# Patient Record
Sex: MALE | Race: WHITE | NOT HISPANIC OR LATINO | Employment: OTHER | URBAN - METROPOLITAN AREA
[De-identification: names, ages, dates, MRNs, and addresses within clinical notes are randomized per-mention and may not be internally consistent; named-entity substitution may affect disease eponyms.]

---

## 2020-11-08 ENCOUNTER — TRANSFERRED RECORDS (OUTPATIENT)
Dept: HEALTH INFORMATION MANAGEMENT | Facility: CLINIC | Age: 65
End: 2020-11-08

## 2020-12-01 ENCOUNTER — TRANSFERRED RECORDS (OUTPATIENT)
Dept: HEALTH INFORMATION MANAGEMENT | Facility: CLINIC | Age: 65
End: 2020-12-01

## 2020-12-17 ENCOUNTER — TRANSFERRED RECORDS (OUTPATIENT)
Dept: HEALTH INFORMATION MANAGEMENT | Facility: CLINIC | Age: 65
End: 2020-12-17

## 2021-05-27 ENCOUNTER — RECORDS - HEALTHEAST (OUTPATIENT)
Dept: ADMINISTRATIVE | Facility: CLINIC | Age: 66
End: 2021-05-27

## 2021-06-02 LAB
CHOLESTEROL (EXTERNAL): 90 MG/DL (ref 100–199)
HDLC SERPL-MCNC: 33 MG/DL
LDL CHOLESTEROL (EXTERNAL): 42 MG/DL
NON HDL CHOLESTEROL (EXTERNAL): 57 MG/DL
TRIGLYCERIDES (EXTERNAL): 74 MG/DL

## 2021-07-22 LAB — TSH SERPL-ACNC: 1.98 UIU/ML (ref 0.47–4.68)

## 2021-09-13 ENCOUNTER — TRANSFERRED RECORDS (OUTPATIENT)
Dept: HEALTH INFORMATION MANAGEMENT | Facility: CLINIC | Age: 66
End: 2021-09-13

## 2022-01-05 ENCOUNTER — TRANSFERRED RECORDS (OUTPATIENT)
Dept: HEALTH INFORMATION MANAGEMENT | Facility: CLINIC | Age: 67
End: 2022-01-05

## 2022-07-19 ENCOUNTER — OFFICE VISIT (OUTPATIENT)
Dept: URBAN - METROPOLITAN AREA CLINIC 16 | Facility: CLINIC | Age: 67
End: 2022-07-19
Payer: MEDICARE

## 2022-07-19 DIAGNOSIS — E11.9 TYPE 2 DIABETES MELLITUS WITHOUT COMPLICATIONS: Primary | ICD-10-CM

## 2022-07-19 DIAGNOSIS — H52.4 PRESBYOPIA: ICD-10-CM

## 2022-07-19 DIAGNOSIS — H25.813 COMBINED FORMS OF AGE-RELATED CATARACT, BILATERAL: ICD-10-CM

## 2022-07-19 PROCEDURE — 99203 OFFICE O/P NEW LOW 30 MIN: CPT | Performed by: OPTOMETRIST

## 2022-07-19 ASSESSMENT — KERATOMETRY
OD: 43.25
OS: 43.25

## 2022-07-19 ASSESSMENT — VISUAL ACUITY
OS: 20/25
OD: 20/25

## 2022-07-19 ASSESSMENT — INTRAOCULAR PRESSURE
OD: 16
OS: 16

## 2022-07-19 NOTE — IMPRESSION/PLAN
Impression: Type 2 diabetes mellitus without complications: H60.0. Plan: Diabetes type II: no background retinopathy, no signs of neovascularization or macular edema noted. Discussed ocular and systemic benefits of blood sugar control.

## 2022-07-24 LAB — HBA1C MFR BLD: 7.2 %

## 2022-07-26 ENCOUNTER — APPOINTMENT (RX ONLY)
Dept: URBAN - METROPOLITAN AREA CLINIC 166 | Facility: CLINIC | Age: 67
Setting detail: DERMATOLOGY
End: 2022-07-26

## 2022-07-26 DIAGNOSIS — L50.8 OTHER URTICARIA: ICD-10-CM

## 2022-07-26 PROBLEM — L50.1 IDIOPATHIC URTICARIA: Status: ACTIVE | Noted: 2022-07-26

## 2022-07-26 LAB
ALT SERPL-CCNC: 13 IU/L (ref 17–63)
AST SERPL-CCNC: 64 IU/L (ref 15–41)

## 2022-07-26 PROCEDURE — 99253 IP/OBS CNSLTJ NEW/EST LOW 45: CPT

## 2022-07-26 PROCEDURE — ? COUNSELING

## 2022-07-27 LAB — INR (EXTERNAL): 1.4 (ref 0.88–1.16)

## 2022-07-29 LAB
GLUCOSE (EXTERNAL): 114 MG/DL (ref 70–130)
POTASSIUM (EXTERNAL): 3.8 MMOL/L (ref 3.6–5)

## 2022-08-09 ENCOUNTER — TRANSFERRED RECORDS (OUTPATIENT)
Dept: HEALTH INFORMATION MANAGEMENT | Facility: CLINIC | Age: 67
End: 2022-08-09

## 2022-08-09 LAB
CREATININE (EXTERNAL): 0.8 MG/DL (ref 0.7–1.2)
GFR ESTIMATED (EXTERNAL): >60 ML/MIN/1.73M2

## 2022-08-22 ENCOUNTER — TRANSFERRED RECORDS (OUTPATIENT)
Dept: HEALTH INFORMATION MANAGEMENT | Facility: CLINIC | Age: 67
End: 2022-08-22

## 2022-08-23 ENCOUNTER — MEDICAL CORRESPONDENCE (OUTPATIENT)
Dept: HEALTH INFORMATION MANAGEMENT | Facility: CLINIC | Age: 67
End: 2022-08-23

## 2022-08-25 DIAGNOSIS — K85.91 NECROTIZING PANCREATITIS: Primary | ICD-10-CM

## 2022-08-29 ENCOUNTER — TRANSFERRED RECORDS (OUTPATIENT)
Dept: HEALTH INFORMATION MANAGEMENT | Facility: CLINIC | Age: 67
End: 2022-08-29

## 2022-09-13 ENCOUNTER — TRANSCRIBE ORDERS (OUTPATIENT)
Dept: OTHER | Age: 67
End: 2022-09-13

## 2022-09-13 DIAGNOSIS — K85.91 NECROTIZING PANCREATITIS: Primary | ICD-10-CM

## 2022-09-16 ENCOUNTER — TELEPHONE (OUTPATIENT)
Dept: ONCOLOGY | Facility: CLINIC | Age: 67
End: 2022-09-16

## 2022-09-16 NOTE — TELEPHONE ENCOUNTER
Advanced [facility]oscopy     Referring provider: Keke Dyer MD - [facility]  [address]  [place] [address]  Ph: [phone]    Referred to: [facility]     Provider Requested: none specified     Referral Received: 9/13/22     Records received: CE    EGD 8/9/22    CT 8/9/22  IMPRESSION:   1.  Decrease in size of the peripancreatic fluid collection with retained drainage stent.   2.  Metal stent dislodged from the stomach and now within the proximal small bowel.     Images received:     Evaluation for: Necrotizing pancreatitis     Clinical History (per RN review):     Internal med note 8/10/22  67-year-old white male from a [facility] with permanent address in [place] with complex medical history including Parkinson's disease, type 2 diabetes mellitus on insulin therapy, chronic kidney disease, coronary artery disease status post CABG, hyperlipidemia, status post appendectomy, with recent development of acute necrotizing pancreatitis with prior history of known pseudocyst ultimately requiring stent drainage, prolonged antibiotic therapy, and TPN with ECF placement.    Requesting evaluation for recently diagnosed acute necrotizing pancreatitis with acute pain status post hospitalization in July of 2022 at [facility] requiring cystogastrostomy placement for fluid drainage. CT scan of the abdomen and pelvis performed on July 23rd revealed 13.9 x 5.5 x 5 cm bilobed gas and fluid collection replacing much of the pancreas access by a stent placed into the stomach. Bowel otherwise negative for obstruction or inflammation. TPN was subsequently required with worsening hyperglycemia and hyponatremia with worsening renal insufficiency. The patient recently had an upper endoscopy with [facility] yesterday with removal of large stent as well as previously on July 24th and July 27th of which results are currently  unknown.    Labs during admission from recent July 2022 hospitalization revealed a low lipase of 19, glucose of 208, BUN and creatinine of 22 and 1.3 respectively, GFR of 60, sodium of 129, potassium 4.2, chloride 95, total protein 5.9 with albumin 2.6, calcium 8.0 with correction at 9.1. Alkaline phosphatase was 240 with AST of 90 and total bilirubin of 1.4. CBC revealed normal white count with decreased hemoglobin 12.9 and normal differential. COVID screening negative. Urinalysis with orange urine with ketones and 50 mg/dL of glucose.    Patient was previously diagnosed with pancreatic cyst and hospitalized in March of 2022 status post cholecystectomy during that time. Apparently, ERCP evaluation at that time revealed no CBD stones. Additionally, the patient was hospitalized in January of 2022 for an appendectomy following colonoscopy which revealed 13 polyps 1 of which was attached to the appendix according to the patient's wife. He continues to have abdominal pain intermittently according to the wife. He did have a recent CT scan performed yesterday which showed decrease in size of the peripancreatic fluid collection with retained drainage stent and metal stent dislodged from the stomach currently within the proximal small bowel.    The patient's main concern at this time is failure to thrive with weight loss in addition to reasons for development of pancreatic cyst with recurrent abdominal pain requesting GI appointment for same. The patient's wife states that everything was fine until her  had his gallbladder removed and things have never been the same ever since.    At this time, patient, the resident, and myself discussed the plan which is to consult with Gastroenterology regarding recent history of pseudocyst and necrotizing pancreatitis requiring drainage and antibiotics in light of comorbid diabetes mellitus, heart disease, and Parkinson's disease with failure to thrive. Nutritional consultation  will also be obtained.    MD review date:   MD Decision for clinic consultation/Orders:            Referral updates/Patient contacted:

## 2022-09-19 ENCOUNTER — TELEPHONE (OUTPATIENT)
Dept: GASTROENTEROLOGY | Facility: CLINIC | Age: 67
End: 2022-09-19

## 2022-09-19 NOTE — TELEPHONE ENCOUNTER
[facility] Call Center    Phone Message    May a detailed message be left on voicemail: yes     Reason for Call: Other: Pt's wife checking up on the status of her 's referral. Best call back # [phone]. Per pt's spouse, they will be moving back to [place] on October 1, 20222 and their new address will be [address] [place] [address] [address] [place] [address]     Action Taken: Other: gi triage    Travel Screening: Not Applicable

## 2022-09-26 NOTE — TELEPHONE ENCOUNTER
Finesse Olivares RN, Already scheduled with advanced endo.  Does not need a clinic visit in gen gi. He has appointment with Dr. Cedric Saldana on 11/16/2022 at 11 AM.      Viviane Friedman CMA

## 2022-10-31 ENCOUNTER — DOCUMENTATION ONLY (OUTPATIENT)
Dept: GASTROENTEROLOGY | Facility: CLINIC | Age: 67
End: 2022-10-31

## 2022-10-31 NOTE — PROGRESS NOTES
PT called and confirmed Appt.     Called to remind patient of their upcoming appointment with our GI clinic, on 11/16/22 at 11:00 AM with Dr. Saldana. This appointment is scheduled as an in-person appt. Please arrive 15 minutes early to check in for your appointment. , if your appointment is virtual (video or telephone) you need to be in [place] for the visit. To reschedule or cancel patient to call [phone].     SK

## 2022-10-31 NOTE — PROGRESS NOTES
Called PT and left VM.     Called to remind patient of their upcoming appointment with our GI clinic, on 11/16/22 at 11:00 AM with Dr. Saldana. This appointment is scheduled as an in-person appt. Please arrive 15 minutes early to check in for your appointment. , if your appointment is virtual (video or telephone) you need to be in [place] for the visit. To reschedule or cancel patient to call [phone].    SK

## 2022-11-11 ENCOUNTER — DOCUMENTATION ONLY (OUTPATIENT)
Dept: GASTROENTEROLOGY | Facility: CLINIC | Age: 67
End: 2022-11-11

## 2022-11-11 NOTE — PROGRESS NOTES
Called [facility] to request images for all images from July/August 2022.    Faxed request per [facility] to [phone].  Provided Mailing address and was told that images would be mailed.    CLINIC INFORMATION:  [facility]     [address] [place] [address] [phone] sk

## 2022-11-16 ENCOUNTER — OFFICE VISIT (OUTPATIENT)
Dept: GASTROENTEROLOGY | Facility: CLINIC | Age: 67
End: 2022-11-16
Payer: MEDICARE

## 2022-11-16 VITALS
HEIGHT: 68 IN | WEIGHT: 169.9 LBS | SYSTOLIC BLOOD PRESSURE: 149 MMHG | HEART RATE: 71 BPM | BODY MASS INDEX: 25.75 KG/M2 | OXYGEN SATURATION: 98 % | DIASTOLIC BLOOD PRESSURE: 86 MMHG

## 2022-11-16 DIAGNOSIS — K85.91 NECROTIZING PANCREATITIS: ICD-10-CM

## 2022-11-16 PROCEDURE — 99204 OFFICE O/P NEW MOD 45 MIN: CPT | Performed by: INTERNAL MEDICINE

## 2022-11-16 RX ORDER — ATORVASTATIN CALCIUM 40 MG/1
1 TABLET, FILM COATED ORAL DAILY
COMMUNITY
Start: 2021-06-30 | End: 2024-08-08

## 2022-11-16 RX ORDER — GABAPENTIN 300 MG/1
300 CAPSULE ORAL
COMMUNITY
Start: 2022-10-17 | End: 2023-06-01

## 2022-11-16 RX ORDER — METOPROLOL SUCCINATE 25 MG/1
25 TABLET, EXTENDED RELEASE ORAL
COMMUNITY
Start: 2022-04-01 | End: 2023-06-01

## 2022-11-16 RX ORDER — CARBIDOPA AND LEVODOPA 50; 200 MG/1; MG/1
1 TABLET, EXTENDED RELEASE ORAL AT BEDTIME
COMMUNITY
End: 2024-06-18

## 2022-11-16 RX ORDER — DIPHENOXYLATE HYDROCHLORIDE AND ATROPINE SULFATE 2.5; .025 MG/1; MG/1
1 TABLET ORAL DAILY
COMMUNITY

## 2022-11-16 RX ORDER — PANCRELIPASE LIPASE, PANCRELIPASE PROTEASE, PANCRELIPASE AMYLASE 40000; 126000; 168000 [USP'U]/1; [USP'U]/1; [USP'U]/1
1 CAPSULE, DELAYED RELEASE ORAL
Qty: 90 CAPSULE | Refills: 11
Start: 2022-11-16 | End: 2022-12-16

## 2022-11-16 RX ORDER — PEN NEEDLE, DIABETIC 32GX 5/32"
NEEDLE, DISPOSABLE MISCELLANEOUS
COMMUNITY
Start: 2022-08-31 | End: 2023-09-07

## 2022-11-16 RX ORDER — CARBIDOPA AND LEVODOPA 25; 100 MG/1; MG/1
20-100 TABLET ORAL 2 TIMES DAILY
Status: ON HOLD | COMMUNITY
Start: 2022-11-11 | End: 2023-01-12

## 2022-11-16 ASSESSMENT — PAIN SCALES - GENERAL: PAINLEVEL: SEVERE PAIN (7)

## 2022-11-16 NOTE — PROGRESS NOTES
"Chief Complaint   Patient presents with     New Patient     Necrotizing pancreatitis [K85.91]         Vitals:    11/16/22 1030   BP: (!) 149/86   BP Location: Left arm   Cuff Size: Adult Regular   Pulse: 71   SpO2: 98%   Weight: 77.1 kg (169 lb 14.4 oz)   Height: 1.727 m (5' 8\")       Body mass index is 25.83 kg/m .    Rachel Mcintosh is a 67-year-old accompanied by his wife and [facility] are [facility] Paul borja at the patient's request for evaluation and follow-up of necrotizing pancreatitis.  We have no images but do have access through care everywhere.  Essentially admitted in March in [place] for pancreatitis cholecystectomy and then returned with necrotizing pancreatitis which is gone on over a period of many months.  The last GI summary I could find is copied and pasted below from August.  At that time he had had endoscopic transluminal drainage with a 10 x 10 axial stent which then migrated.  Last CT mentions a very large residual collection but it is unclear whether that is persisted.    Is currently insulin-dependent, has documented exocrine pancreatic insufficiency and is on 80,000 units lipase Zenpep with each meal leading to constipation.  His main issue is chronic pain which is between a 6 and 8 out of 10 constant periumbilical radiates to the back and is worse with exercise and with sitting in a hard back chair.  His other major problems are Parkinson's disease diabetes.  For his pain he has been seeing a pain clinic in [facility] who have him on gabapentin only and behavioral therapy.  He has not had any imaging in the last 2 months in the last 1 I could find was only an MRCP.    On physical exam he has no focal tenderness in his abdomen.  He appears surprisingly well with some tremor related to Parkinson's disease    We spent a total of 40 minutes in person sifting through his records and looking for data.    We concluded that his pain may be related to residual large " postnecrotic collection, disconnected duct, migrated cyst axial stent which is apparently in the small bowel or nonspecific.  We really cannot tell much without a repeat CT scan which we will for tomorrow.  He also expressed the concern to centralize all his care under one roof and as such I recommend that we put in referrals for primary care, neurology for his Parkinson's disease diabetes clinic for his diabetes and her pain clinic as I suspect that unless we can find a anatomically addressable cause of pain such as it recurrent large collection, disconnected duct, or migrated stent he will need more advanced pain management.    Plan  #1 CMP lipase amylase CBC INR  #2 CT with pancreas protocol of abdomen and pelvis  #3 referral to [facility] primary care, neurology, endocrinology, pain clinic  #4 follow-up with us in the next week at least by MyChart and phone call regarding CT scan and next step      Chief Complaint   Patient presents with   Follow-up   Pancreatic necrosis, Veronika Pena (spouse) with pt     Subjective & ROS Meds and Allergies     Patient is a 67 y.o. male who was evaluated   regarding   Chief Complaint   Patient presents with   Follow-up   Pancreatic necrosis, Veronika Pena (spouse) with pt     He is a pleasant 68 yo male with past medial history of Parkinson's, coronary artery disease, diabetes, hypertension, hyperlipidemia. He was hospitalized in March at [facility] with acute gallstone pancreatitis. He underwent lap lee ann 3/16/2022 with IOC negative at that time. He was also noted to have undergone MRCP which was negative for choledocholithiasis but demonstrated evolving pancreatic pseudocyst. He was discharged on TPN and is noted to have multiple recurrent admissions at [facility]. He was referred to our service by Dr. Whalen in June for management of his large pancreatic pseudocyst. He underwent EUS 07/18/22 which demonstrated 52 mm x 35 mm pseudocyst in the pancreatic body as well as  46 mm x 22 mm pseudocyst was seen in the pancreatic head. Cystogastrostomy was successfully performed with placement of a 10 mm x 10 mm AXIOS stent. He underwent repeat EGD with Dr. Rangel 07/24/22 with necrosectomy and again on 7/27. At that time he was noted to have moderate stenosis at the pylorus and additional AXIOS was placed at that time. He most recently underwent EGD 8/9/2022 at which time AXIOS was removed and 2 dobule pigtail stents were left across the cystgastrostomy tract. The AXIOS at his pylorus was noted to have migrated on this study. For this reason CT ABd with IV contrast was completed 8/9 which demonstrated fluide collection of 14.7 cm x 1.3 cm x 4.3 cm with report of calcifications within the distal fluid collection near the tail as well as pyloric stent in the small bowel without obstruction. At that time we recommended fecal elastase which is noted to be low at 73 and vit D level which is normal.     Today he reports he is not so good. He still has pain in his stomach. He reports this is not any better. It is constant and can be more severe at times. It is currently 3-4/10 most days. He denies any nasuea or vomiting. Denies fever or chills. He his having a bowel movement once every two days. His appetite is okay but he has lost weight. His wife reports they may be moving to [place] to have more help from family. They have reached out to [facility] to establish care.     Review of Systems   Constitutional: Negative.   HENT: Negative.   Eyes: Negative.   Respiratory: Negative.   Cardiovascular: Negative.   Gastrointestinal: Negative.   Genitourinary: Negative.   Musculoskeletal: Negative.   Skin: Negative.   Neurological: Negative.   Endo/Heme/Allergies: Negative.   Psychiatric/Behavioral: Negative.   All other systems reviewed and are negative.    Meds and Allergies     No outpatient medications have been marked as taking for the 8/22/22 encounter (Video Visit) with Keke  "MD Manisha.     Allergies as of 08/22/2022 - Review Complete 08/22/2022   Allergen Reaction Noted   Ciprofloxacin Other (See Comments) 07/23/2022   Morphine Rash 07/14/2022   Oxycodone Rash 08/08/2022   Penicillins Rash 07/14/2022     Problem List     Problem List as of 8/22/2022       Gastrointestinal and Abdominal   Acute pancreatitis   Overview Signed 6/14/2022 9:42 AM by Julianne Frost   Added automatically from request for surgery [id number]      Pancreatic pseudocyst   Overview Signed 7/21/2022 4:16 PM by Yi Mcpherson   Added automatically from request for surgery [id number]        Genitourinary and Reproductive   Hyponatremia       Physical Exam     Vitals:   08/22/22 1030   Weight: 76.7 kg (169 lb)   Height: 5' 8\" (1.727 m)     Physical Exam  HENT:   Head: Normocephalic and atraumatic.   Eyes:   Conjunctiva/sclera: Conjunctivae normal.   Pulmonary:   Effort: Pulmonary effort is normal.   Musculoskeletal:   General: Normal range of motion.   Cervical back: Normal range of motion.   Neurological:   Mental Status: He is alert and oriented to person, place, and time.   Psychiatric:   Mood and Affect: Mood and affect normal.   Cognition and Memory: Memory normal.   Judgment: Judgment normal.       Labs     Chemistries:  No results for input(s): NA, K, CL, CO2, BUN, CREATININE, GLU, CA, ALB, TP, AST, ALT in the last 72 hours.    Hematology:  No results for input(s): WBC, HGB, HCT, PLTCT in the last 72 hours.    Coagulation:  No results for input(s): PRO, INR, APTT in the last 72 hours.    Liver Function Tests:  Lab Results   Component Value Date   TP 4.8 (L) 07/26/2022   ALB 2.0 (L) 07/26/2022   GLOB 2.8 07/26/2022   AG 0.7 (L) 07/26/2022   TBIL 1.0 07/26/2022   DBIL 0.5 07/26/2022    (H) 07/26/2022   AST 64 (H) 07/26/2022   ALT 13 (L) 07/26/2022       Pancreatic Results:  Lab Results   Component Value Date   LIP 19 (L) 07/23/2022       Assessment/Plan     Visit Diagnoses:    Necrotizing Pancreatitis  - " Occurred March 2022 related to gallstones  - S/P cholecystectomy  - Complicated by infected pancreatic pseudocyst and EPI  - Referred to our service in June    Infected Pancreatic Pseudocyst  - S/P EUS guided cystgastrostomy and serial necrosectomy 7/18, 7/24, 7/27 and 8/9  - AXIOS removed 8/9/2022 with two pigtail stents left across the cystgastrostomy tract    4. Exocrine Pancreatic Insufficiency  - Noted on recent labs with fecal elastase of 74  - Noted weight loss as above    4. Weight Loss  - Secondary to above    5. Parkinson's Disease    6. Pyloric Stenosis  - Noted at time of prior procedure with AXIOS placed  - AXIOS migrated into small bowel on last CT  - No obstruction noted.     Today we discussed new diagnosis of EPI with patient and Rx sent in for Creon. Discussed dosing in detail. We also discussed findings of migrated AXIOS in the small bowel which migrated from the pylorus and CT images were personally reviewed today. We recommend KUB in two weeks at [facility] for surveillance of this and follow up OV in 4-6 weeks. We will also send referral to [facility], Fax # [phone], Attention referral review team.     Keke Dyer MD   8/22/2022  2:50 PM    Electronically signed by Keke Dyer MD at 08/22/2022 3:10 PM MST

## 2022-11-16 NOTE — PROGRESS NOTES
Called [facility] to follow-up on request for all images from July/August 2022.     They denied receiving first fax, so I refaxed request to [phone].  Provided Mailing address and was told that images would be mailed.     CLINIC INFORMATION:  [facility]     [address] [place] [id number] [phone] sk

## 2022-11-16 NOTE — LETTER
"    11/16/2022         RE: Arnaldo Henderson  [address]  [facility] [address]        Dear Colleague,    Thank you for referring your patient, Arnaldo Henderson, to the [facility] [place]. Please see a copy of my visit note below.    Chief Complaint   Patient presents with     New Patient     Necrotizing pancreatitis [K85.91]         Vitals:    11/16/22 1030   BP: (!) 149/86   BP Location: Left arm   Cuff Size: Adult Regular   Pulse: 71   SpO2: 98%   Weight: 77.1 kg (169 lb 14.4 oz)   Height: 1.727 m (5' 8\")       Body mass index is 25.83 kg/m .    Rachel Mcintosh is a 67-year-old accompanied by his wife and they are referred from [facility] partners Dr. Paul borja at the patient's request for evaluation and follow-up of necrotizing pancreatitis.  We have no images but do have access through care everywhere.  Essentially admitted in March in [place] for pancreatitis cholecystectomy and then returned with necrotizing pancreatitis which is gone on over a period of many months.  The last GI summary I could find is copied and pasted below from August.  At that time he had had endoscopic transluminal drainage with a 10 x 10 axial stent which then migrated.  Last CT mentions a very large residual collection but it is unclear whether that is persisted.    Is currently insulin-dependent, has documented exocrine pancreatic insufficiency and is on 80,000 units lipase Zenpep with each meal leading to constipation.  His main issue is chronic pain which is between a 6 and 8 out of 10 constant periumbilical radiates to the back and is worse with exercise and with sitting in a hard back chair.  His other major problems are Parkinson's disease diabetes.  For his pain he has been seeing a pain clinic in [facility] who have him on gabapentin only and behavioral therapy.  He has not had any imaging in the last 2 months in the last 1 I could find was only an MRCP.    On " physical exam he has no focal tenderness in his abdomen.  He appears surprisingly well with some tremor related to Parkinson's disease    We spent a total of 40 minutes in person sifting through his records and looking for data.    We concluded that his pain may be related to residual large postnecrotic collection, disconnected duct, migrated cyst axial stent which is apparently in the small bowel or nonspecific.  We really cannot tell much without a repeat CT scan which we will for tomorrow.  He also expressed the concern to centralize all his care under one roof and as such I recommend that we put in referrals for primary care, neurology for his Parkinson's disease diabetes clinic for his diabetes and her pain clinic as I suspect that unless we can find a anatomically addressable cause of pain such as it recurrent large collection, disconnected duct, or migrated stent he will need more advanced pain management.    Plan  #1 CMP lipase amylase CBC INR  #2 CT with pancreas protocol of abdomen and pelvis  #3 referral to [facility] primary care, neurology, endocrinology, pain clinic  #4 follow-up with us in the next week at least by MyChart and phone call regarding CT scan and next step      Chief Complaint   Patient presents with   Follow-up   Pancreatic necrosis, Veronika Pena (spouse) with pt     Subjective & ROS Meds and Allergies     Patient is a 67 y.o. male who was evaluated   regarding   Chief Complaint   Patient presents with   Follow-up   Pancreatic necrosis, Veronika Pena (spouse) with pt     He is a pleasant 68 yo male with past medial history of Parkinson's, coronary artery disease, diabetes, hypertension, hyperlipidemia. He was hospitalized in March at [facility] with acute gallstone pancreatitis. He underwent lap lee ann 3/16/2022 with IOC negative at that time. He was also noted to have undergone MRCP which was negative for choledocholithiasis but demonstrated evolving pancreatic pseudocyst. He was  discharged on TPN and is noted to have multiple recurrent admissions at [facility]. He was referred to our service by Dr. Whalen in June for management of his large pancreatic pseudocyst. He underwent EUS 07/18/22 which demonstrated 52 mm x 35 mm pseudocyst in the pancreatic body as well as 46 mm x 22 mm pseudocyst was seen in the pancreatic head. Cystogastrostomy was successfully performed with placement of a 10 mm x 10 mm AXIOS stent. He underwent repeat EGD with Dr. Rangel 07/24/22 with necrosectomy and again on 7/27. At that time he was noted to have moderate stenosis at the pylorus and additional AXIOS was placed at that time. He most recently underwent EGD 8/9/2022 at which time AXIOS was removed and 2 dobule pigtail stents were left across the cystgastrostomy tract. The AXIOS at his pylorus was noted to have migrated on this study. For this reason CT ABd with IV contrast was completed 8/9 which demonstrated fluide collection of 14.7 cm x 1.3 cm x 4.3 cm with report of calcifications within the distal fluid collection near the tail as well as pyloric stent in the small bowel without obstruction. At that time we recommended fecal elastase which is noted to be low at 73 and vit D level which is normal.     Today he reports he is not so good. He still has pain in his stomach. He reports this is not any better. It is constant and can be more severe at times. It is currently 3-4/10 most days. He denies any nasuea or vomiting. Denies fever or chills. He his having a bowel movement once every two days. His appetite is okay but he has lost weight. His wife reports they may be moving to [place] to have more help from family. They have reached out to [facility] to establish care.     Review of Systems   Constitutional: Negative.   HENT: Negative.   Eyes: Negative.   Respiratory: Negative.   Cardiovascular: Negative.   Gastrointestinal: Negative.   Genitourinary: Negative.   Musculoskeletal: Negative.  "  Skin: Negative.   Neurological: Negative.   Endo/Heme/Allergies: Negative.   Psychiatric/Behavioral: Negative.   All other systems reviewed and are negative.    Meds and Allergies     No outpatient medications have been marked as taking for the 8/22/22 encounter (Video Visit) with Keke Dyer MD.     Allergies as of 08/22/2022 - Review Complete 08/22/2022   Allergen Reaction Noted   Ciprofloxacin Other (See Comments) 07/23/2022   Morphine Rash 07/14/2022   Oxycodone Rash 08/08/2022   Penicillins Rash 07/14/2022     Problem List     Problem List as of 8/22/2022       Gastrointestinal and Abdominal   Acute pancreatitis   Overview Signed 6/14/2022 9:42 AM by Julianne Frost   Added automatically from request for surgery [id number]      Pancreatic pseudocyst   Overview Signed 7/21/2022 4:16 PM by Yi Mcpherson   Added automatically from request for surgery [id number]        Genitourinary and Reproductive   Hyponatremia       Physical Exam     Vitals:   08/22/22 1030   Weight: 76.7 kg (169 lb)   Height: 5' 8\" (1.727 m)     Physical Exam  HENT:   Head: Normocephalic and atraumatic.   Eyes:   Conjunctiva/sclera: Conjunctivae normal.   Pulmonary:   Effort: Pulmonary effort is normal.   Musculoskeletal:   General: Normal range of motion.   Cervical back: Normal range of motion.   Neurological:   Mental Status: He is alert and oriented to person, place, and time.   Psychiatric:   Mood and Affect: Mood and affect normal.   Cognition and Memory: Memory normal.   Judgment: Judgment normal.       Labs     Chemistries:  No results for input(s): NA, K, CL, CO2, BUN, CREATININE, GLU, CA, ALB, TP, AST, ALT in the last 72 hours.    Hematology:  No results for input(s): WBC, HGB, HCT, PLTCT in the last 72 hours.    Coagulation:  No results for input(s): PRO, INR, APTT in the last 72 hours.    Liver Function Tests:  Lab Results   Component Value Date   TP 4.8 (L) 07/26/2022   ALB 2.0 (L) 07/26/2022   GLOB 2.8 07/26/2022   AG 0.7 (L) " 07/26/2022   TBIL 1.0 07/26/2022   DBIL 0.5 07/26/2022    (H) 07/26/2022   AST 64 (H) 07/26/2022   ALT 13 (L) 07/26/2022       Pancreatic Results:  Lab Results   Component Value Date   LIP 19 (L) 07/23/2022       Assessment/Plan     Visit Diagnoses:    Necrotizing Pancreatitis  - Occurred March 2022 related to gallstones  - S/P cholecystectomy  - Complicated by infected pancreatic pseudocyst and EPI  - Referred to our service in June    Infected Pancreatic Pseudocyst  - S/P EUS guided cystgastrostomy and serial necrosectomy 7/18, 7/24, 7/27 and 8/9  - AXIOS removed 8/9/2022 with two pigtail stents left across the cystgastrostomy tract    4. Exocrine Pancreatic Insufficiency  - Noted on recent labs with fecal elastase of 74  - Noted weight loss as above    4. Weight Loss  - Secondary to above    5. Parkinson's Disease    6. Pyloric Stenosis  - Noted at time of prior procedure with AXIOS placed  - AXIOS migrated into small bowel on last CT  - No obstruction noted.     Today we discussed new diagnosis of EPI with patient and Rx sent in for Creon. Discussed dosing in detail. We also discussed findings of migrated AXIOS in the small bowel which migrated from the pylorus and CT images were personally reviewed today. We recommend KUB in two weeks at [facility] for surveillance of this and follow up OV in 4-6 weeks. We will also send referral to [facility], Fax # [phone], Attention referral review team.     Keke Dyer MD   8/22/2022  2:50 PM    Electronically signed by Keke Dyer MD at 08/22/2022 3:10 PM MST            Sincerely,    Cedric Saldana MD

## 2022-11-16 NOTE — PATIENT INSTRUCTIONS
Follow up:    Dr. Saldana has outlined the following steps after your recent clinic visit:    LABS- orders are in to get labs drawn at any [facility]    CT- Ct Pancreas protocol. Please call [phone] to schedule the imaging.     Referrals have been placed to:  Endocrinology  Pain Clinic      Please reach out to a Primary Care Provider within the [facility]  [email or web address]  [address], [place] [address]     [phone]    Please call with any questions or concerns regarding your clinic visit today.    It is a pleasure being involved in your health care.    Contacts post-consultation depending on your need:    Schedule Clinic Appointments            [phone] # 1   M-F 7:30 - 5 pm    Phyllis Butterfield, RN Care Coordinator (Dr. Walden/Dr. Saldana)  [phone]    Laverne Cramer, RN Care Coordinator (Dr. Ramos)   [phone]    Esperanza Thomas, RN Care Coordinator (Dr. Castillo/Dr. Fraire)  [phone]     OR Procedure Scheduling                                 [phone]    For urgent/emergent questions after business hours, you may reach the on-call GI Fellow by contacting the [facility]  at [phone].    How do I schedule labs, imaging studies, or procedures that were ordered in clinic today?     Labs: To schedule lab appointment at the Clinic and Surgery Center, use my chart or call [phone]. If you have a [facility] lab closer to home where you are regularly seen you can give them a call.     Procedures: If a colonoscopy, upper endoscopy, breath test, esophageal manometry, or pH impedence was ordered today, our endoscopy team will call you to schedule this. If you have not heard from our endoscopy team within a week, please call [phone] to schedule.     Imaging Studies: If you were scheduled for a CT scan, X-ray, MRI, ultrasound, HIDA scan or other imaging study, please call  [phone] to have this scheduled.     Referral: If a referral to another specialty was ordered, expect a phone call or follow instructions above. If you have not heard from anyone regarding your referral in a week, please call our clinic to check the status.     How to I schedule a follow-up visit?  If you did not schedule a follow-up visit today, please call [phone] to schedule a follow-up office visit.

## 2022-11-18 ENCOUNTER — LAB (OUTPATIENT)
Dept: LAB | Facility: CLINIC | Age: 67
End: 2022-11-18
Payer: MEDICARE

## 2022-11-18 DIAGNOSIS — K85.91 NECROTIZING PANCREATITIS: ICD-10-CM

## 2022-11-18 LAB
ALBUMIN SERPL BCG-MCNC: 3.9 G/DL (ref 3.5–5.2)
ALP SERPL-CCNC: 185 U/L (ref 40–129)
ALT SERPL W P-5'-P-CCNC: 11 U/L (ref 10–50)
AMYLASE SERPL-CCNC: 42 U/L (ref 28–100)
ANION GAP SERPL CALCULATED.3IONS-SCNC: 14 MMOL/L (ref 7–15)
AST SERPL W P-5'-P-CCNC: 47 U/L (ref 10–50)
BILIRUB SERPL-MCNC: 0.8 MG/DL
BUN SERPL-MCNC: 23.4 MG/DL (ref 8–23)
CALCIUM SERPL-MCNC: 9.1 MG/DL (ref 8.8–10.2)
CHLORIDE SERPL-SCNC: 104 MMOL/L (ref 98–107)
CREAT SERPL-MCNC: 0.84 MG/DL (ref 0.67–1.17)
DEPRECATED HCO3 PLAS-SCNC: 22 MMOL/L (ref 22–29)
ERYTHROCYTE [DISTWIDTH] IN BLOOD BY AUTOMATED COUNT: 14.2 % (ref 10–15)
GFR SERPL CREATININE-BSD FRML MDRD: >90 ML/MIN/1.73M2
GLUCOSE SERPL-MCNC: 72 MG/DL (ref 70–99)
HCT VFR BLD AUTO: 40.1 % (ref 40–53)
HGB BLD-MCNC: 13.1 G/DL (ref 13.3–17.7)
LIPASE SERPL-CCNC: 21 U/L (ref 13–60)
MCH RBC QN AUTO: 30.6 PG (ref 26.5–33)
MCHC RBC AUTO-ENTMCNC: 32.7 G/DL (ref 31.5–36.5)
MCV RBC AUTO: 94 FL (ref 78–100)
PLATELET # BLD AUTO: 216 10E3/UL (ref 150–450)
POTASSIUM SERPL-SCNC: 3.6 MMOL/L (ref 3.4–5.3)
PROT SERPL-MCNC: 7.2 G/DL (ref 6.4–8.3)
RBC # BLD AUTO: 4.28 10E6/UL (ref 4.4–5.9)
SODIUM SERPL-SCNC: 140 MMOL/L (ref 136–145)
WBC # BLD AUTO: 7.4 10E3/UL (ref 4–11)

## 2022-11-18 PROCEDURE — 36415 COLL VENOUS BLD VENIPUNCTURE: CPT

## 2022-11-18 PROCEDURE — 83690 ASSAY OF LIPASE: CPT

## 2022-11-18 PROCEDURE — 80053 COMPREHEN METABOLIC PANEL: CPT

## 2022-11-18 PROCEDURE — 82150 ASSAY OF AMYLASE: CPT

## 2022-11-18 PROCEDURE — 85027 COMPLETE CBC AUTOMATED: CPT

## 2022-11-21 ENCOUNTER — HOSPITAL ENCOUNTER (OUTPATIENT)
Dept: CT IMAGING | Facility: HOSPITAL | Age: 67
Discharge: HOME | End: 2022-11-21
Attending: INTERNAL MEDICINE | Admitting: INTERNAL MEDICINE
Payer: MEDICARE

## 2022-11-21 PROCEDURE — 250N000011 HC RX IP 250 OP 636: Performed by: INTERNAL MEDICINE

## 2022-11-21 PROCEDURE — G1010 CDSM STANSON: HCPCS

## 2022-11-21 RX ORDER — IOPAMIDOL 755 MG/ML
75 INJECTION, SOLUTION INTRAVASCULAR ONCE
Status: COMPLETED | OUTPATIENT
Start: 2022-11-21 | End: 2022-11-21

## 2022-11-21 RX ADMIN — IOPAMIDOL 75 ML: 755 INJECTION, SOLUTION INTRAVENOUS at 08:21

## 2022-11-21 NOTE — PROGRESS NOTES
Called [facility] to follow-up on request for all images from July/August 2022.     They denied receiving either fax, so I refaxed request to [phone].    Provided Mailing address and was told that images would be mailed.     CLINIC INFORMATION:  [facility]     [address] [place] [id number] [phone] sk

## 2022-11-22 ENCOUNTER — PATIENT OUTREACH (OUTPATIENT)
Dept: GASTROENTEROLOGY | Facility: CLINIC | Age: 67
End: 2022-11-22

## 2022-11-22 DIAGNOSIS — K85.91 NECROTIZING PANCREATITIS: Primary | ICD-10-CM

## 2022-11-22 RX ORDER — BISACODYL 5 MG
TABLET, DELAYED RELEASE (ENTERIC COATED) ORAL
Qty: 4 TABLET | Refills: 0 | Status: ON HOLD | OUTPATIENT
Start: 2022-11-22 | End: 2023-02-20

## 2022-11-22 NOTE — TELEPHONE ENCOUNTER
Called patient/wife regarding next steps in POC per Dr. Les Ferguson Lets do outpatient slow golytlely prep, serial abd xray flat and upright prior to prep then repeat q 5 days.     Imaging ordered, talked to wife about plan. Pharmacy verified.    ML

## 2022-11-25 ENCOUNTER — ANCILLARY PROCEDURE (OUTPATIENT)
Dept: GENERAL RADIOLOGY | Facility: CLINIC | Age: 67
End: 2022-11-25
Attending: INTERNAL MEDICINE
Payer: MEDICARE

## 2022-11-25 DIAGNOSIS — K85.91 NECROTIZING PANCREATITIS: ICD-10-CM

## 2022-11-25 PROCEDURE — 74019 RADEX ABDOMEN 2 VIEWS: CPT | Mod: TC | Performed by: RADIOLOGY

## 2022-11-28 ENCOUNTER — PATIENT OUTREACH (OUTPATIENT)
Dept: GASTROENTEROLOGY | Facility: CLINIC | Age: 67
End: 2022-11-28

## 2022-11-28 NOTE — TELEPHONE ENCOUNTER
Called wife to review prep, pt feeling better and might try prep tonight to try and expel stent. Pt feels ready to try prep tonight, split prep, and will finish tomorrow.   Questions answered. Reviewed s/sx of bowel obstruction, pt vomited x1 but feels better now, able to pass gas/stool, no new abdominal pain. No concern for obstruction at this time.    ML

## 2022-11-30 DIAGNOSIS — K85.91 NECROTIZING PANCREATITIS: Primary | ICD-10-CM

## 2022-12-05 ENCOUNTER — ANCILLARY PROCEDURE (OUTPATIENT)
Dept: GENERAL RADIOLOGY | Facility: CLINIC | Age: 67
End: 2022-12-05
Attending: INTERNAL MEDICINE
Payer: MEDICARE

## 2022-12-05 DIAGNOSIS — K85.91 NECROTIZING PANCREATITIS: ICD-10-CM

## 2022-12-05 PROCEDURE — 74019 RADEX ABDOMEN 2 VIEWS: CPT | Mod: TC | Performed by: RADIOLOGY

## 2022-12-08 ENCOUNTER — PREP FOR PROCEDURE (OUTPATIENT)
Dept: GASTROENTEROLOGY | Facility: CLINIC | Age: 67
End: 2022-12-08

## 2022-12-08 ENCOUNTER — PATIENT OUTREACH (OUTPATIENT)
Dept: GASTROENTEROLOGY | Facility: CLINIC | Age: 67
End: 2022-12-08

## 2022-12-08 DIAGNOSIS — Z46.89 ENCOUNTER FOR REMOVAL OF PANCREATIC STENT: Primary | ICD-10-CM

## 2022-12-08 DIAGNOSIS — T85.528A: Primary | ICD-10-CM

## 2022-12-08 RX ORDER — BISACODYL 5 MG
TABLET, DELAYED RELEASE (ENTERIC COATED) ORAL
Qty: 4 TABLET | Refills: 0 | Status: ON HOLD | OUTPATIENT
Start: 2022-12-08 | End: 2023-02-20

## 2022-12-08 NOTE — PROGRESS NOTES
Called pt to discuss referral from Dr Saldana to remove stent. Left VM, holding date at [facility] on 1/12/23    Procedure/Imaging/Clinic: Colonoscopy with fluoroscopy   Physician: Juno   Timing: next avail   Procedure length: 60 min   Anesthesia: MAC   Dx: stent migration   Tier: 2   Location: [facility] OR or [facility] OR    1530    Pt's wife called back and confirmed date of 1/12/23    Explained they will need a , someone to stay with them for 24 hours and should stay in town for 24 hours (within 45 min of Hospital) post procedure    Patient needs to get pre-op physical completed. If outside  health system will need physical faxed to number [phone]   If you do not get a preop physical, your procedure could be cancelled, patient voiced understanding*    Preop Plan: Will make appoinment with [facility] provider within 30 days of procedure     Med Review    Blood thinner -  none  ASA - 81 mg  Diabetic - yes, insulin pt will get guidance at pre op exam    COVID test discussed: no longer required per new policy, unless pt symptomatic or exposure     Patient Education r/t procedure: rtevin    A pre-op nurse will call 1-2 days prior to the procedure.    NPO/Prep:   Golytely bowel prep, sent to pharmacy on file    Pt's wife had concerns about patient developing pancreatitis after this procedure. Explained that the stent is in distal small bowel and Dr Fraire's procedure will not reach the pancreas    Verbalized understanding of all instructions. All questions answered.     Procedure order placed, message routed to OR / Endo     Latonia Thomas RN, BSN,   [facility]  Care coordinator

## 2022-12-09 ENCOUNTER — PREP FOR PROCEDURE (OUTPATIENT)
Dept: GASTROENTEROLOGY | Facility: CLINIC | Age: 67
End: 2022-12-09

## 2022-12-16 RX ORDER — PANCRELIPASE LIPASE, PANCRELIPASE PROTEASE, PANCRELIPASE AMYLASE 40000; 126000; 168000 [USP'U]/1; [USP'U]/1; [USP'U]/1
1 CAPSULE, DELAYED RELEASE ORAL
Qty: 90 CAPSULE | Refills: 11 | Status: SHIPPED | OUTPATIENT
Start: 2022-12-16 | End: 2023-07-12

## 2022-12-19 ENCOUNTER — PATIENT OUTREACH (OUTPATIENT)
Dept: GASTROENTEROLOGY | Facility: CLINIC | Age: 67
End: 2022-12-19

## 2022-12-19 DIAGNOSIS — T85.528A: Primary | ICD-10-CM

## 2022-12-19 NOTE — TELEPHONE ENCOUNTER
"Per Dr. Saldana related to symptom complaints:  Repeat flat upright of abdomen   If worse, come to OUR ED (not other and can have more urgent attempts at removal of migrated AXIOS that was in small bowel, by retrograde colonoscopy/ileoscopy)       Called to talk to wife, pt is feeling better. Pt felt like he \"had a big bubble in his stomach, vomited once\" - Discussed plan for xray or ER if symptoms worsened. Orders placed for xray, address given for ER @ [facility]  They will not get xray now but will call us if happens again. Reviewed plan for procedure with Dr Fraire on 1/12/23    ML  "

## 2023-01-05 LAB
ALT SERPL-CCNC: <10 U/L
AST SERPL-CCNC: 19 U/L (ref 10–40)
CREATININE (EXTERNAL): 0.95 MG/DL (ref 0.73–1.18)
GFR ESTIMATED (EXTERNAL): >60 ML/MIN/1.73M2
GLUCOSE (EXTERNAL): 99 MG/DL (ref 70–100)
POTASSIUM (EXTERNAL): 4.1 MMOL/L (ref 3.5–5.1)

## 2023-01-11 RX ORDER — CETIRIZINE HYDROCHLORIDE 10 MG/1
10 TABLET ORAL DAILY
Status: ON HOLD | COMMUNITY
End: 2023-02-20

## 2023-01-12 ENCOUNTER — HOSPITAL ENCOUNTER (OUTPATIENT)
Facility: CLINIC | Age: 68
Discharge: HOME | End: 2023-01-12
Attending: INTERNAL MEDICINE | Admitting: INTERNAL MEDICINE
Payer: MEDICARE

## 2023-01-12 ENCOUNTER — ANESTHESIA EVENT (OUTPATIENT)
Dept: SURGERY | Facility: CLINIC | Age: 68
End: 2023-01-12
Payer: MEDICARE

## 2023-01-12 ENCOUNTER — ANESTHESIA (OUTPATIENT)
Dept: SURGERY | Facility: CLINIC | Age: 68
End: 2023-01-12
Payer: MEDICARE

## 2023-01-12 ENCOUNTER — APPOINTMENT (OUTPATIENT)
Dept: GENERAL RADIOLOGY | Facility: CLINIC | Age: 68
End: 2023-01-12
Attending: INTERNAL MEDICINE
Payer: MEDICARE

## 2023-01-12 VITALS
HEART RATE: 75 BPM | RESPIRATION RATE: 16 BRPM | TEMPERATURE: 98 F | DIASTOLIC BLOOD PRESSURE: 84 MMHG | WEIGHT: 174.4 LBS | BODY MASS INDEX: 26.43 KG/M2 | SYSTOLIC BLOOD PRESSURE: 160 MMHG | OXYGEN SATURATION: 99 % | HEIGHT: 68 IN

## 2023-01-12 DIAGNOSIS — K85.91 NECROTIZING PANCREATITIS: Primary | ICD-10-CM

## 2023-01-12 LAB
COLONOSCOPY: NORMAL
GLUCOSE BLDC GLUCOMTR-MCNC: 75 MG/DL (ref 70–99)
GLUCOSE BLDC GLUCOMTR-MCNC: 81 MG/DL (ref 70–99)

## 2023-01-12 PROCEDURE — 250N000009 HC RX 250

## 2023-01-12 PROCEDURE — 250N000011 HC RX IP 250 OP 636

## 2023-01-12 PROCEDURE — 82962 GLUCOSE BLOOD TEST: CPT

## 2023-01-12 PROCEDURE — 710N000009 HC RECOVERY PHASE 1, LEVEL 1, PER MIN: Performed by: INTERNAL MEDICINE

## 2023-01-12 PROCEDURE — 360N000082 HC SURGERY LEVEL 2 W/ FLUORO, PER MIN: Performed by: INTERNAL MEDICINE

## 2023-01-12 PROCEDURE — 370N000017 HC ANESTHESIA TECHNICAL FEE, PER MIN: Performed by: INTERNAL MEDICINE

## 2023-01-12 PROCEDURE — 999N000141 HC STATISTIC PRE-PROCEDURE NURSING ASSESSMENT: Performed by: INTERNAL MEDICINE

## 2023-01-12 PROCEDURE — 999N000179 XR SURGERY CARM FLUORO LESS THAN 5 MIN W STILLS

## 2023-01-12 PROCEDURE — 258N000003 HC RX IP 258 OP 636

## 2023-01-12 PROCEDURE — 710N000012 HC RECOVERY PHASE 2, PER MINUTE: Performed by: INTERNAL MEDICINE

## 2023-01-12 RX ORDER — ONDANSETRON 2 MG/ML
4 INJECTION INTRAMUSCULAR; INTRAVENOUS EVERY 30 MIN PRN
Status: DISCONTINUED | OUTPATIENT
Start: 2023-01-12 | End: 2023-01-12 | Stop reason: HOSPADM

## 2023-01-12 RX ORDER — NALOXONE HYDROCHLORIDE 0.4 MG/ML
0.2 INJECTION, SOLUTION INTRAMUSCULAR; INTRAVENOUS; SUBCUTANEOUS
Status: CANCELLED | OUTPATIENT
Start: 2023-01-12

## 2023-01-12 RX ORDER — PROCHLORPERAZINE MALEATE 5 MG
5 TABLET ORAL EVERY 6 HOURS PRN
Status: CANCELLED | OUTPATIENT
Start: 2023-01-12

## 2023-01-12 RX ORDER — ONDANSETRON 2 MG/ML
4 INJECTION INTRAMUSCULAR; INTRAVENOUS
Status: DISCONTINUED | OUTPATIENT
Start: 2023-01-12 | End: 2023-01-12 | Stop reason: HOSPADM

## 2023-01-12 RX ORDER — LIDOCAINE 40 MG/G
CREAM TOPICAL
Status: DISCONTINUED | OUTPATIENT
Start: 2023-01-12 | End: 2023-01-12 | Stop reason: HOSPADM

## 2023-01-12 RX ORDER — NALOXONE HYDROCHLORIDE 0.4 MG/ML
0.4 INJECTION, SOLUTION INTRAMUSCULAR; INTRAVENOUS; SUBCUTANEOUS
Status: CANCELLED | OUTPATIENT
Start: 2023-01-12

## 2023-01-12 RX ORDER — FENTANYL CITRATE 50 UG/ML
25 INJECTION, SOLUTION INTRAMUSCULAR; INTRAVENOUS
Status: DISCONTINUED | OUTPATIENT
Start: 2023-01-12 | End: 2023-01-12 | Stop reason: HOSPADM

## 2023-01-12 RX ORDER — FENTANYL CITRATE 50 UG/ML
50 INJECTION, SOLUTION INTRAMUSCULAR; INTRAVENOUS EVERY 5 MIN PRN
Status: DISCONTINUED | OUTPATIENT
Start: 2023-01-12 | End: 2023-01-12 | Stop reason: HOSPADM

## 2023-01-12 RX ORDER — MEPERIDINE HYDROCHLORIDE 25 MG/ML
12.5 INJECTION INTRAMUSCULAR; INTRAVENOUS; SUBCUTANEOUS
Status: DISCONTINUED | OUTPATIENT
Start: 2023-01-12 | End: 2023-01-12 | Stop reason: HOSPADM

## 2023-01-12 RX ORDER — SODIUM CHLORIDE, SODIUM LACTATE, POTASSIUM CHLORIDE, CALCIUM CHLORIDE 600; 310; 30; 20 MG/100ML; MG/100ML; MG/100ML; MG/100ML
INJECTION, SOLUTION INTRAVENOUS CONTINUOUS PRN
Status: DISCONTINUED | OUTPATIENT
Start: 2023-01-12 | End: 2023-01-12

## 2023-01-12 RX ORDER — HYDROMORPHONE HCL IN WATER/PF 6 MG/30 ML
0.2 PATIENT CONTROLLED ANALGESIA SYRINGE INTRAVENOUS EVERY 5 MIN PRN
Status: DISCONTINUED | OUTPATIENT
Start: 2023-01-12 | End: 2023-01-12 | Stop reason: HOSPADM

## 2023-01-12 RX ORDER — HYDROMORPHONE HCL IN WATER/PF 6 MG/30 ML
0.4 PATIENT CONTROLLED ANALGESIA SYRINGE INTRAVENOUS EVERY 5 MIN PRN
Status: DISCONTINUED | OUTPATIENT
Start: 2023-01-12 | End: 2023-01-12 | Stop reason: HOSPADM

## 2023-01-12 RX ORDER — LIDOCAINE HYDROCHLORIDE 20 MG/ML
INJECTION, SOLUTION INFILTRATION; PERINEURAL PRN
Status: DISCONTINUED | OUTPATIENT
Start: 2023-01-12 | End: 2023-01-12

## 2023-01-12 RX ORDER — FENTANYL CITRATE 50 UG/ML
25 INJECTION, SOLUTION INTRAMUSCULAR; INTRAVENOUS EVERY 5 MIN PRN
Status: DISCONTINUED | OUTPATIENT
Start: 2023-01-12 | End: 2023-01-12 | Stop reason: HOSPADM

## 2023-01-12 RX ORDER — PROPOFOL 10 MG/ML
INJECTION, EMULSION INTRAVENOUS CONTINUOUS PRN
Status: DISCONTINUED | OUTPATIENT
Start: 2023-01-12 | End: 2023-01-12

## 2023-01-12 RX ORDER — ONDANSETRON 4 MG/1
4 TABLET, ORALLY DISINTEGRATING ORAL EVERY 30 MIN PRN
Status: DISCONTINUED | OUTPATIENT
Start: 2023-01-12 | End: 2023-01-12 | Stop reason: HOSPADM

## 2023-01-12 RX ORDER — ONDANSETRON 4 MG/1
4 TABLET, ORALLY DISINTEGRATING ORAL EVERY 6 HOURS PRN
Status: CANCELLED | OUTPATIENT
Start: 2023-01-12

## 2023-01-12 RX ORDER — SODIUM CHLORIDE, SODIUM LACTATE, POTASSIUM CHLORIDE, CALCIUM CHLORIDE 600; 310; 30; 20 MG/100ML; MG/100ML; MG/100ML; MG/100ML
INJECTION, SOLUTION INTRAVENOUS CONTINUOUS
Status: DISCONTINUED | OUTPATIENT
Start: 2023-01-12 | End: 2023-01-12 | Stop reason: HOSPADM

## 2023-01-12 RX ORDER — ONDANSETRON 2 MG/ML
4 INJECTION INTRAMUSCULAR; INTRAVENOUS EVERY 6 HOURS PRN
Status: CANCELLED | OUTPATIENT
Start: 2023-01-12

## 2023-01-12 RX ORDER — FLUMAZENIL 0.1 MG/ML
0.2 INJECTION, SOLUTION INTRAVENOUS
Status: CANCELLED | OUTPATIENT
Start: 2023-01-12 | End: 2023-01-12

## 2023-01-12 RX ORDER — PROPOFOL 10 MG/ML
INJECTION, EMULSION INTRAVENOUS PRN
Status: DISCONTINUED | OUTPATIENT
Start: 2023-01-12 | End: 2023-01-12

## 2023-01-12 RX ADMIN — SODIUM CHLORIDE, POTASSIUM CHLORIDE, SODIUM LACTATE AND CALCIUM CHLORIDE: 600; 310; 30; 20 INJECTION, SOLUTION INTRAVENOUS at 11:04

## 2023-01-12 RX ADMIN — PROPOFOL 50 MG: 10 INJECTION, EMULSION INTRAVENOUS at 11:11

## 2023-01-12 RX ADMIN — LIDOCAINE HYDROCHLORIDE 100 MG: 20 INJECTION, SOLUTION INFILTRATION; PERINEURAL at 11:11

## 2023-01-12 RX ADMIN — PROPOFOL 30 MG: 10 INJECTION, EMULSION INTRAVENOUS at 11:28

## 2023-01-12 RX ADMIN — PROPOFOL 100 MCG/KG/MIN: 10 INJECTION, EMULSION INTRAVENOUS at 11:11

## 2023-01-12 RX ADMIN — PROPOFOL 20 MG: 10 INJECTION, EMULSION INTRAVENOUS at 11:13

## 2023-01-12 ASSESSMENT — ACTIVITIES OF DAILY LIVING (ADL)
ADLS_ACUITY_SCORE: 35

## 2023-01-12 ASSESSMENT — COPD QUESTIONNAIRES: COPD: 0

## 2023-01-12 NOTE — ANESTHESIA CARE TRANSFER NOTE
Patient: Arnaldo Henderson    Procedure: Procedure(s):  colonoscopy with fluroscopy       Diagnosis: Pancreatic stent migration [T85.528A]  Diagnosis Additional Information: No value filed.    Anesthesia Type:   MAC     Note:    Oropharynx: oropharynx clear of all foreign objects and spontaneously breathing  Level of Consciousness: awake  Oxygen Supplementation: face mask  Level of Supplemental Oxygen (L/min / FiO2): 6  Independent Airway: airway patency satisfactory and stable  Dentition: dentition unchanged  Vital Signs Stable: post-procedure vital signs reviewed and stable  Report to RN Given: handoff report given  Patient transferred to: PACU    Handoff Report: Identifed the Patient, Identified the Reponsible Provider, Reviewed the pertinent medical history, Discussed the surgical course, Reviewed Intra-OP anesthesia mangement and issues during anesthesia, Set expectations for post-procedure period and Allowed opportunity for questions and acknowledgement of understanding      Vitals:  Vitals Value Taken Time   /69    Temp 36.4    Pulse 68 01/12/23 1200   Resp 11 01/12/23 1200   SpO2 95 % 01/12/23 1159   Vitals shown include unvalidated device data.    Electronically Signed By: KRYSTYNA Mathis CRNA  January 12, 2023  12:01 PM

## 2023-01-12 NOTE — INTERVAL H&P NOTE
"I have reviewed the surgical (or preoperative) H&P that is linked to this encounter, and examined the patient. There are no significant changes    Clinical Conditions Present on Arrival:  Clinically Significant Risk Factors Present on Admission                    # Overweight: Estimated body mass index is 26.52 kg/m  as calculated from the following:    Height as of this encounter: 1.727 m (5' 8\").    Weight as of this encounter: 79.1 kg (174 lb 6.4 oz).       "

## 2023-01-12 NOTE — ANESTHESIA POSTPROCEDURE EVALUATION
Patient: Arnaldo Henderson    Procedure: Procedure(s):  colonoscopy with fluroscopy       Anesthesia Type:  MAC    Note:  Disposition: Outpatient   Postop Pain Control: Uneventful            Sign Out: Well controlled pain   PONV: No   Neuro/Psych: Uneventful            Sign Out: Acceptable/Baseline neuro status   Airway/Respiratory: Uneventful            Sign Out: Acceptable/Baseline resp. status   CV/Hemodynamics: Uneventful            Sign Out: Acceptable CV status   Other NRE:    DID A NON-ROUTINE EVENT OCCUR? No           Last vitals:  Vitals Value Taken Time   /81 01/12/23 1240   Temp 36.7  C (98  F) 01/12/23 1240   Pulse 73 01/12/23 1240   Resp 10 01/12/23 1240   SpO2 96 % 01/12/23 1241   Vitals shown include unvalidated device data.    Electronically Signed By: Joann Martin  January 12, 2023  2:01 PM

## 2023-01-12 NOTE — ANESTHESIA PREPROCEDURE EVALUATION
"Anesthesia Pre-Procedure Evaluation    Patient: Arnaldo Henderson   MRN: [id number] : 1955        Procedure : Procedure(s):  colonoscopy with fluroscopy          Past Medical History:   Diagnosis Date     CAD (coronary artery disease)      Diabetes (H)      Gastroesophageal reflux disease      Hypercholesteremia      Hypertension      Mixed hearing loss      Parkinson disease (H)      Second degree AV block       Past Surgical History:   Procedure Laterality Date     CA ANESTH CABG W/PUMP       ENT SURGERY       ORTHOPEDIC SURGERY        Allergies   Allergen Reactions     Ciprofloxacin Hives, Itching, Other (See Comments), Rash and Unknown     Other reaction(s): Other (see comments)  Oral swelling per [facility]  Oral swelling  Oral swelling per [facility]       Morphine Rash     rash  rash  rash  rash       Penicillins Rash     aka ancef/ rash  aka ancef/ rash  aka ancef/ rash  aka ancef/ rash       Oxycodone Rash     \"HORRIBLE, SEVERE RASH MAYBE CAUSED BY OXY\"  \"HORRIBLE, SEVERE RASH MAYBE CAUSED BY OXY\"  \"HORRIBLE, SEVERE RASH MAYBE CAUSED BY OXY\"        Social History     Tobacco Use     Smoking status: Former     Types: Cigarettes     Smokeless tobacco: Never   Substance Use Topics     Alcohol use: Not Currently      Wt Readings from Last 1 Encounters:   23 79.1 kg (174 lb 6.4 oz)        Anesthesia Evaluation            ROS/MED HX  ENT/Pulmonary:    (-) asthma, COPD and sleep apnea   Neurologic:     (+) Parkinson's disease,     Cardiovascular:     (+) Dyslipidemia hypertension--CAD -CABG--Irregular Heartbeat/Palpitations (mobitz type 1 2nd degree AV block),     METS/Exercise Tolerance: >4 METS    Hematologic:       Musculoskeletal:       GI/Hepatic:     (+) GERD,     Renal/Genitourinary:       Endo:     (+) type II DM,     Psychiatric/Substance Use:       Infectious Disease:       Malignancy:       Other:            Physical Exam    Airway        Mallampati: III   TM distance: > 3 FB   Neck ROM: " full     Respiratory Devices and Support         Dental  no notable dental history         Cardiovascular   cardiovascular exam normal          Pulmonary           breath sounds clear to auscultation           OUTSIDE LABS:  CBC:   Lab Results   Component Value Date    WBC 7.4 11/18/2022    HGB 13.1 (L) 11/18/2022    HCT 40.1 11/18/2022     11/18/2022     BMP:   Lab Results   Component Value Date     11/18/2022    POTASSIUM 3.6 11/18/2022    CHLORIDE 104 11/18/2022    CO2 22 11/18/2022    BUN 23.4 (H) 11/18/2022    CR 0.84 11/18/2022    GLC 81 01/12/2023    GLC 72 11/18/2022     COAGS:   Lab Results   Component Value Date    INR 1.40 (H) 07/27/2022     POC: No results found for: BGM, HCG, HCGS  HEPATIC:   Lab Results   Component Value Date    ALBUMIN 3.9 11/18/2022    PROTTOTAL 7.2 11/18/2022    ALT 11 11/18/2022    AST 47 11/18/2022    ALKPHOS 185 (H) 11/18/2022    BILITOTAL 0.8 11/18/2022     OTHER:   Lab Results   Component Value Date    JAKE 9.1 11/18/2022    LIPASE 21 11/18/2022    AMYLASE 42 11/18/2022       Anesthesia Plan    ASA Status:  3      Anesthesia Type: MAC.              Consents    Anesthesia Plan(s) and associated risks, benefits, and realistic alternatives discussed. Questions answered and patient/representative(s) expressed understanding.    - Discussed:     - Discussed with:  Patient         Postoperative Care       PONV prophylaxis: Ondansetron (or other 5HT-3)     Comments:                Joann Martin

## 2023-01-12 NOTE — OR NURSING
Patient's wife upset that no stent was found during procedure.  States  that Dr. Fraire did speak with her post procedure.  Patient's spouse states that she will call Dr. Saldana when she gets home.

## 2023-01-12 NOTE — OP NOTE
COLONOSCOPY 01/12/2023 11:02 AM [facility] [address] [place]  [address]   _______________________________________________________________________________   Patient Name: Arnaldo Henderson          Procedure Date: 1/12/2023 11:02 AM   MRN: [id number]                       Account Number: [id number]   YOB: 1955              Admit Type: Outpatient   Age: 67                               Room: [place] [address]   Note Status: Supervisor Override      Attending MD: NEETU AGUILERA MD   Instrument Name: [id number]-H190DL Colonoscope   _______________________________________________________________________________       Procedure:                Colonoscopy   Indications:              Foreign body in the colon, h/o necrotizing                             pancreatitis managed elsewhere with transluminal                             drainage; Previously placed Axios stent had                             migrated into the small intestine into the distal                             ileum. Did not progress despite prior bowel prep.                             Here for colonoscopy to retrieve. Today mainly                             notes intermittent LUQ pain. No                             nausea/vomiting/abdominal distension.   Providers:                NEETU AGUILERA MD, Danyell Avitia RN   Referring MD:               Requesting Provider:      ZAY LYNN MD   Medicines:                Monitored Anesthesia Care   Complications:            No immediate complications. Estimated blood loss:                             Minimal.   _______________________________________________________________________________   Procedure:                Pre-Anesthesia Assessment:                             - Prior to the procedure, a History and Physical                             was performed, and patient medications and                             allergies were reviewed. The patient is  competent.                             The risks and benefits of the procedure and the                             sedation options and risks were discussed with the                             patient. All questions were answered and informed                             consent was obtained. Patient identification and                             proposed procedure were verified by the physician,                             the nurse, the anesthesiologist and the anesthetist                             in the procedure room. Mental Status Examination:                             alert and oriented. Airway Examination: normal                             oropharyngeal airway and neck mobility. Respiratory                             Examination: clear to auscultation. CV Examination:                             normal. Prophylactic Antibiotics: The patient does                             not require prophylactic antibiotics. Prior                             Anticoagulants: The patient has taken no                             anticoagulant or antiplatelet agents. ASA Grade                             Assessment: III - A patient with severe systemic                             disease. After reviewing the risks and benefits,                             the patient was deemed in satisfactory condition to                             undergo the procedure. The anesthesia plan was to                             use monitored anesthesia care (MAC). Immediately                             prior to administration of medications, the patient                             was re-assessed for adequacy to receive sedatives.                             The heart rate, respiratory rate, oxygen                             saturations, blood pressure, adequacy of pulmonary                             ventilation, and response to care were monitored                             throughout the procedure. The physical status of                              the patient was re-assessed after the procedure.                             After obtaining informed consent, the colonoscope                             was passed under direct vision. Throughout the                             procedure, the patient's blood pressure, pulse, and                             oxygen saturations were monitored continuously. The                             Colonoscope was introduced through the anus and                             advanced to 20 cm into the ileum. The colonoscopy                             was performed without difficulty. The patient                             tolerated the procedure well. The quality of the                             bowel preparation was good.                                                                                     Findings:        The perianal and digital rectal examinations were normal. Pertinent        negatives include no palpable rectal lesions.        The terminal ileum appeared normal. Pediatric colonoscope advanced as        far as possible. About ~40 cm into the ileum. No stent was visualized.        There was evidence of a prior surgical anastomosis in the cecum. This        was characterized by healthy appearing mucosa.        The exam was otherwise without abnormality on direct and retroflexion        views.        Fluoroscopy brought in and Axios stent appears much further upstream        than was seen on CT scan. Plastic cystgastrostomy stents seen in the        stomach.                                                                                     Impression:               - The examined portion of the ileum was normal.                             Stent was not visualized endoscopically.                             Fluoroscopy brought in and Axios stent appears in a                             different portion of the bowel than what was seen                             on CT scan (far more  proximal). Will need to repeat                             CT scan to confirm and localize.                             - The examination was otherwise normal on direct                             and retroflexion views.                             - No specimens collected.   Recommendation:           - Discharge patient to home (ambulatory).                             - Repeat CT abd/pelvis scan to confirm presence of                             Axios stent and localization. Based off of that                             will determine next best steps.                             - Resume diet as tolerated.                                                                                     Procedure Code(s):

## 2023-01-12 NOTE — OP NOTE
COLONOSCOPY 01/12/2023 11:02 AM [facility] [address] [address] [place]  [address]   _______________________________________________________________________________   Patient Name: Arnaldo Henderson          Procedure Date: 1/12/2023 11:02 AM   MRN: [id number]                       Account Number: [id number]   YOB: 1955              Admit Type: Outpatient   Age: 67                               Room: [place] [address]   Note Status: Finalized                Attending MD: NEETU AGUILERA MD   Instrument Name: [id number] PCF-H190DL Colonoscope   _______________________________________________________________________________       Procedure:                Colonoscopy   Indications:              Foreign body in the colon, h/o necrotizing                             pancreatitis managed elsewhere with transluminal                             drainage; Previously placed Axios stent had                             migrated into the small intestine into the distal                             ileum. Did not progress despite prior bowel prep.                             Here for colonoscopy to retrieve. Today mainly                             notes intermittent LUQ pain. No                             nausea/vomiting/abdominal distension.   Providers:                NEETU AGUILERA MD, Danyell Avitia, KAI   Referring MD:               Requesting Provider:      ZAY LYNN MD   Medicines:                Monitored Anesthesia Care   Complications:            No immediate complications. Estimated blood loss:                             Minimal.   _______________________________________________________________________________   Procedure:                Pre-Anesthesia Assessment:                             - Prior to the procedure, a History and Physical                             was performed, and patient medications and                             allergies were reviewed. The patient is  competent.                             The risks and benefits of the procedure and the                             sedation options and risks were discussed with the                             patient. All questions were answered and informed                             consent was obtained. Patient identification and                             proposed procedure were verified by the physician,                             the nurse, the anesthesiologist and the anesthetist                             in the procedure room. Mental Status Examination:                             alert and oriented. Airway Examination: normal                             oropharyngeal airway and neck mobility. Respiratory                             Examination: clear to auscultation. CV Examination:                             normal. Prophylactic Antibiotics: The patient does                             not require prophylactic antibiotics. Prior                             Anticoagulants: The patient has taken no                             anticoagulant or antiplatelet agents. ASA Grade                             Assessment: III - A patient with severe systemic                             disease. After reviewing the risks and benefits,                             the patient was deemed in satisfactory condition to                             undergo the procedure. The anesthesia plan was to                             use monitored anesthesia care (MAC). Immediately                             prior to administration of medications, the patient                             was re-assessed for adequacy to receive sedatives.                             The heart rate, respiratory rate, oxygen                             saturations, blood pressure, adequacy of pulmonary                             ventilation, and response to care were monitored                             throughout the procedure. The physical status of                              the patient was re-assessed after the procedure.                             After obtaining informed consent, the colonoscope                             was passed under direct vision. Throughout the                             procedure, the patient's blood pressure, pulse, and                             oxygen saturations were monitored continuously. The                             Colonoscope was introduced through the anus and                             advanced to 20 cm into the ileum. The colonoscopy                             was performed without difficulty. The patient                             tolerated the procedure well. The quality of the                             bowel preparation was good.                                                                                     Findings:        The perianal and digital rectal examinations were normal. Pertinent        negatives include no palpable rectal lesions.        The terminal ileum appeared normal. Pediatric colonoscope advanced as        far as possible. About ~40 cm into the ileum. No stent was visualized.        There was evidence of a prior surgical anastomosis in the cecum. This        was characterized by healthy appearing mucosa.        The exam was otherwise without abnormality on direct and retroflexion        views.        Fluoroscopy brought in and abdominal X-rays no longer showed the Axios        stent. Plastic cystgastrostomy stents seen in the stomach.                                                                                     Impression:               - The examined portion of the ileum was normal.                             Stent was not visualized endoscopically.                             Fluoroscopy brought in and Axios stent no longer                             present and has spontaneously migrated out.                             - The examination was otherwise normal on direct                              and retroflexion views.                             - No specimens collected.   Recommendation:           - Discharge patient to home (ambulatory).                             - Advance diet as tolerated                             - Follow up with Dr. Saldana as planned                                                                                     Procedure Code(s):        --- Professional ---        64811, Colonoscopy, flexible; diagnostic, including collection of        specimen(s) by brushing or washing, when performed (separate procedure)     CPT copyright 2020 [employer]. All rights reserved.     The codes documented in this report are preliminary and upon  review may   be revised to meet current compliance requirements.     Neetu Fraire MD   ________________   NEETU FRAIRE MD   1/12/2023 12:00:39 PM   I was physically present for the entire viewing portion of the exam.   NEETU FRAIRE MD   Number of Addenda: 0     Note Initiated On: 1/12/2023 11:02 AM

## 2023-01-13 ENCOUNTER — PATIENT OUTREACH (OUTPATIENT)
Dept: GASTROENTEROLOGY | Facility: CLINIC | Age: 68
End: 2023-01-13
Payer: MEDICARE

## 2023-01-13 NOTE — PROGRESS NOTES
Called patient, spoke with wife. She expressed frustration with many things, mostly that imaging was not done prior to procedure yesterday. I offered patient relations but she declined.  CT scan scheduled for 1/16 and will follow up based on those results. Pt said she would be calling to inquire about next steps after CT scan.    Latonia Thomas, RN, BSN,   [facility]  Care coordinator

## 2023-01-16 ENCOUNTER — HOSPITAL ENCOUNTER (OUTPATIENT)
Dept: CT IMAGING | Facility: HOSPITAL | Age: 68
Discharge: HOME | End: 2023-01-16
Attending: INTERNAL MEDICINE | Admitting: INTERNAL MEDICINE
Payer: MEDICARE

## 2023-01-16 DIAGNOSIS — K85.91 NECROTIZING PANCREATITIS: ICD-10-CM

## 2023-01-16 LAB
CREAT BLD-MCNC: 1 MG/DL (ref 0.7–1.3)
GFR SERPL CREATININE-BSD FRML MDRD: >60 ML/MIN/1.73M2

## 2023-01-16 PROCEDURE — 82565 ASSAY OF CREATININE: CPT

## 2023-01-16 PROCEDURE — 250N000011 HC RX IP 250 OP 636: Performed by: INTERNAL MEDICINE

## 2023-01-16 PROCEDURE — 74177 CT ABD & PELVIS W/CONTRAST: CPT | Mod: MG

## 2023-01-16 PROCEDURE — G1010 CDSM STANSON: HCPCS

## 2023-01-16 RX ORDER — IOPAMIDOL 755 MG/ML
100 INJECTION, SOLUTION INTRAVASCULAR ONCE
Status: COMPLETED | OUTPATIENT
Start: 2023-01-16 | End: 2023-01-16

## 2023-01-16 RX ADMIN — IOPAMIDOL 100 ML: 755 INJECTION, SOLUTION INTRAVENOUS at 13:34

## 2023-01-18 ENCOUNTER — TRANSFERRED RECORDS (OUTPATIENT)
Dept: HEALTH INFORMATION MANAGEMENT | Facility: CLINIC | Age: 68
End: 2023-01-18

## 2023-01-26 NOTE — PROGRESS NOTES
Arnaldo and his wife, inquiring about next steps in stent removal. Pt said the pain improved after the bowel prep, but eventually the pain did return, intense at times, mostly achy. Seemed to be worse last week.    Pt will be out of the state from the end of March to the middle of May. So they do want to coordinate this before they leave.

## 2023-01-27 ENCOUNTER — OFFICE VISIT (OUTPATIENT)
Dept: SURGERY | Facility: CLINIC | Age: 68
End: 2023-01-27
Payer: MEDICARE

## 2023-01-27 VITALS
HEART RATE: 68 BPM | HEIGHT: 68 IN | BODY MASS INDEX: 27.32 KG/M2 | SYSTOLIC BLOOD PRESSURE: 123 MMHG | OXYGEN SATURATION: 98 % | WEIGHT: 180.3 LBS | DIASTOLIC BLOOD PRESSURE: 73 MMHG

## 2023-01-27 DIAGNOSIS — Z18.9 RETAINED FOREIGN BODY: Primary | ICD-10-CM

## 2023-01-27 PROCEDURE — 99203 OFFICE O/P NEW LOW 30 MIN: CPT | Performed by: SURGERY

## 2023-01-27 ASSESSMENT — ENCOUNTER SYMPTOMS
PALPITATIONS: 0
EYE PAIN: 0
WEAKNESS: 0
JAUNDICE: 0
EYE IRRITATION: 0
PANIC: 0
INSOMNIA: 0
POOR WOUND HEALING: 0
VOMITING: 0
DIZZINESS: 0
MEMORY LOSS: 0
HYPERTENSION: 0
BOWEL INCONTINENCE: 0
DEPRESSION: 0
DECREASED APPETITE: 0
SINUS PAIN: 0
NUMBNESS: 0
HOARSE VOICE: 0
NIGHT SWEATS: 0
SORE THROAT: 0
MUSCLE CRAMPS: 0
RECTAL PAIN: 0
SLEEP DISTURBANCES DUE TO BREATHING: 0
LEG PAIN: 0
LEG SWELLING: 0
MUSCLE WEAKNESS: 0
HEMOPTYSIS: 0
HEADACHES: 0
SNORES LOUDLY: 0
SKIN CHANGES: 0
BLOOD IN STOOL: 0
HYPOTENSION: 0
INCREASED ENERGY: 0
POSTURAL DYSPNEA: 0
NERVOUS/ANXIOUS: 0
LOSS OF CONSCIOUSNESS: 0
DIFFICULTY URINATING: 0
PARALYSIS: 0
FATIGUE: 0
ARTHRALGIAS: 0
NECK PAIN: 0
STIFFNESS: 0
EXTREMITY NUMBNESS: 0
EXERCISE INTOLERANCE: 0
FLANK PAIN: 0
SYNCOPE: 0
DIARRHEA: 0
WHEEZING: 0
SINUS CONGESTION: 0
SPUTUM PRODUCTION: 0
POLYDIPSIA: 0
TINGLING: 0
DYSPNEA ON EXERTION: 0
TASTE DISTURBANCE: 0
BACK PAIN: 1
EYE WATERING: 0
ABDOMINAL PAIN: 1
DOUBLE VISION: 0
SMELL DISTURBANCE: 0
BRUISES/BLEEDS EASILY: 0
NAUSEA: 0
CLAUDICATION: 0
COUGH: 0
SEIZURES: 0
BLOATING: 0
DECREASED CONCENTRATION: 0
RESPIRATORY PAIN: 0
FEVER: 0
DYSURIA: 0
TREMORS: 0
LIGHT-HEADEDNESS: 0
HEARTBURN: 0
SWOLLEN GLANDS: 0
ALTERED TEMPERATURE REGULATION: 0
CHILLS: 0
TROUBLE SWALLOWING: 0
MYALGIAS: 1
CONSTIPATION: 0
COUGH DISTURBING SLEEP: 0
POLYPHAGIA: 0
EYE REDNESS: 0
WEIGHT GAIN: 0
DISTURBANCES IN COORDINATION: 0
WEIGHT LOSS: 0
SPEECH CHANGE: 0
HALLUCINATIONS: 0
SHORTNESS OF BREATH: 0
TACHYCARDIA: 0
ORTHOPNEA: 0
NECK MASS: 0
NAIL CHANGES: 0
HEMATURIA: 0
JOINT SWELLING: 0

## 2023-01-27 ASSESSMENT — PAIN SCALES - GENERAL: PAINLEVEL: MODERATE PAIN (4)

## 2023-01-27 NOTE — LETTER
1/27/2023       RE: Arnaldo Henderson  [address]  [facility] [address]     Dear Colleague,    Thank you for referring your patient, Arnaldo Henderson, to the [facility] GENERAL SURGERY CLINIC [place] [facility]. Please see a copy of my visit note below.    New General Surgery Consultation Note        Arnaldo Henderson  [id number]  1955 January 27, 2023     Requesting Provider: No ref. provider found     Dear Alanna Alanis,     I had the pleasure of seeing your patient, Arnaldo Henderson is a 67 year old male who presents to clinic today for the following health issues       CHIEF COMPLAINT:  migrated stent    Assessment & Plan   Problem List Items Addressed This Visit    None  Visit Diagnoses     Retained foreign body    -  Primary    Relevant Orders    PAC Visit Referral (For [facility] Only)    Case Request: LAPAROSCOPY, DIAGNOSTIC; RETRIEVAL OF MIGRATED STENT (Completed)       I offered patient a dx lap with stent etrieval (through small bowel extrusion site).    Full risk/benefit discussion held    If OK with cardiologist (ie will not be placedon a new blood thinner, acceptable general anesthesia risk), patient will schedule with Butch, and obtain preop H and P          Lab Results   Component Value Date    WBC 7.4 11/18/2022     Lab Results   Component Value Date    RBC 4.28 11/18/2022     Lab Results   Component Value Date    HGB 13.1 11/18/2022     Lab Results   Component Value Date    HCT 40.1 11/18/2022     No components found for: MCT  Lab Results   Component Value Date    MCV 94 11/18/2022     Lab Results   Component Value Date    MCH 30.6 11/18/2022     Lab Results   Component Value Date    MCHC 32.7 11/18/2022     Lab Results   Component Value Date    RDW 14.2 11/18/2022     Lab Results   Component Value Date     11/18/2022     Last Comprehensive Metabolic Panel:  Sodium   Date Value Ref Range Status   11/18/2022 140 136 -  145 mmol/L Final     Potassium   Date Value Ref Range Status   11/18/2022 3.6 3.4 - 5.3 mmol/L Final     Chloride   Date Value Ref Range Status   11/18/2022 104 98 - 107 mmol/L Final     Carbon Dioxide (CO2)   Date Value Ref Range Status   11/18/2022 22 22 - 29 mmol/L Final     Anion Gap   Date Value Ref Range Status   11/18/2022 14 7 - 15 mmol/L Final     GLUCOSE BY METER POCT   Date Value Ref Range Status   01/12/2023 75 70 - 99 mg/dL Final     Comment:     Dr/RN Notified     Urea Nitrogen   Date Value Ref Range Status   11/18/2022 23.4 (H) 8.0 - 23.0 mg/dL Final     Creatinine   Date Value Ref Range Status   11/18/2022 0.84 0.67 - 1.17 mg/dL Final     Creatinine POCT   Date Value Ref Range Status   01/16/2023 1.0 0.7 - 1.3 mg/dL Final     GFR, ESTIMATED POCT   Date Value Ref Range Status   01/16/2023 >60 >60 mL/min/1.73m2 Final     Calcium   Date Value Ref Range Status   11/18/2022 9.1 8.8 - 10.2 mg/dL Final     INR/Prothrombin Time  Liver Function Studies -   Recent Labs   Lab Test 11/18/22  1025   PROTTOTAL 7.2   ALBUMIN 3.9   BILITOTAL 0.8   ALKPHOS 185*   AST 47   ALT 11     30 minutes spent on the date of the encounter doing chart review, history and exam, documentation and further activities per the note      HISTORY OF PRESENT ILLNESS:  I had the pleasure of seeing Mr Henderson in clinic today. He is a 67M who recently had a cholecystectomy and then a prolonged course of complicated nec pancreatitis in [place]. His management included multiple Axios stents across the pylorus and deangelo a pseudocyst. One stent has migrated to the mid small bowel and was not retrieved successfully by colonoscopy. He is having persistent abdominal pain.      Of note, he is seeing a cardioloist next week for a enw heart eval and has a HX Parkinsons.      SEVERITY:   Severity of Present Illness Reviewed With Patient 1/27/2023   How would you describe your symptoms? Moderate   Does your current concern alter your level of activities?  Yes       TIMING:  Timing of Present Illness Reviewed With Patient 1/27/2023   The onset of my symptoms was: Sudden   My symptoms are: Intermittent (come and go)   My symptoms have been: Staying the Same       DURATION:  No flowsheet data found.     MODIFYING FACTORS:  Modifying Factors Reviewed With Patient 1/27/2023   My symptoms improve or resolve with: colon flush       ASSOCIATED SIGNS/SYMPTOMS:        Review of Systems     Constitutional:  Negative for fever, chills, weight loss, weight gain, fatigue, decreased appetite, night sweats, recent stressors, height gain, height loss, post-operative complications, incisional pain, hallucinations, increased energy, hyperactivity and confused.   HENT:  Negative for ear pain, hearing loss, tinnitus, nosebleeds, trouble swallowing, hoarse voice, mouth sores, sore throat, ear discharge, tooth pain, gum tenderness, taste disturbance, smell disturbance, hearing aid, bleeding gums, dry mouth, sinus pain, sinus congestion and neck mass.    Eyes:  Negative for double vision, pain, redness, eye pain, decreased vision, eye watering, eye bulging, eye dryness, flashing lights, spots, floaters, strabismus, tunnel vision, jaundice and eye irritation.   Respiratory:   Negative for cough, hemoptysis, sputum production, shortness of breath, wheezing, sleep disturbances due to breathing, snores loudly, respiratory pain, dyspnea on exertion, cough disturbing sleep and postural dyspnea.    Cardiovascular:  Negative for chest pain, dyspnea on exertion, palpitations, orthopnea, claudication, leg swelling, fingers/toes turn blue, hypertension, hypotension, syncope, history of heart murmur, chest pain on exertion, chest pain at rest, pacemaker, few scattered varicosities, leg pain, sleep disturbances due to breathing, tachycardia, light-headedness, exercise intolerance and edema.   Gastrointestinal:  Positive for abdominal pain. Negative for heartburn, nausea, vomiting, diarrhea,  constipation, blood in stool, melena, rectal pain, bloating, bowel incontinence, jaundice, coffee ground emesis and change in stool.   Genitourinary:  Negative for bladder incontinence, dysuria, urgency, hematuria, flank pain, difficulty urinating, nocturia, voiding less frequently, scrotal pain, ulcerations, penile discharge, male genitourinary complaint and reduced libido.   Musculoskeletal:  Positive for myalgias and back pain. Negative for joint swelling, arthralgias, stiffness, muscle cramps, neck pain, bone pain, muscle weakness and fracture.   Skin:  Negative for nail changes, itching, poor wound healing, rash, hair changes, skin changes, acne, warts, poor wound healing, scarring, flaky skin, Raynaud's phenomenon, sensitivity to sunlight and skin thickening.   Neurological:  Negative for dizziness, tingling, tremors, speech change, seizures, loss of consciousness, weakness, light-headedness, numbness, headaches, disturbances in coordination, extremity numbness, memory loss, difficulty walking and paralysis.   Endo/Heme:  Negative for anemia, swollen glands and bruises/bleeds easily.   Psychiatric/Behavioral:  Negative for depression, hallucinations, memory loss, decreased concentration, mood swings and panic attacks.    Endocrine:  Negative for altered temperature regulation, polyphagia, polydipsia, unwanted hair growth and change in facial hair.      PAST MEDICAL HISTORY:  Past Medical History:   Diagnosis Date     CAD (coronary artery disease)      Diabetes (H)      Gastroesophageal reflux disease      Hypercholesteremia      Hypertension      Mixed hearing loss      Parkinson disease (H)      Second degree AV block         PAST SURGICAL HISTORY:  Past Surgical History:   Procedure Laterality Date     CA ANESTH CABG W/PUMP       COLONOSCOPY N/A 1/12/2023    Procedure: colonoscopy with fluroscopy;  Surgeon: Ayden Fraire MD;  Location:  OR     ENT SURGERY       ORTHOPEDIC SURGERY         "  MEDICATIONS:  Current Outpatient Medications   Medication     aspirin (ASA) 81 MG EC tablet     atorvastatin (LIPITOR) 40 MG tablet     BD PEN NEEDLE LINDY 2ND GEN 32G X 4 MM miscellaneous     bisacodyl (DULCOLAX) 5 MG EC tablet     bisacodyl (DULCOLAX) 5 MG EC tablet     carbidopa-levodopa (SINEMET CR)  MG CR tablet     cetirizine (ZYRTEC) 10 MG tablet     gabapentin (NEURONTIN) 300 MG capsule     insulin aspart (NOVOLOG PEN) 100 UNIT/ML pen     Insulin Glargine (BASAGLAR KWIKPEN SC)     lipase-protease-amylase (ZENPEP) 13370-786957 units CPEP     magnesium sulfate 500 mg/mL SOLN     metoprolol succinate ER (TOPROL XL) 25 MG 24 hr tablet     Multiple Vitamin (MULTI-VITAMINS) TABS     omeprazole (PRILOSEC) 20 MG DR capsule     No current facility-administered medications for this visit.        ALLERGIES:  Allergies   Allergen Reactions     Ciprofloxacin Hives, Itching, Other (See Comments), Rash and Unknown     Other reaction(s): Other (see comments)  Oral swelling per [facility]  Oral swelling  Oral swelling per [facility]       Morphine Rash     rash  rash  rash  rash       Penicillins Rash     aka ancef/ rash  aka ancef/ rash  aka ancef/ rash  aka ancef/ rash       Oxycodone Rash     \"HORRIBLE, SEVERE RASH MAYBE CAUSED BY OXY\"  \"HORRIBLE, SEVERE RASH MAYBE CAUSED BY OXY\"  \"HORRIBLE, SEVERE RASH MAYBE CAUSED BY OXY\"          SOCIAL HISTORY:  Social History     Socioeconomic History     Marital status:      Spouse name: None     Number of children: None     Years of education: None     Highest education level: None   Tobacco Use     Smoking status: Former     Types: Cigarettes     Smokeless tobacco: Never   Vaping Use     Vaping Use: Never used   Substance and Sexual Activity     Alcohol use: Not Currently     Drug use: Not Currently       FAMILY HISTORY:  No family history on file.     Colonoscopy reports reviewed    PHYSICAL EXAM:  Objective     /73 (BP Location: Left arm, Patient Position: " "Sitting, Cuff Size: Adult Regular)   Pulse 68   Ht 1.727 m (5' 8\")   Wt 81.8 kg (180 lb 4.8 oz)   SpO2 98%   BMI 27.41 kg/m    /73 (BP Location: Left arm, Patient Position: Sitting, Cuff Size: Adult Regular)   Pulse 68   Ht 1.727 m (5' 8\")   Wt 81.8 kg (180 lb 4.8 oz)   SpO2 98%   BMI 27.41 kg/m    Body mass index is 27.41 kg/m .  Physical Exam  Musculoskeletal:         General: No edema.        abd s/nt/nd w multiple lap sites       DISCUSSION OF RISKS:  I reviewed the risks of surgery with Arnaldo Henderson.    These include, but are not limited to, death, myocardial infarction, pneumonia, urinary tract infection, deep venous thrombosis with or without pulmonary embolus, abdominal infection from bowel injury or abscess, bowel obstruction, wound infection, and bleeding.    Also discussed risks of infection, bowel injury, adhesions, having to open  Sincerely,       Joseluis Lima MD  "

## 2023-01-27 NOTE — NURSING NOTE
"Chief Complaint   Patient presents with     New Patient     Migrated stent       Vitals:    01/27/23 1442   BP: 123/73   BP Location: Left arm   Patient Position: Sitting   Cuff Size: Adult Regular   Pulse: 68   SpO2: 98%   Weight: 81.8 kg (180 lb 4.8 oz)   Height: 1.727 m (5' 8\")       Body mass index is 27.41 kg/m .                          Mike Macdonald, EMT    "

## 2023-01-27 NOTE — PROGRESS NOTES
New General Surgery Consultation Note        Arnaldo Henderson  [id number]  1955 January 27, 2023     Requesting Provider: No ref. provider found     Dear Alanna Alanis,     I had the pleasure of seeing your patient, Arnaldo Henderson is a 67 year old male who presents to clinic today for the following health issues       CHIEF COMPLAINT:  migrated stent    Assessment & Plan   Problem List Items Addressed This Visit    None  Visit Diagnoses     Retained foreign body    -  Primary    Relevant Orders    PAC Visit Referral (For [facility] Only)    Case Request: LAPAROSCOPY, DIAGNOSTIC; RETRIEVAL OF MIGRATED STENT (Completed)       I offered patient a dx lap with stent etrieval (through small bowel extrusion site).    Full risk/benefit discussion held    If OK with cardiologist (ie will not be placedon a new blood thinner, acceptable general anesthesia risk), patient will schedule with Butch, and obtain preop H and P          Lab Results   Component Value Date    WBC 7.4 11/18/2022     Lab Results   Component Value Date    RBC 4.28 11/18/2022     Lab Results   Component Value Date    HGB 13.1 11/18/2022     Lab Results   Component Value Date    HCT 40.1 11/18/2022     No components found for: MCT  Lab Results   Component Value Date    MCV 94 11/18/2022     Lab Results   Component Value Date    MCH 30.6 11/18/2022     Lab Results   Component Value Date    MCHC 32.7 11/18/2022     Lab Results   Component Value Date    RDW 14.2 11/18/2022     Lab Results   Component Value Date     11/18/2022     Last Comprehensive Metabolic Panel:  Sodium   Date Value Ref Range Status   11/18/2022 140 136 - 145 mmol/L Final     Potassium   Date Value Ref Range Status   11/18/2022 3.6 3.4 - 5.3 mmol/L Final     Chloride   Date Value Ref Range Status   11/18/2022 104 98 - 107 mmol/L Final     Carbon Dioxide (CO2)   Date Value Ref Range Status   11/18/2022 22 22 - 29 mmol/L Final     Anion Gap   Date Value Ref Range Status    11/18/2022 14 7 - 15 mmol/L Final     GLUCOSE BY METER POCT   Date Value Ref Range Status   01/12/2023 75 70 - 99 mg/dL Final     Comment:     Dr/RN Notified     Urea Nitrogen   Date Value Ref Range Status   11/18/2022 23.4 (H) 8.0 - 23.0 mg/dL Final     Creatinine   Date Value Ref Range Status   11/18/2022 0.84 0.67 - 1.17 mg/dL Final     Creatinine POCT   Date Value Ref Range Status   01/16/2023 1.0 0.7 - 1.3 mg/dL Final     GFR, ESTIMATED POCT   Date Value Ref Range Status   01/16/2023 >60 >60 mL/min/1.73m2 Final     Calcium   Date Value Ref Range Status   11/18/2022 9.1 8.8 - 10.2 mg/dL Final     INR/Prothrombin Time  Liver Function Studies -   Recent Labs   Lab Test 11/18/22  1025   PROTTOTAL 7.2   ALBUMIN 3.9   BILITOTAL 0.8   ALKPHOS 185*   AST 47   ALT 11     30 minutes spent on the date of the encounter doing chart review, history and exam, documentation and further activities per the note      HISTORY OF PRESENT ILLNESS:  I had the pleasure of seeing Mr Henderson in clinic today. He is a 67M who recently had a cholecystectomy and then a prolonged course of complicated nec pancreatitis in [place]. His management included multiple Axios stents across the pylorus and deangelo a pseudocyst. One stent has migrated to the mid small bowel and was not retrieved successfully by colonoscopy. He is having persistent abdominal pain.      Of note, he is seeing a cardioloist next week for a enw heart eval and has a HX Parkinsons.      SEVERITY:   Severity of Present Illness Reviewed With Patient 1/27/2023   How would you describe your symptoms? Moderate   Does your current concern alter your level of activities? Yes       TIMING:  Timing of Present Illness Reviewed With Patient 1/27/2023   The onset of my symptoms was: Sudden   My symptoms are: Intermittent (come and go)   My symptoms have been: Staying the Same       DURATION:  No flowsheet data found.     MODIFYING FACTORS:  Modifying Factors Reviewed With Patient 1/27/2023    My symptoms improve or resolve with: colon flush       ASSOCIATED SIGNS/SYMPTOMS:        Review of Systems     Constitutional:  Negative for fever, chills, weight loss, weight gain, fatigue, decreased appetite, night sweats, recent stressors, height gain, height loss, post-operative complications, incisional pain, hallucinations, increased energy, hyperactivity and confused.   HENT:  Negative for ear pain, hearing loss, tinnitus, nosebleeds, trouble swallowing, hoarse voice, mouth sores, sore throat, ear discharge, tooth pain, gum tenderness, taste disturbance, smell disturbance, hearing aid, bleeding gums, dry mouth, sinus pain, sinus congestion and neck mass.    Eyes:  Negative for double vision, pain, redness, eye pain, decreased vision, eye watering, eye bulging, eye dryness, flashing lights, spots, floaters, strabismus, tunnel vision, jaundice and eye irritation.   Respiratory:   Negative for cough, hemoptysis, sputum production, shortness of breath, wheezing, sleep disturbances due to breathing, snores loudly, respiratory pain, dyspnea on exertion, cough disturbing sleep and postural dyspnea.    Cardiovascular:  Negative for chest pain, dyspnea on exertion, palpitations, orthopnea, claudication, leg swelling, fingers/toes turn blue, hypertension, hypotension, syncope, history of heart murmur, chest pain on exertion, chest pain at rest, pacemaker, few scattered varicosities, leg pain, sleep disturbances due to breathing, tachycardia, light-headedness, exercise intolerance and edema.   Gastrointestinal:  Positive for abdominal pain. Negative for heartburn, nausea, vomiting, diarrhea, constipation, blood in stool, melena, rectal pain, bloating, bowel incontinence, jaundice, coffee ground emesis and change in stool.   Genitourinary:  Negative for bladder incontinence, dysuria, urgency, hematuria, flank pain, difficulty urinating, nocturia, voiding less frequently, scrotal pain, ulcerations, penile discharge,  male genitourinary complaint and reduced libido.   Musculoskeletal:  Positive for myalgias and back pain. Negative for joint swelling, arthralgias, stiffness, muscle cramps, neck pain, bone pain, muscle weakness and fracture.   Skin:  Negative for nail changes, itching, poor wound healing, rash, hair changes, skin changes, acne, warts, poor wound healing, scarring, flaky skin, Raynaud's phenomenon, sensitivity to sunlight and skin thickening.   Neurological:  Negative for dizziness, tingling, tremors, speech change, seizures, loss of consciousness, weakness, light-headedness, numbness, headaches, disturbances in coordination, extremity numbness, memory loss, difficulty walking and paralysis.   Endo/Heme:  Negative for anemia, swollen glands and bruises/bleeds easily.   Psychiatric/Behavioral:  Negative for depression, hallucinations, memory loss, decreased concentration, mood swings and panic attacks.    Endocrine:  Negative for altered temperature regulation, polyphagia, polydipsia, unwanted hair growth and change in facial hair.      PAST MEDICAL HISTORY:  Past Medical History:   Diagnosis Date     CAD (coronary artery disease)      Diabetes (H)      Gastroesophageal reflux disease      Hypercholesteremia      Hypertension      Mixed hearing loss      Parkinson disease (H)      Second degree AV block         PAST SURGICAL HISTORY:  Past Surgical History:   Procedure Laterality Date     CA ANESTH CABG W/PUMP       COLONOSCOPY N/A 1/12/2023    Procedure: colonoscopy with fluroscopy;  Surgeon: Ayden Fraire MD;  Location:  OR     ENT SURGERY       ORTHOPEDIC SURGERY          MEDICATIONS:  Current Outpatient Medications   Medication     aspirin (ASA) 81 MG EC tablet     atorvastatin (LIPITOR) 40 MG tablet     BD PEN NEEDLE LINDY 2ND GEN 32G X 4 MM miscellaneous     bisacodyl (DULCOLAX) 5 MG EC tablet     bisacodyl (DULCOLAX) 5 MG EC tablet     carbidopa-levodopa (SINEMET CR)  MG CR tablet     cetirizine  "(ZYRTEC) 10 MG tablet     gabapentin (NEURONTIN) 300 MG capsule     insulin aspart (NOVOLOG PEN) 100 UNIT/ML pen     Insulin Glargine (BASAGLAR KWIKPEN SC)     lipase-protease-amylase (ZENPEP) 49739-921344 units CPEP     magnesium sulfate 500 mg/mL SOLN     metoprolol succinate ER (TOPROL XL) 25 MG 24 hr tablet     Multiple Vitamin (MULTI-VITAMINS) TABS     omeprazole (PRILOSEC) 20 MG DR capsule     No current facility-administered medications for this visit.        ALLERGIES:  Allergies   Allergen Reactions     Ciprofloxacin Hives, Itching, Other (See Comments), Rash and Unknown     Other reaction(s): Other (see comments)  Oral swelling per [facility]  Oral swelling  Oral swelling per [facility]       Morphine Rash     rash  rash  rash  rash       Penicillins Rash     aka ancef/ rash  aka ancef/ rash  aka ancef/ rash  aka ancef/ rash       Oxycodone Rash     \"HORRIBLE, SEVERE RASH MAYBE CAUSED BY OXY\"  \"HORRIBLE, SEVERE RASH MAYBE CAUSED BY OXY\"  \"HORRIBLE, SEVERE RASH MAYBE CAUSED BY OXY\"          SOCIAL HISTORY:  Social History     Socioeconomic History     Marital status:      Spouse name: None     Number of children: None     Years of education: None     Highest education level: None   Tobacco Use     Smoking status: Former     Types: Cigarettes     Smokeless tobacco: Never   Vaping Use     Vaping Use: Never used   Substance and Sexual Activity     Alcohol use: Not Currently     Drug use: Not Currently       FAMILY HISTORY:  No family history on file.     Colonoscopy reports reviewed    PHYSICAL EXAM:  Objective    /73 (BP Location: Left arm, Patient Position: Sitting, Cuff Size: Adult Regular)   Pulse 68   Ht 1.727 m (5' 8\")   Wt 81.8 kg (180 lb 4.8 oz)   SpO2 98%   BMI 27.41 kg/m    /73 (BP Location: Left arm, Patient Position: Sitting, Cuff Size: Adult Regular)   Pulse 68   Ht 1.727 m (5' 8\")   Wt 81.8 kg (180 lb 4.8 oz)   SpO2 98%   BMI 27.41 kg/m    Body mass index is 27.41 " kg/m .  Physical Exam  Musculoskeletal:         General: No edema.        abd s/nt/nd w multiple lap sites       DISCUSSION OF RISKS:  I reviewed the risks of surgery with Arnaldo Henderson.    These include, but are not limited to, death, myocardial infarction, pneumonia, urinary tract infection, deep venous thrombosis with or without pulmonary embolus, abdominal infection from bowel injury or abscess, bowel obstruction, wound infection, and bleeding.    Also discussed risks of infection, bowel injury, adhesions, having to open  Sincerely,     Joseluis Lima MD

## 2023-01-27 NOTE — PATIENT INSTRUCTIONS
You saw Dr Lima and were offered diagnostic laparoscopy and removal of stent. This surgery is contingent on your cardiologist allowing you to have surgery. It may be delayed if the cardiologist deems it necessary. Once cleared, please call Butch Ron at [phone] to schedule. She will also help you arrange a preoperative appointment for a History and Physical

## 2023-01-27 NOTE — PROGRESS NOTES
Updated Veronika that Dr Lima has reviewed and will have his office contact them to arrange a clinic visit to discuss surgical intervention for stent removal. Veronika verbalized understanding.

## 2023-02-09 ENCOUNTER — TELEPHONE (OUTPATIENT)
Dept: SURGERY | Facility: CLINIC | Age: 68
End: 2023-02-09
Payer: MEDICARE

## 2023-02-09 NOTE — TELEPHONE ENCOUNTER
Patient' wife, Veronika, called to schedule surgery with Dr. Lima, diagnostic laparoscopy with removal of stent. Scheduled Monday, February 20 at 7:30 a.m. at [facility], to arrive at 5:30 a.m. Veronika Mcintosh's primary care has pre-op H&P scheduled on 2/17/23.

## 2023-02-10 LAB
CREATININE (EXTERNAL): 1 MG/DL (ref 0.73–1.18)
GFR ESTIMATED (EXTERNAL): >60 ML/MIN/1.73M2
GLUCOSE (EXTERNAL): 99 MG/DL (ref 70–100)
POTASSIUM (EXTERNAL): 4.4 MMOL/L (ref 3.5–5.1)

## 2023-02-11 ENCOUNTER — HEALTH MAINTENANCE LETTER (OUTPATIENT)
Age: 68
End: 2023-02-11

## 2023-02-15 ENCOUNTER — PATIENT OUTREACH (OUTPATIENT)
Dept: GASTROENTEROLOGY | Facility: CLINIC | Age: 68
End: 2023-02-15
Payer: MEDICARE

## 2023-02-15 NOTE — TELEPHONE ENCOUNTER
"Wife called in wondering about next steps after surgery with Dr Lima for stent removal. She states he continues to have pain, poor quality of life, she wonders what the next steps are.     Was scheduled with pain clinic 12/7/22, canceleld prior to visit as they were told that since they were not a \"[place] patient\" they would only consult, not treat. Per policy if patient does not have PCP at [facility], they will not treat. Pt's wife discussed that they have been hesitant to move to [facility] with discussions of merger with [place]. Encouraged primary care within [email or web address]. RTC scheduled with Dr. Saldana on 3/29- reiterated that our office does not prescribe pain meds, encouraged new PCP and follow up with pain clinic    ML  "

## 2023-02-17 ENCOUNTER — ANESTHESIA EVENT (OUTPATIENT)
Dept: SURGERY | Facility: CLINIC | Age: 68
DRG: 908 | End: 2023-02-17
Payer: MEDICARE

## 2023-02-17 RX ORDER — NALOXONE HYDROCHLORIDE 0.4 MG/ML
0.4 INJECTION, SOLUTION INTRAMUSCULAR; INTRAVENOUS; SUBCUTANEOUS
Status: DISCONTINUED | OUTPATIENT
Start: 2023-02-17 | End: 2023-02-20

## 2023-02-17 RX ORDER — FLUMAZENIL 0.1 MG/ML
0.2 INJECTION, SOLUTION INTRAVENOUS
Status: ACTIVE | OUTPATIENT
Start: 2023-02-17 | End: 2023-02-17

## 2023-02-17 RX ORDER — PROCHLORPERAZINE MALEATE 5 MG
5 TABLET ORAL EVERY 6 HOURS PRN
Status: DISCONTINUED | OUTPATIENT
Start: 2023-02-17 | End: 2023-02-20

## 2023-02-17 RX ORDER — NALOXONE HYDROCHLORIDE 0.4 MG/ML
0.2 INJECTION, SOLUTION INTRAMUSCULAR; INTRAVENOUS; SUBCUTANEOUS
Status: DISCONTINUED | OUTPATIENT
Start: 2023-02-17 | End: 2023-02-20

## 2023-02-17 RX ORDER — ONDANSETRON 4 MG/1
4 TABLET, ORALLY DISINTEGRATING ORAL EVERY 6 HOURS PRN
Status: DISCONTINUED | OUTPATIENT
Start: 2023-02-17 | End: 2023-02-20

## 2023-02-17 RX ORDER — ONDANSETRON 2 MG/ML
4 INJECTION INTRAMUSCULAR; INTRAVENOUS EVERY 6 HOURS PRN
Status: DISCONTINUED | OUTPATIENT
Start: 2023-02-17 | End: 2023-02-20

## 2023-02-17 ASSESSMENT — LIFESTYLE VARIABLES: TOBACCO_USE: 1

## 2023-02-17 ASSESSMENT — COPD QUESTIONNAIRES: COPD: 0

## 2023-02-17 NOTE — ANESTHESIA PREPROCEDURE EVALUATION
"Anesthesia Pre-Procedure Evaluation    Patient: Arnaldo Henderson   MRN: [id number] : 1955        Procedure : Procedure(s):  LAPAROSCOPY, DIAGNOSTIC; RETRIEVAL OF MIGRATED STENT          Past Medical History:   Diagnosis Date     CAD (coronary artery disease)      Diabetes (H)      Gastroesophageal reflux disease      Hypercholesteremia      Hypertension      Mixed hearing loss      Parkinson disease (H)      Second degree AV block       Past Surgical History:   Procedure Laterality Date     CA ANESTH CABG W/PUMP       COLONOSCOPY N/A 2023    Procedure: colonoscopy with fluroscopy;  Surgeon: Ayden Fraire MD;  Location:  OR     ENT SURGERY       ORTHOPEDIC SURGERY        Allergies   Allergen Reactions     Ciprofloxacin Hives, Itching, Other (See Comments), Rash and Unknown     Other reaction(s): Other (see comments)  Oral swelling per [facility]  Oral swelling  Oral swelling per [facility]       Morphine Rash     rash  rash  rash  rash       Penicillins Rash     aka ancef/ rash  aka ancef/ rash  aka ancef/ rash  aka ancef/ rash       Oxycodone Rash     \"HORRIBLE, SEVERE RASH MAYBE CAUSED BY OXY\"  \"HORRIBLE, SEVERE RASH MAYBE CAUSED BY OXY\"  \"HORRIBLE, SEVERE RASH MAYBE CAUSED BY OXY\"        Social History     Tobacco Use     Smoking status: Former     Types: Cigarettes     Smokeless tobacco: Never   Substance Use Topics     Alcohol use: Not Currently      Wt Readings from Last 1 Encounters:   23 81.8 kg (180 lb 4.8 oz)        Anesthesia Evaluation            ROS/MED HX  ENT/Pulmonary:     (+) tobacco use, Past use,  (-) asthma, COPD and sleep apnea   Neurologic:     (+) Parkinson's disease,     Cardiovascular:     (+) Dyslipidemia hypertension--CAD -CABG--Irregular Heartbeat/Palpitations (mobitz type 1 2nd degree AV block),     METS/Exercise Tolerance: >4 METS    Hematologic:       Musculoskeletal:       GI/Hepatic:     (+) GERD,     Renal/Genitourinary:       Endo:     (+) type II DM,   "   Psychiatric/Substance Use:       Infectious Disease:       Malignancy:       Other:            Physical Exam    Airway        Mallampati: III   TM distance: > 3 FB   Neck ROM: full   Mouth opening: > 3 cm    Respiratory Devices and Support         Dental  no notable dental history     (+) Minor Abnormalities - some fillings, tiny chips      Cardiovascular   cardiovascular exam normal          Pulmonary   pulmonary exam normal                OUTSIDE LABS:  CBC:   Lab Results   Component Value Date    WBC 7.4 11/18/2022    HGB 13.1 (L) 11/18/2022    HCT 40.1 11/18/2022     11/18/2022     BMP:   Lab Results   Component Value Date     11/18/2022    POTASSIUM 3.6 11/18/2022    CHLORIDE 104 11/18/2022    CO2 22 11/18/2022    BUN 23.4 (H) 11/18/2022    CR 1.0 01/16/2023    CR 0.84 11/18/2022    GLC 75 01/12/2023    GLC 81 01/12/2023     COAGS:   Lab Results   Component Value Date    INR 1.40 (H) 07/27/2022     POC: No results found for: BGM, HCG, HCGS  HEPATIC:   Lab Results   Component Value Date    ALBUMIN 3.9 11/18/2022    PROTTOTAL 7.2 11/18/2022    ALT 11 11/18/2022    AST 47 11/18/2022    ALKPHOS 185 (H) 11/18/2022    BILITOTAL 0.8 11/18/2022     OTHER:   Lab Results   Component Value Date    JAKE 9.1 11/18/2022    LIPASE 21 11/18/2022    AMYLASE 42 11/18/2022       Anesthesia Plan    ASA Status:  3   NPO Status:  NPO Appropriate    Anesthesia Type: General.     - Airway: ETT   Induction: Intravenous.   Maintenance: Inhalation.        Consents    Anesthesia Plan(s) and associated risks, benefits, and realistic alternatives discussed. Questions answered and patient/representative(s) expressed understanding.     - Discussed: Risks, Benefits and Alternatives for BOTH SEDATION and the PROCEDURE were discussed     - Discussed with:  Patient         Postoperative Care    Pain management: IV analgesics, Multi-modal analgesia.   PONV prophylaxis: Ondansetron (or other 5HT-3), Dexamethasone or Solumedrol      Comments:                    Fernando King MD

## 2023-02-20 ENCOUNTER — ANESTHESIA (OUTPATIENT)
Dept: SURGERY | Facility: CLINIC | Age: 68
DRG: 908 | End: 2023-02-20
Payer: MEDICARE

## 2023-02-20 ENCOUNTER — APPOINTMENT (OUTPATIENT)
Dept: GENERAL RADIOLOGY | Facility: CLINIC | Age: 68
DRG: 908 | End: 2023-02-20
Attending: SURGERY
Payer: MEDICARE

## 2023-02-20 ENCOUNTER — DOCUMENTATION ONLY (OUTPATIENT)
Dept: OTHER | Facility: CLINIC | Age: 68
End: 2023-02-20

## 2023-02-20 ENCOUNTER — HOSPITAL ENCOUNTER (INPATIENT)
Facility: CLINIC | Age: 68
LOS: 1 days | Discharge: HOME | DRG: 908 | End: 2023-02-21
Attending: SURGERY | Admitting: SURGERY
Payer: MEDICARE

## 2023-02-20 DIAGNOSIS — Z18.9 RETAINED FOREIGN BODY: Primary | ICD-10-CM

## 2023-02-20 LAB
GLUCOSE BLDC GLUCOMTR-MCNC: 181 MG/DL (ref 70–99)
GLUCOSE BLDC GLUCOMTR-MCNC: 189 MG/DL (ref 70–99)
GLUCOSE BLDC GLUCOMTR-MCNC: 200 MG/DL (ref 70–99)
GLUCOSE BLDC GLUCOMTR-MCNC: 211 MG/DL (ref 70–99)
GLUCOSE BLDC GLUCOMTR-MCNC: 246 MG/DL (ref 70–99)
GLUCOSE BLDC GLUCOMTR-MCNC: 266 MG/DL (ref 70–99)
GLUCOSE BLDC GLUCOMTR-MCNC: 278 MG/DL (ref 70–99)

## 2023-02-20 PROCEDURE — 250N000013 HC RX MED GY IP 250 OP 250 PS 637: Performed by: STUDENT IN AN ORGANIZED HEALTH CARE EDUCATION/TRAINING PROGRAM

## 2023-02-20 PROCEDURE — 120N000002 HC R&B MED SURG/OB UMMC

## 2023-02-20 PROCEDURE — 0DJD4ZZ INSPECTION OF LOWER INTESTINAL TRACT, PERCUTANEOUS ENDOSCOPIC APPROACH: ICD-10-PCS | Performed by: SURGERY

## 2023-02-20 PROCEDURE — 258N000003 HC RX IP 258 OP 636: Performed by: STUDENT IN AN ORGANIZED HEALTH CARE EDUCATION/TRAINING PROGRAM

## 2023-02-20 PROCEDURE — 88304 TISSUE EXAM BY PATHOLOGIST: CPT | Mod: TC | Performed by: SURGERY

## 2023-02-20 PROCEDURE — 272N000001 HC OR GENERAL SUPPLY STERILE: Performed by: SURGERY

## 2023-02-20 PROCEDURE — 250N000011 HC RX IP 250 OP 636: Performed by: STUDENT IN AN ORGANIZED HEALTH CARE EDUCATION/TRAINING PROGRAM

## 2023-02-20 PROCEDURE — 250N000011 HC RX IP 250 OP 636

## 2023-02-20 PROCEDURE — 250N000012 HC RX MED GY IP 250 OP 636 PS 637: Performed by: STUDENT IN AN ORGANIZED HEALTH CARE EDUCATION/TRAINING PROGRAM

## 2023-02-20 PROCEDURE — 710N000010 HC RECOVERY PHASE 1, LEVEL 2, PER MIN: Performed by: SURGERY

## 2023-02-20 PROCEDURE — 370N000017 HC ANESTHESIA TECHNICAL FEE, PER MIN: Performed by: SURGERY

## 2023-02-20 PROCEDURE — 250N000011 HC RX IP 250 OP 636: Performed by: SURGERY

## 2023-02-20 PROCEDURE — 360N000076 HC SURGERY LEVEL 3, PER MIN: Performed by: SURGERY

## 2023-02-20 PROCEDURE — 44238 UNLISTED LAPS PX INTESTINE: CPT | Performed by: SURGERY

## 2023-02-20 PROCEDURE — 0DPD0DZ REMOVAL OF INTRALUMINAL DEVICE FROM LOWER INTESTINAL TRACT, OPEN APPROACH: ICD-10-PCS | Performed by: SURGERY

## 2023-02-20 PROCEDURE — 74018 RADEX ABDOMEN 1 VIEW: CPT | Mod: 26 | Performed by: RADIOLOGY

## 2023-02-20 PROCEDURE — 250N000009 HC RX 250

## 2023-02-20 PROCEDURE — 82962 GLUCOSE BLOOD TEST: CPT

## 2023-02-20 PROCEDURE — 999N000063 XR ABDOMEN PORT 1 VIEW

## 2023-02-20 PROCEDURE — 258N000003 HC RX IP 258 OP 636

## 2023-02-20 PROCEDURE — 74018 RADEX ABDOMEN 1 VIEW: CPT

## 2023-02-20 PROCEDURE — 250N000009 HC RX 250: Performed by: SURGERY

## 2023-02-20 PROCEDURE — 999N000141 HC STATISTIC PRE-PROCEDURE NURSING ASSESSMENT: Performed by: SURGERY

## 2023-02-20 PROCEDURE — 74018 RADEX ABDOMEN 1 VIEW: CPT | Mod: 26 | Performed by: STUDENT IN AN ORGANIZED HEALTH CARE EDUCATION/TRAINING PROGRAM

## 2023-02-20 PROCEDURE — 250N000009 HC RX 250: Performed by: STUDENT IN AN ORGANIZED HEALTH CARE EDUCATION/TRAINING PROGRAM

## 2023-02-20 PROCEDURE — 250N000011 HC RX IP 250 OP 636: Performed by: ANESTHESIOLOGY

## 2023-02-20 PROCEDURE — 250N000025 HC SEVOFLURANE, PER MIN: Performed by: SURGERY

## 2023-02-20 PROCEDURE — 88304 TISSUE EXAM BY PATHOLOGIST: CPT | Mod: 26 | Performed by: PATHOLOGY

## 2023-02-20 RX ORDER — FENTANYL CITRATE 50 UG/ML
INJECTION, SOLUTION INTRAMUSCULAR; INTRAVENOUS PRN
Status: DISCONTINUED | OUTPATIENT
Start: 2023-02-20 | End: 2023-02-20

## 2023-02-20 RX ORDER — NALOXONE HYDROCHLORIDE 0.4 MG/ML
0.2 INJECTION, SOLUTION INTRAMUSCULAR; INTRAVENOUS; SUBCUTANEOUS
Status: DISCONTINUED | OUTPATIENT
Start: 2023-02-20 | End: 2023-02-21 | Stop reason: HOSPADM

## 2023-02-20 RX ORDER — GLYCOPYRROLATE 0.2 MG/ML
INJECTION, SOLUTION INTRAMUSCULAR; INTRAVENOUS PRN
Status: DISCONTINUED | OUTPATIENT
Start: 2023-02-20 | End: 2023-02-20

## 2023-02-20 RX ORDER — LIDOCAINE 40 MG/G
CREAM TOPICAL
Status: DISCONTINUED | OUTPATIENT
Start: 2023-02-20 | End: 2023-02-21 | Stop reason: HOSPADM

## 2023-02-20 RX ORDER — ONDANSETRON 4 MG/1
4 TABLET, ORALLY DISINTEGRATING ORAL EVERY 30 MIN PRN
Status: DISCONTINUED | OUTPATIENT
Start: 2023-02-20 | End: 2023-02-20

## 2023-02-20 RX ORDER — ATORVASTATIN CALCIUM 40 MG/1
40 TABLET, FILM COATED ORAL DAILY
Status: DISCONTINUED | OUTPATIENT
Start: 2023-02-21 | End: 2023-02-21 | Stop reason: HOSPADM

## 2023-02-20 RX ORDER — GABAPENTIN 300 MG/1
300 CAPSULE ORAL 3 TIMES DAILY
Status: DISCONTINUED | OUTPATIENT
Start: 2023-02-20 | End: 2023-02-21 | Stop reason: HOSPADM

## 2023-02-20 RX ORDER — SODIUM CHLORIDE, SODIUM LACTATE, POTASSIUM CHLORIDE, CALCIUM CHLORIDE 600; 310; 30; 20 MG/100ML; MG/100ML; MG/100ML; MG/100ML
INJECTION, SOLUTION INTRAVENOUS CONTINUOUS PRN
Status: DISCONTINUED | OUTPATIENT
Start: 2023-02-20 | End: 2023-02-20

## 2023-02-20 RX ORDER — HYDROMORPHONE HYDROCHLORIDE 2 MG/1
2-4 TABLET ORAL EVERY 4 HOURS PRN
Status: DISCONTINUED | OUTPATIENT
Start: 2023-02-20 | End: 2023-02-21 | Stop reason: HOSPADM

## 2023-02-20 RX ORDER — PANTOPRAZOLE SODIUM 40 MG/1
40 TABLET, DELAYED RELEASE ORAL DAILY
Status: DISCONTINUED | OUTPATIENT
Start: 2023-02-20 | End: 2023-02-21 | Stop reason: HOSPADM

## 2023-02-20 RX ORDER — DEXTROSE MONOHYDRATE 25 G/50ML
25-50 INJECTION, SOLUTION INTRAVENOUS
Status: DISCONTINUED | OUTPATIENT
Start: 2023-02-20 | End: 2023-02-21 | Stop reason: HOSPADM

## 2023-02-20 RX ORDER — CARBIDOPA AND LEVODOPA 50; 200 MG/1; MG/1
1 TABLET, EXTENDED RELEASE ORAL AT BEDTIME
Status: DISCONTINUED | OUTPATIENT
Start: 2023-02-20 | End: 2023-02-21 | Stop reason: HOSPADM

## 2023-02-20 RX ORDER — LIDOCAINE HYDROCHLORIDE 20 MG/ML
INJECTION, SOLUTION INFILTRATION; PERINEURAL PRN
Status: DISCONTINUED | OUTPATIENT
Start: 2023-02-20 | End: 2023-02-20

## 2023-02-20 RX ORDER — ONDANSETRON 2 MG/ML
4 INJECTION INTRAMUSCULAR; INTRAVENOUS EVERY 30 MIN PRN
Status: DISCONTINUED | OUTPATIENT
Start: 2023-02-20 | End: 2023-02-20

## 2023-02-20 RX ORDER — DEXAMETHASONE SODIUM PHOSPHATE 4 MG/ML
INJECTION, SOLUTION INTRA-ARTICULAR; INTRALESIONAL; INTRAMUSCULAR; INTRAVENOUS; SOFT TISSUE PRN
Status: DISCONTINUED | OUTPATIENT
Start: 2023-02-20 | End: 2023-02-20

## 2023-02-20 RX ORDER — ONDANSETRON 2 MG/ML
INJECTION INTRAMUSCULAR; INTRAVENOUS PRN
Status: DISCONTINUED | OUTPATIENT
Start: 2023-02-20 | End: 2023-02-20

## 2023-02-20 RX ORDER — CLINDAMYCIN PHOSPHATE 900 MG/50ML
900 INJECTION, SOLUTION INTRAVENOUS SEE ADMIN INSTRUCTIONS
Status: DISCONTINUED | OUTPATIENT
Start: 2023-02-20 | End: 2023-02-20 | Stop reason: HOSPADM

## 2023-02-20 RX ORDER — HYDROMORPHONE HYDROCHLORIDE 1 MG/ML
.3-.5 INJECTION, SOLUTION INTRAMUSCULAR; INTRAVENOUS; SUBCUTANEOUS
Status: DISCONTINUED | OUTPATIENT
Start: 2023-02-20 | End: 2023-02-21 | Stop reason: HOSPADM

## 2023-02-20 RX ORDER — NALOXONE HYDROCHLORIDE 0.4 MG/ML
0.4 INJECTION, SOLUTION INTRAMUSCULAR; INTRAVENOUS; SUBCUTANEOUS
Status: DISCONTINUED | OUTPATIENT
Start: 2023-02-20 | End: 2023-02-21 | Stop reason: HOSPADM

## 2023-02-20 RX ORDER — LOSARTAN POTASSIUM 50 MG/1
50 TABLET ORAL AT BEDTIME
Status: ON HOLD | COMMUNITY
Start: 2023-02-13 | End: 2024-02-07

## 2023-02-20 RX ORDER — FENTANYL CITRATE 50 UG/ML
25 INJECTION, SOLUTION INTRAMUSCULAR; INTRAVENOUS EVERY 5 MIN PRN
Status: DISCONTINUED | OUTPATIENT
Start: 2023-02-20 | End: 2023-02-20

## 2023-02-20 RX ORDER — LOSARTAN POTASSIUM 50 MG/1
50 TABLET ORAL AT BEDTIME
Status: DISCONTINUED | OUTPATIENT
Start: 2023-02-20 | End: 2023-02-21 | Stop reason: HOSPADM

## 2023-02-20 RX ORDER — CARBIDOPA AND LEVODOPA 25; 100 MG/1; MG/1
TABLET ORAL
COMMUNITY
End: 2024-03-26

## 2023-02-20 RX ORDER — ACETAMINOPHEN 325 MG/1
975 TABLET ORAL EVERY 8 HOURS
Status: DISCONTINUED | OUTPATIENT
Start: 2023-02-20 | End: 2023-02-21 | Stop reason: HOSPADM

## 2023-02-20 RX ORDER — PROPOFOL 10 MG/ML
INJECTION, EMULSION INTRAVENOUS PRN
Status: DISCONTINUED | OUTPATIENT
Start: 2023-02-20 | End: 2023-02-20

## 2023-02-20 RX ORDER — DEXTROSE MONOHYDRATE, SODIUM CHLORIDE, AND POTASSIUM CHLORIDE 50; 1.49; 4.5 G/1000ML; G/1000ML; G/1000ML
INJECTION, SOLUTION INTRAVENOUS CONTINUOUS
Status: DISCONTINUED | OUTPATIENT
Start: 2023-02-20 | End: 2023-02-21 | Stop reason: HOSPADM

## 2023-02-20 RX ORDER — HYDROMORPHONE HCL IN WATER/PF 6 MG/30 ML
0.4 PATIENT CONTROLLED ANALGESIA SYRINGE INTRAVENOUS ONCE
Status: COMPLETED | OUTPATIENT
Start: 2023-02-20 | End: 2023-02-20

## 2023-02-20 RX ORDER — NICOTINE POLACRILEX 4 MG
15-30 LOZENGE BUCCAL
Status: DISCONTINUED | OUTPATIENT
Start: 2023-02-20 | End: 2023-02-21 | Stop reason: HOSPADM

## 2023-02-20 RX ORDER — ONDANSETRON 2 MG/ML
4-8 INJECTION INTRAMUSCULAR; INTRAVENOUS EVERY 6 HOURS PRN
Status: DISCONTINUED | OUTPATIENT
Start: 2023-02-20 | End: 2023-02-21 | Stop reason: HOSPADM

## 2023-02-20 RX ORDER — METHOCARBAMOL 500 MG/1
500 TABLET, FILM COATED ORAL 3 TIMES DAILY PRN
Status: DISCONTINUED | OUTPATIENT
Start: 2023-02-20 | End: 2023-02-21 | Stop reason: HOSPADM

## 2023-02-20 RX ORDER — INSULIN GLARGINE 100 [IU]/ML
15 INJECTION, SOLUTION SUBCUTANEOUS AT BEDTIME
Status: DISCONTINUED | OUTPATIENT
Start: 2023-02-20 | End: 2023-02-21 | Stop reason: HOSPADM

## 2023-02-20 RX ORDER — SODIUM CHLORIDE, SODIUM LACTATE, POTASSIUM CHLORIDE, CALCIUM CHLORIDE 600; 310; 30; 20 MG/100ML; MG/100ML; MG/100ML; MG/100ML
INJECTION, SOLUTION INTRAVENOUS CONTINUOUS
Status: DISCONTINUED | OUTPATIENT
Start: 2023-02-20 | End: 2023-02-20

## 2023-02-20 RX ORDER — CLINDAMYCIN PHOSPHATE 900 MG/50ML
900 INJECTION, SOLUTION INTRAVENOUS
Status: COMPLETED | OUTPATIENT
Start: 2023-02-20 | End: 2023-02-20

## 2023-02-20 RX ORDER — FENTANYL CITRATE 50 UG/ML
50 INJECTION, SOLUTION INTRAMUSCULAR; INTRAVENOUS EVERY 5 MIN PRN
Status: DISCONTINUED | OUTPATIENT
Start: 2023-02-20 | End: 2023-02-20

## 2023-02-20 RX ORDER — MAGNESIUM OXIDE 400 MG/1
400 TABLET ORAL EVERY OTHER DAY
COMMUNITY

## 2023-02-20 RX ADMIN — ONDANSETRON 4 MG: 2 INJECTION INTRAMUSCULAR; INTRAVENOUS at 09:35

## 2023-02-20 RX ADMIN — INSULIN ASPART 3 UNITS: 100 INJECTION, SOLUTION INTRAVENOUS; SUBCUTANEOUS at 17:31

## 2023-02-20 RX ADMIN — FENTANYL CITRATE 25 MCG: 50 INJECTION, SOLUTION INTRAMUSCULAR; INTRAVENOUS at 10:45

## 2023-02-20 RX ADMIN — CLINDAMYCIN PHOSPHATE 900 MG: 900 INJECTION, SOLUTION INTRAVENOUS at 07:05

## 2023-02-20 RX ADMIN — METHOCARBAMOL 500 MG: 500 TABLET ORAL at 23:30

## 2023-02-20 RX ADMIN — PHENYLEPHRINE HYDROCHLORIDE 50 MCG: 10 INJECTION INTRAVENOUS at 08:19

## 2023-02-20 RX ADMIN — PHENYLEPHRINE HYDROCHLORIDE 100 MCG: 10 INJECTION INTRAVENOUS at 09:09

## 2023-02-20 RX ADMIN — PHENYLEPHRINE HYDROCHLORIDE 100 MCG: 10 INJECTION INTRAVENOUS at 09:34

## 2023-02-20 RX ADMIN — PHENYLEPHRINE HYDROCHLORIDE 100 MCG: 10 INJECTION INTRAVENOUS at 08:41

## 2023-02-20 RX ADMIN — HYDROMORPHONE HYDROCHLORIDE 4 MG: 2 TABLET ORAL at 20:08

## 2023-02-20 RX ADMIN — POTASSIUM CHLORIDE, DEXTROSE MONOHYDRATE AND SODIUM CHLORIDE: 150; 5; 450 INJECTION, SOLUTION INTRAVENOUS at 23:29

## 2023-02-20 RX ADMIN — PANCRELIPASE LIPASE, PANCRELIPASE PROTEASE, PANCRELIPASE AMYLASE 2 CAPSULE: 20000; 63000; 84000 CAPSULE, DELAYED RELEASE ORAL at 17:30

## 2023-02-20 RX ADMIN — FENTANYL CITRATE 100 MCG: 50 INJECTION, SOLUTION INTRAMUSCULAR; INTRAVENOUS at 07:31

## 2023-02-20 RX ADMIN — GABAPENTIN 300 MG: 300 CAPSULE ORAL at 14:02

## 2023-02-20 RX ADMIN — CLINDAMYCIN PHOSPHATE 900 MG: 900 INJECTION, SOLUTION INTRAVENOUS at 06:10

## 2023-02-20 RX ADMIN — PHENYLEPHRINE HYDROCHLORIDE 50 MCG: 10 INJECTION INTRAVENOUS at 08:42

## 2023-02-20 RX ADMIN — INSULIN ASPART 3 UNITS: 100 INJECTION, SOLUTION INTRAVENOUS; SUBCUTANEOUS at 14:01

## 2023-02-20 RX ADMIN — DEXAMETHASONE SODIUM PHOSPHATE 4 MG: 4 INJECTION, SOLUTION INTRA-ARTICULAR; INTRALESIONAL; INTRAMUSCULAR; INTRAVENOUS; SOFT TISSUE at 07:40

## 2023-02-20 RX ADMIN — PROPOFOL 40 MG: 10 INJECTION, EMULSION INTRAVENOUS at 08:03

## 2023-02-20 RX ADMIN — CARBIDOPA AND LEVODOPA 1 TABLET: 50; 200 TABLET, EXTENDED RELEASE ORAL at 22:09

## 2023-02-20 RX ADMIN — FENTANYL CITRATE 25 MCG: 50 INJECTION, SOLUTION INTRAMUSCULAR; INTRAVENOUS at 10:32

## 2023-02-20 RX ADMIN — ACETAMINOPHEN 975 MG: 325 TABLET ORAL at 14:02

## 2023-02-20 RX ADMIN — SODIUM CHLORIDE, POTASSIUM CHLORIDE, SODIUM LACTATE AND CALCIUM CHLORIDE: 600; 310; 30; 20 INJECTION, SOLUTION INTRAVENOUS at 07:25

## 2023-02-20 RX ADMIN — LIDOCAINE HYDROCHLORIDE 100 MG: 20 INJECTION, SOLUTION INFILTRATION; PERINEURAL at 07:31

## 2023-02-20 RX ADMIN — PANTOPRAZOLE SODIUM 40 MG: 40 TABLET, DELAYED RELEASE ORAL at 14:02

## 2023-02-20 RX ADMIN — PROPOFOL 130 MG: 10 INJECTION, EMULSION INTRAVENOUS at 07:31

## 2023-02-20 RX ADMIN — HYDROMORPHONE HYDROCHLORIDE 2 MG: 2 TABLET ORAL at 14:14

## 2023-02-20 RX ADMIN — FENTANYL CITRATE 25 MCG: 50 INJECTION, SOLUTION INTRAMUSCULAR; INTRAVENOUS at 11:09

## 2023-02-20 RX ADMIN — PHENYLEPHRINE HYDROCHLORIDE 50 MCG: 10 INJECTION INTRAVENOUS at 08:23

## 2023-02-20 RX ADMIN — POTASSIUM CHLORIDE, DEXTROSE MONOHYDRATE AND SODIUM CHLORIDE: 150; 5; 450 INJECTION, SOLUTION INTRAVENOUS at 11:14

## 2023-02-20 RX ADMIN — FENTANYL CITRATE 50 MCG: 50 INJECTION, SOLUTION INTRAMUSCULAR; INTRAVENOUS at 09:06

## 2023-02-20 RX ADMIN — GABAPENTIN 300 MG: 300 CAPSULE ORAL at 19:29

## 2023-02-20 RX ADMIN — GLYCOPYRROLATE 0.2 MG: 0.2 INJECTION, SOLUTION INTRAMUSCULAR; INTRAVENOUS at 08:00

## 2023-02-20 RX ADMIN — SUGAMMADEX 200 MG: 100 INJECTION, SOLUTION INTRAVENOUS at 10:00

## 2023-02-20 RX ADMIN — FENTANYL CITRATE 50 MCG: 50 INJECTION, SOLUTION INTRAMUSCULAR; INTRAVENOUS at 07:56

## 2023-02-20 RX ADMIN — Medication 50 MG: at 07:31

## 2023-02-20 RX ADMIN — LOSARTAN POTASSIUM 50 MG: 50 TABLET, FILM COATED ORAL at 22:09

## 2023-02-20 RX ADMIN — SODIUM CHLORIDE, POTASSIUM CHLORIDE, SODIUM LACTATE AND CALCIUM CHLORIDE: 600; 310; 30; 20 INJECTION, SOLUTION INTRAVENOUS at 09:09

## 2023-02-20 RX ADMIN — HYDROMORPHONE HYDROCHLORIDE 0.4 MG: 0.2 INJECTION, SOLUTION INTRAMUSCULAR; INTRAVENOUS; SUBCUTANEOUS at 12:05

## 2023-02-20 RX ADMIN — FENTANYL CITRATE 50 MCG: 50 INJECTION, SOLUTION INTRAMUSCULAR; INTRAVENOUS at 10:35

## 2023-02-20 RX ADMIN — INSULIN GLARGINE 15 UNITS: 100 INJECTION, SOLUTION SUBCUTANEOUS at 22:16

## 2023-02-20 RX ADMIN — ACETAMINOPHEN 975 MG: 325 TABLET ORAL at 22:09

## 2023-02-20 RX ADMIN — Medication 20 MG: at 09:04

## 2023-02-20 ASSESSMENT — ACTIVITIES OF DAILY LIVING (ADL)
ADLS_ACUITY_SCORE: 35
ADLS_ACUITY_SCORE: 35
DRESSING/BATHING_DIFFICULTY: YES
ADLS_ACUITY_SCORE: 37
ADLS_ACUITY_SCORE: 41
CHANGE_IN_FUNCTIONAL_STATUS_SINCE_ONSET_OF_CURRENT_ILLNESS/INJURY: NO
DIFFICULTY_EATING/SWALLOWING: NO
ADLS_ACUITY_SCORE: 35
DRESS: 0-->ASSISTANCE NEEDED (DEVELOPMENTALLY APPROPRIATE)
WALKING_OR_CLIMBING_STAIRS: AMBULATION DIFFICULTY, ASSISTANCE 1 PERSON
WEAR_GLASSES_OR_BLIND: NO
DRESS: 1-->ASSISTANCE (EQUIPMENT/PERSON) NEEDED
TOILETING_ISSUES: NO
ADLS_ACUITY_SCORE: 41
BATHING: 1-->ASSISTANCE NEEDED
ADLS_ACUITY_SCORE: 35
ADLS_ACUITY_SCORE: 37
ADLS_ACUITY_SCORE: 35
WALKING_OR_CLIMBING_STAIRS_DIFFICULTY: YES
DRESSING/BATHING: BATHING DIFFICULTY, REQUIRES EQUIPMENT
CONCENTRATING,_REMEMBERING_OR_MAKING_DECISIONS_DIFFICULTY: NO
DOING_ERRANDS_INDEPENDENTLY_DIFFICULTY: YES
EQUIPMENT_CURRENTLY_USED_AT_HOME: CANE, STRAIGHT
TRANSFERRING: 1-->ASSISTANCE (EQUIPMENT/PERSON) NEEDED
FALL_HISTORY_WITHIN_LAST_SIX_MONTHS: NO
TRANSFERRING: 0-->ASSISTANCE NEEDED (DEVELOPMENTALLY APPROPRIATE)

## 2023-02-20 NOTE — PROGRESS NOTES
Admitted/transferred from:   2 RN full   skin assessment completed by Joann Flores RN and Kelvin Brown.  Skin assessment finding: issues found abdominal incisions and lap sites   Interventions/actions: skin interventions repostiioning, take care when sitting up.      Will continue to monitor.

## 2023-02-20 NOTE — PHARMACY-ADMISSION MEDICATION HISTORY
Admission Medication History Completed by Pharmacy    See [facility] Admission Navigator for allergy information, preferred outpatient pharmacy, prior to admission medications and immunization status.     Medication History Sources:     External med dispense report    Patient interview    Changes made to PTA medication list (reason):    Added:   o Carbidopa-levodopa three times daily    Deleted: None    Changed:   o Magnesium solution to magnesium tablet  o Added some directions to the insulin aspart sliding scale (previously stated 100 units three times daily as needed)    Additional Information:    Patient manages medications independently and knew all of them very well.    Patient used on 15 unit(s) of insulin glargine last night prior to procedure.    Patient receives Zenpep through  and  setup per spouse.    Patient started losartan on 2/13/23 and metoprolol has been on hold since losartan started.    Prior to Admission medications    Medication Sig Last Dose   atorvastatin (LIPITOR) 40 MG tablet Take 1 tablet by mouth daily 2/19/2023 at AM   carbidopa-levodopa (SINEMET CR)  MG CR tablet Take 1 tablet by mouth At Bedtime 2/19/2023 at HS   carbidopa-levodopa (SINEMET)  MG tablet Take 2 tablets by mouth 3 times daily 2/19/2023 at PM   gabapentin (NEURONTIN) 300 MG capsule Take 300 mg by mouth 3 times daily (with meals) 2/19/2023 at PM   insulin aspart (NOVOLOG PEN) 100 UNIT/ML pen Inject Subcutaneous 3 times daily (with meals) Give 2 units per every 50 above 140. 2/19/2023 at PM   Insulin Glargine (BASAGLAR KWIKPEN SC) Inject 30 Units Subcutaneous At Bedtime 2/19/2023 at HS   lipase-protease-amylase (ZENPEP) 54271-356812 units CPEP Take 1 capsule by mouth 3 times daily (with meals) 2/19/2023 at PM   losartan (COZAAR) 50 MG tablet Take 50 mg by mouth At Bedtime 2/19/2023 at HS   magnesium oxide (MAG-OX) 400 MG tablet Take 400 mg by mouth every other day 2/19/2023   metoprolol succinate  ER (TOPROL XL) 25 MG 24 hr tablet Take 25 mg by mouth once as needed Past Month at PRN   Multiple Vitamin (MULTI-VITAMINS) TABS Take 1 tablet by mouth daily 2/19/2023 at AM   omeprazole (PRILOSEC) 20 MG DR capsule Take 20 mg by mouth daily 2/19/2023 at AM   aspirin (ASA) 81 MG EC tablet Take 81 mg by mouth daily 2/14/2023 at AM       Date completed: 02/20/23    Medication history completed by: Carla Vela, PharmD

## 2023-02-20 NOTE — ANESTHESIA PROCEDURE NOTES
Airway       Patient location during procedure: OR       Procedure Start/Stop Times: 2/20/2023 7:35 AM  Staff -        CRNA: Herlinda Lisa APRN CRNA       Performed By: CRNA  Consent for Airway        Urgency: elective  Indications and Patient Condition       Indications for airway management: madelyn-procedural         Mask difficulty assessment: 1 - vent by mask    Final Airway Details       Final airway type: endotracheal airway       Successful airway: ETT - single and Oral  Endotracheal Airway Details        ETT size (mm): 7.5       Cuffed: yes       Successful intubation technique: direct laryngoscopy       DL Blade Type: Lee 2       Grade View of Cords: 1       Adjucts: stylet       Measured from: lips       Secured at (cm): 23       Bite block used: None    Post intubation assessment        Placement verified by: capnometry, equal breath sounds and chest rise        Number of attempts at approach: 1       Secured with: pink tape       Ease of procedure: easy       Dentition: Intact and Unchanged    Medication(s) Administered   Medication Administration Time: 2/20/2023 7:35 AM

## 2023-02-20 NOTE — PROGRESS NOTES
I reviewed the risks of surgery with Arnaldo Henderson.    These include, but are not limited to, death, myocardial infarction, pneumonia, urinary tract infection, deep venous thrombosis with or without pulmonary embolus, abdominal infection from bowel injury or abscess, bowel obstruction, wound infection, and bleeding.    Also discussed risk of bowel injury/leak, need to abort, need to open more than expected    Joseluis Lima MD

## 2023-02-20 NOTE — BRIEF OP NOTE
[facility]    Brief Operative Note    Pre-operative diagnosis: Retained foreign body [Z18.9]  Post-operative diagnosis Same as pre-operative diagnosis    Procedure: Procedure(s):  LAPAROSCOPY, DIAGNOSTIC; RETRIEVAL OF MIGRATED STENT  Surgeon: Surgeon(s) and Role:     * Joseluis Lima MD - Primary      * Janet Bonner MD - Assisting  Anesthesia: General   Estimated Blood Loss: 20 ml    Drains: None  Specimens:   ID Type Source Tests Collected by Time Destination   1 : migrated Axios stent Tissue Other SURGICAL PATHOLOGY EXAM Joseluis Lima MD 2/20/2023  9:22 AM      Findings:   Axios stent in mid-small bowel removed via enterotomy.   Complications: None.  Implants: * No implants in log *    Plan: Admit, clear liquid diet, pain control    Janet Rangel MD  General Surgery PGY-7

## 2023-02-20 NOTE — PLAN OF CARE
"Goal Outcome Evaluation:  BP (!) 170/74 (BP Location: Right arm, Patient Position: Semi-Jarrell's, Cuff Size: Adult Large)   Pulse 85   Temp 97.7  F (36.5  C) (Oral)   Resp 12   Ht 1.727 m (5' 8\")   Wt 81.1 kg (178 lb 12.7 oz)   SpO2 95%   BMI 27.19 kg/m       Patient arrived to  around 1230 this shift.  Alert and oriented x 4.  Patient reports pain is tolerable with Tylenol and oral Dilaudid. Abdomen soft with hypoactive bowel sounds present.  Abdominal lap sites x 3 and incision intact.  Voided x 1 this shift. Up with assist of one. Wife at bedside. Continue with POC.                       "

## 2023-02-20 NOTE — OP NOTE
[facility]    Operative Note    Pre-operative diagnosis: Retained foreign body [Z18.9]   Post-operative diagnosis Migratied Axios stent*   Procedure: Procedure(s):  LAPAROSCOPY, DIAGNOSTIC; RETRIEVAL OF MIGRATED STENT;    Surgeon: Surgeon(s) and Role:     * Joseluis Lima MD - Primary   Janet Rangel MD- Assistant (resident)   Assistant Surgeon        Anesthesia: n/a    Estimated blood loss: 20 ml   Drains: None   Specimens: ID Type Source Tests Collected by Time Destination   1 : migrated Axios stent Tissue Other SURGICAL PATHOLOGY EXAM Joseluis Lima MD 2/20/2023  9:22 AM       Findings: axios in mid-small bowel ~125cm proximal from the ileocecal valve.   Complications: None.   Implants: None.       INDICATIONS FOR PROCEDURE  Arnaldo Henderson is a 67M who has a retained small bowel axios stent confirmed on imaging. He was offered retrieval surgery    After understanding the risks and benefits of proceeding with a diagnostic laparoscopy and stent removal, he agreed to an operation as outlined by me.    See risk discussion in preop notes    OPERATIVE PROCEDURE: The patient was prepared in routine fashion and under the benefits of general anesthesia, the abdomen was entered with a left upper quadrant Veress needle and the abdomen was insufflated with carbon dioxide to a maximum pressure of 15 mm Hg. With the patient flexed, a total of 3 left sided ports were placed.     The bowel was run to find the stent several times. It was not readily apparent where the stent was. Fluoroscopic images were used to lcoalize the site in the small bowel. Eventually, the stent was identified, and a laparoscopic bowel clamp was placed next to it.    Next, a 2cm midline incision was made above the umbilicus. All layers of the abdominal wall were incised with cautery until the abdomen  Was entered. A 2cm Hussein wound protector was placed. The stent area of small bowel was  extruded from the abdomen.    A small enterotomy was sharply made and the stent was removed. The enterotomy was closed transversely with running 3-0 PDS. The suture line was imbricated with interrupted 3-0 silk stitches. The closed bowel was returned to the abdomen.    Gown and gloves were changes    We tightened the Hussein and re-insufflated. Scant fluid/blood was suctioned and everything appeared healthy. We desufflated the abdomen./    NExt, the midline mini-laparotomy was closed with running 1-PDS. The wound was salien irrigated. Soft tissue was closed with 3-0 vicryl interrupteds. All skin incisions were closed with 4-0 monocryl and skin glue. WOunds were infiltrated with 30 ml local (see MAR for type)        I was present for all critical components of this case.    Joseluis Lima MD    Surgery  [phone] (hospital )  [phone] (clinic nurses)    Admit overnight  discontinue styles  CLD  Dilaudid analgesia  HOme meds  IVF

## 2023-02-20 NOTE — ANESTHESIA CARE TRANSFER NOTE
Patient: Arnaldo Henderson    Procedure: Procedure(s):  LAPAROSCOPY, DIAGNOSTIC; RETRIEVAL OF MIGRATED STENT       Diagnosis: Retained foreign body [Z18.9]  Diagnosis Additional Information: No value filed.    Anesthesia Type:   General     Note:    Oropharynx: oropharynx clear of all foreign objects and spontaneously breathing  Level of Consciousness: awake and drowsy  Oxygen Supplementation: room air    Independent Airway: airway patency satisfactory and stable  Dentition: dentition unchanged  Vital Signs Stable: post-procedure vital signs reviewed and stable  Report to RN Given: handoff report given  Patient transferred to: PACU    Handoff Report: Identifed the Patient, Identified the Reponsible Provider, Reviewed the pertinent medical history, Discussed the surgical course, Reviewed Intra-OP anesthesia mangement and issues during anesthesia, Set expectations for post-procedure period and Allowed opportunity for questions and acknowledgement of understanding      Vitals:  Vitals Value Taken Time   /70 02/20/23 1021   Temp     Pulse 89 02/20/23 1023   Resp 18    SpO2 96 % 02/20/23 1023   Vitals shown include unvalidated device data.    Electronically Signed By: KRYSTYNA Rossi CRNA  February 20, 2023  10:25 AM

## 2023-02-20 NOTE — ANESTHESIA POSTPROCEDURE EVALUATION
Patient: Arnaldo Henderson    Procedure: Procedure(s):  LAPAROSCOPY, DIAGNOSTIC; RETRIEVAL OF MIGRATED STENT       Anesthesia Type:  General    Note:  Disposition: Outpatient   Postop Pain Control: Uneventful            Sign Out: Well controlled pain   PONV: No   Neuro/Psych: Uneventful            Sign Out: Acceptable/Baseline neuro status   Airway/Respiratory: Uneventful            Sign Out: Acceptable/Baseline resp. status   CV/Hemodynamics: Uneventful            Sign Out: Acceptable CV status; No obvious hypovolemia; No obvious fluid overload   Other NRE: NONE   DID A NON-ROUTINE EVENT OCCUR? No           Last vitals:  Vitals Value Taken Time   /70 02/20/23 1050   Temp 36.9  C (98.5  F) 02/20/23 1020   Pulse 80 02/20/23 1055   Resp 12 02/20/23 1045   SpO2 98 % 02/20/23 1055   Vitals shown include unvalidated device data.    Electronically Signed By: Fernando King MD  February 20, 2023  10:56 AM

## 2023-02-21 ENCOUNTER — APPOINTMENT (OUTPATIENT)
Dept: PHYSICAL THERAPY | Facility: CLINIC | Age: 68
DRG: 908 | End: 2023-02-21
Attending: STUDENT IN AN ORGANIZED HEALTH CARE EDUCATION/TRAINING PROGRAM
Payer: MEDICARE

## 2023-02-21 VITALS
RESPIRATION RATE: 15 BRPM | SYSTOLIC BLOOD PRESSURE: 112 MMHG | TEMPERATURE: 97.8 F | HEIGHT: 68 IN | BODY MASS INDEX: 27.1 KG/M2 | HEART RATE: 75 BPM | WEIGHT: 178.79 LBS | DIASTOLIC BLOOD PRESSURE: 57 MMHG | OXYGEN SATURATION: 92 %

## 2023-02-21 LAB
ANION GAP SERPL CALCULATED.3IONS-SCNC: 8 MMOL/L (ref 7–15)
BUN SERPL-MCNC: 14.1 MG/DL (ref 8–23)
CALCIUM SERPL-MCNC: 7.9 MG/DL (ref 8.8–10.2)
CHLORIDE SERPL-SCNC: 102 MMOL/L (ref 98–107)
CREAT SERPL-MCNC: 0.85 MG/DL (ref 0.67–1.17)
DEPRECATED HCO3 PLAS-SCNC: 25 MMOL/L (ref 22–29)
ERYTHROCYTE [DISTWIDTH] IN BLOOD BY AUTOMATED COUNT: 13.5 % (ref 10–15)
GFR SERPL CREATININE-BSD FRML MDRD: >90 ML/MIN/1.73M2
GLUCOSE BLDC GLUCOMTR-MCNC: 249 MG/DL (ref 70–99)
GLUCOSE SERPL-MCNC: 275 MG/DL (ref 70–99)
HCT VFR BLD AUTO: 35.5 % (ref 40–53)
HGB BLD-MCNC: 11.9 G/DL (ref 13.3–17.7)
MAGNESIUM SERPL-MCNC: 1.7 MG/DL (ref 1.7–2.3)
MCH RBC QN AUTO: 31.7 PG (ref 26.5–33)
MCHC RBC AUTO-ENTMCNC: 33.5 G/DL (ref 31.5–36.5)
MCV RBC AUTO: 95 FL (ref 78–100)
PHOSPHATE SERPL-MCNC: 3.7 MG/DL (ref 2.5–4.5)
PLATELET # BLD AUTO: 174 10E3/UL (ref 150–450)
POTASSIUM SERPL-SCNC: 4.4 MMOL/L (ref 3.4–5.3)
RBC # BLD AUTO: 3.75 10E6/UL (ref 4.4–5.9)
SODIUM SERPL-SCNC: 135 MMOL/L (ref 136–145)
WBC # BLD AUTO: 10.4 10E3/UL (ref 4–11)

## 2023-02-21 PROCEDURE — 97116 GAIT TRAINING THERAPY: CPT | Mod: GP

## 2023-02-21 PROCEDURE — 85014 HEMATOCRIT: CPT | Performed by: STUDENT IN AN ORGANIZED HEALTH CARE EDUCATION/TRAINING PROGRAM

## 2023-02-21 PROCEDURE — 80048 BASIC METABOLIC PNL TOTAL CA: CPT | Performed by: STUDENT IN AN ORGANIZED HEALTH CARE EDUCATION/TRAINING PROGRAM

## 2023-02-21 PROCEDURE — 36415 COLL VENOUS BLD VENIPUNCTURE: CPT | Performed by: STUDENT IN AN ORGANIZED HEALTH CARE EDUCATION/TRAINING PROGRAM

## 2023-02-21 PROCEDURE — 97161 PT EVAL LOW COMPLEX 20 MIN: CPT | Mod: GP

## 2023-02-21 PROCEDURE — 250N000013 HC RX MED GY IP 250 OP 250 PS 637: Performed by: STUDENT IN AN ORGANIZED HEALTH CARE EDUCATION/TRAINING PROGRAM

## 2023-02-21 PROCEDURE — 250N000013 HC RX MED GY IP 250 OP 250 PS 637

## 2023-02-21 PROCEDURE — 84100 ASSAY OF PHOSPHORUS: CPT | Performed by: STUDENT IN AN ORGANIZED HEALTH CARE EDUCATION/TRAINING PROGRAM

## 2023-02-21 PROCEDURE — 83735 ASSAY OF MAGNESIUM: CPT | Performed by: STUDENT IN AN ORGANIZED HEALTH CARE EDUCATION/TRAINING PROGRAM

## 2023-02-21 PROCEDURE — 97110 THERAPEUTIC EXERCISES: CPT | Mod: GP

## 2023-02-21 RX ORDER — HYDROMORPHONE HYDROCHLORIDE 2 MG/1
2-4 TABLET ORAL EVERY 6 HOURS PRN
Qty: 20 TABLET | Refills: 0 | Status: SHIPPED | OUTPATIENT
Start: 2023-02-21 | End: 2023-02-24

## 2023-02-21 RX ORDER — CARBIDOPA AND LEVODOPA 25; 100 MG/1; MG/1
2 TABLET ORAL 3 TIMES DAILY
Status: DISCONTINUED | OUTPATIENT
Start: 2023-02-21 | End: 2023-02-21 | Stop reason: HOSPADM

## 2023-02-21 RX ADMIN — ATORVASTATIN CALCIUM 40 MG: 40 TABLET, FILM COATED ORAL at 08:42

## 2023-02-21 RX ADMIN — CARBIDOPA AND LEVODOPA 2 TABLET: 25; 100 TABLET ORAL at 09:57

## 2023-02-21 RX ADMIN — PANCRELIPASE LIPASE, PANCRELIPASE PROTEASE, PANCRELIPASE AMYLASE 1 CAPSULE: 20000; 63000; 84000 CAPSULE, DELAYED RELEASE ORAL at 08:43

## 2023-02-21 RX ADMIN — GABAPENTIN 300 MG: 300 CAPSULE ORAL at 08:41

## 2023-02-21 RX ADMIN — PANTOPRAZOLE SODIUM 40 MG: 40 TABLET, DELAYED RELEASE ORAL at 08:41

## 2023-02-21 RX ADMIN — HYDROMORPHONE HYDROCHLORIDE 4 MG: 2 TABLET ORAL at 03:05

## 2023-02-21 RX ADMIN — INSULIN ASPART 3 UNITS: 100 INJECTION, SOLUTION INTRAVENOUS; SUBCUTANEOUS at 08:38

## 2023-02-21 ASSESSMENT — ACTIVITIES OF DAILY LIVING (ADL)
ADLS_ACUITY_SCORE: 37

## 2023-02-21 NOTE — PROGRESS NOTES
MIS/Bariatric Surgery Progress Note    Arnaldo Henderson  [id number]    No acute overnight events. Pain controlled. Tolerated clear liquids (jello, juice, etc). No nausea or emesis. Voiding after styles removed. Has not ambulated yet this am.     Temp:  [97.7  F (36.5  C)-99.2  F (37.3  C)] 99.2  F (37.3  C)  Pulse:  [74-89] 74  Resp:  [12-18] 15  BP: (103-170)/() 141/66  SpO2:  [93 %-98 %] 94 %    Intake/Output Summary (Last 24 hours) at 2/21/2023 0814  Last data filed at 2/21/2023 0700  Gross per 24 hour   Intake 800 ml   Output 1385 ml   Net -585 ml     NAD, resting comfortably  non-labored breathing on 2L  Soft, appropriately tender, non-distended, incisions CDI with skin glue, no erythema or drainage  warm and well perfused   alert, oriented    Wbc 10.4  Hgb 11.9  Plt 174  Cr 0.85    67 year old male POD#1 s/p laparoscopic retrieval of migrated axios stent via enterotomy.     - PO pain control  - Incentive spirometer/deep breathing, wean O2  - Advance to full liquid diet, stop IVF  - Encourage ambulation, OOB  - Anticipate discharge home today once ambulating and tolerating diet advancement    Discussed with staff, Dr. Lima.     Janet Rangel MD  General Surgery PGY-7

## 2023-02-21 NOTE — DISCHARGE SUMMARY
"[facility]   MIS Discharge Summary    Date of Admission: 2/20/2023  Date of Discharge: 2/21/2023    Admission Diagnosis:  1. Migrated Axios stent    Discharge Diagnosis:  1. Same as above  2. S/p retrieval of migrated stent    Consultations:  None    Procedures:  1. Laparoscopic retrieval of migrated stent via enterotomy by Dr Lima    Brief HPI:  Arnaldo Henderson is a 67 year old male with a history of cholecystectomy followed by prolonged course of complicated necrotizing pancreatitis in [place]. Management included multiple Axios stents across the pylorus into a pseudocyst. One stent migrated to the mid-small bowel and was unable to be retrieved via colonoscopy. He was seen in clinic and offered diagnostic laparoscopy for retrieval of the migrated stent. After a discussion of the risks, benefits, and alternatives, the patient elected to proceed with surgery.     Hospital Course:  The patient was admitted and underwent the above procedure. The stent was identified in the mid-small bowel. It was removed via an enterotomy that was repaired primarily. The patient tolerated the procedure well. There were no complications. The patient's diet was advanced to full liquids. Pain was controlled with oral pain medication and the patient was able to ambulate and void without difficulty. The patient received appropriate education post operatively. On POD #1 the patient was discharged to home. He was advised to continue to advance his diet slowly at home.     Discharge Physical Exam:  /57 (BP Location: Right arm)   Pulse 75   Temp 97.8  F (36.6  C) (Oral)   Resp 15   Ht 1.727 m (5' 8\")   Wt 81.1 kg (178 lb 12.7 oz)   SpO2 92%   BMI 27.19 kg/m      Gen: NAD  Lungs: non-labored breathing  CV: regular rhythm, normal rate   Abd: obese, soft, nondistended, appropriately tender, incisions are c/d/i  Ext: no peripheral edema  Neuro: AOx3    Meds:     Review of your medicines      START " taking      Dose / Directions   HYDROmorphone 2 MG tablet  Commonly known as: DILAUDID  Used for: Retained foreign body      Dose: 2-4 mg  Take 1-2 tablets (2-4 mg) by mouth every 6 hours as needed for pain  Quantity: 20 tablet  Refills: 0        CONTINUE these medicines which have NOT CHANGED      Dose / Directions   aspirin 81 MG EC tablet  Commonly known as: ASA      Dose: 81 mg  Take 81 mg by mouth daily  Refills: 0     atorvastatin 40 MG tablet  Commonly known as: LIPITOR      Dose: 1 tablet  Take 1 tablet by mouth daily  Refills: 0     BASAGLAR KWIKPEN SC      Dose: 30 Units  Inject 30 Units Subcutaneous At Bedtime  Refills: 0     BD Pen Needle Maria Eugenia 2nd Gen 32G X 4 MM miscellaneous  Generic drug: insulin pen needle      USE TO INJECT INSULIN 4 TIMES A DAY  Refills: 0     * carbidopa-levodopa  MG CR tablet  Commonly known as: SINEMET CR      Dose: 1 tablet  Take 1 tablet by mouth At Bedtime  Refills: 0     * carbidopa-levodopa  MG tablet  Commonly known as: SINEMET      Dose: 2 tablet  Take 2 tablets by mouth 3 times daily  Refills: 0     gabapentin 300 MG capsule  Commonly known as: NEURONTIN      Dose: 300 mg  Take 300 mg by mouth 3 times daily (with meals)  Refills: 0     insulin aspart 100 UNIT/ML pen  Commonly known as: NovoLOG PEN      Inject Subcutaneous 3 times daily (with meals) Give 2 units per every 50 above 140.  Refills: 0     losartan 50 MG tablet  Commonly known as: COZAAR      Dose: 50 mg  Take 50 mg by mouth At Bedtime  Refills: 0     magnesium oxide 400 MG tablet  Commonly known as: MAG-OX      Dose: 400 mg  Take 400 mg by mouth every other day  Refills: 0     metoprolol succinate ER 25 MG 24 hr tablet  Commonly known as: TOPROL XL      Dose: 25 mg  Take 25 mg by mouth once as needed  Refills: 0     Multi-Vitamins Tabs      Dose: 1 tablet  Take 1 tablet by mouth daily  Refills: 0     omeprazole 20 MG DR capsule  Commonly known as: priLOSEC      Dose: 20 mg  Take 20 mg by mouth  daily  Refills: 0     Zenpep [id number] units Cpep  Used for: Necrotizing pancreatitis  Generic drug: lipase-protease-amylase      Dose: 1 capsule  Take 1 capsule by mouth 3 times daily (with meals)  Quantity: 90 capsule  Refills: 11         * This list has 2 medication(s) that are the same as other medications prescribed for you. Read the directions carefully, and ask your doctor or other care provider to review them with you.               Where to get your medicines      Some of these will need a paper prescription and others can be bought over the counter. Ask your nurse if you have questions.    Bring a paper prescription for each of these medications    HYDROmorphone 2 MG tablet         Additional instructions:  After Care     Future Labs/Procedures    Activity     Comments:    Your activity upon discharge: activity as tolerated, no heavy lifting > 10 lbs for 3 weeks    Diet     Comments:    Follow this diet upon discharge: You will be discharged on full liquid diet and recommend slowly resuming regular diet over the coming days.    Discharge Instructions     Comments:    -You may shower 48 hours after surgery but do not scrub incisions; simply let soapy water run over them.   -You have glue over the incisions, this will fall off on its own and does not need to be replaced.   -Do not soak in a bath tub or pool for 6 weeks after surgery.   -No driving while on narcotic pain medications. You may wish to take a stool softener or laxative while taking narcotics.  -You will receive a call from Dr Lima's office in a few days for followup, and to determine if a postop clinic visit is needed.         Janet Rangel MD  General Surgery PGY-7

## 2023-02-21 NOTE — PLAN OF CARE
"BP (!) 141/66 (BP Location: Right arm)   Pulse 74   Temp 99.2  F (37.3  C) (Oral)   Resp 15   Ht 1.727 m (5' 8\")   Wt 81.1 kg (178 lb 12.7 oz)   SpO2 94%   BMI 27.19 kg/m      Status: S/p lap retrieval of migrated stent  Activity: Ax1 + gb  Neuros: A&Ox4, no deficits noted ex baseline tremors for Parkinson's history  Cardiac: WDL, denies chest pain  Respiratory: WDL on RA, denies SOB.  GI/: +BS, + gas, no BM since surgery 2/20.  Diet: Tolerating clears  Skin/Incisions: WDL ex abd lap sites x2 JESSIKA.  Lines/Drains: L PIV running D5 1/2NS 20KCl at 75.  Pain/Nausea: Denies.  New Changes: No acute changes this shift.  Plan: Potential discharge home today.    "

## 2023-02-21 NOTE — UTILIZATION REVIEW
Admission Status; Secondary Review Determination       Under the authority of the Utilization Management Committee, the utilization review process indicated a secondary review on the above patient. The review outcome is based on review of the medical records, discussions with staff, and applying clinical experience noted on the date of the review.     (x) Outpatient Status with extended recovery is appropriate - This patient does not meet hospital inpatient criteria. If this patient's primary payer is Medicare and was admitted as an inpatient, Condition Code 44 should be used and patient status changed to outpatient recovery.    RATIONALE FOR DETERMINATION   67 year old male POD#1 s/p laparoscopic retrieval of migrated axios stent via enterotomy.    Patient was admitted to the hospital after the procedure. Patient has Medicare and the procedure is not on the CMS inpatient list. No documented complications or unexpected recovery. Patient can be safely  monitored for bleeding and recover in outpatient/extended recovery setting. The severity of illness, intensity of service provided, expected LOS and risk for adverse outcome doesn't meet inpatient hospital admission.  Dr Gonzales  notified.    This document was produced using voice recognition software.     The information on this document is developed by the utilization review team in order for the business office to ensure compliance. This only denotes the appropriateness of proper admission status and does not reflect the quality of care rendered.   The definitions of Inpatient Status and Observation Status used in making the determination above are those provided in the CMS Coverage Manual, Chapter 1 and Chapter 6, section 70.4.   Sincerely,   RANJAN HWANG MD   System Medical Director   Utilization Management   [facility].

## 2023-02-21 NOTE — PROGRESS NOTES
02/21/23 1000   Appointment Info   Signing Clinician's Name / Credentials (PT) Corby Wilson, PT, DPT   Living Environment   People in Home spouse   Current Living Arrangements apartment   Home Accessibility no concerns   Transportation Anticipated family or friend will provide   Living Environment Comments Pt lives in a [address]+ apartment with spouse; no concerns for stairs.   Self-Care   Usual Activity Tolerance good   Current Activity Tolerance moderate   Regular Exercise Yes   Activity/Exercise Type walking   Exercise Amount/Frequency daily   Equipment Currently Used at Home cane, straight   Fall history within last six months no   General Information   Onset of Illness/Injury or Date of Surgery 02/20/23   Referring Physician Joseluis Lima MD   Patient/Family Therapy Goals Statement (PT) To go home   Pertinent History of Current Problem (include personal factors and/or comorbidities that impact the POC) per EMR:Arnaldo Henderson is a 67M who has a retained small bowel axios stent confirmed on imaging. He was offered retrieval surgery   Cognition   Affect/Mental Status (Cognition) WNL   Orientation Status (Cognition) oriented x 4   Follows Commands (Cognition) WNL   Pain Assessment   Patient Currently in Pain Yes, see Vital Sign flowsheet   Integumentary/Edema   Integumentary/Edema other (describe)   Integumentary/Edema Comments redness in arms and legs   Posture    Posture Forward head position;Protracted shoulders   Range of Motion (ROM)   Range of Motion ROM is WFL   Strength (Manual Muscle Testing)   Strength (Manual Muscle Testing) strength is WFL   Bed Mobility   Bed Mobility no deficits identified   Transfers   Transfers no deficits identified   Gait/Stairs (Locomotion)   [place] Level (Gait) independent   Clinical Impression   Criteria for Skilled Therapeutic Intervention Yes, treatment indicated   PT Diagnosis (PT) impaired functional mobility   Influenced by the following impairments weakness,  pain   Functional limitations due to impairments gait distance   Clinical Presentation (PT Evaluation Complexity) Stable/Uncomplicated   Clinical Presentation Rationale clinician judgement   Clinical Decision Making (Complexity) low complexity   Planned Therapy Interventions (PT) gait training;home exercise program;strengthening;risk factor education;home program guidelines;progressive activity/exercise   Anticipated Equipment Needs at Discharge (PT) other (see comments)  (none)   Risk & Benefits of therapy have been explained evaluation/treatment results reviewed;care plan/treatment goals reviewed;risks/benefits reviewed;current/potential barriers reviewed;participants voiced agreement with care plan;participants included;patient;daughter   PT Total Evaluation Time   PT Eval, Low Complexity Minutes (07847) 8   Plan of Care Review   Plan of Care Reviewed With patient;daughter   Physical Therapy Goals   PT Frequency One time eval and treatment only   PT Goals Gait   PT: Gait Independent;Greater than 200 feet;Goal Met   PT Discharge Planning   PT Plan dc   PT Discharge Recommendation (DC Rec) home   PT Rationale for DC Rec pt IND with mobility; at functional baseline and safe to dc home.   PT Brief overview of current status IND   Total Session Time   Total Session Time (sum of timed and untimed services) 8

## 2023-02-21 NOTE — PROGRESS NOTES
"Blood pressure (!) 141/73, pulse 77, temperature 99.2  F (37.3  C), temperature source Oral, resp. rate 15, height 1.727 m (5' 8\"), weight 81.1 kg (178 lb 12.7 oz), SpO2 93 %.    Activity: A1  Neuros:  AOX4, slow response  Cardiac: HTN  Respiratory: 2L/NC/93%  GI/: Voiding w/urinal AUOP, +BS, no BM  Diet: Clear liquid, tolerating  Skin/Incisions/Drains: 2 Lt abd lap sites, incision top of umbilical  Lines: Lt PIV infusing D51/2NS KCI 20mEq 75/hr  Pain: 0-10/10 in right arm, raises RT arm 10/10 otherwise 0-3, Dilaudid  Plan: Continue with POC, called MD about Sinemet TID dosage.  "

## 2023-02-21 NOTE — PLAN OF CARE
"Goal Outcome Evaluation:      Plan of Care Reviewed With: patient    /57 (BP Location: Right arm)   Pulse 75   Temp 97.8  F (36.6  C) (Oral)   Resp 15   Ht 1.727 m (5' 8\")   Wt 81.1 kg (178 lb 12.7 oz)   SpO2 92%   BMI 27.19 kg/m       Patient denies pain. Abdomen soft with hypoactive bowel sounds, patient reports he is passing gas but no BM this shift.  Abdominal incision and 3 lap sites intact with liquid bandage. Patient voiding adequate amounts of urine. Reviewed discharge instructions and medications with patient. PIV removed before discharge.                  "

## 2023-02-22 ENCOUNTER — TELEPHONE (OUTPATIENT)
Dept: SURGERY | Facility: CLINIC | Age: 68
End: 2023-02-22
Payer: MEDICARE

## 2023-02-22 ENCOUNTER — PATIENT OUTREACH (OUTPATIENT)
Dept: ENDOCRINOLOGY | Facility: CLINIC | Age: 68
End: 2023-02-22
Payer: MEDICARE

## 2023-02-22 ENCOUNTER — PATIENT OUTREACH (OUTPATIENT)
Dept: CARE COORDINATION | Facility: CLINIC | Age: 68
End: 2023-02-22
Payer: MEDICARE

## 2023-02-22 LAB
PATH REPORT.COMMENTS IMP SPEC: NORMAL
PATH REPORT.COMMENTS IMP SPEC: NORMAL
PATH REPORT.FINAL DX SPEC: NORMAL
PATH REPORT.GROSS SPEC: NORMAL
PATH REPORT.MICROSCOPIC SPEC OTHER STN: NORMAL
PATH REPORT.RELEVANT HX SPEC: NORMAL
PHOTO IMAGE: NORMAL

## 2023-02-22 NOTE — PROGRESS NOTES
RN Post-Op/Post-Discharge Care Coordination Note    Mr. Arnaldo Henderson is a 67 year old male who underwent retrieval of migrated stent on 2/20/23 with  Dr. Joseluis Lima.  Left message.

## 2023-02-22 NOTE — PROGRESS NOTES
[facility]    Background: Transitional Care Management program identified per system criteria and reviewed by [facility] team for possible outreach.    Assessment: Upon chart review, CCR Team member will not proceed with patient outreach related to this episode of Transitional Care Management program due to reason below:    Patient has active communication with a nurse, provider or care team for reason of post-hospital follow up plan.  Outreach call by CCR team not indicated to minimize duplicative efforts.     Plan: Transitional Care Management episode addressed appropriately per reason noted above.      Carline Marquez RN  [facility] Resource [facility]    *Connected Care Resource Team does NOT follow patient ongoing. Referrals are identified based on internal discharge reports and the outreach is to ensure patient has an understanding of their discharge instructions.

## 2023-02-22 NOTE — PROGRESS NOTES
RN Post-Op/Post-Discharge Care Coordination Note    Mr. Arnaldo Henderson is a 67 year old male who underwent removal of migrated stent on 2/20/23 with  Dr. Joseluis Lima.  Spoke with Patient.    Support  Patient able to care for self independently     Health Status  Nausea/Vomiting: Patient denies nausea/vomiting.  Eating/drinking: Patient is able to eat and drink without any complaints.  Bowel habits: no bowel movement, is passing gas  Drains (RUSTY): N/A  Fevers/chills: Patient denies any fever or chills.  Incisions: Patient denies any signs and symptoms of infection..  Wound closure:  Skin Sealant  Pain: moderate - recommended alternating Tylenol and using ice packs  New Medications:  Dilaudid    Activity/Restrictions  No lifting in excess of 15-20 pounds for 3-4 weeks    Equipment  None    Pathology reviewed with patient:  N/A    Forms/Letters  No    All of his questions were answered including reviewing restrictions, and wound care.  He will call this office if he has any further questions and/or concerns.      In lieu of a post-op clinic patient that patient would like to be contacted in approximately 7-10 days via telephone.    A copy of this note was routed to the primary surgeon.      Whom and When to Call  Patient acknowledges understanding of how to manage any medication changes and   when to seek medical care.     Patient advised that if after hour medical concerns arise to please call [phone] and choose option 4 to speak to the physician on call.

## 2023-02-23 NOTE — TELEPHONE ENCOUNTER
CC: Red rash on arms and fingers 5 hours after taking dilaudid.       Assessment:   66 yo man who is POD 2 from laparoscopic retrieval of retained small bowel axios stent who is calling in due to concerns of allergic reaction to narcotic main medication.     He is currently controlling his pain with Q6 Tylenol and as needed dilaudid. He only took 1 dose of the dilaudid today at 7 am and developed a rash later in the afternoon. The rash seems to have almost completely cleared by 9pm. No fevers, sob, abdominal cramps or other symptoms concerning for anaphylaxis. Wife reports that benadryl had helped in the past and wondered if it was okay for him to take it.       Recommendation:   Okay to take benadryl. The patient will likely not continue to need narcotic pain medication as he recovers from surgery.  -- Recommended that he continue to take tylenol but not exceed 4g a day.   -- Okay to alternate with Ibuprofen given that he does not have a history of GI bleed and is a few days out from surgery.  -- I cautioned the patient and his wife on the signs of anaphylaxis and recommended that they stop taking dilaudid entirely and go to the Emergency room right away if worsening symptoms.     Jose Luis Walker MD  General Surgery PGY 2 Resident

## 2023-02-24 ENCOUNTER — PATIENT OUTREACH (OUTPATIENT)
Dept: ENDOCRINOLOGY | Facility: CLINIC | Age: 68
End: 2023-02-24
Payer: MEDICARE

## 2023-02-24 NOTE — PROGRESS NOTES
Patient broke out in a rash on Tuesday evening and thinks it is due to the Dilaudid.  Patient has reacted to morphine and oxycodone in the past with a rash.  Took Benadryl yesterday and is doing better.  Encouraged patient to continue with Tylenol and Benadryl.  Has had a large bowel movement and is feeling better.  Encouraged patient to be up and mobile as much as possible. Can continue with heat or ice, which ever provides more comfort.

## 2023-03-01 ENCOUNTER — PATIENT OUTREACH (OUTPATIENT)
Dept: ENDOCRINOLOGY | Facility: CLINIC | Age: 68
End: 2023-03-01
Payer: MEDICARE

## 2023-03-01 ENCOUNTER — MYC MEDICAL ADVICE (OUTPATIENT)
Dept: ENDOCRINOLOGY | Facility: CLINIC | Age: 68
End: 2023-03-01
Payer: MEDICARE

## 2023-03-01 DIAGNOSIS — T81.49XA WOUND INFECTION AFTER SURGERY: Primary | ICD-10-CM

## 2023-03-01 RX ORDER — CLINDAMYCIN HCL 300 MG
300 CAPSULE ORAL 4 TIMES DAILY
Qty: 28 CAPSULE | Refills: 0 | Status: SHIPPED | OUTPATIENT
Start: 2023-03-01 | End: 2023-03-08

## 2023-03-01 NOTE — PROGRESS NOTES
Patient has redness around incisions and also has a rash on his torso that has not gone away since stopping the pain medication.  No fever.    Instructed patient's SO to upload photos.  Instructed to have patient take Benadryl 25 mg every 4 hours for rash.  Will forward photos to Dr. Lima for further instruction.

## 2023-03-02 NOTE — TELEPHONE ENCOUNTER
Because there is redness on all the incisions this is almost certainly a skin glue reaction. Just in case its a wound infection, can you let him know I will send in a week of antibiotics to his pharmacy? (Likely Keflex)

## 2023-03-03 ENCOUNTER — OFFICE VISIT (OUTPATIENT)
Dept: SURGERY | Facility: CLINIC | Age: 68
End: 2023-03-03
Payer: MEDICARE

## 2023-03-03 ENCOUNTER — TELEPHONE (OUTPATIENT)
Dept: SURGERY | Facility: CLINIC | Age: 68
End: 2023-03-03
Payer: MEDICARE

## 2023-03-03 VITALS
WEIGHT: 174 LBS | SYSTOLIC BLOOD PRESSURE: 125 MMHG | HEART RATE: 83 BPM | DIASTOLIC BLOOD PRESSURE: 78 MMHG | HEIGHT: 68 IN | BODY MASS INDEX: 26.37 KG/M2 | OXYGEN SATURATION: 99 %

## 2023-03-03 DIAGNOSIS — T14.8XXA WOUND INFECTION: Primary | ICD-10-CM

## 2023-03-03 DIAGNOSIS — L08.9 WOUND INFECTION: Primary | ICD-10-CM

## 2023-03-03 PROCEDURE — 99024 POSTOP FOLLOW-UP VISIT: CPT | Performed by: SURGERY

## 2023-03-03 ASSESSMENT — PAIN SCALES - GENERAL: PAINLEVEL: EXTREME PAIN (8)

## 2023-03-03 NOTE — NURSING NOTE
"Chief Complaint   Patient presents with     RECHECK     Possible postop infection       Vitals:    03/03/23 1018   BP: 125/78   BP Location: Left arm   Patient Position: Sitting   Cuff Size: Adult Regular   Pulse: 83   SpO2: 99%   Weight: 78.9 kg (174 lb)   Height: 1.727 m (5' 8\")       Body mass index is 26.46 kg/m .                          Mike Macdonald EMT    "

## 2023-03-03 NOTE — PROGRESS NOTES
"Patient underwent prior dx lap, small bowel enterotomy an stent retrieval surgery and this occurred 2 weeks ago.    Arnaldo Henderson overall has the following complaints: wound infection, midline small bowel extrusion site    On exam:  /78 (BP Location: Left arm, Patient Position: Sitting, Cuff Size: Adult Regular)   Pulse 83   Ht 1.727 m (5' 8\")   Wt 78.9 kg (174 lb)   SpO2 99%   BMI 26.46 kg/m    Lung exam: breathing unlabored  Abdominal exam: soft; nontender; nondistended; incisions c/d/i on L. Midline incision expressive of pus    Plan:  -Bedside I and D of midline abscess under sterile conditions, expressive of copious purulence. Wound cleaned with saline and wound     -Wound packed with Wound -soaked 4x4, covered with gauze and tape. Patient seen by Sherry SORTO as well    -Complete course of clindamycin    -BID daily wound packing (instructed how)    -Patient to follow up with Sherry in 2 weeks        Joselusi Lima MD              "

## 2023-03-03 NOTE — TELEPHONE ENCOUNTER
Brief General Surgery Telephone encounter.  March 3, 2023    CC: Leaking fluid around incision site    I got a call from Mr. Henderson's wife - the patient is almost two weeks out from Diagnostic Lap and axios stent removal. A couple days ago, the patient developed erythema, mild pain around the incision two days prior. After calling into the clinic, felt this was just reaction to the skin glue, however he was started on abx in case of surgical site infection.     Today, the patient's wife reports the midline umbilical incision has opened and now with purulent drainage. They are only on day 2 of antibiotic course.     Given the description by pts wife, I am concerned about worsening surgical sight infection vs. Abscess. It is likely this would have started to drain regardless as antibiotics had only started the day prior. Let the patient know that we would reach out to the [facility] and Dr. Lima to see if they would be able to fit him into clinic today. Otherwise, if they were unable to schedule something, they should come to the ED for evaluation and possible I&D.    Update 2520: Spoke with Dr. Lima, the [facility] will reach out to the patient.     Please page if questions,    Дмитрий Campbell MD, MS  PGY-2, General Surgery

## 2023-03-03 NOTE — LETTER
"3/3/2023       RE: Arnaldo Henderson  [address]  [facility] [address]     Dear Colleague,    Thank you for referring your patient, Arnaldo Henderson, to the [facility] [place] [facility]. Please see a copy of my visit note below.    Patient underwent prior dx lap, small bowel enterotomy an stent retrieval surgery and this occurred 2 weeks ago.    Arnaldo Henderson overall has the following complaints: wound infection, midline small bowel extrusion site    On exam:  /78 (BP Location: Left arm, Patient Position: Sitting, Cuff Size: Adult Regular)   Pulse 83   Ht 1.727 m (5' 8\")   Wt 78.9 kg (174 lb)   SpO2 99%   BMI 26.46 kg/m    Lung exam: breathing unlabored  Abdominal exam: soft; nontender; nondistended; incisions c/d/i on L. Midline incision expressive of pus    Plan:  -Bedside I and D of midline abscess under sterile conditions, expressive of copious purulence. Wound cleaned with saline and wound     -Wound packed with Wound -soaked 4x4, covered with gauze and tape. Patient seen by Sherry SORTO as well    -Complete course of clindamycin    -BID daily wound packing (instructed how)    -Patient to follow up with Sherry in 2 weeks      Joseluis Lima MD  "

## 2023-03-03 NOTE — PATIENT INSTRUCTIONS
Pack the wound with 1 4x4 piece of gauze twice a day. Squirt the gauze with the wound spray first. Then, Use a Qtip to gently pack in the gauze, leaving a corner hanging out.     Cover the packed incision with another piece of gauze and use a piece of tape to secure the wound. Use an abdominal binder if you wish.    You may shower with the wound unpacked, and pack the wound after the shower. No baths or pools.      You will see Marcy in 2 weeks to make sure the wound is healing.    Complete the course of antibiotics (clindamycin as perscribed)    Clinic phone number: [phone]

## 2023-03-17 ENCOUNTER — OFFICE VISIT (OUTPATIENT)
Dept: WOUND CARE | Facility: CLINIC | Age: 68
End: 2023-03-17
Payer: MEDICARE

## 2023-03-17 DIAGNOSIS — S31.109A OPEN ABDOMINAL WALL WOUND, INITIAL ENCOUNTER: Primary | ICD-10-CM

## 2023-03-17 PROCEDURE — 99211 OFF/OP EST MAY X REQ PHY/QHP: CPT

## 2023-03-17 NOTE — PROGRESS NOTES
"Patient comes to wound clinic for wound assessment per request of dr. Lima. He has history of a Open surgical wound due to : Patient underwent prior dx lap, small bowel enterotomy an stent retrieval surgery and this occurred 2/20/2023    Clean gloves are donned and current dressing removed.     Objective findings: Open abdominal wall wound, initial encounter    Number of wounds: 1    Location: midline  abdomen    Type of wound:   surgical    Wound measured: L1.5 cm x W1.5 cm x D 5 cm , Thickness: Full Drainage: copious, serosanguinous       Wound specifics:     Wound Base:  no sign of infection, not visible      Devitalized Tissue or Slough appearance:     no    Eschar appearance:  none      Tenderness: Fully effective control    Odor: none    Periwound Skin: intact,       Subjective finding:     Pt states: Wife is packing wound twice daily    Patient is assessed for discomfort which is: absent    Today's status of the wound: evolving, Wound is smaller then before but approximately the same depth    Treatment:  Removal of existing dressing, Visual inspection, Cleansing with Microklenz spray solution, Wound packing with Mesalt strip, Application of clean dressing. Too deep and narrow for a wound vac    Plan of care and instructions for pt   Reason for dressing use non-debriding dressing applied   Cleaning: Microklenz  Primary:  1\" Mesalt strip 12\" long  Secondary:  , ABD pad,  Frequency of change 2 per day  Verbally approved by   How many supplies pt still has on hand: none, first prescription    Pt received the following instructions:  Supply order emailed to [facility]  Signs and symptoms of infection taught.  Patient needs to be seen 2 weeks. Treated and follow-up appointment made. Dr. Francis was available for supervision of care if needed or if questions should arise and regarding plan of care. Sherry Parks RN, CWON            "

## 2023-03-17 NOTE — PATIENT INSTRUCTIONS
ORTONYX Umbilical Hernia Belt for Men and Women - Abdominal Support Binder with Compression Pad      OTC Lumbosacral Support, 7-inch Lower Back, Lightweight Compression,

## 2023-03-20 ENCOUNTER — DOCUMENTATION ONLY (OUTPATIENT)
Dept: GASTROENTEROLOGY | Facility: CLINIC | Age: 68
End: 2023-03-20
Payer: MEDICARE

## 2023-03-20 NOTE — PROGRESS NOTES
Called PT and confirmed Appt.    Called to remind patient of their upcoming appointment with our GI clinic, on 03/29/23 at 3:00 PM with Dr. Cedric Saldana. This appointment is scheduled as an in-person appt. Please arrive 15 minutes early to check in for your appointment. , if your appointment is virtual (video or telephone) you need to be in [place] for the visit. To reschedule or cancel patient to call [phone].      SK

## 2023-03-29 ENCOUNTER — OFFICE VISIT (OUTPATIENT)
Dept: WOUND CARE | Facility: CLINIC | Age: 68
End: 2023-03-29
Payer: MEDICARE

## 2023-03-29 ENCOUNTER — MYC MEDICAL ADVICE (OUTPATIENT)
Dept: GASTROENTEROLOGY | Facility: CLINIC | Age: 68
End: 2023-03-29

## 2023-03-29 ENCOUNTER — OFFICE VISIT (OUTPATIENT)
Dept: GASTROENTEROLOGY | Facility: CLINIC | Age: 68
End: 2023-03-29
Payer: MEDICARE

## 2023-03-29 VITALS
HEART RATE: 61 BPM | SYSTOLIC BLOOD PRESSURE: 163 MMHG | HEIGHT: 68 IN | DIASTOLIC BLOOD PRESSURE: 77 MMHG | BODY MASS INDEX: 26.75 KG/M2 | OXYGEN SATURATION: 100 % | WEIGHT: 176.5 LBS

## 2023-03-29 DIAGNOSIS — S31.109A OPEN ABDOMINAL WALL WOUND, INITIAL ENCOUNTER: Primary | ICD-10-CM

## 2023-03-29 DIAGNOSIS — K85.91 NECROTIZING PANCREATITIS: Primary | ICD-10-CM

## 2023-03-29 DIAGNOSIS — T85.528S: ICD-10-CM

## 2023-03-29 PROCEDURE — 99212 OFFICE O/P EST SF 10 MIN: CPT | Performed by: INTERNAL MEDICINE

## 2023-03-29 PROCEDURE — 99211 OFF/OP EST MAY X REQ PHY/QHP: CPT

## 2023-03-29 ASSESSMENT — PAIN SCALES - GENERAL: PAINLEVEL: NO PAIN (0)

## 2023-03-29 NOTE — PROGRESS NOTES
"Chief Complaint   Patient presents with     Follow Up       Vitals:    03/29/23 1430   BP: (!) 163/77   BP Location: Left arm   Cuff Size: Adult Regular   Pulse: 61   SpO2: 100%   Weight: 80.1 kg (176 lb 8 oz)   Height: 1.727 m (5' 8\")       Body mass index is 26.84 kg/m .    Rachel Langley    "

## 2023-03-29 NOTE — PROGRESS NOTES
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  Could not be reached by enteroscopy by Dr. Fraire and therefore was managed by Dr. Lima with enterotomy and retrieval of the stent.  He is here for wound care follow-up and to see me.  Please see wound care note for his incision which is healing adequately and not bothering him other than that he cannot go swimming.  He is having no bowel symptoms or other issues at present  I did review his CT that was done to localize the AXIOS on 11/21/2022  To my amazement and something that I did not appreciate before he has 2 very large long double-pigtail stents remaining in place in his pancreatic duct going from the duodenum all the way to the tail with pigtails curling almost through the parenchyma at the tail.  Surprisingly he was told in [place] that those stents should stay in for life.  I spent total of 15 minutes with the patient and his wife reviewing that is not a good idea to leave his plastic stents in for lifetime.  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  We discussed that he has made it 8 months with the stents in and another 6 weeks while they go to [place] in a few days is probably not a reason to cancel their trip.  We did discuss that removing it involves ERCP with general anesthesia some risk of pancreatic trauma or even stent fracture since they have been in so long and then we would typically replace them with single pigtail fallout stents that hopefully will be the end of things.  We did discuss risk of recurrent post ERCP injury from pancreatitis perforation etc. but that the stents really should stay in long-term    Plan  Schedule ERCP after 6 weeks when the patient has returned from [place]        EXAM: CT ABDOMEN PELVIS W CONTRAST  LOCATION: [facility]  HOSPITAL  DATE/TIME: 1/16/2023 1:44 PM     INDICATION: assess location of Axios stent as colonoscopy did not find  one. Necrotizing pancreatitis.  COMPARISON: KUB 12/05/2022 and older studies, CT 11/21/2022  TECHNIQUE: CT scan of the abdomen and pelvis was performed following injection of IV contrast. Multiplanar reformats were obtained. Dose reduction techniques were used.  CONTRAST: 100ml IV Isovue 370     FINDINGS:   LOWER CHEST: Normal.     HEPATOBILIARY: Prior cholecystectomy.     PANCREAS: Pancreatic ductal stent is unchanged with atrophy pancreatic parenchyma diffusely. Normal amount of residual pancreas in the neck. Collapsing, elliptical elliptical fluid collection within the pancreatic tail has continued to decrease in size.   This measures at most a centimeter in short axis (series 4, axial image 44) and was 2 cm before. Minimal stranding in the splenic hilum is unchanged.      SPLEEN: Normal.     ADRENAL GLANDS: Normal.     KIDNEYS/BLADDER: Normal.     BOWEL: Axios stent is again noted in a distal small bowel loop in the right midabdomen (not in the terminal ileum). Minimal distention of the small bowel immediately proximal to it is unchanged. Bowel distal to this is collapsed. Prior appendectomy.   Redundant colon. No ascites.     LYMPH NODES: Normal.     VASCULATURE: Extensive arterial calcifications. No aneurysm. -The portal vein are patent.     PELVIC ORGANS: Normal.     MUSCULOSKELETAL: Compression fracture superior endplate of T12 is unchanged. No suspicious lesions.                                                                      IMPRESSION:   1.  Axios stent is again noted in the distal small bowel (not in the terminal ielum) in the right midabdomen with minimal distention of the bowel proximal to it. Overall, the location does  not appear changed since the most recent KUB 6 weeks ago.  2.  Collapsing, elliptical fluid collection within the pancreatic tail continues to decrease in size.  3.   Minimal residual pancreatic tissue notably in the neck with pancreatic duct stent in place. Findings consistent with history of necrotizing pancreatitis.  4.  Prior appendectomy.         Past Medical History:   Diagnosis Date     CAD (coronary artery disease)      Diabetes (H)      Gastroesophageal reflux disease      Hypercholesteremia      Hypertension      Mixed hearing loss      Parkinson disease (H)      Second degree AV block        Past Surgical History:   Procedure Laterality Date     CA ANESTH CABG W/PUMP       COLONOSCOPY N/A 1/12/2023    Procedure: colonoscopy with fluroscopy;  Surgeon: Ayden Fraire MD;  Location: [facility] OR     ENT SURGERY       LAPAROSCOPY DIAGNOSTIC (GENERAL) N/A 2/20/2023    Procedure: LAPAROSCOPY, DIAGNOSTIC; RETRIEVAL OF MIGRATED STENT;  Surgeon: Joseluis Lima MD;  Location: [facility] OR     ORTHOPEDIC SURGERY         No family history on file.    Social History     Tobacco Use     Smoking status: Former     Types: Cigarettes     Smokeless tobacco: Never   Substance Use Topics     Alcohol use: Not Currently

## 2023-03-29 NOTE — PATIENT INSTRUCTIONS
Follow up:    Dr. Saldana has outlined the following steps after your recent clinic visit:    Plan  Schedule ERCP after 6 weeks when the patient has returned from [place]    Please call us when you are ready to schedule this outpatient procedure with Dr. Saldana          Please call with any questions or concerns regarding your clinic visit today.    It is a pleasure being involved in your health care.    Contacts post-consultation depending on your need:    Schedule Clinic Appointments            [phone] # 1   M-F 7:30 - 5 pm    Phyllis Butterfield, RN Care Coordinator (Dr. Walden/Dr. Saldana)  [phone]    Laverne Cramer, RN Care Coordinator (Dr. Ramos)   [phone]    Esperanza Thomas, RN Care Coordinator (Dr. Castillo/Dr. Fraire)  [phone]      For urgent/emergent questions after business hours, you may reach the on-call GI Fellow by contacting the [facility]  at [phone].    How do I schedule labs, imaging studies, or procedures that were ordered in clinic today?     Labs: To schedule lab appointment at the [facility], use my chart or call [phone]. If you have a [facility] lab closer to home where you are regularly seen you can give them a call.     Procedures: If a colonoscopy, upper endoscopy, breath test, esophageal manometry, or pH impedence was ordered today, our endoscopy team will call you to schedule this. If you have not heard from our endoscopy team within a week, please call [phone] to schedule.     Imaging Studies: If you were scheduled for a CT scan, X-ray, MRI, ultrasound, HIDA scan or other imaging study, please call [phone] to have this scheduled.     Referral: If a referral to another specialty was ordered, expect a phone call or follow instructions above. If you have not heard from anyone regarding your referral in a week, please call our clinic to check the status.     How to I schedule a follow-up visit?  If you did not  schedule a follow-up visit today, please call [phone] to schedule a follow-up office visit.

## 2023-03-29 NOTE — LETTER
"    3/29/2023         RE: Arnaldo Henderson  [address]  [facility] [address]        Dear Colleague,    Thank you for referring your patient, Arnaldo Henderson, to the [facility] [place]. Please see a copy of my visit note below.    Chief Complaint   Patient presents with     Follow Up       Vitals:    03/29/23 1430   BP: (!) 163/77   BP Location: Left arm   Cuff Size: Adult Regular   Pulse: 61   SpO2: 100%   Weight: 80.1 kg (176 lb 8 oz)   Height: 1.727 m (5' 8\")       Body mass index is 26.84 kg/m .    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  Could not be reached by enteroscopy by Dr. Fraire and therefore was managed by Dr. Lima with enterotomy and retrieval of the stent.  He is here for wound care follow-up and to see me.  Please see wound care note for his incision which is healing adequately and not bothering him other than that he cannot go swimming.  He is having no bowel symptoms or other issues at present  I did review his CT that was done to localize the AXIOS on 11/21/2022  To my amazement and something that I did not appreciate before he has 2 very large long double-pigtail stents remaining in place in his pancreatic duct going from the duodenum all the way to the tail with pigtails curling almost through the parenchyma at the tail.  Surprisingly he was told in [place] that those stents should stay in for life.  I spent total of 15 minutes with the patient and his wife reviewing that is not a good idea to leave his plastic stents in for lifetime.  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  We discussed that he has made it 8 months with the stents in and another 6 weeks while they go to [place] in a few days is probably not a reason to cancel their trip.  " We did discuss that removing it involves ERCP with general anesthesia some risk of pancreatic trauma or even stent fracture since they have been in so long and then we would typically replace them with single pigtail fallout stents that hopefully will be the end of things.  We did discuss risk of recurrent post ERCP injury from pancreatitis perforation etc. but that the stents really should stay in long-term    Plan  Schedule ERCP after 6 weeks when the patient has returned from [place]        EXAM: CT ABDOMEN PELVIS W CONTRAST  LOCATION: [facility]  DATE/TIME: 1/16/2023 1:44 PM     INDICATION: assess location of Axios stent as colonoscopy did not find  one. Necrotizing pancreatitis.  COMPARISON: KUB 12/05/2022 and older studies, CT 11/21/2022  TECHNIQUE: CT scan of the abdomen and pelvis was performed following injection of IV contrast. Multiplanar reformats were obtained. Dose reduction techniques were used.  CONTRAST: 100ml IV Isovue 370     FINDINGS:   LOWER CHEST: Normal.     HEPATOBILIARY: Prior cholecystectomy.     PANCREAS: Pancreatic ductal stent is unchanged with atrophy pancreatic parenchyma diffusely. Normal amount of residual pancreas in the neck. Collapsing, elliptical elliptical fluid collection within the pancreatic tail has continued to decrease in size.   This measures at most a centimeter in short axis (series 4, axial image 44) and was 2 cm before. Minimal stranding in the splenic hilum is unchanged.      SPLEEN: Normal.     ADRENAL GLANDS: Normal.     KIDNEYS/BLADDER: Normal.     BOWEL: Axios stent is again noted in a distal small bowel loop in the right midabdomen (not in the terminal ileum). Minimal distention of the small bowel immediately proximal to it is unchanged. Bowel distal to this is collapsed. Prior appendectomy.   Redundant colon. No ascites.     LYMPH NODES: Normal.     VASCULATURE: Extensive arterial calcifications. No aneurysm. -The portal vein are  patent.     PELVIC ORGANS: Normal.     MUSCULOSKELETAL: Compression fracture superior endplate of T12 is unchanged. No suspicious lesions.                                                                      IMPRESSION:   1.  Axios stent is again noted in the distal small bowel (not in the terminal ielum) in the right midabdomen with minimal distention of the bowel proximal to it. Overall, the location does  not appear changed since the most recent KUB 6 weeks ago.  2.  Collapsing, elliptical fluid collection within the pancreatic tail continues to decrease in size.  3.  Minimal residual pancreatic tissue notably in the neck with pancreatic duct stent in place. Findings consistent with history of necrotizing pancreatitis.  4.  Prior appendectomy.         Past Medical History:   Diagnosis Date     CAD (coronary artery disease)      Diabetes (H)      Gastroesophageal reflux disease      Hypercholesteremia      Hypertension      Mixed hearing loss      Parkinson disease (H)      Second degree AV block        Past Surgical History:   Procedure Laterality Date     CA ANESTH CABG W/PUMP       COLONOSCOPY N/A 1/12/2023    Procedure: colonoscopy with fluroscopy;  Surgeon: Ayden Fraire MD;  Location: [facility] OR     ENT SURGERY       LAPAROSCOPY DIAGNOSTIC (GENERAL) N/A 2/20/2023    Procedure: LAPAROSCOPY, DIAGNOSTIC; RETRIEVAL OF MIGRATED STENT;  Surgeon: Joseluis Lima MD;  Location: [facility] OR     ORTHOPEDIC SURGERY         No family history on file.    Social History     Tobacco Use     Smoking status: Former     Types: Cigarettes     Smokeless tobacco: Never   Substance Use Topics     Alcohol use: Not Currently           Sincerely,    eCdric Saldana MD

## 2023-03-29 NOTE — PROGRESS NOTES
"Patient comes to wound clinic for wound assessment per request of dr. Lima. He has history of a Open surgical wound due to : Patient underwent prior dx lap, small bowel enterotomy an stent retrieval surgery and this occurred 2/20/2023    Clean gloves are donned and current dressing removed.     Objective findings: Open abdominal wall wound, initial encounter    Number of wounds: 1    Location: midline  abdomen    Type of wound:   surgical    Wound measured: L1.5 cm x W1.5 cm x D 3.5cm , Thickness: Full Drainage: copious, serosanguinous       Wound specifics:     Wound Base:  no sign of infection, not visible      Devitalized Tissue or Slough appearance:     no    Eschar appearance:  none      Tenderness: Fully effective control    Odor: none    Periwound Skin: intact,       Subjective finding:     Pt states: Wife is packing wound twice daily    Patient is assessed for discomfort which is: absent    Today's status of the wound: evolving, Wound is smaller then before and has less depth    Treatment:  Removal of existing dressing, Visual inspection, Cleansing with Microklenz spray solution, Wound packing with Mesalt strip, Application of clean dressing. Too deep and narrow for a wound vac    Plan of care and instructions for pt   Reason for dressing use non-debriding dressing applied   Cleaning: Microklenz  Primary:  1\" Mesalt strip 12\" long  Secondary:  , ABD pad,  Frequency of change 2 per day  Verbally approved by   How many supplies pt still has on hand: none, first prescription    Pt received the following instructions:  Supply order emailed to [facility]  Signs and symptoms of infection taught.  Patient needs to be seen 6 weeks. He is going to [place] Treated and follow-up appointment made. Dr. Saldana saw pt was available for supervision of care if needed or if questions should arise and regarding plan of care. Sherry Parks RN, CWON          "

## 2023-03-29 NOTE — PATIENT INSTRUCTIONS
"Continue dressing changes once daily  Fill pocket up with the Mesalt like you are doing now  Pay attention to keep the skin open, wound has to heal from bottom up  Now 6\" but this amount will be less  You might need to cut the Mesalt to be a thinner strip, cutting it lenthwise      "

## 2023-03-30 ENCOUNTER — PREP FOR PROCEDURE (OUTPATIENT)
Dept: GASTROENTEROLOGY | Facility: CLINIC | Age: 68
End: 2023-03-30
Payer: MEDICARE

## 2023-03-30 DIAGNOSIS — K85.91 NECROTIZING PANCREATITIS: Primary | ICD-10-CM

## 2023-03-30 NOTE — TELEPHONE ENCOUNTER
Returned call, verified POC, confirmed ERCP date on . Will schedule clinic with Dr. Saldana on 5/10 to answer specific questions about complications in advance of procedure on     Please assist in scheduling:     Procedure/Imaging/Clinic: ERCP  Physician: Dr. Saldana  [email or web address]  Procedure length:provider average  Anesthesia:gen  Dx: necrotizing pancreatitis  Tier:2  Location: [facility]

## 2023-05-03 ENCOUNTER — DOCUMENTATION ONLY (OUTPATIENT)
Dept: GASTROENTEROLOGY | Facility: CLINIC | Age: 68
End: 2023-05-03
Payer: MEDICARE

## 2023-05-03 NOTE — PROGRESS NOTES
Called PT and confirmed Appt.    Called to remind patient of their upcoming appointment with our GI clinic, on 05/10/23 at 4:00 PM with Dr. Cedric Saldana. This appointment is scheduled as a video visit. You will receive a call approximately 30 minutes prior to check you in, you must be in [place] for this visit., if your appointment is virtual (video or telephone) you need to be in [place] for the visit. To reschedule or cancel patient to call [phone].      SK

## 2023-05-10 ENCOUNTER — VIRTUAL VISIT (OUTPATIENT)
Dept: GASTROENTEROLOGY | Facility: CLINIC | Age: 68
End: 2023-05-10
Payer: MEDICARE

## 2023-05-10 DIAGNOSIS — Z46.89 ENCOUNTER FOR REMOVAL OF PANCREATIC STENT: Primary | ICD-10-CM

## 2023-05-10 PROCEDURE — 99215 OFFICE O/P EST HI 40 MIN: CPT | Mod: VID | Performed by: INTERNAL MEDICINE

## 2023-05-10 ASSESSMENT — PAIN SCALES - GENERAL: PAINLEVEL: SEVERE PAIN (7)

## 2023-05-10 NOTE — PROGRESS NOTES
Virtual Visit Details    Type of service:  Video Visit   Joined the call at 5/10/2023, 4:15:09?pm.  Left the call at 5/10/2023, 4:54:55?pm.  You were on the call for 39 minutes 46 seconds .    Originating Location (pt. Location): Home  Distant Location (provider location):  On-site  Platform used for Video Visit: CrossReader

## 2023-05-10 NOTE — NURSING NOTE
Is the patient currently in the state of [place]? YES    Visit mode:VIDEO    If the visit is dropped, the patient can be reconnected by: VIDEO VISIT: Send to e-mail at: [email or web address]    Will anyone else be joining the visit? YES: How would they like to receive their invitation? Send to e-mail at: [email or web address]      How would you like to obtain your AVS? MyChart    Are changes needed to the allergy or medication list? YES-PT is no longer taking Gabapentin    Reason for visit: Video Visit    Hola Tinoco

## 2023-05-10 NOTE — PROGRESS NOTES
Arnaldo is a very pleasant 67-year-old who with his wife would like to review the planned procedure for next Thursday, May 18.  He has a long complex history with pancreatitis in the background of diabetes and Parkinson's disease.  He had endoscopic transluminal drainage of walled off necrosis in [place] with subsequent axial stent left in place for a year.  That had migrated to the mid small bowel causing bowel obstruction, not reachable by single balloon enteroscopy with Dr. Fraire and requiring laparoscopic removal with Dr. Lima and colleagues.  That went uneventfully other than localized wound infection that resolved with incision and drainage and time.  He is healed from that without a fistula.  The reason he is coming back for procedure is that on reviewing his follow-up CT scan became eminently apparent that there are 2 very large double-pigtail stents not in a cyst gastrostomy tract but trans papillary extending the entire length of the pancreatic duct with the pigtails curling through the parenchyma in the tail and actually indenting the splenic vein near the spleen.  He was told that it should stay in for life which is something that I had not never heard recommended and especially with stents like that but since we discovered on the CT it is also become apparent that he is having quite a bit of abdominal pain particularly in the left mid abdomen that is chronic but worse with position and exercise.  Its right in the area of the pigtails that her bedding through the pancreatic parenchyma and indenting the splenic vein.  We went through the rationale for removing them and went through in some detail the nature of the procedure which would be to under general anesthesia grasped with a stents formally and slowly but with increasing pressure try to withdraw them.  We discussed the possibility that the ends may break or beak or B-cell embedded that they do not come out on the first attempt and require serial  procedures with stenting.  Should the stents break off as I have seen happen with polyethylene stents left in a very long time then we have to do step up approach with multiple stents to enlarge the duct.  We also talked about the risk of bleeding perforation pancreatitis etc. and the need for even if it is successful and uneventful placement of single pigtail straight stents to prevent pancreatitis or duct leakage after next week's procedure and that those would likely require removal.  I reassured him that we have all the expertise in place to take care of what ever happens.  We also discussed that its not clear whether removing the stents will relieve his pain.  We are hopeful that we will patient and his wife and asked all list of questions which I did my best answer.  Total time 60 minutes including 40 minutes of direct face-to-face video call and 20 minutes of prior imaging and data review    Joined the call at 5/10/2023, 4:15:09?pm.  Left the call at 5/10/2023, 4:54:55?pm.  You were on the call for 39 minutes 46 seconds .

## 2023-05-10 NOTE — LETTER
5/10/2023         RE: Arnaldo Henderson  [address]  [facility] [address]        Dear Colleague,    Thank you for referring your patient, Arnaldo Henderson, to the [facility] [place]. Please see a copy of my visit note below.      Arnaldo is a very pleasant 67-year-old who with his wife would like to review the planned procedure for next Thursday, May 18.  He has a long complex history with pancreatitis in the background of diabetes and Parkinson's disease.  He had endoscopic transluminal drainage of walled off necrosis in [place] with subsequent axial stent left in place for a year.  That had migrated to the mid small bowel causing bowel obstruction, not reachable by single balloon enteroscopy with Dr. Fraire and requiring laparoscopic removal with Dr. Lima and colleagues.  That went uneventfully other than localized wound infection that resolved with incision and drainage and time.  He is healed from that without a fistula.  The reason he is coming back for procedure is that on reviewing his follow-up CT scan became eminently apparent that there are 2 very large double-pigtail stents not in a cyst gastrostomy tract but trans papillary extending the entire length of the pancreatic duct with the pigtails curling through the parenchyma in the tail and actually indenting the splenic vein near the spleen.  He was told that it should stay in for life which is something that I had not never heard recommended and especially with stents like that but since we discovered on the CT it is also become apparent that he is having quite a bit of abdominal pain particularly in the left mid abdomen that is chronic but worse with position and exercise.  Its right in the area of the pigtails that her bedding through the pancreatic parenchyma and indenting the splenic vein.  We went through the rationale for removing them and went through in some detail the nature of the procedure  which would be to under general anesthesia grasped with a stents formally and slowly but with increasing pressure try to withdraw them.  We discussed the possibility that the ends may break or beak or B-cell embedded that they do not come out on the first attempt and require serial procedures with stenting.  Should the stents break off as I have seen happen with polyethylene stents left in a very long time then we have to do step up approach with multiple stents to enlarge the duct.  We also talked about the risk of bleeding perforation pancreatitis etc. and the need for even if it is successful and uneventful placement of single pigtail straight stents to prevent pancreatitis or duct leakage after next week's procedure and that those would likely require removal.  I reassured him that we have all the expertise in place to take care of what ever happens.  We also discussed that its not clear whether removing the stents will relieve his pain.  We are hopeful that we will patient and his wife and asked all list of questions which I did my best answer.  Total time 60 minutes including 40 minutes of direct face-to-face video call and 20 minutes of prior imaging and data review    Joined the call at 5/10/2023, 4:15:09?pm.  Left the call at 5/10/2023, 4:54:55?pm.  You were on the call for 39 minutes 46 seconds .          Again, thank you for allowing me to participate in the care of your patient.      Sincerely,    Cedric Saldana MD

## 2023-05-12 ENCOUNTER — TRANSFERRED RECORDS (OUTPATIENT)
Dept: MULTI SPECIALTY CLINIC | Facility: CLINIC | Age: 68
End: 2023-05-12

## 2023-05-12 LAB
CREATININE (EXTERNAL): 0.96 MG/DL (ref 0.73–1.18)
GFR ESTIMATED (EXTERNAL): >60 ML/MIN/1.73M2
GLUCOSE (EXTERNAL): 243 MG/DL (ref 70–100)
POTASSIUM (EXTERNAL): 4.5 MMOL/L (ref 3.5–5.1)

## 2023-05-15 ENCOUNTER — OFFICE VISIT (OUTPATIENT)
Dept: WOUND CARE | Facility: CLINIC | Age: 68
End: 2023-05-15
Payer: MEDICARE

## 2023-05-15 DIAGNOSIS — S31.109A OPEN ABDOMINAL WALL WOUND, INITIAL ENCOUNTER: Primary | ICD-10-CM

## 2023-05-15 PROCEDURE — 99211 OFF/OP EST MAY X REQ PHY/QHP: CPT

## 2023-05-15 NOTE — PROGRESS NOTES
Patient comes to wound clinic for wound assessment per request of dr. Lima. He has history of a Open surgical wound due to : Patient underwent prior dx lap, small bowel enterotomy an stent retrieval surgery and this occurred 2/20/2023    Clean gloves are donned and current dressing removed.     Objective findings: Open abdominal wall wound, initial encounter    Wound has healed and is closed    Subjective finding:   Pt states: They returned from [place] recently  Today's status of the wound: Healed    Treatment:  None   Dr. Anguiano was available for supervision of care if needed or if questions should arise and regarding plan of care. Sherry Parks RN, CWON

## 2023-05-18 ENCOUNTER — APPOINTMENT (OUTPATIENT)
Dept: GENERAL RADIOLOGY | Facility: CLINIC | Age: 68
End: 2023-05-18
Attending: INTERNAL MEDICINE
Payer: MEDICARE

## 2023-05-18 ENCOUNTER — HOSPITAL ENCOUNTER (OUTPATIENT)
Facility: CLINIC | Age: 68
Discharge: HOME | End: 2023-05-18
Attending: INTERNAL MEDICINE | Admitting: INTERNAL MEDICINE
Payer: MEDICARE

## 2023-05-18 ENCOUNTER — ANESTHESIA EVENT (OUTPATIENT)
Dept: SURGERY | Facility: CLINIC | Age: 68
End: 2023-05-18
Payer: MEDICARE

## 2023-05-18 ENCOUNTER — ANESTHESIA (OUTPATIENT)
Dept: SURGERY | Facility: CLINIC | Age: 68
End: 2023-05-18
Payer: MEDICARE

## 2023-05-18 VITALS
HEART RATE: 74 BPM | RESPIRATION RATE: 18 BRPM | BODY MASS INDEX: 26.93 KG/M2 | DIASTOLIC BLOOD PRESSURE: 74 MMHG | HEIGHT: 68 IN | SYSTOLIC BLOOD PRESSURE: 124 MMHG | WEIGHT: 177.69 LBS | OXYGEN SATURATION: 100 % | TEMPERATURE: 98.5 F

## 2023-05-18 LAB
ALBUMIN SERPL BCG-MCNC: 3.8 G/DL (ref 3.5–5.2)
ALP SERPL-CCNC: 134 U/L (ref 40–129)
ALT SERPL W P-5'-P-CCNC: <5 U/L (ref 10–50)
AMYLASE SERPL-CCNC: 35 U/L (ref 28–100)
ANION GAP SERPL CALCULATED.3IONS-SCNC: 11 MMOL/L (ref 7–15)
AST SERPL W P-5'-P-CCNC: 24 U/L (ref 10–50)
BILIRUB SERPL-MCNC: 0.5 MG/DL
BUN SERPL-MCNC: 19.6 MG/DL (ref 8–23)
CALCIUM SERPL-MCNC: 8.9 MG/DL (ref 8.8–10.2)
CHLORIDE SERPL-SCNC: 105 MMOL/L (ref 98–107)
CREAT SERPL-MCNC: 0.84 MG/DL (ref 0.67–1.17)
DEPRECATED HCO3 PLAS-SCNC: 22 MMOL/L (ref 22–29)
ERYTHROCYTE [DISTWIDTH] IN BLOOD BY AUTOMATED COUNT: 12.6 % (ref 10–15)
GFR SERPL CREATININE-BSD FRML MDRD: >90 ML/MIN/1.73M2
GLUCOSE BLDC GLUCOMTR-MCNC: 168 MG/DL (ref 70–99)
GLUCOSE BLDC GLUCOMTR-MCNC: 222 MG/DL (ref 70–99)
GLUCOSE BLDC GLUCOMTR-MCNC: 237 MG/DL (ref 70–99)
GLUCOSE SERPL-MCNC: 270 MG/DL (ref 70–99)
HCT VFR BLD AUTO: 40.1 % (ref 40–53)
HGB BLD-MCNC: 13.5 G/DL (ref 13.3–17.7)
INR PPP: 1.32 (ref 0.85–1.15)
LIPASE SERPL-CCNC: 13 U/L (ref 13–60)
MCH RBC QN AUTO: 31.9 PG (ref 26.5–33)
MCHC RBC AUTO-ENTMCNC: 33.7 G/DL (ref 31.5–36.5)
MCV RBC AUTO: 95 FL (ref 78–100)
PLATELET # BLD AUTO: 164 10E3/UL (ref 150–450)
POTASSIUM SERPL-SCNC: 4.2 MMOL/L (ref 3.4–5.3)
PROT SERPL-MCNC: 6.8 G/DL (ref 6.4–8.3)
RBC # BLD AUTO: 4.23 10E6/UL (ref 4.4–5.9)
SODIUM SERPL-SCNC: 138 MMOL/L (ref 136–145)
WBC # BLD AUTO: 6.7 10E3/UL (ref 4–11)

## 2023-05-18 PROCEDURE — 82150 ASSAY OF AMYLASE: CPT | Performed by: INTERNAL MEDICINE

## 2023-05-18 PROCEDURE — 360N000082 HC SURGERY LEVEL 2 W/ FLUORO, PER MIN: Performed by: INTERNAL MEDICINE

## 2023-05-18 PROCEDURE — 80053 COMPREHEN METABOLIC PANEL: CPT | Performed by: INTERNAL MEDICINE

## 2023-05-18 PROCEDURE — 36415 COLL VENOUS BLD VENIPUNCTURE: CPT | Performed by: INTERNAL MEDICINE

## 2023-05-18 PROCEDURE — 85610 PROTHROMBIN TIME: CPT | Performed by: INTERNAL MEDICINE

## 2023-05-18 PROCEDURE — 250N000009 HC RX 250: Performed by: ANESTHESIOLOGY

## 2023-05-18 PROCEDURE — 85027 COMPLETE CBC AUTOMATED: CPT | Performed by: INTERNAL MEDICINE

## 2023-05-18 PROCEDURE — 255N000002 HC RX 255 OP 636: Performed by: INTERNAL MEDICINE

## 2023-05-18 PROCEDURE — 82962 GLUCOSE BLOOD TEST: CPT

## 2023-05-18 PROCEDURE — 93005 ELECTROCARDIOGRAM TRACING: CPT

## 2023-05-18 PROCEDURE — 370N000017 HC ANESTHESIA TECHNICAL FEE, PER MIN: Performed by: INTERNAL MEDICINE

## 2023-05-18 PROCEDURE — 710N000010 HC RECOVERY PHASE 1, LEVEL 2, PER MIN: Performed by: INTERNAL MEDICINE

## 2023-05-18 PROCEDURE — 272N000001 HC OR GENERAL SUPPLY STERILE: Performed by: INTERNAL MEDICINE

## 2023-05-18 PROCEDURE — 999N000179 XR SURGERY CARM FLUORO LESS THAN 5 MIN W STILLS: Mod: TC

## 2023-05-18 PROCEDURE — 258N000003 HC RX IP 258 OP 636: Performed by: STUDENT IN AN ORGANIZED HEALTH CARE EDUCATION/TRAINING PROGRAM

## 2023-05-18 PROCEDURE — 999N000141 HC STATISTIC PRE-PROCEDURE NURSING ASSESSMENT: Performed by: INTERNAL MEDICINE

## 2023-05-18 PROCEDURE — 999N000054 HC STATISTIC EKG NON-CHARGEABLE

## 2023-05-18 PROCEDURE — 710N000012 HC RECOVERY PHASE 2, PER MINUTE: Performed by: INTERNAL MEDICINE

## 2023-05-18 PROCEDURE — 250N000011 HC RX IP 250 OP 636: Performed by: ANESTHESIOLOGY

## 2023-05-18 PROCEDURE — C1769 GUIDE WIRE: HCPCS | Performed by: INTERNAL MEDICINE

## 2023-05-18 PROCEDURE — 250N000013 HC RX MED GY IP 250 OP 250 PS 637: Performed by: STUDENT IN AN ORGANIZED HEALTH CARE EDUCATION/TRAINING PROGRAM

## 2023-05-18 PROCEDURE — 250N000009 HC RX 250: Performed by: INTERNAL MEDICINE

## 2023-05-18 PROCEDURE — 250N000009 HC RX 250: Performed by: STUDENT IN AN ORGANIZED HEALTH CARE EDUCATION/TRAINING PROGRAM

## 2023-05-18 PROCEDURE — 83690 ASSAY OF LIPASE: CPT | Performed by: INTERNAL MEDICINE

## 2023-05-18 PROCEDURE — 250N000025 HC SEVOFLURANE, PER MIN: Performed by: INTERNAL MEDICINE

## 2023-05-18 PROCEDURE — 250N000011 HC RX IP 250 OP 636: Performed by: STUDENT IN AN ORGANIZED HEALTH CARE EDUCATION/TRAINING PROGRAM

## 2023-05-18 PROCEDURE — C1726 CATH, BAL DIL, NON-VASCULAR: HCPCS | Performed by: INTERNAL MEDICINE

## 2023-05-18 RX ORDER — ONDANSETRON 4 MG/1
4 TABLET, ORALLY DISINTEGRATING ORAL EVERY 30 MIN PRN
Status: DISCONTINUED | OUTPATIENT
Start: 2023-05-18 | End: 2023-05-18 | Stop reason: HOSPADM

## 2023-05-18 RX ORDER — ONDANSETRON 2 MG/ML
4 INJECTION INTRAMUSCULAR; INTRAVENOUS EVERY 6 HOURS PRN
Status: DISCONTINUED | OUTPATIENT
Start: 2023-05-18 | End: 2023-05-18 | Stop reason: HOSPADM

## 2023-05-18 RX ORDER — PROPOFOL 10 MG/ML
INJECTION, EMULSION INTRAVENOUS PRN
Status: DISCONTINUED | OUTPATIENT
Start: 2023-05-18 | End: 2023-05-18

## 2023-05-18 RX ORDER — NALOXONE HYDROCHLORIDE 0.4 MG/ML
0.4 INJECTION, SOLUTION INTRAMUSCULAR; INTRAVENOUS; SUBCUTANEOUS
Status: DISCONTINUED | OUTPATIENT
Start: 2023-05-18 | End: 2023-05-18 | Stop reason: HOSPADM

## 2023-05-18 RX ORDER — IOPAMIDOL 510 MG/ML
INJECTION, SOLUTION INTRAVASCULAR PRN
Status: DISCONTINUED | OUTPATIENT
Start: 2023-05-18 | End: 2023-05-18 | Stop reason: HOSPADM

## 2023-05-18 RX ORDER — ONDANSETRON 2 MG/ML
4 INJECTION INTRAMUSCULAR; INTRAVENOUS EVERY 30 MIN PRN
Status: DISCONTINUED | OUTPATIENT
Start: 2023-05-18 | End: 2023-05-18 | Stop reason: HOSPADM

## 2023-05-18 RX ORDER — SODIUM CHLORIDE, SODIUM LACTATE, POTASSIUM CHLORIDE, CALCIUM CHLORIDE 600; 310; 30; 20 MG/100ML; MG/100ML; MG/100ML; MG/100ML
INJECTION, SOLUTION INTRAVENOUS CONTINUOUS PRN
Status: DISCONTINUED | OUTPATIENT
Start: 2023-05-18 | End: 2023-05-18

## 2023-05-18 RX ORDER — LIDOCAINE 40 MG/G
CREAM TOPICAL
Status: DISCONTINUED | OUTPATIENT
Start: 2023-05-18 | End: 2023-05-18 | Stop reason: HOSPADM

## 2023-05-18 RX ORDER — ONDANSETRON 2 MG/ML
INJECTION INTRAMUSCULAR; INTRAVENOUS PRN
Status: DISCONTINUED | OUTPATIENT
Start: 2023-05-18 | End: 2023-05-18

## 2023-05-18 RX ORDER — TRAMADOL HYDROCHLORIDE 50 MG/1
50 TABLET ORAL EVERY 6 HOURS PRN
Status: DISCONTINUED | OUTPATIENT
Start: 2023-05-18 | End: 2023-05-18 | Stop reason: HOSPADM

## 2023-05-18 RX ORDER — SODIUM CHLORIDE, SODIUM LACTATE, POTASSIUM CHLORIDE, CALCIUM CHLORIDE 600; 310; 30; 20 MG/100ML; MG/100ML; MG/100ML; MG/100ML
INJECTION, SOLUTION INTRAVENOUS CONTINUOUS
Status: DISCONTINUED | OUTPATIENT
Start: 2023-05-18 | End: 2023-05-18 | Stop reason: HOSPADM

## 2023-05-18 RX ORDER — DIPHENHYDRAMINE HYDROCHLORIDE 50 MG/ML
INJECTION INTRAMUSCULAR; INTRAVENOUS PRN
Status: DISCONTINUED | OUTPATIENT
Start: 2023-05-18 | End: 2023-05-18

## 2023-05-18 RX ORDER — LIDOCAINE HYDROCHLORIDE 20 MG/ML
INJECTION, SOLUTION INFILTRATION; PERINEURAL PRN
Status: DISCONTINUED | OUTPATIENT
Start: 2023-05-18 | End: 2023-05-18

## 2023-05-18 RX ORDER — NALOXONE HYDROCHLORIDE 0.4 MG/ML
0.2 INJECTION, SOLUTION INTRAMUSCULAR; INTRAVENOUS; SUBCUTANEOUS
Status: DISCONTINUED | OUTPATIENT
Start: 2023-05-18 | End: 2023-05-18 | Stop reason: HOSPADM

## 2023-05-18 RX ORDER — PHENYLEPHRINE HYDROCHLORIDE 10 MG/ML
INJECTION INTRAVENOUS PRN
Status: DISCONTINUED | OUTPATIENT
Start: 2023-05-18 | End: 2023-05-18

## 2023-05-18 RX ORDER — FENTANYL CITRATE 50 UG/ML
50 INJECTION, SOLUTION INTRAMUSCULAR; INTRAVENOUS EVERY 5 MIN PRN
Status: DISCONTINUED | OUTPATIENT
Start: 2023-05-18 | End: 2023-05-18 | Stop reason: HOSPADM

## 2023-05-18 RX ORDER — FLUMAZENIL 0.1 MG/ML
0.2 INJECTION, SOLUTION INTRAVENOUS
Status: DISCONTINUED | OUTPATIENT
Start: 2023-05-18 | End: 2023-05-18 | Stop reason: HOSPADM

## 2023-05-18 RX ORDER — FENTANYL CITRATE 50 UG/ML
INJECTION, SOLUTION INTRAMUSCULAR; INTRAVENOUS PRN
Status: DISCONTINUED | OUTPATIENT
Start: 2023-05-18 | End: 2023-05-18

## 2023-05-18 RX ORDER — HALOPERIDOL 5 MG/ML
1 INJECTION INTRAMUSCULAR
Status: DISCONTINUED | OUTPATIENT
Start: 2023-05-18 | End: 2023-05-18 | Stop reason: HOSPADM

## 2023-05-18 RX ORDER — ONDANSETRON 4 MG/1
4 TABLET, ORALLY DISINTEGRATING ORAL EVERY 6 HOURS PRN
Status: DISCONTINUED | OUTPATIENT
Start: 2023-05-18 | End: 2023-05-18 | Stop reason: HOSPADM

## 2023-05-18 RX ORDER — ALBUTEROL SULFATE 0.83 MG/ML
2.5 SOLUTION RESPIRATORY (INHALATION) EVERY 4 HOURS PRN
Status: DISCONTINUED | OUTPATIENT
Start: 2023-05-18 | End: 2023-05-18 | Stop reason: HOSPADM

## 2023-05-18 RX ORDER — FENTANYL CITRATE 50 UG/ML
25 INJECTION, SOLUTION INTRAMUSCULAR; INTRAVENOUS EVERY 5 MIN PRN
Status: DISCONTINUED | OUTPATIENT
Start: 2023-05-18 | End: 2023-05-18 | Stop reason: HOSPADM

## 2023-05-18 RX ORDER — INDOMETHACIN 50 MG/1
SUPPOSITORY RECTAL PRN
Status: DISCONTINUED | OUTPATIENT
Start: 2023-05-18 | End: 2023-05-18 | Stop reason: HOSPADM

## 2023-05-18 RX ADMIN — GLUCAGON 0.2 MG: KIT at 12:53

## 2023-05-18 RX ADMIN — HUMAN INSULIN 5 UNITS: 100 INJECTION, SOLUTION SUBCUTANEOUS at 06:51

## 2023-05-18 RX ADMIN — HYDROMORPHONE HYDROCHLORIDE 0.5 MG: 1 INJECTION, SOLUTION INTRAMUSCULAR; INTRAVENOUS; SUBCUTANEOUS at 12:50

## 2023-05-18 RX ADMIN — GLUCAGON 0.4 MG: KIT at 12:40

## 2023-05-18 RX ADMIN — DIPHENHYDRAMINE HYDROCHLORIDE 25 MG: 50 INJECTION, SOLUTION INTRAMUSCULAR; INTRAVENOUS at 12:55

## 2023-05-18 RX ADMIN — PROPOFOL 130 MG: 10 INJECTION, EMULSION INTRAVENOUS at 11:55

## 2023-05-18 RX ADMIN — SUGAMMADEX 200 MG: 100 INJECTION, SOLUTION INTRAVENOUS at 13:10

## 2023-05-18 RX ADMIN — SODIUM CHLORIDE, POTASSIUM CHLORIDE, SODIUM LACTATE AND CALCIUM CHLORIDE: 600; 310; 30; 20 INJECTION, SOLUTION INTRAVENOUS at 11:40

## 2023-05-18 RX ADMIN — LIDOCAINE HYDROCHLORIDE 100 MG: 20 INJECTION, SOLUTION INFILTRATION; PERINEURAL at 11:55

## 2023-05-18 RX ADMIN — GLUCAGON 0.4 MG: KIT at 12:45

## 2023-05-18 RX ADMIN — Medication 50 MG: at 11:56

## 2023-05-18 RX ADMIN — Medication 30 MG: at 12:50

## 2023-05-18 RX ADMIN — PHENYLEPHRINE HYDROCHLORIDE 200 MCG: 10 INJECTION INTRAVENOUS at 13:05

## 2023-05-18 RX ADMIN — ONDANSETRON 4 MG: 2 INJECTION INTRAMUSCULAR; INTRAVENOUS at 12:58

## 2023-05-18 RX ADMIN — MIDAZOLAM 2 MG: 1 INJECTION INTRAMUSCULAR; INTRAVENOUS at 12:47

## 2023-05-18 ASSESSMENT — COPD QUESTIONNAIRES: COPD: 0

## 2023-05-18 ASSESSMENT — ACTIVITIES OF DAILY LIVING (ADL)
ADLS_ACUITY_SCORE: 37

## 2023-05-18 ASSESSMENT — LIFESTYLE VARIABLES: TOBACCO_USE: 1

## 2023-05-18 NOTE — ANESTHESIA PROCEDURE NOTES
Airway       Patient location during procedure: OR       Procedure Start/Stop Times: 5/18/2023 11:58 AM  Staff -        CRNA: Kenan Cueva APRN CRNA       Performed By: CRNA  Consent for Airway        Urgency: elective  Indications and Patient Condition       Indications for airway management: madelyn-procedural       Induction type:intravenous       Mask difficulty assessment: 1 - vent by mask    Final Airway Details       Final airway type: endotracheal airway       Successful airway: ETT - single  Endotracheal Airway Details        Cuffed: yes       Successful intubation technique: direct laryngoscopy       DL Blade Type: MAC 4       Grade View of Cords: 1       Adjucts: stylet       Position: Right       Measured from: gums/teeth       Secured at (cm): 23       Bite block used: None    Post intubation assessment        Placement verified by: capnometry, equal breath sounds and chest rise        Number of attempts at approach: 1       Number of other approaches attempted: 0       Secured with: pink tape       Ease of procedure: easy       Dentition: Intact and Unchanged    Medication(s) Administered   Medication Administration Time: 5/18/2023 11:58 AM

## 2023-05-18 NOTE — ANESTHESIA PREPROCEDURE EVALUATION
"Anesthesia Pre-Procedure Evaluation    Patient: Arnaldo Henderson   MRN: [id number] : 1955        Procedure : Procedure(s):  ENDOSCOPIC RETROGRADE CHOLANGIOPANCREATOGRAPHY, WITH , STENTS X2 REMOVAL          Past Medical History:   Diagnosis Date     CAD (coronary artery disease)      Diabetes (H)      Gastroesophageal reflux disease      Hypercholesteremia      Hypertension      Mixed hearing loss      Parkinson disease (H)      Second degree AV block       Past Surgical History:   Procedure Laterality Date     CA ANESTH CABG W/PUMP       COLONOSCOPY N/A 2023    Procedure: colonoscopy with fluroscopy;  Surgeon: Ayden Fraire MD;  Location:  OR     ENT SURGERY       LAPAROSCOPY DIAGNOSTIC (GENERAL) N/A 2023    Procedure: LAPAROSCOPY, DIAGNOSTIC; RETRIEVAL OF MIGRATED STENT;  Surgeon: Joseluis Lima MD;  Location: [facility] OR     ORTHOPEDIC SURGERY        Allergies   Allergen Reactions     Ciprofloxacin Other (See Comments), Hives, Itching, Rash and Unknown     Other reaction(s): Other (see comments)  Oral swelling per [facility]         Dilaudid [Hydromorphone] Rash     Morphine Rash     rash     Penicillins Rash     aka ancef/ rash       Oxycodone Rash     \"HORRIBLE, SEVERE RASH MAYBE CAUSED BY OXY\"      Social History     Tobacco Use     Smoking status: Former     Types: Cigarettes     Smokeless tobacco: Never   Vaping Use     Vaping status: Never Used   Substance Use Topics     Alcohol use: Not Currently      Wt Readings from Last 1 Encounters:   23 80.6 kg (177 lb 11.1 oz)        Anesthesia Evaluation   Pt has had prior anesthetic.     No history of anesthetic complications       ROS/MED HX  ENT/Pulmonary:     (+) tobacco use, Past use,  (-) asthma, COPD and sleep apnea   Neurologic:     (+) Parkinson's disease,     Cardiovascular:     (+) Dyslipidemia hypertension--CAD -CABG--Irregular Heartbeat/Palpitations (mobitz type 1 2nd degree AV block), Previous cardiac testing   Echo: Date: " 2/2023 Results:  Summary     1. Sinus rhythm with AV block during study. Unable to determine type of   AV   block based on rhythm strip.     2. Normal LV size with normal wall thickness. Calculated bi-planes LVEF   57%   (Normal function). Normal diastolic function.     3. Normal RV size and systolic function.     4. Moderate LA enlargement.     5. Mild RA enlargement.     6. No significant valvular abnormalities.     7. The IVC measures <2.1 cm with <50% collapse upon inspiration   consistent   with elevated right atrial pressure, 8 mmHg.     8. A prior study is not available for comparison.   Stress Test: Date: Results:    ECG Reviewed: Date: 5/18/2023 Results:  Prior to exercise, Mobitz Type 1 w/HR in 40s-50s. Post -exercise, 1st degree AV block w/HR in 80s.  Cath: Date: Results:      METS/Exercise Tolerance: >4 METS    Hematologic:       Musculoskeletal:       GI/Hepatic:     (+) GERD,     Renal/Genitourinary:       Endo:     (+) type II DM,     Psychiatric/Substance Use:       Infectious Disease:       Malignancy:       Other:            Physical Exam    Airway        Mallampati: III   TM distance: > 3 FB   Neck ROM: full   Mouth opening: > 3 cm    Respiratory Devices and Support         Dental       (+) Minor Abnormalities - some fillings, tiny chips      Cardiovascular   cardiovascular exam normal          Pulmonary   pulmonary exam normal                OUTSIDE LABS:  CBC:   Lab Results   Component Value Date    WBC 6.7 05/18/2023    WBC 10.4 02/21/2023    HGB 13.5 05/18/2023    HGB 11.9 (L) 02/21/2023    HCT 40.1 05/18/2023    HCT 35.5 (L) 02/21/2023     05/18/2023     02/21/2023     BMP:   Lab Results   Component Value Date     05/18/2023     (L) 02/21/2023    POTASSIUM 4.2 05/18/2023    POTASSIUM 4.4 02/21/2023    CHLORIDE 105 05/18/2023    CHLORIDE 102 02/21/2023    CO2 22 05/18/2023    CO2 25 02/21/2023    BUN 19.6 05/18/2023    BUN 14.1 02/21/2023    CR 0.84 05/18/2023    CR  0.85 02/21/2023     (H) 05/18/2023     (H) 05/18/2023     COAGS:   Lab Results   Component Value Date    INR 1.32 (H) 05/18/2023     POC: No results found for: BGM, HCG, HCGS  HEPATIC:   Lab Results   Component Value Date    ALBUMIN 3.8 05/18/2023    PROTTOTAL 6.8 05/18/2023    ALT <5 (L) 05/18/2023    AST 24 05/18/2023    ALKPHOS 134 (H) 05/18/2023    BILITOTAL 0.5 05/18/2023     OTHER:   Lab Results   Component Value Date    JAKE 8.9 05/18/2023    PHOS 3.7 02/21/2023    MAG 1.7 02/21/2023    LIPASE 13 05/18/2023    AMYLASE 35 05/18/2023       Anesthesia Plan    ASA Status:  3   NPO Status:  NPO Appropriate    Anesthesia Type: General.     - Airway: ETT   Induction: Intravenous.   Maintenance: Balanced.   Techniques and Equipment:     - Lines/Monitors: 2nd IV     Consents    Anesthesia Plan(s) and associated risks, benefits, and realistic alternatives discussed. Questions answered and patient/representative(s) expressed understanding.    - Discussed:     - Discussed with:  Patient         Postoperative Care    Pain management: IV analgesics, Oral pain medications, Multi-modal analgesia.   PONV prophylaxis: Ondansetron (or other 5HT-3)     Comments:    Other Comments: Preop EKG obtained showing Mobitz Type I AV block with 2:1 conduction and HR of 50, reflecting a significant decrease in the conduction ratio compared to EKG from 2/2023. As suggested by EP attending Dr. Vásquez, a repeat EKG was then obtained after 3 minutes of moderate intensity exercise. This showed improvement of the rhythm to a 1st degree AV block with rates in the 80s. We were reassured by this finding and the fact that patient has been asymptomatic and his 2/2023 TTE showed no significant abnormalities. Patient acknowledged understanding that despite these reassuring factors, he was still at some risk of perioperative cardiac complications.            Dre Muñoz MD

## 2023-05-18 NOTE — DISCHARGE INSTRUCTIONS
[facility], [place]  Same-Day Surgery ERCP Procedure   Adult Discharge Orders & Instructions     You had a procedure known as an Endoscopic Retrograde Cholangiopancreatography (ERCP). Your healthcare provider performed the ERCP to look at your bile or pancreatic ducts, and to locate and/or treat blockages (dilation, stenting, removal, etc.) in these ducts. Often biopsies, small samples of tissue, are taken to help diagnose and/or classify stages of disease growth. This procedure is used to diagnose diseases of the pancreas, bile ducts, pancreatic duct, liver, and gallbladder.     After your procedure   Make sure to clarify with your healthcare provider any diet restrictions (For example, clear liquid, low fat, no caffeine, etc.)   Do NOT take aspirin containing medications or any other blood-thinning medicines (anticoagulants) until your healthcare provider says it's OK.   You MAY be prescribed antibiotics, depending on what was done and/or found during your EUS, make sure to take antibiotics as prescribed by your healthcare provider    For 24 hours after surgery  Get plenty of rest.  A responsible adult must stay with you for at least 24 hours after you leave the hospital.   Do not drive or use heavy equipment.  If you have weakness or tingling, don't drive or use heavy equipment until this feeling goes away.  Do not drink alcohol.  Avoid strenuous or risky activities (gym, yoga, cycling, etc.).  Ask for help when climbing stairs.   You may feel lightheaded.  IF so, sit for a few minutes before standing.  Have someone help you get up.   If you have nausea (feel sick to your stomach): Drink only clear liquids such as apple juice, ginger ale, broth or 7-Up.  Rest may also help.  Be sure to drink enough fluids.  Move to a regular diet as you feel able.  If you feel bloated or have too much gas, use a heating pad on your belly to help reduce the discomfort. This should help you feel better.    You may have a slight fever. This is normal for the first 24 hours.   You may have a dry mouth, a sore throat, muscle aches or trouble sleeping.  These are normal and will go away after 24 hours. A sore throat is most common. Use lozenges or gargle with salt water to ease the discomfort.   Do not make important or legal decisions.      Call your doctor for any of the following:  Chest pain, and/or shortness of breath  Abdominal  pain, bloating or cramping that has not improved or does not respond to pain reliving medications (Tylenol or narcotics if prescribed)   Difficulty swallowing or feeling as though food or liquids are stuck in your throat   Sore throat lasting more than 2 days or pain that has worsened over time   Black or tarry stools   Nausea and/or vomiting that is not resolving or has not responded to anti-nausea medications prescribed to you   It has been over 8 to 10 hours since surgery and you are still not able to urinate (pass water)   Headache for over 24 hours   Fever over 100.5 F (38 C) lasting more than 24 hours after the procedure   Signs of jaundice or blockage (fever, chills, abdominal pain, yellowing of the whites of your eyes, yellowing of your skin, and/or passing darker than normal urine)     To contact a doctor, call:   [ ] __Dr Saldana's clinic at [phone] _________ (Monday thru Friday 8:00am to 4:30pm)   [ ] [phone] and ask for the ___Gastroenterology_____ resident on call (answered 24 hours a day)   [ ] Emergency Department: [facility]: [phone]     Take it easy when you get home.  Remember, same day surgery DOES NOT MEAN SAME DAY RECOVERY!  Healing is a gradual process.  You will need some time to recover - you may be more tired than you realize at first.  Rest and relax for at least the first 24 hours at home.  You'll feel better and heal faster if you take good care of yourself.     [facility], [place]  Same-Day Surgery   Adult  Discharge Orders & Instructions     For 24 hours after surgery    Get plenty of rest.  A responsible adult must stay with you for at least 24 hours after you leave the hospital.   Do not drive or use heavy equipment.  If you have weakness or tingling, don't drive or use heavy equipment until this feeling goes away.  Do not drink alcohol.  Avoid strenuous or risky activities.  Ask for help when climbing stairs.   You may feel lightheaded.  IF so, sit for a few minutes before standing.  Have someone help you get up.   If you have nausea (feel sick to your stomach): Drink only clear liquids such as apple juice, ginger ale, broth or 7-Up.  Rest may also help.  Be sure to drink enough fluids.  Move to a regular diet as you feel able.  You may have a slight fever. Call the doctor if your fever is over 100 F (37.7 C) (taken under the tongue) or lasts longer than 24 hours.  You may have a dry mouth, a sore throat, muscle aches or trouble sleeping.  These should go away after 24 hours.  Do not make important or legal decisions.   Call your doctor for any of the followin.  Signs of infection (fever, growing tenderness at the surgery site, a large amount of drainage or bleeding, severe pain, foul-smelling drainage, redness, swelling).    2. It has been over 8 to 10 hours since surgery and you are still not able to urinate (pass water).    3.  Headache for over 24 hours.    4.  Numbness, tingling or weakness the day after surgery (if you had spinal anesthesia).    '   Emergency Department:    [facility]: [phone]       (TTY for hearing impaired: [phone])    [facility]: [phone]       (TTY for hearing impaired: [phone])

## 2023-05-18 NOTE — BRIEF OP NOTE
[facility]    Brief Operative Note  Arnaldo Henderson is a 68 year old male with a PMHx of Parkinson's disease, DM II and h/o endoscopic transluminal drainage of walled off necrosis in [place] with subsequent axial stent migration in the into the small bowel and left in place for a year. An attempt was made to remove the stent with single balloon enteroscopy, however, it was unsuccessful and laparoscopic surgical removal. CT AP obtained to confirm location of Axios stent showed two transpapillary DPT stents out to the tail of the pancreas with pigtail ends of the stent curling up within the pancreatic parenchyma and indenting the splenic vein. There is about a 1 cm residual fluid collection in the tail of the pancreas. Patient also endorses chronic abdominal pain, predominately in the left mid abdomen. He presents for ERCP for further evaluation.    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]  Post-operative diagnosis Same as pre-operative diagnosis    Procedure: Procedure(s):  ENDOSCOPIC RETROGRADE CHOLANGIOPANCREATOGRAPHY, WITH  CYSTDUODENOSTOMY STENTS X2 REMOVAL  Surgeon: Surgeon(s) and Role:     * Cedric Saldana MD - Primary     * Jennifer Morgan MD - Fellow - Assisting  Anesthesia: General   Estimated Blood Loss: Minimal    Drains: None  Specimens: * No specimens in log *  Findings:     - Two stents were removed from a cystduodenostomy site   - The ventral pancreatic duct was accessed and limited pancreatogram was suggestive of pancreatic duct disruption vs pancreas divisum  - Decision was made to leave patient stent and clinically monitor    Complications: None.  Implants: * No implants in log *    Recommendations  - Observe patient in PACU for ongoing care with plans for discharge to home later today   - Will obtain CT pancreas protocol in 1 month to evaluate the pancreatic duct and any change in size of residual pancreatic tail fluid  collections  - The findings and recommendations were discussed with the patient and their family

## 2023-05-18 NOTE — OR NURSING
As per orders, 2 ECGs completed; one while resting and one after ambulating for 3 minutes. Provider notified.

## 2023-05-18 NOTE — CONSULTS
Brief Cardiology Note    Mr Henderson is a 67 yo M with a Hx of asymptomatic 2nd degree AV block , CAD s/p CABG in 2003, HTN, Parkinson's disease and pancreatic cyst s/p multiple endsocopic procedure, here for an ERCP. We are asked by anesthesia and GI to weigh in for a pre-procedural assessment.     He has been asymptomatic from his AVB standpoint, with no syncopal events, limited functional capacities or other CV complications. Therefore, it would be safe to go ahead with the planned ERCP, but would defer final decision to our anesthesia and GI colleagues.     Telemetry monitoring in there madelyn-operative setting would be recommended.     Rudy Rincon MD   Internal Medicine, PGY2

## 2023-05-18 NOTE — OR NURSING
RN spoke to Anesthesiologist, Dr. Muñoz, regarding pt HR dropping to 40s. Provider ordered 12 lead EKG.

## 2023-05-18 NOTE — OR NURSING
Dr. Saldana at bedside to speak with patient post-procedure. Instructed to follow up with cardiology on an outpatient basis.

## 2023-05-18 NOTE — ANESTHESIA CARE TRANSFER NOTE
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    Anesthesia Type:   General     Note:    Oropharynx: oropharynx clear of all foreign objects  Level of Consciousness: drowsy  Oxygen Supplementation: face mask  Level of Supplemental Oxygen (L/min / FiO2): 8  Independent Airway: airway patency satisfactory and stable  Dentition: dentition unchanged  Vital Signs Stable: post-procedure vital signs reviewed and stable  Report to RN Given: handoff report given  Patient transferred to: PACU    Handoff Report: Identifed the Patient, Identified the Reponsible Provider, Reviewed the pertinent medical history, Discussed the surgical course, Reviewed Intra-OP anesthesia mangement and issues during anesthesia, Set expectations for post-procedure period and Allowed opportunity for questions and acknowledgement of understanding      Vitals:  Vitals Value Taken Time   /72 05/18/23 1333   Temp     Pulse 51 05/18/23 1335   Resp     SpO2 98 % 05/18/23 1335   Vitals shown include unvalidated device data.    Electronically Signed By: KRYSTYNA Kunz CRNA  May 18, 2023  1:36 PM

## 2023-05-18 NOTE — OR NURSING
As per Dr. Muñoz's orders, Pt took home medication Sinemet  at 1040 while waiting for procedure.

## 2023-05-18 NOTE — OR NURSING
Anesthesia provider, Dr. Muñoz,  notified of BG of 237. Provider ordered 5 units of IV regular insulin.

## 2023-05-18 NOTE — ANESTHESIA POSTPROCEDURE EVALUATION
Patient: Arnaldo Henderson    Procedure: Procedure(s):  ENDOSCOPIC RETROGRADE CHOLANGIOPANCREATOGRAPHY, WITH  CYSTDUODENOSTOMY STENTS X2 REMOVAL       Anesthesia Type:  General    Note:  Disposition: Outpatient   Postop Pain Control: Uneventful            Sign Out: Well controlled pain   PONV: No   Neuro/Psych: Uneventful            Sign Out: Acceptable/Baseline neuro status   Airway/Respiratory: Uneventful            Sign Out: Acceptable/Baseline resp. status   CV/Hemodynamics: Uneventful            Sign Out: Acceptable CV status; No obvious hypovolemia; No obvious fluid overload   Other NRE: NONE   DID A NON-ROUTINE EVENT OCCUR? No           Last vitals:  Vitals Value Taken Time   /68 05/18/23 1430   Temp 36.4  C (97.5  F) 05/18/23 1325   Pulse 66 05/18/23 1432   Resp 18 05/18/23 1415   SpO2 95 % 05/18/23 1432   Vitals shown include unvalidated device data.    Electronically Signed By: Hayley Contreras MD  May 18, 2023  2:32 PM

## 2023-05-19 LAB
ATRIAL RATE - MUSE: 80 BPM
ATRIAL RATE - MUSE: 83 BPM
DIASTOLIC BLOOD PRESSURE - MUSE: NORMAL MMHG
DIASTOLIC BLOOD PRESSURE - MUSE: NORMAL MMHG
ERCP: NORMAL
INTERPRETATION ECG - MUSE: NORMAL
INTERPRETATION ECG - MUSE: NORMAL
P AXIS - MUSE: 63 DEGREES
P AXIS - MUSE: 68 DEGREES
PR INTERVAL - MUSE: 274 MS
PR INTERVAL - MUSE: NORMAL MS
QRS DURATION - MUSE: 94 MS
QRS DURATION - MUSE: 96 MS
QT - MUSE: 378 MS
QT - MUSE: 414 MS
QTC - MUSE: 377 MS
QTC - MUSE: 435 MS
R AXIS - MUSE: 26 DEGREES
R AXIS - MUSE: 37 DEGREES
SYSTOLIC BLOOD PRESSURE - MUSE: NORMAL MMHG
SYSTOLIC BLOOD PRESSURE - MUSE: NORMAL MMHG
T AXIS - MUSE: 65 DEGREES
T AXIS - MUSE: 69 DEGREES
VENTRICULAR RATE- MUSE: 50 BPM
VENTRICULAR RATE- MUSE: 80 BPM

## 2023-05-22 ENCOUNTER — PATIENT OUTREACH (OUTPATIENT)
Dept: GASTROENTEROLOGY | Facility: CLINIC | Age: 68
End: 2023-05-22
Payer: MEDICARE

## 2023-05-22 DIAGNOSIS — R94.31 ACUTE ELECTROCARDIOGRAM CHANGES: ICD-10-CM

## 2023-05-22 DIAGNOSIS — K85.91 NECROTIZING PANCREATITIS: Primary | ICD-10-CM

## 2023-05-22 NOTE — TELEPHONE ENCOUNTER
Per ERCP note:  Will obtain secretin MRCP with secreting in 3-4     weeks to evaluate the pancreatic duct and any change    in size of residual pancreatic collections    - Virtual follow up in pancreas clinic after that    - Needs cardiology consult - had brief episode of  bradycardia and ? third degree heart block during   passage of endoscope     Orders placed, Procedure note routed to [place] endoscopist, Telma Rangel. Discussed plan with wife, she will schedule imaging    Phyllis Butterfield, RN Care Coordinator

## 2023-05-26 ENCOUNTER — TELEPHONE (OUTPATIENT)
Dept: GASTROENTEROLOGY | Facility: CLINIC | Age: 68
End: 2023-05-26
Payer: MEDICARE

## 2023-05-26 NOTE — TELEPHONE ENCOUNTER
ERICA, sent [email or web address] to schedule the following appointment:      Follow up RTC with Dr. Saldana, next available Return Patient appt, in person or virtual. Schedule with patient's wife. Left call center #

## 2023-05-30 ENCOUNTER — PATIENT OUTREACH (OUTPATIENT)
Dept: GASTROENTEROLOGY | Facility: CLINIC | Age: 68
End: 2023-05-30
Payer: MEDICARE

## 2023-05-30 NOTE — TELEPHONE ENCOUNTER
Wife called requesting earlier clinic with Dr. Saldana, states patient has sMRCP scheduled for tomorrow, needs asap follow up.     Returned call to wife, will overbook at 4:30 on 6/5- wife agreeable to plan. Message sent to scheduling.    ML

## 2023-05-31 ENCOUNTER — HOSPITAL ENCOUNTER (OUTPATIENT)
Dept: MRI IMAGING | Facility: CLINIC | Age: 68
Discharge: HOME | End: 2023-05-31
Attending: INTERNAL MEDICINE | Admitting: INTERNAL MEDICINE
Payer: MEDICARE

## 2023-05-31 ENCOUNTER — PATIENT OUTREACH (OUTPATIENT)
Dept: GASTROENTEROLOGY | Facility: CLINIC | Age: 68
End: 2023-05-31
Payer: MEDICARE

## 2023-05-31 ENCOUNTER — PRE VISIT (OUTPATIENT)
Dept: CARDIOLOGY | Facility: CLINIC | Age: 68
End: 2023-05-31
Payer: MEDICARE

## 2023-05-31 DIAGNOSIS — Z18.9 RETAINED FOREIGN BODY: Primary | ICD-10-CM

## 2023-05-31 DIAGNOSIS — K85.91 NECROTIZING PANCREATITIS: ICD-10-CM

## 2023-05-31 DIAGNOSIS — K85.91 NECROTIZING PANCREATITIS: Primary | ICD-10-CM

## 2023-05-31 PROCEDURE — 250N000009 HC RX 250: Performed by: INTERNAL MEDICINE

## 2023-05-31 PROCEDURE — 74183 MRI ABD W/O CNTR FLWD CNTR: CPT | Mod: 26 | Performed by: RADIOLOGY

## 2023-05-31 PROCEDURE — G1010 CDSM STANSON: HCPCS | Mod: GC | Performed by: RADIOLOGY

## 2023-05-31 PROCEDURE — A9585 GADOBUTROL INJECTION: HCPCS | Performed by: INTERNAL MEDICINE

## 2023-05-31 PROCEDURE — 74183 MRI ABD W/O CNTR FLWD CNTR: CPT | Mod: MG

## 2023-05-31 PROCEDURE — 250N000011 HC RX IP 250 OP 636: Performed by: INTERNAL MEDICINE

## 2023-05-31 PROCEDURE — 255N000002 HC RX 255 OP 636: Performed by: INTERNAL MEDICINE

## 2023-05-31 RX ORDER — GADOBUTROL 604.72 MG/ML
10 INJECTION INTRAVENOUS ONCE
Status: COMPLETED | OUTPATIENT
Start: 2023-05-31 | End: 2023-05-31

## 2023-05-31 RX ADMIN — GADOBUTROL 8 ML: 604.72 INJECTION INTRAVENOUS at 16:35

## 2023-05-31 RX ADMIN — HUMAN SECRETIN 15.8 MCG: 16 INJECTION, POWDER, LYOPHILIZED, FOR SOLUTION INTRAVENOUS at 16:13

## 2023-05-31 RX ADMIN — HUMAN SECRETIN 0.2 MCG: 16 INJECTION, POWDER, LYOPHILIZED, FOR SOLUTION INTRAVENOUS at 16:12

## 2023-05-31 NOTE — TELEPHONE ENCOUNTER
Wife called back, reviewed plan for CT with or without ER visit to help manage symptoms. Pt has cardiology visit tomorrow. Called to organize CT after cardiology visit at [facility] tomorrow, after 10:30. They are also having car trouble.     Scheduled tomorrow at 8am, check in at 7:30 on 6/1 [facility]    ML

## 2023-05-31 NOTE — TELEPHONE ENCOUNTER
MRCP reviewed by Dr. Saldana, next steps:    - CT abdomen  -- ER if symptoms out of control, CT could also be done there.    CT ordered, left message for wife Veronika MATHEWS

## 2023-06-01 ENCOUNTER — ANCILLARY PROCEDURE (OUTPATIENT)
Dept: CT IMAGING | Facility: CLINIC | Age: 68
End: 2023-06-01
Attending: INTERNAL MEDICINE
Payer: MEDICARE

## 2023-06-01 ENCOUNTER — OFFICE VISIT (OUTPATIENT)
Dept: CARDIOLOGY | Facility: CLINIC | Age: 68
End: 2023-06-01
Attending: INTERNAL MEDICINE
Payer: MEDICARE

## 2023-06-01 VITALS
HEART RATE: 72 BPM | HEIGHT: 68 IN | BODY MASS INDEX: 27.29 KG/M2 | DIASTOLIC BLOOD PRESSURE: 96 MMHG | OXYGEN SATURATION: 98 % | SYSTOLIC BLOOD PRESSURE: 173 MMHG | WEIGHT: 180.1 LBS

## 2023-06-01 DIAGNOSIS — K85.91 NECROTIZING PANCREATITIS: ICD-10-CM

## 2023-06-01 DIAGNOSIS — I44.1 ATRIOVENTRICULAR BLOCK, MOBITZ TYPE 1, WENCKEBACH: Primary | ICD-10-CM

## 2023-06-01 DIAGNOSIS — I25.10 CORONARY ARTERY DISEASE INVOLVING NATIVE CORONARY ARTERY OF NATIVE HEART WITHOUT ANGINA PECTORIS: ICD-10-CM

## 2023-06-01 DIAGNOSIS — I10 BENIGN ESSENTIAL HYPERTENSION: ICD-10-CM

## 2023-06-01 DIAGNOSIS — E78.2 MIXED HYPERLIPIDEMIA: ICD-10-CM

## 2023-06-01 PROCEDURE — 93005 ELECTROCARDIOGRAM TRACING: CPT

## 2023-06-01 PROCEDURE — 99205 OFFICE O/P NEW HI 60 MIN: CPT | Performed by: INTERNAL MEDICINE

## 2023-06-01 PROCEDURE — G1010 CDSM STANSON: HCPCS | Mod: GC | Performed by: RADIOLOGY

## 2023-06-01 PROCEDURE — 93010 ELECTROCARDIOGRAM REPORT: CPT | Performed by: INTERNAL MEDICINE

## 2023-06-01 PROCEDURE — G0463 HOSPITAL OUTPT CLINIC VISIT: HCPCS | Performed by: INTERNAL MEDICINE

## 2023-06-01 PROCEDURE — 74178 CT ABD&PLV WO CNTR FLWD CNTR: CPT | Mod: MG | Performed by: RADIOLOGY

## 2023-06-01 RX ORDER — IOPAMIDOL 755 MG/ML
97 INJECTION, SOLUTION INTRAVASCULAR ONCE
Status: COMPLETED | OUTPATIENT
Start: 2023-06-01 | End: 2023-06-01

## 2023-06-01 RX ADMIN — IOPAMIDOL 97 ML: 755 INJECTION, SOLUTION INTRAVASCULAR at 07:47

## 2023-06-01 ASSESSMENT — PAIN SCALES - GENERAL: PAINLEVEL: NO PAIN (0)

## 2023-06-01 NOTE — LETTER
6/1/2023      RE: Arnaldo Henderson  [address]  [facility] [address]       Dear Colleague,    Thank you for the opportunity to participate in the care of your patient, Arnaldo Henderson, at the [facility] [employer] at [facility]. Please see a copy of my visit note below.    I am delighted to see Arnaldo Henderson as a new patient in cardiology clinic for evaluation of AV Wenckebach.  He is here with his wife.    History of Present Illness:  The patient is a 68 year old  Male with a h/o CABG, pancreatitis s/p stents. He was noted to have AV Wenckebach on Holter in January 2023 and was referred to Dr. Morales for cardiovascular evaluation prior to pancreatic stent removal. A nuclear stress was negative and patient was referred to EP. His metoprolol was stopped. He didn't get to see EP but did undergo ERCP in May 2023; anesthesia noted AV Wenckebach in preop, cardiology was consulted and it was recommended to proceed with ERCP which took place without complications. He and his wife wish to transfer care to [facility]. They had lived in [place] prior to last year.    At baseline, he is debilitated from frequent abdominal discomfort, back pain, and symptoms from Parkinson's disease. He does not sleep well due to pain, sleeps about 30 minutes at a time. He denies any lightheadedness, dizziness, syncope.     Past Medical History:  CAD s/p CABG 2003 [place]  Diabetes type II  Hypertension  Hyperlipidemia  Parkinson's  Pancreatic pseudocyst  Pancreatitis s/p stents and stent removal  GERD    Medications:   Aspirin 81 every day  Atorvastatin 40 every day  Losartan 50 at bedtime    Omeprazole  Sinemet  Insulin      Allergies:    Allergies   Allergen Reactions    Ciprofloxacin Other (See Comments), Hives, Itching, Rash and Unknown     Other reaction(s): Other (see comments)  Oral swelling per [facility]        Dilaudid [Hydromorphone] Rash    Morphine Rash      "rash    Penicillins Rash     aka ancef/ rash      Oxycodone Rash     \"HORRIBLE, SEVERE RASH MAYBE CAUSED BY OXY\"         Physical examination  Vitals: 173/96, HR 72  Repeat right arm 147/97  BMI= 27    Constitutional: In general, the patient is in no acute distress. Appears older than age. Movements are difficult. Significant tremors especially right arm.  Cardiovascular: Carotids +2/2 bilaterally without bruits.  No jugular venous distension. Regular rate and rhythm. Normal S1, S2. No murmur, rub, click, or gallop.   Extremities: Pulses are normal bilaterally throughout. No peripheral edema.  Respiratory: Clear to asculation.  No ronchi, wheezes, rales.  No dullness to percussion.       I have personally and independently reviewed the following:  Labs:   5/18/2023: cr 0.84, hgb 13, plt 164K  5/12/2023: A2C 8.5  10/7/2022: chol 76, TG 1, HDL 29, LDL 33    Stress test PET scan 7/18/2022: peak HR 98 bpm, no ischemia, no arrhythmias    Echo 2/6/2023: EF 57%, no sig valve disease, moderate LAE    EKG:   TODAY 6/1/2023: sinus 80 bpm,  ms, AV Wenckebach (tremor artifact)  5/18/2023: sinus 80 bpm, , AV Wenckebach  5/18/2023: sinus 80 bpm,    7/23/2022: sinus 85 bpm,    1/12/2017: sinus 58 bpm,    10/10/2011: sinus 55 bpm,     Holter monitor 24 hours 1/2023: sinus  Average 61 bpm, range 35-90 bpm, AV WB    Assessment :  1. Asymptomatic prolonged ME interval, present since at least 2011; Intermittent AV Wenckebach noted sine 1/2023. Asymptomatic AV node conduction disease does not require intervention, no permanent pacemaker indicated.  2. CAD s/p CABG, neg stress test 2/2023, normal LV function. He saw Dr. Morales in 2/2023 but would like to follow with general cardiology here. Will make follow up appointment for him with general cardiology. Continue aspirin 81 every day and atorvastatin.  3. Hypertension, may need to intensity regimen but he will follow up with PCP  4. Diabetes type II, " A1C 8.5.   5. Parkinson's      I spent a total of 30 minutes face to face with  Arnaldo Henderson during today's office visit. I have spend an additional 30 minutes today on chart review and documentation.      The patient is to return as above . The patient understood the treatment plan as outlined above.  There were no barriers to learning.      Jenna Hernandez MD

## 2023-06-01 NOTE — PROGRESS NOTES
"I am delighted to see Arnaldo Henderson as a new patient in cardiology clinic for evaluation of AV Wenckebach.  He is here with his wife.    History of Present Illness:  The patient is a 68 year old  Male with a h/o CABG, pancreatitis s/p stents. He was noted to have AV Wenckebach on Holter in January 2023 and was referred to Dr. Morales for cardiovascular evaluation prior to pancreatic stent removal. A nuclear stress last year was negative.  His metoprolol was stopped and he was referred to EP.  He didn't get to see EP but did undergo ERCP in May 2023; anesthesia noted AV Wenckebach in preop, cardiology was consulted and it was recommended to proceed with ERCP which took place without complications. He and his wife wish to transfer care to [facility]. They had lived in [place] prior to last year.    At baseline, he is debilitated from frequent abdominal discomfort, back pain, and symptoms from Parkinson's disease. He does not sleep well due to pain, sleeps about 30 minutes at a time. He denies any lightheadedness, dizziness, syncope.     Past Medical History:  CAD s/p CABG 2003 [employer]  Diabetes type II  Hypertension  Hyperlipidemia  Parkinson's  Pancreatic pseudocyst  Pancreatitis s/p stents and stent removal  GERD    Medications:   Aspirin 81 every day  Atorvastatin 40 every day  Losartan 50 at bedtime    Omeprazole  Sinemet  Insulin      Allergies:    Allergies   Allergen Reactions     Ciprofloxacin Other (See Comments), Hives, Itching, Rash and Unknown     Other reaction(s): Other (see comments)  Oral swelling per [facility]         Dilaudid [Hydromorphone] Rash     Morphine Rash     rash     Penicillins Rash     aka ancef/ rash       Oxycodone Rash     \"HORRIBLE, SEVERE RASH MAYBE CAUSED BY OXY\"         Physical examination  Vitals: 173/96, HR 72  Repeat right arm 147/97  BMI= 27    Constitutional: In general, the patient is in no acute distress. Appears older than age. Movements are difficult. Significant tremors " especially right arm.  Cardiovascular: Carotids +2/2 bilaterally without bruits.  No jugular venous distension. Regular rate and rhythm. Normal S1, S2. No murmur, rub, click, or gallop.   Extremities: Pulses are normal bilaterally throughout. No peripheral edema.  Respiratory: Clear to asculation.  No ronchi, wheezes, rales.  No dullness to percussion.       I have personally and independently reviewed the following:  Labs:   5/18/2023: cr 0.84, hgb 13, plt 164K  5/12/2023: A2C 8.5  10/7/2022: chol 76, TG 1, HDL 29, LDL 33    Stress test PET scan 7/18/2022: peak HR 98 bpm, no ischemia, no arrhythmias    Echo 2/6/2023: EF 57%, no sig valve disease, moderate LAE    EKG:   TODAY 6/1/2023: sinus 80 bpm,  ms, AV Wenckebach (tremor artifact)  5/18/2023: sinus 80 bpm, , AV Wenckebach  5/18/2023: sinus 80 bpm,    7/23/2022: sinus 85 bpm,    1/12/2017: sinus 58 bpm,    10/10/2011: sinus 55 bpm,     Holter monitor 24 hours 1/2023: sinus  Average 61 bpm, range 35-90 bpm, AV WB    Assessment :  1. Asymptomatic prolonged WI interval, present since at least 2011; Intermittent AV Wenckebach noted sine 1/2023. Asymptomatic AV node conduction disease does not require intervention, no permanent pacemaker indicated.  2. CAD s/p CABG, neg stress test 2/2023, normal LV function. He saw Dr. Morales in 2/2023 but would like to follow with general cardiology here. Will make follow up appointment for him with general cardiology. Continue aspirin 81 every day and atorvastatin.  3. Hypertension, may need to intensity regimen but he will follow up with PCP  4. Diabetes type II, A1C 8.5.   5. Parkinson's      I spent a total of 30 minutes face to face with  Arnaldo Henderson during today's office visit. I have spend an additional 30 minutes today on chart review and documentation.      The patient is to return as above . The patient understood the treatment plan as outlined above.  There were no barriers to  learning.      Jenna Hernandez MD

## 2023-06-01 NOTE — NURSING NOTE
Chief Complaint   Patient presents with     New Patient     NEW- referral from Gastro Dr Saldana, - had brief episode of bradycardia and ? third degree heart block during procedure 5/18/23       Vitals were taken, medications reconciled and EKG performed.    Rafaela Bocanegra, EMT   9:54 AM

## 2023-06-01 NOTE — PATIENT INSTRUCTIONS
You were seen in the Electrophysiology Clinic today by: Dr Hernandez    Plan:       Follow up Visit: March 2024 with General Cardiology        If you have further questions, please utilize [employer] to contact us.     Your Care Team:    EP Cardiology   Telephone Number     Nurse Line  Merlene Lin, RN   Jodi Quintero, RN   [phone]     For scheduling appointments:   Jorge   [phone]   For procedure scheduling:    Yolis Ferris     [phone]   For the Device Clinic (Pacemakers, ICDs, Loop Recorders)    During business hours: [phone]  After business hours:   [phone]- select option 4 and ask for job code [id number].       On-call cardiologist for after hours or on weekends:   [phone], option #4, and ask to speak to the on-call cardiologist.     Cardiovascular Clinic:   [address] [place] [address]. [place] [address]      As always, Thank you for trusting us with your health care needs!

## 2023-06-02 LAB
ATRIAL RATE - MUSE: 78 BPM
DIASTOLIC BLOOD PRESSURE - MUSE: NORMAL MMHG
INTERPRETATION ECG - MUSE: NORMAL
P AXIS - MUSE: NORMAL DEGREES
PR INTERVAL - MUSE: NORMAL MS
QRS DURATION - MUSE: 86 MS
QT - MUSE: 374 MS
QTC - MUSE: 417 MS
R AXIS - MUSE: 58 DEGREES
SYSTOLIC BLOOD PRESSURE - MUSE: NORMAL MMHG
T AXIS - MUSE: 56 DEGREES
VENTRICULAR RATE- MUSE: 75 BPM

## 2023-06-05 ENCOUNTER — VIRTUAL VISIT (OUTPATIENT)
Dept: GASTROENTEROLOGY | Facility: CLINIC | Age: 68
End: 2023-06-05
Payer: MEDICARE

## 2023-06-05 DIAGNOSIS — K85.91 NECROTIZING PANCREATITIS: Primary | ICD-10-CM

## 2023-06-05 PROCEDURE — 99215 OFFICE O/P EST HI 40 MIN: CPT | Mod: VID | Performed by: INTERNAL MEDICINE

## 2023-06-05 PROCEDURE — 99417 PROLNG OP E/M EACH 15 MIN: CPT | Mod: VID | Performed by: INTERNAL MEDICINE

## 2023-06-05 NOTE — PROGRESS NOTES
Virtual Visit Details    Type of service:  Video Visit   See above    Originating Location (pt. Location): Home  Distant Location (provider location):  Off-site  Platform used for Video Visit: Destinee

## 2023-06-05 NOTE — PROGRESS NOTES
This is follow-up of Mr. Henderson who has suffered a very long sequence of events after necrotizing pancreatitis treated endoscopically in [place].  Please see former notes for details.  In some he developed partial small bowel obstruction after migration of an axial stent placed in [place] into his small bowel.  That was expertly removed laparoscopically by Dr. Joseluis Lima after endoscopic attempts by Dr. Fraire unsuccessful.  Despite that resolution of his bowel obstructive symptoms he persisted with intractable pain particularly in the left mid abdomen lower quadrant and positionally aggravated.  He had 2 very large very long double-pigtail 7 [place] plastic stents that had been placed transduodenal he around the pancreas and the inner pigtail digging into the splenic vein and not apparently draining any part of the pancreatic duct or any significant collection.  Based on the suspicion that the stents were causing pain and not providing any function we performed ERCP on May 18 with removal of those stents and performed a pancreatogram which confirmed CT and clinical suspicion that he has completely disconnected pancreatic duct.  Unfortunately in the last 3 weeks since removing those stents his pain has only intensified.  It is now continuous and worse with position and is centered on the left side but does radiate through to the back and his lower abdomen.    He is on absolutely no pain medicines.  We had tried referring him to our pain clinic but after 2 months wait a canceled his appointment apparently because he does not have a primary in our system.  He does have a primary doctor Iveth and [facility] with whom they have an excellent rapport.  She had prescribed Neurontin which has helped somewhat but he stopped it for unclear reasons.    As we had been alerted that his pain was worse I ordered a secretin MRCP with a result pasted below.  Of note is the disconnected pancreatic duct with atrophic dilated  upstream duct and improvement in the peripancreatic collection to minimal level.  The report states that there is an axial stent in the mid abdomen which upset the patient's wife greatly.  I went through the next day CT scan which I also ordered and I am positive there is no stent there and it must of been a misprint.  I went through the images with screen share to try to reassure the patient and his wife that this is axial stent is no longer there and the bowel and was in fact removed by Dr. Lima as stated.  The CT confirms that there is no significant fluid collection around the pancreas and affirms the same findings of the MRCP with disconnected duct with atrophic pancreas and dilated upstream segment in the tail.  Also the portal vein is patent    The patient's wife led the visit and expressed anger and frustration over everything that happened and quite a lot of bitterness about the fact that the patient's pain is only getting worse and they are not getting help.  I spent a full 47 minutes on the video visit and another 30 minutes at the end of the visit reviewing data and imaging independently.  I expressed sympathy that there need not getting the support they need but we are doing her very best to rectify a number of situations and a disease that was treated elsewhere prior to coming to us.  I also explained that with pain there is never a guarantee that any particular anatomic finding that leads to an intervention such as surgery or other will in fact relieve that pain.  This obviously disappointing although very surprising to me that removing those very rigid stents from his mid abdomen would actually exacerbate rather than improve pain but we are where we are    I expressed that we will do our very best to get him to a pain clinic that we will route be responsive.  I also think that there may be a role for distal pancreatectomy and splenectomy if we think that the pain is due to disconnected pancreatic  duct with obstructed tail.  I did review his images with Dr. Castillo and we both agree that anatomically this problem is not addressable by endoscopic ultrasound guided pancreatico gastrostomy as the pancreas is too distant from the stomach and its a very small short minimally enlarged segment of pancreas.  [facility] I will try to refer him to [facility] pain clinic with which some of our patients have had good results.  I will also ask his primary to consider interim management of his pain with either tramadol or low-dose opioids.  He also has some gabapentin leftover which did work in part and suggested he start taking that right away.  To rectify the confusion about the remnant axial stent I am going to ask Dr. LOVE to over read his MRCP based on the CT the next day and affirmed that the axial stent is gone from the bowel    Plan    1) restart gabapentin already Rx by PCP  2) I will contact PCP tomorrow to consider Rx tramadol or low dose oxycodone.  3) refer to [facility] Pain Clinic - consider local / celiac blocks perhaps as differential for source of pain.  4) refer to Dr Haider to consider distal pancreatectomy/splenectomy - not likely any islet cell as pt on insulin and very small atrophic but obstructed tail  5) Follow-up w us afterwards  6) Ask MR reader Dr TOBIAS LOVE MD to review MR and amend report to say NO Axial stent present if he agrees, which I trust he will after reviewing CT done next day.    Joined the call at 6/5/2023, 4:48:28 pm.  Left the call at 6/5/2023, 5:34:57 pm.  You were on the call for 46 minutes 28 seconds .    Past Medical, Surgical, Family, and Social Histories:    Past Medical History:   Diagnosis Date     CAD (coronary artery disease)      Diabetes (H)      Gastroesophageal reflux disease      Hypercholesteremia      Hypertension      Mixed hearing loss      Parkinson disease (H)      Second degree AV block         Past Surgical History:   Procedure Laterality Date     CA ANESTH CABG W/PUMP       COLONOSCOPY N/A 1/12/2023    Procedure: colonoscopy with fluroscopy;  Surgeon: Ayden Fraire MD;  Location: [facility] OR     ENDOSCOPIC RETROGRADE CHOLANGIOPANCREATOGRAM N/A 5/18/2023    Procedure: ENDOSCOPIC RETROGRADE CHOLANGIOPANCREATOGRAPHY, WITH  CYSTDUODENOSTOMY STENTS X2 REMOVAL;  Surgeon: Cedric Saldana MD;  Location: [facility] OR     ENT SURGERY       LAPAROSCOPY DIAGNOSTIC (GENERAL) N/A 2/20/2023    Procedure: LAPAROSCOPY, DIAGNOSTIC; RETRIEVAL OF MIGRATED STENT;  Surgeon: Joseluis Lima MD;  Location: [facility] OR     ORTHOPEDIC SURGERY         No family history on file.    Social History     Tobacco Use     Smoking status: Former     Types: Cigarettes     Smokeless tobacco: Never   Vaping Use     Vaping status: Never Used   Substance Use Topics     Alcohol use: Not Currently

## 2023-06-05 NOTE — LETTER
6/5/2023         RE: Arnaldo Henderson  [address]  [facility] [address]        Dear Colleague,    Thank you for referring your patient, Arnaldo Henderson, to the [facility] [place]. Please see a copy of my visit note below.    Virtual Visit Details    Type of service:  Video Visit   See above    Originating Location (pt. Location): Home  Distant Location (provider location):  Off-site  Platform used for Video Visit: [employer]      This is follow-up of Mr. Henderson who has suffered a very long sequence of events after necrotizing pancreatitis treated endoscopically in [place].  Please see former notes for details.  In some he developed partial small bowel obstruction after migration of an axial stent placed in [place] into his small bowel.  That was expertly removed laparoscopically by Dr. Joseluis Lima after endoscopic attempts by Dr. Fraire unsuccessful.  Despite that resolution of his bowel obstructive symptoms he persisted with intractable pain particularly in the left mid abdomen lower quadrant and positionally aggravated.  He had 2 very large very long double-pigtail 7 [place] plastic stents that had been placed transduodenal he around the pancreas and the inner pigtail digging into the splenic vein and not apparently draining any part of the pancreatic duct or any significant collection.  Based on the suspicion that the stents were causing pain and not providing any function we performed ERCP on May 18 with removal of those stents and performed a pancreatogram which confirmed CT and clinical suspicion that he has completely disconnected pancreatic duct.  Unfortunately in the last 3 weeks since removing those stents his pain has only intensified.  It is now continuous and worse with position and is centered on the left side but does radiate through to the back and his lower abdomen.    He is on absolutely no pain medicines.  We had tried referring him to our pain  clinic but after 2 months wait a canceled his appointment apparently because he does not have a primary in our system.  He does have a primary doctor Iveth and [facility] with whom they have an excellent rapport.  She had prescribed Neurontin which has helped somewhat but he stopped it for unclear reasons.    As we had been alerted that his pain was worse I ordered a secretin MRCP with a result pasted below.  Of note is the disconnected pancreatic duct with atrophic dilated upstream duct and improvement in the peripancreatic collection to minimal level.  The report states that there is an axial stent in the mid abdomen which upset the patient's wife greatly.  I went through the next day CT scan which I also ordered and I am positive there is no stent there and it must of been a misprint.  I went through the images with screen share to try to reassure the patient and his wife that this is axial stent is no longer there and the bowel and was in fact removed by Dr. Lima as stated.  The CT confirms that there is no significant fluid collection around the pancreas and affirms the same findings of the MRCP with disconnected duct with atrophic pancreas and dilated upstream segment in the tail.  Also the portal vein is patent    The patient's wife led the visit and expressed anger and frustration over everything that happened and quite a lot of bitterness about the fact that the patient's pain is only getting worse and they are not getting help.  I spent a full 47 minutes on the video visit and another 30 minutes reviewing data and imaging independently.  I expressed sympathy that there need not getting the support they need but we are doing her very best to rectify a number of situations and a disease that was treated elsewhere prior to coming to us.  I also explained that with pain there is never a guarantee that any particular anatomic finding that leads to an intervention such as surgery or other will in fact  relieve that pain.  This obviously disappointing although very surprising to me that removing those very rigid stents from his mid abdomen would actually exacerbate rather than improve pain but we are where we are    I expressed that we will do our very best to get him to a pain clinic that we will route be responsive.  I also think that there may be a role for distal pancreatectomy and splenectomy if we think that the pain is due to disconnected pancreatic duct with obstructed tail.  I did review his images with Dr. Castillo and we both agree that anatomically this problem is not addressable by endoscopic ultrasound guided pancreatico gastrostomy as the pancreas is too distant from the stomach and its a very small short minimally enlarged segment of pancreas.  He has no interest in returning to [facility] pain clinic but I will try to refer him to [facility] pain clinic with which some of our patients have had good results.  I will also ask his primary to consider interim management of his pain with either tramadol or low-dose opioids.  He also has some gabapentin leftover which did work in part and suggested he start taking that right away.  To rectify the confusion about the remnant axial stent I am going to ask Dr. LOVE to over read his MRCP based on the CT the next day and affirmed that the axial stent is gone from the bowel    Plan    1) restart gabapentin already Rx by PCP  2) I will contact PCP tomorrow to consider Rx tramadol or low dose oxycodone.  3) refer to [facility] Clinic - consider local / celiac blocks perhaps as differential for source of pain.  4) refer to Dr Haider to consider distal pancreatectomy/splenectomy - not likely any islet cell as pt on insulin and very small atrophic but obstructed tail  5) Follow-up w us afterwards  6) Ask MR reader Dr TOBIAS LOVE MD to review MR and amend report to say NO Axial stent present if he agrees, which I trust he will after reviewing CT done  next day.    Joined the call at 6/5/2023, 4:48:28 pm.  Left the call at 6/5/2023, 5:34:57 pm.  You were on the call for 46 minutes 28 seconds .    Past Medical, Surgical, Family, and Social Histories:    Past Medical History:   Diagnosis Date    CAD (coronary artery disease)     Diabetes (H)     Gastroesophageal reflux disease     Hypercholesteremia     Hypertension     Mixed hearing loss     Parkinson disease (H)     Second degree AV block        Past Surgical History:   Procedure Laterality Date    CA ANESTH CABG W/PUMP      COLONOSCOPY N/A 1/12/2023    Procedure: colonoscopy with fluroscopy;  Surgeon: Ayden Fraire MD;  Location:  OR    ENDOSCOPIC RETROGRADE CHOLANGIOPANCREATOGRAM N/A 5/18/2023    Procedure: ENDOSCOPIC RETROGRADE CHOLANGIOPANCREATOGRAPHY, WITH  CYSTDUODENOSTOMY STENTS X2 REMOVAL;  Surgeon: Cedric Saldana MD;  Location: [facility] OR    ENT SURGERY      LAPAROSCOPY DIAGNOSTIC (GENERAL) N/A 2/20/2023    Procedure: LAPAROSCOPY, DIAGNOSTIC; RETRIEVAL OF MIGRATED STENT;  Surgeon: Joseluis Lima MD;  Location: [facility] OR    ORTHOPEDIC SURGERY         No family history on file.    Social History     Tobacco Use    Smoking status: Former     Types: Cigarettes    Smokeless tobacco: Never   Vaping Use    Vaping status: Never Used   Substance Use Topics    Alcohol use: Not Currently           Cedric Saldana MD

## 2023-06-05 NOTE — NURSING NOTE
Is the patient currently in the state of [place]? YES    Visit mode:VIDEO    If the visit is dropped, the patient can be reconnected by: VIDEO VISIT: Text to cell phone: [phone]    Will anyone else be joining the visit? YES: How would they like to receive their invitation? Other e-mail: PT's wife will be on camera      How would you like to obtain your AVS? MyChart    Are changes needed to the allergy or medication list? NO    Reason for visit: RECHECK

## 2023-06-08 ENCOUNTER — PREP FOR PROCEDURE (OUTPATIENT)
Dept: SURGERY | Facility: CLINIC | Age: 68
End: 2023-06-08

## 2023-06-08 ENCOUNTER — OFFICE VISIT (OUTPATIENT)
Dept: SURGERY | Facility: CLINIC | Age: 68
End: 2023-06-08
Payer: MEDICARE

## 2023-06-08 VITALS
BODY MASS INDEX: 27.51 KG/M2 | HEART RATE: 68 BPM | WEIGHT: 181.5 LBS | HEIGHT: 68 IN | SYSTOLIC BLOOD PRESSURE: 160 MMHG | DIASTOLIC BLOOD PRESSURE: 83 MMHG | OXYGEN SATURATION: 99 %

## 2023-06-08 DIAGNOSIS — K86.1 CHRONIC PANCREATITIS (H): Primary | ICD-10-CM

## 2023-06-08 DIAGNOSIS — K86.1 CHRONIC BILIARY PANCREATITIS (H): Primary | ICD-10-CM

## 2023-06-08 DIAGNOSIS — K86.89 PANCREATIC DUCT STRICTURE: ICD-10-CM

## 2023-06-08 PROCEDURE — 99215 OFFICE O/P EST HI 40 MIN: CPT | Performed by: SURGERY

## 2023-06-08 RX ORDER — GABAPENTIN 300 MG/1
600 CAPSULE ORAL 3 TIMES DAILY
COMMUNITY
End: 2024-08-08

## 2023-06-08 ASSESSMENT — ENCOUNTER SYMPTOMS
HEADACHES: 0
BLOOD IN STOOL: 0
VOMITING: 0
BOWEL INCONTINENCE: 0
SPEECH CHANGE: 0
WEAKNESS: 0
NUMBNESS: 0
MEMORY LOSS: 0
TINGLING: 0
DIARRHEA: 0
CONSTIPATION: 0
JAUNDICE: 0
ABDOMINAL PAIN: 1
RECTAL PAIN: 0
PARALYSIS: 0
SEIZURES: 0
DIZZINESS: 0
HEARTBURN: 0
NERVOUS/ANXIOUS: 1
LOSS OF CONSCIOUSNESS: 0
PANIC: 0
DISTURBANCES IN COORDINATION: 0
TREMORS: 1
INSOMNIA: 1
NAUSEA: 0
DECREASED CONCENTRATION: 0
BLOATING: 0
DEPRESSION: 0

## 2023-06-08 ASSESSMENT — PAIN SCALES - GENERAL: PAINLEVEL: MILD PAIN (3)

## 2023-06-08 NOTE — LETTER
6/8/2023       RE: Arnaldo Henderson  [address]  [facility] [address]     Dear Colleague,    Thank you for referring your patient, Arnaldo Henderson, to the [facility] GENERAL SURGERY CLINIC [place] at [facility]. Please see a copy of my visit note below.    See dictated note: [id number]  GB  Answers for HPI/ROS submitted by the patient on 6/8/2023  General Symptoms: No  Skin Symptoms: No  HENT Symptoms: No  EYE SYMPTOMS: No  HEART SYMPTOMS: No  LUNG SYMPTOMS: No  INTESTINAL SYMPTOMS: Yes  URINARY SYMPTOMS: No  REPRODUCTIVE SYMPTOMS: No  SKELETAL SYMPTOMS: No  BLOOD SYMPTOMS: No  NERVOUS SYSTEM SYMPTOMS: Yes  MENTAL HEALTH SYMPTOMS: Yes  Heart burn or indigestion: No  Nausea: No  Vomiting: No  Abdominal pain: Yes  Bloating: No  Constipation: No  Diarrhea: No  Blood in stool: No  Black stools: No  Rectal or Anal pain: No  Fecal incontinence: No  Yellowing of skin or eyes: No  Vomit with blood: No  Change in stools: No  Trouble with coordination: No  Dizziness or trouble with balance: No  Fainting or black-out spells: No  Memory loss: No  Headache: No  Seizures: No  Speech problems: No  Tingling: No  Tremor: Yes  Weakness: No  Difficulty walking: No  Paralysis: No  Numbness: No  Nervous or Anxious: Yes  Depression: No  Trouble sleeping: Yes  Trouble thinking or concentrating: No  Mood changes: No  Panic attacks: No          Again, thank you for allowing me to participate in the care of your patient.      Sincerely,    Ashvin Haider MD

## 2023-06-08 NOTE — PROGRESS NOTES
See dictated note: [id number]  GB  Answers for HPI/ROS submitted by the patient on 6/8/2023  General Symptoms: No  Skin Symptoms: No  HENT Symptoms: No  EYE SYMPTOMS: No  HEART SYMPTOMS: No  LUNG SYMPTOMS: No  INTESTINAL SYMPTOMS: Yes  URINARY SYMPTOMS: No  REPRODUCTIVE SYMPTOMS: No  SKELETAL SYMPTOMS: No  BLOOD SYMPTOMS: No  NERVOUS SYSTEM SYMPTOMS: Yes  MENTAL HEALTH SYMPTOMS: Yes  Heart burn or indigestion: No  Nausea: No  Vomiting: No  Abdominal pain: Yes  Bloating: No  Constipation: No  Diarrhea: No  Blood in stool: No  Black stools: No  Rectal or Anal pain: No  Fecal incontinence: No  Yellowing of skin or eyes: No  Vomit with blood: No  Change in stools: No  Trouble with coordination: No  Dizziness or trouble with balance: No  Fainting or black-out spells: No  Memory loss: No  Headache: No  Seizures: No  Speech problems: No  Tingling: No  Tremor: Yes  Weakness: No  Difficulty walking: No  Paralysis: No  Numbness: No  Nervous or Anxious: Yes  Depression: No  Trouble sleeping: Yes  Trouble thinking or concentrating: No  Mood changes: No  Panic attacks: No

## 2023-06-08 NOTE — NURSING NOTE
"Chief Complaint   Patient presents with     New Patient     Distal/splenectomy consult       Vitals:    06/08/23 0935   BP: (!) 160/83   BP Location: Left arm   Patient Position: Sitting   Cuff Size: Adult Regular   Pulse: 68   SpO2: 99%   Weight: 82.3 kg (181 lb 8 oz)   Height: 1.727 m (5' 8\")       Body mass index is 27.6 kg/m .                          Mike Macdonald, EMT    "

## 2023-06-08 NOTE — PROGRESS NOTES
Service Date: 2023    Cedric Saldana MD  [facility]  [address] [place] [address]  [id number]    RE:  Arnaldo Henderson  MRN:  [id number]  :  1955    Dear Dr. Saldana:    I had the pleasure of seeing your patient, Mr. Arnaldo Henderson, in Surgery Clinic today.  As you know, Mr. Henderson is a sherlyn 68-year-old male with a history of severe acute pancreatitis due to gallstones about a year and a half ago.  This was cared for in [place], [place], where he was admitted and then transferred to successive hospitals for severe acute pancreatitis.  He spent then about 3 months in the hospital and rehab. He was very sick in the ICU and developed complications of renal failure, although he did not require dialysis.  After leaving rehabilitation, he started seeing a gastroenterologist who drained successively multiple peripancreatic fluid collections.  He recently returned to the [facility].  He notes that he continues to have pain in his left upper quadrant and into his back.  He also developed a partial small-bowel obstruction from migration of an AXIOS stent that was treated by laparoscopic removal of the AXIOS stent.  This surgery was complicated by postoperative wound infection.    He has daily pain that is ongoing.  You started him recently back on gabapentin and he has noted some improvement in his pain since starting gabapentin.  He does not note this pain with eating.  He notes pain in his back on the left side, radiating around his abdomen through the day that begins mildly and then worsens through the day.  He denies weight loss, fevers or chills with his pain.  He has been on Zenpep 1 with each meal since his time in [place] and you had reduced this to 1 each meal recently for constipation.  He does not note any pain with meals or with eating.    PAST MEDICAL HISTORY:  Positive for diabetes mellitus, on insulin (prior to getting sick, he was on metformin but has been  on insulin since that time).  He had an MI 20 years ago and had a double bypass at that time.  He has had the surgery as noted above.  He was also noted to have an irregular heartbeat at a recent evaluation.  He was seen by one of our cardiologists who placed him on aspirin and annual followup.  He has been on anti-Parkinson meds for the last 2 years.    ALLERGIES:  He had a severe episode of hives during his hospitalization that they were unsure of in terms of what was causing the hives.  Other allergies listed include Dilaudid, morphine, penicillins, citalopram, oxycodone and ciprofloxacin.     CURRENT MEDICATIONS:  Please review his chart for medications.  His medication list has not recently been sorted or thinned.    SOCIAL HISTORY:  The patient is a retired  in the [employer] business.  He was a lifelong smoker, having stopped 10 years ago.  He was an occasional social drinker and stopped and has not had an alcoholic beverage since 03/2002.  He is  and is in the visit today with his wife.    FAMILY HISTORY:  Negative for pancreas cancer.  He has 2 healthy children.    REVIEW OF SYSTEMS:  Notable for significant weight loss that is now improving over the last several months.    PHYSICAL EXAMINATION:    GENERAL:  This is a well-developed, well-nourished male, appearing his stated age of 68.  HEENT:  Without scleral icterus.  NECK:  Supple.  CHEST:  Notable for well-healed sternotomy scar. Clear to auscultation and percussion.  CARDIOVASCULAR:  Regular rate and rhythm.  ABDOMEN:  His abdomen is soft and nontender.  He has laparoscopic incisions that are well healed.  The spleen is nonpalpable.  The liver edge is 2 cm down from the costal margin.  EXTREMITIES:  Without cyanosis, clubbing, or edema.  He has a wound on his left leg from a skin graft as a child.      I reviewed the CT scans and MRIs with the patient and his wife.  These show a markedly atrophic pancreas with dilation of the  distal pancreatic duct in the tail of the pancreas on the CT scan. On the MRI, the pancreatic duct is dilated in the tail of the pancreas.    I think that Mr. Henderson has disconnected pancreatic duct with chronic pancreatitis involving the tail of the pancreas.  We spent 30 minutes of our 1-hour visit going over the surgical management of this and the risks and benefits of the operation, including the 15% risk of recurrent pain in the remainder of his pancreas, the risk of cardiac complications of surgery given his cardiac issues and the risk of wound infection.  He wishes to proceed.  Liset will work with him to help set up a time for surgery in the next couple of months.  I look forward to discussing him further at our chronic pancreatitis working group meeting next week.  He will need cardiology clearance prior to surgery.    Sincerely,    Ashvin Haider MD        D: 2023   T: 2023   MT: nay    Name:     SANDRA HENDERSON  MRN:      [id number]        Account:      [id number]   :      1955           Service Date: 2023       Document: [id number]

## 2023-06-12 ENCOUNTER — TRANSFERRED RECORDS (OUTPATIENT)
Dept: HEALTH INFORMATION MANAGEMENT | Facility: CLINIC | Age: 68
End: 2023-06-12
Payer: MEDICARE

## 2023-06-12 DIAGNOSIS — I44.1 ATRIOVENTRICULAR BLOCK, MOBITZ TYPE 1, WENCKEBACH: Primary | ICD-10-CM

## 2023-06-22 ENCOUNTER — VIRTUAL VISIT (OUTPATIENT)
Dept: SURGERY | Facility: CLINIC | Age: 68
End: 2023-06-22
Payer: MEDICARE

## 2023-06-22 VITALS — HEIGHT: 68 IN | BODY MASS INDEX: 27.28 KG/M2 | WEIGHT: 180 LBS

## 2023-06-22 DIAGNOSIS — K86.89 PANCREATIC DUCT STRICTURE: Primary | ICD-10-CM

## 2023-06-22 PROCEDURE — 99215 OFFICE O/P EST HI 40 MIN: CPT | Mod: VID | Performed by: SURGERY

## 2023-06-22 ASSESSMENT — PAIN SCALES - GENERAL: PAINLEVEL: NO PAIN (0)

## 2023-06-22 NOTE — PROGRESS NOTES
Arnaldo Henderson is a 68 year old who is being evaluated via a billable video visit.      How would you like to obtain your AVS? MyChart  If the video visit is dropped, the invitation should be resent by: Text to cell phone: [phone]  Will anyone else be joining your video visit? No  If patient encounters technical issues they should call [phone]    During this virtual visit the patient is located in [place], patient verifies this as the location during the entirety of this visit.     Video-Visit Details  Video Start Time: 0800    Type of service:  Video Visit    Video End Time:8:30 AM    Originating Location (pt. Location): Home    Distant Location (provider location):  On-site    Platform used for Video Visit: HARRY Nolen 6/22/2023      6:59 AM    I had the pleasure of speaking with  and Mrs. Howell this morning to review the planned surgery and to answer their questions.  We spent 30 minutes reviewing the planned operation, the complications of surgery, the expected recovery, and the alternatives to surgery.    Certainly it is reasonable to consider no operation at this time.  I discussed with the patient this option versus the option of surgery which carries the risk of myocardial infarction, worsening of his Parkinson's, wound infection, or other unexpected issue.    We discussed the risk of MI in this patient with previous MI and coronary artery bypass grafting.  We will not stop his aspirin prior to surgery.    I discussed the issues with pain control after surgery.  We will leave the placement of an epidural up to our anesthesia colleagues.  I would prefer an epidural if this is an option.    I spent 40 minutes with the patient and his wife and discussing the above answering their questions reviewing chart and coordinating care.    Mack Haider MD

## 2023-06-22 NOTE — LETTER
6/22/2023       RE: Arnlado Henderson  [address]  [facility] [address]     Dear Colleague,    Thank you for referring your patient, Arnaldo Henderson, to the [facility] GENERAL SURGERY CLINIC [place] at [facility]. Please see a copy of my visit note below.    Arnaldo Henderson is a 68 year old who is being evaluated via a billable video visit.      How would you like to obtain your AVS? MyChart  If the video visit is dropped, the invitation should be resent by: Text to cell phone: [phone]  Will anyone else be joining your video visit? No  If patient encounters technical issues they should call [phone]    During this virtual visit the patient is located in [place], patient verifies this as the location during the entirety of this visit.     Video-Visit Details  Video Start Time: 0800    Type of service:  Video Visit    Video End Time:8:30 AM    Originating Location (pt. Location): Home    Distant Location (provider location):  On-site    Platform used for Video Visit: HARRY Nolen 6/22/2023      6:59 AM    I had the pleasure of speaking with MrBrennan and Mrs. Howell this morning to review the planned surgery and to answer their questions.  We spent 30 minutes reviewing the planned operation, the complications of surgery, the expected recovery, and the alternatives to surgery.    Certainly it is reasonable to consider no operation at this time.  I discussed with the patient this option versus the option of surgery which carries the risk of myocardial infarction, worsening of his Parkinson's, wound infection, or other unexpected issue.    We discussed the risk of MI in this patient with previous MI and coronary artery bypass grafting.  We will not stop his aspirin prior to surgery.    I discussed the issues with pain control after surgery.  We will leave the placement of an epidural up to our anesthesia colleagues.  I would prefer an  epidural if this is an option.    I spent 40 minutes with the patient and his wife and discussing the above answering their questions reviewing chart and coordinating care.    Mack Haider MD

## 2023-06-22 NOTE — NURSING NOTE
"Chief Complaint   Patient presents with     RECHECK     Return consult       Vitals:    06/22/23 0659   Weight: 81.6 kg (180 lb)   Height: 1.727 m (5' 8\")       Body mass index is 27.37 kg/m .                          Mike Macdonald, EMT    "

## 2023-06-26 ENCOUNTER — PATIENT OUTREACH (OUTPATIENT)
Dept: GASTROENTEROLOGY | Facility: CLINIC | Age: 68
End: 2023-06-26
Payer: MEDICARE

## 2023-06-26 NOTE — TELEPHONE ENCOUNTER
Returned called to [facility], patient is having surgery with Dr Haider on Friday. Reassured that patient's case has been reviewed by both providers, have full confidence in Dr Haider's care/team. Questions answered, advised they're welcome to call/follow up with us anytime, but we have no planned follow up at this time.    ML

## 2023-06-28 ENCOUNTER — ANESTHESIA EVENT (OUTPATIENT)
Dept: SURGERY | Facility: CLINIC | Age: 68
DRG: 406 | End: 2023-06-28
Payer: MEDICARE

## 2023-06-28 NOTE — ANESTHESIA PREPROCEDURE EVALUATION
"Anesthesia Pre-Procedure Evaluation    Patient: Arnaldo Henderson   MRN: [id number] : 1955        Procedure : Procedure(s):  Open distal pancreactectomy with splenectomy, possible incidental appendectomy          Past Medical History:   Diagnosis Date     CAD (coronary artery disease)      Diabetes (H)      Gastroesophageal reflux disease      Hypercholesteremia      Hypertension      Mixed hearing loss      Parkinson disease (H)      Second degree AV block       Past Surgical History:   Procedure Laterality Date     CA ANESTH CABG W/PUMP       COLONOSCOPY N/A 2023    Procedure: colonoscopy with fluroscopy;  Surgeon: Ayden Fraire MD;  Location: [facility] OR     ENDOSCOPIC RETROGRADE CHOLANGIOPANCREATOGRAM N/A 2023    Procedure: ENDOSCOPIC RETROGRADE CHOLANGIOPANCREATOGRAPHY, WITH  CYSTDUODENOSTOMY STENTS X2 REMOVAL;  Surgeon: Cedric Saldana MD;  Location: [facility] OR     ENT SURGERY       LAPAROSCOPY DIAGNOSTIC (GENERAL) N/A 2023    Procedure: LAPAROSCOPY, DIAGNOSTIC; RETRIEVAL OF MIGRATED STENT;  Surgeon: Joseulis Lima MD;  Location: [facility] OR     ORTHOPEDIC SURGERY        Allergies   Allergen Reactions     Ciprofloxacin Other (See Comments), Hives, Itching, Rash and Unknown     Other reaction(s): Other (see comments)  Oral swelling per [facility]         Dilaudid [Hydromorphone] Rash     Morphine Rash     rash     Penicillins Rash     aka ancef/ rash       Citalopram Unknown     Oxycodone Rash     \"HORRIBLE, SEVERE RASH MAYBE CAUSED BY OXY\"      Social History     Tobacco Use     Smoking status: Former     Types: Cigarettes     Smokeless tobacco: Never   Substance Use Topics     Alcohol use: Not Currently      Wt Readings from Last 1 Encounters:   23 81.6 kg (180 lb)        Anesthesia Evaluation   Pt has had prior anesthetic. Type: General and MAC.    No history of anesthetic complications       ROS/MED HX  ENT/Pulmonary:     (+) tobacco use (30), Past use,  (-) sleep apnea "   Neurologic:     (+) Parkinson's disease,     Cardiovascular:     (+) Dyslipidemia hypertension (losartan)--CAD -past MI (2003) CABG-date: 6/2018. stent-2003. 2 Irregular Heartbeat/Palpitations (mobitz type 1 2nd degree AV block), Previous cardiac testing   Echo: Date: 2/2023 Results:    1. Sinus rhythm with AV block during study. Unable to determine type of AV block based on rhythm strip.     2. Normal LV size with normal wall thickness. Calculated bi-planes LVEF 57% (Normal function). Normal diastolic function.     3. Normal RV size and systolic function.     4. Moderate LA enlargement.     5. Mild RA enlargement.     6. No significant valvular abnormalities.     7. The IVC measures <2.1 cm with <50% collapse upon inspiration consistent with elevated right atrial pressure, 8 mmHg.     8. A prior study is not available for comparison.   Stress Test: Date: Results:    ECG Reviewed: Date: 5/18/2023 Results:  Prior to exercise, Mobitz Type 1 w/HR in 40s-50s.   Post-exercise, 1st degree AV block w/HR in 80s.  Cath:  Date: Results:      METS/Exercise Tolerance: >4 METS    Hematologic:       Musculoskeletal: Comment: # LBP      GI/Hepatic: Comment: # chronic biliary pancreatitis 2/2/ pancreatic duct stricture  # pancreatic pseudocyst    (+) GERD, Asymptomatic on medication,     Renal/Genitourinary: Comment: # Hx hydrocele, scrotal skin cancer  - neg Renal ROS     Endo:     (+) type II DM, Last HgA1c: 8.5, date: 5/12/23, Using insulin,     Psychiatric/Substance Use:  - neg psychiatric ROS     Infectious Disease:  - neg infectious disease ROS     Malignancy:  - neg malignancy ROS     Other:            Physical Exam    Airway        Mallampati: III   TM distance: > 3 FB   Neck ROM: limited     Respiratory Devices and Support         Dental       (+) Modest Abnormalities - crowns, retainers, 1 or 2 missing teeth      Cardiovascular   cardiovascular exam normal       Rhythm and rate: bradycardia     Pulmonary   pulmonary  exam normal                OUTSIDE LABS:  CBC:   Lab Results   Component Value Date    WBC 6.7 05/18/2023    WBC 10.4 02/21/2023    HGB 13.5 05/18/2023    HGB 11.9 (L) 02/21/2023    HCT 40.1 05/18/2023    HCT 35.5 (L) 02/21/2023     05/18/2023     02/21/2023     BMP:   Lab Results   Component Value Date     05/18/2023     (L) 02/21/2023    POTASSIUM 4.2 05/18/2023    POTASSIUM 4.4 02/21/2023    CHLORIDE 105 05/18/2023    CHLORIDE 102 02/21/2023    CO2 22 05/18/2023    CO2 25 02/21/2023    BUN 19.6 05/18/2023    BUN 14.1 02/21/2023    CR 0.84 05/18/2023    CR 0.85 02/21/2023     (H) 05/18/2023     (H) 05/18/2023     COAGS:   Lab Results   Component Value Date    INR 1.32 (H) 05/18/2023     POC: No results found for: BGM, HCG, HCGS  HEPATIC:   Lab Results   Component Value Date    ALBUMIN 3.8 05/18/2023    PROTTOTAL 6.8 05/18/2023    ALT <5 (L) 05/18/2023    AST 24 05/18/2023    ALKPHOS 134 (H) 05/18/2023    BILITOTAL 0.5 05/18/2023     OTHER:   Lab Results   Component Value Date    JAKE 8.9 05/18/2023    PHOS 3.7 02/21/2023    MAG 1.7 02/21/2023    LIPASE 13 05/18/2023    AMYLASE 35 05/18/2023       Anesthesia Plan    ASA Status:  3   NPO Status:  NPO Appropriate    Anesthesia Type: General.   Induction: Intravenous.   Maintenance: Balanced.   Techniques and Equipment:     - Airway: Shoulder Ron/Ramp, Video-Laryngoscope     - Lines/Monitors: BIS, 2nd IV, Arterial Line     - Blood: T&S     - Drips/Meds: Ketamine, Dexmed. infusion (Insulin)     Consents    Anesthesia Plan(s) and associated risks, benefits, and realistic alternatives discussed. Questions answered and patient/representative(s) expressed understanding.    - Discussed:     - Discussed with:  Patient      - Extended Intubation/Ventilatory Support Discussed: Yes.      - Patient is DNR/DNI Status: No    Use of blood products discussed: Yes.     - Discussed with: Patient.     - Consented: consented to blood products             Reason for refusal: other.     Postoperative Care    Pain management: IV analgesics, Peripheral nerve block (Continuous), Multi-modal analgesia.   PONV prophylaxis: Ondansetron (or other 5HT-3)     Comments:              PAC Discussion and Assessment    ASA Classification: 3    Anesthetic techniques and relevant risks discussed: GA with regional block for post-op pain control  Invasive monitoring and risk discussed: Yes    Possibility and Risk of blood transfusion discussed: Yes  NPO instructions given: NPO after midnight    Needs early admission to pre-op area: No                                             Diann Maharaj MD

## 2023-06-30 ENCOUNTER — APPOINTMENT (OUTPATIENT)
Dept: GENERAL RADIOLOGY | Facility: CLINIC | Age: 68
DRG: 406 | End: 2023-06-30
Attending: SURGERY
Payer: MEDICARE

## 2023-06-30 ENCOUNTER — HOSPITAL ENCOUNTER (INPATIENT)
Facility: CLINIC | Age: 68
LOS: 7 days | Discharge: HOME HEALTH | DRG: 406 | End: 2023-07-07
Attending: SURGERY | Admitting: SURGERY
Payer: MEDICARE

## 2023-06-30 ENCOUNTER — ANESTHESIA (OUTPATIENT)
Dept: SURGERY | Facility: CLINIC | Age: 68
DRG: 406 | End: 2023-06-30
Payer: MEDICARE

## 2023-06-30 DIAGNOSIS — K86.1 OTHER CHRONIC PANCREATITIS (H): ICD-10-CM

## 2023-06-30 DIAGNOSIS — E11.65 TYPE 2 DIABETES MELLITUS WITH HYPERGLYCEMIA, WITH LONG-TERM CURRENT USE OF INSULIN (H): Primary | ICD-10-CM

## 2023-06-30 DIAGNOSIS — Z79.4 TYPE 2 DIABETES MELLITUS WITH HYPERGLYCEMIA, WITH LONG-TERM CURRENT USE OF INSULIN (H): Primary | ICD-10-CM

## 2023-06-30 DIAGNOSIS — Z90.411 HISTORY OF PARTIAL PANCREATECTOMY: ICD-10-CM

## 2023-06-30 LAB
ABO/RH(D): NORMAL
ANTIBODY SCREEN: NEGATIVE
BASE EXCESS BLDA CALC-SCNC: -0.4 MMOL/L (ref -9.6–2)
BASE EXCESS BLDA CALC-SCNC: -2.9 MMOL/L (ref -9.6–2)
BASE EXCESS BLDA CALC-SCNC: 1.6 MMOL/L (ref -9.6–2)
BASE EXCESS BLDA CALC-SCNC: 2.8 MMOL/L (ref -9.6–2)
CA-I BLD-MCNC: 4 MG/DL (ref 4.4–5.2)
CA-I BLD-MCNC: 4.2 MG/DL (ref 4.4–5.2)
CA-I BLD-MCNC: 4.5 MG/DL (ref 4.4–5.2)
CA-I BLD-MCNC: 4.7 MG/DL (ref 4.4–5.2)
GLUCOSE BLD-MCNC: 158 MG/DL (ref 70–99)
GLUCOSE BLD-MCNC: 169 MG/DL (ref 70–99)
GLUCOSE BLD-MCNC: 185 MG/DL (ref 70–99)
GLUCOSE BLD-MCNC: 236 MG/DL (ref 70–99)
GLUCOSE BLDC GLUCOMTR-MCNC: 146 MG/DL (ref 70–99)
GLUCOSE BLDC GLUCOMTR-MCNC: 150 MG/DL (ref 70–99)
GLUCOSE BLDC GLUCOMTR-MCNC: 153 MG/DL (ref 70–99)
GLUCOSE BLDC GLUCOMTR-MCNC: 170 MG/DL (ref 70–99)
GLUCOSE BLDC GLUCOMTR-MCNC: 329 MG/DL (ref 70–99)
HBA1C MFR BLD: 9.6 %
HCO3 BLDA-SCNC: 23 MMOL/L (ref 21–28)
HCO3 BLDA-SCNC: 24 MMOL/L (ref 21–28)
HCO3 BLDA-SCNC: 27 MMOL/L (ref 21–28)
HCO3 BLDA-SCNC: 28 MMOL/L (ref 21–28)
HGB BLD-MCNC: 11.7 G/DL (ref 13.3–17.7)
HGB BLD-MCNC: 12.8 G/DL (ref 13.3–17.7)
HGB BLD-MCNC: 14.1 G/DL (ref 13.3–17.7)
HGB BLD-MCNC: 14.2 G/DL (ref 13.3–17.7)
HOLD SPECIMEN: NORMAL
LACTATE BLD-SCNC: 0.7 MMOL/L
LACTATE BLD-SCNC: 1.4 MMOL/L
LACTATE BLD-SCNC: 1.4 MMOL/L
LACTATE BLD-SCNC: 2.5 MMOL/L
O2/TOTAL GAS SETTING VFR VENT: 30 %
O2/TOTAL GAS SETTING VFR VENT: 45 %
PCO2 BLDA: 37 MM HG (ref 35–45)
PCO2 BLDA: 42 MM HG (ref 35–45)
PH BLDA: 7.35 [PH] (ref 7.35–7.45)
PH BLDA: 7.41 [PH] (ref 7.35–7.45)
PH BLDA: 7.42 [PH] (ref 7.35–7.45)
PH BLDA: 7.43 [PH] (ref 7.35–7.45)
PLATELET # BLD AUTO: 176 10E3/UL (ref 150–450)
PO2 BLDA: 133 MM HG (ref 80–105)
PO2 BLDA: 151 MM HG (ref 80–105)
PO2 BLDA: 189 MM HG (ref 80–105)
PO2 BLDA: 95 MM HG (ref 80–105)
POTASSIUM BLD-SCNC: 4 MMOL/L (ref 3.5–5)
POTASSIUM BLD-SCNC: 4.7 MMOL/L (ref 3.5–5)
POTASSIUM BLD-SCNC: 4.9 MMOL/L (ref 3.5–5)
POTASSIUM BLD-SCNC: 5.1 MMOL/L (ref 3.5–5)
POTASSIUM SERPL-SCNC: 4.9 MMOL/L (ref 3.4–5.3)
SODIUM BLD-SCNC: 136 MMOL/L (ref 133–144)
SODIUM BLD-SCNC: 137 MMOL/L (ref 133–144)
SODIUM BLD-SCNC: 138 MMOL/L (ref 133–144)
SODIUM BLD-SCNC: 139 MMOL/L (ref 133–144)
SPECIMEN EXPIRATION DATE: NORMAL

## 2023-06-30 PROCEDURE — 86901 BLOOD TYPING SEROLOGIC RH(D): CPT | Performed by: STUDENT IN AN ORGANIZED HEALTH CARE EDUCATION/TRAINING PROGRAM

## 2023-06-30 PROCEDURE — 258N000003 HC RX IP 258 OP 636

## 2023-06-30 PROCEDURE — 258N000003 HC RX IP 258 OP 636: Performed by: STUDENT IN AN ORGANIZED HEALTH CARE EDUCATION/TRAINING PROGRAM

## 2023-06-30 PROCEDURE — 36415 COLL VENOUS BLD VENIPUNCTURE: CPT | Performed by: STUDENT IN AN ORGANIZED HEALTH CARE EDUCATION/TRAINING PROGRAM

## 2023-06-30 PROCEDURE — 93005 ELECTROCARDIOGRAM TRACING: CPT

## 2023-06-30 PROCEDURE — 0FBG0ZZ EXCISION OF PANCREAS, OPEN APPROACH: ICD-10-PCS | Performed by: SURGERY

## 2023-06-30 PROCEDURE — 250N000013 HC RX MED GY IP 250 OP 250 PS 637: Performed by: STUDENT IN AN ORGANIZED HEALTH CARE EDUCATION/TRAINING PROGRAM

## 2023-06-30 PROCEDURE — 272N000001 HC OR GENERAL SUPPLY STERILE: Performed by: SURGERY

## 2023-06-30 PROCEDURE — 250N000011 HC RX IP 250 OP 636: Mod: JZ | Performed by: STUDENT IN AN ORGANIZED HEALTH CARE EDUCATION/TRAINING PROGRAM

## 2023-06-30 PROCEDURE — 85049 AUTOMATED PLATELET COUNT: CPT | Performed by: STUDENT IN AN ORGANIZED HEALTH CARE EDUCATION/TRAINING PROGRAM

## 2023-06-30 PROCEDURE — 84132 ASSAY OF SERUM POTASSIUM: CPT

## 2023-06-30 PROCEDURE — 88342 IMHCHEM/IMCYTCHM 1ST ANTB: CPT | Mod: 26 | Performed by: PATHOLOGY

## 2023-06-30 PROCEDURE — 83605 ASSAY OF LACTIC ACID: CPT

## 2023-06-30 PROCEDURE — 0DQ60ZZ REPAIR STOMACH, OPEN APPROACH: ICD-10-PCS | Performed by: SURGERY

## 2023-06-30 PROCEDURE — 72020 X-RAY EXAM OF SPINE 1 VIEW: CPT

## 2023-06-30 PROCEDURE — 88305 TISSUE EXAM BY PATHOLOGIST: CPT | Mod: TC | Performed by: SURGERY

## 2023-06-30 PROCEDURE — 88309 TISSUE EXAM BY PATHOLOGIST: CPT | Mod: 26 | Performed by: PATHOLOGY

## 2023-06-30 PROCEDURE — 250N000009 HC RX 250: Performed by: STUDENT IN AN ORGANIZED HEALTH CARE EDUCATION/TRAINING PROGRAM

## 2023-06-30 PROCEDURE — 48140 PARTIAL REMOVAL OF PANCREAS: CPT | Mod: GC | Performed by: SURGERY

## 2023-06-30 PROCEDURE — 88307 TISSUE EXAM BY PATHOLOGIST: CPT | Mod: 26 | Performed by: PATHOLOGY

## 2023-06-30 PROCEDURE — 88341 IMHCHEM/IMCYTCHM EA ADD ANTB: CPT | Mod: 26 | Performed by: PATHOLOGY

## 2023-06-30 PROCEDURE — 250N000013 HC RX MED GY IP 250 OP 250 PS 637: Performed by: ANESTHESIOLOGY

## 2023-06-30 PROCEDURE — 88360 TUMOR IMMUNOHISTOCHEM/MANUAL: CPT | Mod: 26 | Performed by: PATHOLOGY

## 2023-06-30 PROCEDURE — 360N000077 HC SURGERY LEVEL 4, PER MIN: Performed by: SURGERY

## 2023-06-30 PROCEDURE — 250N000025 HC SEVOFLURANE, PER MIN: Performed by: SURGERY

## 2023-06-30 PROCEDURE — 250N000009 HC RX 250: Performed by: ANESTHESIOLOGY

## 2023-06-30 PROCEDURE — P9045 ALBUMIN (HUMAN), 5%, 250 ML: HCPCS | Mod: JZ | Performed by: STUDENT IN AN ORGANIZED HEALTH CARE EDUCATION/TRAINING PROGRAM

## 2023-06-30 PROCEDURE — 370N000017 HC ANESTHESIA TECHNICAL FEE, PER MIN: Performed by: SURGERY

## 2023-06-30 PROCEDURE — 250N000011 HC RX IP 250 OP 636: Performed by: STUDENT IN AN ORGANIZED HEALTH CARE EDUCATION/TRAINING PROGRAM

## 2023-06-30 PROCEDURE — 83036 HEMOGLOBIN GLYCOSYLATED A1C: CPT | Performed by: ANESTHESIOLOGY

## 2023-06-30 PROCEDURE — 999N000065 XR CHEST PORT 1 VIEW

## 2023-06-30 PROCEDURE — 710N000011 HC RECOVERY PHASE 1, LEVEL 3, PER MIN: Performed by: SURGERY

## 2023-06-30 PROCEDURE — 999N000141 HC STATISTIC PRE-PROCEDURE NURSING ASSESSMENT: Performed by: SURGERY

## 2023-06-30 PROCEDURE — 0DBB0ZZ EXCISION OF ILEUM, OPEN APPROACH: ICD-10-PCS | Performed by: SURGERY

## 2023-06-30 PROCEDURE — 84132 ASSAY OF SERUM POTASSIUM: CPT | Performed by: ANESTHESIOLOGY

## 2023-06-30 PROCEDURE — 250N000011 HC RX IP 250 OP 636: Mod: JZ

## 2023-06-30 PROCEDURE — 86850 RBC ANTIBODY SCREEN: CPT | Performed by: STUDENT IN AN ORGANIZED HEALTH CARE EDUCATION/TRAINING PROGRAM

## 2023-06-30 PROCEDURE — 72020 X-RAY EXAM OF SPINE 1 VIEW: CPT | Mod: 26 | Performed by: SURGERY

## 2023-06-30 PROCEDURE — 88305 TISSUE EXAM BY PATHOLOGIST: CPT | Mod: 26 | Performed by: PATHOLOGY

## 2023-06-30 PROCEDURE — 07TP0ZZ RESECTION OF SPLEEN, OPEN APPROACH: ICD-10-PCS | Performed by: SURGERY

## 2023-06-30 PROCEDURE — 71045 X-RAY EXAM CHEST 1 VIEW: CPT | Mod: 26 | Performed by: RADIOLOGY

## 2023-06-30 PROCEDURE — 200N000002 HC R&B ICU UMMC

## 2023-06-30 PROCEDURE — 44120 REMOVAL OF SMALL INTESTINE: CPT | Mod: 59 | Performed by: SURGERY

## 2023-06-30 PROCEDURE — 250N000009 HC RX 250

## 2023-06-30 RX ORDER — DEXTROSE MONOHYDRATE 100 MG/ML
INJECTION, SOLUTION INTRAVENOUS CONTINUOUS PRN
Status: DISCONTINUED | OUTPATIENT
Start: 2023-06-30 | End: 2023-07-01

## 2023-06-30 RX ORDER — NALOXONE HYDROCHLORIDE 0.4 MG/ML
0.2 INJECTION, SOLUTION INTRAMUSCULAR; INTRAVENOUS; SUBCUTANEOUS
Status: DISCONTINUED | OUTPATIENT
Start: 2023-06-30 | End: 2023-07-07 | Stop reason: HOSPADM

## 2023-06-30 RX ORDER — METHOCARBAMOL 500 MG/1
500 TABLET, FILM COATED ORAL EVERY 6 HOURS PRN
Status: DISCONTINUED | OUTPATIENT
Start: 2023-06-30 | End: 2023-07-07 | Stop reason: HOSPADM

## 2023-06-30 RX ORDER — DEXTROSE MONOHYDRATE 100 MG/ML
INJECTION, SOLUTION INTRAVENOUS CONTINUOUS PRN
Status: DISCONTINUED | OUTPATIENT
Start: 2023-06-30 | End: 2023-06-30 | Stop reason: HOSPADM

## 2023-06-30 RX ORDER — DIPHENHYDRAMINE HYDROCHLORIDE 50 MG/ML
12.5 INJECTION INTRAMUSCULAR; INTRAVENOUS EVERY 6 HOURS PRN
Status: DISCONTINUED | OUTPATIENT
Start: 2023-06-30 | End: 2023-07-07 | Stop reason: HOSPADM

## 2023-06-30 RX ORDER — HEPARIN SODIUM 5000 [USP'U]/.5ML
5000 INJECTION, SOLUTION INTRAVENOUS; SUBCUTANEOUS EVERY 8 HOURS
Status: DISCONTINUED | OUTPATIENT
Start: 2023-07-01 | End: 2023-07-07 | Stop reason: HOSPADM

## 2023-06-30 RX ORDER — SODIUM CHLORIDE, SODIUM LACTATE, POTASSIUM CHLORIDE, CALCIUM CHLORIDE 600; 310; 30; 20 MG/100ML; MG/100ML; MG/100ML; MG/100ML
INJECTION, SOLUTION INTRAVENOUS CONTINUOUS
Status: DISCONTINUED | OUTPATIENT
Start: 2023-06-30 | End: 2023-06-30 | Stop reason: HOSPADM

## 2023-06-30 RX ORDER — ACETAMINOPHEN 325 MG/1
975 TABLET ORAL EVERY 8 HOURS
Status: COMPLETED | OUTPATIENT
Start: 2023-06-30 | End: 2023-07-03

## 2023-06-30 RX ORDER — NICOTINE POLACRILEX 4 MG
15-30 LOZENGE BUCCAL
Status: DISCONTINUED | OUTPATIENT
Start: 2023-06-30 | End: 2023-07-07 | Stop reason: HOSPADM

## 2023-06-30 RX ORDER — NALOXONE HYDROCHLORIDE 0.4 MG/ML
0.2 INJECTION, SOLUTION INTRAMUSCULAR; INTRAVENOUS; SUBCUTANEOUS
Status: DISCONTINUED | OUTPATIENT
Start: 2023-06-30 | End: 2023-06-30 | Stop reason: HOSPADM

## 2023-06-30 RX ORDER — ACETAMINOPHEN 325 MG/1
650 TABLET ORAL EVERY 4 HOURS PRN
Status: DISCONTINUED | OUTPATIENT
Start: 2023-07-03 | End: 2023-07-07 | Stop reason: HOSPADM

## 2023-06-30 RX ORDER — ERTAPENEM 1 G/1
INJECTION, POWDER, LYOPHILIZED, FOR SOLUTION INTRAMUSCULAR; INTRAVENOUS PRN
Status: DISCONTINUED | OUTPATIENT
Start: 2023-06-30 | End: 2023-06-30

## 2023-06-30 RX ORDER — DEXTROSE MONOHYDRATE 25 G/50ML
25-50 INJECTION, SOLUTION INTRAVENOUS
Status: DISCONTINUED | OUTPATIENT
Start: 2023-06-30 | End: 2023-06-30 | Stop reason: HOSPADM

## 2023-06-30 RX ORDER — CARBIDOPA AND LEVODOPA 50; 200 MG/1; MG/1
1 TABLET, EXTENDED RELEASE ORAL AT BEDTIME
Status: DISCONTINUED | OUTPATIENT
Start: 2023-06-30 | End: 2023-07-07 | Stop reason: HOSPADM

## 2023-06-30 RX ORDER — LIDOCAINE HYDROCHLORIDE 20 MG/ML
INJECTION, SOLUTION INFILTRATION; PERINEURAL PRN
Status: DISCONTINUED | OUTPATIENT
Start: 2023-06-30 | End: 2023-06-30

## 2023-06-30 RX ORDER — PHENYLEPHRINE HCL IN 0.9% NACL 50MG/250ML
PLASTIC BAG, INJECTION (ML) INTRAVENOUS CONTINUOUS PRN
Status: DISCONTINUED | OUTPATIENT
Start: 2023-06-30 | End: 2023-06-30

## 2023-06-30 RX ORDER — NALOXONE HYDROCHLORIDE 0.4 MG/ML
0.4 INJECTION, SOLUTION INTRAMUSCULAR; INTRAVENOUS; SUBCUTANEOUS
Status: DISCONTINUED | OUTPATIENT
Start: 2023-06-30 | End: 2023-06-30 | Stop reason: HOSPADM

## 2023-06-30 RX ORDER — FENTANYL CITRATE 50 UG/ML
25-50 INJECTION, SOLUTION INTRAMUSCULAR; INTRAVENOUS
Status: DISCONTINUED | OUTPATIENT
Start: 2023-06-30 | End: 2023-06-30 | Stop reason: HOSPADM

## 2023-06-30 RX ORDER — GABAPENTIN 400 MG/1
400 CAPSULE ORAL 3 TIMES DAILY
Status: DISCONTINUED | OUTPATIENT
Start: 2023-06-30 | End: 2023-07-05

## 2023-06-30 RX ORDER — NALBUPHINE HYDROCHLORIDE 10 MG/ML
2.5-5 INJECTION, SOLUTION INTRAMUSCULAR; INTRAVENOUS; SUBCUTANEOUS EVERY 6 HOURS PRN
Status: DISCONTINUED | OUTPATIENT
Start: 2023-06-30 | End: 2023-07-07 | Stop reason: HOSPADM

## 2023-06-30 RX ORDER — ONDANSETRON 4 MG/1
4 TABLET, ORALLY DISINTEGRATING ORAL EVERY 6 HOURS PRN
Status: DISCONTINUED | OUTPATIENT
Start: 2023-06-30 | End: 2023-07-07 | Stop reason: HOSPADM

## 2023-06-30 RX ORDER — ONDANSETRON 4 MG/1
4 TABLET, ORALLY DISINTEGRATING ORAL EVERY 6 HOURS PRN
Status: DISCONTINUED | OUTPATIENT
Start: 2023-06-30 | End: 2023-06-30

## 2023-06-30 RX ORDER — LIDOCAINE 40 MG/G
CREAM TOPICAL
Status: CANCELLED | OUTPATIENT
Start: 2023-06-30

## 2023-06-30 RX ORDER — PROCHLORPERAZINE 25 MG
12.5 SUPPOSITORY, RECTAL RECTAL EVERY 12 HOURS PRN
Status: DISCONTINUED | OUTPATIENT
Start: 2023-06-30 | End: 2023-07-07 | Stop reason: HOSPADM

## 2023-06-30 RX ORDER — LIDOCAINE 40 MG/G
CREAM TOPICAL
Status: DISCONTINUED | OUTPATIENT
Start: 2023-06-30 | End: 2023-07-07 | Stop reason: HOSPADM

## 2023-06-30 RX ORDER — LIDOCAINE 40 MG/G
CREAM TOPICAL
Status: DISCONTINUED | OUTPATIENT
Start: 2023-06-30 | End: 2023-06-30 | Stop reason: HOSPADM

## 2023-06-30 RX ORDER — NALOXONE HYDROCHLORIDE 0.4 MG/ML
0.4 INJECTION, SOLUTION INTRAMUSCULAR; INTRAVENOUS; SUBCUTANEOUS
Status: DISCONTINUED | OUTPATIENT
Start: 2023-06-30 | End: 2023-07-07 | Stop reason: HOSPADM

## 2023-06-30 RX ORDER — AMOXICILLIN 250 MG
1 CAPSULE ORAL 2 TIMES DAILY
Status: DISCONTINUED | OUTPATIENT
Start: 2023-06-30 | End: 2023-07-01

## 2023-06-30 RX ORDER — DEXTROSE MONOHYDRATE 25 G/50ML
25-50 INJECTION, SOLUTION INTRAVENOUS
Status: DISCONTINUED | OUTPATIENT
Start: 2023-06-30 | End: 2023-06-30

## 2023-06-30 RX ORDER — POLYETHYLENE GLYCOL 3350 17 G/17G
17 POWDER, FOR SOLUTION ORAL DAILY
Status: DISCONTINUED | OUTPATIENT
Start: 2023-07-01 | End: 2023-07-01

## 2023-06-30 RX ORDER — BISACODYL 10 MG
10 SUPPOSITORY, RECTAL RECTAL DAILY PRN
Status: DISCONTINUED | OUTPATIENT
Start: 2023-06-30 | End: 2023-07-07 | Stop reason: HOSPADM

## 2023-06-30 RX ORDER — DIPHENHYDRAMINE HCL 12.5MG/5ML
12.5 LIQUID (ML) ORAL EVERY 6 HOURS PRN
Status: DISCONTINUED | OUTPATIENT
Start: 2023-06-30 | End: 2023-07-07 | Stop reason: HOSPADM

## 2023-06-30 RX ORDER — ONDANSETRON 2 MG/ML
INJECTION INTRAMUSCULAR; INTRAVENOUS PRN
Status: DISCONTINUED | OUTPATIENT
Start: 2023-06-30 | End: 2023-06-30

## 2023-06-30 RX ORDER — SODIUM CHLORIDE, SODIUM LACTATE, POTASSIUM CHLORIDE, CALCIUM CHLORIDE 600; 310; 30; 20 MG/100ML; MG/100ML; MG/100ML; MG/100ML
INJECTION, SOLUTION INTRAVENOUS CONTINUOUS PRN
Status: DISCONTINUED | OUTPATIENT
Start: 2023-06-30 | End: 2023-06-30

## 2023-06-30 RX ORDER — KETOROLAC TROMETHAMINE 30 MG/ML
INJECTION, SOLUTION INTRAMUSCULAR; INTRAVENOUS PRN
Status: DISCONTINUED | OUTPATIENT
Start: 2023-06-30 | End: 2023-06-30

## 2023-06-30 RX ORDER — SODIUM CHLORIDE 9 MG/ML
INJECTION, SOLUTION INTRAVENOUS CONTINUOUS
Status: DISCONTINUED | OUTPATIENT
Start: 2023-06-30 | End: 2023-06-30

## 2023-06-30 RX ORDER — FENTANYL CITRATE 50 UG/ML
25 INJECTION, SOLUTION INTRAMUSCULAR; INTRAVENOUS EVERY 5 MIN PRN
Status: DISCONTINUED | OUTPATIENT
Start: 2023-06-30 | End: 2023-06-30 | Stop reason: HOSPADM

## 2023-06-30 RX ORDER — SODIUM CHLORIDE, SODIUM GLUCONATE, SODIUM ACETATE, POTASSIUM CHLORIDE AND MAGNESIUM CHLORIDE 526; 502; 368; 37; 30 MG/100ML; MG/100ML; MG/100ML; MG/100ML; MG/100ML
INJECTION, SOLUTION INTRAVENOUS CONTINUOUS PRN
Status: DISCONTINUED | OUTPATIENT
Start: 2023-06-30 | End: 2023-06-30

## 2023-06-30 RX ORDER — FENTANYL CITRATE 50 UG/ML
50 INJECTION, SOLUTION INTRAMUSCULAR; INTRAVENOUS EVERY 5 MIN PRN
Status: DISCONTINUED | OUTPATIENT
Start: 2023-06-30 | End: 2023-06-30 | Stop reason: HOSPADM

## 2023-06-30 RX ORDER — PROCHLORPERAZINE MALEATE 5 MG
5 TABLET ORAL EVERY 6 HOURS PRN
Status: DISCONTINUED | OUTPATIENT
Start: 2023-06-30 | End: 2023-06-30

## 2023-06-30 RX ORDER — INSULIN GLARGINE 100 [IU]/ML
30 INJECTION, SOLUTION SUBCUTANEOUS AT BEDTIME
Status: DISCONTINUED | OUTPATIENT
Start: 2023-06-30 | End: 2023-07-01 | Stop reason: ALTCHOICE

## 2023-06-30 RX ORDER — GLYCOPYRROLATE 0.2 MG/ML
INJECTION, SOLUTION INTRAMUSCULAR; INTRAVENOUS PRN
Status: DISCONTINUED | OUTPATIENT
Start: 2023-06-30 | End: 2023-06-30

## 2023-06-30 RX ORDER — ACETAMINOPHEN 325 MG/1
975 TABLET ORAL ONCE
Status: COMPLETED | OUTPATIENT
Start: 2023-06-30 | End: 2023-06-30

## 2023-06-30 RX ORDER — SODIUM CHLORIDE, SODIUM LACTATE, POTASSIUM CHLORIDE, CALCIUM CHLORIDE 600; 310; 30; 20 MG/100ML; MG/100ML; MG/100ML; MG/100ML
INJECTION, SOLUTION INTRAVENOUS CONTINUOUS
Status: DISCONTINUED | OUTPATIENT
Start: 2023-06-30 | End: 2023-07-01

## 2023-06-30 RX ORDER — NICOTINE POLACRILEX 4 MG
15-30 LOZENGE BUCCAL
Status: DISCONTINUED | OUTPATIENT
Start: 2023-06-30 | End: 2023-06-30 | Stop reason: HOSPADM

## 2023-06-30 RX ORDER — DEXTROSE MONOHYDRATE 25 G/50ML
25-50 INJECTION, SOLUTION INTRAVENOUS
Status: DISCONTINUED | OUTPATIENT
Start: 2023-06-30 | End: 2023-07-07 | Stop reason: HOSPADM

## 2023-06-30 RX ORDER — PROPOFOL 10 MG/ML
INJECTION, EMULSION INTRAVENOUS PRN
Status: DISCONTINUED | OUTPATIENT
Start: 2023-06-30 | End: 2023-06-30

## 2023-06-30 RX ORDER — FLUMAZENIL 0.1 MG/ML
0.2 INJECTION, SOLUTION INTRAVENOUS
Status: DISCONTINUED | OUTPATIENT
Start: 2023-06-30 | End: 2023-06-30 | Stop reason: HOSPADM

## 2023-06-30 RX ORDER — DEXMEDETOMIDINE HYDROCHLORIDE 4 UG/ML
.2-1 INJECTION, SOLUTION INTRAVENOUS CONTINUOUS
Status: DISCONTINUED | OUTPATIENT
Start: 2023-06-30 | End: 2023-06-30 | Stop reason: HOSPADM

## 2023-06-30 RX ORDER — CARBIDOPA AND LEVODOPA 25; 100 MG/1; MG/1
2 TABLET ORAL 3 TIMES DAILY
Status: DISCONTINUED | OUTPATIENT
Start: 2023-06-30 | End: 2023-07-07 | Stop reason: HOSPADM

## 2023-06-30 RX ORDER — PROCHLORPERAZINE MALEATE 5 MG
5 TABLET ORAL EVERY 6 HOURS PRN
Status: DISCONTINUED | OUTPATIENT
Start: 2023-06-30 | End: 2023-07-07 | Stop reason: HOSPADM

## 2023-06-30 RX ORDER — ONDANSETRON 2 MG/ML
4 INJECTION INTRAMUSCULAR; INTRAVENOUS EVERY 6 HOURS PRN
Status: DISCONTINUED | OUTPATIENT
Start: 2023-06-30 | End: 2023-06-30

## 2023-06-30 RX ORDER — PEDIATRIC MULTIVIT 61/D3/VIT K 1500-800
1.5 CAPSULE ORAL DAILY
Status: CANCELLED | OUTPATIENT
Start: 2023-07-02

## 2023-06-30 RX ORDER — NICOTINE POLACRILEX 4 MG
15-30 LOZENGE BUCCAL
Status: DISCONTINUED | OUTPATIENT
Start: 2023-06-30 | End: 2023-06-30

## 2023-06-30 RX ORDER — LIDOCAINE HCL/EPINEPHRINE/PF 2%-1:200K
VIAL (ML) INJECTION
Status: COMPLETED | OUTPATIENT
Start: 2023-06-30 | End: 2023-06-30

## 2023-06-30 RX ORDER — DEXTROSE MONOHYDRATE 100 MG/ML
INJECTION, SOLUTION INTRAVENOUS CONTINUOUS PRN
Status: DISCONTINUED | OUTPATIENT
Start: 2023-06-30 | End: 2023-06-30

## 2023-06-30 RX ORDER — ERTAPENEM 1 G/1
1 INJECTION, POWDER, LYOPHILIZED, FOR SOLUTION INTRAMUSCULAR; INTRAVENOUS ONCE
Status: COMPLETED | OUTPATIENT
Start: 2023-06-30 | End: 2023-06-30

## 2023-06-30 RX ORDER — ONDANSETRON 2 MG/ML
4 INJECTION INTRAMUSCULAR; INTRAVENOUS EVERY 6 HOURS PRN
Status: DISCONTINUED | OUTPATIENT
Start: 2023-06-30 | End: 2023-07-07 | Stop reason: HOSPADM

## 2023-06-30 RX ORDER — ASPIRIN 81 MG/1
81 TABLET ORAL DAILY
Status: DISCONTINUED | OUTPATIENT
Start: 2023-07-01 | End: 2023-07-07 | Stop reason: HOSPADM

## 2023-06-30 RX ADMIN — SODIUM CHLORIDE, SODIUM GLUCONATE, SODIUM ACETATE, POTASSIUM CHLORIDE AND MAGNESIUM CHLORIDE: 526; 502; 368; 37; 30 INJECTION, SOLUTION INTRAVENOUS at 09:15

## 2023-06-30 RX ADMIN — DEXMEDETOMIDINE HYDROCHLORIDE 0.2 MCG/KG/HR: 400 INJECTION INTRAVENOUS at 12:47

## 2023-06-30 RX ADMIN — PHENYLEPHRINE HYDROCHLORIDE 100 MCG: 10 INJECTION INTRAVENOUS at 11:14

## 2023-06-30 RX ADMIN — PHENYLEPHRINE HYDROCHLORIDE 100 MCG: 10 INJECTION INTRAVENOUS at 11:18

## 2023-06-30 RX ADMIN — GLYCOPYRROLATE 0.4 MG: 0.2 INJECTION, SOLUTION INTRAMUSCULAR; INTRAVENOUS at 09:05

## 2023-06-30 RX ADMIN — PROPOFOL 100 MG: 10 INJECTION, EMULSION INTRAVENOUS at 07:55

## 2023-06-30 RX ADMIN — SODIUM CHLORIDE, POTASSIUM CHLORIDE, SODIUM LACTATE AND CALCIUM CHLORIDE: 600; 310; 30; 20 INJECTION, SOLUTION INTRAVENOUS at 18:18

## 2023-06-30 RX ADMIN — Medication 10 MG: at 12:31

## 2023-06-30 RX ADMIN — SENNOSIDES AND DOCUSATE SODIUM 1 TABLET: 50; 8.6 TABLET ORAL at 19:46

## 2023-06-30 RX ADMIN — PHENYLEPHRINE HYDROCHLORIDE 100 MCG: 10 INJECTION INTRAVENOUS at 10:59

## 2023-06-30 RX ADMIN — ONDANSETRON 4 MG: 2 INJECTION INTRAMUSCULAR; INTRAVENOUS at 13:25

## 2023-06-30 RX ADMIN — FENTANYL CITRATE 50 MCG: 50 INJECTION, SOLUTION INTRAMUSCULAR; INTRAVENOUS at 07:13

## 2023-06-30 RX ADMIN — ALBUMIN (HUMAN): 12.5 SOLUTION INTRAVENOUS at 11:33

## 2023-06-30 RX ADMIN — BUPIVACAINE HYDROCHLORIDE 5 ML/HR: 7.5 INJECTION, SOLUTION EPIDURAL; RETROBULBAR at 13:40

## 2023-06-30 RX ADMIN — ERTAPENEM SODIUM 1 G: 1 INJECTION, POWDER, LYOPHILIZED, FOR SOLUTION INTRAMUSCULAR; INTRAVENOUS at 08:40

## 2023-06-30 RX ADMIN — PHENYLEPHRINE HYDROCHLORIDE 100 MCG: 10 INJECTION INTRAVENOUS at 11:10

## 2023-06-30 RX ADMIN — Medication: at 17:55

## 2023-06-30 RX ADMIN — NOREPINEPHRINE BITARTRATE 6.4 MCG: 1 INJECTION, SOLUTION, CONCENTRATE INTRAVENOUS at 09:41

## 2023-06-30 RX ADMIN — Medication 20 MG: at 11:45

## 2023-06-30 RX ADMIN — ERTAPENEM SODIUM 1 G: 1 INJECTION, POWDER, LYOPHILIZED, FOR SOLUTION INTRAMUSCULAR; INTRAVENOUS at 19:48

## 2023-06-30 RX ADMIN — ACETAMINOPHEN 975 MG: 325 TABLET, FILM COATED ORAL at 18:09

## 2023-06-30 RX ADMIN — LIDOCAINE HYDROCHLORIDE 2 ML: 20 INJECTION, SOLUTION INFILTRATION; PERINEURAL at 13:34

## 2023-06-30 RX ADMIN — Medication 20 MG: at 09:46

## 2023-06-30 RX ADMIN — Medication 10 MG: at 11:02

## 2023-06-30 RX ADMIN — HUMAN INSULIN 1 UNITS/HR: 100 INJECTION, SOLUTION SUBCUTANEOUS at 21:29

## 2023-06-30 RX ADMIN — PROPOFOL 30 MG: 10 INJECTION, EMULSION INTRAVENOUS at 09:44

## 2023-06-30 RX ADMIN — SODIUM CHLORIDE, POTASSIUM CHLORIDE, SODIUM LACTATE AND CALCIUM CHLORIDE: 600; 310; 30; 20 INJECTION, SOLUTION INTRAVENOUS at 16:03

## 2023-06-30 RX ADMIN — HUMAN INSULIN 2 UNITS/HR: 100 INJECTION, SOLUTION SUBCUTANEOUS at 11:52

## 2023-06-30 RX ADMIN — HUMAN INSULIN 5 UNITS: 100 INJECTION, SOLUTION SUBCUTANEOUS at 06:59

## 2023-06-30 RX ADMIN — LIDOCAINE HYDROCHLORIDE 60 MG: 20 INJECTION, SOLUTION INFILTRATION; PERINEURAL at 07:55

## 2023-06-30 RX ADMIN — Medication 10 MG: at 10:54

## 2023-06-30 RX ADMIN — PHENYLEPHRINE HYDROCHLORIDE 100 MCG: 10 INJECTION INTRAVENOUS at 13:11

## 2023-06-30 RX ADMIN — Medication 50 MG: at 07:58

## 2023-06-30 RX ADMIN — Medication 20 MG: at 12:45

## 2023-06-30 RX ADMIN — FENTANYL CITRATE 50 MCG: 50 INJECTION, SOLUTION INTRAMUSCULAR; INTRAVENOUS at 12:09

## 2023-06-30 RX ADMIN — NOREPINEPHRINE BITARTRATE 0.02 MCG/KG/MIN: 1 INJECTION, SOLUTION, CONCENTRATE INTRAVENOUS at 10:38

## 2023-06-30 RX ADMIN — SUGAMMADEX 200 MG: 100 INJECTION, SOLUTION INTRAVENOUS at 13:25

## 2023-06-30 RX ADMIN — NOREPINEPHRINE BITARTRATE 3.2 MCG: 1 INJECTION, SOLUTION, CONCENTRATE INTRAVENOUS at 09:52

## 2023-06-30 RX ADMIN — Medication 20 MG: at 09:24

## 2023-06-30 RX ADMIN — FENTANYL CITRATE 50 MCG: 50 INJECTION, SOLUTION INTRAMUSCULAR; INTRAVENOUS at 12:30

## 2023-06-30 RX ADMIN — SODIUM CHLORIDE, POTASSIUM CHLORIDE, SODIUM LACTATE AND CALCIUM CHLORIDE: 600; 310; 30; 20 INJECTION, SOLUTION INTRAVENOUS at 07:47

## 2023-06-30 RX ADMIN — CARBIDOPA AND LEVODOPA 2 TABLET: 25; 100 TABLET ORAL at 19:47

## 2023-06-30 RX ADMIN — PHENYLEPHRINE HYDROCHLORIDE 50 MCG: 10 INJECTION INTRAVENOUS at 11:07

## 2023-06-30 RX ADMIN — GABAPENTIN 400 MG: 400 CAPSULE ORAL at 19:48

## 2023-06-30 RX ADMIN — PHENYLEPHRINE HYDROCHLORIDE 100 MCG: 10 INJECTION INTRAVENOUS at 11:16

## 2023-06-30 RX ADMIN — Medication 10 MG: at 10:38

## 2023-06-30 RX ADMIN — Medication 0.5 MCG/KG/MIN: at 11:19

## 2023-06-30 RX ADMIN — MIDAZOLAM 1.5 MG: 1 INJECTION INTRAMUSCULAR; INTRAVENOUS at 07:08

## 2023-06-30 RX ADMIN — ACETAMINOPHEN 975 MG: 325 TABLET, FILM COATED ORAL at 06:26

## 2023-06-30 RX ADMIN — KETAMINE HYDROCHLORIDE 15 MG/HR: 100 INJECTION, SOLUTION, CONCENTRATE INTRAMUSCULAR; INTRAVENOUS at 10:17

## 2023-06-30 RX ADMIN — NOREPINEPHRINE BITARTRATE 3.2 MCG: 1 INJECTION, SOLUTION, CONCENTRATE INTRAVENOUS at 10:10

## 2023-06-30 RX ADMIN — FENTANYL CITRATE 100 MCG: 50 INJECTION, SOLUTION INTRAMUSCULAR; INTRAVENOUS at 07:54

## 2023-06-30 RX ADMIN — SODIUM CHLORIDE: 9 INJECTION, SOLUTION INTRAVENOUS at 18:18

## 2023-06-30 RX ADMIN — CARBIDOPA AND LEVODOPA 1 TABLET: 50; 200 TABLET, EXTENDED RELEASE ORAL at 21:43

## 2023-06-30 RX ADMIN — NOREPINEPHRINE BITARTRATE 6.4 MCG: 1 INJECTION, SOLUTION, CONCENTRATE INTRAVENOUS at 10:58

## 2023-06-30 RX ADMIN — LIDOCAINE HYDROCHLORIDE,EPINEPHRINE BITARTRATE 5 ML: 20; .005 INJECTION, SOLUTION EPIDURAL; INFILTRATION; INTRACAUDAL; PERINEURAL at 07:32

## 2023-06-30 RX ADMIN — Medication 10 MG: at 10:14

## 2023-06-30 ASSESSMENT — ACTIVITIES OF DAILY LIVING (ADL)
ADLS_ACUITY_SCORE: 30
ADLS_ACUITY_SCORE: 22
ADLS_ACUITY_SCORE: 28
ADLS_ACUITY_SCORE: 22
ADLS_ACUITY_SCORE: 30
ADLS_ACUITY_SCORE: 22
ADLS_ACUITY_SCORE: 30
ADLS_ACUITY_SCORE: 22
ADLS_ACUITY_SCORE: 22

## 2023-06-30 ASSESSMENT — LIFESTYLE VARIABLES: TOBACCO_USE: 1

## 2023-06-30 NOTE — PROGRESS NOTES
No labs ordered for patient other than type and screen.  Unable to get a hold of Dr. Maharaj with anesthesia so spoke with Dr. Chowdhury the AIC.  He ordered a potassium. There will be extra blood available in lab if other labs want to be added on.

## 2023-06-30 NOTE — ANESTHESIA PROCEDURE NOTES
"Epidural catheter Procedure Note    Pre-Procedure   Staff -        Anesthesiologist:  Gildardo Loredo MD       Resident/Fellow: Latonia Shelton MD       Performed By: anesthesiologist       Location: pre-op       Pre-Anesthestic Checklist: patient identified, IV checked, risks and benefits discussed, informed consent, monitors and equipment checked, pre-op evaluation, at physician/surgeon's request and post-op pain management  Timeout:       Correct Patient: Yes        Correct Procedure: Yes        Correct Site: Yes        Correct Position: Yes   Procedure Documentation  Procedure: epidural catheter       Diagnosis: labor analgesia       Patient Position: sitting       Skin prep: Chloraprep       Local skin infiltrated with 3 mL of 1% lidocaine.        Insertion Site: T6-7. (midline approach).       Technique: LORT saline        EMILY at 8 cm.       Needle Type: Sketchfaby needle       Needle Gauge: 17.        Needle Length (Inches): 3.5        Catheter: 19 G.          Catheter threaded easily.         5 cm epidural space.         Threaded 13 cm at skin.         # of attempts: 2 and  # of redirects:  2    Assessment/Narrative         Paresthesias: No.       Test dose of 3 mL lidocaine 1.5% w/ 1:200,000 epinephrine at 07:32 CDT.         Test dose negative, 3 minutes after injection, for signs of intravascular, subdural, or intrathecal injection.       Insertion/Infusion Method: LORT saline       No aspiration negative for Heme or CSF via Epidural Catheter.    Medication(s) Administered   2% Lidocaine w/ 1:200K Epi (EPIDURAL) - EPIDURAL   5 mL - 6/30/2023 7:32:00 AM    FOR [facility] ([facility]) ONLY:   Pain Team Contact information: please page the Pain Team Via [employer]. Search \"Pain\". During daytime hours, please page the attending first. At night please page the resident first.      "

## 2023-06-30 NOTE — ANESTHESIA POSTPROCEDURE EVALUATION
Patient: Arnaldo Henderson    Procedure: Procedure(s):  Open distal pancreactectomy with splenectomy, lysis of adhesions, resection of proximal illeum       Anesthesia Type:  General    Note:  Disposition: Inpatient   Postop Pain Control: Uneventful            Sign Out: Well controlled pain   PONV: No   Neuro/Psych: Uneventful            Sign Out: Acceptable/Baseline neuro status   Airway/Respiratory: Uneventful            Sign Out: Acceptable/Baseline resp. status   CV/Hemodynamics: Uneventful            Sign Out: Acceptable CV status; No obvious hypovolemia; No obvious fluid overload   Other NRE: NONE   DID A NON-ROUTINE EVENT OCCUR? No           Last vitals:  Vitals Value Taken Time   BP 95/56 06/30/23 1411   Temp     Pulse 75 06/30/23 1427   Resp 13 06/30/23 1427   SpO2 95 % 06/30/23 1427   Vitals shown include unvalidated device data.    Electronically Signed By: Diann Maharaj MD  June 30, 2023  2:28 PM

## 2023-06-30 NOTE — PROGRESS NOTES
Blood sugar 329 this morning. Spoke with Dr. Maharaj.  She asked that 5 units of regular insulin be given and insulin drip to be started in the OR.

## 2023-06-30 NOTE — ANESTHESIA CARE TRANSFER NOTE
Patient: Arnaldo Henderson    Procedure: Procedure(s):  Open distal pancreactectomy with splenectomy, lysis of adhesions, resection of proximal illeum       Diagnosis: Chronic pancreatitis (H) [K86.1]  Diagnosis Additional Information: No value filed.    Anesthesia Type:   General     Note:    Oropharynx: oropharynx clear of all foreign objects, nasal gatric tube in place and spontaneously breathing  Level of Consciousness: awake and drowsy  Oxygen Supplementation: face mask  Level of Supplemental Oxygen (L/min / FiO2): 6  Independent Airway: airway patency satisfactory and stable  Dentition: dentition unchanged  Vital Signs Stable: post-procedure vital signs reviewed and stable  Report to RN Given: handoff report given  Patient transferred to: PACU    Handoff Report: Identifed the Patient, Identified the Reponsible Provider, Reviewed the pertinent medical history, Discussed the surgical course, Reviewed Intra-OP anesthesia mangement and issues during anesthesia, Set expectations for post-procedure period and Allowed opportunity for questions and acknowledgement of understanding      Vitals:  Vitals Value Taken Time   BP 96/55 06/30/23 1407   Temp     Pulse 77 06/30/23 1411   Resp 11 06/30/23 1411   SpO2 95 % 06/30/23 1411   Vitals shown include unvalidated device data.    Electronically Signed By: Jesu Boudreaux MD  June 30, 2023  2:12 PM

## 2023-06-30 NOTE — ANESTHESIA PROCEDURE NOTES
Central Line/PA Catheter Placement    Pre-Procedure   Staff -        Anesthesiologist:  Diann Maharaj MD       Resident/Fellow: Jesu Boudreaux MD       Performed By: resident       Location: OR       Pre-Anesthestic Checklist: patient identified, IV checked, risks and benefits discussed, informed consent, monitors and equipment checked, pre-op evaluation and at physician/surgeon's request  Timeout:       Correct Patient: Yes        Correct Procedure: Yes        Correct Site: Yes        Correct Position: Yes        Correct Laterality: Yes   Line Placement:   This line was placed Post Induction    Procedure   Procedure: central line and elective       Laterality: right       Insertion Site: internal jugular.       Patient Position: Trendelenburg  Sterile Prep        All elements of maximal sterile barrier technique followed       Patient Prep/Sterile Barriers: draped, hand hygiene, gloves , hat , mask , draped, gown, sterile gel and probe cover       Skin prep: Chloraprep  Insertion/Injection        Technique: ultrasound guided and Seldinger Technique        1. Ultrasound was used to evaluate the access site.       2. Vein evaluated via ultrasound for patency/adequacy.       3. Using real-time ultrasound the needle/catheter was observed entering the artery/vein.       5. The visualized structures were anatomically normal.       6. There were no apparent abnormal pathologic findings.       Type: CVC       Catheter Size: 7 Fr       Catheter Length: 20       Number of Lumens: triple lumen  Narrative         Secured by: suture       Tegaderm and Biopatch dressing used.       Complications: None apparent,        blood aspirated from all lumens,        All lumens flushed: Yes       Verification method: Placement to be verified post-op and Ultrasound

## 2023-06-30 NOTE — ANESTHESIA PROCEDURE NOTES
Arterial Line Procedure Note    Pre-Procedure   Staff -        Anesthesiologist:  Diann Maharaj MD       Resident/Fellow: Jesu Boudreaux MD       Performed By: resident       Location: OR       Pre-Anesthestic Checklist: patient identified, IV checked, risks and benefits discussed, informed consent, monitors and equipment checked, pre-op evaluation and at physician/surgeon's request  Timeout:       Correct Patient: Yes        Correct Procedure: Yes        Correct Site: Yes        Correct Position: Yes   Line Placement:   This line was placed Post Induction starting at 6/30/2023 8:00 AM and ending at 6/30/2023 8:00 PM  Procedure   Procedure: arterial line       Laterality: left       Insertion Site: radial.  Sterile Prep        Standard elements of sterile barrier followed       Skin prep: Chloraprep  Insertion/Injection        Technique: ultrasound guided        1. Ultrasound was used to evaluate the access site.       2. Artery evaluated via ultrasound for patency/adequacy.       3. Using real-time ultrasound the needle/catheter was observed entering the artery/vein.       Catheter Type/Size: 20 G, 1.75 in/4.5 cm quick cath (integral wire)  Narrative        Tegaderm dressing used.       Complications: None apparent,        Arterial waveform: Yes        IBP within 10% of NIBP: Yes   Comments:  A line placed under U/S guidance for continuous hemodynamic monitoring and frequent blood sampling.

## 2023-06-30 NOTE — PROGRESS NOTES
Temp:  [97.7  F (36.5  C)] 97.7  F (36.5  C)  Pulse:  [69-83] 80  Resp:  [13-20] 13  BP: (115-131)/(75-88) 115/75  SpO2:  [97 %-100 %] 100 %    Spinal epidural block performed without complications, 1.5 mg versed, 50 mcg fentanyl given. Was not able to leave patient room during placement of epidural, so was not able to return controlled substance medication to [facility] prior to one hour time limit. Sinus rhythm with 2nd degree AV block, VSS, 2L O2 via NC.  Pt tolerated well.  Will continue to monitor. Patient wife Veronika updated.

## 2023-06-30 NOTE — H&P
[facility] ICU ADMISSION NOTE  6/30/2023    PRIMARY TEAM: Transplant  PRIMARY PHYSICIAN: Ashvin Haider    REASON FOR CRITICAL CARE ADMISSION: Hemodynamic monitoring   ADMITTING PHYSICIAN: Asael Fernández    ASSESSMENT: Arnaldo Henderson 67 y/o with PMH chronic pancreatitis, CAD, CABG, 2nd degree AVB, Parkinson's disease and chronic pain. He is now s/p distal pancreatectomy, splenectomy, YVAN, proximal ileum resection. He is admitted to the SICU for hemodynamic monitoring.    PLAN:  -Admit to SICU  -Ertapenem tonight  -PCA/epidural for pain, schedule tylenol and gabapentin  -NG to LIS, keep until passing flatus  -NPO, IVF, OK for meds PO  -Resume PTA sinemet, aspirin, and omeprazole  -Insulin gtt, goal < 180  -Heparin ppx tomorrow  -Routine AM labs  -Drain amylase POD #3       Neuro/ pain/ sedation:  #Acute on chronic  Pain  #Parkinson's disease  - Monitor neurological status. Notify provider for any acute changes in exam.  - Epidural catheter  - Scheduled tylenol, gabapentin  - PTA Sinemet  - Fentanyl PCA     Pulmonary :   - Supplemental oxygen to keep saturation above 92 %.     Cardiovascular:    #CAD s/p CABG  #HTN  #Dyslipidemia  #2nd degree AVB  - Monitor hemodynamic status.    - Continue PTA Aspirin  - Holding Atorvastatin, Losartan     GI/Nutrition:   #Chronic pancreatitis  #s/p distal pancreatectomy, splenectomy, YVAN, proximal ileum resection  #GERD  - NPO except ice chips and medications.  - Nutrition consulted. Appreciate recommendations.  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   - Obtain drain amylase POD 3  - PPI  - NG to LIS     Renal/ Fluid Balance:    - Will monitor intake and output.  - LR for IV fluid hydration  - ICU electrolyte replacement protocol  - Santos in place     Endocrine:    #Type II DM  - Insulin gtt, D10 gtt - This is mostly  for protection of pancreatic cells, but will also provide glycemic control.     ID:  - Repeat Ertapenem dose tonight       Hematology:     #Acute blood loss anemia  -  ml     MSK:    - PT and OT consulted. Appreciate recommendations.     Lines/ tubes/ drains:  - CVC RIJ  - Epidural Catheter  - RUQ RUSTY drain  - NGT  - Santos        Prophylaxis:    - DVT prophylaxis: Mechanical. No chemical DVT prophylaxis tonight due to high risk of bleeding.  Plan to start SubC heparin tomorrow.  - PUD prophylaxis: PPI    Code status:  - Full     Disposition:  -  ICU.     Patient seen, findings and plan discussed with ICU staff prior to arrival.    Mark Soto MD   General Surgery Resident    - - - - - - - - - - - - - - - - - - - - - - - - - - - - - - - - - - - - - - - - - - - - - - - - - - - - - - - - - - - - - - - - - - - - - - - -     HISTORY PRESENTING ILLNESS: Mr. Eliud Henderson is a 67 year old man with a past medical history of parkinson's, coronary artery disease s/p CABG (2003), bilateral mixed hearing loss with right mastoidectomy in 1970s, diabetes on insulin, hypertension, and hyperlipidemia. He had presumed gallstone pancreatitis managed with a cholecystectomy complicated later by necrotizing pancreatitis and pseudocysts requiring multiple interventions including a necrotomy and gastro-pancreatic stent for drainage. Today, he presented for surgical treatment of his chronic pancreatitis and underwent an open distal pancreactectomy with splenectomy, lysis of adhesions, resection of proximal ileum with Dr. Haider.     On arrival to the SICU, he was laying comfortably in bed. He reports some pain in his lower abdomen, but overall it is toleratable.     REVIEW OF SYSTEMS: 10 point ROS neg other than the symptoms noted above in the HPI.    PAST MEDICAL HISTORY:   Past Medical History:   Diagnosis Date     CAD (coronary artery disease)      Diabetes (H)      Gastroesophageal reflux disease      Hypercholesteremia       "Hypertension      Mixed hearing loss      Parkinson disease (H)      Second degree AV block        SURGICAL HISTORY:   Past Surgical History:   Procedure Laterality Date     CA ANESTH CABG W/PUMP       COLONOSCOPY N/A 1/12/2023    Procedure: colonoscopy with fluroscopy;  Surgeon: Ayden Fraire MD;  Location:  OR     ENDOSCOPIC RETROGRADE CHOLANGIOPANCREATOGRAM N/A 5/18/2023    Procedure: ENDOSCOPIC RETROGRADE CHOLANGIOPANCREATOGRAPHY, WITH  CYSTDUODENOSTOMY STENTS X2 REMOVAL;  Surgeon: Cedric Saldana MD;  Location: [facility] OR     ENT SURGERY       LAPAROSCOPY DIAGNOSTIC (GENERAL) N/A 2/20/2023    Procedure: LAPAROSCOPY, DIAGNOSTIC; RETRIEVAL OF MIGRATED STENT;  Surgeon: Joseluis Lima MD;  Location: [facility] OR     ORTHOPEDIC SURGERY         SOCIAL HISTORY:   Social History     Socioeconomic History     Marital status:      Spouse name: None     Number of children: None     Years of education: None     Highest education level: None   Tobacco Use     Smoking status: Former     Types: Cigarettes     Smokeless tobacco: Never   Vaping Use     Vaping Use: Never used   Substance and Sexual Activity     Alcohol use: Not Currently     Drug use: Not Currently       FAMILY HISTORY: No bleeding/clotting disorders nor problems with anesthesia.     ALLERGIES:      Allergies   Allergen Reactions     Ciprofloxacin Other (See Comments), Hives, Itching, Rash and Unknown     Other reaction(s): Other (see comments)  Oral swelling per [facility]         Dilaudid [Hydromorphone] Rash     Morphine Rash     rash     Penicillins Rash     aka ancef/ rash       Citalopram Unknown     Oxycodone Rash     \"HORRIBLE, SEVERE RASH MAYBE CAUSED BY OXY\"       MEDICATIONS:  No current facility-administered medications on file prior to encounter.  aspirin (ASA) 81 MG EC tablet, Take 81 mg by mouth daily  atorvastatin (LIPITOR) 40 MG tablet, Take 1 tablet by mouth daily  BD PEN NEEDLE LINDY 2ND GEN 32G X 4 MM miscellaneous, USE TO INJECT " INSULIN 4 TIMES A DAY  carbidopa-levodopa (SINEMET CR)  MG CR tablet, Take 1 tablet by mouth At Bedtime  carbidopa-levodopa (SINEMET)  MG tablet, Take 2 tablets by mouth 3 times daily  gabapentin (NEURONTIN) 300 MG capsule, Take 300 mg by mouth 3 times daily 1 morning, 2 afternoon, 2 at night  insulin aspart (NOVOLOG PEN) 100 UNIT/ML pen, Inject Subcutaneous 3 times daily (with meals) Give 2 units per every 50 above 140.  Insulin Glargine (BASAGLAR KWIKPEN SC), Inject 30 Units Subcutaneous At Bedtime  lipase-protease-amylase (ZENPEP) 59170-566793 units CPEP, Take 1 capsule by mouth 3 times daily (with meals)  losartan (COZAAR) 50 MG tablet, Take 50 mg by mouth At Bedtime  magnesium oxide (MAG-OX) 400 MG tablet, Take 400 mg by mouth every other day  Multiple Vitamin (MULTI-VITAMINS) TABS, Take 1 tablet by mouth daily  omeprazole (PRILOSEC) 20 MG DR capsule, Take 20 mg by mouth daily        PHYSICAL EXAMINATION:  Temp:  [97.7  F (36.5  C)-98.4  F (36.9  C)] 98.4  F (36.9  C)  Pulse:  [69-83] 76  Resp:  [11-20] 11  BP: ()/(56-88) 95/56  MAP:  [54 mmHg-80 mmHg] 80 mmHg  Arterial Line BP: ()/(40-64) 104/64  SpO2:  [95 %-100 %] 95 %    Gen: Awake, alert, NAD  HEENT: EOMI  CV: NR, Normotensive, PPP  Resp: NWOB  Abd: soft, nondistended, Appropriately tender. Dressing with some strike through. RUSTY drain in place with sanguinous output.  Psych: affect normal, behavior normal. Cooperative  Ext: WWP, no edema, RUQ resting tremor      LABS: Reviewed.   Arterial Blood Gases   Recent Labs   Lab 06/30/23  1241 06/30/23  1135 06/30/23  1006 06/30/23  0840   PH 7.42 7.35 7.43 7.41   PCO2 37 42 42 42   PO2 133* 189* 95 151*   HCO3 24 23 28 27     Complete Blood Count   Recent Labs   Lab 06/30/23  1241 06/30/23  1135 06/30/23  1006 06/30/23  0840   HGB 11.7* 14.1 14.2 12.8*     Basic Metabolic Panel  Recent Labs   Lab 06/30/23  1429 06/30/23  1241 06/30/23  1135 06/30/23  1006 06/30/23  0840   NA  --  137 136 138  139   POTASSIUM  --  4.7 5.1* 4.9 4.0   * 185* 236* 158* 169*     Liver Function Tests  No lab results found in last 7 days.  Pancreatic Enzymes  No lab results found in last 7 days.  Coagulation Profile  No lab results found in last 7 days.    IMAGING:  Recent Results (from the past 24 hour(s))   XR Chest Port 1 View    Impression    RESIDENT PRELIMINARY INTERPRETATION  IMPRESSION:   1.  Right IJ central venous catheter tip terminating in the SVC. Other  devices as described above.  2.  Streaky/hazy perihilar and retrocardiac pulmonary opacities, favor  atelectasis/pulmonary edema.

## 2023-06-30 NOTE — BRIEF OP NOTE
[facility]    Brief Operative Note    Pre-operative diagnosis: Chronic pancreatitis (H) [K86.1]  Post-operative diagnosis Same as pre-operative diagnosis    Procedure: Procedure(s):  Open distal pancreactectomy with splenectomy, lysis of adhesions, resection of proximal illeum  Surgeon: Surgeon(s) and Role:     * Ashvin Haider MD - Primary     * Payton Garcia MD - Resident - Assisting  Anesthesia: General with Block   Estimated Blood Loss: 600 ml    Drains: Ellis-Romero  Specimens:   ID Type Source Tests Collected by Time Destination   1 : spleen and tail of pancreas Tissue Other SURGICAL PATHOLOGY EXAM Ashvin Haider MD 6/30/2023 11:17 AM    2 : Distal Pancreas Tissue Pancreas SURGICAL PATHOLOGY EXAM Ashvin Haider MD 6/30/2023 12:05 PM    3 : Proximal Ileum with Stenotic Area and Prior Anastomosis Tissue Small Intestine, Ileum SURGICAL PATHOLOGY EXAM Ashvin Haider MD 6/30/2023 12:11 PM      Findings:   Atrophic pancreas with walled off necrosis in the body of the pancreas. 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  Complications: None.  Implants: * No implants in log *      Plan -  Admit to SICU  Ertapenem at 15:00  PCA/epidural for pain, schedule tylenol and gabapentin  NG to LIS, keep until passing flatus  NPO, IVF, OK for meds PO  Resume PTA sinemet, aspirin, and omeprazole  Insulin gtt, goal < 180  Heparin ppx tomorrow  Routine AM labs  Drain amylase POD #3

## 2023-06-30 NOTE — ANESTHESIA PROCEDURE NOTES
Airway       Patient location during procedure: OR       Procedure Start/Stop Times: 6/30/2023 7:59 AM  Staff -        Anesthesiologist:  Diann Maharaj MD       Resident/Fellow: Jesu Boudreaux MD       Performed By: resident  Consent for Airway        Urgency: elective  Indications and Patient Condition       Indications for airway management: madelyn-procedural       Induction type:intravenous       Mask difficulty assessment: 1 - vent by mask    Final Airway Details       Final airway type: endotracheal airway       Successful airway: ETT - single  Endotracheal Airway Details        ETT size (mm): 7.5       Cuffed: yes       Successful intubation technique: video laryngoscopy       VL Blade Size: MAC 4       Grade View of Cords: 1       Adjucts: stylet       Position: Right       Measured from: lips       Secured at (cm): 24       Bite block used: None    Post intubation assessment        Placement verified by: capnometry, equal breath sounds and chest rise        Number of attempts at approach: 1       Secured with: pink tape       Ease of procedure: easy       Dentition: Intact and Unchanged    Medication(s) Administered   Medication Administration Time: 6/30/2023 7:59 AM

## 2023-07-01 ENCOUNTER — APPOINTMENT (OUTPATIENT)
Dept: OCCUPATIONAL THERAPY | Facility: CLINIC | Age: 68
DRG: 406 | End: 2023-07-01
Attending: SURGERY
Payer: MEDICARE

## 2023-07-01 LAB
ANION GAP SERPL CALCULATED.3IONS-SCNC: 8 MMOL/L (ref 7–15)
BUN SERPL-MCNC: 30.3 MG/DL (ref 8–23)
CALCIUM SERPL-MCNC: 7.7 MG/DL (ref 8.8–10.2)
CHLORIDE SERPL-SCNC: 103 MMOL/L (ref 98–107)
CREAT SERPL-MCNC: 1.12 MG/DL (ref 0.67–1.17)
DEPRECATED HCO3 PLAS-SCNC: 26 MMOL/L (ref 22–29)
ERYTHROCYTE [DISTWIDTH] IN BLOOD BY AUTOMATED COUNT: 13.2 % (ref 10–15)
GFR SERPL CREATININE-BSD FRML MDRD: 71 ML/MIN/1.73M2
GLUCOSE BLDC GLUCOMTR-MCNC: 101 MG/DL (ref 70–99)
GLUCOSE BLDC GLUCOMTR-MCNC: 121 MG/DL (ref 70–99)
GLUCOSE BLDC GLUCOMTR-MCNC: 129 MG/DL (ref 70–99)
GLUCOSE BLDC GLUCOMTR-MCNC: 134 MG/DL (ref 70–99)
GLUCOSE BLDC GLUCOMTR-MCNC: 134 MG/DL (ref 70–99)
GLUCOSE BLDC GLUCOMTR-MCNC: 141 MG/DL (ref 70–99)
GLUCOSE BLDC GLUCOMTR-MCNC: 142 MG/DL (ref 70–99)
GLUCOSE BLDC GLUCOMTR-MCNC: 146 MG/DL (ref 70–99)
GLUCOSE BLDC GLUCOMTR-MCNC: 150 MG/DL (ref 70–99)
GLUCOSE BLDC GLUCOMTR-MCNC: 150 MG/DL (ref 70–99)
GLUCOSE BLDC GLUCOMTR-MCNC: 162 MG/DL (ref 70–99)
GLUCOSE BLDC GLUCOMTR-MCNC: 168 MG/DL (ref 70–99)
GLUCOSE BLDC GLUCOMTR-MCNC: 168 MG/DL (ref 70–99)
GLUCOSE BLDC GLUCOMTR-MCNC: 175 MG/DL (ref 70–99)
GLUCOSE BLDC GLUCOMTR-MCNC: 188 MG/DL (ref 70–99)
GLUCOSE BLDC GLUCOMTR-MCNC: 76 MG/DL (ref 70–99)
GLUCOSE BLDC GLUCOMTR-MCNC: 81 MG/DL (ref 70–99)
GLUCOSE BLDC GLUCOMTR-MCNC: 91 MG/DL (ref 70–99)
GLUCOSE SERPL-MCNC: 179 MG/DL (ref 70–99)
HCT VFR BLD AUTO: 30.4 % (ref 40–53)
HGB BLD-MCNC: 10.4 G/DL (ref 13.3–17.7)
MAGNESIUM SERPL-MCNC: 1.6 MG/DL (ref 1.7–2.3)
MCH RBC QN AUTO: 33.1 PG (ref 26.5–33)
MCHC RBC AUTO-ENTMCNC: 34.2 G/DL (ref 31.5–36.5)
MCV RBC AUTO: 97 FL (ref 78–100)
PHOSPHATE SERPL-MCNC: 4.2 MG/DL (ref 2.5–4.5)
PLATELET # BLD AUTO: 181 10E3/UL (ref 150–450)
POTASSIUM SERPL-SCNC: 4.8 MMOL/L (ref 3.4–5.3)
RBC # BLD AUTO: 3.14 10E6/UL (ref 4.4–5.9)
SODIUM SERPL-SCNC: 137 MMOL/L (ref 136–145)
WBC # BLD AUTO: 13.6 10E3/UL (ref 4–11)

## 2023-07-01 PROCEDURE — 250N000011 HC RX IP 250 OP 636: Performed by: STUDENT IN AN ORGANIZED HEALTH CARE EDUCATION/TRAINING PROGRAM

## 2023-07-01 PROCEDURE — 97166 OT EVAL MOD COMPLEX 45 MIN: CPT | Mod: GO

## 2023-07-01 PROCEDURE — 85027 COMPLETE CBC AUTOMATED: CPT

## 2023-07-01 PROCEDURE — 99233 SBSQ HOSP IP/OBS HIGH 50: CPT | Mod: 24 | Performed by: NURSE PRACTITIONER

## 2023-07-01 PROCEDURE — 250N000011 HC RX IP 250 OP 636: Mod: JZ | Performed by: SURGERY

## 2023-07-01 PROCEDURE — 250N000011 HC RX IP 250 OP 636: Performed by: NURSE PRACTITIONER

## 2023-07-01 PROCEDURE — 200N000002 HC R&B ICU UMMC

## 2023-07-01 PROCEDURE — 250N000013 HC RX MED GY IP 250 OP 250 PS 637: Performed by: STUDENT IN AN ORGANIZED HEALTH CARE EDUCATION/TRAINING PROGRAM

## 2023-07-01 PROCEDURE — 97535 SELF CARE MNGMENT TRAINING: CPT | Mod: GO

## 2023-07-01 PROCEDURE — 97530 THERAPEUTIC ACTIVITIES: CPT | Mod: GO

## 2023-07-01 PROCEDURE — 99232 SBSQ HOSP IP/OBS MODERATE 35: CPT | Mod: GC | Performed by: ANESTHESIOLOGY

## 2023-07-01 PROCEDURE — 250N000009 HC RX 250

## 2023-07-01 PROCEDURE — 258N000003 HC RX IP 258 OP 636

## 2023-07-01 PROCEDURE — 80048 BASIC METABOLIC PNL TOTAL CA: CPT

## 2023-07-01 PROCEDURE — 83735 ASSAY OF MAGNESIUM: CPT | Performed by: SURGERY

## 2023-07-01 PROCEDURE — 84100 ASSAY OF PHOSPHORUS: CPT | Performed by: SURGERY

## 2023-07-01 RX ORDER — DEXTROSE MONOHYDRATE 100 MG/ML
INJECTION, SOLUTION INTRAVENOUS CONTINUOUS PRN
Status: DISCONTINUED | OUTPATIENT
Start: 2023-07-01 | End: 2023-07-07 | Stop reason: HOSPADM

## 2023-07-01 RX ORDER — PANTOPRAZOLE SODIUM 40 MG/1
40 TABLET, DELAYED RELEASE ORAL
Status: DISCONTINUED | OUTPATIENT
Start: 2023-07-02 | End: 2023-07-07 | Stop reason: HOSPADM

## 2023-07-01 RX ORDER — POLYETHYLENE GLYCOL 3350 17 G/17G
17 POWDER, FOR SOLUTION ORAL DAILY PRN
Status: DISCONTINUED | OUTPATIENT
Start: 2023-07-02 | End: 2023-07-07 | Stop reason: HOSPADM

## 2023-07-01 RX ORDER — ATORVASTATIN CALCIUM 40 MG/1
40 TABLET, FILM COATED ORAL DAILY
Status: DISCONTINUED | OUTPATIENT
Start: 2023-07-02 | End: 2023-07-07 | Stop reason: HOSPADM

## 2023-07-01 RX ORDER — MAGNESIUM SULFATE HEPTAHYDRATE 40 MG/ML
2 INJECTION, SOLUTION INTRAVENOUS ONCE
Status: COMPLETED | OUTPATIENT
Start: 2023-07-01 | End: 2023-07-01

## 2023-07-01 RX ADMIN — CARBIDOPA AND LEVODOPA 2 TABLET: 25; 100 TABLET ORAL at 19:32

## 2023-07-01 RX ADMIN — GABAPENTIN 400 MG: 400 CAPSULE ORAL at 07:53

## 2023-07-01 RX ADMIN — SODIUM CHLORIDE, POTASSIUM CHLORIDE, SODIUM LACTATE AND CALCIUM CHLORIDE: 600; 310; 30; 20 INJECTION, SOLUTION INTRAVENOUS at 01:52

## 2023-07-01 RX ADMIN — ACETAMINOPHEN 975 MG: 325 TABLET, FILM COATED ORAL at 01:52

## 2023-07-01 RX ADMIN — CARBIDOPA AND LEVODOPA 2 TABLET: 25; 100 TABLET ORAL at 07:53

## 2023-07-01 RX ADMIN — ACETAMINOPHEN 975 MG: 325 TABLET, FILM COATED ORAL at 10:22

## 2023-07-01 RX ADMIN — DEXTROSE AND SODIUM CHLORIDE: 5; 450 INJECTION, SOLUTION INTRAVENOUS at 08:57

## 2023-07-01 RX ADMIN — CARBIDOPA AND LEVODOPA 1 TABLET: 50; 200 TABLET, EXTENDED RELEASE ORAL at 22:00

## 2023-07-01 RX ADMIN — CARBIDOPA AND LEVODOPA 2 TABLET: 25; 100 TABLET ORAL at 14:01

## 2023-07-01 RX ADMIN — GABAPENTIN 400 MG: 400 CAPSULE ORAL at 19:31

## 2023-07-01 RX ADMIN — HUMAN INSULIN 0.2 UNITS/HR: 100 INJECTION, SOLUTION SUBCUTANEOUS at 10:54

## 2023-07-01 RX ADMIN — ASPIRIN 81 MG: 81 TABLET, COATED ORAL at 07:53

## 2023-07-01 RX ADMIN — HEPARIN SODIUM 5000 UNITS: 5000 INJECTION, SOLUTION INTRAVENOUS; SUBCUTANEOUS at 10:23

## 2023-07-01 RX ADMIN — HEPARIN SODIUM 5000 UNITS: 5000 INJECTION, SOLUTION INTRAVENOUS; SUBCUTANEOUS at 17:20

## 2023-07-01 RX ADMIN — ACETAMINOPHEN 975 MG: 325 TABLET, FILM COATED ORAL at 17:20

## 2023-07-01 RX ADMIN — MAGNESIUM SULFATE IN WATER 2 G: 40 INJECTION, SOLUTION INTRAVENOUS at 14:01

## 2023-07-01 RX ADMIN — GABAPENTIN 400 MG: 400 CAPSULE ORAL at 14:01

## 2023-07-01 RX ADMIN — OMEPRAZOLE 20 MG: 20 CAPSULE, DELAYED RELEASE ORAL at 07:53

## 2023-07-01 ASSESSMENT — ACTIVITIES OF DAILY LIVING (ADL)
ADLS_ACUITY_SCORE: 30
ADLS_ACUITY_SCORE: 27
ADLS_ACUITY_SCORE: 26
ADLS_ACUITY_SCORE: 30
ADLS_ACUITY_SCORE: 26
ADLS_ACUITY_SCORE: 27
ADLS_ACUITY_SCORE: 30
ADLS_ACUITY_SCORE: 27
ADLS_ACUITY_SCORE: 30

## 2023-07-01 NOTE — PROGRESS NOTES
[facility] ICU PROGRESS NOTE  7/1/2023    PRIMARY TEAM: Transplant  PRIMARY PHYSICIAN: Ashvin Haider    REASON FOR CRITICAL CARE ADMISSION: Hemodynamic monitoring   ADMITTING PHYSICIAN: Asael Fernández    ASSESSMENT: Arnaldo Henderson 69 y/o with PMH chronic pancreatitis, CAD, CABG, 2nd degree AVB, Parkinson's disease and chronic pain. He is now s/p distal pancreatectomy, splenectomy, YVAN, proximal ileum resection. He is admitted to the SICU for hemodynamic monitoring.    PLAN:  - Discontinue basal fentanyl  - Add PRN Ketamine  - Change IVF to D51/2  - Start subcutaneous heparin prophylaxis  - Transfer to floor       Neuro/ pain/ sedation:  #Acute on chronic  Pain  #Parkinson's disease  - Monitor neurological status. Notify provider for any acute changes in exam.  - Epidural catheter- Bupivacaine. Continue  - Scheduled tylenol, gabapentin  - PTA Sinemet  - Fentanyl PCA- discontinue basal rate.      Pulmonary :   - Supplemental oxygen to keep saturation above 92 %.  - IS     Cardiovascular:    #CAD s/p CABG  #HTN  #Dyslipidemia  #2nd degree AVB  - Monitor hemodynamic status.    - Continue PTA Aspirin  - Hold Losartan  - Resume atorvastatin in am     GI/Nutrition:   #Chronic pancreatitis  #s/p distal pancreatectomy, splenectomy, YVAN, proximal ileum resection  #GERD  - NPO except for medication, pending return of bowel function.   - Nutrition consulted. Appreciate recommendations.  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   - Obtain drain amylase POD 3  - PPI  - NG to LIS     Renal/ Fluid Balance:    - Will monitor intake and output.  - LR for IV fluid hydration--> change to D51/2  - ICU electrolyte replacement protocol  - Danielle in place     Endocrine:    #Type II DM  - Continue insulin gtt  - will likely benefit for endocrinology consult for diabetes  management      ID:  - No indication for antibiotics. Afebrile. Reactive leukocytosis 13K as expected.       Hematology:     #Acute blood loss anemia  -  ml. Hemoglobin 10.4. Platelets normal. No evidence of bleeding.   - Start subcutaneous Heparin prophylaxis     MSK:    - PT and OT consulted. Appreciate recommendations.     Lines/ tubes/ drains:  - RIJ CVC  - Epidural Catheter  - RUQ RUSTY drain  - NGT  - Santos        Prophylaxis:    - DVT prophylaxis: Mechanical.SQ heparin  - PUD prophylaxis: PPI     Code status:  - Full     Disposition:  -  To floor    Patient seen, findings and plan discussed with ICU staff     Kathy Bruno    - - - - - - - - - - - - - - - - - - - - - - - - - - - - - - - - - - - - - - - - - - - - - - - - - - - - - - - - - - - - - - -   SUBJECTIVE  Events reviewed. Moderate pain. Favors not moving to keep pain under control. Denies nausea, vomiting. Not passing gas. No dyspnea, chest pain.     PHYSICAL EXAMINATION:  Temp:  [98  F (36.7  C)-98.9  F (37.2  C)] 98.2  F (36.8  C)  Pulse:  [71-82] 73  Resp:  [11-19] 16  BP: ()/(31-80) 118/55  MAP:  [54 mmHg-84 mmHg] 69 mmHg  Arterial Line BP: ()/(40-68) 99/42  SpO2:  [91 %-98 %] 94 %    Gen: Awake, alert, NAD  HEENT: EOMI  Neuro: alert, oriented, no focal deficits, follows commands  CV: NR, Normotensive  Resp:clear lung sounds  Abd: soft, nondistended, Appropriately tender. Dressing with some strike through. RUSTY drain in place with serosanguinous output.   Psych: affect normal, behavior normal. Cooperative  Ext: WWP, no edema, resting tremor      LABS: Reviewed.   Arterial Blood Gases   Recent Labs   Lab 06/30/23  1241 06/30/23  1135 06/30/23  1006 06/30/23  0840   PH 7.42 7.35 7.43 7.41   PCO2 37 42 42 42   PO2 133* 189* 95 151*   HCO3 24 23 28 27     Complete Blood Count   Recent Labs   Lab 07/01/23  0410 06/30/23  1735 06/30/23  1241 06/30/23  1135 06/30/23  1006   WBC 13.6*  --   --   --   --    HGB 10.4*  --  11.7* 14.1 14.2     176  --   --   --      Basic Metabolic Panel  Recent Labs   Lab 07/01/23  0949 07/01/23  0900 07/01/23  0802 07/01/23  0700 07/01/23  0413 07/01/23  0410 06/30/23  1429 06/30/23  1241 06/30/23  1135 06/30/23  1006   NA  --   --   --   --   --  137  --  137 136 138   POTASSIUM  --   --   --   --   --  4.8  --  4.7 5.1* 4.9   CHLORIDE  --   --   --   --   --  103  --   --   --   --    CO2  --   --   --   --   --  26  --   --   --   --    BUN  --   --   --   --   --  30.3*  --   --   --   --    CR  --   --   --   --   --  1.12  --   --   --   --    GLC 91 76 81 101*   < > 179*   < > 185* 236* 158*    < > = values in this interval not displayed.     Liver Function Tests  No lab results found in last 7 days.  Pancreatic Enzymes  No lab results found in last 7 days.  Coagulation Profile  No lab results found in last 7 days.    IMAGING:  Recent Results (from the past 24 hour(s))   XR Thoracic Spine Port 1 View    Narrative    EXAMINATION: XR CROSSTABLE LATERAL THORACIC SPINE PORTABLE, 6/30/2023  2:33 PM     Technique: AP view(s) were obtained.     History: epidurogram    Comparison: 6, 5/31/2023/30/2023    Technique:     Findings/    Impression    Impression:  Limited evaluation on single AP view.    Catheter tip overlies the mid thoracic spine. Contrast incompletely  opacifies the thoracic spinal canal.    Postsurgical changes of CABG.  Central line overlies the distal superior vena cava in good position.    Gastric tube overlies the stomach.  Partially imaged laparotomy and surgical drain.    CHARY NINO MD         SYSTEM ID:  [id number]   XR Chest Port 1 View    Narrative    EXAM: Chest radiograph 6/30/2023 2:34 PM    HISTORY: 68 years Male CVC placement.     COMPARISON: None.    TECHNIQUE: Portable AP view the chest.    FINDINGS:   Right IJ central venous catheter tip terminating within the mid SVC.  Enteric tube crosses the diaphragm with tip and sidehole in the  gastric lumen. Analgesia catheter.  Postsurgical changes of the chest  with intact median sternotomy wires. Sternal surgical clips. The  trachea is midline. The cardiac silhouette size is within normal  limits. Atherosclerotic calcifications of the aortic arch. Distinct  pulmonary vasculature. Streaky perihilar and dense left retrocardiac  opacities with silhouetting of left hemidiaphragm. No appreciable  pneumothorax or pleural effusion. No acute or suspicious osseous  abnormality.      Impression    IMPRESSION:   1.  Right IJ central venous catheter tip terminating in the SVC. Other  devices as described above.  2.  Streaky/hazy perihilar and retrocardiac pulmonary opacities, favor  atelectasis/pulmonary edema.    I have personally reviewed the examination and initial interpretation  and I agree with the findings.    RONALD GOMEZ MD         SYSTEM ID:  [id number]

## 2023-07-01 NOTE — PROGRESS NOTES
"    [facility]    Progress Note - EGS Service       Date of Admission:  6/30/2023  Date of Surgery: 6/30/2023  Assessment & Plan: Surgery      Arnaldo Henderson is a 68 year old with a PMH of chronic pancreatitis, CAD, IDDM, GERD, HLD, HTN, admitted 6/30 for a distal pancreatectomy for treatment of chronic pancreatitis. Patient is POD1. Admitted to SICU for closer observation 6/30, transfer to floor 7/1.      - NPO, NGT to LIS, IVF   - DVT ppx with SQH  - monitor RUSTY drain output for s/s c/f leak, strip for clots as needed  - remove styles POD2  - agree with anesthesia consult for leaking epidural  - agree with switch to PCA from continuous drip via epidural and addition of ketamine for pain control  - PT/OT consult  - daily AM labs for monitoring hemoglobin, WBC, glucose, lytes       Diet: NPO for Medical/Clinical Reasons Except for: Meds, Ice Chips    DVT Prophylaxis: Heparin SQ   Styles Catheter: PRESENT, indication: Strict 1-2 Hour I&O  Lines: PRESENT      CVC Triple Lumen Right Internal jugular-Site Assessment: WDL  [REMOVED] Arterial Line 06/30/23 Radial-Site Assessment: WDL      Drains: PRESENT         - 1 Closed/Suction Drain(s)   Cardiac Monitoring: None  Code Status: Full Code      Clinically Significant Risk Factors          # Hypocalcemia: Lowest iCa = 4 mg/dL in last 2 days, will monitor and replace as appropriate                # Overweight: Estimated body mass index is 28.63 kg/m  as calculated from the following:    Height as of this encounter: 1.727 m (5' 8\").    Weight as of this encounter: 85.4 kg (188 lb 4.4 oz)., PRESENT ON ADMISSION          Disposition Plan     Expected Discharge Date: 07/04/2023                 The patient's care was discussed with the Chief Resident/Fellow Dr. Garcia.    Divya Rouse MD PGY1 [phone]  [facility]  Text page via [facility] Paging/Directory "     ______________________________________________________________________    Interval History   NAEO in SICU. POD1 from open distal pancreatectomy for chronic pancreatitis. Pain is tolerable, not ambulatory. No nausea/vomiting. Not passing flatus.     Physical Exam   Vital Signs: Temp: 98.2  F (36.8  C) Temp src: Axillary BP: 118/55 Pulse: 73   Resp: 14 SpO2: 94 % O2 Device: Nasal cannula Oxygen Delivery: 1 LPM  Weight: 188 lbs 4.37 oz    Intake/Output Summary (Last 24 hours) at 7/1/2023 0859  Last data filed at 7/1/2023 0800  Gross per 24 hour   Intake 5923.2 ml   Output 2582 ml   Net 3341.2 ml     General Appearance: Drowsy  Respiratory: no increased work of breathing  Cardiovascular: regular rate and rhythm  GI: soft, non-tender, non-distended  Skin: incisions clean, dry, intact  Other: RUSTY drain with sanguinous output (thin, bloody) as expected for POD1       Data     I have personally reviewed the following data over the past 24 hrs:    13.6 (H)  \   10.4 (L)   / 181     137 103 30.3 (H) /  76   4.8 26 1.12 \       TSH: N/A T4: N/A A1C: N/A       Procal: N/A CRP: N/A Lactic Acid: 1.4         Imaging results reviewed over the past 24 hrs:   Recent Results (from the past 24 hour(s))   XR Thoracic Spine Port 1 View    Narrative    EXAMINATION: XR CROSSTABLE LATERAL THORACIC SPINE PORTABLE, 6/30/2023  2:33 PM     Technique: AP view(s) were obtained.     History: epidurogram    Comparison: 6, 5/31/2023/30/2023    Technique:     Findings/    Impression    Impression:  Limited evaluation on single AP view.    Catheter tip overlies the mid thoracic spine. Contrast incompletely  opacifies the thoracic spinal canal.    Postsurgical changes of CABG.  Central line overlies the distal superior vena cava in good position.    Gastric tube overlies the stomach.  Partially imaged laparotomy and surgical drain.    CHARY NINO MD         SYSTEM ID:  [id number]   XR Chest Port 1 View    Narrative    EXAM: Chest radiograph  6/30/2023 2:34 PM    HISTORY: 68 years Male CVC placement.     COMPARISON: None.    TECHNIQUE: Portable AP view the chest.    FINDINGS:   Right IJ central venous catheter tip terminating within the mid SVC.  Enteric tube crosses the diaphragm with tip and sidehole in the  gastric lumen. Analgesia catheter. Postsurgical changes of the chest  with intact median sternotomy wires. Sternal surgical clips. The  trachea is midline. The cardiac silhouette size is within normal  limits. Atherosclerotic calcifications of the aortic arch. Distinct  pulmonary vasculature. Streaky perihilar and dense left retrocardiac  opacities with silhouetting of left hemidiaphragm. No appreciable  pneumothorax or pleural effusion. No acute or suspicious osseous  abnormality.      Impression    IMPRESSION:   1.  Right IJ central venous catheter tip terminating in the SVC. Other  devices as described above.  2.  Streaky/hazy perihilar and retrocardiac pulmonary opacities, favor  atelectasis/pulmonary edema.    I have personally reviewed the examination and initial interpretation  and I agree with the findings.    RONALD GOMEZ MD         SYSTEM ID:  [id number]

## 2023-07-01 NOTE — PROGRESS NOTES
Admitted/transferred from: PACU  Reason for admission/transfer: s/p distal pancreatectomy, splenectomy, lysis of adhesions, proximal ileum resection  Patient status upon admission/transfer: 2L NC, vital signs stable, epidural catheter in place infusing bupivicaine, styles, RUQ RUSTY drain, midline abdominal incision  Interventions: Fentanyl PCA initiated  Plan: Start insulin gtt  2 RN skin assessment: completed by KAI Alan and El RN  Sexual Orientation and Gender Identity (SOGI) smartfom completed: Not Done  Result of skin assessment and interventions/actions: bruising to arms, midline abdominal incision, RUQ RUSTY drain, preventative mepilex to sacrum/coccyx  Height, weight, drug calc weight: Done  Patient belongings (see Flowsheet - Adult Profile for details): clothes, shoes, glasses, hearing aids  MDRO education (if applicable): N/A

## 2023-07-01 NOTE — PHARMACY-ADMISSION MEDICATION HISTORY
"Pharmacy Intern Admission Medication History    Admission medication history is complete. The information provided in this note is only as accurate as the sources available at the time of the update.    Medication reconciliation/reorder completed by provider prior to medication history? Yes    Information Source(s): Patient and CareEverywhere/SureScripts via in-person    Pertinent Information:     Patient brought list of medications from home    Current dose of gabapentin is 300 mg in morning, 300 mg at midday, and 600 mg at bedtime, confirmed by patient's home medication list    Current carbidopa-levodopa dose is two  mg tablets 3 times daily (6:00 AM, 12:00 PM, 6:00 PM) and one  mg tablet at bedtime (10:00PM), confirmed by SureScripts and patient's home medication list    Note removed from insulin glargine written 5/15/23 \"instructed by PCP to take 1/2 dose\", patient taking 30 units at bedtime    Patient no longer taking citalopram (last dispensed 3/6/23 with 90 days supply)    Changes made to PTA medication list:    Added: None    Deleted: None    Changed:   o Gabapentin 300 mg capsule - take 300 mg by mouth 3 times daily 1 morning, 2 afternoon, 2 at night -> take 300 mg by mouth in morning, 300 mg by mouth at midday, and 600 mg by mouth at bedtime    Medication History Completed By: Liset Hollingsworth 7/1/2023 1:58 PM    Prior to Admission medications    Medication Sig Last Dose Taking? Auth Provider Long Term End Date   aspirin (ASA) 81 MG EC tablet Take 81 mg by mouth daily 6/29/2023 Yes Reported, Patient     atorvastatin (LIPITOR) 40 MG tablet Take 1 tablet by mouth daily 6/29/2023 at 0800 Yes Reported, Patient Yes    BD PEN NEEDLE LINDY 2ND GEN 32G X 4 MM miscellaneous USE TO INJECT INSULIN 4 TIMES A DAY 6/29/2023 at 2200 Yes Reported, Patient     carbidopa-levodopa (SINEMET CR)  MG CR tablet Take 1 tablet by mouth At Bedtime 6/29/2023 at 2200 Yes Reported, Patient Yes 9/19/23 "   carbidopa-levodopa (SINEMET)  MG tablet Take 2 tablets by mouth 3 times daily 6/30/2023 at 0430 Yes Unknown, Entered By History Yes    gabapentin (NEURONTIN) 300 MG capsule Take 300 mg by mouth every morning , 300 mg by mouth at midday, and 600 mg by mouth at bedtime 6/29/2023 Yes Reported, Patient No    insulin aspart (NOVOLOG PEN) 100 UNIT/ML pen Inject Subcutaneous 3 times daily (with meals) Give 2 units per every 50 above 140. 6/29/2023 at 0800 Yes Reported, Patient Yes    Insulin Glargine (BASAGLAR KWIKPEN SC) Inject 30 Units Subcutaneous At Bedtime 6/29/2023 at 2200 Yes Reported, Patient Yes    lipase-protease-amylase (ZENPEP) 78283-656800 units CPEP Take 1 capsule by mouth 3 times daily (with meals) 6/29/2023 at 0800 Yes Cedric Saldana MD     losartan (COZAAR) 50 MG tablet Take 50 mg by mouth At Bedtime 6/29/2023 at 2200 Yes Reported, Patient Yes    magnesium oxide (MAG-OX) 400 MG tablet Take 400 mg by mouth every other day 6/29/2023 at 0800 Yes Unknown, Entered By History     Multiple Vitamin (MULTI-VITAMINS) TABS Take 1 tablet by mouth daily 6/29/2023 at 0800 Yes Reported, Patient     omeprazole (PRILOSEC) 20 MG DR capsule Take 20 mg by mouth daily 6/29/2023 at 0800 Yes Reported, Patient

## 2023-07-01 NOTE — OP NOTE
[facility]  Operative Note    Pre-operative diagnosis:         Chronic pancreatitis (H) [K86.1]  Post-operative diagnosis        Same as pre-operative diagnosis     Procedure:      Procedure(s):  Open distal pancreactectomy with splenectomy, lysis of adhesions, resection of proximal illeum  Surgeon:         Surgeon(s) and Role:     * Ashvin Haider MD - Primary     * Payton Garcia MD - Resident - Assisting  Anesthesia:     General with Block        Estimated Blood Loss: 600 ml    Specimens:       ID Type Source Tests Collected by Time Destination   1 : spleen and tail of pancreas Tissue Other SURGICAL PATHOLOGY EXAM Ashvin Haider MD 6/30/2023 11:17 AM     2 : Distal Pancreas Tissue Pancreas SURGICAL PATHOLOGY EXAM Ashvin Haider MD 6/30/2023 12:05 PM     3 : Proximal Ileum with Stenotic Area and Prior Anastomosis Tissue Small Intestine, Ileum SURGICAL PATHOLOGY EXAM Ashvin Haider MD 6/30/2023 12:11 PM         OPERATIVE INDICATIONS:  Arnaldo Henderson is a 68 year old year old with a history of severe acute pancreatitis secondary to gallstones about a year and a half ago complicated by multiple peripancreatic fluid collections status post AXIOS stent placement complicated by partial small bowel obstruction secondary to migration of an AXIOS stent necessitating laparoscopic removal of the stent. This procedure was complicated by postoperative wound infection. 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    After understanding the risks and benefits of proceeding with surgery, the patient consented to undergo surgery.    OPERATIVE DETAILS:  The patient was brought to the operating room and prepared in a routine fashion. The patient was placed in supine position and general endotracheal anesthesia was induced.  Preoperative antibiotics were given. A Santos catheter and nasogastric tube were placed. The abdomen was prepped and draped in the usual sterile fashion. A timeout was performed prior to surgery and documented by the nursing team.    A vertical midline incision was made from xiphoid to just below the umbilicus. This was deepened through the subcutaneous tissues and hemostasis was achieved with electrocautery. The linea alba was identified and incised and the peritoneal cavity entered.    The abdomen was explored. The small bowel was ran from the ligament of Treitz to the cecum. Several adhesive bands were taken down sharply and a concerning area of dense thickening was appreciated in the distal jejunum/proximal ileum. The prior anastomosis was identified and patent.     Using a combination of Ligasure and electrocautery, the gastrocolic ligament was incised to open the lesser sac. The lesser sac was nearly completely obliterated by previous pancreatitis, making this dissection quite difficult. The gastrosplenic ligament and the posterior attachments to the stomach were divided with Ligasure to allow for cephalad retraction of the stomach.The splenic flexture was mobilized. The colon was packed inferiorly. Packs were placed superiorly to bring the spleen toward midline.The spleen was carefully mobilized medially to expose the splenorenal ligament, which was carefully incised with electrocautery. The spleen and the tail of the pancreas were elevated from the retroperitoneum, taking care not to injure the hilar vessels or pancreas.     The splenic artery and vein were next identified and then clamped, divided close to the superior edge of the pancreas, and suture ligated with 2-0 silk. A walled off collection of peripancreatic fluid was encountered along the distal third of the pancreas and was unroofed as part of mobilization of the specimen. The fistulous tract between the stomach and peripancreatic fluid collection  created by the prior AXIOS stent was divided, entering the stomach in distal posterior stomach.  We then mobilized the distal pancreas. This was quite difficult due to previous pancreatitis. We mobilized to an area just to the patient's left of the portal vein (and at the level of the previously-drained fluid collection). At this point we divided the splenic vein and splenic artery, then divided the neck of the pancreas with electrocautery. We did not identify a structure coresponding with pancreatic duct in either the specimen or in the more proximal pancreas.  The specimen was passed off. The surgical bed was irrigated with sterile saline and hemostasis achieved.     The opening in the posterior wall of the stomach was closed with interrupted 2-0 silk suture. The position of the NG tube was confirmed. The small bowel was again inspected and a short segment of bowel containing the area of concern in the distal jejunum was resected using a linear stapler to divide the bowel and ligasure to divide the mesentery. An anastomosis was then created using 35mm linear stapler to create the common channel and closing the enterotomy with interrupted 3-0 silk suture. The mesenteric defect was closed with running  3-0 vicryl.    A RUSTY drain was placed in the left upper quadrant posterior to the stomach and brought out thru a separate RLQ stab wound.    The incision was then closed. The fascia was closed with a running suture of looped 0 PDS starting at each end of the wound. The skin was closed with skin staples.    The patient tolerated the procedure well and was taken to the postanesthesia care unit in stable condition with plans for close monitoring in the intensive care unit overnight.    All needle and sponge counts were correct x 2 at the end of the procedure. Dr. Haider was present during the entirety of the procedure.    Payton Garcia MD  Surgery Resident    I was present, scrubbed, and participated in entire  procedure.    GB

## 2023-07-01 NOTE — PLAN OF CARE
Shift Events: A&Ox4, follows commands, baseline upper extremity tremors, abd/incisional pain controlled with scheduled tylenol, epidural bupivacaine & fentanyl PCA, Ax2 to chair - stayed in chair for over 4 hours. SR to first degree AV block 60s - 70s. Room air, lung sounds clear, utilizing IS when awake. NG remains in place to LIS, NPO except for medications, styles removed and due to void around 2030, insulin drip continuous - switched to normal algorithm. Midline abd incision, RUQ RUSTY with serous output, epidural site bleeding - ok per anesthesia. Magnesium replaced.    Plan: Increase activity as tolerated. Continue with plan of care.    For complete assessments and vital signs, please refer to flowsheets.       Goal Outcome Evaluation:    Plan of Care Reviewed With: patient, spouse, family    Overall Patient Progress: improving

## 2023-07-01 NOTE — PROGRESS NOTES
Pain Service Progress Note  [facility]  Date: 07/01/2023       Patient Name: Arnaldo Henderson  MRN: [id number]  Age: 68 year old  Sex: male      Assessment: Arnaldo Henderson 67 y/o with PMH chronic pancreatitis, CAD, CABG, 2nd degree AVB, Parkinson's disease and chronic pain. He is now s/p distal pancreatectomy, splenectomy, YVAN, proximal ileum resection. Had epidural catheter for pain.    Procedure: Distal pancreatectomy, splenectomy, YVAN, proximal ileum resection    Date of Surgery: 6/30/23    Date of Catheter Placement: 6/30/23    Plan/Recommendations:  1. Regional Anesthesia/Analgesia  -Continuous Catheter Type/Site: epidural T6-7  Infusate: 0.125% bupivacaine  Continuous Infusion at 7 mL/hr  Plan to maintain catheter, max of 7 days    2. Anticoagulation  -Please contact Inpatient Pain Service before ordering or making any anticoagulation changes      3. Multimodal Analgesia  - Scheduled Tylenol  - Gabapentin  - Current PCA via R internal jugular CVC: fentanyl 15 mcg dose, lockout 10 minutes, 1-hour limit 90 mcg. Discontinued continuous rate this AM  - Remaining pain regimen per primary team    Pain Service will continue to follow.    Discussed with attending anesthesiologist    Дмитрий Vora MD  07/01/2023     Overnight Events: NAEON. Paged 7/1 AM about small leakage from epidural site; assessed and minimal sanguinous drainage that is unremarkable and expected.    Tubes/Drains: Yes    Subjective: Reporting pain at 1/10 when laying still, and 3/10 when moving.  Nausea: No  Vomiting: No  Pruritus: No  Symptoms of LAST: No    Minimal sanguinous drainage from epidural site. Reassuring exam.    Pain Location:  Incisional    Pain Intensity:    Pain at Rest: 1/10   Pain with Activity: 3/10  Comfort Goal: 1/10   Baseline Pain: 1/10   Satisfied with your level of pain control: Yes    Diet: NPO for Medical/Clinical Reasons Except for: Meds, Ice Chips    Relevant Labs:  Recent Labs   Lab Test  "07/01/23  0410 06/30/23  1735 05/18/23  0556   INR  --   --  1.32*      < > 164   BUN 30.3*  --  19.6    < > = values in this interval not displayed.       Physical Exam:  Vitals: /55   Pulse 73   Temp 36.8  C (98.2  F) (Axillary)   Resp 14   Ht 1.727 m (5' 8\")   Wt 85.4 kg (188 lb 4.4 oz)   SpO2 94%   BMI 28.63 kg/m      Physical Exam:   Orientation:  Alert, oriented, and in no acute distress: Yes  Sedation: No    Motor Examination:  5/5 Strength in lower extremities: Yes    Sensory Level:   Decrease in sensation: Yes    Catheter Site:   Catheter entry site is clean/dry/intact: Yes    Tender: No      Relevant Medications:  Current Pain Medications:  Medications related to Pain Management (From now, onward)    Start     Dose/Rate Route Frequency Ordered Stop    07/03/23 0000  acetaminophen (TYLENOL) tablet 650 mg         650 mg Oral EVERY 4 HOURS PRN 06/30/23 1702      07/02/23 0000  polyethylene glycol (MIRALAX) Packet 17 g         17 g Oral or Feeding Tube DAILY PRN 07/01/23 0647      07/01/23 0800  aspirin EC tablet 81 mg        Note to Pharmacy: PTA Sig:Take 81 mg by mouth daily      81 mg Oral DAILY 06/30/23 1703      07/01/23 0700  fentaNYL (SUBLIMAZE) PCA 50 mcg/mL OPIOID TOLERANT          Intravenous CONTINUOUS 07/01/23 0650      07/01/23 0650  ketamine (KETALAR) injection 5 mg         5 mg  over 2-5 Minutes Intravenous EVERY 2 HOURS PRN 07/01/23 0651      06/30/23 2000  gabapentin (NEURONTIN) capsule 400 mg        Note to Pharmacy: PTA Sig:Take 300 mg by mouth 3 times daily 1 morning, 2 afternoon, 2 at night      400 mg Oral 3 TIMES DAILY 06/30/23 1703      06/30/23 1800  acetaminophen (TYLENOL) tablet 975 mg         975 mg Oral EVERY 8 HOURS 06/30/23 1702 07/03/23 1759    06/30/23 1703  diphenhydrAMINE (BENADRYL) solution 12.5 mg        See Hyperspace for full Linked Orders Report.    12.5 mg Oral or Feeding Tube EVERY 6 HOURS PRN 06/30/23 1703      06/30/23 1703  diphenhydrAMINE " "(BENADRYL) injection 12.5 mg        See Hyperspace for full Linked Orders Report.    12.5 mg Intravenous EVERY 6 HOURS PRN 06/30/23 1703      06/30/23 1702  magnesium hydroxide (MILK OF MAGNESIA) suspension 30 mL         30 mL Oral DAILY PRN 06/30/23 1702      06/30/23 1702  bisacodyl (DULCOLAX) suppository 10 mg         10 mg Rectal DAILY PRN 06/30/23 1702      06/30/23 1702  methocarbamol (ROBAXIN) tablet 500 mg         500 mg Oral EVERY 6 HOURS PRN 06/30/23 1702      06/30/23 1702  lidocaine 1 % 0.1-1 mL         0.1-1 mL Other EVERY 1 HOUR PRN 06/30/23 1702      06/30/23 1702  lidocaine (LMX4) cream          Topical EVERY 1 HOUR PRN 06/30/23 1702      06/30/23 0800  bupivacaine (MARCAINE) 0.125 % in sodium chloride 0.9 % 250 mL EPIDURAL Infusion         7 mL/hr  EPIDURAL CONTINUOUS 06/30/23 0749      06/30/23 0745  nalbuphine (NUBAIN) injection 2.5-5 mg         2.5-5 mg Intravenous EVERY 6 HOURS PRN 06/30/23 0749            Primary Service Contacted with Recommendations? No      Please see A&P for additional details of medical decision making.      Acute Inpatient Pain Service [facility]  Hours of pain coverage 24/7   Page via Amcom- Please Page the Pain Team Via Amcom: \"PAIN MANAGEMENT ACUTE INPATIENT/ [facility]\"             "

## 2023-07-01 NOTE — PLAN OF CARE
Shift Summary: A&Ox4, drowsy, follows commands, generalized weakness, pupils PERRLA, baseline BUE tremor. Fent gtt continuous rate 10 mg/hr with 15 mg q10 min PCA doses. Epidural with bupivicaine gtt at 7ml/hr. C/o abd and neck pain. When pt experiencing pain, HR bradys down to mid 30s momentarily. MD notified, 12 lead ordered. Baseline HR 70s-80s, SR with AV block. Blood pressures within parameters. 1L NC, LS clear. NG to LIS, bile/green output. NPO expect for meds. Insulin gtt alg 4 with TPIAT algorithm. Santos in place with adequate output. No BM overnight. RUQ RUSTY drain with serosanginous output. Midline abd incision, primapore dressing, generalized bruising, RIJ, 2 PIV, LR at 100 ml/hr.     Plan: continue to manage pain, continue with POC.

## 2023-07-02 ENCOUNTER — APPOINTMENT (OUTPATIENT)
Dept: PHYSICAL THERAPY | Facility: CLINIC | Age: 68
DRG: 406 | End: 2023-07-02
Attending: SURGERY
Payer: MEDICARE

## 2023-07-02 LAB
ANION GAP SERPL CALCULATED.3IONS-SCNC: 6 MMOL/L (ref 7–15)
BUN SERPL-MCNC: 20.5 MG/DL (ref 8–23)
CALCIUM SERPL-MCNC: 7.3 MG/DL (ref 8.8–10.2)
CHLORIDE SERPL-SCNC: 102 MMOL/L (ref 98–107)
CREAT SERPL-MCNC: 0.81 MG/DL (ref 0.67–1.17)
DEPRECATED HCO3 PLAS-SCNC: 27 MMOL/L (ref 22–29)
ERYTHROCYTE [DISTWIDTH] IN BLOOD BY AUTOMATED COUNT: 12.9 % (ref 10–15)
GFR SERPL CREATININE-BSD FRML MDRD: >90 ML/MIN/1.73M2
GLUCOSE BLDC GLUCOMTR-MCNC: 114 MG/DL (ref 70–99)
GLUCOSE BLDC GLUCOMTR-MCNC: 115 MG/DL (ref 70–99)
GLUCOSE BLDC GLUCOMTR-MCNC: 116 MG/DL (ref 70–99)
GLUCOSE BLDC GLUCOMTR-MCNC: 119 MG/DL (ref 70–99)
GLUCOSE BLDC GLUCOMTR-MCNC: 121 MG/DL (ref 70–99)
GLUCOSE BLDC GLUCOMTR-MCNC: 122 MG/DL (ref 70–99)
GLUCOSE BLDC GLUCOMTR-MCNC: 126 MG/DL (ref 70–99)
GLUCOSE BLDC GLUCOMTR-MCNC: 127 MG/DL (ref 70–99)
GLUCOSE BLDC GLUCOMTR-MCNC: 130 MG/DL (ref 70–99)
GLUCOSE BLDC GLUCOMTR-MCNC: 130 MG/DL (ref 70–99)
GLUCOSE BLDC GLUCOMTR-MCNC: 131 MG/DL (ref 70–99)
GLUCOSE BLDC GLUCOMTR-MCNC: 133 MG/DL (ref 70–99)
GLUCOSE BLDC GLUCOMTR-MCNC: 139 MG/DL (ref 70–99)
GLUCOSE SERPL-MCNC: 153 MG/DL (ref 70–99)
HCT VFR BLD AUTO: 29.9 % (ref 40–53)
HGB BLD-MCNC: 9.8 G/DL (ref 13.3–17.7)
MAGNESIUM SERPL-MCNC: 1.9 MG/DL (ref 1.7–2.3)
MCH RBC QN AUTO: 32.2 PG (ref 26.5–33)
MCHC RBC AUTO-ENTMCNC: 32.8 G/DL (ref 31.5–36.5)
MCV RBC AUTO: 98 FL (ref 78–100)
PHOSPHATE SERPL-MCNC: 2.2 MG/DL (ref 2.5–4.5)
PLATELET # BLD AUTO: 218 10E3/UL (ref 150–450)
POTASSIUM SERPL-SCNC: 3.9 MMOL/L (ref 3.4–5.3)
RBC # BLD AUTO: 3.04 10E6/UL (ref 4.4–5.9)
SODIUM SERPL-SCNC: 135 MMOL/L (ref 136–145)
WBC # BLD AUTO: 10.3 10E3/UL (ref 4–11)

## 2023-07-02 PROCEDURE — 84100 ASSAY OF PHOSPHORUS: CPT | Performed by: SURGERY

## 2023-07-02 PROCEDURE — 83735 ASSAY OF MAGNESIUM: CPT | Performed by: SURGERY

## 2023-07-02 PROCEDURE — 200N000002 HC R&B ICU UMMC

## 2023-07-02 PROCEDURE — 85014 HEMATOCRIT: CPT | Performed by: STUDENT IN AN ORGANIZED HEALTH CARE EDUCATION/TRAINING PROGRAM

## 2023-07-02 PROCEDURE — 250N000009 HC RX 250

## 2023-07-02 PROCEDURE — 250N000013 HC RX MED GY IP 250 OP 250 PS 637: Performed by: STUDENT IN AN ORGANIZED HEALTH CARE EDUCATION/TRAINING PROGRAM

## 2023-07-02 PROCEDURE — 01996 DLY HOSP MGMT EDRL RX ADMIN: CPT | Mod: GC | Performed by: ANESTHESIOLOGY

## 2023-07-02 PROCEDURE — 250N000011 HC RX IP 250 OP 636: Performed by: NURSE PRACTITIONER

## 2023-07-02 PROCEDURE — 258N000003 HC RX IP 258 OP 636: Performed by: STUDENT IN AN ORGANIZED HEALTH CARE EDUCATION/TRAINING PROGRAM

## 2023-07-02 PROCEDURE — 250N000013 HC RX MED GY IP 250 OP 250 PS 637: Performed by: SURGERY

## 2023-07-02 PROCEDURE — 93010 ELECTROCARDIOGRAM REPORT: CPT | Performed by: INTERNAL MEDICINE

## 2023-07-02 PROCEDURE — 80048 BASIC METABOLIC PNL TOTAL CA: CPT

## 2023-07-02 PROCEDURE — 250N000011 HC RX IP 250 OP 636: Mod: JZ | Performed by: STUDENT IN AN ORGANIZED HEALTH CARE EDUCATION/TRAINING PROGRAM

## 2023-07-02 PROCEDURE — 93005 ELECTROCARDIOGRAM TRACING: CPT

## 2023-07-02 PROCEDURE — 258N000003 HC RX IP 258 OP 636: Performed by: TRANSPLANT SURGERY

## 2023-07-02 PROCEDURE — 250N000011 HC RX IP 250 OP 636: Performed by: STUDENT IN AN ORGANIZED HEALTH CARE EDUCATION/TRAINING PROGRAM

## 2023-07-02 PROCEDURE — 97162 PT EVAL MOD COMPLEX 30 MIN: CPT | Mod: GP

## 2023-07-02 PROCEDURE — 97116 GAIT TRAINING THERAPY: CPT | Mod: GP

## 2023-07-02 PROCEDURE — 250N000013 HC RX MED GY IP 250 OP 250 PS 637: Performed by: NURSE PRACTITIONER

## 2023-07-02 PROCEDURE — 97530 THERAPEUTIC ACTIVITIES: CPT | Mod: GP

## 2023-07-02 PROCEDURE — 250N000009 HC RX 250: Performed by: TRANSPLANT SURGERY

## 2023-07-02 PROCEDURE — 250N000013 HC RX MED GY IP 250 OP 250 PS 637: Performed by: TRANSPLANT SURGERY

## 2023-07-02 RX ORDER — POTASSIUM PHOS IN 0.9 % NACL 15MMOL/250
15 PLASTIC BAG, INJECTION (ML) INTRAVENOUS ONCE
Status: COMPLETED | OUTPATIENT
Start: 2023-07-02 | End: 2023-07-02

## 2023-07-02 RX ORDER — MAGNESIUM OXIDE 400 MG/1
400 TABLET ORAL EVERY 4 HOURS
Status: COMPLETED | OUTPATIENT
Start: 2023-07-02 | End: 2023-07-02

## 2023-07-02 RX ADMIN — MAGNESIUM OXIDE TAB 400 MG (241.3 MG ELEMENTAL MG) 400 MG: 400 (241.3 MG) TAB at 03:55

## 2023-07-02 RX ADMIN — CARBIDOPA AND LEVODOPA 2 TABLET: 25; 100 TABLET ORAL at 17:13

## 2023-07-02 RX ADMIN — PANTOPRAZOLE SODIUM 40 MG: 40 TABLET, DELAYED RELEASE ORAL at 08:20

## 2023-07-02 RX ADMIN — BUPIVACAINE HYDROCHLORIDE: 7.5 INJECTION, SOLUTION EPIDURAL; RETROBULBAR at 03:10

## 2023-07-02 RX ADMIN — GABAPENTIN 400 MG: 400 CAPSULE ORAL at 08:21

## 2023-07-02 RX ADMIN — POTASSIUM PHOSPHATE, MONOBASIC POTASSIUM PHOSPHATE, DIBASIC 15 MMOL: 224; 236 INJECTION, SOLUTION, CONCENTRATE INTRAVENOUS at 03:55

## 2023-07-02 RX ADMIN — ACETAMINOPHEN 975 MG: 325 TABLET, FILM COATED ORAL at 08:20

## 2023-07-02 RX ADMIN — ACETAMINOPHEN 975 MG: 325 TABLET, FILM COATED ORAL at 02:14

## 2023-07-02 RX ADMIN — GABAPENTIN 400 MG: 400 CAPSULE ORAL at 19:29

## 2023-07-02 RX ADMIN — CARBIDOPA AND LEVODOPA 1 TABLET: 50; 200 TABLET, EXTENDED RELEASE ORAL at 22:06

## 2023-07-02 RX ADMIN — DEXTROSE AND SODIUM CHLORIDE: 5; 450 INJECTION, SOLUTION INTRAVENOUS at 09:00

## 2023-07-02 RX ADMIN — HEPARIN SODIUM 5000 UNITS: 5000 INJECTION, SOLUTION INTRAVENOUS; SUBCUTANEOUS at 08:21

## 2023-07-02 RX ADMIN — HEPARIN SODIUM 5000 UNITS: 5000 INJECTION, SOLUTION INTRAVENOUS; SUBCUTANEOUS at 15:54

## 2023-07-02 RX ADMIN — CARBIDOPA AND LEVODOPA 2 TABLET: 25; 100 TABLET ORAL at 11:30

## 2023-07-02 RX ADMIN — ACETAMINOPHEN 975 MG: 325 TABLET, FILM COATED ORAL at 15:54

## 2023-07-02 RX ADMIN — MAGNESIUM OXIDE TAB 400 MG (241.3 MG ELEMENTAL MG) 400 MG: 400 (241.3 MG) TAB at 08:21

## 2023-07-02 RX ADMIN — HUMAN INSULIN 1 UNITS/HR: 100 INJECTION, SOLUTION SUBCUTANEOUS at 19:29

## 2023-07-02 RX ADMIN — HEPARIN SODIUM 5000 UNITS: 5000 INJECTION, SOLUTION INTRAVENOUS; SUBCUTANEOUS at 02:14

## 2023-07-02 RX ADMIN — CARBIDOPA AND LEVODOPA 2 TABLET: 25; 100 TABLET ORAL at 08:21

## 2023-07-02 RX ADMIN — ATORVASTATIN CALCIUM 40 MG: 40 TABLET, FILM COATED ORAL at 08:21

## 2023-07-02 RX ADMIN — ASPIRIN 81 MG: 81 TABLET, COATED ORAL at 08:20

## 2023-07-02 RX ADMIN — GABAPENTIN 400 MG: 400 CAPSULE ORAL at 13:55

## 2023-07-02 ASSESSMENT — ACTIVITIES OF DAILY LIVING (ADL)
ADLS_ACUITY_SCORE: 28
ADLS_ACUITY_SCORE: 28
ADLS_ACUITY_SCORE: 32
ADLS_ACUITY_SCORE: 30
ADLS_ACUITY_SCORE: 28
ADLS_ACUITY_SCORE: 32
ADLS_ACUITY_SCORE: 28
ADLS_ACUITY_SCORE: 30
ADLS_ACUITY_SCORE: 28
ADLS_ACUITY_SCORE: 30

## 2023-07-02 NOTE — PLAN OF CARE
Major Shift Events: Up in the chair from [id number] today, walked the mendoza w/ therapy. Fentanyl PCA & epidural in place & effective. Went into 2nd degree AV block x1 today, cathy in the 30's for 30-45 sec, asymptomatic. NG pulled per order. Insulin gtt on alg 2, 1 unit(s)/hr. Straight cathed @ 1800 for 700 mL, pt attempted several times to void but unsuccessful. RUQ RUSTY w/ increased output this afternoon, General Surgery resident paged.   Plan: Transfer to  when a bed becomes available. Monitor BG.   For vital signs and complete assessments, please see documentation flowsheets.       Goal Outcome Evaluation:      Plan of Care Reviewed With: patient, spouse, family    Overall Patient Progress: improving

## 2023-07-02 NOTE — PLAN OF CARE
Shift Summary: A&Ox4, drowsy, follows commands, generalized weakness, pupils PERRLA, baseline BUE tremor. Fent PCA and epidural with bupivacaine adequately managing pain. HR occasionally bradys down to 30s/40s. Sinus rhythm with 2nd degree AV block. Blood pressures within parameters. 1L NC, LS clear. NG to LIS, no output overnight. NPO expect for meds. Pt experiencing urinary retention, straight cath x2, due to void at 0900. No BM overnight. Insulin gtt alg 1. Midline abd incision with primapore dressing, RUQ RUSTY with minimal output, epidural site bleeding but ok per anesthesia, appears to not be increasing. Mag and phos replaced.     Plan: work with PT/OT, continue to adequately manage pain, transfer out of ICU when bed available

## 2023-07-02 NOTE — PROGRESS NOTES
07/02/23 0845   Appointment Info   Signing Clinician's Name / Credentials (PT) Whitney Mendez DPT       Present no   Living Environment   People in Home spouse   Current Living Arrangements apartment   Home Accessibility no concerns   Transportation Anticipated family or friend will provide   Living Environment Comments Pt lives in an apartment in [place] w/ his wife. No stairs to enter apartment.   Self-Care   Usual Activity Tolerance good   Current Activity Tolerance moderate   Regular Exercise Yes   Activity/Exercise Type other (see comments)  (PT/OT at [facility] in [place])   Equipment Currently Used at Home none   Fall history within last six months no   Activity/Exercise/Self-Care Comment IND at baseline w/ mobility and ADLs.   General Information   Onset of Illness/Injury or Date of Surgery 06/30/23   Referring Physician Ashvin Haider MD   Patient/Family Therapy Goals Statement (PT) to return to baseline IND   Pertinent History of Current Problem (include personal factors and/or comorbidities that impact the POC) Arnaldo Henderson 69 y/o with PMH chronic pancreatitis, CAD, CABG, 2nd degree AVB, Parkinson's disease and chronic pain. He is now s/p distal pancreatectomy, splenectomy, YVAN, proximal ileum resection.   Existing Precautions/Restrictions fall;abdominal   Weight-Bearing Status - LUE partial weight-bearing (% in comments)  (<10#)   Weight-Bearing Status - RUE partial weight-bearing (% in comments)  (<10#)   Heart Disease Risk Factors Age;Gender;Medical history;Lack of physical activity   General Observations daughter present and supportive   Cognition   Affect/Mental Status (Cognition) WNL   Orientation Status (Cognition) oriented x 4   Follows Commands (Cognition) WNL   Cognitive Status Comments slightly flat affect/ speech.   Pain Assessment   Patient Currently in Pain Yes, see Vital Sign flowsheet  (6-7/10 at incision site throughout mobility)    Integumentary/Edema   Integumentary/Edema Comments abdominal incision BUE/ hands and LEs edematous   Posture    Posture Forward head position;Protracted shoulders;Kyphosis   Range of Motion (ROM)   ROM Comment grossly WFL BUE/LE.   Strength (Manual Muscle Testing)   Strength Comments deconditioned w/ functional mobility but >3/5 for basic transfers.   Bed Mobility   Comment, (Bed Mobility) modA of 2 via log roll supine>sit HOb 30 degrees + rail   Transfers   Comment, (Transfers) sit<>stand Dagn of 2   Gait/Stairs (Locomotion)   Comment, (Gait/Stairs) CGA w/ FWW, flexed trunk/ knees posture, short shuffling stepping, mild festinating   Balance   Balance Comments Dang initially at EOB static unsupported sitting balance, Dang static standing balance   Sensory Examination   Sensory Perception patient reports no sensory changes   Coordination   Coordination Comments bradykinetic w/ movements   Muscle Tone   Muscle Tone Comments UE hand tremor, baseline   Clinical Impression   Criteria for Skilled Therapeutic Intervention Yes, treatment indicated   PT Diagnosis (PT) impaired functional mobility   Influenced by the following impairments medical status, PMH/ comorbidities, decreased activity tolerance, pain, decreased strength and balance   Functional limitations due to impairments impaired IND w/ bed mobility, transfers, gait   Clinical Presentation (PT Evaluation Complexity) Evolving/Changing   Clinical Presentation Rationale current status, clinical judgement, PMH   Clinical Decision Making (Complexity) moderate complexity   Planned Therapy Interventions (PT) balance training;bed mobility training;home exercise program;gait training;neuromuscular re-education;patient/family education;postural re-education;strengthening;stretching;transfer training;progressive activity/exercise;risk factor education;home program guidelines   Anticipated Equipment Needs at Discharge (PT) walker, standard   Risk & Benefits of therapy have  been explained evaluation/treatment results reviewed;care plan/treatment goals reviewed;risks/benefits reviewed;current/potential barriers reviewed;participants voiced agreement with care plan;participants included;patient;daughter   Clinical Impression Comments Pt presents below baseline w/ decreased activity tolerance, pain and new precautions. Pt deconditioned w/ decreased functional strength and balance. Will benefit from skilled IP PT to progress IND.   PT Total Evaluation Time   PT Eval, Moderate Complexity Minutes (94678) 15   Plan of Care Review   Plan of Care Reviewed With patient;daughter   Physical Therapy Goals   PT Frequency 6x/week   PT Predicted Duration/Target Date for Goal Attainment 07/07/23   PT Goals Bed Mobility;Transfers;Gait   PT: Bed Mobility Independent;Supine to/from sit;Rolling;Bridging;Within precautions   PT: Transfers Modified independent;Sit to/from stand;Bed to/from chair;Assistive device;Within precautions   PT: Gait Modified independent;Assistive device;Standard walker;Greater than 200 feet   PT Discharge Planning   PT Discharge Recommendation (DC Rec) [facility] patient will benefit from intensive, interdisciplinary therapy.  Anticipate will be able to tolerate 3 hours of therapy per day   PT Rationale for DC Rec Pt below functional baseline, requires A of 1-2 for transfers and gait, is a high falls risk at this time. Anticipate would benefit from ARU level of intensity to progress functional IND.   PT Brief overview of current status A of 2 bed mobility, A of 1-2 SPT w/ FWW and short gait   Total Session Time   Total Session Time (sum of timed and untimed services) 15

## 2023-07-02 NOTE — PROGRESS NOTES
Pain Service Progress Note  [facility]  Date: 07/02/2023       Patient Name: Arnaldo Henderson  MRN: [id number]  Age: 68 year old  Sex: male      Assessment: Arnaldo Henderson 67 y/o with PMH chronic pancreatitis, CAD, CABG, 2nd degree AVB, Parkinson's disease and chronic pain. He is now s/p distal pancreatectomy, splenectomy, YVAN, proximal ileum resection. Had epidural catheter for pain.    Procedure: Distal pancreatectomy, splenectomy, YVAN, proximal ileum resection    Date of Surgery: 6/30/23    Date of Catheter Placement: 6/30/23    Plan/Recommendations:  1. Regional Anesthesia/Analgesia  -Continuous Catheter Type/Site: epidural T6-7  Infusate: 0.125% bupivacaine  Continuous Infusion at 7 mL/hr  Plan to maintain catheter, max of 7 days    2. Anticoagulation  -Please contact Inpatient Pain Service before ordering or making any anticoagulation changes      3. Multimodal Analgesia  - Scheduled Tylenol  - Gabapentin  - Current PCA via R internal jugular CVC: fentanyl 15 mcg dose, lockout 10 minutes, 1-hour limit 90 mcg. Discontinued continuous rate this AM  - Remaining pain regimen per primary team    Pain Service will continue to follow.    Discussed with attending anesthesiologist    Дмитрий Vora MD  07/02/2023     Overnight Events: NAEON. Continues to feel well.      Tubes/Drains: Yes    Subjective: Reporting pain at 1/10 when laying still, and 4-5/10 when moving.  Nausea: No  Vomiting: No  Pruritus: No  Symptoms of LAST: No    Minimal sanguinous drainage from epidural site. Reassuring exam.    Pain Location:  Incisional    Pain Intensity:    Pain at Rest: 1/10   Pain with Activity: 3/10  Comfort Goal: 1/10   Baseline Pain: 1/10   Satisfied with your level of pain control: Yes    Diet: NPO for Medical/Clinical Reasons Except for: Meds, Ice Chips    Relevant Labs:  Recent Labs   Lab Test 07/01/23  0410 06/30/23  1735 05/18/23  0556   INR  --   --  1.32*      < > 164   BUN 30.3*  --  " 19.6    < > = values in this interval not displayed.       Physical Exam:  Vitals: BP (!) 150/78   Pulse 77   Temp 36.4  C (97.6  F) (Oral)   Resp 20   Ht 1.727 m (5' 8\")   Wt 87.5 kg (192 lb 14.4 oz)   SpO2 93%   BMI 29.33 kg/m      Physical Exam:   Orientation:  Alert, oriented, and in no acute distress: Yes  Sedation: No    Motor Examination:  5/5 Strength in lower extremities: Yes    Sensory Level:   Decrease in sensation: Yes    Catheter Site:   Catheter entry site is clean/dry/intact: Yes    Tender: No      Relevant Medications:  Current Pain Medications:  Medications related to Pain Management (From now, onward)    Start     Dose/Rate Route Frequency Ordered Stop    07/03/23 0000  acetaminophen (TYLENOL) tablet 650 mg         650 mg Oral EVERY 4 HOURS PRN 06/30/23 1702      07/02/23 0000  polyethylene glycol (MIRALAX) Packet 17 g         17 g Oral or Feeding Tube DAILY PRN 07/01/23 0647      07/01/23 0800  aspirin EC tablet 81 mg        Note to Pharmacy: PTA Sig:Take 81 mg by mouth daily      81 mg Oral DAILY 06/30/23 1703      07/01/23 0700  fentaNYL (SUBLIMAZE) PCA 50 mcg/mL OPIOID TOLERANT          Intravenous CONTINUOUS 07/01/23 0650      07/01/23 0650  ketamine (KETALAR) injection 5 mg         5 mg  over 2-5 Minutes Intravenous EVERY 2 HOURS PRN 07/01/23 0651      06/30/23 2000  gabapentin (NEURONTIN) capsule 400 mg        Note to Pharmacy: PTA Sig:Take 300 mg by mouth 3 times daily 1 morning, 2 afternoon, 2 at night      400 mg Oral 3 TIMES DAILY 06/30/23 1703      06/30/23 1800  acetaminophen (TYLENOL) tablet 975 mg         975 mg Oral EVERY 8 HOURS 06/30/23 1702 07/03/23 1759    06/30/23 1703  diphenhydrAMINE (BENADRYL) solution 12.5 mg        See Hyperspace for full Linked Orders Report.    12.5 mg Oral or Feeding Tube EVERY 6 HOURS PRN 06/30/23 1703      06/30/23 1703  diphenhydrAMINE (BENADRYL) injection 12.5 mg        See Hyperspace for full Linked Orders Report.    12.5 mg Intravenous " "EVERY 6 HOURS PRN 06/30/23 1703      06/30/23 1702  magnesium hydroxide (MILK OF MAGNESIA) suspension 30 mL         30 mL Oral DAILY PRN 06/30/23 1702      06/30/23 1702  bisacodyl (DULCOLAX) suppository 10 mg         10 mg Rectal DAILY PRN 06/30/23 1702      06/30/23 1702  methocarbamol (ROBAXIN) tablet 500 mg         500 mg Oral EVERY 6 HOURS PRN 06/30/23 1702      06/30/23 1702  lidocaine 1 % 0.1-1 mL         0.1-1 mL Other EVERY 1 HOUR PRN 06/30/23 1702      06/30/23 1702  lidocaine (LMX4) cream          Topical EVERY 1 HOUR PRN 06/30/23 1702      06/30/23 0800  bupivacaine (MARCAINE) 0.125 % in sodium chloride 0.9 % 250 mL EPIDURAL Infusion         7 mL/hr  EPIDURAL CONTINUOUS 06/30/23 0749      06/30/23 0745  nalbuphine (NUBAIN) injection 2.5-5 mg         2.5-5 mg Intravenous EVERY 6 HOURS PRN 06/30/23 0749            Primary Service Contacted with Recommendations? No      Please see A&P for additional details of medical decision making.      Acute Inpatient Pain Service [facility]  Hours of pain coverage 24/7   Page via Amcom- Please Page the Pain Team Via Amcom: \"PAIN MANAGEMENT ACUTE INPATIENT/ [facility]\"             "

## 2023-07-02 NOTE — PROGRESS NOTES
"[facility]    Progress Note - EGS Service       Date of Admission:  6/30/2023    Assessment & Plan: Surgery    Arnaldo Henderson is a 68 year old year old with a history of severe acute pancreatitis secondary to gallstones about a year and a half ago complicated by multiple peripancreatic fluid collections status post AXIOS stent placement complicated by partial small bowel obstruction secondary to migration of an AXIOS stent necessitating laparoscopic removal of the stent. This procedure was complicated by postoperative wound infection. 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 He is now POD 2 following a distal pancreatectomy on 6/30.    Plan:  - Check drain amylase on POD3 (tomorrow)  - Remove NG tube today  - Maintain NPO  - DVT ppx with SQH  - monitor RUSTY drain output for s/s c/f leak, strip for clots as needed  - Santos catheter is removed  - PT/OT consult  - daily AM labs for monitoring hemoglobin, WBC, glucose, lytes       Diet: NPO for Medical/Clinical Reasons Except for: Meds, Ice Chips    DVT Prophylaxis: Heparin SQ  Santos Catheter: Not present  Lines: PRESENT      CVC Triple Lumen Right Internal jugular-Site Assessment: WDL      Drains: PRESENT         - 1 Closed/Suction Drain(s)   Cardiac Monitoring: None  Code Status: Full Code      Clinically Significant Risk Factors          # Hypocalcemia: Lowest iCa = 4 mg/dL in last 2 days, will monitor and replace as appropriate   # Hypomagnesemia: Lowest Mg = 1.6 mg/dL in last 2 days, will replace as needed              # Overweight: Estimated body mass index is 29.33 kg/m  as calculated from the following:    Height as of this encounter: 1.727 m (5' 8\").    Weight as of this encounter: 87.5 kg (192 lb 14.4 oz)., PRESENT ON ADMISSION          Disposition Plan    Expected Discharge Date: 7/4/2023   "    The patient was seen with the Chief Resident/Fellow, Dr. Jose Matthews MD  [facility]  Non-urgent messages: Securely message with [employer] (more info)  Text page via [employer] Paging/Directory     ______________________________________________________________________    Interval History   No acute events overnight. Nurse reported his pulse was occasionally a little low. His pain is better, and he is using his PCA pump.    Physical Exam   Vital Signs: Temp: 98.8  F (37.1  C) Temp src: Oral BP: 103/60 Pulse: 68   Resp: 20 SpO2: 94 % O2 Device: None (Room air) Oxygen Delivery: 1 LPM  Weight: 192 lbs 14.44 oz    Intake/Output Summary (Last 24 hours) at 7/2/2023 0915  Last data filed at 7/2/2023 0900  Gross per 24 hour   Intake 2446.57 ml   Output 1695 ml   Net 751.57 ml     Constitutional: alert and no apparent distress  Respiratory: no increased work of breathing  Cardiovascular: RRR  Abdomen: Soft Non-distended.   Skin: Incision healing well without erythema, serosanguineous fluid in drain.           Data     I have personally reviewed the following data over the past 24 hrs:    10.3  \   9.8 (L)   / 218     135 (L) 102 20.5 /  126 (H)   3.9 27 0.81 \

## 2023-07-03 ENCOUNTER — APPOINTMENT (OUTPATIENT)
Dept: OCCUPATIONAL THERAPY | Facility: CLINIC | Age: 68
DRG: 406 | End: 2023-07-03
Attending: SURGERY
Payer: MEDICARE

## 2023-07-03 ENCOUNTER — APPOINTMENT (OUTPATIENT)
Dept: PHYSICAL THERAPY | Facility: CLINIC | Age: 68
DRG: 406 | End: 2023-07-03
Attending: SURGERY
Payer: MEDICARE

## 2023-07-03 LAB
AMYLASE BODY FLUID SOURCE: NORMAL
AMYLASE FLD-CCNC: 6 U/L
AMYLASE SERPL-CCNC: 14 U/L (ref 28–100)
ANION GAP SERPL CALCULATED.3IONS-SCNC: 8 MMOL/L (ref 7–15)
ATRIAL RATE - MUSE: 72 BPM
BUN SERPL-MCNC: 10.9 MG/DL (ref 8–23)
CALCIUM SERPL-MCNC: 7.7 MG/DL (ref 8.8–10.2)
CHLORIDE SERPL-SCNC: 107 MMOL/L (ref 98–107)
CREAT SERPL-MCNC: 0.74 MG/DL (ref 0.67–1.17)
DEPRECATED HCO3 PLAS-SCNC: 25 MMOL/L (ref 22–29)
DIASTOLIC BLOOD PRESSURE - MUSE: NORMAL MMHG
ERYTHROCYTE [DISTWIDTH] IN BLOOD BY AUTOMATED COUNT: 12.8 % (ref 10–15)
GFR SERPL CREATININE-BSD FRML MDRD: >90 ML/MIN/1.73M2
GLUCOSE BLDC GLUCOMTR-MCNC: 100 MG/DL (ref 70–99)
GLUCOSE BLDC GLUCOMTR-MCNC: 104 MG/DL (ref 70–99)
GLUCOSE BLDC GLUCOMTR-MCNC: 105 MG/DL (ref 70–99)
GLUCOSE BLDC GLUCOMTR-MCNC: 105 MG/DL (ref 70–99)
GLUCOSE BLDC GLUCOMTR-MCNC: 112 MG/DL (ref 70–99)
GLUCOSE BLDC GLUCOMTR-MCNC: 116 MG/DL (ref 70–99)
GLUCOSE BLDC GLUCOMTR-MCNC: 122 MG/DL (ref 70–99)
GLUCOSE BLDC GLUCOMTR-MCNC: 126 MG/DL (ref 70–99)
GLUCOSE BLDC GLUCOMTR-MCNC: 132 MG/DL (ref 70–99)
GLUCOSE BLDC GLUCOMTR-MCNC: 147 MG/DL (ref 70–99)
GLUCOSE BLDC GLUCOMTR-MCNC: 167 MG/DL (ref 70–99)
GLUCOSE BLDC GLUCOMTR-MCNC: 169 MG/DL (ref 70–99)
GLUCOSE BLDC GLUCOMTR-MCNC: 169 MG/DL (ref 70–99)
GLUCOSE BLDC GLUCOMTR-MCNC: 177 MG/DL (ref 70–99)
GLUCOSE BLDC GLUCOMTR-MCNC: 55 MG/DL (ref 70–99)
GLUCOSE BLDC GLUCOMTR-MCNC: 73 MG/DL (ref 70–99)
GLUCOSE BLDC GLUCOMTR-MCNC: 73 MG/DL (ref 70–99)
GLUCOSE BLDC GLUCOMTR-MCNC: 95 MG/DL (ref 70–99)
GLUCOSE BLDC GLUCOMTR-MCNC: 96 MG/DL (ref 70–99)
GLUCOSE SERPL-MCNC: 135 MG/DL (ref 70–99)
HCT VFR BLD AUTO: 27 % (ref 40–53)
HGB BLD-MCNC: 9.1 G/DL (ref 13.3–17.7)
INTERPRETATION ECG - MUSE: NORMAL
MAGNESIUM SERPL-MCNC: 1.8 MG/DL (ref 1.7–2.3)
MCH RBC QN AUTO: 33.1 PG (ref 26.5–33)
MCHC RBC AUTO-ENTMCNC: 33.7 G/DL (ref 31.5–36.5)
MCV RBC AUTO: 98 FL (ref 78–100)
P AXIS - MUSE: 53 DEGREES
PHOSPHATE SERPL-MCNC: 2.8 MG/DL (ref 2.5–4.5)
PLATELET # BLD AUTO: 220 10E3/UL (ref 150–450)
POTASSIUM SERPL-SCNC: 3.7 MMOL/L (ref 3.4–5.3)
PR INTERVAL - MUSE: NORMAL MS
QRS DURATION - MUSE: 96 MS
QT - MUSE: 394 MS
QTC - MUSE: 369 MS
R AXIS - MUSE: 29 DEGREES
RBC # BLD AUTO: 2.75 10E6/UL (ref 4.4–5.9)
SODIUM SERPL-SCNC: 140 MMOL/L (ref 136–145)
SYSTOLIC BLOOD PRESSURE - MUSE: NORMAL MMHG
T AXIS - MUSE: 58 DEGREES
VENTRICULAR RATE- MUSE: 53 BPM
WBC # BLD AUTO: 8.9 10E3/UL (ref 4–11)

## 2023-07-03 PROCEDURE — 82150 ASSAY OF AMYLASE: CPT

## 2023-07-03 PROCEDURE — 97530 THERAPEUTIC ACTIVITIES: CPT | Mod: GP | Performed by: PHYSICAL THERAPIST

## 2023-07-03 PROCEDURE — 250N000011 HC RX IP 250 OP 636: Mod: JZ | Performed by: STUDENT IN AN ORGANIZED HEALTH CARE EDUCATION/TRAINING PROGRAM

## 2023-07-03 PROCEDURE — 200N000002 HC R&B ICU UMMC

## 2023-07-03 PROCEDURE — 250N000013 HC RX MED GY IP 250 OP 250 PS 637: Performed by: NURSE PRACTITIONER

## 2023-07-03 PROCEDURE — 85027 COMPLETE CBC AUTOMATED: CPT | Performed by: STUDENT IN AN ORGANIZED HEALTH CARE EDUCATION/TRAINING PROGRAM

## 2023-07-03 PROCEDURE — 250N000011 HC RX IP 250 OP 636: Performed by: STUDENT IN AN ORGANIZED HEALTH CARE EDUCATION/TRAINING PROGRAM

## 2023-07-03 PROCEDURE — 82310 ASSAY OF CALCIUM: CPT | Performed by: STUDENT IN AN ORGANIZED HEALTH CARE EDUCATION/TRAINING PROGRAM

## 2023-07-03 PROCEDURE — 83735 ASSAY OF MAGNESIUM: CPT | Performed by: TRANSPLANT SURGERY

## 2023-07-03 PROCEDURE — 97530 THERAPEUTIC ACTIVITIES: CPT | Mod: GO

## 2023-07-03 PROCEDURE — 250N000013 HC RX MED GY IP 250 OP 250 PS 637: Performed by: TRANSPLANT SURGERY

## 2023-07-03 PROCEDURE — 01996 DLY HOSP MGMT EDRL RX ADMIN: CPT | Performed by: NURSE PRACTITIONER

## 2023-07-03 PROCEDURE — 84100 ASSAY OF PHOSPHORUS: CPT | Performed by: TRANSPLANT SURGERY

## 2023-07-03 PROCEDURE — 258N000003 HC RX IP 258 OP 636: Performed by: STUDENT IN AN ORGANIZED HEALTH CARE EDUCATION/TRAINING PROGRAM

## 2023-07-03 PROCEDURE — 250N000013 HC RX MED GY IP 250 OP 250 PS 637: Performed by: SURGERY

## 2023-07-03 PROCEDURE — 250N000013 HC RX MED GY IP 250 OP 250 PS 637: Performed by: STUDENT IN AN ORGANIZED HEALTH CARE EDUCATION/TRAINING PROGRAM

## 2023-07-03 PROCEDURE — 97116 GAIT TRAINING THERAPY: CPT | Mod: GP | Performed by: PHYSICAL THERAPIST

## 2023-07-03 RX ORDER — MAGNESIUM OXIDE 400 MG/1
400 TABLET ORAL EVERY 4 HOURS
Status: COMPLETED | OUTPATIENT
Start: 2023-07-03 | End: 2023-07-03

## 2023-07-03 RX ORDER — MAGNESIUM SULFATE HEPTAHYDRATE 40 MG/ML
2 INJECTION, SOLUTION INTRAVENOUS ONCE
Status: DISCONTINUED | OUTPATIENT
Start: 2023-07-03 | End: 2023-07-03

## 2023-07-03 RX ORDER — POTASSIUM CHLORIDE 750 MG/1
20 TABLET, EXTENDED RELEASE ORAL ONCE
Status: COMPLETED | OUTPATIENT
Start: 2023-07-03 | End: 2023-07-03

## 2023-07-03 RX ORDER — POTASSIUM CHLORIDE 29.8 MG/ML
20 INJECTION INTRAVENOUS ONCE
Status: DISCONTINUED | OUTPATIENT
Start: 2023-07-03 | End: 2023-07-03

## 2023-07-03 RX ADMIN — CARBIDOPA AND LEVODOPA 2 TABLET: 25; 100 TABLET ORAL at 17:32

## 2023-07-03 RX ADMIN — PANTOPRAZOLE SODIUM 40 MG: 40 TABLET, DELAYED RELEASE ORAL at 08:43

## 2023-07-03 RX ADMIN — HEPARIN SODIUM 5000 UNITS: 5000 INJECTION, SOLUTION INTRAVENOUS; SUBCUTANEOUS at 00:17

## 2023-07-03 RX ADMIN — HEPARIN SODIUM 5000 UNITS: 5000 INJECTION, SOLUTION INTRAVENOUS; SUBCUTANEOUS at 16:14

## 2023-07-03 RX ADMIN — ACETAMINOPHEN 975 MG: 325 TABLET, FILM COATED ORAL at 08:43

## 2023-07-03 RX ADMIN — POTASSIUM CHLORIDE 20 MEQ: 750 TABLET, EXTENDED RELEASE ORAL at 06:15

## 2023-07-03 RX ADMIN — MAGNESIUM OXIDE TAB 400 MG (241.3 MG ELEMENTAL MG) 400 MG: 400 (241.3 MG) TAB at 06:15

## 2023-07-03 RX ADMIN — GABAPENTIN 400 MG: 400 CAPSULE ORAL at 13:26

## 2023-07-03 RX ADMIN — HEPARIN SODIUM 5000 UNITS: 5000 INJECTION, SOLUTION INTRAVENOUS; SUBCUTANEOUS at 08:43

## 2023-07-03 RX ADMIN — GABAPENTIN 400 MG: 400 CAPSULE ORAL at 19:35

## 2023-07-03 RX ADMIN — CARBIDOPA AND LEVODOPA 1 TABLET: 50; 200 TABLET, EXTENDED RELEASE ORAL at 21:56

## 2023-07-03 RX ADMIN — BUPIVACAINE HYDROCHLORIDE: 7.5 INJECTION, SOLUTION EPIDURAL; RETROBULBAR at 17:27

## 2023-07-03 RX ADMIN — GABAPENTIN 400 MG: 400 CAPSULE ORAL at 08:43

## 2023-07-03 RX ADMIN — ATORVASTATIN CALCIUM 40 MG: 40 TABLET, FILM COATED ORAL at 08:43

## 2023-07-03 RX ADMIN — ASPIRIN 81 MG: 81 TABLET, COATED ORAL at 08:43

## 2023-07-03 RX ADMIN — ACETAMINOPHEN 975 MG: 325 TABLET, FILM COATED ORAL at 00:17

## 2023-07-03 RX ADMIN — CARBIDOPA AND LEVODOPA 2 TABLET: 25; 100 TABLET ORAL at 06:15

## 2023-07-03 RX ADMIN — CARBIDOPA AND LEVODOPA 2 TABLET: 25; 100 TABLET ORAL at 11:07

## 2023-07-03 RX ADMIN — MAGNESIUM OXIDE TAB 400 MG (241.3 MG ELEMENTAL MG) 400 MG: 400 (241.3 MG) TAB at 08:43

## 2023-07-03 ASSESSMENT — ACTIVITIES OF DAILY LIVING (ADL)
ADLS_ACUITY_SCORE: 32
ADLS_ACUITY_SCORE: 32
ADLS_ACUITY_SCORE: 29
ADLS_ACUITY_SCORE: 32
ADLS_ACUITY_SCORE: 29
ADLS_ACUITY_SCORE: 32
ADLS_ACUITY_SCORE: 32
ADLS_ACUITY_SCORE: 29
ADLS_ACUITY_SCORE: 29

## 2023-07-03 NOTE — PROGRESS NOTES
"    [facility]    Progress Note - EGS Service       Date of Admission:  6/30/2023    Assessment & Plan: Surgery    Arnaldo Henderson is a 68 year old year old with a history of severe acute pancreatitis secondary to gallstones about a year and a half ago complicated by multiple peripancreatic fluid collections status post AXIOS stent placement complicated by partial small bowel obstruction secondary to migration of an AXIOS stent necessitating laparoscopic removal of the stent. This procedure was complicated by postoperative wound infection. 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 He is now POD 3 following a distal pancreatectomy on 6/30.    Plan:  - Advance to clear liquid diet  - DVT ppx with SQH  - monitor RUSTY drain output for s/s c/f leak, strip for clots as needed  - Remove drain today (drain amylase 6.0)  - New styles catheter in place  - PT/OT consult  - daily AM labs for monitoring hemoglobin, WBC, glucose, lytes  - Transfer to floor       Diet: NPO for Medical/Clinical Reasons Except for: Meds, Ice Chips    DVT Prophylaxis: Heparin SQ  Styles Catheter: PRESENT, indication: Retention, Strict 1-2 Hour I&O  Lines: PRESENT      CVC Triple Lumen Right Internal jugular-Site Assessment: WDL      Drains: PRESENT         - 1 Closed/Suction Drain(s)   Cardiac Monitoring: None  Code Status: Full Code      Clinically Significant Risk Factors                         # Obesity: Estimated body mass index is 30.2 kg/m  as calculated from the following:    Height as of this encounter: 1.727 m (5' 8\").    Weight as of this encounter: 90.1 kg (198 lb 10.2 oz)., PRESENT ON ADMISSION          Disposition Plan    Expected Discharge Date: 7/4/2023      The patient was seen with the Chief Resident/Fellow, Dr. Jose Matthews MD  [facility]" [facility]  Non-urgent messages: Securely message with [employer] (more info)  Text page via [employer] Paging/Directory     ______________________________________________________________________    Interval History   Straight cath over night and styles was replaced. No nausea. His pain is better, and he is using his PCA pump.    Physical Exam   Vital Signs: Temp: 97.5  F (36.4  C) Temp src: Oral BP: (!) 146/81 Pulse: 61   Resp: 20 SpO2: 94 % O2 Device: None (Room air)    Weight: 198 lbs 10.15 oz    Intake/Output Summary (Last 24 hours) at 7/2/2023 0915  Last data filed at 7/2/2023 0900  Gross per 24 hour   Intake 2446.57 ml   Output 1695 ml   Net 751.57 ml     Constitutional: alert and no apparent distress  Respiratory: no increased work of breathing  Cardiovascular: RRR  Abdomen: Soft Non-distended, no guarding. Some tenderness.  Skin: Incision healing well without erythema, serosanguineous fluid in drain.           Data     I have personally reviewed the following data over the past 24 hrs:    8.9  \   9.1 (L)   / 220     140 107 10.9 /  116 (H)   3.7 25 0.74 \

## 2023-07-03 NOTE — PLAN OF CARE
Major Shift Events: Pain improving. Walked the halls x2. SBP intermittently elevated in the 150-160's. Remains on RA. Advanced to clear liquid diet, tolerating well. IVMF discontinued. BG dropped to 60-70's this afternoon, improved after apple & cranberry juice. Ok to stop insulin gtt if indicated per protocol (verified w/ General Surgery). RUQ RUSTY removed.   Plan: Monitor BG. Transfer to  when a bed becomes available.   For vital signs and complete assessments, please see documentation flowsheets.       Goal Outcome Evaluation:      Plan of Care Reviewed With: patient, spouse, family    Overall Patient Progress: improving

## 2023-07-03 NOTE — PROGRESS NOTES
"Pain Service Progress Note  [facility]  Date: 07/03/2023       Patient Name: Arnaldo Henderson  MRN: [id number]  Age: 68 year old  Sex: male      Assessment:  Arnaldo Henderson 67 y/o with PMH chronic pancreatitis, CAD, CABG, 2nd degree AVB, Parkinson's disease and chronic pain. He is now s/p distal pancreatectomy, splenectomy, YVAN, proximal ileum resection. Had epidural catheter for pain.    Procedure: Distal pancreatectomy, splenectomy, YVAN, proximal ileum resection    Date of Surgery: 6/30/23    Date of Catheter Placement: 6/30/23    Plan/Recommendations:  1. Regional Anesthesia/Analgesia  -Continuous Catheter Type/Site: epidural T6-7  Infusate: 0.125% bupivacaine  Continuous Infusion at 7 mL/hr  Plan to maintain catheter, max of 7 days    2. Anticoagulation  -Please contact Inpatient Pain Service before ordering or making any anticoagulation changes    Multimodal Analgesia  Per primary team.      Pain Service will continue to follow.    Discussed with attending anesthesiologist    KRYSTYNA Luther CNP  07/03/2023     Overnight Events: None      Subjective:  I feel pretty good   Nausea: No  Symptoms of LAST: No    Pain Location:  Abdomen, incision    Pain Intensity:    Pain at Rest: 2/10   Pain with Activity: 4/10  Comfort Goal: 4/10   Baseline Pain: NA   Satisfied with your level of pain control: Yes    Diet: Clear Liquid Diet    Relevant Labs:  Recent Labs   Lab Test 07/03/23  0346 06/30/23  1735 05/18/23  0556   INR  --   --  1.32*      < > 164   BUN 10.9   < > 19.6    < > = values in this interval not displayed.       Physical Exam:  Vitals: /83 (BP Location: Left arm, Cuff Size: Adult Regular)   Pulse 72   Temp 98.6  F (37  C) (Oral)   Resp 20   Ht 1.727 m (5' 8\")   Wt 90.1 kg (198 lb 10.2 oz)   SpO2 95%   BMI 30.20 kg/m      Physical Exam:   Orientation:  Alert, oriented, and in no acute distress: Yes  Sedation: No    Motor Examination:  5/5 Strength in " lower extremities: Yes    Sensory Level:   Decrease in sensation: Yes    Catheter Site:   Catheter entry site is clean/dry/intact: Yes    Tender: No      Relevant Medications:  Current Pain Medications:  Medications related to Pain Management (From now, onward)    Start     Dose/Rate Route Frequency Ordered Stop    07/03/23 0000  acetaminophen (TYLENOL) tablet 650 mg         650 mg Oral EVERY 4 HOURS PRN 06/30/23 1702      07/02/23 0000  polyethylene glycol (MIRALAX) Packet 17 g         17 g Oral or Feeding Tube DAILY PRN 07/01/23 0647      07/01/23 0800  aspirin EC tablet 81 mg        Note to Pharmacy: PTA Sig:Take 81 mg by mouth daily      81 mg Oral DAILY 06/30/23 1703      07/01/23 0700  fentaNYL (SUBLIMAZE) PCA 50 mcg/mL OPIOID TOLERANT          Intravenous CONTINUOUS 07/01/23 0650      07/01/23 0650  ketamine (KETALAR) injection 5 mg         5 mg  over 2-5 Minutes Intravenous EVERY 2 HOURS PRN 07/01/23 0651      06/30/23 2000  gabapentin (NEURONTIN) capsule 400 mg        Note to Pharmacy: PTA Sig:Take 300 mg by mouth 3 times daily 1 morning, 2 afternoon, 2 at night      400 mg Oral 3 TIMES DAILY 06/30/23 1703      06/30/23 1703  diphenhydrAMINE (BENADRYL) solution 12.5 mg        See Hyperspace for full Linked Orders Report.    12.5 mg Oral or Feeding Tube EVERY 6 HOURS PRN 06/30/23 1703      06/30/23 1703  diphenhydrAMINE (BENADRYL) injection 12.5 mg        See Hyperspace for full Linked Orders Report.    12.5 mg Intravenous EVERY 6 HOURS PRN 06/30/23 1703      06/30/23 1702  magnesium hydroxide (MILK OF MAGNESIA) suspension 30 mL         30 mL Oral DAILY PRN 06/30/23 1702      06/30/23 1702  bisacodyl (DULCOLAX) suppository 10 mg         10 mg Rectal DAILY PRN 06/30/23 1702      06/30/23 1702  methocarbamol (ROBAXIN) tablet 500 mg         500 mg Oral EVERY 6 HOURS PRN 06/30/23 1702      06/30/23 1702  lidocaine 1 % 0.1-1 mL         0.1-1 mL Other EVERY 1 HOUR PRN 06/30/23 1702      06/30/23 1702  lidocaine  "(LMX4) cream          Topical EVERY 1 HOUR PRN 06/30/23 1702      06/30/23 0800  bupivacaine (MARCAINE) 0.125 % in sodium chloride 0.9 % 250 mL EPIDURAL Infusion         7 mL/hr  EPIDURAL CONTINUOUS 06/30/23 0749      06/30/23 0745  nalbuphine (NUBAIN) injection 2.5-5 mg         2.5-5 mg Intravenous EVERY 6 HOURS PRN 06/30/23 0749            Primary Service Contacted with Recommendations? No      Please see A&P for additional details of medical decision making.      Acute Inpatient Pain Service [facility]  Hours of pain coverage 24/7   Page via [employer]- Please Page the Pain Team Via [employer]: \"PAIN MANAGEMENT ACUTE INPATIENT/ [facility]\"             "

## 2023-07-03 NOTE — PLAN OF CARE
Shift Summary: A&Ox4, drowsy, follows commands, generalized weakness, pupils PERRLA, baseline BUE tremor. Fent PCA and epidural with bupivacaine adequately managing pain. Sinus rhythm 60s-70s with first degree AV block, occasionally in second degree type 1 AV block and bradys down to 30-40s for a few seconds. SBP goal <180, no interventions needed. RA, LS clear. NPO expect for meds. Santos in place for urinary retention, good urine output. No BM since admission. Insulin gtt alg 2. Midline abd incision JESSIKA, RUQ RUSTY with minimal serous output, blood around epidural insertion site, not of concern per anesthesia, does not appear to be increasing. Mag and K replaced. RIJ and LPIV. D5 1/2 NS at 75 ml/hr.     Plan: continue to ambulate and manage pain, possibly remove central line?   Transfer of ICU when bed available.  For vital signs and complete assessments, please see documentation flowsheets.

## 2023-07-04 LAB
ATRIAL RATE - MUSE: 80 BPM
DIASTOLIC BLOOD PRESSURE - MUSE: NORMAL MMHG
ERYTHROCYTE [DISTWIDTH] IN BLOOD BY AUTOMATED COUNT: 13.1 % (ref 10–15)
GLUCOSE BLDC GLUCOMTR-MCNC: 105 MG/DL (ref 70–99)
GLUCOSE BLDC GLUCOMTR-MCNC: 106 MG/DL (ref 70–99)
GLUCOSE BLDC GLUCOMTR-MCNC: 108 MG/DL (ref 70–99)
GLUCOSE BLDC GLUCOMTR-MCNC: 116 MG/DL (ref 70–99)
GLUCOSE BLDC GLUCOMTR-MCNC: 125 MG/DL (ref 70–99)
GLUCOSE BLDC GLUCOMTR-MCNC: 128 MG/DL (ref 70–99)
GLUCOSE BLDC GLUCOMTR-MCNC: 128 MG/DL (ref 70–99)
GLUCOSE BLDC GLUCOMTR-MCNC: 135 MG/DL (ref 70–99)
GLUCOSE BLDC GLUCOMTR-MCNC: 135 MG/DL (ref 70–99)
GLUCOSE BLDC GLUCOMTR-MCNC: 136 MG/DL (ref 70–99)
GLUCOSE BLDC GLUCOMTR-MCNC: 136 MG/DL (ref 70–99)
GLUCOSE BLDC GLUCOMTR-MCNC: 138 MG/DL (ref 70–99)
GLUCOSE BLDC GLUCOMTR-MCNC: 143 MG/DL (ref 70–99)
GLUCOSE BLDC GLUCOMTR-MCNC: 144 MG/DL (ref 70–99)
GLUCOSE BLDC GLUCOMTR-MCNC: 171 MG/DL (ref 70–99)
GLUCOSE BLDC GLUCOMTR-MCNC: 92 MG/DL (ref 70–99)
GLUCOSE BLDC GLUCOMTR-MCNC: 94 MG/DL (ref 70–99)
GLUCOSE BLDC GLUCOMTR-MCNC: 99 MG/DL (ref 70–99)
HCT VFR BLD AUTO: 28.9 % (ref 40–53)
HGB BLD-MCNC: 9.6 G/DL (ref 13.3–17.7)
INTERPRETATION ECG - MUSE: NORMAL
MAGNESIUM SERPL-MCNC: 1.6 MG/DL (ref 1.7–2.3)
MCH RBC QN AUTO: 32.5 PG (ref 26.5–33)
MCHC RBC AUTO-ENTMCNC: 33.2 G/DL (ref 31.5–36.5)
MCV RBC AUTO: 98 FL (ref 78–100)
P AXIS - MUSE: 46 DEGREES
PHOSPHATE SERPL-MCNC: 2.9 MG/DL (ref 2.5–4.5)
PLATELET # BLD AUTO: 321 10E3/UL (ref 150–450)
POTASSIUM SERPL-SCNC: 4.1 MMOL/L (ref 3.4–5.3)
PR INTERVAL - MUSE: 312 MS
QRS DURATION - MUSE: 72 MS
QT - MUSE: 380 MS
QTC - MUSE: 438 MS
R AXIS - MUSE: 20 DEGREES
RBC # BLD AUTO: 2.95 10E6/UL (ref 4.4–5.9)
SYSTOLIC BLOOD PRESSURE - MUSE: NORMAL MMHG
T AXIS - MUSE: 67 DEGREES
VENTRICULAR RATE- MUSE: 80 BPM
WBC # BLD AUTO: 10.1 10E3/UL (ref 4–11)

## 2023-07-04 PROCEDURE — 250N000012 HC RX MED GY IP 250 OP 636 PS 637: Performed by: STUDENT IN AN ORGANIZED HEALTH CARE EDUCATION/TRAINING PROGRAM

## 2023-07-04 PROCEDURE — 120N000002 HC R&B MED SURG/OB UMMC

## 2023-07-04 PROCEDURE — 250N000009 HC RX 250

## 2023-07-04 PROCEDURE — 83735 ASSAY OF MAGNESIUM: CPT | Performed by: TRANSPLANT SURGERY

## 2023-07-04 PROCEDURE — 250N000011 HC RX IP 250 OP 636: Mod: JZ | Performed by: SURGERY

## 2023-07-04 PROCEDURE — 250N000011 HC RX IP 250 OP 636: Performed by: STUDENT IN AN ORGANIZED HEALTH CARE EDUCATION/TRAINING PROGRAM

## 2023-07-04 PROCEDURE — 250N000013 HC RX MED GY IP 250 OP 250 PS 637: Performed by: STUDENT IN AN ORGANIZED HEALTH CARE EDUCATION/TRAINING PROGRAM

## 2023-07-04 PROCEDURE — 250N000013 HC RX MED GY IP 250 OP 250 PS 637: Performed by: SURGERY

## 2023-07-04 PROCEDURE — 250N000013 HC RX MED GY IP 250 OP 250 PS 637: Performed by: NURSE PRACTITIONER

## 2023-07-04 PROCEDURE — 84100 ASSAY OF PHOSPHORUS: CPT | Performed by: TRANSPLANT SURGERY

## 2023-07-04 PROCEDURE — 99222 1ST HOSP IP/OBS MODERATE 55: CPT | Mod: 24

## 2023-07-04 PROCEDURE — 85027 COMPLETE CBC AUTOMATED: CPT | Performed by: STUDENT IN AN ORGANIZED HEALTH CARE EDUCATION/TRAINING PROGRAM

## 2023-07-04 PROCEDURE — 01996 DLY HOSP MGMT EDRL RX ADMIN: CPT | Mod: GC | Performed by: ANESTHESIOLOGY

## 2023-07-04 PROCEDURE — 84132 ASSAY OF SERUM POTASSIUM: CPT | Performed by: TRANSPLANT SURGERY

## 2023-07-04 PROCEDURE — 250N000013 HC RX MED GY IP 250 OP 250 PS 637

## 2023-07-04 RX ORDER — FENTANYL CITRATE 50 UG/ML
25-50 INJECTION, SOLUTION INTRAMUSCULAR; INTRAVENOUS
Status: DISCONTINUED | OUTPATIENT
Start: 2023-07-04 | End: 2023-07-06

## 2023-07-04 RX ORDER — MAGNESIUM SULFATE HEPTAHYDRATE 40 MG/ML
2 INJECTION, SOLUTION INTRAVENOUS ONCE
Status: COMPLETED | OUTPATIENT
Start: 2023-07-04 | End: 2023-07-04

## 2023-07-04 RX ORDER — OXYCODONE HYDROCHLORIDE 5 MG/1
5-10 TABLET ORAL EVERY 4 HOURS PRN
Status: DISCONTINUED | OUTPATIENT
Start: 2023-07-04 | End: 2023-07-07 | Stop reason: HOSPADM

## 2023-07-04 RX ADMIN — CARBIDOPA AND LEVODOPA 2 TABLET: 25; 100 TABLET ORAL at 11:47

## 2023-07-04 RX ADMIN — ASPIRIN 81 MG: 81 TABLET, COATED ORAL at 08:37

## 2023-07-04 RX ADMIN — CARBIDOPA AND LEVODOPA 2 TABLET: 25; 100 TABLET ORAL at 17:55

## 2023-07-04 RX ADMIN — HEPARIN SODIUM 5000 UNITS: 5000 INJECTION, SOLUTION INTRAVENOUS; SUBCUTANEOUS at 08:37

## 2023-07-04 RX ADMIN — PANTOPRAZOLE SODIUM 40 MG: 40 TABLET, DELAYED RELEASE ORAL at 08:37

## 2023-07-04 RX ADMIN — HEPARIN SODIUM 5000 UNITS: 5000 INJECTION, SOLUTION INTRAVENOUS; SUBCUTANEOUS at 15:58

## 2023-07-04 RX ADMIN — CARBIDOPA AND LEVODOPA 1 TABLET: 50; 200 TABLET, EXTENDED RELEASE ORAL at 22:08

## 2023-07-04 RX ADMIN — INSULIN GLARGINE 15 UNITS: 100 INJECTION, SOLUTION SUBCUTANEOUS at 15:59

## 2023-07-04 RX ADMIN — OXYCODONE HYDROCHLORIDE 5 MG: 5 TABLET ORAL at 12:12

## 2023-07-04 RX ADMIN — ATORVASTATIN CALCIUM 40 MG: 40 TABLET, FILM COATED ORAL at 08:37

## 2023-07-04 RX ADMIN — ACETAMINOPHEN 650 MG: 325 TABLET, FILM COATED ORAL at 11:47

## 2023-07-04 RX ADMIN — METHOCARBAMOL 500 MG: 500 TABLET ORAL at 11:47

## 2023-07-04 RX ADMIN — HEPARIN SODIUM 5000 UNITS: 5000 INJECTION, SOLUTION INTRAVENOUS; SUBCUTANEOUS at 00:26

## 2023-07-04 RX ADMIN — HEPARIN SODIUM 5000 UNITS: 5000 INJECTION, SOLUTION INTRAVENOUS; SUBCUTANEOUS at 23:21

## 2023-07-04 RX ADMIN — INSULIN ASPART 2 UNITS: 100 INJECTION, SOLUTION INTRAVENOUS; SUBCUTANEOUS at 18:11

## 2023-07-04 RX ADMIN — GABAPENTIN 400 MG: 400 CAPSULE ORAL at 08:37

## 2023-07-04 RX ADMIN — GABAPENTIN 400 MG: 400 CAPSULE ORAL at 22:08

## 2023-07-04 RX ADMIN — MAGNESIUM SULFATE IN WATER 2 G: 40 INJECTION, SOLUTION INTRAVENOUS at 06:41

## 2023-07-04 RX ADMIN — CARBIDOPA AND LEVODOPA 2 TABLET: 25; 100 TABLET ORAL at 06:42

## 2023-07-04 RX ADMIN — HUMAN INSULIN 1 UNITS/HR: 100 INJECTION, SOLUTION SUBCUTANEOUS at 16:47

## 2023-07-04 RX ADMIN — GABAPENTIN 400 MG: 400 CAPSULE ORAL at 13:57

## 2023-07-04 ASSESSMENT — ACTIVITIES OF DAILY LIVING (ADL)
ADLS_ACUITY_SCORE: 29
ADLS_ACUITY_SCORE: 32
ADLS_ACUITY_SCORE: 28
ADLS_ACUITY_SCORE: 28
ADLS_ACUITY_SCORE: 32
ADLS_ACUITY_SCORE: 28
ADLS_ACUITY_SCORE: 32
ADLS_ACUITY_SCORE: 31
ADLS_ACUITY_SCORE: 28
ADLS_ACUITY_SCORE: 32

## 2023-07-04 NOTE — PROGRESS NOTES
"VS BP (!) 169/74 (BP Location: Right arm)   Pulse 71   Temp 97.6  F (36.4  C) (Oral)   Resp 20   Ht 1.727 m (5' 8\")   Wt 86 kg (189 lb 8 oz)   SpO2 98%   BMI 28.81 kg/m     Activity: SBA  Neuros: A&O x4. Able to make needs known. [place], hearing aides in place.  Cardiac: WDL. Denies cardiac chest pain.  Respiratory: WDL. RA. Denies SOB  GI/: Voiding via styles. LBM PTA, +flatus  Diet: Full liquid diet.  Skin/Incisions: Admitted/transferred from:   2 RN full skin assessment completed by Ricardo Canales, RN and Rika MARTINES RN.  Skin assessment finding: Midline abd incision, stapled, Old RUSTY site, (R) TL IJ   Interventions/actions: Educated on importance to reposition frequently, frequent ambulation, and continue w/ adequate hydration. Preventative mepilex in place.  Lines/Drains: (R) PIV - SL. (R) TL internal jugular infusing insulin gtt, other 2 SL  Labs: , 92  Pain/nausea: 0-5/10 pain. Managed w/ PRN oxycodone, PRN robaxin, PRN fentanyl, PRN ketamine  "

## 2023-07-04 NOTE — PROGRESS NOTES
"Pain Service Progress Note  [facility]  Date: 07/04/2023       Patient Name: Arnaldo Henderson  MRN: [id number]  Age: 68 year old  Sex: male      Assessment:  Arnaldo Henderson 69 y/o with PMH chronic pancreatitis, CAD, CABG, 2nd degree AVB, Parkinson's disease and chronic pain. He is now s/p distal pancreatectomy, splenectomy, YVAN, proximal ileum resection. Had epidural catheter for pain. Per patient and primary team, pain is better controlled.    Procedure: Distal pancreatectomy, splenectomy, YVAN, proximal ileum resection    Date of Surgery: 6/30/23    Date of Catheter Placement: 6/30/23    Plan/Recommendations:  1. Regional Anesthesia/Analgesia  - Continuous Catheter Type/Site: epidural T6-7  - Remove epidural catheter    2. Anticoagulation  -Please contact Inpatient Pain Service before ordering or making any anticoagulation changes    Multimodal Analgesia  Per primary team.      Pain Service will sign off.     Discussed with attending anesthesiologist    Michael Ortiz MD   Department of Anesthesiology - CA-2/PGY-4  07/04/2023     Overnight Events: None      Subjective: Lying comfortably in bed, no acute distress. Says his pain is better controlled.  Nausea: No  Symptoms of LAST: No    Pain Location:  Abdomen, incision    Pain Intensity:    Pain at Rest: 2/10   Pain with Activity: 4/10  Comfort Goal: 4/10   Baseline Pain: NA   Satisfied with your level of pain control: Yes    Diet: Clear Liquid Diet    Relevant Labs:  Recent Labs   Lab Test 07/03/23  0346 06/30/23  1735 05/18/23  0556   INR  --   --  1.32*      < > 164   BUN 10.9   < > 19.6    < > = values in this interval not displayed.       Physical Exam:  Vitals: BP (!) 165/82   Pulse 72   Temp 36.8  C (98.3  F)   Resp 17   Ht 1.727 m (5' 8\")   Wt 90.1 kg (198 lb 10.2 oz)   SpO2 93%   BMI 30.20 kg/m      Physical Exam:   Orientation:  Alert, oriented, and in no acute distress: Yes  Sedation: No    Motor " Examination:  5/5 Strength in lower extremities: Yes    Sensory Level:   Decrease in sensation: Yes    Catheter Site:   Catheter entry site is clean/dry/intact: Yes    Tender: No      Relevant Medications:  Current Pain Medications:  Medications related to Pain Management (From now, onward)    Start     Dose/Rate Route Frequency Ordered Stop    07/03/23 0000  acetaminophen (TYLENOL) tablet 650 mg         650 mg Oral EVERY 4 HOURS PRN 06/30/23 1702      07/02/23 0000  polyethylene glycol (MIRALAX) Packet 17 g         17 g Oral or Feeding Tube DAILY PRN 07/01/23 0647      07/01/23 0800  aspirin EC tablet 81 mg        Note to Pharmacy: PTA Sig:Take 81 mg by mouth daily      81 mg Oral DAILY 06/30/23 1703      07/01/23 0700  fentaNYL (SUBLIMAZE) PCA 50 mcg/mL OPIOID TOLERANT          Intravenous CONTINUOUS 07/01/23 0650      07/01/23 0650  ketamine (KETALAR) injection 5 mg         5 mg  over 2-5 Minutes Intravenous EVERY 2 HOURS PRN 07/01/23 0651      06/30/23 2000  gabapentin (NEURONTIN) capsule 400 mg        Note to Pharmacy: PTA Sig:Take 300 mg by mouth 3 times daily 1 morning, 2 afternoon, 2 at night      400 mg Oral 3 TIMES DAILY 06/30/23 1703      06/30/23 1703  diphenhydrAMINE (BENADRYL) solution 12.5 mg        See Hyperspace for full Linked Orders Report.    12.5 mg Oral or Feeding Tube EVERY 6 HOURS PRN 06/30/23 1703      06/30/23 1703  diphenhydrAMINE (BENADRYL) injection 12.5 mg        See Hyperspace for full Linked Orders Report.    12.5 mg Intravenous EVERY 6 HOURS PRN 06/30/23 1703      06/30/23 1702  magnesium hydroxide (MILK OF MAGNESIA) suspension 30 mL         30 mL Oral DAILY PRN 06/30/23 1702      06/30/23 1702  bisacodyl (DULCOLAX) suppository 10 mg         10 mg Rectal DAILY PRN 06/30/23 1702      06/30/23 1702  methocarbamol (ROBAXIN) tablet 500 mg         500 mg Oral EVERY 6 HOURS PRN 06/30/23 1702      06/30/23 1702  lidocaine 1 % 0.1-1 mL         0.1-1 mL Other EVERY 1 HOUR PRN 06/30/23 7448    "   06/30/23 1702  lidocaine (LMX4) cream          Topical EVERY 1 HOUR PRN 06/30/23 1702      06/30/23 0800  bupivacaine (MARCAINE) 0.125 % in sodium chloride 0.9 % 250 mL EPIDURAL Infusion         7 mL/hr  EPIDURAL CONTINUOUS 06/30/23 0749      06/30/23 0745  nalbuphine (NUBAIN) injection 2.5-5 mg         2.5-5 mg Intravenous EVERY 6 HOURS PRN 06/30/23 0749            Primary Service Contacted with Recommendations? No      Please see A&P for additional details of medical decision making.      Acute Inpatient Pain Service [facility]  Hours of pain coverage 24/7   Page via [employer]- Please Page the Pain Team Via [employer]: \"PAIN MANAGEMENT ACUTE INPATIENT/ [facility] [place]\"           "

## 2023-07-04 NOTE — CONSULTS
NEW INPATIENT DIABETES MANAGEMENT CONSULT  Arnaldo Henderson  Age: 68 year old  MRN # [id number]   YOB: 1955    Chief Complaint: Hemodynamic monitoring  Reason for Consult: T2DM with hyperglycemia on insulin dripp      History of Present Illness:   Arnaldo Henderson is a 68 year old year old with a history of chronic pancreatitis, CAD , CABG, 2 degree AVB , Parkinson's ds ,chronic pain, T2DM , severe acute pancreatitis secondary to gallstones about a year and a half ago complicated by multiple peripancreatic fluid collections status post AXIOS stent placement complicated by partial small bowel obstruction secondary to migration of an AXIOS stent necessitating laparoscopic removal of the stent. This procedure was complicated by postoperative wound infection. 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 He is now POD 3 following a distal pancreatectomy on 6/30.    Diabetes History:  Patient mentions that he was diagnosed with T2DM about 3 years back. He mentions that he was on Metformin initially but later when he was admitted in [place], was transitioned to insulin regimen. He mentions that he is on Basaglar 30 units at bedtime and taked Novolog only sliding scale.     Current inpatient DM regimen:  Insulin drip( non DKA protocol)      Total daily insulin dose:  7/4 : 7 units - so far  7/3: 27.48726  7/2: 27 units  7/1: 52.73 units    Other Active Medical Problems:   Diabetes Mellitus Type: T2DM  Duration:    3 years-- can be documented to 2013 by A1c  Diabetic Complications: None  Prior to Admission Diabetes Regimen:      BG monitor: Glucometer  Frequency of checks: 3 times daily  Usual BG control PTA:   A1c 9.6% on 6/30/23  History of DKA: No  Able to Detect Hypoglycemia: Yes  Usual Diabetes Care Provider:   Primary Care Provider: Alanna Lazaro  Factors Impacting IP Glucose Control:  "  Current Diet:     Orders Placed This Encounter      Full Liquid Diet    10 point ROS completed with pertinent positives and negatives noted in the HPI  Past medical, family and social histories are reviewed and updated.  Past Medical History:   Diagnosis Date     CAD (coronary artery disease)      Diabetes mellitus, type 2 (H)      Gastroesophageal reflux disease      Hypercholesteremia      Hypertension      Mixed hearing loss      Pancreatitis      Parkinson disease (H)      Second degree AV block      Past Surgical History:   Procedure Laterality Date     CA ANESTH CABG W/PUMP       COLONOSCOPY N/A 1/12/2023    Procedure: colonoscopy with fluroscopy;  Surgeon: Ayden Fraire MD;  Location:  OR     ENDOSCOPIC RETROGRADE CHOLANGIOPANCREATOGRAM N/A 5/18/2023    Procedure: ENDOSCOPIC RETROGRADE CHOLANGIOPANCREATOGRAPHY, WITH  CYSTDUODENOSTOMY STENTS X2 REMOVAL;  Surgeon: Cedric Saldana MD;  Location: [facility] OR     ENT SURGERY       LAPAROSCOPY DIAGNOSTIC (GENERAL) N/A 2/20/2023    Procedure: LAPAROSCOPY, DIAGNOSTIC; RETRIEVAL OF MIGRATED STENT;  Surgeon: Joseluis Lima MD;  Location: [facility] OR     ORTHOPEDIC SURGERY       PANCREATECTOMY PEUSTOW N/A 6/30/2023    Procedure: Open distal pancreactectomy with splenectomy, lysis of adhesions, resection of proximal illeum;  Surgeon: Ashvin Haider MD;  Location: [facility] OR     Social History  ;     Family History   Problem Relation Age of Onset     Diabetes No family hx of      Physical Exam   BP (!) 145/76 (BP Location: Right arm, Cuff Size: Adult Regular)   Pulse 72   Temp 98.7  F (37.1  C) (Oral)   Resp 20   Ht 1.727 m (5' 8\")   Wt 90.1 kg (198 lb 10.2 oz)   SpO2 97%   BMI 30.20 kg/m    General: pleasant, in no distress.  Sitting in chair; wife present  HEENT: normocephalic, atraumatic. Oral mucous membranes moist.   Hearing aide right ear  CARDIAC: RRR S1 S2  Lungs: unlabored respiration, no cough  ABD: rounded, nondistended  Skin: warm and " dry, no obvious lesions  MSK:  moves all extremities  Lymp:  no LE edema   Mental status:  alert, oriented to self, place, time  Psych:   calm and appropriate interaction     Most Recent Laboratory Tests:  Recent Labs   Lab 07/04/23  0646   HGB 9.6*     Recent Labs   Lab 06/30/23  0642   A1C 9.6*     Recent Labs   Lab 07/03/23  0346   CR 0.74     Recent Labs   Lab 07/04/23  1155 07/04/23  1006 07/04/23  0848 07/04/23  0754 07/04/23  0636 07/04/23  0447   * 135* 128* 125* 138* 128*       Assessment:     Arnaldo Henderson 67 y/o with PMH chronic pancreatitis, CAD, CABG, 2nd degree AVB, Parkinson's disease and chronic pain. He is now s/p distal pancreatectomy, splenectomy, YVAN, proximal ileum resection. He is admitted to the SICU for hemodynamic monitoring.    # T2DM not at goal with hyperglycemia.  Poor control prior to admission   # Stress induced hyperglycemia  # post pancreatic surgery    Patient is currently on insulin drip for BG control. His rates in insulin drip have been pretty stable without much variation. Will plan to transition him of the drip    Plan:    -Lantus 15 units   -Novolog 1u/10 gm CHO coverage with meals and snacks   -Continue insulin drip for now.   -BG monitoring as per insulin drip protocol   -hypoglycemia protocol   -recommend diet with carb counting protocol   -diabetes education needs will be assessed closer to discharge   -on discharge, will recommend outpatient follow up with [facility] Endocrinology service     Discussed plan of care with patient, nursing, and primary team   Thank you for this consult; Inpatient Diabetes will continue to follow.     To contact Endocrine Diabetes service:   From 8AM-4PM: page inpatient diabetes provider that is following the patient  For questions or updates from 4PM-8AM: page the diabetes job code for on call fellow: [phone]    Patient was seen and discussed with Dr Vannessa Garcia  Endocrinology Fellow  [phone]    Attending tie-in  statement: Patient seen and examined by me, discussed with Dr Garcia  whose note I have reviewed and amended and with which I agree.  Faina Hurd MD

## 2023-07-04 NOTE — CONSULTS
Care Management Initial Consult    General Information  Assessment completed with: Patient, Spouse or significant other, Veronika wife and Arnaldo, patient  Type of CM/SW Visit: Initial Assessment    Primary Care Provider verified and updated as needed: Yes   Readmission within the last 30 days: no previous admission in last 30 days      Reason for Consult: discharge planning  Advance Care Planning: Advance Care Planning Reviewed: no concerns identified       Communication Assessment  Patient's communication style: spoken language (English or Bilingual)    Hearing Difficulty or Deaf: no   Wear Glasses or Blind: yes    Cognitive  Cognitive/Neuro/Behavioral: WDL  Level of Consciousness: alert  Arousal Level: opens eyes spontaneously  Orientation: oriented x 4  Mood/Behavior: calm, cooperative  Best Language: 0 - No aphasia  Speech: clear, spontaneous    Living Environment:   People in home: spouse  Veronika  Current living Arrangements: apartment, no stairs      Able to return to prior arrangements: yes       Family/Social Support:  Care provided by: self, spouse/significant other  Provides care for: no one  Marital Status:   Wife  Veronika       Description of Support System: Supportive, Involved    Support Assessment: Adequate family and caregiver support, Adequate social supports    Current Resources:   Patient receiving home care services: No     Community Resources: None  Equipment currently used at home: none, owns a cane.  Supplies currently used at home: None    Employment/Financial:  Employment Status: retired        Financial Concerns:     Referral to Financial Worker: No       Does the patient's insurance plan have a 3 day qualifying hospital stay waiver?  No    Lifestyle & Psychosocial Needs:  Social Determinants of Health     Tobacco Use: Medium Risk (7/3/2023)    Patient History      Smoking Tobacco Use: Former      Smokeless Tobacco Use: Never      Passive Exposure: Not on file   Alcohol Use: Not on file    Financial Resource Strain: Not on file   Food Insecurity: Not on file   Transportation Needs: Not on file   Physical Activity: Not on file   Stress: Not on file   Social Connections: Not on file   Intimate Partner Violence: Not on file   Depression: Not at risk (5/10/2023)    PHQ-2      PHQ-2 Score: 0   Housing Stability: Not on file       Functional Status:  Prior to admission patient needed assistance:   Dependent ADLs:: Independent, Bathing, Dressing, Eating, Grooming, Positioning, Transfers, Toileting, Ambulation-no assistive device  Dependent IADLs:: Cleaning, Laundry, Shopping, Meal Preparation, Medication Management, Money Management  Assesssment of Functional Status: Not at  functional baseline    Mental Health Status:  Mental Health Status: No Current Concerns       Chemical Dependency Status:  Chemical Dependency Status: No Current Concerns             Values/Beliefs:  Spiritual, Cultural Beliefs, Faith Practices, Values that affect care: no               Additional Information:  Patient is admitted s/p distal pancreatectomy on 6/30.   Patient is alert and oriented x 4. Wife present at bedside.   RNCC met with the patient and wife and explained Care Management role.   Patient lives with his wife in apartment.  No stairs. Patient was independent with most of his ADLs. Wife occasionally assisted with meals. Patient was not using DMEs. They have grab bars in the bathroom and built in shower seat.   Patient was not connected with any C services prior.   Patient has no Advance directives and declined the form.     Discussed therapy recs for HHC. Patient prefers [facility]. Referral send to  for RN, PT, PT.      Care Management team will continue to follow for discharge needs during this hospitalization.       Addendum 1218: Received a call from [facility] Intake. SOC 7/7-7/8. RNCC will need to update HHC upon discharge.     Valerie Bryant, MSN, MA, CCM, RN  4C/4A ICU Care  Coordinator  Phone:[phone]  Pager: [phone]    For Weekend & Holiday on call RN Care Coordinator:  [facility] [place] ([phone]) Saturday & Sunday; ([phone])  Recognized Holidays   Pager: [phone] Units: 4A, 4C, 4E, 5A & 5B

## 2023-07-04 NOTE — PROGRESS NOTES
"    [facility]    Progress Note - EGS Service       Date of Admission:  6/30/2023    Assessment & Plan: Surgery    Arnaldo Henderson is a 68 year old year old with a history of severe acute pancreatitis secondary to gallstones about a year and a half ago complicated by multiple peripancreatic fluid collections status post AXIOS stent placement complicated by partial small bowel obstruction secondary to migration of an AXIOS stent necessitating laparoscopic removal of the stent. This procedure was complicated by postoperative wound infection. 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 He is now POD4 following a distal pancreatectomy on 6/30.    Plan:  - Advance to full liquids as tolerated  - discontinue IV fluids  - Maintain styles x24h more   - Transition off epidural   - PT/OT following  - daily AM labs for monitoring hemoglobin, WBC, glucose, lytes  - Floor status       Diet: Clear Liquid Diet    DVT Prophylaxis: Heparin SQ  Styles Catheter: PRESENT, indication: Retention, Strict 1-2 Hour I&O  Lines: PRESENT      CVC Triple Lumen Right Internal jugular-Site Assessment: WDL      Drains: None     Cardiac Monitoring: None  Code Status: Full Code      Clinically Significant Risk Factors            # Hypomagnesemia: Lowest Mg = 1.6 mg/dL in last 2 days, will replace as needed              # Obesity: Estimated body mass index is 30.2 kg/m  as calculated from the following:    Height as of this encounter: 1.727 m (5' 8\").    Weight as of this encounter: 90.1 kg (198 lb 10.2 oz).           Disposition Plan    Expected Discharge Date: 7/4/2023      The patient was seen with the Chief Resident/Fellow, Dr. Salamanca and Attending Dr. Haider.     Divya Rouse MD PGY1 [phone]  [facility]  Text page via [facility] " [place]/y     ______________________________________________________________________    Interval History   NAEO. Reports passing gas since yesterday, no bowel movements yet. Tolerating clears without n/v. Ambulatory, sat up for 4h yesterday.     Physical Exam   Vital Signs: Temp: 98.5  F (36.9  C) Temp src: Oral BP: (!) 154/81 Pulse: 74   Resp: 18 SpO2: 92 % O2 Device: None (Room air)    Weight: 198 lbs 10.15 oz    07/03 0700 - 07/04 0659  In: 1457.43 [P.O.:1080; I.V.:377.43]  Out: 3110 [Urine:3100; Drains:10]    Constitutional: alert, no apparent distress, seen sleeping comfortably  Respiratory: no increased work of breathing  Cardiovascular: RRR  Abdomen: Soft, appropriately tender. Non distended. Drain site well healed. Loose skin overlying middle of incision, questionable for subcutaneous fascial dehiscence.   Skin: Incision healing well with staples in place. No exudate.       Data     I have personally reviewed the following data over the past 24 hrs:    10.1  \   9.6 (L)   / 321     N/A N/A N/A /  128 (H)   4.1 N/A N/A \

## 2023-07-04 NOTE — PLAN OF CARE
Transferred to: 7B @ 153  Belongings: Tablet, phone, chargers packed & sent w/ pt  Santos removed? NO - retention  Central line removed? NO - per orders from attending MD  Chart and medications sent with patient? Yes  Family notified: Pt to notify wife      Goal Outcome Evaluation:      Plan of Care Reviewed With: patient, spouse    Overall Patient Progress: improving

## 2023-07-05 ENCOUNTER — APPOINTMENT (OUTPATIENT)
Dept: OCCUPATIONAL THERAPY | Facility: CLINIC | Age: 68
DRG: 406 | End: 2023-07-05
Attending: SURGERY
Payer: MEDICARE

## 2023-07-05 PROBLEM — K81.0 CHOLECYSTITIS, ACUTE: Status: ACTIVE | Noted: 2023-07-05

## 2023-07-05 LAB
ERYTHROCYTE [DISTWIDTH] IN BLOOD BY AUTOMATED COUNT: 13.2 % (ref 10–15)
GLUCOSE BLDC GLUCOMTR-MCNC: 120 MG/DL (ref 70–99)
GLUCOSE BLDC GLUCOMTR-MCNC: 138 MG/DL (ref 70–99)
GLUCOSE BLDC GLUCOMTR-MCNC: 147 MG/DL (ref 70–99)
GLUCOSE BLDC GLUCOMTR-MCNC: 234 MG/DL (ref 70–99)
GLUCOSE BLDC GLUCOMTR-MCNC: 235 MG/DL (ref 70–99)
GLUCOSE BLDC GLUCOMTR-MCNC: 262 MG/DL (ref 70–99)
GLUCOSE BLDC GLUCOMTR-MCNC: 73 MG/DL (ref 70–99)
GLUCOSE BLDC GLUCOMTR-MCNC: 80 MG/DL (ref 70–99)
GLUCOSE BLDC GLUCOMTR-MCNC: 81 MG/DL (ref 70–99)
GLUCOSE BLDC GLUCOMTR-MCNC: 84 MG/DL (ref 70–99)
GLUCOSE BLDC GLUCOMTR-MCNC: 84 MG/DL (ref 70–99)
GLUCOSE BLDC GLUCOMTR-MCNC: 85 MG/DL (ref 70–99)
GLUCOSE BLDC GLUCOMTR-MCNC: 90 MG/DL (ref 70–99)
HCT VFR BLD AUTO: 32.5 % (ref 40–53)
HGB BLD-MCNC: 10.4 G/DL (ref 13.3–17.7)
MAGNESIUM SERPL-MCNC: 1.6 MG/DL (ref 1.7–2.3)
MCH RBC QN AUTO: 32.1 PG (ref 26.5–33)
MCHC RBC AUTO-ENTMCNC: 32 G/DL (ref 31.5–36.5)
MCV RBC AUTO: 100 FL (ref 78–100)
PHOSPHATE SERPL-MCNC: 3.6 MG/DL (ref 2.5–4.5)
PLATELET # BLD AUTO: 398 10E3/UL (ref 150–450)
POTASSIUM SERPL-SCNC: 4 MMOL/L (ref 3.4–5.3)
RBC # BLD AUTO: 3.24 10E6/UL (ref 4.4–5.9)
WBC # BLD AUTO: 11.5 10E3/UL (ref 4–11)

## 2023-07-05 PROCEDURE — 97535 SELF CARE MNGMENT TRAINING: CPT | Mod: GO

## 2023-07-05 PROCEDURE — 84100 ASSAY OF PHOSPHORUS: CPT | Performed by: SURGERY

## 2023-07-05 PROCEDURE — 84132 ASSAY OF SERUM POTASSIUM: CPT | Performed by: SURGERY

## 2023-07-05 PROCEDURE — 83735 ASSAY OF MAGNESIUM: CPT | Performed by: SURGERY

## 2023-07-05 PROCEDURE — 250N000011 HC RX IP 250 OP 636: Performed by: STUDENT IN AN ORGANIZED HEALTH CARE EDUCATION/TRAINING PROGRAM

## 2023-07-05 PROCEDURE — 250N000013 HC RX MED GY IP 250 OP 250 PS 637: Performed by: NURSE PRACTITIONER

## 2023-07-05 PROCEDURE — 36592 COLLECT BLOOD FROM PICC: CPT | Performed by: SURGERY

## 2023-07-05 PROCEDURE — 250N000013 HC RX MED GY IP 250 OP 250 PS 637: Performed by: STUDENT IN AN ORGANIZED HEALTH CARE EDUCATION/TRAINING PROGRAM

## 2023-07-05 PROCEDURE — 120N000002 HC R&B MED SURG/OB UMMC

## 2023-07-05 PROCEDURE — 99233 SBSQ HOSP IP/OBS HIGH 50: CPT | Mod: 24 | Performed by: PHYSICIAN ASSISTANT

## 2023-07-05 PROCEDURE — 250N000011 HC RX IP 250 OP 636: Mod: JZ | Performed by: SURGERY

## 2023-07-05 PROCEDURE — 250N000013 HC RX MED GY IP 250 OP 250 PS 637

## 2023-07-05 PROCEDURE — 85027 COMPLETE CBC AUTOMATED: CPT | Performed by: STUDENT IN AN ORGANIZED HEALTH CARE EDUCATION/TRAINING PROGRAM

## 2023-07-05 PROCEDURE — 250N000009 HC RX 250

## 2023-07-05 PROCEDURE — 250N000013 HC RX MED GY IP 250 OP 250 PS 637: Performed by: SURGERY

## 2023-07-05 PROCEDURE — 97530 THERAPEUTIC ACTIVITIES: CPT | Mod: GO

## 2023-07-05 RX ORDER — GABAPENTIN 300 MG/1
600 CAPSULE ORAL AT BEDTIME
Status: DISCONTINUED | OUTPATIENT
Start: 2023-07-06 | End: 2023-07-05

## 2023-07-05 RX ORDER — GABAPENTIN 300 MG/1
600 CAPSULE ORAL AT BEDTIME
Status: DISCONTINUED | OUTPATIENT
Start: 2023-07-05 | End: 2023-07-07 | Stop reason: HOSPADM

## 2023-07-05 RX ORDER — GABAPENTIN 400 MG/1
400 CAPSULE ORAL
Status: DISCONTINUED | OUTPATIENT
Start: 2023-07-05 | End: 2023-07-07 | Stop reason: HOSPADM

## 2023-07-05 RX ORDER — LOSARTAN POTASSIUM 50 MG/1
50 TABLET ORAL AT BEDTIME
Status: DISCONTINUED | OUTPATIENT
Start: 2023-07-05 | End: 2023-07-07 | Stop reason: HOSPADM

## 2023-07-05 RX ORDER — MAGNESIUM SULFATE HEPTAHYDRATE 40 MG/ML
2 INJECTION, SOLUTION INTRAVENOUS ONCE
Status: COMPLETED | OUTPATIENT
Start: 2023-07-05 | End: 2023-07-05

## 2023-07-05 RX ADMIN — METHOCARBAMOL 500 MG: 500 TABLET ORAL at 14:17

## 2023-07-05 RX ADMIN — OXYCODONE HYDROCHLORIDE 10 MG: 5 TABLET ORAL at 22:32

## 2023-07-05 RX ADMIN — GABAPENTIN 600 MG: 300 CAPSULE ORAL at 22:25

## 2023-07-05 RX ADMIN — GABAPENTIN 400 MG: 400 CAPSULE ORAL at 14:17

## 2023-07-05 RX ADMIN — POLYETHYLENE GLYCOL 3350 17 G: 17 POWDER, FOR SOLUTION ORAL at 23:06

## 2023-07-05 RX ADMIN — OXYCODONE HYDROCHLORIDE 5 MG: 5 TABLET ORAL at 12:03

## 2023-07-05 RX ADMIN — HEPARIN SODIUM 5000 UNITS: 5000 INJECTION, SOLUTION INTRAVENOUS; SUBCUTANEOUS at 16:51

## 2023-07-05 RX ADMIN — HUMAN INSULIN 0.2 UNITS/HR: 100 INJECTION, SOLUTION SUBCUTANEOUS at 06:48

## 2023-07-05 RX ADMIN — CARBIDOPA AND LEVODOPA 2 TABLET: 25; 100 TABLET ORAL at 12:16

## 2023-07-05 RX ADMIN — MAGNESIUM SULFATE IN WATER 2 G: 40 INJECTION, SOLUTION INTRAVENOUS at 12:04

## 2023-07-05 RX ADMIN — CARBIDOPA AND LEVODOPA 1 TABLET: 50; 200 TABLET, EXTENDED RELEASE ORAL at 22:32

## 2023-07-05 RX ADMIN — GABAPENTIN 400 MG: 400 CAPSULE ORAL at 09:38

## 2023-07-05 RX ADMIN — INSULIN ASPART 2 UNITS: 100 INJECTION, SOLUTION INTRAVENOUS; SUBCUTANEOUS at 18:43

## 2023-07-05 RX ADMIN — HEPARIN SODIUM 5000 UNITS: 5000 INJECTION, SOLUTION INTRAVENOUS; SUBCUTANEOUS at 09:38

## 2023-07-05 RX ADMIN — ATORVASTATIN CALCIUM 40 MG: 40 TABLET, FILM COATED ORAL at 09:38

## 2023-07-05 RX ADMIN — HEPARIN SODIUM 5000 UNITS: 5000 INJECTION, SOLUTION INTRAVENOUS; SUBCUTANEOUS at 23:06

## 2023-07-05 RX ADMIN — ASPIRIN 81 MG: 81 TABLET, COATED ORAL at 09:38

## 2023-07-05 RX ADMIN — CARBIDOPA AND LEVODOPA 2 TABLET: 25; 100 TABLET ORAL at 06:47

## 2023-07-05 RX ADMIN — OXYCODONE HYDROCHLORIDE 5 MG: 5 TABLET ORAL at 16:51

## 2023-07-05 RX ADMIN — INSULIN ASPART 2 UNITS: 100 INJECTION, SOLUTION INTRAVENOUS; SUBCUTANEOUS at 09:39

## 2023-07-05 RX ADMIN — CARBIDOPA AND LEVODOPA 2 TABLET: 25; 100 TABLET ORAL at 18:43

## 2023-07-05 RX ADMIN — PANTOPRAZOLE SODIUM 40 MG: 40 TABLET, DELAYED RELEASE ORAL at 09:38

## 2023-07-05 RX ADMIN — LOSARTAN POTASSIUM 50 MG: 50 TABLET, FILM COATED ORAL at 22:25

## 2023-07-05 RX ADMIN — OXYCODONE HYDROCHLORIDE 5 MG: 5 TABLET ORAL at 02:42

## 2023-07-05 RX ADMIN — INSULIN ASPART 2 UNITS: 100 INJECTION, SOLUTION INTRAVENOUS; SUBCUTANEOUS at 12:18

## 2023-07-05 ASSESSMENT — ACTIVITIES OF DAILY LIVING (ADL)
ADLS_ACUITY_SCORE: 29
ADLS_ACUITY_SCORE: 28

## 2023-07-05 NOTE — PLAN OF CARE
Goal Outcome Evaluation:      Plan of Care Reviewed With: patient, spouse    Overall Patient Progress: improvingOverall Patient Progress: improving      Neuro: A&Ox4; calling appropriately. Hard of hearing; bilateral hearing aids on.   Respiratory: sats mid 90s on RA.   Cardiac: HTN but within parameters.  Denies cardiac chest pain  GI/: +passing gas, adequate UOP via styles. Per Dr. Haider and pt discussion; will keep styles until tomorrow.   Diet: Full liquids  with sliding scale and carb coverage  Pain/Nausea: C/O abdominal incisional pain; more discomfort with activity. Currently managed with PRN oxy and robaxin.  Denies nausea.   Incisions: Abdominal midline incision JESSIKA with staples. (R) old RUSTY site covered with primapore, CDI.    IV Access: R PIV SL, R triple lumen internal jugular- SL  Activity: SBA. Ambulating in halls  Labs: ; 235 and 234. M.6; replaced. Recheck tomorrow.   New changes: insulin gtt turned off today.        Plan: Possible home Friday. Continue to monitor and follow POC.

## 2023-07-05 NOTE — PLAN OF CARE
Temp: 97.9  F (36.6  C) Temp src: Oral BP: (!) 146/68 Pulse: 72   Resp: 20 SpO2: 98 % O2 Device: None (Room air)         Time of care: 4590-3792  Reason for admission: chronic pancreatitis, POD #4-5 pancreatectomy an splenectomy     NEURO: A&Ox4  RESPIRATORY: denies SOB, sats 97% on RA   CARDIAC: HTN within parameters, OVSS, denies cardiac chest pain  GI/: styles patent. LBM PTA, +flatus  DIET: regular, carb coverage  PAIN/NAUSEA: pain 5/10 w/ activity. Denies nausea.   INCISION/DRAINS/SKIN: midline abd incision JESSIKA, old RUSTY site w/ CDI primapore. Styles.   IV ACCESS: R PIV SL, R internal jugular- SL and tko w/ insulin gtt  ACTIVITY: sba  LAB: reviewed. BG . Given 8oz apple juice when at 73, BG at 84 after 30 minutes and 120 after an hour.     CHANGES: insulin gtt has been off most of the night (1110-3133) d/t BG 70s-99. Otherwise no acute major changes, pain controlled w/ scheduled and prn medications. VSS.   PLAN: continue w/ POC.

## 2023-07-05 NOTE — PROGRESS NOTES
"  [facility]    Progress Note - EGS Service       Date of Admission:  6/30/2023    Assessment & Plan: Surgery    Arnaldo Henderson is a 68 year old year old with a history of severe acute pancreatitis secondary to gallstones about a year and a half ago complicated by multiple peripancreatic fluid collections status post AXIOS stent placement complicated by partial small bowel obstruction secondary to migration of an AXIOS stent necessitating laparoscopic removal of the stent. This procedure was complicated by postoperative wound infection. 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 He is now POD5 following a distal pancreatectomy on 6/30. Transitioned off insulin drip today (7/5).    Plan:  - full liquid diet  - resumed losartan due to HTN  - daily AM labs for monitoring hemoglobin, WBC, glucose, lytes  - group nurse care  - PT/OT following  - discontinued IV fluids  - discontinued styles       Diet: Full Liquid Diet    DVT Prophylaxis: Heparin SQ  Styles Catheter: PRESENT, indication: Retention, Strict 1-2 Hour I&O  Lines: PRESENT      CVC Triple Lumen Right Internal jugular-Site Assessment: WDL      Drains: None     Cardiac Monitoring: None  Code Status: Full Code      Clinically Significant Risk Factors            # Hypomagnesemia: Lowest Mg = 1.6 mg/dL in last 2 days, will replace as needed              # Overweight: Estimated body mass index is 28.81 kg/m  as calculated from the following:    Height as of this encounter: 1.727 m (5' 8\").    Weight as of this encounter: 86 kg (189 lb 8 oz).           Disposition Plan      Expected Discharge Date: 07/06/2023      Destination: home with help/services           The patient was seen with the Chief Resident/Fellow.    Yusra Correia, MS4    [facility]  Non-urgent " messages: Securely message with [employer] (more info)  Text page via [employer] Paging/Directory     ______________________________________________________________________    Interval History   No acute overnight events. Pain not managed well. Patient stated he was repeatedly woken up due to the insulin drip and requested his nurse visits be scheduled. Pt had flatus, no BM.    Physical Exam   Vital Signs: Temp: 98.1  F (36.7  C) Temp src: Oral BP: 138/73 Pulse: 80   Resp: 18 SpO2: 95 % O2 Device: None (Room air)    Weight: 189 lbs 8 oz    General: resting comfortably in bed  Resp: non-labored breathing   CV: extremities well perfused  GI: no tenderness, soft, incision with no erythema or drainage, noted small hematoma      Intake/Output Summary (Last 24 hours) at 7/5/2023 1714  Last data filed at 7/5/2023 1158  Gross per 24 hour   Intake 270 ml   Output 1800 ml   Net -1530 ml       Data     I have personally reviewed the following data over the past 24 hrs:    11.5 (H)  \   10.4 (L)   / 398     N/A N/A N/A /  234 (H)   4.0 N/A N/A \       Imaging results reviewed over the past 24 hrs:   No results found for this or any previous visit (from the past 24 hour(s)).

## 2023-07-05 NOTE — PROGRESS NOTES
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 He is now POD 5 following a distal pancreatectomy on 6/30      Assessment:   1.T2DM not at goal with hyperglycemia.  Poor control prior to admission   2. Stress induced hyperglycemia  3. post pancreatic surgery     Plan:    -Decrease Lantus 13 units this afternoon    -Stop Insulin drip now. ( has been stopped since 10pm last evening)                 - Novolog 1u/20 gm CHO coverage with meals and snacks--> addendum: change to Novolog 1/15 with meals and snacks based on last 2 BG                   -Add Novolog custom 1:65 sliding scale--> addendum: change to medium sliding scale based on last 2 BG, start at bedtime                 -BG monitoring before meals, bedtime and 2 am                 -hypoglycemia protocol                 -recommend diet with carb counting protocol                 -diabetes education , unlikely needs any ed, was on long acting and sliding scale as below                 -on discharge, will recommend outpatient follow up with [facility] Endocr[facility]ology service and primary care. Wife thought it would be good to come here for endocrine when come to see Dr Dixon.  Put in referral and sent Epic note 7/5/2023.     Discussed plan of care with patient and wife in person, nursing by phone, and primary team called later this afternoon     Tentative Recommendations for Discharge:   Instructions to patient will be posted in AVS and discussed on day of discharge.   Medications and supplies are to be ordered by primary service on discharge.  "  *please use the DIAB non-branded discharge supply order set ([id number])*                    -blood glucose monitoring three times daily before meals and at bedtime     -Basaglar  ** units daily in the afternoon     Aspart (Novolog  per insurance coverage): ** units three times daily with meals, no plan to carb count--> Do not take Fixed meal Novolog  if meal is skipped     -Aspart (Novolog per insurance coverage): Medium intensity sliding scale (see below) given based on premeal BG( BG=blood Glucose)     Correction Scale - Novolog MEDIUM INSULIN RESISTANCE DOSING     Do Not give Correction Insulin if Pre-Meal BG less than 140.   To be given with Fixed meal Novolog insulin, and based on pre-meal blood glucose.      For Pre-Meal  - 189 give 1 unit of Novolog.   For Pre-Meal  - 239 give 2 units.   For Pre-Meal  - 289 give 3 units.   For Pre-Meal  - 339 give 4 units.   For Pre-Meal - 399 give 5 units.   For Pre-Meal -449 give 6 units  For Pre-Meal BG greater than or equal to 450 give 7 units.            MEDIUM INSULIN RESISTANCE DOSING for bedtime    Do Not give Bedtime Correction Insulin if BG less than  200.      For  - 249 give 1 unit of Novolog.   For  - 299 give 2 units.   For  - 349 give 3 units.   For  -399 give 4 units.   For BG greater than or equal to 400 give 5 units.                                           -follow up with [facility] Endocrinology in 4 weeks after discharge (appointment request sent to clinic coordinator on 7/5/2023)                 -upon discharge, patient will need the following supplies ordered by the primary team:      Has \"lots of Basaglar and Novolog at home.\" Does not need testing supllies.     Please call provider if you have more than 1 blood sugar over 250 in one day, or any sugars less than 75.         Endocrinology Outpatient follow up:   Patient should make an appointment in 1-2 weeks from discharge with primary care " provider to go over diabetes regimen. Put in referral to [facility] adult Endocrine clinic.  You can call the [facility] Adult Endocrine  clinic at [phone] if you don't have an appointment yet scheduled 1 week after discharged. If you  have non-urgent questions regarding your blood sugars or insulin the first 2 weeks after discharge.      If you have urgent questions or concerns regarding your blood sugars or insulin, you may contact [phone] (the [facility] hospital ). Ask to speak with the endocrinologist on call.     Your target A1c value is less than 7%. Your most recent A1c is 9.6.      Thank you for letting the Diabetes Management Team be involved in your care!             Interval History and Assessment: interval glucose trend reviewed: .   From 00:00 on 7/4 and 00:00 on 7/5 used 33.5 units including Lantus 15 units. Not eating much per RN.  No procedures planned today.  Still full liquid diet, no plan to change.  Doing okay with eating but only had tomato soup last night and cream of wheat this am.  No diarrhea. No emesis.  Wife is present.  Addendum: last 2 BG >200, will change to medium sliding scale.  Creat=0.74, WBC up slt 11.5      Current nutritional intake and type: Orders Placed This Encounter      Full Liquid Diet      Planned Procedures/surgeries:   Steroid planning: none  D5W-containing solutions/medications: none    Diabetes Mellitus Type: T2DM  Duration:    3 years-- can be documented to 2013 by A1c  Diabetic Complications: None  Prior to Admission Diabetes Regimen:   Novolog 2 units for every 50 above 140  Basaglar 30 units at bedtime     BG monitor: Glucometer  Frequency of checks: 3 times daily  Usual BG control PTA:   A1c 9.6% on 6/30/23  History of DKA: No  Able to Detect Hypoglycemia: Yes  Usual Diabetes Care Provider:   Primary Care Provider: Alanna Lazaro           Diabetes History:   Type of Diabetes: Type 2 Diabetes Mellitus  Lab Results    Component Value Date    A1C 9.6 06/30/2023              Review of Systems:     The Review of Systems is negative other than noted in the Interval History.           Medications:     Current Facility-Administered Medications   Medication     acetaminophen (TYLENOL) tablet 650 mg     aspirin EC tablet 81 mg     atorvastatin (LIPITOR) tablet 40 mg     benzocaine-menthol (CHLORASEPTIC MAX) 15-10 MG lozenge 1 lozenge     bisacodyl (DULCOLAX) suppository 10 mg     carbidopa-levodopa (SINEMET CR)  MG per CR tablet 1 tablet     carbidopa-levodopa (SINEMET)  MG per tablet 2 tablet     dextrose 10% infusion     glucose gel 15-30 g    Or     dextrose 50 % injection 25-50 mL    Or     glucagon injection 1 mg     diphenhydrAMINE (BENADRYL) solution 12.5 mg    Or     diphenhydrAMINE (BENADRYL) injection 12.5 mg     fentaNYL (PF) (SUBLIMAZE) injection 25-50 mcg     gabapentin (NEURONTIN) capsule 400 mg     gabapentin (NEURONTIN) capsule 600 mg     heparin ANTICOAGULANT injection 5,000 Units     insulin 1 unit/mL in saline (NovoLIN, HumuLIN Regular) drip - ADULT IV Infusion     insulin aspart (NovoLOG) injection (RAPID ACTING)     insulin aspart (NovoLOG) injection (RAPID ACTING)     insulin glargine (LANTUS PEN) injection 15 Units     ketamine (KETALAR) injection 5 mg     lidocaine (LMX4) cream     lidocaine 1 % 0.1-1 mL     [Held by provider] lipase-protease-amylase (ZENPEP) 67846-32799-98336 units delayed release capsule 2 capsule     magnesium hydroxide (MILK OF MAGNESIA) suspension 30 mL     medication instruction     methocarbamol (ROBAXIN) tablet 500 mg     nalbuphine (NUBAIN) injection 2.5-5 mg     naloxone (NARCAN) injection 0.2 mg    Or     naloxone (NARCAN) injection 0.4 mg    Or     naloxone (NARCAN) injection 0.2 mg    Or     naloxone (NARCAN) injection 0.4 mg     ondansetron (ZOFRAN ODT) ODT tab 4 mg    Or     ondansetron (ZOFRAN) injection 4 mg     oxyCODONE (ROXICODONE) tablet 5-10 mg     pantoprazole  "(PROTONIX) EC tablet 40 mg     polyethylene glycol (MIRALAX) Packet 17 g     prochlorperazine (COMPAZINE) injection 5 mg    Or     prochlorperazine (COMPAZINE) tablet 5 mg    Or     prochlorperazine (COMPAZINE) suppository 12.5 mg     sodium chloride (PF) 0.9% PF flush 3 mL     sodium chloride (PF) 0.9% PF flush 3 mL            Physical Exam:    BP (!) 178/78 (BP Location: Right arm)   Pulse 69   Temp 98.1  F (36.7  C) (Oral)   Resp 20   Ht 1.727 m (5' 8\")   Wt 86 kg (189 lb 8 oz)   SpO2 95%   BMI 28.81 kg/m    General: pleasant, in no distress.  Sitting in chair; wife present  HEENT: normocephalic, atraumatic. Oral mucous membranes moist.   Hearing aide right ear  Lungs: unlabored respiration, no cough  ABD: rounded, nondistended  Skin: warm and dry, no obvious lesions  MSK:  moves all extremities  Lymp:  no LE edema   Mental status:  alert, oriented to self, place, time  Psych:   calm and appropriate interaction             Data:     Recent Labs   Lab 07/05/23  0646 07/05/23  0603 07/05/23  0421 07/05/23  0331 07/05/23  0237 07/05/23  0141   * 84 80 81 90 85     Lab Results   Component Value Date    WBC 10.1 07/04/2023    WBC 8.9 07/03/2023    WBC 10.3 07/02/2023    HGB 9.6 (L) 07/04/2023    HGB 9.1 (L) 07/03/2023    HGB 9.8 (L) 07/02/2023    HCT 28.9 (L) 07/04/2023    HCT 27.0 (L) 07/03/2023    HCT 29.9 (L) 07/02/2023    MCV 98 07/04/2023    MCV 98 07/03/2023    MCV 98 07/02/2023     07/04/2023     07/03/2023     07/02/2023     Lab Results   Component Value Date     07/03/2023     (L) 07/02/2023     07/01/2023    POTASSIUM 4.1 07/04/2023    POTASSIUM 3.7 07/03/2023    POTASSIUM 3.9 07/02/2023    CHLORIDE 107 07/03/2023    CHLORIDE 102 07/02/2023    CHLORIDE 103 07/01/2023    CO2 25 07/03/2023    CO2 27 07/02/2023    CO2 26 07/01/2023     (H) 07/05/2023    GLC 84 07/05/2023    GLC 80 07/05/2023     Lab Results   Component Value Date    BUN 10.9 07/03/2023 "    BUN 20.5 07/02/2023    BUN 30.3 (H) 07/01/2023     No results found for: TSH  Lab Results   Component Value Date    AST 24 05/18/2023    AST 47 11/18/2022    ALT <5 (L) 05/18/2023    ALT 11 11/18/2022    ALKPHOS 134 (H) 05/18/2023    ALKPHOS 185 (H) 11/18/2022       Review of prior external note(s) from - [facility] or Care Everywhere   50 minutes spent on the date of the encounter doing chart review, history and exam, documentation and further activities per the note     Over 50% of my time on the unit was spent counseling the patient and/or coordinating care regarding acute hyperglycemia management.  See note for details.       To contact Endocrine Diabetes service:   From 8AM-4PM: page inpatient diabetes provider that is following the patient  For questions or updates from 4PM-8AM: page the diabetes job code for on call fellow: [phone]    Shy Bhakta PA-C  Inpatient Diabetes Service  Pager   [phone]  Date of Service: 7/5/2023

## 2023-07-06 ENCOUNTER — APPOINTMENT (OUTPATIENT)
Dept: OCCUPATIONAL THERAPY | Facility: CLINIC | Age: 68
DRG: 406 | End: 2023-07-06
Attending: SURGERY
Payer: MEDICARE

## 2023-07-06 ENCOUNTER — TELEPHONE (OUTPATIENT)
Dept: ENDOCRINOLOGY | Facility: CLINIC | Age: 68
End: 2023-07-06

## 2023-07-06 ENCOUNTER — APPOINTMENT (OUTPATIENT)
Dept: PHYSICAL THERAPY | Facility: CLINIC | Age: 68
DRG: 406 | End: 2023-07-06
Attending: SURGERY
Payer: MEDICARE

## 2023-07-06 LAB
GLUCOSE BLDC GLUCOMTR-MCNC: 133 MG/DL (ref 70–99)
GLUCOSE BLDC GLUCOMTR-MCNC: 181 MG/DL (ref 70–99)
GLUCOSE BLDC GLUCOMTR-MCNC: 182 MG/DL (ref 70–99)
GLUCOSE BLDC GLUCOMTR-MCNC: 188 MG/DL (ref 70–99)
MAGNESIUM SERPL-MCNC: 1.7 MG/DL (ref 1.7–2.3)
PHOSPHATE SERPL-MCNC: 4 MG/DL (ref 2.5–4.5)
PLATELET # BLD AUTO: 428 10E3/UL (ref 150–450)
POTASSIUM SERPL-SCNC: 3.9 MMOL/L (ref 3.4–5.3)

## 2023-07-06 PROCEDURE — 250N000011 HC RX IP 250 OP 636: Performed by: STUDENT IN AN ORGANIZED HEALTH CARE EDUCATION/TRAINING PROGRAM

## 2023-07-06 PROCEDURE — 36592 COLLECT BLOOD FROM PICC: CPT | Performed by: SURGERY

## 2023-07-06 PROCEDURE — 250N000013 HC RX MED GY IP 250 OP 250 PS 637: Performed by: STUDENT IN AN ORGANIZED HEALTH CARE EDUCATION/TRAINING PROGRAM

## 2023-07-06 PROCEDURE — 97530 THERAPEUTIC ACTIVITIES: CPT | Mod: GP

## 2023-07-06 PROCEDURE — 97535 SELF CARE MNGMENT TRAINING: CPT | Mod: GO

## 2023-07-06 PROCEDURE — 97116 GAIT TRAINING THERAPY: CPT | Mod: GP

## 2023-07-06 PROCEDURE — 84132 ASSAY OF SERUM POTASSIUM: CPT | Performed by: SURGERY

## 2023-07-06 PROCEDURE — 250N000011 HC RX IP 250 OP 636: Mod: JZ | Performed by: SURGERY

## 2023-07-06 PROCEDURE — 250N000013 HC RX MED GY IP 250 OP 250 PS 637: Performed by: NURSE PRACTITIONER

## 2023-07-06 PROCEDURE — 83735 ASSAY OF MAGNESIUM: CPT | Performed by: SURGERY

## 2023-07-06 PROCEDURE — 99233 SBSQ HOSP IP/OBS HIGH 50: CPT | Mod: 24 | Performed by: CLINICAL NURSE SPECIALIST

## 2023-07-06 PROCEDURE — 250N000013 HC RX MED GY IP 250 OP 250 PS 637: Performed by: SURGERY

## 2023-07-06 PROCEDURE — 85049 AUTOMATED PLATELET COUNT: CPT | Performed by: STUDENT IN AN ORGANIZED HEALTH CARE EDUCATION/TRAINING PROGRAM

## 2023-07-06 PROCEDURE — 250N000013 HC RX MED GY IP 250 OP 250 PS 637

## 2023-07-06 PROCEDURE — 120N000002 HC R&B MED SURG/OB UMMC

## 2023-07-06 PROCEDURE — 84100 ASSAY OF PHOSPHORUS: CPT | Performed by: SURGERY

## 2023-07-06 RX ORDER — MAGNESIUM SULFATE HEPTAHYDRATE 40 MG/ML
2 INJECTION, SOLUTION INTRAVENOUS ONCE
Status: COMPLETED | OUTPATIENT
Start: 2023-07-06 | End: 2023-07-06

## 2023-07-06 RX ORDER — AMOXICILLIN 250 MG
1 CAPSULE ORAL 2 TIMES DAILY PRN
Status: DISCONTINUED | OUTPATIENT
Start: 2023-07-06 | End: 2023-07-07 | Stop reason: HOSPADM

## 2023-07-06 RX ORDER — FENTANYL CITRATE 50 UG/ML
25-50 INJECTION, SOLUTION INTRAMUSCULAR; INTRAVENOUS EVERY 6 HOURS PRN
Status: DISCONTINUED | OUTPATIENT
Start: 2023-07-06 | End: 2023-07-07 | Stop reason: HOSPADM

## 2023-07-06 RX ADMIN — CARBIDOPA AND LEVODOPA 2 TABLET: 25; 100 TABLET ORAL at 06:25

## 2023-07-06 RX ADMIN — ATORVASTATIN CALCIUM 40 MG: 40 TABLET, FILM COATED ORAL at 07:42

## 2023-07-06 RX ADMIN — OXYCODONE HYDROCHLORIDE 10 MG: 5 TABLET ORAL at 12:31

## 2023-07-06 RX ADMIN — OXYCODONE HYDROCHLORIDE 10 MG: 5 TABLET ORAL at 07:42

## 2023-07-06 RX ADMIN — GABAPENTIN 400 MG: 400 CAPSULE ORAL at 14:15

## 2023-07-06 RX ADMIN — ASPIRIN 81 MG: 81 TABLET, COATED ORAL at 07:42

## 2023-07-06 RX ADMIN — CARBIDOPA AND LEVODOPA 1 TABLET: 50; 200 TABLET, EXTENDED RELEASE ORAL at 22:27

## 2023-07-06 RX ADMIN — GABAPENTIN 600 MG: 300 CAPSULE ORAL at 22:27

## 2023-07-06 RX ADMIN — MAGNESIUM SULFATE IN WATER 2 G: 40 INJECTION, SOLUTION INTRAVENOUS at 14:15

## 2023-07-06 RX ADMIN — MAGNESIUM HYDROXIDE 30 ML: 400 SUSPENSION ORAL at 12:43

## 2023-07-06 RX ADMIN — METHOCARBAMOL 500 MG: 500 TABLET ORAL at 19:14

## 2023-07-06 RX ADMIN — BISACODYL 10 MG: 10 SUPPOSITORY RECTAL at 20:36

## 2023-07-06 RX ADMIN — CARBIDOPA AND LEVODOPA 2 TABLET: 25; 100 TABLET ORAL at 12:22

## 2023-07-06 RX ADMIN — HEPARIN SODIUM 5000 UNITS: 5000 INJECTION, SOLUTION INTRAVENOUS; SUBCUTANEOUS at 07:42

## 2023-07-06 RX ADMIN — GABAPENTIN 400 MG: 400 CAPSULE ORAL at 07:42

## 2023-07-06 RX ADMIN — HEPARIN SODIUM 5000 UNITS: 5000 INJECTION, SOLUTION INTRAVENOUS; SUBCUTANEOUS at 16:01

## 2023-07-06 RX ADMIN — PANTOPRAZOLE SODIUM 40 MG: 40 TABLET, DELAYED RELEASE ORAL at 07:42

## 2023-07-06 RX ADMIN — CARBIDOPA AND LEVODOPA 2 TABLET: 25; 100 TABLET ORAL at 18:23

## 2023-07-06 RX ADMIN — METHOCARBAMOL 500 MG: 500 TABLET ORAL at 09:43

## 2023-07-06 RX ADMIN — PANCRELIPASE LIPASE, PANCRELIPASE PROTEASE, PANCRELIPASE AMYLASE 2 CAPSULE: 20000; 63000; 84000 CAPSULE, DELAYED RELEASE ORAL at 07:41

## 2023-07-06 RX ADMIN — LOSARTAN POTASSIUM 50 MG: 50 TABLET, FILM COATED ORAL at 22:27

## 2023-07-06 ASSESSMENT — ACTIVITIES OF DAILY LIVING (ADL)
ADLS_ACUITY_SCORE: 29

## 2023-07-06 NOTE — PLAN OF CARE
"BP (!) 149/83 (BP Location: Right arm)   Pulse 86   Temp 98.1  F (36.7  C) (Oral)   Resp 18   Ht 1.727 m (5' 8\")   Wt 86 kg (189 lb 8 oz)   SpO2 95%   BMI 28.81 kg/m        Shift: 6012-0133  Status: Chronic pancreatitis.   Neuro: A&O x 4. Diabetic neuropathy.    Resp: WDL. RA. Denies SOB.   Cardiac: HTN. Denies cardiac chest pain.   GI/: Full liquid diet. Denies N&V. Passing flatus. No BM this shift, gave PRN Miralax. BG checks AC HS. Sliding scale and carb coverage.   Skin: Warm and dry.   Activity: SBA.   Incision & Drainage: PIV SL. Midline Abdominal incision approximated with staples. Previous RUSTY site right abdomen.   Pain: 7/10 pain left abdomen. Gave PRN oxycodone.   Labs: Mg + replaced yesterday PM. Re-check this AM. B.   Changes: No acute changes.   Plan: Continue with POC.       Goal Outcome Evaluation: Ongoing.       Plan of Care Reviewed With: patient    Overall Patient Progress: no change           "

## 2023-07-06 NOTE — PROGRESS NOTES
"  [facility]    Progress Note - EGS Service       Date of Admission:  6/30/2023    Assessment & Plan: Surgery   Arnaldo Henderson is a 68 year old year old with a history of severe acute pancreatitis secondary to gallstones about a year and a half ago complicated by multiple peripancreatic fluid collections status post AXIOS stent placement complicated by partial small bowel obstruction secondary to migration of an AXIOS stent necessitating laparoscopic removal of the stent. This procedure was complicated by postoperative wound infection. 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 He is now POD6 following a distal pancreatectomy on 6/30. Transitioned off insulin drip 7/5.    Plan:   -styles removed  -full liquid diet  -oral hydration as able to tolerate, consider IVF if dehydrated  -monitor I&O's   -space out pain meds       Diet: Full Liquid Diet    DVT Prophylaxis: Heparin SQ  Styles Catheter: Not present  Lines: PRESENT      CVC Triple Lumen Right Internal jugular-Site Assessment: WDL      Drains: None     Cardiac Monitoring: None  Code Status: Full Code      Clinically Significant Risk Factors            # Hypomagnesemia: Lowest Mg = 1.6 mg/dL in last 2 days, will replace as needed              # Overweight: Estimated body mass index is 28.81 kg/m  as calculated from the following:    Height as of this encounter: 1.727 m (5' 8\").    Weight as of this encounter: 86 kg (189 lb 8 oz).           Disposition Plan      Expected Discharge Date: 07/07/2023      Destination: home with help/services           The patient was seen with the Chief Resident/Fellow.    Yusra Correia, MS4    [facility]  Non-urgent messages: Securely message with [employer] (more info)  Text page via [facility] Paging/Directory "     ______________________________________________________________________    Interval History   Patient doing well. Passing gas, no stool. Pain controlled. Santos out today.     Physical Exam   Vital Signs: Temp: 97.2  F (36.2  C) Temp src: Oral BP: 116/62 Pulse: 82   Resp: 15 SpO2: 94 % O2 Device: None (Room air)    Weight: 189 lbs 8 oz    General: resting comfortably, NAD  CV: extremities well perfused  Resp: non-labored breathing on RA  GI: soft, non-tender abdomen, no erythema around incisions, no drainage    Intake/Output Summary (Last 24 hours) at 7/6/2023 1611  Last data filed at 7/6/2023 1544  Gross per 24 hour   Intake 1560 ml   Output 2350 ml   Net -790 ml       Data     I have personally reviewed the following data over the past 24 hrs:    N/A  \   N/A   / 428     N/A N/A N/A /  188 (H)   3.9 N/A N/A \       Imaging results reviewed over the past 24 hrs:   No results found for this or any previous visit (from the past 24 hour(s)).

## 2023-07-06 NOTE — PROVIDER NOTIFICATION
Purpose of Notification: retention  Notified Person: Dr. Rouse  Notification Time: 1838  Notification Interaction: Paged    [phone]. JESUS Isaacs- pt voided 300 mLs but PVR still 425 mL. Do you want me to continue to monitor or straight cath? thanks! Aida [phone]    Addendum: 1845    Dr. Rouse called back. Verbal orders to straight cath patient.    Addendum: 2509 [id number]. JESUS Henderson Pt voided another 150 mLs. Refusing to straight cath right now, wants to give bladder some time. Reports at home he has hesitancy. thanks! Yadiraee [phone]

## 2023-07-06 NOTE — PROVIDER NOTIFICATION
Notified Person: Dr. Matthews  Notification Time: 1445   Notification Interaction: Paged    [phone]. BeatrizBrennan JESUS  Pt's styles removed at 0750. Coming upon  7 hours now; still due to void. Bladder scanned: 244 mL. Should we give more time? please let me know. Thanks, Aida [phone]      Addendum: 1548  Since last page, patient was able to void 150 mL and scanned bladder again;PVR: 325. Spoke with team. No interventions right now. Encourage patient to drink fluids. Continue to monitor and page if PVR are greater than 500 mL.

## 2023-07-06 NOTE — DISCHARGE INSTRUCTIONS
Discharging RN please fax discharge orders to the following agency  [facility]  Fax: [phone]             7/7/2023 Recommendations for Discharge--                   -blood glucose monitoring three times daily before meals and at bedtime     -Basaglar  13 units daily at bedtime     Aspart (Novolog  per insurance coverage): 3 units three times daily with meals, no plan to carb count at this time--> Do not take Fixed meal Novolog  if meal is skipped     -Aspart (Novolog per insurance coverage): Medium intensity sliding scale (see below) given based on premeal BG( BG=blood Glucose), in addition to the meal-based dose     Correction Scale - Novolog MEDIUM INSULIN RESISTANCE DOSING     Do Not give Correction Insulin if Pre-Meal BG less than 140.   To be given with Fixed meal Novolog insulin, and based on pre-meal blood glucose.      For Pre-Meal  - 189 give 1 unit of Novolog.   For Pre-Meal  - 239 give 2 units.   For Pre-Meal  - 289 give 3 units.   For Pre-Meal  - 339 give 4 units.   For Pre-Meal - 399 give 5 units.   For Pre-Meal -449 give 6 units  For Pre-Meal BG greater than or equal to 450 give 7 units.            MEDIUM INSULIN RESISTANCE DOSING for bedtime    Do Not give Bedtime Correction Insulin if BG less than  200.      For  - 249 give 1 unit of Novolog.   For  - 299 give 2 units.   For  - 349 give 3 units.   For  -399 give 4 units.   For BG greater than or equal to 400 give 5 units.                       Please call provider if you have more than 1 blood sugar over 250 in one day, or any sugars less than 75.        Endocrinology Outpatient follow up:  Placed referral to [facility] adult Endocrine clinic on 7/5, which will be released on discharge day.  You can call the [facility] Adult Endocrine  clinic at [phone] if you don't have an appointment yet scheduled 1 week after discharged.      If you have urgent questions or  concerns regarding your blood sugars or insulin, you may contact [phone] (the [facility] hospital ). Ask to speak with the endocrinologist on call.     Your target A1c value is less than 7%. Your most recent A1c is 9.6.      Thank you for letting the Diabetes Management Team be involved in your care!

## 2023-07-06 NOTE — PROGRESS NOTES
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 He is now s/p distal pancreatectomy on 6/30      Assessment:   1.T2DM not at goal with hyperglycemia.  Poor control prior to admission   2. Stress induced hyperglycemia  3. post pancreatic surgery     Plan:    -continue Lantus 13 units --> move from 1600 to 1800, eventually rescheduling to bedtime (home/preferred schedule)                    - Novolog  1/15 with meals and snacks--> increase to 1 per 10 grams (pt will be provided a fixed meal dose on discharge)                   - Novolog medium sliding scale based on last 2 BG, start at bedtime                 -BG monitoring before meals, bedtime and 2 am--> unclear why the 0200 BG is not being checked, it is ordered.  Day RN will verbally pass on request to perform.                 -hypoglycemia protocol                 -recommend diet with carb counting protocol                 -diabetes education , pt understanding the concept of fixed meal dosing so no formal education requested at this time.                 -on discharge, will recommend outpatient follow up with [facility]ocr[facility]ology service and primary care. Wife thought it would be good to come here for endocrine when come to see Dr Dixon.  Put in referral and sent Epic note 7/5/2023.     Discussed plan of care with patient and wife and RN.  RN had received update from Dr. Haider that discharge may be Friday.      Tentative Recommendations for Discharge (pasted into AVS)-- doses to be finalized on discharge day  **pt denies the need  "for prescriptions of any diabetes-related supplies or insulin                    -blood glucose monitoring three times daily before meals and at bedtime     -Basaglar  13 units daily at bedtime     Aspart (Novolog  per insurance coverage): 3 units three times daily with meals, no plan to carb count--> Do not take Fixed meal Novolog  if meal is skipped     -Aspart (Novolog per insurance coverage): Medium intensity sliding scale (see below) given based on premeal BG( BG=blood Glucose)     Correction Scale - Novolog MEDIUM INSULIN RESISTANCE DOSING     Do Not give Correction Insulin if Pre-Meal BG less than 140.   To be given with Fixed meal Novolog insulin, and based on pre-meal blood glucose.      For Pre-Meal  - 189 give 1 unit of Novolog.   For Pre-Meal  - 239 give 2 units.   For Pre-Meal  - 289 give 3 units.   For Pre-Meal  - 339 give 4 units.   For Pre-Meal - 399 give 5 units.   For Pre-Meal -449 give 6 units  For Pre-Meal BG greater than or equal to 450 give 7 units.            MEDIUM INSULIN RESISTANCE DOSING for bedtime    Do Not give Bedtime Correction Insulin if BG less than  200.      For  - 249 give 1 unit of Novolog.   For  - 299 give 2 units.   For  - 349 give 3 units.   For  -399 give 4 units.   For BG greater than or equal to 400 give 5 units.     Additional details on follow up are included in the AVS                               Interval History and Assessment: interval glucose trend reviewed: .        full liquid diet, no plan to change per notes.  Pt's wife asking if the diet will advance and if now, what can she buy for him at home.   Total intake compared to pt's typical is much lower.  He's even taking sugar-free pudding.    Prefer HS Basaglar: yes, to be on usual schedule.  Discussed the addition of meal-based aspart.  This makes sense to patient as he had noticed \"carbohydrates\" really raise his glucose, not just \"sugars\".  Reviewed " the lower glargine need at this time, but the ongoing post-prandial elevation and need for more aspart.      :Expected Discharge Date: 07/06/2023      Destination: home with help/services     :    RN was advised pt will be here until tomorrow , to gain more functional improvement.  Pt estimates needing until the weekend.                Current nutritional intake and type: Orders Placed This Encounter      Full Liquid Diet      Planned Procedures/surgeries:   Steroid planning: none  D5W-containing solutions/medications: none    Diabetes Mellitus Type: T2DM  Duration:    3 years-- can be documented to 2013 by A1c  Diabetic Complications: None  Prior to Admission Diabetes Regimen:   Novolog 2 units for every 50 above 140  Basaglar 30 units at bedtime     BG monitor: Glucometer  Frequency of checks: 3 times daily  Usual BG control PTA:   A1c 9.6% on 6/30/23  History of DKA: No  Able to Detect Hypoglycemia: Yes  Usual Diabetes Care Provider:   Primary Care Provider: Alanna Lazaro           Diabetes History:   Type of Diabetes: Type 2 Diabetes Mellitus  Lab Results   Component Value Date    A1C 9.6 06/30/2023              Review of Systems:     The Review of Systems is negative other than noted in the Interval History.           Medications:     Current Facility-Administered Medications   Medication     acetaminophen (TYLENOL) tablet 650 mg     aspirin EC tablet 81 mg     atorvastatin (LIPITOR) tablet 40 mg     benzocaine-menthol (CHLORASEPTIC MAX) 15-10 MG lozenge 1 lozenge     bisacodyl (DULCOLAX) suppository 10 mg     carbidopa-levodopa (SINEMET CR)  MG per CR tablet 1 tablet     carbidopa-levodopa (SINEMET)  MG per tablet 2 tablet     dextrose 10% infusion     glucose gel 15-30 g    Or     dextrose 50 % injection 25-50 mL    Or     glucagon injection 1 mg     diphenhydrAMINE (BENADRYL) solution 12.5 mg    Or     diphenhydrAMINE (BENADRYL) injection 12.5 mg     fentaNYL (PF) (SUBLIMAZE) injection 25-50  "mcg     gabapentin (NEURONTIN) capsule 400 mg     gabapentin (NEURONTIN) capsule 600 mg     heparin ANTICOAGULANT injection 5,000 Units     insulin aspart (NovoLOG) injection (RAPID ACTING)     insulin aspart (NovoLOG) injection (RAPID ACTING)     insulin aspart (NovoLOG) injection (RAPID ACTING)     insulin aspart (NovoLOG) injection (RAPID ACTING)     insulin glargine (LANTUS PEN) injection 13 Units     ketamine (KETALAR) injection 5 mg     lidocaine (LMX4) cream     lidocaine 1 % 0.1-1 mL     lipase-protease-amylase (ZENPEP) 51038-83702-92386 units delayed release capsule 2 capsule     losartan (COZAAR) tablet 50 mg     magnesium hydroxide (MILK OF MAGNESIA) suspension 30 mL     Med Instruction - Transition from IV Insulin Infusion to Sub-Q Insulin     medication instruction     methocarbamol (ROBAXIN) tablet 500 mg     nalbuphine (NUBAIN) injection 2.5-5 mg     naloxone (NARCAN) injection 0.2 mg    Or     naloxone (NARCAN) injection 0.4 mg    Or     naloxone (NARCAN) injection 0.2 mg    Or     naloxone (NARCAN) injection 0.4 mg     ondansetron (ZOFRAN ODT) ODT tab 4 mg    Or     ondansetron (ZOFRAN) injection 4 mg     oxyCODONE (ROXICODONE) tablet 5-10 mg     pantoprazole (PROTONIX) EC tablet 40 mg     polyethylene glycol (MIRALAX) Packet 17 g     prochlorperazine (COMPAZINE) injection 5 mg    Or     prochlorperazine (COMPAZINE) tablet 5 mg    Or     prochlorperazine (COMPAZINE) suppository 12.5 mg     sodium chloride (PF) 0.9% PF flush 3 mL     sodium chloride (PF) 0.9% PF flush 3 mL            Physical Exam:    BP (!) 141/75 (BP Location: Right arm)   Pulse 73   Temp 98.2  F (36.8  C) (Oral)   Resp 18   Ht 1.727 m (5' 8\")   Wt 86 kg (189 lb 8 oz)   SpO2 93%   BMI 28.81 kg/m    General: pleasant, in no distress.  Sitting in chair after transfer from edge of bed; wife present* and involved  HEENT: normocephalic, atraumatic. Oral mucous membranes moist.   Hearing aide right ear, no issues with " volume  Lungs: unlabored respiration, no cough  ABD: rounded, nondistended  Skin: dry, no obvious lesions  MSK:  moves all extremities  Mental status:  alert, oriented to self, place, time  Psych:   calm and appropriate interaction             Data:     Recent Labs   Lab 07/06/23  0739 07/05/23  2200 07/05/23  1655 07/05/23  1153 07/05/23  0907 07/05/23  0847   * 262* 234* 235* 138* 147*     Lab Results   Component Value Date    WBC 11.5 (H) 07/05/2023    WBC 10.1 07/04/2023    WBC 8.9 07/03/2023    HGB 10.4 (L) 07/05/2023    HGB 9.6 (L) 07/04/2023    HGB 9.1 (L) 07/03/2023    HCT 32.5 (L) 07/05/2023    HCT 28.9 (L) 07/04/2023    HCT 27.0 (L) 07/03/2023     07/05/2023    MCV 98 07/04/2023    MCV 98 07/03/2023     07/06/2023     07/05/2023     07/04/2023     Lab Results   Component Value Date     07/03/2023     (L) 07/02/2023     07/01/2023    POTASSIUM 3.9 07/06/2023    POTASSIUM 4.0 07/05/2023    POTASSIUM 4.1 07/04/2023    CHLORIDE 107 07/03/2023    CHLORIDE 102 07/02/2023    CHLORIDE 103 07/01/2023    CO2 25 07/03/2023    CO2 27 07/02/2023    CO2 26 07/01/2023     (H) 07/06/2023     (H) 07/05/2023     (H) 07/05/2023     Lab Results   Component Value Date    BUN 10.9 07/03/2023    BUN 20.5 07/02/2023    BUN 30.3 (H) 07/01/2023     No results found for: TSH  Lab Results   Component Value Date    AST 24 05/18/2023    AST 47 11/18/2022    ALT <5 (L) 05/18/2023    ALT 11 11/18/2022    ALKPHOS 134 (H) 05/18/2023    ALKPHOS 185 (H) 11/18/2022       Gloria Crouch APRN -[id number]  50 minutes spent on the date of the encounter doing chart review, history and exam, coordinating care/communication, documentation and further activities per the note.        To contact Endocrine Diabetes service:   From 8AM-4PM: page inpatient diabetes provider that is following the patient  For questions or updates from 4PM-8AM: page the diabetes job code for on call  fellow: [facility]

## 2023-07-06 NOTE — TELEPHONE ENCOUNTER
Spoke w/ Pt's wife. Confirms understanding that we will schedule with PA.   CCs notified to please help schedule with PA as soon as pt is discharged.   Chyna Lee RN on 7/6/2023 at 12:33 PM           May a detailed message be left on voicemail: yes      Reason for Call: Other: Per spouse would like to schedule pt referral for diabetes but would like to have pt see the doctor he is currently seeing in the ED for ENDO. Writer took a look and pt is not discharge yet but saw  in the ED. Also there is nothing inperson until DEC. Per Spouse would like pt to been seen a lot sooner and inperson. Please call wife back to discuss Thank you!      Action Taken: Message routed to:  [facility] ENDO     Travel Screening: Not Applicable                                                                                                                                                                                                                                                                                                                                                                                                                                                                                                                                                                                                                                                                                             Note      Veronika Pena [phone]  [place]

## 2023-07-07 ENCOUNTER — APPOINTMENT (OUTPATIENT)
Dept: PHYSICAL THERAPY | Facility: CLINIC | Age: 68
DRG: 406 | End: 2023-07-07
Attending: SURGERY
Payer: MEDICARE

## 2023-07-07 VITALS
HEART RATE: 79 BPM | WEIGHT: 184.5 LBS | OXYGEN SATURATION: 92 % | BODY MASS INDEX: 27.96 KG/M2 | RESPIRATION RATE: 16 BRPM | DIASTOLIC BLOOD PRESSURE: 53 MMHG | SYSTOLIC BLOOD PRESSURE: 89 MMHG | HEIGHT: 68 IN | TEMPERATURE: 98.1 F

## 2023-07-07 LAB
GLUCOSE BLDC GLUCOMTR-MCNC: 122 MG/DL (ref 70–99)
GLUCOSE BLDC GLUCOMTR-MCNC: 126 MG/DL (ref 70–99)
HOLD SPECIMEN: NORMAL
MAGNESIUM SERPL-MCNC: 1.9 MG/DL (ref 1.7–2.3)
PHOSPHATE SERPL-MCNC: 3.5 MG/DL (ref 2.5–4.5)
POTASSIUM SERPL-SCNC: 4.1 MMOL/L (ref 3.4–5.3)

## 2023-07-07 PROCEDURE — 36415 COLL VENOUS BLD VENIPUNCTURE: CPT | Performed by: SURGERY

## 2023-07-07 PROCEDURE — 84100 ASSAY OF PHOSPHORUS: CPT | Performed by: SURGERY

## 2023-07-07 PROCEDURE — 250N000013 HC RX MED GY IP 250 OP 250 PS 637: Performed by: NURSE PRACTITIONER

## 2023-07-07 PROCEDURE — 97116 GAIT TRAINING THERAPY: CPT | Mod: GP

## 2023-07-07 PROCEDURE — 250N000013 HC RX MED GY IP 250 OP 250 PS 637: Performed by: STUDENT IN AN ORGANIZED HEALTH CARE EDUCATION/TRAINING PROGRAM

## 2023-07-07 PROCEDURE — 250N000011 HC RX IP 250 OP 636: Performed by: STUDENT IN AN ORGANIZED HEALTH CARE EDUCATION/TRAINING PROGRAM

## 2023-07-07 PROCEDURE — 84132 ASSAY OF SERUM POTASSIUM: CPT | Performed by: SURGERY

## 2023-07-07 PROCEDURE — 250N000013 HC RX MED GY IP 250 OP 250 PS 637: Performed by: SURGERY

## 2023-07-07 PROCEDURE — 83735 ASSAY OF MAGNESIUM: CPT | Performed by: SURGERY

## 2023-07-07 PROCEDURE — 99232 SBSQ HOSP IP/OBS MODERATE 35: CPT | Mod: 24 | Performed by: PHYSICIAN ASSISTANT

## 2023-07-07 PROCEDURE — 97530 THERAPEUTIC ACTIVITIES: CPT | Mod: GP

## 2023-07-07 PROCEDURE — 97110 THERAPEUTIC EXERCISES: CPT | Mod: GP

## 2023-07-07 PROCEDURE — 250N000013 HC RX MED GY IP 250 OP 250 PS 637

## 2023-07-07 RX ORDER — OXYCODONE HYDROCHLORIDE 5 MG/1
5-10 TABLET ORAL EVERY 4 HOURS PRN
Qty: 12 TABLET | Refills: 0 | Status: SHIPPED | OUTPATIENT
Start: 2023-07-07 | End: 2023-11-28

## 2023-07-07 RX ORDER — MAGNESIUM OXIDE 400 MG/1
400 TABLET ORAL EVERY 4 HOURS
Status: COMPLETED | OUTPATIENT
Start: 2023-07-07 | End: 2023-07-07

## 2023-07-07 RX ORDER — POLYETHYLENE GLYCOL 3350 17 G/17G
17 POWDER, FOR SOLUTION ORAL DAILY
Qty: 510 G | COMMUNITY
Start: 2023-07-07 | End: 2023-11-28

## 2023-07-07 RX ORDER — ACETAMINOPHEN 325 MG/1
650 TABLET ORAL EVERY 4 HOURS PRN
COMMUNITY
Start: 2023-07-07 | End: 2024-04-25

## 2023-07-07 RX ORDER — INSULIN GLARGINE 100 [IU]/ML
13 INJECTION, SOLUTION SUBCUTANEOUS AT BEDTIME
Start: 2023-07-07 | End: 2023-07-28

## 2023-07-07 RX ORDER — METHOCARBAMOL 500 MG/1
500 TABLET, FILM COATED ORAL EVERY 6 HOURS PRN
Qty: 45 TABLET | Refills: 0 | Status: SHIPPED | OUTPATIENT
Start: 2023-07-07 | End: 2024-02-04

## 2023-07-07 RX ORDER — BISACODYL 10 MG
10 SUPPOSITORY, RECTAL RECTAL ONCE
Status: COMPLETED | OUTPATIENT
Start: 2023-07-07 | End: 2023-07-07

## 2023-07-07 RX ADMIN — PANCRELIPASE LIPASE, PANCRELIPASE PROTEASE, PANCRELIPASE AMYLASE 2 CAPSULE: 20000; 63000; 84000 CAPSULE, DELAYED RELEASE ORAL at 07:57

## 2023-07-07 RX ADMIN — GABAPENTIN 400 MG: 400 CAPSULE ORAL at 14:06

## 2023-07-07 RX ADMIN — OXYCODONE HYDROCHLORIDE 10 MG: 5 TABLET ORAL at 08:12

## 2023-07-07 RX ADMIN — HEPARIN SODIUM 5000 UNITS: 5000 INJECTION, SOLUTION INTRAVENOUS; SUBCUTANEOUS at 00:22

## 2023-07-07 RX ADMIN — CARBIDOPA AND LEVODOPA 2 TABLET: 25; 100 TABLET ORAL at 06:33

## 2023-07-07 RX ADMIN — HEPARIN SODIUM 5000 UNITS: 5000 INJECTION, SOLUTION INTRAVENOUS; SUBCUTANEOUS at 07:59

## 2023-07-07 RX ADMIN — CARBIDOPA AND LEVODOPA 2 TABLET: 25; 100 TABLET ORAL at 12:34

## 2023-07-07 RX ADMIN — GABAPENTIN 400 MG: 400 CAPSULE ORAL at 07:58

## 2023-07-07 RX ADMIN — BISACODYL 10 MG: 10 SUPPOSITORY RECTAL at 12:35

## 2023-07-07 RX ADMIN — ASPIRIN 81 MG: 81 TABLET, COATED ORAL at 07:58

## 2023-07-07 RX ADMIN — ATORVASTATIN CALCIUM 40 MG: 40 TABLET, FILM COATED ORAL at 07:57

## 2023-07-07 RX ADMIN — MAGNESIUM OXIDE TAB 400 MG (241.3 MG ELEMENTAL MG) 400 MG: 400 (241.3 MG) TAB at 12:35

## 2023-07-07 RX ADMIN — PANCRELIPASE LIPASE, PANCRELIPASE PROTEASE, PANCRELIPASE AMYLASE 2 CAPSULE: 20000; 63000; 84000 CAPSULE, DELAYED RELEASE ORAL at 12:35

## 2023-07-07 RX ADMIN — PANTOPRAZOLE SODIUM 40 MG: 40 TABLET, DELAYED RELEASE ORAL at 07:58

## 2023-07-07 ASSESSMENT — ACTIVITIES OF DAILY LIVING (ADL)
ADLS_ACUITY_SCORE: 29

## 2023-07-07 NOTE — PROGRESS NOTES
Care Management Follow Up    Length of Stay (days): 7    Expected Discharge Date: 07/08/2023     Concerns to be Addressed: all concerns addressed in this encounter     Patient plan of care discussed at interdisciplinary rounds: Yes    Anticipated Discharge Disposition: Home with services       Anticipated Discharge Services: Home Care  Anticipated Discharge DME: NA      Patient/family educated on Medicare website which has current facility and service quality ratings: yes   Education Provided on the Discharge Plan: yes     Patient/Family in Agreement with the Plan: yes     Referrals Placed by CM/SW: Home Care   Private pay costs discussed: Not applicable      Care Management Discharge Note    Discharge Date: 07/08/2023       Discharge Disposition: Home with services    Discharge Services: Home Care      Discharge DME: No new needs    Discharge Transportation: family or friend will provide    Private pay costs discussed: Not applicable    PAS Confirmation Code:NA    Patient/family educated on Medicare website which has current facility and service quality ratings: yes      Education Provided on the Discharge Plan: yes   Persons Notified of Discharge Plans: patient   Patient/Family in Agreement with the Plan:yes      Handoff Referral Completed: external    Additional Information:  Additional Information:  Patient is anticipated to be medically ready for discharge tomorrow.  Pt has been accepted by [facility] for RN/PT/OT services.  Orders placed.  RNCC will remain available if further needs arise.    [facility](RN/PT/OT)   Phone: [phone]   Fax: [phone]      JULIUS Miguel  Phone: [phone]  Pager: [phone]    SEARCHABLE in [facility] - search CARE COORDINATOR     [place] ([phone]) Saturday & Sunday; ([phone]) [facility] Recognized Holidays     Units: 4A, 4C, 4E, 5A & 5B   Pager: [phone]    Units: 6A & 6B    Pager: [phone]    Units: 6C & 6D   Pager:  [phone]    Units: 7A, 7B, 7C, 7D & 5C    Pager: [phone]    Units: [facility] ED, 5 Ortho, 5 Med/Surg, 6 Med/Surg, 8A, 10 ICU, & [facility]'s [facility]    Pager: [phone]

## 2023-07-07 NOTE — PLAN OF CARE
Physical Therapy Discharge Summary    Reason for therapy discharge:    Discharged to home with home therapy.    Progress towards therapy goal(s). See goals on Care Plan in [facility] electronic health record for goal details.  Goals met    Therapy recommendation(s):    Continued therapy is recommended.  Rationale/Recommendations:   PT recommended to promote strength and activity tolerance.

## 2023-07-07 NOTE — DISCHARGE SUMMARY
Pt. discharged at 2:30 PM to home, was accompanied by wife, and left with personal belongings. Pt. received complete discharge paperwork and medications as filled by discharge pharmacy. Pt. was given times of last dose for all discharge medications in writing on discharge medication sheets. Discharge teaching included medication, pain management, activity restrictions, dressing changes, and signs and symptoms of infection. Pt. had no further questions at the time of discharge and no unmet needs were identified.

## 2023-07-07 NOTE — PROGRESS NOTES
7/7/2023 Diabetes Service Recommendations for Discharge--                   -blood glucose monitoring three times daily before meals and at bedtime     -Basaglar  13 units daily at bedtime     Aspart (Novolog  per insurance coverage): 3 units three times daily with meals, no plan to carb count at this time--> Do not take Fixed meal Novolog  if meal is skipped     -Aspart (Novolog per insurance coverage): Medium intensity sliding scale (see below) given based on premeal BG( BG=blood Glucose), in addition to the meal-based dose     Correction Scale - Novolog MEDIUM INSULIN RESISTANCE DOSING     Do Not give Correction Insulin if Pre-Meal BG less than 140.   To be given with Fixed meal Novolog insulin, and based on pre-meal blood glucose.      For Pre-Meal  - 189 give 1 unit of Novolog.   For Pre-Meal  - 239 give 2 units.   For Pre-Meal  - 289 give 3 units.   For Pre-Meal  - 339 give 4 units.   For Pre-Meal - 399 give 5 units.   For Pre-Meal -449 give 6 units  For Pre-Meal BG greater than or equal to 450 give 7 units.            MEDIUM INSULIN RESISTANCE DOSING for bedtime    Do Not give Bedtime Correction Insulin if BG less than  200.      For  - 249 give 1 unit of Novolog.   For  - 299 give 2 units.   For  - 349 give 3 units.   For  -399 give 4 units.   For BG greater than or equal to 400 give 5 units.                       Please call provider if you have more than 1 blood sugar over 250 in one day, or any sugars less than 75.        Endocrinology Outpatient follow up:  Placed referral to [facility] adult Endocrine clinic on 7/5, which will be released on discharge day.  You can call the [facility]ocrine  clinic at [phone] if you don't have an appointment yet scheduled 1 week after discharged.      If you have urgent questions or concerns regarding your blood sugars or insulin, you may contact [phone] (the [facility]  hospital ). Ask to speak with the endocrinologist on call.     Your target A1c value is less than 7%. Your most recent A1c is 9.6.      Thank you for letting the Diabetes Management Team be involved in your care!

## 2023-07-07 NOTE — PLAN OF CARE
Goal Outcome Evaluation:      Plan of Care Reviewed With: patient, spouse    Overall Patient Progress: improvingOverall Patient Progress: improving    Neuro: A&Ox4; calling appropriately. Hard of hearing; bilateral hearing aids on.   Respiratory: sats mid 90s on RA.   Cardiac: Intermittently hypertensive but within parameters.  Denies cardiac chest pain  GI/: +passing gas. PRN milk of mag given with no results. Would like to try suppository later this evening. Santos removed this AM; pt voiding with hesitancy. Pt reports he has at home. See notes to provider.   Diet: Full liquids  with sliding scale and carb coverage. Good intake.   Pain/Nausea: C/O abdominal incisional pain; more discomfort with activity. Currently managed with PRN oxy and robaxin.  Denies nausea.   Incisions: Abdominal midline incision JESSIKA with staples. (R) old RUSTY site covered with primapore, new dressing applied.    IV Access: R triple lumen internal jugular- SL  Activity: SBA. Ambulating in halls  Labs: , 188 and 182.  M.7; replaced. Recheck tomorrow.      Plan: Continue to monitor urinary output. Possible home Friday. Continue to monitor and follow POC.

## 2023-07-07 NOTE — PLAN OF CARE
Occupational Therapy Discharge Summary    Reason for therapy discharge:    Discharged to home with home therapy.    Progress towards therapy goal(s). See goals on Care Plan in [facility] electronic health record for goal details.  Goals partially met.  Barriers to achieving goals:   discharge from facility.    Therapy recommendation(s):    Continued therapy is recommended.  Rationale/Recommendations:  pt would benefit from continued therapy in HHOT/PT setting to progress IND and facilitate home safety.

## 2023-07-07 NOTE — PROGRESS NOTES
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 He is now s/p distal pancreatectomy on 6/30      Assessment:   1.T2DM not at goal with hyperglycemia.  Poor control prior to admission   2. Stress induced hyperglycemia  3. post pancreatic surgery     Plan:    -continue Lantus 13 units --> move from 1800 to 1900, eventually rescheduling to bedtime (home/preferred schedule)                    - Novolog  1/10 with meals and snacks- (pt will be provided a fixed meal dose on discharge)                   - Novolog medium sliding scale                  -BG monitoring before meals, bedtime and 2 am                 -hypoglycemia protocol                 -recommend diet with carb counting protocol                 -diabetes education , pt understanding the concept of fixed meal dosing so no formal education requested at this time.Expalined again today and pt verbalized understanding  Pt knows decreased lantus dose due to remains on full liquid diet at discharge so eating less.                 -on discharge, recommend outpatient follow up with [facility]ology service and primary care. Wife thought it would be good to come here for endocrine when come to see Dr Dixon.  Put in referral and sent Epic note 7/5/2023.     Discussed plan of care with patient  and RN.  Communicated with surgery service and they said he will discharge today.       Recommendations for Discharge (pasted into AVS)--   **pt denies the need for prescriptions of any diabetes-related supplies or insulin                    -blood  glucose monitoring three times daily before meals and at bedtime     -Basaglar  13 units daily at bedtime     Aspart (Novolog  per insurance coverage): 3 units three times daily with meals, no plan to carb count--> Do not take Fixed meal Novolog  if meal is skipped     -Aspart (Novolog per insurance coverage): Medium intensity sliding scale (see below) given based on premeal BG( BG=blood Glucose)     Correction Scale - Novolog MEDIUM INSULIN RESISTANCE DOSING     Do Not give Correction Insulin if Pre-Meal BG less than 140.   To be given with Fixed meal Novolog insulin, and based on pre-meal blood glucose.      For Pre-Meal  - 189 give 1 unit of Novolog.   For Pre-Meal  - 239 give 2 units.   For Pre-Meal  - 289 give 3 units.   For Pre-Meal  - 339 give 4 units.   For Pre-Meal - 399 give 5 units.   For Pre-Meal -449 give 6 units  For Pre-Meal BG greater than or equal to 450 give 7 units.            MEDIUM INSULIN RESISTANCE DOSING for bedtime    Do Not give Bedtime Correction Insulin if BG less than  200.      For  - 249 give 1 unit of Novolog.   For  - 299 give 2 units.   For  - 349 give 3 units.   For  -399 give 4 units.   For BG greater than or equal to 400 give 5 units.     Additional details on follow up are included in the AVS                               Interval History and Assessment: interval glucose trend reviewed: .        Doing well.  No n,v,d.  No stool today so he isn't sure that he will get discharged today.  Communicated with surgery and they Epic texted back and said he will dicharge.  Pt has the numbers to call and verbalized understanding for discharge regimen.        :Expected Discharge Date: 07/06/2023      Destination: home with help/services     :    Current nutritional intake and type: Orders Placed This Encounter      Full Liquid Diet      Planned Procedures/surgeries:   Steroid planning: none  D5W-containing solutions/medications:  none    Diabetes Mellitus Type: T2DM  Duration:    3 years-- can be documented to 2013 by A1c  Diabetic Complications: None  Prior to Admission Diabetes Regimen:   Novolog 2 units for every 50 above 140  Basaglar 30 units at bedtime     BG monitor: Glucometer  Frequency of checks: 3 times daily  Usual BG control PTA:   A1c 9.6% on 6/30/23  History of DKA: No  Able to Detect Hypoglycemia: Yes  Usual Diabetes Care Provider:   Primary Care Provider: Alanna Lazaro           Diabetes History:   Type of Diabetes: Type 2 Diabetes Mellitus  Lab Results   Component Value Date    A1C 9.6 06/30/2023              Review of Systems:     The Review of Systems is negative other than noted in the Interval History.           Medications:     Current Facility-Administered Medications   Medication     acetaminophen (TYLENOL) tablet 650 mg     aspirin EC tablet 81 mg     atorvastatin (LIPITOR) tablet 40 mg     benzocaine-menthol (CHLORASEPTIC MAX) 15-10 MG lozenge 1 lozenge     bisacodyl (DULCOLAX) suppository 10 mg     carbidopa-levodopa (SINEMET CR)  MG per CR tablet 1 tablet     carbidopa-levodopa (SINEMET)  MG per tablet 2 tablet     dextrose 10% infusion     glucose gel 15-30 g    Or     dextrose 50 % injection 25-50 mL    Or     glucagon injection 1 mg     diphenhydrAMINE (BENADRYL) solution 12.5 mg    Or     diphenhydrAMINE (BENADRYL) injection 12.5 mg     fentaNYL (PF) (SUBLIMAZE) injection 25-50 mcg     gabapentin (NEURONTIN) capsule 400 mg     gabapentin (NEURONTIN) capsule 600 mg     heparin ANTICOAGULANT injection 5,000 Units     insulin aspart (NovoLOG) injection (RAPID ACTING)     insulin aspart (NovoLOG) injection (RAPID ACTING)     insulin aspart (NovoLOG) injection (RAPID ACTING)     insulin aspart (NovoLOG) injection (RAPID ACTING)     insulin glargine (LANTUS PEN) injection 13 Units     ketamine (KETALAR) injection 5 mg     lidocaine (LMX4) cream     lidocaine 1 % 0.1-1 mL     lipase-protease-amylase  "(ZENPEP) 10327-68[address]-01240 units delayed release capsule 2 capsule     losartan (COZAAR) tablet 50 mg     magnesium hydroxide (MILK OF MAGNESIA) suspension 30 mL     Med Instruction - Transition from IV Insulin Infusion to Sub-Q Insulin     medication instruction     methocarbamol (ROBAXIN) tablet 500 mg     nalbuphine (NUBAIN) injection 2.5-5 mg     naloxone (NARCAN) injection 0.2 mg    Or     naloxone (NARCAN) injection 0.4 mg    Or     naloxone (NARCAN) injection 0.2 mg    Or     naloxone (NARCAN) injection 0.4 mg     ondansetron (ZOFRAN ODT) ODT tab 4 mg    Or     ondansetron (ZOFRAN) injection 4 mg     oxyCODONE (ROXICODONE) tablet 5-10 mg     pantoprazole (PROTONIX) EC tablet 40 mg     polyethylene glycol (MIRALAX) Packet 17 g     prochlorperazine (COMPAZINE) injection 5 mg    Or     prochlorperazine (COMPAZINE) tablet 5 mg    Or     prochlorperazine (COMPAZINE) suppository 12.5 mg     senna-docusate (SENOKOT-S/PERICOLACE) 8.6-50 MG per tablet 1 tablet     sodium chloride (PF) 0.9% PF flush 3 mL     sodium chloride (PF) 0.9% PF flush 3 mL            Physical Exam:    /66 (BP Location: Right arm)   Pulse 88   Temp 97.9  F (36.6  C) (Oral)   Resp 18   Ht 1.727 m (5' 8\")   Wt 86 kg (189 lb 8 oz)   SpO2 93%   BMI 28.81 kg/m    General: pleasant, in no distress.  Sitting in chair   HEENT: normocephalic, atraumatic. Oral mucous membranes moist.   Hearing aide right ear, no issues with volume  Lungs: unlabored respiration, no cough  ABD: rounded, nondistended  Skin: dry, no obvious lesions  MSK:  moves all extremities  Mental status:  alert, oriented to self, place, time  Psych:   calm and appropriate interaction             Data:     Recent Labs   Lab 07/06/23  2213 07/06/23  1734 07/06/23  1200 07/06/23  0739 07/05/23  2200 07/05/23  1655   * 182* 188* 133* 262* 234*     Lab Results   Component Value Date    WBC 11.5 (H) 07/05/2023    WBC 10.1 07/04/2023    WBC 8.9 07/03/2023    HGB 10.4 (L) " 07/05/2023    HGB 9.6 (L) 07/04/2023    HGB 9.1 (L) 07/03/2023    HCT 32.5 (L) 07/05/2023    HCT 28.9 (L) 07/04/2023    HCT 27.0 (L) 07/03/2023     07/05/2023    MCV 98 07/04/2023    MCV 98 07/03/2023     07/06/2023     07/05/2023     07/04/2023     Lab Results   Component Value Date     07/03/2023     (L) 07/02/2023     07/01/2023    POTASSIUM 3.9 07/06/2023    POTASSIUM 4.0 07/05/2023    POTASSIUM 4.1 07/04/2023    CHLORIDE 107 07/03/2023    CHLORIDE 102 07/02/2023    CHLORIDE 103 07/01/2023    CO2 25 07/03/2023    CO2 27 07/02/2023    CO2 26 07/01/2023     (H) 07/06/2023     (H) 07/06/2023     (H) 07/06/2023     Lab Results   Component Value Date    BUN 10.9 07/03/2023    BUN 20.5 07/02/2023    BUN 30.3 (H) 07/01/2023     No results found for: TSH  Lab Results   Component Value Date    AST 24 05/18/2023    AST 47 11/18/2022    ALT <5 (L) 05/18/2023    ALT 11 11/18/2022    ALKPHOS 134 (H) 05/18/2023    ALKPHOS 185 (H) 11/18/2022       Shy Bhakta PA-C  Inpatient Diabetes Service  Pager   [phone]  Date of Service: 7/7/2023    35 minutes spent on the date of the encounter doing chart review, history and exam, coordinating care/communication, documentation and further activities per the note.        To contact Endocrine Diabetes service:   From 8AM-4PM: page inpatient diabetes provider that is following the patient  For questions or updates from 4PM-8AM: page the diabetes job code for on call fellow: [phone]

## 2023-07-07 NOTE — DISCHARGE SUMMARY
[facility]  Surgery Discharge Summary      Date of Admission:  6/30/2023  Date of Discharge:  7/7/2023  Discharging Provider: Sergio Milton MD  Discharge Service: EGS    Discharge Diagnoses   Distal Pancreatectomy with splenectomy    Follow-ups Needed After Discharge   Follow up in clinic with Dr. Haider.     Unresulted Labs Ordered in the Past 30 Days of this Admission     Date and Time Order Name Status Description    6/30/2023 11:17 AM Surgical Pathology Exam In process       These results will be followed up in clinic with Dr. Haider.     Discharge Disposition   Discharged to home  Condition at discharge: Good    Hospital Course   Patient presented to hospital for Distal pancreatectomy, splenectomy, YVAN, proximal ileum resection with Dr. Haider for chronic pancreatitis on 6/30/2023. He had a history of severe acute pancreatitis secondary to gallstones about a year and a half ago complicated by multiple peripancreatic fluid collections status post AXIOS stent placement complicated by partial small bowel obstruction secondary to migration of an AXIOS stent necessitating laparoscopic removal of the stent. This procedure was complicated by postoperative wound infection. He continued to have persistent LUQ pain and was found to have completely disconnected pancreatic duct . Patient underwent open distal pancreactectomy with splenectomy, lysis of adhesions, resection of proximal illeum and was admitted to SICU post surgery. Had leaking epidural and was assessed by anesthesia which resolved. During admission, patient developed a ventral, small, midline incisional hematoma. Before discharge, this hematoma was of no concern, was soft, non tender and was decreasing in size. On POD 7, patient was ambulating and voiding, and thus, patient was discharged.     Pressors: none  Extubated: in OR post surgery  Procedures: none  Drain removal: 7/03/2023  Santos removal:  7/06/2023    Consultations This Hospital Stay   PHARMACY IP CONSULT  PHYSICAL THERAPY ADULT IP CONSULT  OCCUPATIONAL THERAPY ADULT IP CONSULT  PHARMACY IP CONSULT  ENDOCRINE DIABETES ADULT IP CONSULT  CARE MANAGEMENT / SOCIAL WORK IP CONSULT    Code Status   Full Code      Yusra Correia, MS4    [facility] [address] [address]  Phone: [phone]     I, Serigo Milton MD, saw the patient with the medical student and have edited the note to reflect our combined findings. I agree with the history, exam, assessment, and plan as outlined above and have discussed this patient with the fellow and staff on service.  ______________________________________________________________________        Physical Exam   Vital Signs: Temp: 98.1  F (36.7  C) Temp src: Oral BP: (!) 89/53 Pulse: 79   Resp: 16 SpO2: 92 % O2 Device: None (Room air)    Weight: 189 lbs 8 oz    General: patient resting comfortably in bed. NAD  CV: extremities well perfused  Resp: non-labored breathing on room air  GI: soft, non-tender, non-distended. Small midline incisional hernia. No skin discoloration. No erythema. No incision drainage.     Subjective Exam   Patient doing well on day of discharge. No pain. PVR of 425 however patient stated he has history of urinary retention and this was expected after styles removal.        Primary Care Physician   Alanna Lazaro    Discharge Orders       Significant Results and Procedures   Most Recent 3 CBC's:Recent Labs   Lab Test 07/06/23  0612 07/05/23  0834 07/04/23  0646 07/03/23  0346   WBC  --  11.5* 10.1 8.9   HGB  --  10.4* 9.6* 9.1*   MCV  --  100 98 98    398 321 220     Most Recent 3 BMP's:Recent Labs   Lab Test 07/07/23  0735 07/07/23  0726 07/06/23  2213 07/06/23  1734 07/06/23  0739 07/06/23  0612 07/05/23  0847 07/05/23  0834 07/03/23  0614 07/03/23  0346 07/02/23  0315 07/02/23  0236 07/01/23  0413 07/01/23  0410   NA  --   --   --   --   --   --   --   --    --  140  --  135*  --  137   POTASSIUM  --  4.1  --   --   --  3.9  --  4.0   < > 3.7  --  3.9  --  4.8   CHLORIDE  --   --   --   --   --   --   --   --   --  107  --  102  --  103   CO2  --   --   --   --   --   --   --   --   --  25  --  27  --  26   BUN  --   --   --   --   --   --   --   --   --  10.9  --  20.5  --  30.3*   CR  --   --   --   --   --   --   --   --   --  0.74  --  0.81  --  1.12   ANIONGAP  --   --   --   --   --   --   --   --   --  8  --  6*  --  8   JAKE  --   --   --   --   --   --   --   --   --  7.7*  --  7.3*  --  7.7*   *  --  181* 182*   < >  --    < >  --    < > 135*   < > 153*   < > 179*    < > = values in this interval not displayed.       Discharge Medications   Current Discharge Medication List      CONTINUE these medications which have NOT CHANGED    Details   aspirin (ASA) 81 MG EC tablet Take 81 mg by mouth daily      atorvastatin (LIPITOR) 40 MG tablet Take 1 tablet by mouth daily      BD PEN NEEDLE LINDY 2ND GEN 32G X 4 MM miscellaneous USE TO INJECT INSULIN 4 TIMES A DAY      carbidopa-levodopa (SINEMET CR)  MG CR tablet Take 1 tablet by mouth At Bedtime      carbidopa-levodopa (SINEMET)  MG tablet Take 2 tablets by mouth 3 times daily      gabapentin (NEURONTIN) 300 MG capsule Take 300 mg by mouth every morning , 300 mg by mouth at midday, and 600 mg by mouth at bedtime      insulin aspart (NOVOLOG PEN) 100 UNIT/ML pen Inject Subcutaneous 3 times daily (with meals) Give 2 units per every 50 above 140.      Insulin Glargine (BASAGLAR KWIKPEN SC) Inject 30 Units Subcutaneous At Bedtime      lipase-protease-amylase (ZENPEP) 88780-972413 units CPEP Take 1 capsule by mouth 3 times daily (with meals)  Qty: 90 capsule, Refills: 11    Associated Diagnoses: Necrotizing pancreatitis      losartan (COZAAR) 50 MG tablet Take 50 mg by mouth At Bedtime      magnesium oxide (MAG-OX) 400 MG tablet Take 400 mg by mouth every other day      Multiple Vitamin (MULTI-VITAMINS)  "TABS Take 1 tablet by mouth daily      omeprazole (PRILOSEC) 20 MG DR capsule Take 20 mg by mouth daily           Allergies   Allergies   Allergen Reactions     Ciprofloxacin Other (See Comments), Hives, Itching, Rash and Unknown     Other reaction(s): Other (see comments)  Oral swelling per [facility]         Dilaudid [Hydromorphone] Rash     Morphine Rash     rash     Penicillins Rash     aka ancef/ rash       Citalopram Unknown     Oxycodone Rash     \"HORRIBLE, SEVERE RASH MAYBE CAUSED BY OXY\"     "

## 2023-07-07 NOTE — PROGRESS NOTES
"  [facility]    Progress Note - EGS Service       Date of Admission:  6/30/2023    Assessment & Plan: Surgery   Arnaldo Henderson is a 68 year old year old with a history of severe acute pancreatitis secondary to gallstones about a year and a half ago complicated by multiple peripancreatic fluid collections status post AXIOS stent placement complicated by partial small bowel obstruction secondary to migration of an AXIOS stent necessitating laparoscopic removal of the stent. This procedure was complicated by postoperative wound infection. 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 He is now POD7 following a distal pancreatectomy on 6/30. Transitioned off insulin drip 7/5. Santos catheter removed 7/6.    Plan:   -Advance to regular diet as tolerated  -monitor I&O's   -Pt will try suppository today  -Anticipate discharge by tomorrow         Diet: Regular Diet Adult    DVT Prophylaxis: Heparin SQ  Santos Catheter: Not present  Lines: PRESENT      CVC Triple Lumen Right Internal jugular-Site Assessment: WDL      Drains: None     Cardiac Monitoring: None  Code Status: Full Code      Clinically Significant Risk Factors                         # Overweight: Estimated body mass index is 28.05 kg/m  as calculated from the following:    Height as of this encounter: 1.727 m (5' 8\").    Weight as of this encounter: 83.7 kg (184 lb 8 oz).           Disposition Plan      Expected Discharge Date: 07/08/2023,  3:00 PM    Destination: home with help/services           The patient was seen with the Chief Resident/Fellow.      [facility]  Non-urgent messages: Securely message with [employer] (more info)  Text page via [employer] Paging/Directory     ______________________________________________________________________    Interval History "   Patient doing well. Passing gas, no stool. Pain still controlled. Patient has been able to urinate after having styles removed.    Physical Exam   Vital Signs: Temp: 98.1  F (36.7  C) Temp src: Oral BP: (!) 89/53 Pulse: 79   Resp: 16 SpO2: 92 % O2 Device: None (Room air)    Weight: 184 lbs 8 oz    General: resting comfortably, NAD  CV: extremities well perfused  Resp: non-labored breathing on RA  GI: soft, non-tender, non-distended abdomen, no erythema around incisions, no drainage, small hematoma felt mid-way down incision (same size or smaller than previously)    Intake/Output Summary (Last 24 hours) at 7/6/2023 1611  Last data filed at 7/6/2023 1544  Gross per 24 hour   Intake 1560 ml   Output 2350 ml   Net -790 ml       Data     I have personally reviewed the following data over the past 24 hrs:    N/A  \   N/A   / N/A     N/A N/A N/A /  122 (H)   4.1 N/A N/A \       Imaging results reviewed over the past 24 hrs:   No results found for this or any previous visit (from the past 24 hour(s)).

## 2023-07-07 NOTE — PLAN OF CARE
"Goal Outcome Evaluation:      Plan of Care Reviewed With: patient      BP (!) 89/53 (BP Location: Left arm)   Pulse 79   Temp 98.1  F (36.7  C) (Oral)   Resp 16   Ht 1.727 m (5' 8\")   Wt 83.7 kg (184 lb 8 oz)   SpO2 92%   BMI 28.05 kg/m        Neuro: A&Ox4; calling appropriately. Hard of hearing; bilateral hearing aids on.   Respiratory: sats mid 90s on RA.   Cardiac: Intermittently hypertensive but within parameters.  Denies cardiac chest pain  GI/: +passing gas. Had a medium sized BM after suppository,  voiding AUOP  Diet: Full liquids  with sliding scale and carb coverage. Good intake.   Pain/Nausea: C/O abdominal incisional pain; more discomfort with activity. Currently managed with PRN oxy given with good result. Denies nausea.   Incisions: Abdominal midline incision JESSIKA with staples. (R) old RUSTY site covered with primapore, new dressing applied.    IV Access: R triple lumen internal jugular- SL removed  Activity: SBA. Ambulating in halls  Labs: Reviewed   Plan: Continue with current POC      "

## 2023-07-07 NOTE — PLAN OF CARE
"/66 (BP Location: Right arm)   Pulse 88   Temp 97.9  F (36.6  C) (Oral)   Resp 18   Ht 1.727 m (5' 8\")   Wt 86 kg (189 lb 8 oz)   SpO2 93%   BMI 28.81 kg/m      Shift: 9433-8787  Status: Chronic pancreatitis. 23-Open distal pancreactectomy with splenectomy, lysis of adhesions, resection of proximal illeum.  Neuro: A&O x 4. Parkinson's disease.   Resp: WDL. RA. Denies SOB. LS clear.   Cardiac: HTN. Denies cardiac chest pain.   GI/: Full liquid diet. Denies N&V. Passing flatus. No BM this shift, gave PRN bisacodyl suppository-no results. Voiding spontaneously-using bedside urinal. BG checks AC HS. Sliding scale and carb coverage.   Skin: Warm and dry.   Activity: SBA.   Incision & Drainage: PIV SL. Midline Abdominal vrvsuvjy-[id number]-[id number] with staples. Previous RUSTY site right abdomen.   Pain: 4/10 pain left abdomen. Declined pain meds.   Labs: HS B.   Changes: No acute changes.   Plan: Continue with POC.     Goal Outcome Evaluation: Ongoing.       Plan of Care Reviewed With: patient    "

## 2023-07-07 NOTE — PROGRESS NOTES
CLINICAL NUTRITION SERVICES    Reviewed nutrition risk factors due to LOS. Pt is tolerating diet, eating well per nursing documentation. No nutrition issues identified at this time. RD will follow via rounds at this time, unless consulted.     Viv Ron RD, LD   7A/7B (beds 219-229) RD pager: [phone]  Weekend/Holiday RD pager: [phone]

## 2023-07-10 ENCOUNTER — PATIENT OUTREACH (OUTPATIENT)
Dept: GASTROENTEROLOGY | Facility: CLINIC | Age: 68
End: 2023-07-10
Payer: MEDICARE

## 2023-07-10 ENCOUNTER — PATIENT OUTREACH (OUTPATIENT)
Dept: CARE COORDINATION | Facility: CLINIC | Age: 68
End: 2023-07-10
Payer: MEDICARE

## 2023-07-10 LAB
PATH REPORT.COMMENTS IMP SPEC: ABNORMAL
PATH REPORT.COMMENTS IMP SPEC: YES
PATH REPORT.FINAL DX SPEC: ABNORMAL
PATH REPORT.GROSS SPEC: ABNORMAL
PATH REPORT.MICROSCOPIC SPEC OTHER STN: ABNORMAL
PATH REPORT.RELEVANT HX SPEC: ABNORMAL
PATHOLOGY SYNOPTIC REPORT: ABNORMAL
PHOTO IMAGE: ABNORMAL

## 2023-07-10 NOTE — PROGRESS NOTES
Clinic Care Coordination Contact  [facility]: Post-Discharge Note  SITUATION                                                      Admission:    Admission Date: 06/30/23   Reason for Admission: You were admitted for pancreas surgery  Discharge:   Discharge Date: 07/07/23  Discharge Diagnosis: You were admitted for pancreas surgery    BACKGROUND                                                      Per hospital discharge summary and inpatient provider notes:    Mr. Eliud Henderson is a 67 year old man with a past medical history of parkinson's, coronary artery disease s/p CABG (2003), bilateral mixed hearing loss with right mastoidectomy in 1970s, diabetes on insulin, hypertension, and hyperlipidemia. He had presumed gallstone pancreatitis managed with a cholecystectomy complicated later by necrotizing pancreatitis and pseudocysts requiring multiple interventions including a necrotomy and gastro-pancreatic stent for drainage. Today, he presented for surgical treatment of his chronic pancreatitis and underwent an open distal pancreactectomy with splenectomy, lysis of adhesions, resection of proximal ileum with Dr. Haider.     ASSESSMENT           Discharge Assessment  How are you doing now that you are home?: He is doing really well. He is walking and tolerating food.  How are your symptoms? (Red Flag symptoms escalate to triage hotline per guidelines): Improved  Do you feel your condition is stable enough to be safe at home until your provider visit?: Yes  Does the patient have their discharge instructions? : Yes  Does the patient have questions regarding their discharge instructions? : No  Were you started on any new medications or were there changes to any of your previous medications? : Yes  Does the patient have all of their medications?: Yes  Do you have questions regarding any of your medications? : No  Discharge follow-up appointment scheduled within 14 calendar days? : Yes  Discharge Follow Up Appointment  Date: 07/13/23  Discharge Follow Up Appointment Scheduled with?: Specialty Care Provider    Post-op (W CTA Only)  If the patient had a surgery or procedure, do they have any questions for a nurse?: No           PLAN                                                      Outpatient Plan: Follow up with Dr. Haider as scheduled.    Future Appointments   Date Time Provider Department Center   7/13/2023 11:00 AM Ashvin Haider MD [facility]         For any urgent concerns, please contact our 24 hour nurse triage line: [phone] ([phone])         LESVIA Mora  [phone]  [facility]

## 2023-07-10 NOTE — TELEPHONE ENCOUNTER
Called patients significant other to discuss questions from [employer] message, no caller on line once it picked up. [employer] sent.    ML

## 2023-07-12 DIAGNOSIS — K85.91 NECROTIZING PANCREATITIS: ICD-10-CM

## 2023-07-12 RX ORDER — PANCRELIPASE LIPASE, PANCRELIPASE PROTEASE, PANCRELIPASE AMYLASE 40000; 126000; 168000 [USP'U]/1; [USP'U]/1; [USP'U]/1
1 CAPSULE, DELAYED RELEASE ORAL
Qty: 90 CAPSULE | Refills: 11 | Status: SHIPPED | OUTPATIENT
Start: 2023-07-12 | End: 2024-02-04

## 2023-07-12 NOTE — PROGRESS NOTES
provided medication, sent to pharmacy for labeling/distribution.    Phyllis Butterfield RN Care Coordinator

## 2023-07-12 NOTE — PROGRESS NOTES
"BRIEF Progress note in response to Coding Query     Our team is aware of the official pathology results reported on 7/10/2023. This results were not electronically available before patient was discharged (7/7/2023) and thus, were not depicted in our notes. Results were supposed to be followed in clinic in 1-2 weeks after discharge.     Pathology report:    \" PROXIMAL ILEUM WITH STENOTIC AREA AND PRIOR ANASTOMOSIS, RESECTION:  - Well-differentiated neuroendocrine tumor, grade 2 of 3  - Tumor invades through the muscularis propria into the subserosal tissue  - Tumor size: 2.3 cm in greatest dimension  - Resection margin free of involvement (tumor 0.9 cm from the closest mesenteric margin)  - Lymphovascular invasion present  - Perineural invasion present  - One of four lymph nodes positive for metastatic neuroendocrine tumor (1/4)  - Please see tumor synoptic report for details\"      Sergio Donaldson MD  PGY2  Department of Surgery  "

## 2023-07-13 ENCOUNTER — OFFICE VISIT (OUTPATIENT)
Dept: SURGERY | Facility: CLINIC | Age: 68
End: 2023-07-13
Payer: MEDICARE

## 2023-07-13 ENCOUNTER — TELEPHONE (OUTPATIENT)
Dept: ENDOCRINOLOGY | Facility: CLINIC | Age: 68
End: 2023-07-13

## 2023-07-13 VITALS
OXYGEN SATURATION: 98 % | DIASTOLIC BLOOD PRESSURE: 62 MMHG | SYSTOLIC BLOOD PRESSURE: 98 MMHG | BODY MASS INDEX: 27.13 KG/M2 | HEIGHT: 68 IN | HEART RATE: 73 BPM | WEIGHT: 179 LBS

## 2023-07-13 DIAGNOSIS — G89.18 ACUTE POST-OPERATIVE PAIN: Primary | ICD-10-CM

## 2023-07-13 DIAGNOSIS — C7B.8 METASTATIC MALIGNANT NEUROENDOCRINE TUMOR TO LYMPH NODE (H): Primary | ICD-10-CM

## 2023-07-13 PROCEDURE — 99024 POSTOP FOLLOW-UP VISIT: CPT | Performed by: SURGERY

## 2023-07-13 RX ORDER — OXYCODONE HYDROCHLORIDE 5 MG/1
TABLET ORAL
Qty: 14 TABLET | Refills: 0 | Status: SHIPPED | OUTPATIENT
Start: 2023-07-13 | End: 2023-07-27

## 2023-07-13 ASSESSMENT — PAIN SCALES - GENERAL: PAINLEVEL: MILD PAIN (3)

## 2023-07-13 NOTE — PROGRESS NOTES
Mr. Howell is in clinic 1-1/2 weeks status post distal pancreatectomy and splenectomy with a small bowel resection for a incidental finding of a tumor in the distal small bowel.    Overall he has been doing well at home he is down to his last pain medicine and is requesting a refill.  He denies fevers chills or sweats he denies nausea or vomiting his appetite is good.    On physical exam vital signs are stable he is afebrile HEENT is unremarkable on abdominal exam his abdomen is soft and nontender his wound is clean and dry HEENT is unremarkable on abdominal exam his abdomen is soft and nontender his wound is clean and dry staples are removed and Steri-Strips placed.    I reviewed the path report with the patient and his wife (I called them earlier in the week to inform them of the findings).  This path report shows a neuroendocrine tumor with neuro and lymphovascular invasion and 1 out of 4 lymph nodes positive.    We talked through the issues related to this finding of neuroendocrine tumor.  We will refer him to Dr. Reji Lyn here at the [facility] and I will order a PET scan for staging in the meantime.    With regards to his chronic pancreatitis and distal pancreatectomy splenectomy he is doing well I would like to see him again in 6 weeks for follow-up of his pain.    30 min    GB  Answers for HPI/ROS submitted by the patient on 7/13/2023  General Symptoms: No  Skin Symptoms: No  HENT Symptoms: No  EYE SYMPTOMS: No  HEART SYMPTOMS: No  LUNG SYMPTOMS: No  INTESTINAL SYMPTOMS: No  URINARY SYMPTOMS: No  REPRODUCTIVE SYMPTOMS: No  SKELETAL SYMPTOMS: No  BLOOD SYMPTOMS: No  NERVOUS SYSTEM SYMPTOMS: No  MENTAL HEALTH SYMPTOMS: No

## 2023-07-13 NOTE — TELEPHONE ENCOUNTER
[facility] Call Center    Phone Message    May a detailed message be left on voicemail: yes     Reason for Call: Symptoms or Concerns     If patient has red-flag symptoms, warm transfer to triage line    Current symptom or concern: High blood suagrs    Symptoms have been present for:  A few days now    Has patient previously been seen for this? Yes    By  in the ED back     Date: 6/30/23    Are there any new or worsening symptoms? Yes: Per pt stating recently his blood sugars are in the high 200's.       Action Taken: Message routed to:  [facility] ([facility] ENDO    Travel Screening: Not Applicable

## 2023-07-13 NOTE — LETTER
7/13/2023       RE: Arnaldo Henderson  [address]  [place] [address]       Dear Colleague,    Thank you for referring your patient, Arnaldo Henderson, to the [facility] [place] at [facility]. Please see a copy of my visit note below.    Mr. Howell is in clinic 1-1/2 weeks status post distal pancreatectomy and splenectomy with a small bowel resection for a incidental finding of a tumor in the distal small bowel.    Overall he has been doing well at home he is down to his last pain medicine and is requesting a refill.  He denies fevers chills or sweats he denies nausea or vomiting his appetite is good.    On physical exam vital signs are stable he is afebrile HEENT is unremarkable on abdominal exam his abdomen is soft and nontender his wound is clean and dry HEENT is unremarkable on abdominal exam his abdomen is soft and nontender his wound is clean and dry staples are removed and Steri-Strips placed.    I reviewed the path report with the patient and his wife (I called them earlier in the week to inform them of the findings).  This path report shows a neuroendocrine tumor with neuro and lymphovascular invasion and 1 out of 4 lymph nodes positive.    We talked through the issues related to this finding of neuroendocrine tumor.  We will refer him to Dr. Reji Lyn here at the [facility] and I will order a PET scan for staging in the meantime.    With regards to his chronic pancreatitis and distal pancreatectomy splenectomy he is doing well I would like to see him again in 6 weeks for follow-up of his pain.    30 min    GB  Answers for HPI/ROS submitted by the patient on 7/13/2023  General Symptoms: No  Skin Symptoms: No  HENT Symptoms: No  EYE SYMPTOMS: No  HEART SYMPTOMS: No  LUNG SYMPTOMS: No  INTESTINAL SYMPTOMS: No  URINARY SYMPTOMS: No  REPRODUCTIVE SYMPTOMS: No  SKELETAL SYMPTOMS: No  BLOOD SYMPTOMS:  No  NERVOUS SYSTEM SYMPTOMS: No  MENTAL HEALTH SYMPTOMS: No          Again, thank you for allowing me to participate in the care of your patient.      Sincerely,    Ashvin Haider MD

## 2023-07-13 NOTE — NURSING NOTE
"Chief Complaint   Patient presents with     RECHECK     Return consult       Vitals:    07/13/23 1035   BP: 98/62   BP Location: Left arm   Patient Position: Sitting   Cuff Size: Adult Regular   Pulse: 73   SpO2: 98%   Weight: 81.2 kg (179 lb)   Height: 1.727 m (5' 8\")       Body mass index is 27.22 kg/m .                          Mike Macdonald, EMT    "

## 2023-07-14 ENCOUNTER — TELEPHONE (OUTPATIENT)
Dept: ENDOCRINOLOGY | Facility: CLINIC | Age: 68
End: 2023-07-14
Payer: MEDICARE

## 2023-07-14 NOTE — TELEPHONE ENCOUNTER
I have sent Dr Hurd several messages on his reported high blood sugars. We havn't seen him in clinic yet but has an appointment soon. Dr Hurd will view blood sugars and insulin orders and manage until seen Torie Núñez RN on 7/14/2023 at 9:42 AM

## 2023-07-14 NOTE — TELEPHONE ENCOUNTER
To schedulers : please move him forward to open post hospital discharge JOSÉ space such as 7//25 or 7/27, 7/8, 8/2 or 8/3 with Hayley Alatorre or Catherine Gomez.  Thanks     Faina Hurd MD  Endocrine triage

## 2023-07-17 ENCOUNTER — PATIENT OUTREACH (OUTPATIENT)
Dept: ONCOLOGY | Facility: CLINIC | Age: 68
End: 2023-07-17
Payer: MEDICARE

## 2023-07-17 NOTE — TELEPHONE ENCOUNTER
Communication Summary: Talked with patient and spouse about moving up appointment date to sooner appointment     Appointment type: Hospital Follow Up  Provider: Valeri Gomez  Return date: 7/28/2023  Speciality phone number: [phone]  Additional appointment(s) needed: N/A  Additional notes: Patient's wife was skeptical about a virtual visit; she stated she would like to keep the appointment on 9/19/2023 as well..     Issa Gonzalez on 7/17/2023 at 10:22 AM

## 2023-07-21 NOTE — PROGRESS NOTES
CHIEF COMPLAINT:  MEDICATION ISSUE       HISTORY OF PRESENT ILLNESS:    Simone Cotter is a pleasant 24 year old male who presents today for ADHD follow-up.  Feels like he is doing good.  Normal ups and Downs of life but medication is working well.  Working over time.  Feels like his mood is good.  Sleeping good.    PROBLEM LIST:    Patient Active Problem List   Diagnosis   • Attention deficit hyperactivity disorder (ADHD), predominantly inattentive type         PHYSICAL EXAM:    Visit Vitals  /70   Pulse 98   Wt 93 kg   SpO2 98%   BMI 25.97 kg/m²     Physical Exam   Psychiatric: He has a normal mood and affect. His behavior is normal.         ASSESSMENT:    1. Attention deficit hyperactivity disorder (ADHD), predominantly inattentive type        PLAN:      Continue current medications.  Follow-up 3 months for      Return in about 3 months (around 7/7/2021) for follow up.      Over the last 2 weeks, how often have you been bothered by the following problems?          PHQ2 Score: 0  PHQ2 Score Interpretation: No further screening needed  1. Little interest or pleasure in activity?: 0  2. Feeling down, depressed, or hopeless?: 0         DEPRESSION ASSESSMENT/PLAN:  Depression screening is negative no further plan needed.     Outcome for 07/21/23 9:10 AM: [employer] message sent  Yi Medley MA  Outcome for 07/24/23 6:43 AM: Glucose readings sent via [employer] Medley MA

## 2023-07-24 NOTE — TELEPHONE ENCOUNTER
"RECORDS STATUS - ALL OTHER DIAGNOSIS      RECORDS RECEIVED FROM: Epic   DATE RECEIVED:    NOTES STATUS DETAILS   OFFICE NOTE from referring provider Epic 7/13/23: Dr. Ashvin Haider   OFFICE NOTE from other specialist Epic 6/5/23: Dr. Cedric Saldana   DISCHARGE SUMMARY from hospital [facility] 6/30/23\":[facility]   OPERATIVE REPORT [facility] 6/30/23: Open distal pancreactectomy with splenectomy, lysis of adhesions, resection of proximal illeum    MEDICATION LIST [facility]    LABS     PATHOLOGY REPORTS Report in Epic 6/30/23: [id number]   ANYTHING RELATED TO DIAGNOSIS Epic Most recent from 7/7/23   IMAGING (NEED IMAGES & REPORT)     CT SCANS PACS 6/1/23 8/9/22: CT Abd  1/16/23, 7/24/22: CT Abd Pel  11/21/22: CT Pancreas   MRI PACS 5/31/23: MR Abd   XRAYS PACS 2/20/23, 8/29/22,  11/25/22, 12/5/22: XR Abd     "

## 2023-07-24 NOTE — PROGRESS NOTES
Oncology referral received from Dr Haider (Gen Surg) for patient with neuroendocrine carcinoma of GI.      HX: necrotizing pancreatitis tx'ed in [place], pt saw Dr Saldana for consult. Refer to Dr Haider for surgery, s/p distal panc/splenectomy/SBO resection, incidental NET found on distal illeum, +  NET, stage pT3pN1. Dr Haider ref to [facility].      Evaristo Mason RN  Oncology Nurse Navigator  [facility]  [phone]

## 2023-07-25 ENCOUNTER — APPOINTMENT (OUTPATIENT)
Dept: CT IMAGING | Facility: CLINIC | Age: 68
DRG: 863 | End: 2023-07-25
Attending: EMERGENCY MEDICINE
Payer: MEDICARE

## 2023-07-25 ENCOUNTER — HOSPITAL ENCOUNTER (OUTPATIENT)
Facility: CLINIC | Age: 68
Setting detail: OBSERVATION
Discharge: HOME | DRG: 863 | End: 2023-07-26
Attending: EMERGENCY MEDICINE | Admitting: SURGERY
Payer: MEDICARE

## 2023-07-25 DIAGNOSIS — T14.8XXA WOUND INFECTION: ICD-10-CM

## 2023-07-25 DIAGNOSIS — L08.9 WOUND INFECTION: ICD-10-CM
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
## 2023-07-25 DIAGNOSIS — T81.49XA SURGICAL SITE INFECTION: Primary | ICD-10-CM

## 2023-07-25 DIAGNOSIS — T81.40XA POSTOPERATIVE INFECTION, UNSPECIFIED TYPE, INITIAL ENCOUNTER: ICD-10-CM

## 2023-07-25 DIAGNOSIS — C7A.8 NEUROENDOCRINE CARCINOMA OF SMALL BOWEL (H): ICD-10-CM

## 2023-07-25 DIAGNOSIS — K81.0 CHOLECYSTITIS, ACUTE: ICD-10-CM

## 2023-07-25 LAB
ABO/RH(D): NORMAL
ALBUMIN SERPL BCG-MCNC: 4.2 G/DL (ref 3.5–5.2)
ALP SERPL-CCNC: 137 U/L (ref 40–129)
ALT SERPL W P-5'-P-CCNC: <5 U/L (ref 0–70)
ANION GAP SERPL CALCULATED.3IONS-SCNC: 10 MMOL/L (ref 7–15)
ANTIBODY SCREEN: NEGATIVE
AST SERPL W P-5'-P-CCNC: 21 U/L (ref 0–45)
BASOPHILS # BLD MANUAL: 0 10E3/UL (ref 0–0.2)
BASOPHILS NFR BLD MANUAL: 0 %
BILIRUB SERPL-MCNC: 0.5 MG/DL
BUN SERPL-MCNC: 25.1 MG/DL (ref 8–23)
CALCIUM SERPL-MCNC: 9.2 MG/DL (ref 8.8–10.2)
CHLORIDE SERPL-SCNC: 101 MMOL/L (ref 98–107)
CREAT SERPL-MCNC: 0.92 MG/DL (ref 0.67–1.17)
DEPRECATED HCO3 PLAS-SCNC: 26 MMOL/L (ref 22–29)
EOSINOPHIL # BLD MANUAL: 1.7 10E3/UL (ref 0–0.7)
EOSINOPHIL NFR BLD MANUAL: 12 %
ERYTHROCYTE [DISTWIDTH] IN BLOOD BY AUTOMATED COUNT: 13.7 % (ref 10–15)
GFR SERPL CREATININE-BSD FRML MDRD: 90 ML/MIN/1.73M2
GLUCOSE SERPL-MCNC: 230 MG/DL (ref 70–99)
HCT VFR BLD AUTO: 38.3 % (ref 40–53)
HGB BLD-MCNC: 12.1 G/DL (ref 13.3–17.7)
HOLD SPECIMEN: NORMAL
INR PPP: 1.24 (ref 0.85–1.15)
LYMPHOCYTES # BLD MANUAL: 1.6 10E3/UL (ref 0.8–5.3)
LYMPHOCYTES NFR BLD MANUAL: 11 %
MCH RBC QN AUTO: 32.1 PG (ref 26.5–33)
MCHC RBC AUTO-ENTMCNC: 31.6 G/DL (ref 31.5–36.5)
MCV RBC AUTO: 102 FL (ref 78–100)
MONOCYTES # BLD MANUAL: 2 10E3/UL (ref 0–1.3)
MONOCYTES NFR BLD MANUAL: 14 %
NEUTROPHILS # BLD MANUAL: 9.1 10E3/UL (ref 1.6–8.3)
NEUTROPHILS NFR BLD MANUAL: 63 %
PLAT MORPH BLD: ABNORMAL
PLATELET # BLD AUTO: 680 10E3/UL (ref 150–450)
POTASSIUM SERPL-SCNC: 4.1 MMOL/L (ref 3.4–5.3)
PROT SERPL-MCNC: 7.9 G/DL (ref 6.4–8.3)
RBC # BLD AUTO: 3.77 10E6/UL (ref 4.4–5.9)
RBC MORPH BLD: ABNORMAL
SODIUM SERPL-SCNC: 137 MMOL/L (ref 136–145)
SPECIMEN EXPIRATION DATE: NORMAL
WBC # BLD AUTO: 14.4 10E3/UL (ref 4–11)

## 2023-07-25 PROCEDURE — 85007 BL SMEAR W/DIFF WBC COUNT: CPT | Performed by: EMERGENCY MEDICINE

## 2023-07-25 PROCEDURE — G1010 CDSM STANSON: HCPCS

## 2023-07-25 PROCEDURE — 86316 IMMUNOASSAY TUMOR OTHER: CPT

## 2023-07-25 PROCEDURE — 74177 CT ABD & PELVIS W/CONTRAST: CPT | Mod: MG

## 2023-07-25 PROCEDURE — 86850 RBC ANTIBODY SCREEN: CPT | Performed by: EMERGENCY MEDICINE

## 2023-07-25 PROCEDURE — 250N000011 HC RX IP 250 OP 636: Performed by: EMERGENCY MEDICINE

## 2023-07-25 PROCEDURE — 99285 EMERGENCY DEPT VISIT HI MDM: CPT | Performed by: EMERGENCY MEDICINE

## 2023-07-25 PROCEDURE — 85027 COMPLETE CBC AUTOMATED: CPT | Performed by: EMERGENCY MEDICINE

## 2023-07-25 PROCEDURE — 86901 BLOOD TYPING SEROLOGIC RH(D): CPT | Performed by: EMERGENCY MEDICINE

## 2023-07-25 PROCEDURE — 74177 CT ABD & PELVIS W/CONTRAST: CPT | Mod: 26 | Performed by: RADIOLOGY

## 2023-07-25 PROCEDURE — G1010 CDSM STANSON: HCPCS | Performed by: RADIOLOGY

## 2023-07-25 PROCEDURE — 80053 COMPREHEN METABOLIC PANEL: CPT | Performed by: EMERGENCY MEDICINE

## 2023-07-25 PROCEDURE — 36415 COLL VENOUS BLD VENIPUNCTURE: CPT | Performed by: EMERGENCY MEDICINE

## 2023-07-25 PROCEDURE — 99285 EMERGENCY DEPT VISIT HI MDM: CPT | Mod: 25 | Performed by: EMERGENCY MEDICINE

## 2023-07-25 PROCEDURE — 85610 PROTHROMBIN TIME: CPT | Performed by: EMERGENCY MEDICINE

## 2023-07-25 PROCEDURE — 87040 BLOOD CULTURE FOR BACTERIA: CPT | Performed by: STUDENT IN AN ORGANIZED HEALTH CARE EDUCATION/TRAINING PROGRAM

## 2023-07-25 RX ORDER — IOPAMIDOL 755 MG/ML
100 INJECTION, SOLUTION INTRAVASCULAR ONCE
Status: COMPLETED | OUTPATIENT
Start: 2023-07-25 | End: 2023-07-25

## 2023-07-25 RX ORDER — CARBIDOPA AND LEVODOPA 50; 200 MG/1; MG/1
1 TABLET, EXTENDED RELEASE ORAL AT BEDTIME
Status: DISCONTINUED | OUTPATIENT
Start: 2023-07-25 | End: 2023-07-26 | Stop reason: HOSPADM

## 2023-07-25 RX ADMIN — IOPAMIDOL 100 ML: 755 INJECTION, SOLUTION INTRAVENOUS at 23:10

## 2023-07-25 ASSESSMENT — ACTIVITIES OF DAILY LIVING (ADL)
ADLS_ACUITY_SCORE: 35
ADLS_ACUITY_SCORE: 35

## 2023-07-26 VITALS
OXYGEN SATURATION: 97 % | HEIGHT: 68 IN | RESPIRATION RATE: 16 BRPM | TEMPERATURE: 98.3 F | BODY MASS INDEX: 26.98 KG/M2 | DIASTOLIC BLOOD PRESSURE: 71 MMHG | SYSTOLIC BLOOD PRESSURE: 123 MMHG | HEART RATE: 74 BPM | WEIGHT: 178 LBS

## 2023-07-26 PROBLEM — L08.9 WOUND INFECTION: Status: ACTIVE | Noted: 2023-07-26

## 2023-07-26 PROBLEM — T81.49XA SURGICAL SITE INFECTION: Status: ACTIVE | Noted: 2023-07-26

## 2023-07-26 PROBLEM — T14.8XXA WOUND INFECTION: Status: ACTIVE | Noted: 2023-07-26

## 2023-07-26 LAB
ALBUMIN SERPL BCG-MCNC: 3.5 G/DL (ref 3.5–5.2)
ANION GAP SERPL CALCULATED.3IONS-SCNC: 15 MMOL/L (ref 7–15)
BUN SERPL-MCNC: 19.6 MG/DL (ref 8–23)
CALCIUM SERPL-MCNC: 9 MG/DL (ref 8.8–10.2)
CHLORIDE SERPL-SCNC: 108 MMOL/L (ref 98–107)
CREAT SERPL-MCNC: 0.74 MG/DL (ref 0.67–1.17)
DEPRECATED HCO3 PLAS-SCNC: 23 MMOL/L (ref 22–29)
ERYTHROCYTE [DISTWIDTH] IN BLOOD BY AUTOMATED COUNT: 13.8 % (ref 10–15)
GFR SERPL CREATININE-BSD FRML MDRD: >90 ML/MIN/1.73M2
GLUCOSE BLDC GLUCOMTR-MCNC: 223 MG/DL (ref 70–99)
GLUCOSE BLDC GLUCOMTR-MCNC: 247 MG/DL (ref 70–99)
GLUCOSE BLDC GLUCOMTR-MCNC: 267 MG/DL (ref 70–99)
GLUCOSE BLDC GLUCOMTR-MCNC: 268 MG/DL (ref 70–99)
GLUCOSE SERPL-MCNC: 235 MG/DL (ref 70–99)
HCT VFR BLD AUTO: 37.5 % (ref 40–53)
HGB BLD-MCNC: 12.2 G/DL (ref 13.3–17.7)
MCH RBC QN AUTO: 33 PG (ref 26.5–33)
MCHC RBC AUTO-ENTMCNC: 32.5 G/DL (ref 31.5–36.5)
MCV RBC AUTO: 101 FL (ref 78–100)
PHOSPHATE SERPL-MCNC: 3.4 MG/DL (ref 2.5–4.5)
PLATELET # BLD AUTO: 629 10E3/UL (ref 150–450)
POTASSIUM SERPL-SCNC: 4.3 MMOL/L (ref 3.4–5.3)
RBC # BLD AUTO: 3.7 10E6/UL (ref 4.4–5.9)
SODIUM SERPL-SCNC: 146 MMOL/L (ref 136–145)
WBC # BLD AUTO: 7.8 10E3/UL (ref 4–11)

## 2023-07-26 PROCEDURE — 250N000011 HC RX IP 250 OP 636: Mod: JZ | Performed by: STUDENT IN AN ORGANIZED HEALTH CARE EDUCATION/TRAINING PROGRAM

## 2023-07-26 PROCEDURE — 120N000002 HC R&B MED SURG/OB UMMC

## 2023-07-26 PROCEDURE — 82040 ASSAY OF SERUM ALBUMIN: CPT | Performed by: STUDENT IN AN ORGANIZED HEALTH CARE EDUCATION/TRAINING PROGRAM

## 2023-07-26 PROCEDURE — 85027 COMPLETE CBC AUTOMATED: CPT | Performed by: STUDENT IN AN ORGANIZED HEALTH CARE EDUCATION/TRAINING PROGRAM

## 2023-07-26 PROCEDURE — 99221 1ST HOSP IP/OBS SF/LOW 40: CPT | Mod: 24 | Performed by: SURGERY

## 2023-07-26 PROCEDURE — 82962 GLUCOSE BLOOD TEST: CPT

## 2023-07-26 PROCEDURE — 36415 COLL VENOUS BLD VENIPUNCTURE: CPT | Performed by: STUDENT IN AN ORGANIZED HEALTH CARE EDUCATION/TRAINING PROGRAM

## 2023-07-26 PROCEDURE — 250N000012 HC RX MED GY IP 250 OP 636 PS 637: Performed by: EMERGENCY MEDICINE

## 2023-07-26 PROCEDURE — 250N000013 HC RX MED GY IP 250 OP 250 PS 637: Performed by: STUDENT IN AN ORGANIZED HEALTH CARE EDUCATION/TRAINING PROGRAM

## 2023-07-26 PROCEDURE — G0378 HOSPITAL OBSERVATION PER HR: HCPCS

## 2023-07-26 PROCEDURE — 250N000013 HC RX MED GY IP 250 OP 250 PS 637: Performed by: EMERGENCY MEDICINE

## 2023-07-26 RX ORDER — CARBIDOPA AND LEVODOPA 50; 200 MG/1; MG/1
1 TABLET, EXTENDED RELEASE ORAL AT BEDTIME
Status: DISCONTINUED | OUTPATIENT
Start: 2023-07-26 | End: 2023-07-26 | Stop reason: HOSPADM

## 2023-07-26 RX ORDER — CARBIDOPA AND LEVODOPA 25; 100 MG/1; MG/1
2 TABLET ORAL 3 TIMES DAILY
Status: DISCONTINUED | OUTPATIENT
Start: 2023-07-26 | End: 2023-07-26 | Stop reason: HOSPADM

## 2023-07-26 RX ORDER — MULTIVITAMIN,THERAPEUTIC
1 TABLET ORAL DAILY
Status: DISCONTINUED | OUTPATIENT
Start: 2023-07-26 | End: 2023-07-26 | Stop reason: HOSPADM

## 2023-07-26 RX ORDER — DEXTROSE MONOHYDRATE 25 G/50ML
25-50 INJECTION, SOLUTION INTRAVENOUS
Status: DISCONTINUED | OUTPATIENT
Start: 2023-07-26 | End: 2023-07-26 | Stop reason: HOSPADM

## 2023-07-26 RX ORDER — MAGNESIUM OXIDE 400 MG/1
400 TABLET ORAL EVERY OTHER DAY
Status: DISCONTINUED | OUTPATIENT
Start: 2023-07-26 | End: 2023-07-26 | Stop reason: HOSPADM

## 2023-07-26 RX ORDER — POLYETHYLENE GLYCOL 3350 17 G/17G
17 POWDER, FOR SOLUTION ORAL DAILY
Status: DISCONTINUED | OUTPATIENT
Start: 2023-07-26 | End: 2023-07-26 | Stop reason: HOSPADM

## 2023-07-26 RX ORDER — CEFDINIR 300 MG/1
300 CAPSULE ORAL 2 TIMES DAILY
Qty: 12 CAPSULE | Refills: 0 | Status: SHIPPED | OUTPATIENT
Start: 2023-07-26 | End: 2023-11-28

## 2023-07-26 RX ORDER — LIDOCAINE 40 MG/G
CREAM TOPICAL
Status: DISCONTINUED | OUTPATIENT
Start: 2023-07-26 | End: 2023-07-26 | Stop reason: HOSPADM

## 2023-07-26 RX ORDER — GABAPENTIN 300 MG/1
600 CAPSULE ORAL AT BEDTIME
Status: DISCONTINUED | OUTPATIENT
Start: 2023-07-26 | End: 2023-07-26 | Stop reason: HOSPADM

## 2023-07-26 RX ORDER — ATORVASTATIN CALCIUM 40 MG/1
40 TABLET, FILM COATED ORAL DAILY
Status: DISCONTINUED | OUTPATIENT
Start: 2023-07-26 | End: 2023-07-26 | Stop reason: HOSPADM

## 2023-07-26 RX ORDER — GABAPENTIN 300 MG/1
300 CAPSULE ORAL 2 TIMES DAILY
Status: DISCONTINUED | OUTPATIENT
Start: 2023-07-26 | End: 2023-07-26 | Stop reason: HOSPADM

## 2023-07-26 RX ORDER — ACETAMINOPHEN 325 MG/1
650 TABLET ORAL EVERY 4 HOURS PRN
Status: DISCONTINUED | OUTPATIENT
Start: 2023-07-26 | End: 2023-07-26 | Stop reason: HOSPADM

## 2023-07-26 RX ORDER — METHOCARBAMOL 500 MG/1
500 TABLET, FILM COATED ORAL EVERY 6 HOURS PRN
Status: DISCONTINUED | OUTPATIENT
Start: 2023-07-26 | End: 2023-07-26 | Stop reason: HOSPADM

## 2023-07-26 RX ORDER — LIDOCAINE HYDROCHLORIDE AND EPINEPHRINE 10; 10 MG/ML; UG/ML
5 INJECTION, SOLUTION INFILTRATION; PERINEURAL ONCE
Status: DISCONTINUED | OUTPATIENT
Start: 2023-07-26 | End: 2023-07-26 | Stop reason: HOSPADM

## 2023-07-26 RX ORDER — ASPIRIN 81 MG/1
81 TABLET ORAL DAILY
Status: DISCONTINUED | OUTPATIENT
Start: 2023-07-26 | End: 2023-07-26 | Stop reason: HOSPADM

## 2023-07-26 RX ORDER — CEFTRIAXONE 1 G/1
1 INJECTION, POWDER, FOR SOLUTION INTRAMUSCULAR; INTRAVENOUS EVERY 24 HOURS
Status: DISCONTINUED | OUTPATIENT
Start: 2023-07-26 | End: 2023-07-26 | Stop reason: HOSPADM

## 2023-07-26 RX ORDER — NICOTINE POLACRILEX 4 MG
15-30 LOZENGE BUCCAL
Status: DISCONTINUED | OUTPATIENT
Start: 2023-07-26 | End: 2023-07-26 | Stop reason: HOSPADM

## 2023-07-26 RX ORDER — DIPHENHYDRAMINE HCL 25 MG
25 TABLET ORAL EVERY 6 HOURS PRN
Qty: 6 TABLET | Refills: 0 | Status: SHIPPED | OUTPATIENT
Start: 2023-07-26 | End: 2023-11-28

## 2023-07-26 RX ORDER — LOSARTAN POTASSIUM 50 MG/1
50 TABLET ORAL AT BEDTIME
Status: DISCONTINUED | OUTPATIENT
Start: 2023-07-26 | End: 2023-07-26 | Stop reason: HOSPADM

## 2023-07-26 RX ORDER — PANTOPRAZOLE SODIUM 40 MG/1
40 TABLET, DELAYED RELEASE ORAL DAILY
Status: DISCONTINUED | OUTPATIENT
Start: 2023-07-26 | End: 2023-07-26 | Stop reason: HOSPADM

## 2023-07-26 RX ADMIN — ASPIRIN 81 MG: 81 TABLET, COATED ORAL at 08:43

## 2023-07-26 RX ADMIN — MAGNESIUM OXIDE TAB 400 MG (241.3 MG ELEMENTAL MG) 400 MG: 400 (241.3 MG) TAB at 08:43

## 2023-07-26 RX ADMIN — PANCRELIPASE LIPASE, PANCRELIPASE PROTEASE, PANCRELIPASE AMYLASE 2 CAPSULE: 20000; 63000; 84000 CAPSULE, DELAYED RELEASE ORAL at 13:41

## 2023-07-26 RX ADMIN — CEFTRIAXONE SODIUM 1 G: 1 INJECTION, POWDER, FOR SOLUTION INTRAMUSCULAR; INTRAVENOUS at 04:12

## 2023-07-26 RX ADMIN — GABAPENTIN 300 MG: 300 CAPSULE ORAL at 08:43

## 2023-07-26 RX ADMIN — LOSARTAN POTASSIUM 50 MG: 50 TABLET, FILM COATED ORAL at 05:06

## 2023-07-26 RX ADMIN — THERA TABS 1 TABLET: TAB at 08:43

## 2023-07-26 RX ADMIN — PANTOPRAZOLE SODIUM 40 MG: 40 TABLET, DELAYED RELEASE ORAL at 08:43

## 2023-07-26 RX ADMIN — CARBIDOPA AND LEVODOPA 1 TABLET: 50; 200 TABLET, EXTENDED RELEASE ORAL at 00:05

## 2023-07-26 RX ADMIN — INSULIN GLARGINE 13 UNITS: 100 INJECTION, SOLUTION SUBCUTANEOUS at 00:04

## 2023-07-26 RX ADMIN — ATORVASTATIN CALCIUM 40 MG: 40 TABLET, FILM COATED ORAL at 08:43

## 2023-07-26 RX ADMIN — CARBIDOPA AND LEVODOPA 2 TABLET: 25; 100 TABLET ORAL at 08:47

## 2023-07-26 ASSESSMENT — ACTIVITIES OF DAILY LIVING (ADL)
ADLS_ACUITY_SCORE: 35

## 2023-07-26 NOTE — PROGRESS NOTES
No sliding scale on Novolog order. Pharmacist notified.    Barbara Webb RN on 7/26/2023 at 6:05 AM

## 2023-07-26 NOTE — ED PROVIDER NOTES
"    [facility] ([facility])    7/25/23       ED PROVIDER NOTE      History     Chief Complaint   Patient presents with    Wound Check     The history is provided by the patient and medical records.     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  Patient states that he underwent a partial pancreatectomy and splenectomy on 6/30, which had initially healed well.  However, he had noticed redness developing around the midline abdominal incision site around 6:30 PM this evening.  This had raised only mild concerns, however after bending towards the ground he states that he experienced a \"popping\" sensation.  The redness surrounding the incision has now worsened and there is a significant amount of drainage leaking from the wound.  Drainage was noted to be purulent/bloody.  He presents with drainage soaked through his T-shirt and down to his shorts.    Patient states that he has developed an abdominal abscess following laparoscopic surgery in the past, in which he also experienced drainage from the incision.  He notes that he typically notices white drainage from the wound, and that this red-tinged drainage is atypical for him.  Patient denies increased pain with this.  No other symptoms noted at this time.    Past Medical History  Past Medical History:   Diagnosis Date    CAD (coronary artery disease)     CABG    Diabetes mellitus, type 2 (H) 2013    Gastroesophageal reflux " disease     Hypercholesteremia     Hypertension     Mixed conductive and sensorineural hearing loss of both ears     Mixed hearing loss     Pancreatic duct stricture     Pancreatitis     Parkinson disease (H)     Second degree AV block      Past Surgical History:   Procedure Laterality Date    CA ANESTH CABG W/PUMP      CHOLECYSTECTOMY  2022    COLONOSCOPY N/A 01/12/2023    Procedure: colonoscopy with fluroscopy;  Surgeon: Ayden Fraire MD;  Location:  OR    ENDOSCOPIC RETROGRADE CHOLANGIOPANCREATOGRAM N/A 05/18/2023    Procedure: ENDOSCOPIC RETROGRADE CHOLANGIOPANCREATOGRAPHY, WITH  CYSTDUODENOSTOMY STENTS X2 REMOVAL;  Surgeon: Cedric Saldana MD;  Location: [facility] OR    ENT SURGERY      LAPAROSCOPY DIAGNOSTIC (GENERAL) N/A 02/20/2023    Procedure: LAPAROSCOPY, DIAGNOSTIC; RETRIEVAL OF MIGRATED STENT;  Surgeon: Joseluis Lima MD;  Location: [facility] OR    ORTHOPEDIC SURGERY      PANCREATECTOMY PEUSTOW N/A 06/30/2023    Procedure: Open distal pancreactectomy with splenectomy, lysis of adhesions, resection of proximal illeum;  Surgeon: Ashvin Haider MD;  Location: [facility] OR    VT CABG, ARTERIAL, TWO  2003     acetaminophen (TYLENOL) 325 MG tablet  aspirin (ASA) 81 MG EC tablet  atorvastatin (LIPITOR) 40 MG tablet  BD PEN NEEDLE LINDY 2ND GEN 32G X 4 MM miscellaneous  carbidopa-levodopa (SINEMET CR)  MG CR tablet  carbidopa-levodopa (SINEMET)  MG tablet  gabapentin (NEURONTIN) 300 MG capsule  insulin aspart (NOVOLOG PEN) 100 UNIT/ML pen  insulin aspart (NOVOLOG PEN) 100 UNIT/ML pen  insulin aspart (NOVOLOG PEN) 100 UNIT/ML pen  insulin glargine (BASAGLAR KWIKPEN) 100 UNIT/ML pen  lipase-protease-amylase (ZENPEP) [id number] units CPEP  losartan (COZAAR) 50 MG tablet  magnesium oxide (MAG-OX) 400 MG tablet  methocarbamol (ROBAXIN) 500 MG tablet  Multiple Vitamin (MULTI-VITAMINS) TABS  omeprazole (PRILOSEC) 20 MG DR capsule  oxyCODONE (ROXICODONE) 5 MG tablet  oxyCODONE (ROXICODONE) 5 MG  "tablet  polyethylene glycol (MIRALAX) 17 GM/Dose powder      Allergies   Allergen Reactions    Ciprofloxacin Other (See Comments), Hives, Itching, Rash and Unknown     Other reaction(s): Other (see comments)  Oral swelling per [facility]        Dilaudid [Hydromorphone] Rash    Morphine Rash     rash    Penicillins Rash     aka ancef/ rash      Citalopram Unknown    Oxycodone Rash     \"HORRIBLE, SEVERE RASH MAYBE CAUSED BY OXY\"     Family History  Family History   Problem Relation Age of Onset    Parkinsonism Mother     Lung Cancer Brother     Diabetes No family hx of      Social History   Social History     Tobacco Use    Smoking status: Former     Types: Cigarettes    Smokeless tobacco: Never   Vaping Use    Vaping Use: Never used   Substance Use Topics    Alcohol use: Not Currently    Drug use: Not Currently      Past medical history, past surgical history, medications, allergies, family history, and social history were reviewed with the patient. No additional pertinent items.          Physical Exam   BP: (!) 125/93  Pulse: 75  Temp: 97.4  F (36.3  C)  Resp: 20  Height: 172.7 cm (5' 8\")  Weight: 80.7 kg (178 lb)  SpO2: 99 %  Physical Exam  Vitals and nursing note reviewed.   Constitutional:       General: He is not in acute distress.     Appearance: He is well-developed. He is not diaphoretic.   HENT:      Head: Normocephalic and atraumatic.      Mouth/Throat:      Pharynx: No oropharyngeal exudate.   Eyes:      General: No scleral icterus.        Right eye: No discharge.         Left eye: No discharge.      Pupils: Pupils are equal, round, and reactive to light.   Cardiovascular:      Rate and Rhythm: Normal rate and regular rhythm.      Heart sounds: Normal heart sounds. No murmur heard.     No friction rub. No gallop.   Pulmonary:      Effort: Pulmonary effort is normal. No respiratory distress.      Breath sounds: Normal breath sounds. No wheezing.   Chest:      Chest wall: No tenderness.   Abdominal:      " General: Bowel sounds are normal. There is no distension.      Palpations: Abdomen is soft.      Tenderness: There is no abdominal tenderness.       Musculoskeletal:         General: No tenderness or deformity. Normal range of motion.      Cervical back: Normal range of motion and neck supple.   Skin:     General: Skin is warm and dry.      Coloration: Skin is not pale.      Findings: No erythema or rash.   Neurological:      Mental Status: He is alert and oriented to person, place, and time.      Cranial Nerves: No cranial nerve deficit.           ED Course, Procedures, & Data    8:07 PM  The patient was seen and examined by Jordi Borrero   in Room [facility].     Procedures                      Results for orders placed or performed during the hospital encounter of 07/25/23   [place] Draw     Status: None ()    Narrative    The following orders were created for panel order [place] Draw.  Procedure                               Abnormality         Status                     ---------                               -----------         ------                     Extra Blood Culture Bottle[[id number]]                                                  Extra Blue Top Tube[[id number]]                                                         Extra Red Top Tube[[id number]]                                                          Extra Green Top (Lithium...[[id number]]                                                 Extra Purple Top Tube[[id number]]                                                         Please view results for these tests on the individual orders.     Medications - No data to display  Labs Ordered and Resulted from Time of ED Arrival to Time of ED Departure - No data to display  No orders to display              Assessment & Plan    This is a 68-year-old male who underwent partial pancreatectomy and splenectomy on 630 who presents for drainage from surgical site.  Patient had surrounding erythema develop at surgical  site today and then when bending forward to tie his shoes surgical site opened and there was drainage of purulent/bloody material.  On exam he has saturated dressing.  Lab work shows WBC count of 14.4.  CT abdomen pelvis shows gas and fluid in the subcutaneous fat.  There is also stranding at the surgical bed.  I discussed case with General Surgery who saw the patient.  Their recommendations are pending at this time.  Patient was signed out to incoming physician pending General Surgery's recommendations.    I have reviewed the nursing notes. I have reviewed the findings, diagnosis, plan and need for follow up with the patient.    New Prescriptions    No medications on file       Final diagnoses:   None   IRossana, am serving as a trained medical scribe to document services personally performed by Jordi Borrero DO, based on the provider's statements to me.      Jordi PRITCHETT DO, was physically present and have reviewed and verified the accuracy of this note documented by Rossana Whittington.      Jordi Borrero DO  [facility] EMERGENCY DEPARTMENT  7/25/2023     Jordi Borrero DO  07/26/23 0142

## 2023-07-26 NOTE — PROGRESS NOTES
"ABD pad changed on wound. Serisanguinous fluid covering approx 4\"x2\" of the old pad. Redness and swelling has not advanced from markings.    Barbara Webb RN on 7/26/2023 at 12:51 AM    "

## 2023-07-26 NOTE — ED TRIAGE NOTES
Had splenectomy and distal pancreatectomy 6/30. Noticed redness around incision site approx. 1500 and noticed fluid and blood coming out from a small opening along incision site at approx. 1830. Opening was not there before, and rest of incision site is intact.     Triage Assessment     Row Name 07/25/23 1925       Triage Assessment (Adult)    Airway WDL WDL       Respiratory WDL    Respiratory WDL WDL       Skin Circulation/Temperature WDL    Skin Circulation/Temperature WDL WDL       Cardiac WDL    Cardiac WDL WDL       Peripheral/Neurovascular WDL    Peripheral Neurovascular WDL WDL       Cognitive/Neuro/Behavioral WDL    Cognitive/Neuro/Behavioral WDL WDL

## 2023-07-26 NOTE — ED PROVIDER NOTES
.Sign Out Provider: Dr. Borrero    Sign Out Plan: 68-year-old male here with drainage from his recent surgical incision.  Patient pending surgery recommendations at the time of signout.    Reassessment: Surgery evaluated the patient, at this time will start patient on IV antibiotics, obtain blood cultures, admit for observation to the surgical service for further evaluation and ongoing treatment.  Patient understands and agrees with the plan.    Disposition: Admit to general surgery       Suzette Monsalve MD  07/26/23 0405

## 2023-07-26 NOTE — H&P
[facility] [place]    History and Physical - Emergency General Surgery Service       Date of Admission:  7/25/2023   on * No surgery found *  Assessment & Plan: Surgery   Arnaldo Henderson is a 68 year old male with a significant PMHx most noteable pancreatectomy with splenectomy (6/30), post-op day 25 and who presented to ED today for new abdominal incisional erythema and drainage from incision site. On abdominal exam, there is a well approximated incision with erythema and expressible sanguinous and purulent drainage from a single point. Labs notable for WBC of 14.4 (11.5 two weeks ago), hemoglobin of 12.1 (10.4 two weeks ago), sodium WNL, elevated BUN at 25.1, elevated glucose of 230, and INR or 1.24. CT results with phlegmon but no drainable collections. Wound was packed with iodoform gauze but fascia could not be probed due to the small skin opening    - blood cultures  - IV antibiosis with ceftriaxone q24  - obs admit  - regular diet  - resume all home meds  - no mIVF  - AM labs  - General surgery team will re-evaluate in AM     The patient's care was discussed with the Attending Physician, Dr. Acevedo and Chief Resident/Fellow.    Adonay Castaneda MD  [facility] [place]  All communications related to this note should be expressed to resident/SHALA on call for this team at the time of your communication.  ______________________________________________________________________    Chief Complaint   New incisional drainage    History is obtained from the patient and the patient's spouse.    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     Patient is post-op day 25 and presented to ED today for new abdominal drainage from incision site. Patient states his wife noticed erythema around the incision earlier today and she sent a photo to Dr. Haider's nurse who was not concerned regarding the new incisional erythema but was told to keep an eye on it and lamont it to observe any spread. Arnaldo states that later today, he bent over to tie his shoe, and he felt he had \"burst\" and noticed new, tan drainage. On arrival patients shirt was covered in drainage fluid and serosanguinous output soaked the abdominal pad. Labs notable for WBC of 14.4 (11.5 two weeks ago), hemoglobin of 12.1 (10.4 two weeks ago), sodium WNL, elevated BUN at 25.1, elevated glucose of 230, and INR or 1.24.       Past Medical History    Past Medical History:   Diagnosis Date    CAD (coronary artery disease)     CABG    Diabetes mellitus, type 2 (H) 2013    Gastroesophageal reflux disease     Hypercholesteremia     Hypertension     Mixed conductive and sensorineural hearing loss of both ears     Mixed hearing loss     Pancreatic duct stricture     Pancreatitis     Parkinson disease (H)     Second degree AV block        Past Surgical History   Past Surgical History:   Procedure Laterality Date    CA ANESTH CABG W/PUMP      CHOLECYSTECTOMY  2022    COLONOSCOPY N/A 01/12/2023    Procedure: colonoscopy with fluroscopy;  Surgeon: Ayden Fraire MD;  Location:  OR    ENDOSCOPIC RETROGRADE CHOLANGIOPANCREATOGRAM N/A 05/18/2023    Procedure: ENDOSCOPIC RETROGRADE CHOLANGIOPANCREATOGRAPHY, WITH  CYSTDUODENOSTOMY STENTS X2 REMOVAL;  Surgeon: Cedric Saldana MD;  Location:  OR    ENT SURGERY      LAPAROSCOPY DIAGNOSTIC (GENERAL) N/A 02/20/2023    Procedure: LAPAROSCOPY, DIAGNOSTIC; RETRIEVAL OF MIGRATED STENT;  Surgeon: Joseluis Lima MD;  Location:  OR "    ORTHOPEDIC SURGERY      PANCREATECTOMY CIRO N/A 06/30/2023    Procedure: Open distal pancreactectomy with splenectomy, lysis of adhesions, resection of proximal illeum;  Surgeon: Ashvin Haider MD;  Location: [facility] OR    CO CABG, ARTERIAL, TWO  2003       Prior to Admission Medications   Prior to Admission Medications   Prescriptions Last Dose Informant Patient Reported? Taking?   BD PEN NEEDLE LINDY 2ND GEN 32G X 4 MM miscellaneous   Yes No   Sig: USE TO INJECT INSULIN 4 TIMES A DAY   Multiple Vitamin (MULTI-VITAMINS) TABS   Yes No   Sig: Take 1 tablet by mouth daily   acetaminophen (TYLENOL) 325 MG tablet   No No   Sig: Take 2 tablets (650 mg) by mouth every 4 hours as needed for other (For optimal non-opioid multimodal pain management to improve pain control.)   aspirin (ASA) 81 MG EC tablet   Yes No   Sig: Take 81 mg by mouth daily   atorvastatin (LIPITOR) 40 MG tablet   Yes No   Sig: Take 1 tablet by mouth daily   carbidopa-levodopa (SINEMET CR)  MG CR tablet   Yes No   Sig: Take 1 tablet by mouth At Bedtime   carbidopa-levodopa (SINEMET)  MG tablet   Yes No   Sig: Take 2 tablets by mouth 3 times daily   gabapentin (NEURONTIN) 300 MG capsule   Yes No   Sig: Take 300 mg by mouth every morning , 300 mg by mouth at midday, and 600 mg by mouth at bedtime   insulin aspart (NOVOLOG PEN) 100 UNIT/ML pen   No No   Sig: Inject 3 Units Subcutaneous 3 times daily (with meals) Give 2 units per every 50 above 140.   Patient not taking: Reported on 7/13/2023   insulin aspart (NOVOLOG PEN) 100 UNIT/ML pen   No No   Sig: Inject 1-7 Units Subcutaneous 3 times daily (before meals)   insulin aspart (NOVOLOG PEN) 100 UNIT/ML pen   No No   Sig: Inject 1-5 Units Subcutaneous At Bedtime   Patient not taking: Reported on 7/13/2023   insulin glargine (BASAGLAR KWIKPEN) 100 UNIT/ML pen   No No   Sig: Inject 13 Units Subcutaneous At Bedtime   lipase-protease-amylase (ZENPEP) 29582-571518-275851 units CPEP   No No  "  Sig: Take 1 capsule by mouth 3 times daily (with meals)   losartan (COZAAR) 50 MG tablet   Yes No   Sig: Take 50 mg by mouth At Bedtime   magnesium oxide (MAG-OX) 400 MG tablet   Yes No   Sig: Take 400 mg by mouth every other day   methocarbamol (ROBAXIN) 500 MG tablet   No No   Sig: Take 1 tablet (500 mg) by mouth every 6 hours as needed for muscle spasms   omeprazole (PRILOSEC) 20 MG DR capsule   Yes No   Sig: Take 20 mg by mouth daily   oxyCODONE (ROXICODONE) 5 MG tablet   No No   Sig: Take 1-2 tablets (5-10 mg) by mouth every 4 hours as needed for breakthrough pain   oxyCODONE (ROXICODONE) 5 MG tablet   No No   Sig: Take 5 mg up to two times daily as needed for severe breakthrough abdominal pain   polyethylene glycol (MIRALAX) 17 GM/Dose powder   No No   Sig: Take 17 g by mouth daily      Facility-Administered Medications: None        Review of Systems    Con: denies fevers chills night sweats and headaches  HE: Denies changes in vision. Denies tinnitus vertigo and lightheadedness.  ENT: Denies changes in smell or taste. Denies nasal congestion, sinus congestion, rhinorrhea, cough and sore throat  Pulm/CV: Denies CP and SOB  Abd: Denies nausea and vomiting. Denies abdominal pain. Denies diarrhea and constipation.  MSK: Denies back pain  Psych: Denies suicidal ideation and homicidal ideation      Allergies   Allergies   Allergen Reactions    Ciprofloxacin Other (See Comments), Hives, Itching, Rash and Unknown     Other reaction(s): Other (see comments)  Oral swelling per [facility]        Dilaudid [Hydromorphone] Rash    Morphine Rash     rash    Penicillins Rash     aka ancef/ rash      Citalopram Unknown    Oxycodone Rash     \"HORRIBLE, SEVERE RASH MAYBE CAUSED BY OXY\"        Physical Exam   Vital Signs: Temp: 97.4  F (36.3  C) Temp src: Oral BP: (!) 125/93 Pulse: 75   Resp: 20 SpO2: 99 % O2 Device: None (Room air)    Weight: 178 lbs 0 ozNo intake or output data in the 24 hours ending 07/25/23 4148     Gen: " alert and conversational adult male in NAD  Pulm: nonlabored breathing  CV: RRR by radial palpation  Abd: soft, nontender, nondistended. Midline incision with one small skin defect with expressible serosanguinous fluid. Surrounding erythema to about 10 cm (decreased from afternoon marking) no fluctuance noted  Extremities: wwp         Data     I have personally reviewed the following data over the past 24 hrs:    14.4 (H)  \   12.1 (L)   / 680 (H)     137 101 25.1 (H) /  267 (H)   4.1 26 0.92 \     ALT: <5 AST: 21 AP: 137 (H) TBILI: 0.5   ALB: 4.2 TOT PROTEIN: 7.9 LIPASE: N/A     INR:  1.24 (H) PTT:  N/A   D-dimer:  N/A Fibrinogen:  N/A       Imaging results reviewed over the past 24 hrs:   Recent Results (from the past 24 hour(s))   CT Abdomen Pelvis w Contrast    Narrative    EXAM: CT ABDOMEN PELVIS W CONTRAST  LOCATION: [facility]  DATE: 7/25/2023    INDICATION: Splenectomy and partial pancratectomy on 6 30. Wound opened today with purulent and bloody drainage.  COMPARISON: CT 06/01/2023  TECHNIQUE: CT scan of the abdomen and pelvis was performed following injection of IV contrast. Multiplanar reformats were obtained. Dose reduction techniques were used.  CONTRAST: iopamidol (ISOVUE 370) solution 100 mL    FINDINGS:   LOWER CHEST: Prior median sternotomy. No focal pulmonary consolidation or effusion.    HEPATOBILIARY: Unremarkable liver. Cholecystectomy.    PANCREAS: Partial pancreatectomy with only small amount of the same process remaining.    SPLEEN: Splenectomy.    ADRENAL GLANDS: Normal.    KIDNEYS/BLADDER: Normal.    BOWEL: Moderate colonic stool. Appendectomy. Partial small bowel resection. No bowel obstruction. Fat stranding in the pancreatectomy operative bed, nonspecific but may be postoperative change.    LYMPH NODES: No lymphadenopathy.    VASCULATURE: Diffuse atherosclerotic calcifications of the aortoiliac vessels without evidence of aneurysmal  dilatation.    PELVIC ORGANS: Normal.    MUSCULOSKELETAL: Small amount of gas and fluid in the subcutaneous fat deep to the previous midline incision compatible with phlegmonous changes. No well-defined drainable abscess at this time.      Impression    IMPRESSION:   1.  Small amount of gas and fluid in the subcutaneous fat deep to the previous midline incision compatible with phlegmonous changes. No well-defined drainable abscess in this region.  2.  Fat stranding in the pancreatectomy operative bed, nonspecific but may be postoperative change. No drainable fluid collection within the abdomen or pelvis.  3.  Moderate colonic stool.

## 2023-07-26 NOTE — PROGRESS NOTES
Brief Procedure Note: Incision and debridement of midline wound.    The patient was supine during the procedure. First 3mL of 1% lidocaine with 1:100,000 epinephrine was along wound edge, and 3cm towards the umbilicus from wound edge. We proceeded to unpack the wound. We then incised towards the umbilicus from the wound edge. Wound was packed with saline moistened gauze. An abdominal pad was placed over the site with tape along the perimeter of the dressing. Patient tolerated the procedure well. Patient had question fully answered about care and management outside of the hospital.    Dr. Garcia was at bedside to assist with the procedure.    Rancho Pleitez MD    [facility]

## 2023-07-26 NOTE — UTILIZATION REVIEW
"  [facility] Utilization Review  Admission Status; Secondary Review Determination     Admission Date: 7/25/2023  7:57 PM      Under the authority of the Utilization Management Committee, the utilization review process indicated a secondary review on the above patient.  The review outcome is based on review of the medical records, discussions with staff, and applying clinical experience noted on the date of the review.        (X) Observation Status Appropriate - This patient does not meet hospital inpatient criteria and is placed in observation status. If this patient's primary payer is Medicare and was admitted as an inpatient, Condition Code 44 should be used and patient status changed to \"observation\".   () Observation Status concurrent Review           RATIONALE FOR DETERMINATION   68-year-old male with history of coronary artery disease status post CABG, second-degree AV block, chronic pancreatitis with peripancreatic fluid collection status post stent placement, migration, pancreatic duct stricture status post pancreatectomy with splenectomy, pancreatic pseudocyst, cholecystitis status postcholecystectomy, malignant cancer of scrotum, Parkinson's disease, diabetes mellitus, postop day 25 and admitted with new abdominal drainage from incision site.  Patient also found to have leukocytosis with elevated blood sugars.  Started on IV antibiotics, IV fluid hydration.  Surgery team performed I&D of midline wound, with plans for discharge.  After single midnight stay in the hospital, and early discharge, patient does not meet criteria for inpatient stay, recommend change to observation status, communicated to Dr. Pleitez      The severity of illness, intensity of service provided, expected LOS make the care appropriate for observation status at this time.        The information on this document is developed by the utilization review team in order for the business office to ensure compliance.  This only " denotes the appropriateness of proper admission status and does not reflect the quality of care rendered.         The definitions of Inpatient Status and Observation Status used in making the determination above are those provided in the CMS Coverage Manual, Chapter 1 and Chapter 6, section 70.4.      Sincerely,       Syed Peralta MD  Physician Advisor  Utilization Review-[place]    Phone: [phone]

## 2023-07-26 NOTE — DISCHARGE SUMMARY
[facility]  Surgery Discharge Summary      Date of Admission:  7/25/2023  Date of Discharge:  7/26/2023  Discharging Provider: Sherry Mendez  Discharge Service: EGS     Discharge Diagnoses   Surgical Site Infection    Follow-ups Needed After Discharge   Follow up with Dr. Haider in 1 week.    Unresulted Labs Ordered in the Past 30 Days of this Admission       Date and Time Order Name Status Description    7/26/2023  2:42 AM Blood Culture Peripheral Blood In process         These results will be followed up by Dr. Haider.    Discharge Disposition   Discharged to home  Condition at discharge: Stable    Hospital Course    7/26 - S/p distal pancreatectomy and splenectomy on 6/30. Patient presented to the ED today with a midline abdominal superficial incision infection draining purulent fluid as well as cellulitis surrounding his incision. The incision was opened further at bedside and packed. Patient should continue packing BID wet/dry. Started on 6 days of cefdinir PO for surgical site infection.    Consultations This Hospital Stay   None    Code Status   Prior      Rancho Pleitez MD  [facility] EMERGENCY DEPARTMENT  [address] [address]  Phone: [phone]  ______________________________________________________________________    Physical Exam   Vital Signs: Temp: 98  F (36.7  C) Temp src: Oral BP: (!) 160/88 Pulse: 75   Resp: 14 SpO2: 99 % O2 Device: None (Room air)    Weight: 178 lbs 0 oz    Constitutional: fatigued, alert, distracted, and mild distress  Respiratory: no increased work of breathing  Cardiovascular: regular rate and rhythm  GI: no scars, soft, non-distended, tenderness noted around midline abdominal incision, and involuntary guarding absent  Skin: normal skin color, texture, turgor  Neuropsychiatric: General: restless, fidgeting, and poor concentration       Primary Care Physician   Alanna Lazaro    Discharge Orders   No  discharge procedures on file.    Significant Results and Procedures   Results for orders placed or performed during the hospital encounter of 07/25/23   CT Abdomen Pelvis w Contrast    Narrative    EXAM: CT ABDOMEN PELVIS W CONTRAST  LOCATION: [facility]  DATE: 7/25/2023    INDICATION: Splenectomy and partial pancratectomy on 6 30. Wound opened today with purulent and bloody drainage.  COMPARISON: CT 06/01/2023  TECHNIQUE: CT scan of the abdomen and pelvis was performed following injection of IV contrast. Multiplanar reformats were obtained. Dose reduction techniques were used.  CONTRAST: iopamidol (ISOVUE 370) solution 100 mL    FINDINGS:   LOWER CHEST: Prior median sternotomy. No focal pulmonary consolidation or effusion.    HEPATOBILIARY: Unremarkable liver. Cholecystectomy.    PANCREAS: Partial pancreatectomy with only small amount of the same process remaining.    SPLEEN: Splenectomy.    ADRENAL GLANDS: Normal.    KIDNEYS/BLADDER: Normal.    BOWEL: Moderate colonic stool. Appendectomy. Partial small bowel resection. No bowel obstruction. Fat stranding in the pancreatectomy operative bed, nonspecific but may be postoperative change.    LYMPH NODES: No lymphadenopathy.    VASCULATURE: Diffuse atherosclerotic calcifications of the aortoiliac vessels without evidence of aneurysmal dilatation.    PELVIC ORGANS: Normal.    MUSCULOSKELETAL: Small amount of gas and fluid in the subcutaneous fat deep to the previous midline incision compatible with phlegmonous changes. No well-defined drainable abscess at this time.      Impression    IMPRESSION:   1.  Small amount of gas and fluid in the subcutaneous fat deep to the previous midline incision compatible with phlegmonous changes. No well-defined drainable abscess in this region.  2.  Fat stranding in the pancreatectomy operative bed, nonspecific but may be postoperative change. No drainable fluid collection within the abdomen  or pelvis.  3.  Moderate colonic stool.       Discharge Medications   Current Discharge Medication List        CONTINUE these medications which have NOT CHANGED    Details   acetaminophen (TYLENOL) 325 MG tablet Take 2 tablets (650 mg) by mouth every 4 hours as needed for other (For optimal non-opioid multimodal pain management to improve pain control.)    Associated Diagnoses: History of partial pancreatectomy      aspirin (ASA) 81 MG EC tablet Take 81 mg by mouth daily      atorvastatin (LIPITOR) 40 MG tablet Take 1 tablet by mouth daily      BD PEN NEEDLE LINDY 2ND GEN 32G X 4 MM miscellaneous USE TO INJECT INSULIN 4 TIMES A DAY      carbidopa-levodopa (SINEMET CR)  MG CR tablet Take 1 tablet by mouth At Bedtime      carbidopa-levodopa (SINEMET)  MG tablet Take 2 tablets by mouth 3 times daily      gabapentin (NEURONTIN) 300 MG capsule Take 300 mg by mouth every morning , 300 mg by mouth at midday, and 600 mg by mouth at bedtime      !! insulin aspart (NOVOLOG PEN) 100 UNIT/ML pen Inject 3 Units Subcutaneous 3 times daily (with meals) Give 2 units per every 50 above 140.  Qty: 15 mL    Associated Diagnoses: Type 2 diabetes mellitus with hyperglycemia, with long-term current use of insulin (H)      !! insulin aspart (NOVOLOG PEN) 100 UNIT/ML pen Inject 1-7 Units Subcutaneous 3 times daily (before meals)  Qty: 15 mL    Associated Diagnoses: Type 2 diabetes mellitus with hyperglycemia, with long-term current use of insulin (H)      !! insulin aspart (NOVOLOG PEN) 100 UNIT/ML pen Inject 1-5 Units Subcutaneous At Bedtime  Qty: 15 mL    Associated Diagnoses: Type 2 diabetes mellitus with hyperglycemia, with long-term current use of insulin (H)      insulin glargine (BASAGLAR KWIKPEN) 100 UNIT/ML pen Inject 13 Units Subcutaneous At Bedtime    Comments: If Basaglar is not covered by insurance, may substitute Lantus or Semglee or other insulin glargine product per insurance preference at same dose and  "frequency.    Associated Diagnoses: Type 2 diabetes mellitus with hyperglycemia, with long-term current use of insulin (H)      lipase-protease-amylase (ZENPEP) [id number] units CPEP Take 1 capsule by mouth 3 times daily (with meals)  Qty: 90 capsule, Refills: 11    Associated Diagnoses: Necrotizing pancreatitis      losartan (COZAAR) 50 MG tablet Take 50 mg by mouth At Bedtime      magnesium oxide (MAG-OX) 400 MG tablet Take 400 mg by mouth every other day      methocarbamol (ROBAXIN) 500 MG tablet Take 1 tablet (500 mg) by mouth every 6 hours as needed for muscle spasms  Qty: 45 tablet, Refills: 0    Associated Diagnoses: History of partial pancreatectomy      Multiple Vitamin (MULTI-VITAMINS) TABS Take 1 tablet by mouth daily      omeprazole (PRILOSEC) 20 MG DR capsule Take 20 mg by mouth daily      !! oxyCODONE (ROXICODONE) 5 MG tablet Take 5 mg up to two times daily as needed for severe breakthrough abdominal pain  Qty: 14 tablet, Refills: 0    Associated Diagnoses: Acute post-operative pain      !! oxyCODONE (ROXICODONE) 5 MG tablet Take 1-2 tablets (5-10 mg) by mouth every 4 hours as needed for breakthrough pain  Qty: 12 tablet, Refills: 0    Associated Diagnoses: History of partial pancreatectomy      polyethylene glycol (MIRALAX) 17 GM/Dose powder Take 17 g by mouth daily  Qty: 510 g    Associated Diagnoses: History of partial pancreatectomy       !! - Potential duplicate medications found. Please discuss with provider.        Allergies   Allergies   Allergen Reactions     Ciprofloxacin Other (See Comments), Hives, Itching, Rash and Unknown     Other reaction(s): Other (see comments)  Oral swelling per [facility]         Dilaudid [Hydromorphone] Rash     Morphine Rash     rash     Penicillins Rash     aka ancef/ rash       Citalopram Unknown     Oxycodone Rash     \"HORRIBLE, SEVERE RASH MAYBE CAUSED BY OXY\"      "

## 2023-07-27 ENCOUNTER — PRE VISIT (OUTPATIENT)
Dept: ONCOLOGY | Facility: CLINIC | Age: 68
End: 2023-07-27
Payer: MEDICARE

## 2023-07-27 ENCOUNTER — PATIENT OUTREACH (OUTPATIENT)
Dept: CARE COORDINATION | Facility: CLINIC | Age: 68
End: 2023-07-27
Payer: MEDICARE

## 2023-07-27 ENCOUNTER — VIRTUAL VISIT (OUTPATIENT)
Dept: ONCOLOGY | Facility: CLINIC | Age: 68
End: 2023-07-27
Attending: SURGERY
Payer: MEDICARE

## 2023-07-27 VITALS
WEIGHT: 178 LBS | DIASTOLIC BLOOD PRESSURE: 71 MMHG | SYSTOLIC BLOOD PRESSURE: 123 MMHG | HEIGHT: 68 IN | BODY MASS INDEX: 26.98 KG/M2

## 2023-07-27 DIAGNOSIS — C7A.8 NEUROENDOCRINE CARCINOMA OF SMALL BOWEL (H): Primary | ICD-10-CM

## 2023-07-27 DIAGNOSIS — C7B.8 METASTATIC MALIGNANT NEUROENDOCRINE TUMOR TO LYMPH NODE (H): ICD-10-CM

## 2023-07-27 PROBLEM — G20.A1 PARKINSON'S DISEASE (H): Status: ACTIVE | Noted: 2021-05-14

## 2023-07-27 PROBLEM — R26.9 IMPAIRED GAIT: Status: ACTIVE | Noted: 2022-10-24

## 2023-07-27 PROBLEM — Z74.1 NEED FOR ASSISTANCE WITH PERSONAL CARE: Status: ACTIVE | Noted: 2022-04-15

## 2023-07-27 PROBLEM — M54.50 ACUTE BILATERAL LOW BACK PAIN WITHOUT SCIATICA: Status: ACTIVE | Noted: 2022-10-24

## 2023-07-27 PROBLEM — C63.2: Status: ACTIVE | Noted: 2019-02-05

## 2023-07-27 PROBLEM — I25.10 ATHEROSCLEROTIC HEART DISEASE OF NATIVE CORONARY ARTERY WITHOUT ANGINA PECTORIS: Status: ACTIVE | Noted: 2018-06-04

## 2023-07-27 PROBLEM — R53.81 PHYSICAL DECONDITIONING: Status: ACTIVE | Noted: 2022-10-24

## 2023-07-27 PROBLEM — Z79.82 LONG TERM (CURRENT) USE OF ASPIRIN: Status: ACTIVE | Noted: 2022-04-15

## 2023-07-27 PROBLEM — M54.2 CERVICALGIA: Status: ACTIVE | Noted: 2022-04-15

## 2023-07-27 PROBLEM — K86.3 PSEUDOCYST OF PANCREAS: Status: ACTIVE | Noted: 2022-07-21

## 2023-07-27 PROBLEM — E44.1 MILD PROTEIN-CALORIE MALNUTRITION (H): Status: ACTIVE | Noted: 2022-04-15

## 2023-07-27 PROBLEM — Z90.49 HISTORY OF CHOLECYSTECTOMY: Status: ACTIVE | Noted: 2022-08-10

## 2023-07-27 PROBLEM — H90.41 SENSORINEURAL HEARING LOSS (SNHL) OF RIGHT EAR WITH UNRESTRICTED HEARING OF LEFT EAR: Status: ACTIVE | Noted: 2018-06-04

## 2023-07-27 PROBLEM — K86.1 CHRONIC BILIARY PANCREATITIS (H): Status: ACTIVE | Noted: 2022-04-18

## 2023-07-27 PROBLEM — N43.2 OTHER HYDROCELE: Status: ACTIVE | Noted: 2018-06-04

## 2023-07-27 PROCEDURE — 99205 OFFICE O/P NEW HI 60 MIN: CPT | Mod: 95 | Performed by: INTERNAL MEDICINE

## 2023-07-27 ASSESSMENT — PAIN SCALES - GENERAL: PAINLEVEL: NO PAIN (0)

## 2023-07-27 NOTE — PROGRESS NOTES
[facility]    Background: Transitional Care Management program identified per system criteria and reviewed by [facility] [place] team for possible outreach.    Assessment: Upon chart review, [facility] Team member will not proceed with patient outreach related to this episode of Transitional Care Management program due to reason below:    Patient has a follow up appointment with an appropriate provider today for hospital discharge.    Plan: Transitional Care Management episode addressed appropriately per reason noted above.      Isabelle Mendes MA  [facility]    *Connected Care Resource Team does NOT follow patient ongoing. Referrals are identified based on internal discharge reports and the outreach is to ensure patient has an understanding of their discharge instructions..

## 2023-07-27 NOTE — LETTER
7/27/2023         RE: Arnaldo Henderson  [address]  [facility] [address]        Dear Colleague,    Thank you for referring your patient, Arnaldo Henderson, to the [facility]. Please see a copy of my visit note below.    Virtual Visit Details    Type of service:  Video Visit   Video Start Time:  8:00  Video End Time:9:05 AM    Originating Location (pt. Location): Home    Distant Location (provider location):  Off-site  Platform used for Video Visit: Destinee Henderson is a new patient I have been asked to see by Dr. 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    He is a 68-year-old with multiple comorbidities who over the last year and a half has been struggling with gallstone pancreatitis, with pancreatic leak, migration of an axial stent into the small bowel that required surgical removal, and most recently the need for a distal pancreatectomy/splenectomy due to chronic leak.  At the time of that surgery he was incidentally noted to have marked thickening in the distal jejunum or proximal ileum and that segment of bowel was resected revealing the presence of his neuroendocrine tumor.  The tumor was well differentiated with a Ki-67 index of 10% with 1 of 4 lymph nodes involved and negative surgical margins.  The patient has had a little bit of a difficult postoperative course with a recent wound infection in the lower part of his wound is now open and he is packing it.  He has a moderate amount of postoperative pain but otherwise is feeling better than he has in a while.    He tells me over the last year and a half has been very inactive due to the chronic pain from his pancreatitis.  He thinks currently his weight is stable.  He is not having a lot of trouble with diarrhea although his bowel function has been quite disruptive over the last year and a half with multiple surgeries and issues related to his  pancreatitis.  He also has Parkinson's and so sometimes does not track well with all of his medical information and with anxiety his symptoms from the Parkinson's get significantly worse.  He is doing only moderately well with control of his diabetes right now and is generally running blood sugars between 200 and 300.  He and his wife have been living out in [place] at the start of all this but have now moved permanently back to [place] to be close to better medical care.  His wife is a 7-year survivor of stage III ovarian cancer and has other medical problems of her own, which it sounds like she has been deferring care for as she consumed with taking care of her .  They have some children and grandchildren in the area they visit with, but otherwise do not have much of a social life because of their medical problems.    On exam via the video link Mr. Henderson appears quite comfortable.  He is conversant and seems to track well with our visit today.      I personally reviewed several CTs and an MRCP dating back over the past year.  There is the extensive changes related to his pancreatic disease and I do not see any clear evidence of his small bowel tumor.  His most recent scan shows changes of his postoperative wound infection and nothing to suggest metastatic disease.    His most recent lab work shows mild elevation of his sodium and chloride probably consistent with some mild dehydration but normal renal function.  Bilirubin, albumin, and liver enzymes are normal.  He has mild and improving anemia with a hemoglobin of 12.2 with mild macrocytosis (probably representing some reticulocytosis as he recovers from his surgical blood losses) and an elevated platelet count of 629 consistent with his postsplenectomy state.      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  We do not have a baseline chromogranin A  level.  His preoperative imaging showed no evidence of metastatic disease and at this point I do not think getting a dotatate PET scan would add useful information.  He does not have any clear carcinoid symptoms but the symptomology from all of his surgeries and pancreatitis conceivably could obscure some of that.  I had a long talk with him and his wife going through the nature of his disease, his prognosis and my recommendations moving forward.  We discussed that these tumors are often quite indolent and at present as he does not have symptoms clearly referable to the tumor or any radiographic evidence of residual disease so he does not require any immediate intervention.  I recommend that we get follow-up imaging and chromogranin A levels every 3 months over the next 6 months or so and then every 6 months for another year and then at most once per year.  If he has any concerns for recurrent disease either based on symptoms or CT imaging at that point we can get a dotatate scan.  I reviewed with him symptoms that might represent recurrence of his disease, either hormone related symptoms such as carcinoid some syndrome or more general malignancy symptoms of pain and weight loss.  We discussed the difficulty with his other medical problems of recognizing those and try not to be over concerned about intermittent problems with bowel issues in particular.  I discussed that with his age and comorbidities there is a good chance will never need any intervention for this, but that if we do it is likely to be relatively simple hormonal intervention and that for now our main plan would be surveillance for recurrence as discussed above.    At the end of our long discussion he and his wife had had all their questions answered and were able to express a good understanding of his disease state.  I will plan on seeing him back in about 3 months with a CT scan.  I added on a chromogranin A level to his most recent lab work we will  be rechecking that at her 3-month follow-up.    Again, thank you for allowing me to participate in the care of your patient.        Sincerely,        Caio Lyn MD

## 2023-07-27 NOTE — PROGRESS NOTES
Virtual Visit Details    Type of service:  Video Visit   Video Start Time:  8:00  Video End Time:9:05 AM    Originating Location (pt. Location): Home    Distant Location (provider location):  Off-site  Platform used for Video Visit: Destinee Henderson is a new patient I have been asked to see by Dr. 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    He is a 68-year-old with multiple comorbidities who over the last year and a half has been struggling with gallstone pancreatitis, with pancreatic leak, migration of an axial stent into the small bowel that required surgical removal, and most recently the need for a distal pancreatectomy/splenectomy due to chronic leak.  At the time of that surgery he was incidentally noted to have marked thickening in the distal jejunum or proximal ileum and that segment of bowel was resected revealing the presence of his neuroendocrine tumor.  The tumor was well differentiated with a Ki-67 index of 10% with 1 of 4 lymph nodes involved and negative surgical margins.  The patient has had a little bit of a difficult postoperative course with a recent wound infection in the lower part of his wound is now open and he is packing it.  He has a moderate amount of postoperative pain but otherwise is feeling better than he has in a while.    He tells me over the last year and a half has been very inactive due to the chronic pain from his pancreatitis.  He thinks currently his weight is stable.  He is not having a lot of trouble with diarrhea although his bowel function has been quite disruptive over the last year and a half with multiple surgeries and issues related to his pancreatitis.  He also has Parkinson's and so sometimes does not track well with all of his medical information and with anxiety his symptoms from the Parkinson's get significantly worse.  He is doing only moderately well with control of his diabetes right now and is  generally running blood sugars between 200 and 300.  He and his wife have been living out in [place] at the start of all this but have now moved permanently back to [place] to be close to better medical care.  His wife is a 7-year survivor of stage III ovarian cancer and has other medical problems of her own, which it sounds like she has been deferring care for as she consumed with taking care of her .  They have some children and grandchildren in the area they visit with, but otherwise do not have much of a social life because of their medical problems.    On exam via the video link Mr. Henderson appears quite comfortable.  He is conversant and seems to track well with our visit today.      I personally reviewed several CTs and an MRCP dating back over the past year.  There is the extensive changes related to his pancreatic disease and I do not see any clear evidence of his small bowel tumor.  His most recent scan shows changes of his postoperative wound infection and nothing to suggest metastatic disease.    His most recent lab work shows mild elevation of his sodium and chloride probably consistent with some mild dehydration but normal renal function.  Bilirubin, albumin, and liver enzymes are normal.  He has mild and improving anemia with a hemoglobin of 12.2 with mild macrocytosis (probably representing some reticulocytosis as he recovers from his surgical blood losses) and an elevated platelet count of 629 consistent with his postsplenectomy state.      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  We do not have a baseline chromogranin A level.  His preoperative imaging showed no evidence of metastatic disease and at this point I do not think getting a dotatate PET scan would add useful information.  He does not have any clear carcinoid symptoms but the symptomology from all of his surgeries and  pancreatitis conceivably could obscure some of that.  I had a long talk with him and his wife going through the nature of his disease, his prognosis and my recommendations moving forward.  We discussed that these tumors are often quite indolent and at present as he does not have symptoms clearly referable to the tumor or any radiographic evidence of residual disease so he does not require any immediate intervention.  I recommend that we get follow-up imaging and chromogranin A levels every 3 months over the next 6 months or so and then every 6 months for another year and then at most once per year.  If he has any concerns for recurrent disease either based on symptoms or CT imaging at that point we can get a dotatate scan.  I reviewed with him symptoms that might represent recurrence of his disease, either hormone related symptoms such as carcinoid some syndrome or more general malignancy symptoms of pain and weight loss.  We discussed the difficulty with his other medical problems of recognizing those and try not to be over concerned about intermittent problems with bowel issues in particular.  I discussed that with his age and comorbidities there is a good chance will never need any intervention for this, but that if we do it is likely to be relatively simple hormonal intervention and that for now our main plan would be surveillance for recurrence as discussed above.    At the end of our long discussion he and his wife had had all their questions answered and were able to express a good understanding of his disease state.  I will plan on seeing him back in about 3 months with a CT scan.  I added on a chromogranin A level to his most recent lab work we will be rechecking that at her 3-month follow-up.

## 2023-07-27 NOTE — NURSING NOTE
Is the patient currently in the state of [place]? YES    Visit mode:VIDEO    If the visit is dropped, the patient can be reconnected by: VIDEO VISIT: Send to e-mail at: [email or web address]    Will anyone else be joining the visit? NO      How would you like to obtain your AVS? MyChart    Are changes needed to the allergy or medication list? NO    Patient declined individual allergy and medication review by support staff because they were just reviewed yesterday (7/26).    Cynthia APPIAH    Reason for visit: Consult

## 2023-07-27 NOTE — LETTER
7/27/2023         RE: Arnaldo Henderson  [address]  [facility] [address]      Virtual Visit Details    Type of service:  Video Visit   Video Start Time:  8:00  Video End Time:9:05 AM    Originating Location (pt. Location): Home    Distant Location (provider location):  Off-site  Platform used for Video Visit: Destinee Henderson is a new patient I have been asked to see by Dr. 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    He is a 68-year-old with multiple comorbidities who over the last year and a half has been struggling with gallstone pancreatitis, with pancreatic leak, migration of an axial stent into the small bowel that required surgical removal, and most recently the need for a distal pancreatectomy/splenectomy due to chronic leak.  At the time of that surgery he was incidentally noted to have marked thickening in the distal jejunum or proximal ileum and that segment of bowel was resected revealing the presence of his neuroendocrine tumor.  The tumor was well differentiated with a Ki-67 index of 10% with 1 of 4 lymph nodes involved and negative surgical margins.  The patient has had a little bit of a difficult postoperative course with a recent wound infection in the lower part of his wound is now open and he is packing it.  He has a moderate amount of postoperative pain but otherwise is feeling better than he has in a while.    He tells me over the last year and a half has been very inactive due to the chronic pain from his pancreatitis.  He thinks currently his weight is stable.  He is not having a lot of trouble with diarrhea although his bowel function has been quite disruptive over the last year and a half with multiple surgeries and issues related to his pancreatitis.  He also has Parkinson's and so sometimes does not track well with all of his medical information and with anxiety his symptoms from the Parkinson's get significantly  worse.  He is doing only moderately well with control of his diabetes right now and is generally running blood sugars between 200 and 300.  He and his wife have been living out in [place] at the start of all this but have now moved permanently back to [place] to be close to better medical care.  His wife is a 7-year survivor of stage III ovarian cancer and has other medical problems of her own, which it sounds like she has been deferring care for as she consumed with taking care of her .  They have some children and grandchildren in the area they visit with, but otherwise do not have much of a social life because of their medical problems.    On exam via the video link Mr. Henderson appears quite comfortable.  He is conversant and seems to track well with our visit today.      I personally reviewed several CTs and an MRCP dating back over the past year.  There is the extensive changes related to his pancreatic disease and I do not see any clear evidence of his small bowel tumor.  His most recent scan shows changes of his postoperative wound infection and nothing to suggest metastatic disease.    His most recent lab work shows mild elevation of his sodium and chloride probably consistent with some mild dehydration but normal renal function.  Bilirubin, albumin, and liver enzymes are normal.  He has mild and improving anemia with a hemoglobin of 12.2 with mild macrocytosis (probably representing some reticulocytosis as he recovers from his surgical blood losses) and an elevated platelet count of 629 consistent with his postsplenectomy state.      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  We do not have a baseline chromogranin A level.  His preoperative imaging showed no evidence of metastatic disease and at this point I do not think getting a dotatate PET scan would add useful information.  He does not have  any clear carcinoid symptoms but the symptomology from all of his surgeries and pancreatitis conceivably could obscure some of that.  I had a long talk with him and his wife going through the nature of his disease, his prognosis and my recommendations moving forward.  We discussed that these tumors are often quite indolent and at present as he does not have symptoms clearly referable to the tumor or any radiographic evidence of residual disease so he does not require any immediate intervention.  I recommend that we get follow-up imaging and chromogranin A levels every 3 months over the next 6 months or so and then every 6 months for another year and then at most once per year.  If he has any concerns for recurrent disease either based on symptoms or CT imaging at that point we can get a dotatate scan.  I reviewed with him symptoms that might represent recurrence of his disease, either hormone related symptoms such as carcinoid some syndrome or more general malignancy symptoms of pain and weight loss.  We discussed the difficulty with his other medical problems of recognizing those and try not to be over concerned about intermittent problems with bowel issues in particular.  I discussed that with his age and comorbidities there is a good chance will never need any intervention for this, but that if we do it is likely to be relatively simple hormonal intervention and that for now our main plan would be surveillance for recurrence as discussed above.    At the end of our long discussion he and his wife had had all their questions answered and were able to express a good understanding of his disease state.  I will plan on seeing him back in about 3 months with a CT scan.  I added on a chromogranin A level to his most recent lab work we will be rechecking that at her 3-month follow-up.        Caio Lyn MD

## 2023-07-28 ENCOUNTER — VIRTUAL VISIT (OUTPATIENT)
Dept: ENDOCRINOLOGY | Facility: CLINIC | Age: 68
End: 2023-07-28
Attending: PHYSICIAN ASSISTANT
Payer: MEDICARE

## 2023-07-28 DIAGNOSIS — E11.65 TYPE 2 DIABETES MELLITUS WITH HYPERGLYCEMIA, WITH LONG-TERM CURRENT USE OF INSULIN (H): ICD-10-CM

## 2023-07-28 DIAGNOSIS — Z79.4 TYPE 2 DIABETES MELLITUS WITH HYPERGLYCEMIA, WITH LONG-TERM CURRENT USE OF INSULIN (H): ICD-10-CM

## 2023-07-28 PROCEDURE — 99215 OFFICE O/P EST HI 40 MIN: CPT | Mod: VID | Performed by: PHYSICIAN ASSISTANT

## 2023-07-28 RX ORDER — INSULIN GLARGINE 100 [IU]/ML
INJECTION, SOLUTION SUBCUTANEOUS
Qty: 15 ML | Refills: 3 | Status: SHIPPED | OUTPATIENT
Start: 2023-07-28 | End: 2023-08-07

## 2023-07-28 ASSESSMENT — ENCOUNTER SYMPTOMS
HALLUCINATIONS: 0
HYPERTENSION: 1
BOWEL INCONTINENCE: 0
NERVOUS/ANXIOUS: 1
PALPITATIONS: 1
WEIGHT LOSS: 0
PALPITATIONS: 1
INSOMNIA: 1
DIZZINESS: 1
INSOMNIA: 1
TINGLING: 0
ALTERED TEMPERATURE REGULATION: 1
ABDOMINAL PAIN: 0
CONSTIPATION: 0
ARTHRALGIAS: 0
DECREASED APPETITE: 0
DECREASED CONCENTRATION: 1
NECK PAIN: 0
HEARTBURN: 0
HYPERTENSION: 1
POLYDIPSIA: 0
JOINT SWELLING: 0
SWOLLEN GLANDS: 0
BLOATING: 0
MUSCLE CRAMPS: 0
POLYPHAGIA: 0
TREMORS: 1
NUMBNESS: 0
WEIGHT GAIN: 0
PARALYSIS: 0
MUSCLE CRAMPS: 0
EXERCISE INTOLERANCE: 1
PARALYSIS: 0
WEAKNESS: 1
WEIGHT GAIN: 0
EXERCISE INTOLERANCE: 1
SEIZURES: 0
DIARRHEA: 0
INCREASED ENERGY: 0
JOINT SWELLING: 0
LEG PAIN: 0
MEMORY LOSS: 1
WEIGHT LOSS: 0
DECREASED CONCENTRATION: 1
BRUISES/BLEEDS EASILY: 1
BLOOD IN STOOL: 0
FEVER: 0
TREMORS: 1
JAUNDICE: 0
HYPOTENSION: 1
POLYDIPSIA: 0
ORTHOPNEA: 0
JAUNDICE: 0
HALLUCINATIONS: 0
ARTHRALGIAS: 0
BRUISES/BLEEDS EASILY: 1
HEARTBURN: 0
LOSS OF CONSCIOUSNESS: 0
DISTURBANCES IN COORDINATION: 1
SLEEP DISTURBANCES DUE TO BREATHING: 0
DECREASED APPETITE: 0
TINGLING: 0
ORTHOPNEA: 0
DEPRESSION: 1
LIGHT-HEADEDNESS: 1
MYALGIAS: 0
SYNCOPE: 0
MEMORY LOSS: 1
FEVER: 0
BLOATING: 0
NIGHT SWEATS: 0
VOMITING: 0
BOWEL INCONTINENCE: 0
SPEECH CHANGE: 0
DEPRESSION: 1
MYALGIAS: 0
LOSS OF CONSCIOUSNESS: 0
NECK PAIN: 0
BACK PAIN: 1
INCREASED ENERGY: 0
WEAKNESS: 1
SYNCOPE: 0
NAUSEA: 0
DISTURBANCES IN COORDINATION: 1
NAUSEA: 0
MUSCLE WEAKNESS: 1
SPEECH CHANGE: 0
HYPOTENSION: 1
SEIZURES: 0
ABDOMINAL PAIN: 0
STIFFNESS: 0
CHILLS: 1
DIARRHEA: 0
LEG PAIN: 0
CONSTIPATION: 0
MUSCLE WEAKNESS: 1
LIGHT-HEADEDNESS: 1
BLOOD IN STOOL: 0
SWOLLEN GLANDS: 0
NUMBNESS: 0
STIFFNESS: 0
NERVOUS/ANXIOUS: 1
CHILLS: 1
PANIC: 1
NIGHT SWEATS: 0
VOMITING: 0
SLEEP DISTURBANCES DUE TO BREATHING: 0
HEADACHES: 0
BACK PAIN: 1
POLYPHAGIA: 0
PANIC: 1
RECTAL PAIN: 0
DIZZINESS: 1
RECTAL PAIN: 0
ALTERED TEMPERATURE REGULATION: 1
HEADACHES: 0

## 2023-07-28 NOTE — LETTER
7/28/2023       RE: Arnaldo Henderson  [address]  [facility] [address]     Dear Colleague,    Thank you for referring your patient, Arnaldo Henderson, to the [facility] [place] at [facility]. Please see a copy of my visit note below.    Outcome for 07/21/23 9:10 AM: [employer] message sent  Yi Medley MA  Outcome for 07/24/23 6:43 AM: Glucose readings sent via [employer]  Yi Medley MA          Virtual Visit Details    Type of service:  Video Visit   Video Start Time:   Video End Time:    Originating Location (pt. Location):     Distant Location (provider location):    Platform used for Video Visit:   Answers submitted by the patient for this visit:  Symptoms you have experienced in the last 30 days (Submitted on 7/28/2023)  General Symptoms: Yes  Skin Symptoms: No  HENT Symptoms: No  EYE SYMPTOMS: No  HEART SYMPTOMS: Yes  LUNG SYMPTOMS: No  INTESTINAL SYMPTOMS: Yes  URINARY SYMPTOMS: No  REPRODUCTIVE SYMPTOMS: No  SKELETAL SYMPTOMS: Yes  BLOOD SYMPTOMS: Yes  NERVOUS SYSTEM SYMPTOMS: Yes  MENTAL HEALTH SYMPTOMS: Yes  Please answer the questions below to tell us what conditions you are experiencing: (Submitted on 7/28/2023)  Fever: No  Loss of appetite: No  Weight loss: No  Weight gain: No  Night sweats: No  Chills: Yes  Increased stress: Yes  Excessive hunger: No  Excessive thirst: No  Feeling hot or cold when others believe the temperature is normal: Yes  Loss of height: No  Post-operative complications: Yes  Surgical site pain: Yes  Hallucinations: No  Change in or Loss of Energy: No  Hyperactivity: No  Confusion: Yes  Please answer the questions below to tell us what condition you are experiencing: (Submitted on 7/28/2023)  Chest pain or pressure: No  Fast or irregular heartbeat: Yes  Pain in legs with walking: No  Trouble breathing while lying down: No  Fingers or toes appear blue: No  High blood pressure: Yes  Low  blood pressure: Yes  Fainting: No  Murmurs: No  Varicose veins: No  Edema or swelling: Yes  Wake up at night with shortness of breath: No  Light-headedness: Yes  Exercise intolerance: Yes  Please answer the questions below to tell us what conditions you are experiencing: (Submitted on 7/28/2023)  Heart burn or indigestion: No  Nausea: No  Vomiting: No  Abdominal pain: No  Bloating: No  Constipation: No  Diarrhea: No  Blood in stool: No  Black stools: No  Rectal or Anal pain: No  Fecal incontinence: No  Yellowing of skin or eyes: No  Vomit with blood: No  Change in stools: No  Please answer the questions below to tell us what condition you are experiencing: (Submitted on 7/28/2023)  Back pain: Yes  Muscle aches: No  Neck pain: No  Swollen joints: No  Joint pain: No  Bone pain: No  Muscle cramps: No  Muscle weakness: Yes  Joint stiffness: No  Bone fracture: No  Please answer the questions below to tell us what condition you are experiencing: (Submitted on 7/28/2023)  Edema or swelling: Yes  Anemia: No  Swollen glands: No  Easy bleeding or bruising: Yes  Please answer the questions below to tell us what condition you are experiencing: (Submitted on 7/28/2023)  Trouble with coordination: Yes  Dizziness or trouble with balance: Yes  Fainting or black-out spells: No  Memory loss: Yes  Headache: No  Seizures: No  Speech problems: No  Tingling: No  Tremor: Yes  Weakness: Yes  Difficulty walking: Yes  Paralysis: No  Numbness: No  Please answer the questions below to tell us what condition you are experiencing: (Submitted on 7/28/2023)  Nervous or Anxious: Yes  Depression: Yes  Trouble sleeping: Yes  Trouble thinking or concentrating: Yes  Mood changes: Yes  Panic attacks: Yes      Time of start: 10:13 am  Time of end: 10:44 am   Total duration of video visit: 31 minutes.  Providers location: offsite.  Patients location: [place].    [facility]  Arnaldo Henderson is a 68 year old male with type 2 diabetes mellitus. Recent distal  pancreatectomy for chronic pancreatitis. Pt seen to day for diabetes care following recent hospitalization.  His wife Veronika is present during our video visit today.  Pt was dx with type 2 DM in 2013. Denies retinopathy, nephropathy and neuropathy.  Pt has hx of chronic pancreatitis with hx of gallstones, CAD- s/p CABG, Parkinson's disease and HTN.   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  Pt was seen by the inpatient diabetes team.  Pt discharged to home on 7/7/2023 and seen in the ED this Wednesday 7/26/2023 with midline abdominal superficial incision infection draining purulent fluid as well as cellulitis surrounding his incision. Incision was opened further and packed. He was also started on Cefdinir.  He feels this infection is improving. Denies fever or rigors.  For his diabetes, he is taking Basaglar 19 units subcutaneous at hs and Novolog 5 units with meals with correction ( 1 unit/50 for BG > 140 ).  Most recent A1C was 8.5 % in May 2023.  His A1C was 8.4 % in 2/2023.  Pt provided me with blood sugar readings today which I scanned in his note below.  He states his FBS was 353 today.  This was fasting and he did not eat or drink anything since last pm at 7 pm.  Blood sugars yesterday- FBS was 235, prelunch bs 311 and predinner bs 303.  No blood sugars < 100.  On ROS today, infected incision as above on antibiotic and local would care.  Denies fever or rigors.  Weight stable per patient. Eating normal diet- tries to not eat too much fat or sweets.  Still has abd pain, but less since surgery.  Denies diarrhea.  Denies blurred vision, n/v,SOB at rest, cough, chest pain, blood in stool, melena or sx of neuropathy. Denies foot ulcers.    Diabetes Care  Retinopathy: none per patient. Referred to Oph here for annual eye exam.  Nephropathy:none; urine microalbuminuria negative in Jan 2023. Pt taking Cozaar.  Neuropathy: none per patient. He states he takes Gabapentin for abd pain.  Foot Exam: no  "exam.  Taking aspirin: yes.  Lipids:LDL 33 in Oct 2022. Pt taking Lipitor.  Insulin: basal and meal time insulin with correction.   DM meds: none.  Testing: glucose meter. Referred to CDE for diabetes ed and to discuss use of DexcomG7 sensor.             TODAY ( 7/28/2023 ).    ROS  See under HPI.    Allergies  Allergies   Allergen Reactions    Ciprofloxacin Other (See Comments), Hives, Itching, Rash and Unknown     Other reaction(s): Other (see comments)  Oral swelling per [facility]        Dilaudid [Hydromorphone] Rash    Morphine Rash     rash    Penicillins Rash     aka ancef/ rash      Citalopram Unknown    Oxycodone Rash     \"HORRIBLE, SEVERE RASH MAYBE CAUSED BY OXY\"       Medications  Current Outpatient Medications   Medication Sig Dispense Refill    acetaminophen (TYLENOL) 325 MG tablet Take 2 tablets (650 mg) by mouth every 4 hours as needed for other (For optimal non-opioid multimodal pain management to improve pain control.)      aspirin (ASA) 81 MG EC tablet Take 81 mg by mouth daily      atorvastatin (LIPITOR) 40 MG tablet Take 1 tablet by mouth daily      BD PEN NEEDLE LINDY 2ND GEN 32G X 4 MM miscellaneous USE TO INJECT INSULIN 4 TIMES A DAY      carbidopa-levodopa (SINEMET CR)  MG CR tablet Take 1 tablet by mouth At Bedtime      carbidopa-levodopa (SINEMET)  MG tablet Take 2 tablets by mouth 3 times daily      cefdinir (OMNICEF) 300 MG capsule Take 1 capsule (300 mg) by mouth 2 times daily 12 capsule 0    diphenhydrAMINE (BENADRYL) 25 MG tablet Take 1 tablet (25 mg) by mouth every 6 hours as needed for itching or allergies 6 tablet 0    gabapentin (NEURONTIN) 300 MG capsule Take 300 mg by mouth every morning , 300 mg by mouth at midday, and 600 mg by mouth at bedtime      insulin aspart (NOVOLOG PEN) 100 UNIT/ML pen Inject 1-7 Units Subcutaneous 3 times daily (before meals) 15 mL     insulin glargine (BASAGLAR KWIKPEN) 100 UNIT/ML pen Inject 13 Units Subcutaneous At Bedtime      " lipase-protease-amylase (ZENPEP) 36913-743494-893771 units CPEP Take 1 capsule by mouth 3 times daily (with meals) 90 capsule 11    losartan (COZAAR) 50 MG tablet Take 50 mg by mouth At Bedtime      magnesium oxide (MAG-OX) 400 MG tablet Take 400 mg by mouth every other day      methocarbamol (ROBAXIN) 500 MG tablet Take 1 tablet (500 mg) by mouth every 6 hours as needed for muscle spasms 45 tablet 0    Multiple Vitamin (MULTI-VITAMINS) TABS Take 1 tablet by mouth daily      omeprazole (PRILOSEC) 20 MG DR capsule Take 20 mg by mouth daily      oxyCODONE (ROXICODONE) 5 MG tablet Take 1-2 tablets (5-10 mg) by mouth every 4 hours as needed for breakthrough pain 12 tablet 0    polyethylene glycol (MIRALAX) 17 GM/Dose powder Take 17 g by mouth daily 510 g     insulin aspart (NOVOLOG PEN) 100 UNIT/ML pen Inject 3 Units Subcutaneous 3 times daily (with meals) Give 2 units per every 50 above 140. (Patient not taking: Reported on 7/13/2023) 15 mL     insulin aspart (NOVOLOG PEN) 100 UNIT/ML pen Inject 1-5 Units Subcutaneous At Bedtime (Patient not taking: Reported on 7/13/2023) 15 mL        Family History  family history includes Lung Cancer in his brother; Parkinsonism in his mother.    Social History   reports that he has quit smoking. His smoking use included cigarettes. He has never used smokeless tobacco. He reports that he does not currently use alcohol. He reports that he does not currently use drugs.     Past Medical History  Past Medical History:   Diagnosis Date    CAD (coronary artery disease)     CABG    Diabetes mellitus, type 2 (H) 2013    Gastroesophageal reflux disease     Hypercholesteremia     Hypertension     Mixed conductive and sensorineural hearing loss of both ears     Mixed hearing loss     Pancreatic duct stricture     Pancreatitis     Parkinson disease (H)     Second degree AV block        Past Surgical History:   Procedure Laterality Date    CA ANESTH CABG W/PUMP      CHOLECYSTECTOMY  2022     COLONOSCOPY N/A 01/12/2023    Procedure: colonoscopy with fluroscopy;  Surgeon: Ayden Fraire MD;  Location:  OR    ENDOSCOPIC RETROGRADE CHOLANGIOPANCREATOGRAM N/A 05/18/2023    Procedure: ENDOSCOPIC RETROGRADE CHOLANGIOPANCREATOGRAPHY, WITH  CYSTDUODENOSTOMY STENTS X2 REMOVAL;  Surgeon: Cedric Saldana MD;  Location: [facility] OR    ENT SURGERY      LAPAROSCOPY DIAGNOSTIC (GENERAL) N/A 02/20/2023    Procedure: LAPAROSCOPY, DIAGNOSTIC; RETRIEVAL OF MIGRATED STENT;  Surgeon: Joseluis Lima MD;  Location: [facility] OR    ORTHOPEDIC SURGERY      PANCREATECTOMY PEUSTOW N/A 06/30/2023    Procedure: Open distal pancreactectomy with splenectomy, lysis of adhesions, resection of proximal illeum;  Surgeon: Ashvin Haider MD;  Location: [facility] OR    MS CABG, ARTERIAL, TWO  2003       Physical Exam    No exam today.    RESULTS  Creatinine   Date Value Ref Range Status   07/26/2023 0.74 0.67 - 1.17 mg/dL Final     GFR Estimate   Date Value Ref Range Status   07/26/2023 >90 >60 mL/min/1.73m2 Final     GFR, ESTIMATED POCT   Date Value Ref Range Status   01/16/2023 >60 >60 mL/min/1.73m2 Final     Hemoglobin A1C   Date Value Ref Range Status   06/30/2023 9.6 (H) <5.7 % Final     Comment:     Normal <5.7%   Prediabetes 5.7-6.4%    Diabetes 6.5% or higher     Note: Adopted from ADA consensus guidelines.     Potassium   Date Value Ref Range Status   07/26/2023 4.3 3.4 - 5.3 mmol/L Final     Comment:     This is a corrected result. Previous result was 4.2 mmol/L on 7/26/2023 at  8:04 AM CDT     Potassium POCT   Date Value Ref Range Status   06/30/2023 4.7 3.5 - 5.0 mmol/L Final     ALT   Date Value Ref Range Status   07/25/2023 <5 0 - 70 U/L Final     Comment:     Reference intervals for this test were updated on 6/12/2023 to more accurately reflect our healthy population. There may be differences in the flagging of prior results with similar values performed with this method. Interpretation of those prior results can be made  in the context of the updated reference intervals.       AST   Date Value Ref Range Status   07/25/2023 21 0 - 45 U/L Final     Comment:     Reference intervals for this test were updated on 6/12/2023 to more accurately reflect our healthy population. There may be differences in the flagging of prior results with similar values performed with this method. Interpretation of those prior results can be made in the context of the updated reference intervals.       Triglycerides (External)   Date Value Ref Range Status   06/02/2021 74 <150 mg/dL Final         ASSESSMENT/PLAN:      TYPE 2 DIABETES MELLITUS: Hx of type 2 diabetes mellitus dx in 2013 with recent distal pancreatectomy on 6/3/2023 for chronic pancreatitis. Pt's blood sugar values are high and he denies missing insulin doses and eats healthy. He is currently being treated for abdominal incisional infection.  Increase Basaglar 22 units subcutaneous at hs and increase dose by 2 units every 3rd day until FBS is 130 or less with max dose of Basaglar 28 units daily.  Increase Novolog 6 units with meals with correction ( 1 unit/50 for BG > 140 ). May consider adding Metformin in the near future.  Pt instructed to check his FBS , prelunch and predinner blood sugar daily and send me blood sugar data for review next week. I also referred pt to CDE to discuss use of a DexcomG7 sensor and for additional diabetes education.  Pt denies hx of diabetic retinopathy - seen by Oph in 9/2022. Referred to Oph here for annual eye exam. His urine microalbuminuria in Jan 2023. Pt taking Cozaar.  Most recent creat 0.74 with GFR > 90 mL/min 7/26/2023.  Pt denies sx of neuropathy or foot ulcers. He states he is taking Gabapentin for abdominal pain.    HTN: BP good at home. Continue current RX.  LIPIDS: LDL 33 in Oct 2022. Pt taking Lipitor.  4.   PARKINSON'S DISEASE: Not addressed.  5.   FOLLOW UP: With me in 2 weeks. Pt to send me blood sugar data for review next week.  CDE referral  placed today- need additional diabetes ed and discuss use of DexcomG7 sensor.  Oph referral placed today.    Time spent reviewing chart, labs and glucose data today = 8 minutes.  Time for video visit today = 31 minutes.  Time for documentation today = 15 minutes.    Total time for visit today = 54 minutes.    Valeri Gomez PA-C

## 2023-07-28 NOTE — PROGRESS NOTES
Time of start: 10:13 am  Time of end: 10:44 am   Total duration of video visit: 31 minutes.  Providers location: offsite.  Patients location: [place].    HPI  Arnaldo Henderson is a 68 year old male with type 2 diabetes mellitus. Recent distal pancreatectomy for chronic pancreatitis. Pt seen to day for diabetes care following recent hospitalization.  His wife Veronika is present during our video visit today.  Pt was dx with type 2 DM in 2013. Denies retinopathy, nephropathy and neuropathy.  Pt has hx of chronic pancreatitis with hx of gallstones, CAD- s/p CABG, Parkinson's disease and HTN.   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  Pt was seen by the inpatient diabetes team.  Pt discharged to home on 7/7/2023 and seen in the ED this Wednesday 7/26/2023 with midline abdominal superficial incision infection draining purulent fluid as well as cellulitis surrounding his incision. Incision was opened further and packed. He was also started on Cefdinir.  He feels this infection is improving. Denies fever or rigors.  For his diabetes, he is taking Basaglar 19 units subcutaneous at hs and Novolog 5 units with meals with correction ( 1 unit/50 for BG > 140 ).  Most recent A1C was 8.5 % in May 2023.  His A1C was 8.4 % in 2/2023.  Pt provided me with blood sugar readings today which I scanned in his note below.  He states his FBS was 353 today.  This was fasting and he did not eat or drink anything since last pm at 7 pm.  Blood sugars yesterday- FBS was 235, prelunch bs 311 and predinner bs 303.  No blood sugars < 100.  On ROS today, infected incision as above on antibiotic and local would care.  Denies fever or rigors.  Weight stable per patient. Eating normal diet- tries to not eat too much fat or sweets.  Still has abd pain, but less since surgery.  Denies diarrhea.  Denies blurred vision, n/v,SOB at rest, cough, chest pain, blood in stool, melena or sx of neuropathy. Denies foot ulcers.    Diabetes Care  Retinopathy:  "none per patient. Referred to Oph here for annual eye exam.  Nephropathy:none; urine microalbuminuria negative in Jan 2023. Pt taking Cozaar.  Neuropathy: none per patient. He states he takes Gabapentin for abd pain.  Foot Exam: no exam.  Taking aspirin: yes.  Lipids:LDL 33 in Oct 2022. Pt taking Lipitor.  Insulin: basal and meal time insulin with correction.   DM meds: none.  Testing: glucose meter. Referred to CDE for diabetes ed and to discuss use of DexcomG7 sensor.             TODAY ( 7/28/2023 ).    ROS  See under HPI.    Allergies  Allergies   Allergen Reactions    Ciprofloxacin Other (See Comments), Hives, Itching, Rash and Unknown     Other reaction(s): Other (see comments)  Oral swelling per [facility]        Dilaudid [Hydromorphone] Rash    Morphine Rash     rash    Penicillins Rash     aka ancef/ rash      Citalopram Unknown    Oxycodone Rash     \"HORRIBLE, SEVERE RASH MAYBE CAUSED BY OXY\"       Medications  Current Outpatient Medications   Medication Sig Dispense Refill    acetaminophen (TYLENOL) 325 MG tablet Take 2 tablets (650 mg) by mouth every 4 hours as needed for other (For optimal non-opioid multimodal pain management to improve pain control.)      aspirin (ASA) 81 MG EC tablet Take 81 mg by mouth daily      atorvastatin (LIPITOR) 40 MG tablet Take 1 tablet by mouth daily      BD PEN NEEDLE LINDY 2ND GEN 32G X 4 MM miscellaneous USE TO INJECT INSULIN 4 TIMES A DAY      carbidopa-levodopa (SINEMET CR)  MG CR tablet Take 1 tablet by mouth At Bedtime      carbidopa-levodopa (SINEMET)  MG tablet Take 2 tablets by mouth 3 times daily      cefdinir (OMNICEF) 300 MG capsule Take 1 capsule (300 mg) by mouth 2 times daily 12 capsule 0    diphenhydrAMINE (BENADRYL) 25 MG tablet Take 1 tablet (25 mg) by mouth every 6 hours as needed for itching or allergies 6 tablet 0    gabapentin (NEURONTIN) 300 MG capsule Take 300 mg by mouth every morning , 300 mg by mouth at midday, and 600 mg by " mouth at bedtime      insulin aspart (NOVOLOG PEN) 100 UNIT/ML pen Inject 1-7 Units Subcutaneous 3 times daily (before meals) 15 mL     insulin glargine (BASAGLAR KWIKPEN) 100 UNIT/ML pen Inject 13 Units Subcutaneous At Bedtime      lipase-protease-amylase (ZENPEP) 29348-327524-960476 units CPEP Take 1 capsule by mouth 3 times daily (with meals) 90 capsule 11    losartan (COZAAR) 50 MG tablet Take 50 mg by mouth At Bedtime      magnesium oxide (MAG-OX) 400 MG tablet Take 400 mg by mouth every other day      methocarbamol (ROBAXIN) 500 MG tablet Take 1 tablet (500 mg) by mouth every 6 hours as needed for muscle spasms 45 tablet 0    Multiple Vitamin (MULTI-VITAMINS) TABS Take 1 tablet by mouth daily      omeprazole (PRILOSEC) 20 MG DR capsule Take 20 mg by mouth daily      oxyCODONE (ROXICODONE) 5 MG tablet Take 1-2 tablets (5-10 mg) by mouth every 4 hours as needed for breakthrough pain 12 tablet 0    polyethylene glycol (MIRALAX) 17 GM/Dose powder Take 17 g by mouth daily 510 g     insulin aspart (NOVOLOG PEN) 100 UNIT/ML pen Inject 3 Units Subcutaneous 3 times daily (with meals) Give 2 units per every 50 above 140. (Patient not taking: Reported on 7/13/2023) 15 mL     insulin aspart (NOVOLOG PEN) 100 UNIT/ML pen Inject 1-5 Units Subcutaneous At Bedtime (Patient not taking: Reported on 7/13/2023) 15 mL        Family History  family history includes Lung Cancer in his brother; Parkinsonism in his mother.    Social History   reports that he has quit smoking. His smoking use included cigarettes. He has never used smokeless tobacco. He reports that he does not currently use alcohol. He reports that he does not currently use drugs.     Past Medical History  Past Medical History:   Diagnosis Date    CAD (coronary artery disease)     CABG    Diabetes mellitus, type 2 (H) 2013    Gastroesophageal reflux disease     Hypercholesteremia     Hypertension     Mixed conductive and sensorineural hearing loss of both ears      Mixed hearing loss     Pancreatic duct stricture     Pancreatitis     Parkinson disease (H)     Second degree AV block        Past Surgical History:   Procedure Laterality Date    CA ANESTH CABG W/PUMP      CHOLECYSTECTOMY  2022    COLONOSCOPY N/A 01/12/2023    Procedure: colonoscopy with fluroscopy;  Surgeon: Ayden Fraire MD;  Location:  OR    ENDOSCOPIC RETROGRADE CHOLANGIOPANCREATOGRAM N/A 05/18/2023    Procedure: ENDOSCOPIC RETROGRADE CHOLANGIOPANCREATOGRAPHY, WITH  CYSTDUODENOSTOMY STENTS X2 REMOVAL;  Surgeon: Cedric Saldana MD;  Location: [facility] OR    ENT SURGERY      LAPAROSCOPY DIAGNOSTIC (GENERAL) N/A 02/20/2023    Procedure: LAPAROSCOPY, DIAGNOSTIC; RETRIEVAL OF MIGRATED STENT;  Surgeon: Joseluis Lima MD;  Location: [facility] OR    ORTHOPEDIC SURGERY      PANCREATECTOMY PEUSTOW N/A 06/30/2023    Procedure: Open distal pancreactectomy with splenectomy, lysis of adhesions, resection of proximal illeum;  Surgeon: Ashvin Haider MD;  Location: [facility] OR    GA CABG, ARTERIAL, TWO  2003       Physical Exam    No exam today.    RESULTS  Creatinine   Date Value Ref Range Status   07/26/2023 0.74 0.67 - 1.17 mg/dL Final     GFR Estimate   Date Value Ref Range Status   07/26/2023 >90 >60 mL/min/1.73m2 Final     GFR, ESTIMATED POCT   Date Value Ref Range Status   01/16/2023 >60 >60 mL/min/1.73m2 Final     Hemoglobin A1C   Date Value Ref Range Status   06/30/2023 9.6 (H) <5.7 % Final     Comment:     Normal <5.7%   Prediabetes 5.7-6.4%    Diabetes 6.5% or higher     Note: Adopted from ADA consensus guidelines.     Potassium   Date Value Ref Range Status   07/26/2023 4.3 3.4 - 5.3 mmol/L Final     Comment:     This is a corrected result. Previous result was 4.2 mmol/L on 7/26/2023 at  8:04 AM CDT     Potassium POCT   Date Value Ref Range Status   06/30/2023 4.7 3.5 - 5.0 mmol/L Final     ALT   Date Value Ref Range Status   07/25/2023 <5 0 - 70 U/L Final     Comment:     Reference intervals for this test  were updated on 6/12/2023 to more accurately reflect our healthy population. There may be differences in the flagging of prior results with similar values performed with this method. Interpretation of those prior results can be made in the context of the updated reference intervals.       AST   Date Value Ref Range Status   07/25/2023 21 0 - 45 U/L Final     Comment:     Reference intervals for this test were updated on 6/12/2023 to more accurately reflect our healthy population. There may be differences in the flagging of prior results with similar values performed with this method. Interpretation of those prior results can be made in the context of the updated reference intervals.       Triglycerides (External)   Date Value Ref Range Status   06/02/2021 74 <150 mg/dL Final         ASSESSMENT/PLAN:      TYPE 2 DIABETES MELLITUS: Hx of type 2 diabetes mellitus dx in 2013 with recent distal pancreatectomy on 6/3/2023 for chronic pancreatitis. Pt's blood sugar values are high and he denies missing insulin doses and eats healthy. He is currently being treated for abdominal incisional infection.  Increase Basaglar 22 units subcutaneous at hs and increase dose by 2 units every 3rd day until FBS is 130 or less with max dose of Basaglar 28 units daily.  Increase Novolog 6 units with meals with correction ( 1 unit/50 for BG > 140 ). May consider adding Metformin in the near future.  Pt instructed to check his FBS , prelunch and predinner blood sugar daily and send me blood sugar data for review next week. I also referred pt to CDE to discuss use of a DexcomG7 sensor and for additional diabetes education.  Pt denies hx of diabetic retinopathy - seen by Oph in 9/2022. Referred to Oph here for annual eye exam. His urine microalbuminuria in Jan 2023. Pt taking Cozaar.  Most recent creat 0.74 with GFR > 90 mL/min 7/26/2023.  Pt denies sx of neuropathy or foot ulcers. He states he is taking Gabapentin for abdominal pain.    HTN:  BP good at home. Continue current RX.  LIPIDS: LDL 33 in Oct 2022. Pt taking Lipitor.  4.   PARKINSON'S DISEASE: Not addressed.  5.   FOLLOW UP: With me in 2 weeks. Pt to send me blood sugar data for review next week.  CDE referral placed today- need additional diabetes ed and discuss use of DexcomG7 sensor.  Oph referral placed today.    Time spent reviewing chart, labs and glucose data today = 8 minutes.  Time for video visit today = 31 minutes.  Time for documentation today = 15 minutes.    Total time for visit today = 54 minutes.    Valeri Gomez PA-C

## 2023-07-28 NOTE — NURSING NOTE
Is the patient currently in the state of [place]? YES    Visit mode:VIDEO    If the visit is dropped, the patient can be reconnected by: TELEPHONE VISIT: Phone number: [phone]    Will anyone else be joining the visit? NO      How would you like to obtain your AVS? MyChart    Are changes needed to the allergy or medication list? NO    Reason for visit: Video Visit (Follow-up /)

## 2023-07-28 NOTE — PROGRESS NOTES
Virtual Visit Details    Type of service:  Video Visit   Video Start Time:   Video End Time:    Originating Location (pt. Location):     Distant Location (provider location):    Platform used for Video Visit:   Answers submitted by the patient for this visit:  Symptoms you have experienced in the last 30 days (Submitted on 7/28/2023)  General Symptoms: Yes  Skin Symptoms: No  HENT Symptoms: No  EYE SYMPTOMS: No  HEART SYMPTOMS: Yes  LUNG SYMPTOMS: No  INTESTINAL SYMPTOMS: Yes  URINARY SYMPTOMS: No  REPRODUCTIVE SYMPTOMS: No  SKELETAL SYMPTOMS: Yes  BLOOD SYMPTOMS: Yes  NERVOUS SYSTEM SYMPTOMS: Yes  MENTAL HEALTH SYMPTOMS: Yes  Please answer the questions below to tell us what conditions you are experiencing: (Submitted on 7/28/2023)  Fever: No  Loss of appetite: No  Weight loss: No  Weight gain: No  Night sweats: No  Chills: Yes  Increased stress: Yes  Excessive hunger: No  Excessive thirst: No  Feeling hot or cold when others believe the temperature is normal: Yes  Loss of height: No  Post-operative complications: Yes  Surgical site pain: Yes  Hallucinations: No  Change in or Loss of Energy: No  Hyperactivity: No  Confusion: Yes  Please answer the questions below to tell us what condition you are experiencing: (Submitted on 7/28/2023)  Chest pain or pressure: No  Fast or irregular heartbeat: Yes  Pain in legs with walking: No  Trouble breathing while lying down: No  Fingers or toes appear blue: No  High blood pressure: Yes  Low blood pressure: Yes  Fainting: No  Murmurs: No  Varicose veins: No  Edema or swelling: Yes  Wake up at night with shortness of breath: No  Light-headedness: Yes  Exercise intolerance: Yes  Please answer the questions below to tell us what conditions you are experiencing: (Submitted on 7/28/2023)  Heart burn or indigestion: No  Nausea: No  Vomiting: No  Abdominal pain: No  Bloating: No  Constipation: No  Diarrhea: No  Blood in stool: No  Black stools: No  Rectal or Anal pain: No  Fecal  incontinence: No  Yellowing of skin or eyes: No  Vomit with blood: No  Change in stools: No  Please answer the questions below to tell us what condition you are experiencing: (Submitted on 7/28/2023)  Back pain: Yes  Muscle aches: No  Neck pain: No  Swollen joints: No  Joint pain: No  Bone pain: No  Muscle cramps: No  Muscle weakness: Yes  Joint stiffness: No  Bone fracture: No  Please answer the questions below to tell us what condition you are experiencing: (Submitted on 7/28/2023)  Edema or swelling: Yes  Anemia: No  Swollen glands: No  Easy bleeding or bruising: Yes  Please answer the questions below to tell us what condition you are experiencing: (Submitted on 7/28/2023)  Trouble with coordination: Yes  Dizziness or trouble with balance: Yes  Fainting or black-out spells: No  Memory loss: Yes  Headache: No  Seizures: No  Speech problems: No  Tingling: No  Tremor: Yes  Weakness: Yes  Difficulty walking: Yes  Paralysis: No  Numbness: No  Please answer the questions below to tell us what condition you are experiencing: (Submitted on 7/28/2023)  Nervous or Anxious: Yes  Depression: Yes  Trouble sleeping: Yes  Trouble thinking or concentrating: Yes  Mood changes: Yes  Panic attacks: Yes

## 2023-07-29 LAB — CGA SERPL-MCNC: 1370 NG/ML

## 2023-07-31 ENCOUNTER — OFFICE VISIT (OUTPATIENT)
Dept: SURGERY | Facility: CLINIC | Age: 68
End: 2023-07-31
Payer: MEDICARE

## 2023-07-31 VITALS
DIASTOLIC BLOOD PRESSURE: 69 MMHG | BODY MASS INDEX: 27.04 KG/M2 | HEIGHT: 68 IN | HEART RATE: 51 BPM | WEIGHT: 178.4 LBS | OXYGEN SATURATION: 100 % | SYSTOLIC BLOOD PRESSURE: 150 MMHG

## 2023-07-31 DIAGNOSIS — T81.49XA SUPERFICIAL POSTOPERATIVE WOUND INFECTION: Primary | ICD-10-CM

## 2023-07-31 DIAGNOSIS — D3A.8 NEUROENDOCRINE TUMOR (H): ICD-10-CM

## 2023-07-31 DIAGNOSIS — Z90.81 STATUS POST SPLENECTOMY: ICD-10-CM

## 2023-07-31 LAB — BACTERIA BLD CULT: NO GROWTH

## 2023-07-31 PROCEDURE — 99024 POSTOP FOLLOW-UP VISIT: CPT | Performed by: SURGERY

## 2023-07-31 ASSESSMENT — PAIN SCALES - GENERAL: PAINLEVEL: NO PAIN (0)

## 2023-07-31 NOTE — PROGRESS NOTES
Mr. Howell is here 1 month status post distal pancreatectomy and splenectomy for complicated acute pancreatitis progressing to chronic pancreatitis.  Overall he has been doing reasonably well although he had a recent short hospitalization for wound infection.  His wound was opened and drained and he denies any recent fevers or chills he has been packing the wound on a daily basis.    He was recently seen by Dr. Reji Lyn for evaluation of his small bowel tumor.  I appreciate Dr. Alonzo's thoughtful note and care of this patient.    On physical exam vital signs are stable he is afebrile.  HEENT is without scleral icterus.  Neck is supple.  Abdomen is soft and nontender.  There is a 1 inch open wound in the midportion of his incision that is granulating nicely.  There is no surrounding cellulitis or erythema.    Assessment/plan.    1. superficial wound infection continue packing (Jorje saw patient today and demonstrated packing technique.)    2.Neuroendocrine tumor involving the small bowel.  Dr. Lyn's input appreciated.  We will continue to follow.    3. Immunization status status post splenectomy.  I went over his vaccinations with his wife and asked her to see his primary physician for update of appropriate vaccinations.      4.  follow-up in 1 month.  30-minute visit including coordination of care.

## 2023-07-31 NOTE — NURSING NOTE
"Chief Complaint   Patient presents with    RECHECK     Incision check       Vitals:    07/31/23 0911   BP: (!) 150/69   BP Location: Left arm   Patient Position: Sitting   Cuff Size: Adult Regular   Pulse: 51   SpO2: 100%   Weight: 80.9 kg (178 lb 6.4 oz)   Height: 1.727 m (5' 8\")       Body mass index is 27.13 kg/m .                          Mike Macdonald, EMT    "

## 2023-07-31 NOTE — LETTER
7/31/2023       RE: Arnaldo Henderson  [address]  [facility] [address]       Dear Colleague,    Thank you for referring your patient, Arnaldo Henderson, to the [facility] [place] at [facility]. Please see a copy of my visit note below.    Mr. Howell is here 1 month status post distal pancreatectomy and splenectomy for complicated acute pancreatitis progressing to chronic pancreatitis.  Overall he has been doing reasonably well although he had a recent short hospitalization for wound infection.  His wound was opened and drained and he denies any recent fevers or chills he has been packing the wound on a daily basis.    He was recently seen by Dr. Reji Lyn for evaluation of his small bowel tumor.  I appreciate Dr. Alonzo's thoughtful note and care of this patient.    On physical exam vital signs are stable he is afebrile.  HEENT is without scleral icterus.  Neck is supple.  Abdomen is soft and nontender.  There is a 1 inch open wound in the midportion of his incision that is granulating nicely.  There is no surrounding cellulitis or erythema.    Assessment/plan.    1. superficial wound infection continue packing (Jorje saw patient today and demonstrated packing technique.)    2.Neuroendocrine tumor involving the small bowel.  Dr. Lyn's input appreciated.  We will continue to follow.    3. Immunization status status post splenectomy.  I went over his vaccinations with his wife and asked her to see his primary physician for update of appropriate vaccinations.      4.  follow-up in 1 month.  30-minute visit including coordination of care.      Again, thank you for allowing me to participate in the care of your patient.      Sincerely,    Ashvin Haider MD

## 2023-08-02 ENCOUNTER — LAB (OUTPATIENT)
Dept: LAB | Facility: CLINIC | Age: 68
End: 2023-08-02
Payer: MEDICARE

## 2023-08-02 DIAGNOSIS — C7A.8 NEUROENDOCRINE CARCINOMA OF SMALL BOWEL (H): ICD-10-CM

## 2023-08-02 DIAGNOSIS — C7B.8 METASTATIC MALIGNANT NEUROENDOCRINE TUMOR TO LYMPH NODE (H): ICD-10-CM

## 2023-08-02 DIAGNOSIS — C7B.8 METASTATIC MALIGNANT NEUROENDOCRINE TUMOR TO LYMPH NODE (H): Primary | ICD-10-CM

## 2023-08-02 LAB
ALBUMIN SERPL BCG-MCNC: 4 G/DL (ref 3.5–5.2)
ALP SERPL-CCNC: 154 U/L (ref 40–129)
ALT SERPL W P-5'-P-CCNC: <5 U/L (ref 0–70)
ANION GAP SERPL CALCULATED.3IONS-SCNC: 10 MMOL/L (ref 7–15)
AST SERPL W P-5'-P-CCNC: 40 U/L (ref 0–45)
BASOPHILS # BLD AUTO: 0.2 10E3/UL (ref 0–0.2)
BASOPHILS NFR BLD AUTO: 2 %
BILIRUB SERPL-MCNC: 0.4 MG/DL
BUN SERPL-MCNC: 14.7 MG/DL (ref 8–23)
CALCIUM SERPL-MCNC: 9.3 MG/DL (ref 8.8–10.2)
CHLORIDE SERPL-SCNC: 102 MMOL/L (ref 98–107)
CREAT SERPL-MCNC: 0.89 MG/DL (ref 0.67–1.17)
DEPRECATED HCO3 PLAS-SCNC: 25 MMOL/L (ref 22–29)
EOSINOPHIL # BLD AUTO: 1.2 10E3/UL (ref 0–0.7)
EOSINOPHIL NFR BLD AUTO: 16 %
ERYTHROCYTE [DISTWIDTH] IN BLOOD BY AUTOMATED COUNT: 13.5 % (ref 10–15)
GFR SERPL CREATININE-BSD FRML MDRD: >90 ML/MIN/1.73M2
GLUCOSE SERPL-MCNC: 209 MG/DL (ref 70–99)
HCT VFR BLD AUTO: 40.1 % (ref 40–53)
HGB BLD-MCNC: 12.9 G/DL (ref 13.3–17.7)
HOLD SPECIMEN: NORMAL
IMM GRANULOCYTES # BLD: 0 10E3/UL
IMM GRANULOCYTES NFR BLD: 0 %
LYMPHOCYTES # BLD AUTO: 2.3 10E3/UL (ref 0.8–5.3)
LYMPHOCYTES NFR BLD AUTO: 29 %
MCH RBC QN AUTO: 32.4 PG (ref 26.5–33)
MCHC RBC AUTO-ENTMCNC: 32.2 G/DL (ref 31.5–36.5)
MCV RBC AUTO: 101 FL (ref 78–100)
MONOCYTES # BLD AUTO: 1 10E3/UL (ref 0–1.3)
MONOCYTES NFR BLD AUTO: 14 %
NEUTROPHILS # BLD AUTO: 3 10E3/UL (ref 1.6–8.3)
NEUTROPHILS NFR BLD AUTO: 39 %
NRBC # BLD AUTO: 0 10E3/UL
NRBC BLD AUTO-RTO: 0 /100
PLATELET # BLD AUTO: 169 10E3/UL (ref 150–450)
POTASSIUM SERPL-SCNC: 4.9 MMOL/L (ref 3.4–5.3)
PROT SERPL-MCNC: 7.3 G/DL (ref 6.4–8.3)
RBC # BLD AUTO: 3.98 10E6/UL (ref 4.4–5.9)
SODIUM SERPL-SCNC: 137 MMOL/L (ref 136–145)
WBC # BLD AUTO: 7.7 10E3/UL (ref 4–11)

## 2023-08-02 PROCEDURE — 86316 IMMUNOASSAY TUMOR OTHER: CPT | Mod: 90 | Performed by: PATHOLOGY

## 2023-08-02 PROCEDURE — 36415 COLL VENOUS BLD VENIPUNCTURE: CPT | Performed by: PATHOLOGY

## 2023-08-02 PROCEDURE — 99000 SPECIMEN HANDLING OFFICE-LAB: CPT | Performed by: PATHOLOGY

## 2023-08-02 PROCEDURE — 85025 COMPLETE CBC W/AUTO DIFF WBC: CPT | Performed by: PATHOLOGY

## 2023-08-02 PROCEDURE — 80053 COMPREHEN METABOLIC PANEL: CPT | Performed by: PATHOLOGY

## 2023-08-03 ENCOUNTER — TELEPHONE (OUTPATIENT)
Dept: EDUCATION SERVICES | Facility: CLINIC | Age: 68
End: 2023-08-03
Payer: MEDICARE

## 2023-08-03 ENCOUNTER — CARE COORDINATION (OUTPATIENT)
Dept: EDUCATION SERVICES | Facility: CLINIC | Age: 68
End: 2023-08-03
Payer: MEDICARE

## 2023-08-03 DIAGNOSIS — E11.9 TYPE 2 DIABETES MELLITUS WITHOUT COMPLICATION (H): Primary | ICD-10-CM

## 2023-08-03 RX ORDER — ACYCLOVIR 400 MG/1
TABLET ORAL
Qty: 1 EACH | Refills: 0 | Status: SHIPPED | OUTPATIENT
Start: 2023-08-03

## 2023-08-03 RX ORDER — ACYCLOVIR 400 MG/1
TABLET ORAL
Qty: 3 EACH | Refills: 5 | Status: SHIPPED | OUTPATIENT
Start: 2023-08-03 | End: 2024-01-04

## 2023-08-03 RX ORDER — ACYCLOVIR 400 MG/1
TABLET ORAL
Qty: 3 EACH | Refills: 5 | Status: CANCELLED | OUTPATIENT
Start: 2023-08-03

## 2023-08-03 RX ORDER — ACYCLOVIR 400 MG/1
TABLET ORAL
Qty: 1 EACH | Refills: 0 | Status: CANCELLED | OUTPATIENT
Start: 2023-08-03

## 2023-08-03 NOTE — TELEPHONE ENCOUNTER
Arnaldo's wife called.    They have some concerns about coverage of a CGM.   Explained that the Kendra 3 is not covered by Medicare as of yet.   Dexcom G7 will be covered as long as the  is also ordered with it.   Explained to her that we will send the order to [facility] as they will be able to process it through patient's Part B.   They will contact them prior to shipping to verify the co-pay, etc.    She was OK with this plan.   Order sent.     SAMANTHA LoveN, RN, [facility]  Certified Diabetes Care and   [facility] Endocrinology and Diabetes   [facility]  Clinic [address]  Phone [phone]

## 2023-08-03 NOTE — TELEPHONE ENCOUNTER
[facility] Call Center    Phone Message    May a detailed message be left on voicemail: yes     Reason for Call: Other: Per wife would like to speak with Romana Briggs directly since wife has a few concerning questions. Please and thank you!     Action Taken: Message routed to:  [facility] ([facility]): ENDO    Travel Screening: Not Applicable

## 2023-08-05 LAB — CGA SERPL-MCNC: 443 NG/ML

## 2023-08-07 ENCOUNTER — TELEPHONE (OUTPATIENT)
Dept: EDUCATION SERVICES | Facility: CLINIC | Age: 68
End: 2023-08-07
Payer: MEDICARE

## 2023-08-07 DIAGNOSIS — Z79.4 TYPE 2 DIABETES MELLITUS WITH HYPERGLYCEMIA, WITH LONG-TERM CURRENT USE OF INSULIN (H): ICD-10-CM

## 2023-08-07 DIAGNOSIS — E11.65 TYPE 2 DIABETES MELLITUS WITH HYPERGLYCEMIA, WITH LONG-TERM CURRENT USE OF INSULIN (H): ICD-10-CM

## 2023-08-07 RX ORDER — INSULIN GLARGINE 100 [IU]/ML
INJECTION, SOLUTION SUBCUTANEOUS
Qty: 15 ML | Refills: 3 | Status: SHIPPED | OUTPATIENT
Start: 2023-08-07 | End: 2023-11-28

## 2023-08-07 NOTE — TELEPHONE ENCOUNTER
Wife has appt on 8/16 in building at 10:45am.    Will forward to Idalia Funez and Romana Briggs to see if Idalia could possibly start G7 next Wednesday while patient is in building.    Informed wife Idalia will return call to marbella or Romana Briggs next week when she returns.    Berenice Arroyo, RN, Diabetes Educator  Diabetes Education Department  [facility], [facility] and [place]  [phone]

## 2023-08-07 NOTE — TELEPHONE ENCOUNTER
[facility] Call Center    Phone Message    May a detailed message be left on voicemail: yes     Reason for Call: Other: patients wife calling stating they are getting patients dexcom G7 delivered on 8/11/2023 but does not have an appointment until  9/7/2023. Patients wife is requesting an earlier appointment if possible to go over the G7 dexcom. Please call thank you.     Action Taken: Message routed to:  [facility] ([facility]): diabetic ed    Travel Screening: Not Applicable

## 2023-08-16 ENCOUNTER — ALLIED HEALTH/NURSE VISIT (OUTPATIENT)
Dept: EDUCATION SERVICES | Facility: CLINIC | Age: 68
End: 2023-08-16
Payer: MEDICARE

## 2023-08-16 DIAGNOSIS — E11.9 TYPE 2 DIABETES MELLITUS WITHOUT COMPLICATION (H): Primary | ICD-10-CM

## 2023-08-16 PROCEDURE — 95249 CONT GLUC MNTR PT PROV EQP: CPT

## 2023-08-16 PROCEDURE — G0108 DIAB MANAGE TRN  PER INDIV: HCPCS

## 2023-08-16 NOTE — TELEPHONE ENCOUNTER
Returned Arnaldo's call to discuss how he was taking his mealtime insulin. During CGM start visit, pt reported only taking Novolog if blood glucose > 140 before a meal. If it is greater than 140, he would take 8 units + correctional insulin. He does not take Novolog before a meal if blood glucose is < 140.  We discussed that he is supposed to take 8 units of Novolog before meals even if blood glucose < 140. He should add the correctional scale plus 8 units if his BG is > 140.  He verbalized understanding. Pt has follow up appointments with Romana Briggs RN [facility] on September 7th and Hayley Alatorre PA-C on September 19th.     Idalia BELLO  Diabetes Education  [address]  Phone: [phone]  Email: [email or web address]

## 2023-08-16 NOTE — PROGRESS NOTES
Diabetes Self Management Training: Personal Continuous Glucose Monitor Start    Arnaldo Henderson presents today for initiation of personal continuous glucose monitoring with patient-owned device related to Type 2 diabetes.    He is accompanied by self    Patient's diabetes management related comments/concerns: here to start Dexcom G7 system      Diabetes Management  Diabetes medications yes:     Diabetes Medication(s)       Insulin       insulin aspart (NOVOLOG PEN) 100 UNIT/ML pen    Inject 8 units with meals with correction insulin ( 1 unit/50 for BG > 140 ). Pt uses approx 40 units daily.     insulin glargine (BASAGLAR KWIKPEN) 100 UNIT/ML pen    Inject 26 units subcutaneous at hs, increase by 2 units every 3 rd day until FBS < 130. Max dose 28 units daily.            Patient glucose self monitoring as follows: three times daily. He checks blood sugars before meals  BG meter: [facility] meter  BG results: pre-lunch glucose- 174     Pt reports he takes insulin aspart only if BG > 140. If it is greater than 140, he will take 8 units + correctional insulin. He does not take insulin aspart if BG is < 140.       Patient's most recent   Lab Results   Component Value Date    A1C 9.6 06/30/2023    is not meeting goal of <7.0    Patient experiencing hypoglycemia? no      Labs:    Lab Results   Component Value Date     08/02/2023     07/26/2023       INTERVENTION:  Patient was able to demonstrate ability to insert and start sensor without difficulty.    Education provided on:  Dexcom CGM system - Dexcom sensor, transmitter, , Dexcom mobile applications, sensor glucose versus blood glucose, trends and graphs, alarms and alerts, Dexcom sensor insertion, calibrating, stopping sensor and changing sensor, Dexcom Clarity software    Discussed 15-15 Rule in case of hypoglycemia.     CGM initial settings:   Dexcom: Low Glucose Alarm: On (fixed at 55 mg/dL and cannot be changed)  High Glucose Alert: On at 250  mg/dL  Low Glucose Alert: On at 70 mg/dL    PLAN:  Ongoing Diabetes Education  Will review mealtime insulin dosing with patient    Follow-up:    With Romana Briggs on 9/7  With Hayley Alatorre PA-C on 9/19    Time Spent: 60 minutes  Encounter Type: Individual    Any diabetes medication dose changes were made via the CDE Protocol and Collaborative Practice Agreement with the patient's referring provider. A copy of this encounter was shared with the provider.

## 2023-08-16 NOTE — PATIENT INSTRUCTIONS
Abdi Mcintosh,    It was nice meeting you today. Here are some tips to help you with your Dexcom G7 system. The tech support number for Dexcom is [phone].    Parts of your  [id number]              There are two options to get your DexCom data.  An yobany on a compatible mobile phone or a .    Tips for using your DexCom sensor    There is a 30 minute (G7) to two hour (G6) warm-up period each time you insert a new Dexcom sensor.  During that time the part of the sensor that sits under your skin is getting wet with your interstitial fluid.  The sensor cannot provide glucose readings until that happens.    2.  Each time you insert a new sensor, you need to input another 4 digit code.  The code is on the paper backing you remove from sensor(G6) or on applicator (G7).    3.  After 30 minutes (G7) or two hours (G6) the glucose will stream to the  or an yobany on your cell phone or a  every 5 minutes.  This will provide 288 glucose readings for every days you wear the sensor.    4.  You will get a trend arrow in addition to glucose result every 5 minutes.       WHAT THE TREND ARROWS MEAN:     The trend arrows are helpful in predicting where your sensor glucose will be in the next 30 minutes or so.  The system doesn't have the ability to predict further ahead than about 30 minutes.       Straight across arrow:  Means your sensor glucose is not changing much (less than 1 mg/dL/mn)     Angled arrows up or down:  Means that if nothing else changes,  your sensor glucose may rise or fall about 50 mg/dL over the next 30 minutes.     One arrow straight UP or DOWN:  Means that if nothing else changes, your sensor glucose may rise or fall about 75 mg/dL over the next 30 minutes.    Two arrows straight UP or DOWN:  Means that if nothing else changes, your sensor glucose may rise or fall about 100 mg/dL over the next 30 minutes      5. If your glucose goes above or below the thresholds you set you will get an alarm.   "Acknowledge the alarm on your device to stop it.    6. Try to keep your device within 20 feet of you.  If you are using the yobany on your cell phone, try to keep the yobany open in the background of your phone.  If you close it out it will lose connection with the phone.  If this happens turn your phone off and then turn on to restart it.    7. The Dexcom sensor is reading the glucose in the interstitial fluid.  Your blood glucose meter is reading glucose in your blood stream.  Most of the time these two values are very close and that is why you can dose your insulin off your Dexcom value.  However, after eating, glucose gets to your blood stream first and then it takes another 20-30 minutes for it to get out to the sensor.  During this time your blood glucose may be significantly higher than your sensor reading.  The difference is called \"lag time\".    7. Treat low blood sugar as recommended by your healthcare team (15 grams for blood sugar 55-69 mg/dl, 30 grams for blood sugar <55 mg/dl) and do a test with a blood sugar meter to check your recovery.  Due to \"lag time\" it will take the sensor an extra 20-30 to recognize that you have recovered from a low.  If you eat until the sensor value comes back up you will rebound quite high.    8. You can calibrate a Dexcom sensor if you feel the accuracy is off.  Go to the calibrate screen and input a blood glucose value.  Be sure that you have not eaten in the last two hours before calibrating.  Do not calibrate multiple times a day this can throw the accuracy of your sensor off.  Be sure that you wash your hands prior to any blood glucose readings taken for calibration as skipping handwashing is a common reason blood glucose readings are not accurate.    9. Sensors are sensitive to movement and excessive movement can cause sensor errors.  Pay attention to your sites if you are having a lot of sensor errors.  Some sites on your body get more movement than others.    10. If you " have a sensor that does not last 10 days go to [email or web address].com and report the sensor error.  The company will replace the sensor free of charge.  They will ask you to provide the circumstances (when the sensor was inserted and when it failed) and the lot number off the box of sensors.  The tech support number for dexcom is [phone] or you can go this route [employer]-->support-->contact us-->request sensor or live chat.   You can report faulty sensors through the G6 or 7 mobile apps.  Home-->Settings--> Contact-->report an issue.  They will want the sensor start and end date and the lot number off the box of sensors.    11. There are many tips and products to improve adhesion of your DexCom sensor.  You can find the entire list at dexcom.com.  Type adhesion into the search bar.    12. Keep yourself hydrated.  The sensor is reading the glucose in the interstitial fluid.  You need to have adequate fluid to read.    13.  For G6 users: Your transmitter will need to be replaced every three months.  Contact your supplier to get a new on when your device gives you the message that your battery is low.  You have about 10 days from the first message to get a new one before the battery stops working.    14. There are multiple strategies to combat skin reactions to Dexcom tape.  Go to dexcom.com and type skin irritation into the search.    15. You will need two apps on your phone to stream data to our clinic portal.  The Dexcom G 6 yobany or G7 and Dexcom Clarity.  You can generate a sharing code to give to your healthcare team or our preferred way, to accept our invitation to share data.  To add other follower such as family members they would get the Dexcom Follow yobany.    16. Most of the time Dexcom is covered through your pharmacy benefit and you can get it at the same pharmacy you use for your other prescriptions. Medicare covers it as durable medical equipment and orders for it need to be sent to a company that can bill it  "that way.  This may mean that you need to use a mail-order company such as [facility] or [employer] or [employer].     17. Compression lows: The sensor reads the glucose in the interstitial fluid.  If you are laying on the sensor you can prevent the interstitial fluid from getting to the sensor.  This may cause your sensor to read it as low blood sugar when you are not actually low.  We call this a \"compression low\" and often if you change positions the sensor will adjust and start reading accurately again.  If there is any question about your reading do a fingerstick.    18.  The system will alert you when your sensor session is ending.  There will be a countdown of hours until it gets to change sensor now.  If you are ending your session early go into the main menu and and scroll to the bottom of the menu and hit stop sensor.  Peel the sensor off like a Band-Aid.  Baby oil or soap may help loosen the tape.    "

## 2023-08-24 ENCOUNTER — TELEPHONE (OUTPATIENT)
Dept: EDUCATION SERVICES | Facility: CLINIC | Age: 68
End: 2023-08-24
Payer: MEDICARE

## 2023-08-24 NOTE — TELEPHONE ENCOUNTER
Provider has requested that pt's appt on sept 7 be rescheduled for first available please assist with this for diabetic ed . Thank you !    Ihsan Mendoza on 8/24/2023 at 9:26 AM

## 2023-08-31 ENCOUNTER — OFFICE VISIT (OUTPATIENT)
Dept: SURGERY | Facility: CLINIC | Age: 68
End: 2023-08-31
Payer: MEDICARE

## 2023-08-31 VITALS
OXYGEN SATURATION: 98 % | BODY MASS INDEX: 28.81 KG/M2 | DIASTOLIC BLOOD PRESSURE: 103 MMHG | WEIGHT: 190.1 LBS | SYSTOLIC BLOOD PRESSURE: 180 MMHG | HEART RATE: 65 BPM | HEIGHT: 68 IN

## 2023-08-31 DIAGNOSIS — T14.8XXA WOUND INFECTION: Primary | ICD-10-CM

## 2023-08-31 DIAGNOSIS — L08.9 WOUND INFECTION: Primary | ICD-10-CM

## 2023-08-31 PROCEDURE — 99024 POSTOP FOLLOW-UP VISIT: CPT | Performed by: SURGERY

## 2023-08-31 ASSESSMENT — PAIN SCALES - GENERAL: PAINLEVEL: NO PAIN (0)

## 2023-08-31 NOTE — PROGRESS NOTES
Mr. Howell is here for follow-up after his pancreatic surgery.  His postoperative course was complicated by wound infection and he has been packing this twice a day.  He denies fevers chills or sweats    I reviewed again that his recent consultants comments.    On physical exam the patient has a small open wound in the midportion of his incision.  This is draining purulence.  It appears that the skin has closed more than the deep tissues and I think that he needs to have this opened.    Operative note    1% lidocaine was infiltrated around the wound.  A 11 blade scalpel was used to open the wound to approximately 1 and half inches a hemostat was placed in the base of the wound and used to dilate up the wound tract.  The wound was then packed with packing gauze with no concern for hemostasis and a sterile dressing was applied.    Assessment/plan open wound status post wound infection.  Continue twice daily packing.  Follow-up in 3 weeks    30 minutes    GB

## 2023-08-31 NOTE — NURSING NOTE
"Chief Complaint   Patient presents with    RECHECK     Incision check       Vitals:    08/31/23 1019   BP: (!) 180/103   BP Location: Left arm   Patient Position: Sitting   Cuff Size: Adult Regular   Pulse: 65   SpO2: 98%   Weight: 86.2 kg (190 lb 1.6 oz)   Height: 1.727 m (5' 8\")       Body mass index is 28.9 kg/m .                          Mike Macdonald, EMT    "

## 2023-08-31 NOTE — LETTER
8/31/2023       RE: Arnaldo Henderson  [address]  [facility] [address]         Dear Colleague,    Thank you for referring your patient, Arnaldo Henderson, to the [facility] [place] [facility]. Please see a copy of my visit note below.    Mr. Howell is here for follow-up after his pancreatic surgery.  His postoperative course was complicated by wound infection and he has been packing this twice a day.  He denies fevers chills or sweats    I reviewed again that his recent consultants comments.    On physical exam the patient has a small open wound in the midportion of his incision.  This is draining purulence.  It appears that the skin has closed more than the deep tissues and I think that he needs to have this opened.    Operative note    1% lidocaine was infiltrated around the wound.  A 11 blade scalpel was used to open the wound to approximately 1 and half inches a hemostat was placed in the base of the wound and used to dilate up the wound tract.  The wound was then packed with packing gauze with no concern for hemostasis and a sterile dressing was applied.    Assessment/plan open wound status post wound infection.  Continue twice daily packing.  Follow-up in 3 weeks    30 minutes            Again, thank you for allowing me to participate in the care of your patient.      Sincerely,    Ashvin Haider MD

## 2023-09-07 ENCOUNTER — TELEPHONE (OUTPATIENT)
Dept: ENDOCRINOLOGY | Facility: CLINIC | Age: 68
End: 2023-09-07

## 2023-09-07 DIAGNOSIS — E11.9 TYPE 2 DIABETES MELLITUS WITHOUT COMPLICATION (H): Primary | ICD-10-CM

## 2023-09-07 RX ORDER — PEN NEEDLE, DIABETIC 32GX 5/32"
NEEDLE, DISPOSABLE MISCELLANEOUS
Qty: 400 EACH | Refills: 3 | Status: SHIPPED | OUTPATIENT
Start: 2023-09-07

## 2023-09-07 NOTE — TELEPHONE ENCOUNTER
"[facility] Call Center    Phone Message    May a detailed message be left on voicemail: yes     Reason for Call: Other: Pen [place]   Patient's spouse is looking for a call back to discuss pen needles, and a possible refill of \"BD LINDY 2GEN PEN [id number]\" Please follow-up. Thank you.     Action Taken: Other: Endo    Travel Screening: Not Applicable                                                                   "

## 2023-09-13 ENCOUNTER — OFFICE VISIT (OUTPATIENT)
Dept: WOUND CARE | Facility: CLINIC | Age: 68
End: 2023-09-13
Payer: MEDICARE

## 2023-09-13 DIAGNOSIS — S31.109A OPEN ABDOMINAL WALL WOUND, INITIAL ENCOUNTER: Primary | ICD-10-CM

## 2023-09-13 PROCEDURE — 99211 OFF/OP EST MAY X REQ PHY/QHP: CPT

## 2023-09-13 NOTE — PATIENT INSTRUCTIONS
Practice Locations Phone    [facility] [address] [address] (Map) [phone]   [facility] [address], [place] [address] (Map) [phone]

## 2023-09-13 NOTE — PROGRESS NOTES
"Patient comes to wound clinic for dressing change  per request of Dr Haider  He has history of a Open surgical wound s/p I+D     Subjective: Pt assessed for discomfort which is minimal   Dressing removed, wound cleaned with Vashe, and measured.     Objective:  Open abdominal wall wound, initial encounter  Wound evaluation  Number of of wounds: 1  Wound type: surgical  Size:  0.7 cm length x 0.5 cm width x 3 cm depth. Tunnel  Thickness:  full  Drainage:  moderate    Assessment: Slow healing abd wound    Treatment:  Dressing change:  Cleanse wound with:  Vashe.  Primary Dressin/2\" Meslat dry  Secondary Dressing: Bordered gauze  Frequency of care: once daily  PLAN: Dressing changes daily at h ome. Patient needs to be seen 3 months after trip to [place].   Treated and follow-up appointment made    Pt has our number should other issues arise. All questions answered for now.    Dr. Gonzalez was available for supervision of care if needed or if questions should arise and regarding plan of care. Sherry Parks RN       "

## 2023-09-15 NOTE — PROGRESS NOTES
Diabetes Clinic  September 19, 2023      HPI  Arnaldo Henderson is a 68 year old male with type 2 diabetes mellitus. He underwent distal pancreatectomy for chronic pancreatitis in June 2023.  He also has a history of Parkinson's disease, coronary artery disease with bypass graft previously performed, hypertension hyperlipidemia hearing loss and GERD.    His wife Veronika is present during our visit today.  Pt was dx with type 2 DM in 2013. Denies retinopathy, nephropathy and neuropathy.  Pt has hx of chronic pancreatitis with hx of gallstones, CAD- s/p CABG, Parkinson's disease and HTN.   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  Pt was seen by the inpatient diabetes team.  Pt discharged to home on 7/7/2023 and seen in the ED this Wednesday 7/26/2023 with midline abdominal superficial incision infection draining purulent fluid as well as cellulitis surrounding his incision. Incision was opened further and packed. He was also started on Cefdinir.  He last saw Catherine Gomez in virtual clinic and she increased both long and short acting insulin and referred him to diabetes education.     Last A1c was 9.4 in late June.    Today   His blood sugar remains far above goal average per Dexcom at 203, but with 3% hypoglycemia including some blood sugars less than 55.   He shares that he thinks Basaglar too low - was previously at 30 units and that seemed to work better.  We note that his blood sugars often coming down overnight, he notes that he often does not sleep well, and may go to bed at 9 or 10 PM but then just sleep for a few hours and be up.  At times she will be up from 4 AM to 6 AM.   Will not eat well up at night unless he is decided to stay up for the day which usually is not until 6 AM.  When he does see blood sugar very high overnight he generally give 6 or 8 units.  He does not use any sliding scale for this he just tries 6 or 8 units.       For breakfast has protein shake toast muffin or cheerios and  fruit.  For lunch ham sandwich, possibly chips or soup with limited crackers.  Tries limit carbs but it ios a struggle  Has chicken or roast beef.Some veggies, some fruit, canned without sugar.     He gives 8 units with all meals, then adds to this the sliding scale he has at home.  He has a couple of different ones, but thinks that when he is using starts with 1 40-1 89, 1 unit.    He wonders if at night he should give 8 units plus sliding scale.  Is wondering why he gives 8 units plus sliding scale at mealtime.    His Denies blurred vision, n/v,SOB at rest, cough, chest pain, blood in stool, melena or sx of neuropathy. Denies foot ulcers.      Dm meds:  Basaglar 26 units subcutaneous at hs and   Novolog 8 units with meals with correction ( 1 unit/50 for BG > 140 ).  Most recent A1C was 9.4 in late June.    Continuous glucose monitoring:    See below.  His blood sugar remains far above goal average per Dexcom at 203, but with 3% hypoglycemia including some blood sugars less than 55.  Note the blood sugar dropped precipitously overnight at times, as well as an one incident late in the evening.  Discussing this with Arnaldo it appears that this is related to when he gives 6 or 8 units to correct a high blood sugar.    Diabetes Care  Retinopathy: none per patient.  He has [facility].  Nephropathy:none; urine microalbuminuria negative in Jan 2023. Pt taking Cozaar.  Neuropathy: none per patient. He states he takes Gabapentin for abd pain.  Foot Exam: no exam.  Taking aspirin: yes.  Lipids:LDL 33 in Oct 2022. Pt taking Lipitor.  Insulin: basal and meal time insulin with correction.   DM meds: none.  Testing: glucose meter. Referred to CDE for diabetes ed and to discuss use of DexcomG7 sensor.        ROS  See under HPI.    Allergies  Allergies   Allergen Reactions    Ciprofloxacin Other (See Comments), Hives, Itching, Rash and Unknown     Other reaction(s): Other (see comments)  Oral swelling per  "[facility]        Dilaudid [Hydromorphone] Rash    Morphine Rash     rash    Penicillins Rash     aka ancef/ rash      Citalopram Unknown    Oxycodone Rash     \"HORRIBLE, SEVERE RASH MAYBE CAUSED BY OXY\"       Medications  Current Outpatient Medications   Medication Sig Dispense Refill    acetaminophen (TYLENOL) 325 MG tablet Take 2 tablets (650 mg) by mouth every 4 hours as needed for other (For optimal non-opioid multimodal pain management to improve pain control.)      aspirin (ASA) 81 MG EC tablet Take 81 mg by mouth daily      atorvastatin (LIPITOR) 40 MG tablet Take 1 tablet by mouth daily      BD PEN NEEDLE LINDY 2ND GEN 32G X 4 MM miscellaneous USE TO INJECT INSULIN 4 TIMES A  each 3    carbidopa-levodopa (SINEMET CR)  MG CR tablet Take 1 tablet by mouth At Bedtime      carbidopa-levodopa (SINEMET)  MG tablet Take 2 tablets by mouth 3 times daily      cefdinir (OMNICEF) 300 MG capsule Take 1 capsule (300 mg) by mouth 2 times daily 12 capsule 0    Continuous Blood Gluc  (DEXCOM G7 ) ALIA Use to read blood sugars as per 's instructions. 1 each 0    Continuous Blood Gluc Sensor (DEXCOM G7 SENSOR) MISC Change every 10 days. 3 each 5    diphenhydrAMINE (BENADRYL) 25 MG tablet Take 1 tablet (25 mg) by mouth every 6 hours as needed for itching or allergies 6 tablet 0    gabapentin (NEURONTIN) 300 MG capsule Take 300 mg by mouth every morning , 300 mg by mouth at midday, and 600 mg by mouth at bedtime      insulin aspart (NOVOLOG PEN) 100 UNIT/ML pen Inject 8 units with meals with correction insulin ( 1 unit/50 for BG > 140 ). Pt uses approx 40 units daily. 15 mL 1    insulin glargine (BASAGLAR KWIKPEN) 100 UNIT/ML pen Inject 26 units subcutaneous at hs, increase by 2 units every 3 rd day until FBS < 130. Max dose 28 units daily. 15 mL 3    lipase-protease-amylase (ZENPEP) 67801-059633-456592 units CPEP Take 1 capsule by mouth 3 times daily (with meals) 90 capsule 11    " losartan (COZAAR) 50 MG tablet Take 50 mg by mouth At Bedtime      magnesium oxide (MAG-OX) 400 MG tablet Take 400 mg by mouth every other day      methocarbamol (ROBAXIN) 500 MG tablet Take 1 tablet (500 mg) by mouth every 6 hours as needed for muscle spasms 45 tablet 0    Multiple Vitamin (MULTI-VITAMINS) TABS Take 1 tablet by mouth daily      omeprazole (PRILOSEC) 20 MG DR capsule Take 20 mg by mouth daily      oxyCODONE (ROXICODONE) 5 MG tablet Take 1-2 tablets (5-10 mg) by mouth every 4 hours as needed for breakthrough pain 12 tablet 0    polyethylene glycol (MIRALAX) 17 GM/Dose powder Take 17 g by mouth daily 510 g        Family History  family history includes Lung Cancer in his brother; Parkinsonism in his mother.    Social History   reports that he has quit smoking. His smoking use included cigarettes. He has never used smokeless tobacco. He reports that he does not currently use alcohol. He reports that he does not currently use drugs.     Past Medical History  Past Medical History:   Diagnosis Date    CAD (coronary artery disease)     CABG    Diabetes mellitus, type 2 (H) 2013    Gastroesophageal reflux disease     Hypercholesteremia     Hypertension     Mixed conductive and sensorineural hearing loss of both ears     Mixed hearing loss     Pancreatic duct stricture     Pancreatitis     Parkinson disease (H)     Second degree AV block        Past Surgical History:   Procedure Laterality Date    CA ANESTH CABG W/PUMP      CHOLECYSTECTOMY  2022    COLONOSCOPY N/A 01/12/2023    Procedure: colonoscopy with fluroscopy;  Surgeon: Ayden Fraire MD;  Location:  OR    ENDOSCOPIC RETROGRADE CHOLANGIOPANCREATOGRAM N/A 05/18/2023    Procedure: ENDOSCOPIC RETROGRADE CHOLANGIOPANCREATOGRAPHY, WITH  CYSTDUODENOSTOMY STENTS X2 REMOVAL;  Surgeon: Cedric Saldana MD;  Location: [facility] OR    ENT SURGERY      LAPAROSCOPY DIAGNOSTIC (GENERAL) N/A 02/20/2023    Procedure: LAPAROSCOPY, DIAGNOSTIC; RETRIEVAL OF MIGRATED  "STENT;  Surgeon: Joseluis Lima MD;  Location: [facility] OR    ORTHOPEDIC SURGERY      PANCREATECTOMY CIRO N/A 06/30/2023    Procedure: Open distal pancreactectomy with splenectomy, lysis of adhesions, resection of proximal illeum;  Surgeon: Ashvin Haider MD;  Location: [facility] OR    [place] CABG, ARTERIAL, TWO  2003       Physical Exam  This is a pleasant male with notable tremor accompanied in clinic by his wife Veronika.  Mood is \"good,\"  affect is appropriate,    Her eyes are clear without proptosis, with healthy appearing lens, conjunctiva and sclera.  EOMs intact.  Nares are patent.  Neck is supple without mass or JVD noted.    Respirations are easy and unlabored.  Skin is warm moist and elastic without lesions noted.  No edema is appreciated in lower extremities.  No pedal lesions are appreciated.      RESULTS  Recent Labs   Lab Test 08/02/23  1138 07/26/23  0702 07/01/23  0410 06/30/23  0642 11/18/22  1025 06/02/21  0831   A1C  --   --   --  9.6*  --   --    TRIG  --   --   --   --   --  74   CR 0.89 0.74   < >  --    < >  --     < > = values in this interval not displayed.         ASSESSMENT/PLAN:     Arnaldo is a 68-year-old male who has type 2 diabetes now complicated by distal pancreatectomy as well as a history of CABG.     TYPE 2 DIABETES MELLITUS: Hx of type 2 diabetes mellitus dx in 2013 with recent distal pancreatectomy on 6/3/2023 for chronic pancreatitis.   His blood sugars remain above goal but he is having some precipitous drops in blood sugar that seem to be related to his dosing 8 units for high blood sugar.    Provided some education regarding the difference between covering carbohydrates and correcting a high blood sugar.  Fortunately he is seeing diabetes education and this can be further reviewed today.    Breakfast dose and often his lunch dose appear to be excessive leading to lows during the day, the breakfast dose to 4 units and lunch dose to 6 units, increasing dinner dose to 10 units " to prevent overnight hyperglycemia.    Further discussed the sliding scale how to use this.  And gave him the [place]reme[place] sliding scale differentiated to this from the overnight or bedtime blood sugar scale.  Encouraging him to treat blood sugars over 200 at bedtime if it has been greater than 4 hours since his dinner dose due to how to do this, and him to again review this with diabetes education today.    We also discussed the possibility of learning to count carbohydrates we can have greater flexibility in his meals, I am hoping that he can begin to broach this topic with diabetes education today, though he does appear to need further basic insulin education.      HTN: BP good at home. Continue current RX.  LIPIDS: LDL 33 in Oct 2022. Pt taking Lipitor.  4.   PARKINSON'S DISEASE: Per neurology and primary care provider.  He actually has been recommended by his primary care provider to transition to care here at the [facility] which and his wife would like to do as communication has been difficult.  Referral to primary care is placed.  5.   FOLLOW UP: With me in 2 weeks. Pt to send me blood sugar data for review next week.  CDE referral placed today- need additional diabetes ed and discuss use of DexcomG7 sensor.  Oph referral placed today.  53 minutes spent on the date of the encounter doing chart review, history and exam, documentation, education and counseling, as well as communication and coordination of care, and further activities as noted above.  This time includes time spent reviewing CGM.      It is my privilege to be involved in the care of the above patient.     Hayley Alatorre PA-C, MPAS  [facility]  Diabetes, Endocrinology, and Metabolism  [phone] Appointments/Nurse Line  [phone] Fax  [phone] pager  On [facility] (inpatient)              Patient is showing 3/5 MNCM met. BP out range and A1c not in range   HARRY Hwang    BG data obtained via CGM portal :

## 2023-09-19 ENCOUNTER — ALLIED HEALTH/NURSE VISIT (OUTPATIENT)
Dept: EDUCATION SERVICES | Facility: CLINIC | Age: 68
End: 2023-09-19
Attending: PHYSICIAN ASSISTANT
Payer: MEDICARE

## 2023-09-19 ENCOUNTER — OFFICE VISIT (OUTPATIENT)
Dept: ENDOCRINOLOGY | Facility: CLINIC | Age: 68
End: 2023-09-19
Payer: MEDICARE

## 2023-09-19 VITALS
HEART RATE: 78 BPM | BODY MASS INDEX: 28.89 KG/M2 | WEIGHT: 190 LBS | SYSTOLIC BLOOD PRESSURE: 156 MMHG | OXYGEN SATURATION: 97 % | DIASTOLIC BLOOD PRESSURE: 79 MMHG

## 2023-09-19 DIAGNOSIS — I25.10 ATHEROSCLEROSIS OF NATIVE CORONARY ARTERY WITHOUT ANGINA PECTORIS, UNSPECIFIED WHETHER NATIVE OR TRANSPLANTED HEART: Primary | ICD-10-CM

## 2023-09-19 DIAGNOSIS — Z79.4 TYPE 2 DIABETES MELLITUS WITH HYPERGLYCEMIA, WITH LONG-TERM CURRENT USE OF INSULIN (H): ICD-10-CM

## 2023-09-19 DIAGNOSIS — E11.65 TYPE 2 DIABETES MELLITUS WITH HYPERGLYCEMIA, WITH LONG-TERM CURRENT USE OF INSULIN (H): ICD-10-CM

## 2023-09-19 DIAGNOSIS — E44.1 MILD PROTEIN-CALORIE MALNUTRITION (H): ICD-10-CM

## 2023-09-19 PROCEDURE — 99215 OFFICE O/P EST HI 40 MIN: CPT | Performed by: PHYSICIAN ASSISTANT

## 2023-09-19 PROCEDURE — G0108 DIAB MANAGE TRN  PER INDIV: HCPCS | Performed by: DIETITIAN, REGISTERED

## 2023-09-19 ASSESSMENT — PAIN SCALES - GENERAL: PAINLEVEL: MODERATE PAIN (4)

## 2023-09-19 NOTE — PATIENT INSTRUCTIONS
"Goals for Diabetes:    1. Check blood sugars at least 4 times each day at varying times: before meals, after meals and bedtime.     Blood Glucose Targets:   Fasting and before meal: 80 - 130   2 hours after the start of a meal: less than 180      2. Activity helps to improve blood sugars. Try to incorporate 150 minutes per week (at least 10 minute at a time, and at least every other day)    3. Eat three balanced meals each day, consider using plate planning method, aiming for a variety of food groups including 1/4 plate starch/grains, 1/4 plate lean protein, and 1/2 plate non-starchy vegetables. Focus on \"high quality\" carbohydrates (whole grains, starchy vegetables, fruits, beans/legumes). Limit added sugar as much as possible.    4. Take diabetes medications as prescribed       5. If you are overweight aim for a 5% weight loss. This will improve blood sugars, cholesterol, and blood pressure!     Follow up: November 6th at 1:00pm with Idalia Shields or Mychart with any questions.    Idalia BELLO  Diabetes Education  [facility] [address] [address]  Phone: [phone]  Email: [email or web address]        "

## 2023-09-19 NOTE — PATIENT INSTRUCTIONS
Dear Arnaldo,      Please increase Basaglar to 30 units each evening.  Please give only 4 units with breakfast, continue 6 with lunch.    Please increase dinner dose to 10 units.    Please continue to give sliding scale if blood sugar >140 before a meal.    For Pre-Meal  - 189 give 1 unit.   For Pre-Meal  - 239 give 2 units.   For Pre-Meal  - 289 give 3 units.   For Pre-Meal  - 339 give 4 units.   For Pre-Meal BG = or > 340 give 5 units.     At bedtime and overnight BG >200 AND- it has been at least 4 hours since last injection, preferably 5, please correct any BG>200.  At bedtime/overnight:  For  - 249 give 1 units.   For  - 299 give 2 units.   For  - 349 give 3 units.   For BG = or > 350 give 4 units.     If you learn to count carbohydrates, I recommend you give 1 unit : 10 g carbohydrate.    My best wishes,    Hayley Alatorre PA-C, MPAS  [facility]  Diabetes, Endocrinology, and Metabolism  [phone] Appointments/Nurse  [phone] Fax

## 2023-09-19 NOTE — LETTER
9/19/2023       RE: Arnaldo Henderson  [address]  [facility] [address]     Dear Colleague,    Thank you for referring your patient, Arnaldo Henderson, to the [facility] [place] [facility]. Please see a copy of my visit note below.    Diabetes Clinic  September 19, 2023      HPI  Arnaldo Henderson is a 68 year old male with type 2 diabetes mellitus. He underwent distal pancreatectomy for chronic pancreatitis in June 2023.  He also has a history of Parkinson's disease, coronary artery disease with bypass graft previously performed, hypertension hyperlipidemia hearing loss and GERD.    His wife Veronika is present during our visit today.  Pt was dx with type 2 DM in 2013. Denies retinopathy, nephropathy and neuropathy.  Pt has hx of chronic pancreatitis with hx of gallstones, CAD- s/p CABG, Parkinson's disease and HTN.   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  Pt was seen by the inpatient diabetes team.  Pt discharged to home on 7/7/2023 and seen in the ED this Wednesday 7/26/2023 with midline abdominal superficial incision infection draining purulent fluid as well as cellulitis surrounding his incision. Incision was opened further and packed. He was also started on Cefdinir.  He last saw Catherine Gomez in virtual clinic and she increased both long and short acting insulin and referred him to diabetes education.     Last A1c was 9.4 in late June.    Today   His blood sugar remains far above goal average per Dexcom at 203, but with 3% hypoglycemia including some blood sugars less than 55.   He shares that he thinks Basaglar too low - was previously at 30 units and that seemed to work better.  We note that his blood sugars often coming down overnight, he notes that he often does not sleep well, and may go to bed at 9 or 10 PM but then just sleep for a few hours and be up.  At times she will be up from 4 AM to 6 AM.   Will  not eat well up at night unless he is decided to stay up for the day which usually is not until 6 AM.  When he does see blood sugar very high overnight he generally give 6 or 8 units.  He does not use any sliding scale for this he just tries 6 or 8 units.       For breakfast has protein shake toast muffin or cheerios and fruit.  For lunch ham sandwich, possibly chips or soup with limited crackers.  Tries limit carbs but it ios a struggle  Has chicken or roast beef.Some veggies, some fruit, canned without sugar.     He gives 8 units with all meals, then adds to this the sliding scale he has at home.  He has a couple of different ones, but thinks that when he is using starts with 1 40-1 89, 1 unit.    He wonders if at night he should give 8 units plus sliding scale.  Is wondering why he gives 8 units plus sliding scale at mealtime.    His Denies blurred vision, n/v,SOB at rest, cough, chest pain, blood in stool, melena or sx of neuropathy. Denies foot ulcers.      Dm meds:  Basaglar 26 units subcutaneous at hs and   Novolog 8 units with meals with correction ( 1 unit/50 for BG > 140 ).  Most recent A1C was 9.4 in late June.    Continuous glucose monitoring:    See below.  His blood sugar remains far above goal average per Dexcom at 203, but with 3% hypoglycemia including some blood sugars less than 55.  Note the blood sugar dropped precipitously overnight at times, as well as an one incident late in the evening.  Discussing this with Arnaldo it appears that this is related to when he gives 6 or 8 units to correct a high blood sugar.    Diabetes Care  Retinopathy: none per patient.  He has an eye exam today at [facility].  Nephropathy:none; urine microalbuminuria negative in Jan 2023. Pt taking Cozaar.  Neuropathy: none per patient. He states he takes Gabapentin for abd pain.  Foot Exam: no exam.  Taking aspirin: yes.  Lipids:LDL 33 in Oct 2022. Pt taking Lipitor.  Insulin: basal and meal time insulin with  "correction.   DM meds: none.  Testing: glucose meter. Referred to CDE for diabetes ed and to discuss use of DexcomG7 sensor.        ROS  See under HPI.    Allergies  Allergies   Allergen Reactions    Ciprofloxacin Other (See Comments), Hives, Itching, Rash and Unknown     Other reaction(s): Other (see comments)  Oral swelling per [facility]        Dilaudid [Hydromorphone] Rash    Morphine Rash     rash    Penicillins Rash     aka ancef/ rash      Citalopram Unknown    Oxycodone Rash     \"HORRIBLE, SEVERE RASH MAYBE CAUSED BY OXY\"       Medications  Current Outpatient Medications   Medication Sig Dispense Refill    acetaminophen (TYLENOL) 325 MG tablet Take 2 tablets (650 mg) by mouth every 4 hours as needed for other (For optimal non-opioid multimodal pain management to improve pain control.)      aspirin (ASA) 81 MG EC tablet Take 81 mg by mouth daily      atorvastatin (LIPITOR) 40 MG tablet Take 1 tablet by mouth daily      BD PEN NEEDLE LINDY 2ND GEN 32G X 4 MM miscellaneous USE TO INJECT INSULIN 4 TIMES A  each 3    carbidopa-levodopa (SINEMET CR)  MG CR tablet Take 1 tablet by mouth At Bedtime      carbidopa-levodopa (SINEMET)  MG tablet Take 2 tablets by mouth 3 times daily      cefdinir (OMNICEF) 300 MG capsule Take 1 capsule (300 mg) by mouth 2 times daily 12 capsule 0    Continuous Blood Gluc  (DEXCOM G7 ) ALIA Use to read blood sugars as per 's instructions. 1 each 0    Continuous Blood Gluc Sensor (DEXCOM G7 SENSOR) MISC Change every 10 days. 3 each 5    diphenhydrAMINE (BENADRYL) 25 MG tablet Take 1 tablet (25 mg) by mouth every 6 hours as needed for itching or allergies 6 tablet 0    gabapentin (NEURONTIN) 300 MG capsule Take 300 mg by mouth every morning , 300 mg by mouth at midday, and 600 mg by mouth at bedtime      insulin aspart (NOVOLOG PEN) 100 UNIT/ML pen Inject 8 units with meals with correction insulin ( 1 unit/50 for BG > 140 ). Pt uses approx 40 " units daily. 15 mL 1    insulin glargine (BASAGLAR KWIKPEN) 100 UNIT/ML pen Inject 26 units subcutaneous at hs, increase by 2 units every 3 rd day until FBS < 130. Max dose 28 units daily. 15 mL 3    lipase-protease-amylase (ZENPEP) 38499-196684-361173 units CPEP Take 1 capsule by mouth 3 times daily (with meals) 90 capsule 11    losartan (COZAAR) 50 MG tablet Take 50 mg by mouth At Bedtime      magnesium oxide (MAG-OX) 400 MG tablet Take 400 mg by mouth every other day      methocarbamol (ROBAXIN) 500 MG tablet Take 1 tablet (500 mg) by mouth every 6 hours as needed for muscle spasms 45 tablet 0    Multiple Vitamin (MULTI-VITAMINS) TABS Take 1 tablet by mouth daily      omeprazole (PRILOSEC) 20 MG DR capsule Take 20 mg by mouth daily      oxyCODONE (ROXICODONE) 5 MG tablet Take 1-2 tablets (5-10 mg) by mouth every 4 hours as needed for breakthrough pain 12 tablet 0    polyethylene glycol (MIRALAX) 17 GM/Dose powder Take 17 g by mouth daily 510 g        Family History  family history includes Lung Cancer in his brother; Parkinsonism in his mother.    Social History   reports that he has quit smoking. His smoking use included cigarettes. He has never used smokeless tobacco. He reports that he does not currently use alcohol. He reports that he does not currently use drugs.     Past Medical History  Past Medical History:   Diagnosis Date    CAD (coronary artery disease)     CABG    Diabetes mellitus, type 2 (H) 2013    Gastroesophageal reflux disease     Hypercholesteremia     Hypertension     Mixed conductive and sensorineural hearing loss of both ears     Mixed hearing loss     Pancreatic duct stricture     Pancreatitis     Parkinson disease (H)     Second degree AV block        Past Surgical History:   Procedure Laterality Date    CA ANESTH CABG W/PUMP      CHOLECYSTECTOMY  2022    COLONOSCOPY N/A 01/12/2023    Procedure: colonoscopy with fluroscopy;  Surgeon: Ayden Fraire MD;  Location: [facility] OR    ENDOSCOPIC  "RETROGRADE CHOLANGIOPANCREATOGRAM N/A 05/18/2023    Procedure: ENDOSCOPIC RETROGRADE CHOLANGIOPANCREATOGRAPHY, WITH  CYSTDUODENOSTOMY STENTS X2 REMOVAL;  Surgeon: Cedric Saldana MD;  Location: [facility] OR    ENT SURGERY      LAPAROSCOPY DIAGNOSTIC (GENERAL) N/A 02/20/2023    Procedure: LAPAROSCOPY, DIAGNOSTIC; RETRIEVAL OF MIGRATED STENT;  Surgeon: Joseluis Lima MD;  Location: [facility] OR    ORTHOPEDIC SURGERY      PANCREATECTOMY PEUSTOW N/A 06/30/2023    Procedure: Open distal pancreactectomy with splenectomy, lysis of adhesions, resection of proximal illeum;  Surgeon: Ashvin Haider MD;  Location: [facility] OR    WA CABG, ARTERIAL, TWO  2003       Physical Exam  This is a pleasant male with notable tremor accompanied in clinic by his wife Veronika.  Mood is \"good,\"  affect is appropriate,    Her eyes are clear without proptosis, with healthy appearing lens, conjunctiva and sclera.  EOMs intact.  Nares are patent.  Neck is supple without mass or JVD noted.    Respirations are easy and unlabored.  Skin is warm moist and elastic without lesions noted.  No edema is appreciated in lower extremities.  No pedal lesions are appreciated.      RESULTS  Recent Labs   Lab Test 08/02/23  1138 07/26/23  0702 07/01/23  0410 06/30/23  0642 11/18/22  1025 06/02/21  0831   A1C  --   --   --  9.6*  --   --    TRIG  --   --   --   --   --  74   CR 0.89 0.74   < >  --    < >  --     < > = values in this interval not displayed.         ASSESSMENT/PLAN:     Arnaldo is a 68-year-old male who has type 2 diabetes now complicated by distal pancreatectomy as well as a history of CABG.     TYPE 2 DIABETES MELLITUS: Hx of type 2 diabetes mellitus dx in 2013 with recent distal pancreatectomy on 6/3/2023 for chronic pancreatitis.   His blood sugars remain above goal but he is having some precipitous drops in blood sugar that seem to be related to his dosing 8 units for high blood sugar.    Provided some education regarding the difference " between covering carbohydrates and correcting a high blood sugar.  Fortunately he is seeing diabetes education and this can be further reviewed today.    Breakfast dose and often his lunch dose appear to be excessive leading to lows during the day, the breakfast dose to 4 units and lunch dose to 6 units, increasing dinner dose to 10 units to prevent overnight hyperglycemia.    Further discussed the sliding scale how to use this.  And gave him the Premeal sliding scale differentiated to this from the overnight or bedtime blood sugar scale.  Encouraging him to treat blood sugars over 200 at bedtime if it has been greater than 4 hours since his dinner dose due to how to do this, and him to again review this with diabetes education today.    We also discussed the possibility of learning to count carbohydrates we can have greater flexibility in his meals, I am hoping that he can begin to broach this topic with diabetes education today, though he does appear to need further basic insulin education.      HTN: BP good at home. Continue current RX.  LIPIDS: LDL 33 in Oct 2022. Pt taking Lipitor.  4.   PARKINSON'S DISEASE: Per neurology and primary care provider.  He actually has been recommended by his primary care provider to transition to care here at the [facility] which and his wife would like to do as communication has been difficult.  Referral to primary care is placed.  5.   FOLLOW UP: With me in 2 weeks. Pt to send me blood sugar data for review next week.  CDE referral placed today- need additional diabetes ed and discuss use of DexcomG7 sensor.  Oph referral placed today.  53 minutes spent on the date of the encounter doing chart review, history and exam, documentation, education and counseling, as well as communication and coordination of care, and further activities as noted above.  This time includes time spent reviewing CGM.      It is my privilege to be involved in the care of the above patient.     Hayley  Jayson Alatorre PA-C, MPAS  [facility]  Diabetes, Endocrinology, and Metabolism  [phone] Appointments/Nurse Line  [phone] Fax  [phone] pager  On V[facility]A (inpatient)              Patient is showing 3/5 MNCM met. BP out range and A1c not in range   Julianne Young EMT    BG data obtained via CGM portal :

## 2023-09-19 NOTE — PROGRESS NOTES
Diabetes Self-Management Education & Support    Arnaldo Henderson presents today for education related to Type 2 diabetes    Patient is being treated with:  insulin  He is accompanied by self and spouse, Veronika    Year of diagnosis: 2013 with recent distal pancreatectomy on 6/3/2023  Referring provider:  Valeri Gomez PA-C   Living Situation: with spouse, continues to go between [place] and [place]  Employment: Retired    PATIENT CONCERNS RELATED TO DIABETES SELF MANAGEMENT: Learning to manage with insulin, prior to hospitalization he was only taking Metformin      ASSESSMENT:    Taking Medication:     Current Diabetes Management per Patient:  Taking diabetes medications? yes:     Diabetes Medication(s)       Insulin       insulin aspart (NOVOLOG PEN) 100 UNIT/ML pen    Inject 8 units with meals with correction insulin ( 1 unit/50 for BG > 140 ). Pt uses approx 40 units daily.     insulin glargine (BASAGLAR KWIKPEN) 100 UNIT/ML pen    Inject 26 units subcutaneous at hs, increase by 2 units every 3 rd day until FBS < 130. Max dose 28 units daily.            Monitoring    Patient glucose self monitoring as follows: continuously using a continuous glucose monitor (CGM)  BG meter: Dexcom G7  BG results: see below:        Average Glucose: 213  Time in Range is 38%  Time above Range: 60% -  23% High 181 - 250  37% Very High >250    Time below Range: 2%    Patient's most recent   Lab Results   Component Value Date    A1C 9.6 06/30/2023      Patient's A1C goal: <7.0    Activity: no regular exercise program    Healthy Eating:   Patient currently eats 3 meals 1 snacks per day   Wakes up 5-6 am and will eat breakfast  Will eat lunch ~11-12pm    Says he does not eat vegetables, may eat carrots or celery  Trying to incorporate more fruit in diet      Problem Solving:      Patient is at risk of hypoglycemia?: YES  Hospitalizations for hyper or hypoglycemia: No    Healthy Coping and Stress Management:   Sources of stress  "identified by patient: My health      EDUCATION and INSTRUCTION PROVIDED AT THIS VISIT:    DMT2 seen at the request of Valeri Gomez PA-C. Arnaldo also saw Hayley Alatorre PA-C today before our appointment. She adjusted her corrective insulin doses based on CGM data above.  She increased Basaglar to 30 units each morning and adjusted mealtime insulin doses: 4 units of NovoLog with breakfast (lowered due to lows), 6 units of NovoLog with lunch, increase to 10 units of NovoLog with dinner.  Arnaldo has a pattern of nighttime highs on his Dexcom Clarity reports. Two instances appear to be rebound highs - We discussed the timing of Novolog - he was taking sometimes > 30 minutes before eating, discussed Novolog works best when taken 10-15 minutes before eating. Discussed the goal is to try to time the insulin so it starts working just as food is starting to get digested.  Discussed his correction scales - Hayley added a bedtime correction which is more relaxed than his mealtime correction scale. Explained that he should wait 4 hours since last injection to avoid \"insulin stacking\" which can lead to lows.    Arnaldo states he is rotating injections sites, priming insulin pen, and using a new needle for each injection.     We briefly discussed the concept of counting carbohydrates so Arnaldo could better match his insulin to his meals. Arnaldo and Veronika said that they were going to [place] to take care of their second home and will return to [place] on November 1. They would like to wait until November to try carb counting.    He and his wife were also given copies of Guide to Carbohydrate Counting and Healthy Living with Diabetes books.    Patient-stated goal written and given to Arnaldo J Maximdonny.  Verbalized and demonstrated understanding of instructions.     PLAN:  - Defer carb counting education until Arnaldo returns from [place]    Med adjustments per Hayley Alatorre PA-C:  - Increase Basaglar to 30 units each " morning    - 4 units of NovoLog with breakfast, 6 units of NovoLog with lunch, increase to 10 units of NovoLog with dinner    - Correction scale 1/40/140:  For Pre-Meal  - 189 give 1 unit.   For Pre-Meal  - 239 give 2 units.   For Pre-Meal  - 289 give 3 units.   For Pre-Meal  - 339 give 4 units.   For Pre-Meal BG = or > 340 give 5 units.     - Bedtime correction scale:  At bedtime and overnight BG >200 AND - it has been at least 4 hours since last injection, preferably 5, please correct any BG>200.  At bedtime/overnight:  For  - 249 give 1 units.   For  - 299 give 2 units.   For  - 349 give 3 units.   For BG = or > 350 give 4 units.     - If learning to count carbohydrates, use 1 to 10g ratio    He is encouraged to reach out sooner with any concerns.    See patient instructions  AVS printed and given to patient    FOLLOW-UP:    With Diabetes Education on November 6th  With Hayley Alatorre on 1/2/2024    Time spent with patient at today's visit was 60 minutes.      Any diabetes medication dose changes were made via the CDE Protocol and Collaborative Practice Agreement [facility].  A copy of this encounter was provided to patient's referring provider.

## 2023-09-20 ENCOUNTER — OFFICE VISIT (OUTPATIENT)
Dept: SURGERY | Facility: CLINIC | Age: 68
End: 2023-09-20
Payer: MEDICARE

## 2023-09-20 VITALS
OXYGEN SATURATION: 98 % | WEIGHT: 192 LBS | HEIGHT: 68 IN | SYSTOLIC BLOOD PRESSURE: 160 MMHG | BODY MASS INDEX: 29.1 KG/M2 | DIASTOLIC BLOOD PRESSURE: 89 MMHG | HEART RATE: 72 BPM

## 2023-09-20 DIAGNOSIS — T81.49XA SUPERFICIAL POSTOPERATIVE WOUND INFECTION: Primary | ICD-10-CM

## 2023-09-20 PROCEDURE — 99024 POSTOP FOLLOW-UP VISIT: CPT | Performed by: SURGERY

## 2023-09-20 ASSESSMENT — PAIN SCALES - GENERAL: PAINLEVEL: MODERATE PAIN (4)

## 2023-09-20 NOTE — NURSING NOTE
"Chief Complaint   Patient presents with    RECHECK     Return consult       Vitals:    09/20/23 0922   BP: (!) 160/89   BP Location: Left arm   Patient Position: Sitting   Cuff Size: Adult Regular   Pulse: 72   SpO2: 98%   Weight: 87.1 kg (192 lb)   Height: 1.727 m (5' 8\")       Body mass index is 29.19 kg/m .                          Mike Macdonald, EMT    "

## 2023-09-20 NOTE — PROGRESS NOTES
Mr. Howell is here for follow-up of his open abdominal wound.  He denies fevers chills or sweats.  He has been packing this twice a day and has been seen in our wound care nurse for the wound.    On physical exam the wound is much smaller, is draining a mild amount of exudate, but the exit at the skin is markedly narrowed at about 3 to 4 mm.    Procedure note we opened up the wound cicatrix using 1% lidocaine for anesthesia followed by a knife to open the wound to an extent of about 1-1/2 cm in diameter.  The base the wound was clean with granulation tissue in place.  The wound was packed with 1 inch packing gauze.  Good hemostasis was noted.  Sterile dressing was applied.    Follow-up with the patient in a couple of months to recheck on his wound.  He will call my office if there are any issues.    Visit time 35 minutes.

## 2023-09-21 ENCOUNTER — TELEPHONE (OUTPATIENT)
Dept: OPHTHALMOLOGY | Facility: CLINIC | Age: 68
End: 2023-09-21
Payer: MEDICARE

## 2023-09-21 NOTE — TELEPHONE ENCOUNTER
Spoke with patient's spouse confirming appointment on 9/23/23. Confirmed appointment. -Per Patient's Spouse

## 2023-09-23 ENCOUNTER — OFFICE VISIT (OUTPATIENT)
Dept: OPHTHALMOLOGY | Facility: CLINIC | Age: 68
End: 2023-09-23
Attending: PHYSICIAN ASSISTANT
Payer: MEDICARE

## 2023-09-23 DIAGNOSIS — H52.4 HYPEROPIA OF BOTH EYES WITH ASTIGMATISM AND PRESBYOPIA: Primary | ICD-10-CM

## 2023-09-23 DIAGNOSIS — E11.65 TYPE 2 DIABETES MELLITUS WITH HYPERGLYCEMIA, WITH LONG-TERM CURRENT USE OF INSULIN (H): ICD-10-CM

## 2023-09-23 DIAGNOSIS — Z79.4 TYPE 2 DIABETES MELLITUS WITH HYPERGLYCEMIA, WITH LONG-TERM CURRENT USE OF INSULIN (H): ICD-10-CM

## 2023-09-23 DIAGNOSIS — H52.203 HYPEROPIA OF BOTH EYES WITH ASTIGMATISM AND PRESBYOPIA: Primary | ICD-10-CM

## 2023-09-23 DIAGNOSIS — H25.13 AGE-RELATED NUCLEAR CATARACT OF BOTH EYES: ICD-10-CM

## 2023-09-23 DIAGNOSIS — H52.03 HYPEROPIA OF BOTH EYES WITH ASTIGMATISM AND PRESBYOPIA: Primary | ICD-10-CM

## 2023-09-23 PROCEDURE — 92004 COMPRE OPH EXAM NEW PT 1/>: CPT | Performed by: OPTOMETRIST

## 2023-09-23 PROCEDURE — 92015 DETERMINE REFRACTIVE STATE: CPT | Mod: GY | Performed by: OPTOMETRIST

## 2023-09-23 ASSESSMENT — REFRACTION_WEARINGRX
OS_SPHERE: +1.00
OD_AXIS: 011
OD_CYLINDER: +1.25
OD_ADD: +2.75
OS_AXIS: 172
OD_SPHERE: +1.00
OS_CYLINDER: +1.00
OS_ADD: +2.75

## 2023-09-23 ASSESSMENT — REFRACTION_MANIFEST
OD_AXIS: 005
OS_CYLINDER: +0.75
OD_CYLINDER: +1.25
OS_ADD: +2.75
OS_AXIS: 145
OD_SPHERE: +1.00
OD_ADD: +2.75
OS_SPHERE: +1.75

## 2023-09-23 ASSESSMENT — VISUAL ACUITY
OS_CC+: -1
OS_CC: 20/30
CORRECTION_TYPE: GLASSES
OD_CC: 20/20
METHOD: SNELLEN - LINEAR
OD_CC+: -1

## 2023-09-23 ASSESSMENT — CONF VISUAL FIELD
OS_SUPERIOR_TEMPORAL_RESTRICTION: 0
OD_NORMAL: 1
OS_INFERIOR_TEMPORAL_RESTRICTION: 0
OS_SUPERIOR_NASAL_RESTRICTION: 0
OS_INFERIOR_NASAL_RESTRICTION: 0
OD_SUPERIOR_NASAL_RESTRICTION: 0
OD_SUPERIOR_TEMPORAL_RESTRICTION: 0
OD_INFERIOR_NASAL_RESTRICTION: 0
OD_INFERIOR_TEMPORAL_RESTRICTION: 0
METHOD: COUNTING FINGERS
OS_NORMAL: 1

## 2023-09-23 ASSESSMENT — EXTERNAL EXAM - LEFT EYE: OS_EXAM: NORMAL

## 2023-09-23 ASSESSMENT — CUP TO DISC RATIO
OS_RATIO: 0.35
OD_RATIO: 0.4

## 2023-09-23 ASSESSMENT — TONOMETRY
IOP_METHOD: ICARE
OS_IOP_MMHG: 12
OD_IOP_MMHG: 10

## 2023-09-23 ASSESSMENT — SLIT LAMP EXAM - LIDS
COMMENTS: NORMAL
COMMENTS: NORMAL

## 2023-09-23 ASSESSMENT — EXTERNAL EXAM - RIGHT EYE: OD_EXAM: NORMAL

## 2023-09-23 NOTE — PROGRESS NOTES
Assessment & Plan       Arnaldo Henderson is a 68 year old male with the following diagnoses:   1. Hyperopia of both eyes with astigmatism and presbyopia - Both Eyes    2. Type 2 diabetes mellitus with hyperglycemia, with long-term current use of insulin (H) - Both Eyes    3. Age-related nuclear cataract of both eyes - Both Eyes          New prescription for glasses   No DBR each eye   Cataracts follow  Reinforced BS control  Yearly exam     Patient disposition:   Return in about 1 year (around 9/23/2024).          Complete documentation of historical and exam elements from today's encounter can be found in the full encounter summary report (not reduplicated in this progress note). I personally obtained the chief complaint(s) and history of present illness.  I confirmed and edited as necessary the review of systems, past medical/surgical history, family history, social history, and examination findings as documented by others; and I examined the patient myself. I personally reviewed the relevant tests, images, and reports as documented above. I formulated and edited as necessary the assessment and plan and discussed the findings and management plan with the patient and family.  Dr. Willian Thornton

## 2023-09-23 NOTE — NURSING NOTE
Chief Complaints and History of Present Illnesses   Patient presents with    Eye Exam For Diabetes     Chief Complaint(s) and History of Present Illness(es)       Eye Exam For Diabetes              Laterality: both eyes    Associated symptoms: Negative for eye pain, flashes and floaters              Comments    Arnaldo Henderson is a(n) 68 year old male who presents for a comprehensive exam. Last eye exam was a year ago. Since exam, vision has worsened for near. No lubricating drops. No flashes and floaters. No eye pain.     Lab Results       Component                Value               Date                       A1C                      9.6                 06/30/2023              Dread Richardson COT 8:22 AM September 23, 2023

## 2023-09-23 NOTE — PATIENT INSTRUCTIONS
New prescription for glasses   No DBR each eye   Cataracts follow  Reinforced BS control  Yearly exam

## 2023-10-21 ENCOUNTER — HEALTH MAINTENANCE LETTER (OUTPATIENT)
Age: 68
End: 2023-10-21

## 2023-11-03 ENCOUNTER — LAB (OUTPATIENT)
Dept: LAB | Facility: CLINIC | Age: 68
End: 2023-11-03
Payer: MEDICARE

## 2023-11-03 ENCOUNTER — ANCILLARY PROCEDURE (OUTPATIENT)
Dept: CT IMAGING | Facility: CLINIC | Age: 68
End: 2023-11-03
Attending: INTERNAL MEDICINE
Payer: MEDICARE

## 2023-11-03 DIAGNOSIS — C7A.8 NEUROENDOCRINE CARCINOMA OF SMALL BOWEL (H): ICD-10-CM

## 2023-11-03 LAB
ALBUMIN SERPL BCG-MCNC: 3.9 G/DL (ref 3.5–5.2)
ALP SERPL-CCNC: 115 U/L (ref 40–129)
ALT SERPL W P-5'-P-CCNC: <5 U/L (ref 0–70)
ANION GAP SERPL CALCULATED.3IONS-SCNC: 9 MMOL/L (ref 7–15)
AST SERPL W P-5'-P-CCNC: 34 U/L (ref 0–45)
BASOPHILS # BLD AUTO: 0.1 10E3/UL (ref 0–0.2)
BASOPHILS NFR BLD AUTO: 2 %
BILIRUB SERPL-MCNC: 1.1 MG/DL
BUN SERPL-MCNC: 21.3 MG/DL (ref 8–23)
CALCIUM SERPL-MCNC: 8.9 MG/DL (ref 8.8–10.2)
CHLORIDE SERPL-SCNC: 102 MMOL/L (ref 98–107)
CREAT BLD-MCNC: 1.1 MG/DL (ref 0.7–1.3)
CREAT SERPL-MCNC: 1.04 MG/DL (ref 0.67–1.17)
DEPRECATED HCO3 PLAS-SCNC: 25 MMOL/L (ref 22–29)
EGFRCR SERPLBLD CKD-EPI 2021: 78 ML/MIN/1.73M2
EGFRCR SERPLBLD CKD-EPI 2021: >60 ML/MIN/1.73M2
EOSINOPHIL # BLD AUTO: 0.3 10E3/UL (ref 0–0.7)
EOSINOPHIL NFR BLD AUTO: 4 %
ERYTHROCYTE [DISTWIDTH] IN BLOOD BY AUTOMATED COUNT: 15.5 % (ref 10–15)
GLUCOSE SERPL-MCNC: 77 MG/DL (ref 70–99)
HCT VFR BLD AUTO: 40 % (ref 40–53)
HGB BLD-MCNC: 13.4 G/DL (ref 13.3–17.7)
IMM GRANULOCYTES # BLD: 0 10E3/UL
IMM GRANULOCYTES NFR BLD: 0 %
LYMPHOCYTES # BLD AUTO: 2 10E3/UL (ref 0.8–5.3)
LYMPHOCYTES NFR BLD AUTO: 24 %
MCH RBC QN AUTO: 32.1 PG (ref 26.5–33)
MCHC RBC AUTO-ENTMCNC: 33.5 G/DL (ref 31.5–36.5)
MCV RBC AUTO: 96 FL (ref 78–100)
MONOCYTES # BLD AUTO: 1.4 10E3/UL (ref 0–1.3)
MONOCYTES NFR BLD AUTO: 17 %
NEUTROPHILS # BLD AUTO: 4.4 10E3/UL (ref 1.6–8.3)
NEUTROPHILS NFR BLD AUTO: 53 %
NRBC # BLD AUTO: 0 10E3/UL
NRBC BLD AUTO-RTO: 0 /100
PLATELET # BLD AUTO: 279 10E3/UL (ref 150–450)
POTASSIUM SERPL-SCNC: 4.6 MMOL/L (ref 3.4–5.3)
PROT SERPL-MCNC: 7.2 G/DL (ref 6.4–8.3)
RBC # BLD AUTO: 4.18 10E6/UL (ref 4.4–5.9)
SODIUM SERPL-SCNC: 136 MMOL/L (ref 135–145)
WBC # BLD AUTO: 8.2 10E3/UL (ref 4–11)

## 2023-11-03 PROCEDURE — 99000 SPECIMEN HANDLING OFFICE-LAB: CPT | Performed by: PATHOLOGY

## 2023-11-03 PROCEDURE — 82565 ASSAY OF CREATININE: CPT | Mod: 91 | Performed by: PATHOLOGY

## 2023-11-03 PROCEDURE — 85025 COMPLETE CBC W/AUTO DIFF WBC: CPT | Performed by: PATHOLOGY

## 2023-11-03 PROCEDURE — 86316 IMMUNOASSAY TUMOR OTHER: CPT | Mod: 90 | Performed by: PATHOLOGY

## 2023-11-03 PROCEDURE — G1010 CDSM STANSON: HCPCS | Performed by: RADIOLOGY

## 2023-11-03 PROCEDURE — 80053 COMPREHEN METABOLIC PANEL: CPT | Performed by: PATHOLOGY

## 2023-11-03 PROCEDURE — 74177 CT ABD & PELVIS W/CONTRAST: CPT | Mod: MG | Performed by: RADIOLOGY

## 2023-11-03 PROCEDURE — 36415 COLL VENOUS BLD VENIPUNCTURE: CPT | Performed by: PATHOLOGY

## 2023-11-03 PROCEDURE — 71260 CT THORAX DX C+: CPT | Mod: MG | Performed by: RADIOLOGY

## 2023-11-03 RX ORDER — IOPAMIDOL 755 MG/ML
94 INJECTION, SOLUTION INTRAVASCULAR ONCE
Status: COMPLETED | OUTPATIENT
Start: 2023-11-03 | End: 2023-11-03

## 2023-11-03 RX ADMIN — IOPAMIDOL 94 ML: 755 INJECTION, SOLUTION INTRAVASCULAR at 11:22

## 2023-11-05 LAB — CGA SERPL-MCNC: 605 NG/ML

## 2023-11-06 ENCOUNTER — PATIENT OUTREACH (OUTPATIENT)
Dept: ONCOLOGY | Facility: CLINIC | Age: 68
End: 2023-11-06

## 2023-11-06 ENCOUNTER — ALLIED HEALTH/NURSE VISIT (OUTPATIENT)
Dept: EDUCATION SERVICES | Facility: CLINIC | Age: 68
End: 2023-11-06
Payer: MEDICARE

## 2023-11-06 ENCOUNTER — ONCOLOGY VISIT (OUTPATIENT)
Dept: ONCOLOGY | Facility: CLINIC | Age: 68
End: 2023-11-06
Attending: INTERNAL MEDICINE
Payer: MEDICARE

## 2023-11-06 ENCOUNTER — OFFICE VISIT (OUTPATIENT)
Dept: SURGERY | Facility: CLINIC | Age: 68
End: 2023-11-06
Payer: MEDICARE

## 2023-11-06 VITALS
TEMPERATURE: 98.2 F | SYSTOLIC BLOOD PRESSURE: 145 MMHG | DIASTOLIC BLOOD PRESSURE: 83 MMHG | WEIGHT: 191 LBS | OXYGEN SATURATION: 96 % | HEART RATE: 77 BPM | RESPIRATION RATE: 18 BRPM | BODY MASS INDEX: 29.04 KG/M2

## 2023-11-06 VITALS
BODY MASS INDEX: 28.96 KG/M2 | WEIGHT: 191.1 LBS | OXYGEN SATURATION: 96 % | HEIGHT: 68 IN | DIASTOLIC BLOOD PRESSURE: 83 MMHG | SYSTOLIC BLOOD PRESSURE: 145 MMHG | HEART RATE: 77 BPM

## 2023-11-06 DIAGNOSIS — T81.49XA SUPERFICIAL POSTOPERATIVE WOUND INFECTION: Primary | ICD-10-CM

## 2023-11-06 DIAGNOSIS — D3A.8 NEUROENDOCRINE TUMOR (H): ICD-10-CM

## 2023-11-06 DIAGNOSIS — C7A.8 NEUROENDOCRINE CARCINOMA OF SMALL BOWEL (H): Primary | ICD-10-CM

## 2023-11-06 DIAGNOSIS — E11.9 TYPE 2 DIABETES MELLITUS WITHOUT COMPLICATION (H): Primary | ICD-10-CM

## 2023-11-06 PROBLEM — R62.7 FAILURE TO THRIVE IN ADULT: Status: ACTIVE | Noted: 2022-08-10

## 2023-11-06 PROBLEM — K85.90 ACUTE PANCREATITIS: Status: ACTIVE | Noted: 2022-04-18

## 2023-11-06 PROBLEM — E87.1 HYPONATREMIA: Status: ACTIVE | Noted: 2022-07-24

## 2023-11-06 PROCEDURE — 99214 OFFICE O/P EST MOD 30 MIN: CPT | Performed by: SURGERY

## 2023-11-06 PROCEDURE — G0108 DIAB MANAGE TRN  PER INDIV: HCPCS | Performed by: DIETITIAN, REGISTERED

## 2023-11-06 PROCEDURE — G0463 HOSPITAL OUTPT CLINIC VISIT: HCPCS | Performed by: INTERNAL MEDICINE

## 2023-11-06 PROCEDURE — 99215 OFFICE O/P EST HI 40 MIN: CPT | Performed by: INTERNAL MEDICINE

## 2023-11-06 ASSESSMENT — PAIN SCALES - GENERAL
PAINLEVEL: NO PAIN (0)
PAINLEVEL: NO PAIN (0)

## 2023-11-06 NOTE — NURSING NOTE
"Oncology Rooming Note    November 6, 2023 2:01 PM   Arnaldo Henderson is a 68 year old male who presents for:    Chief Complaint   Patient presents with    Oncology Clinic Visit     RTN for Colon Cancer     Initial Vitals: BP (!) 145/83   Pulse 77   Temp 98.2  F (36.8  C) (Oral)   Resp 18   Wt 86.6 kg (191 lb)   SpO2 96%   BMI 29.04 kg/m   Estimated body mass index is 29.04 kg/m  as calculated from the following:    Height as of an earlier encounter on 11/6/23: 1.727 m (5' 8\").    Weight as of this encounter: 86.6 kg (191 lb). Body surface area is 2.04 meters squared.  No Pain (0) Comment: Data Unavailable   No LMP for male patient.  Allergies reviewed: Yes  Medications reviewed: Yes    Medications: Medication refills not needed today.  Pharmacy name entered into [employer]:    [id number] [place] [id number] [facility] - [place], [place] - [facility] [address]    Clinical concerns: none       Ngozi Lan MA             "

## 2023-11-06 NOTE — PROGRESS NOTES
Mr. Howell is here for follow-up regarding his open wound.  He denies fevers chills or sweats.  He is scheduled to see Dr. Roa this afternoon.    On physical exam Mr. Howell appears to be alert oriented and happy.  He has gained some weight since my last exam and today weighs 191 pounds.  HEENT is without scleral icterus on abdominal exam his abdomen is soft and nontender his wound is well-healed.    Assessment/plan status post distal pancreatectomy and splenectomy for complications of severe acute pancreatitis.  Mild ongoing abdominal pain.  I am suspicious that this may represent some scar pain we will start physical therapy.  2.  Neuroendocrine tumor.  He is scheduled to see Dr. Lyn today.  No obvious surgical interventions needed at this time.    Visit time 25 minutes    Follow-up in 3 to 4 months.

## 2023-11-06 NOTE — PROGRESS NOTES
Diabetes Self-Management Education & Support    Arnaldo Henderson presents today for education related to Type 2 diabetes    Patient is being treated with:  insulin  He is accompanied by self and spouse, Veronika    Year of diagnosis: 2013 with recent distal pancreatectomy on 6/3/2023  Referring provider:  Valeri Gomez PA-C   Living Situation: with spouse, continues to go between [place] and [place]  Employment: Retired    PATIENT CONCERNS RELATED TO DIABETES SELF MANAGEMENT: Blood sugars continue to be variable    ASSESSMENT:    Taking Medication:     Current Diabetes Management per Patient:  Taking diabetes medications? yes:     Diabetes Medication(s)       Insulin       insulin aspart (NOVOLOG PEN) 100 UNIT/ML pen    Inject 8 units with meals with correction insulin ( 1 unit/50 for BG > 140 ). Pt uses approx 40 units daily.     insulin glargine (BASAGLAR KWIKPEN) 100 UNIT/ML pen    Inject 26 units subcutaneous at hs, increase by 2 units every 3 rd day until FBS < 130. Max dose 28 units daily.          30 units of basaglar at night    4 units at breakfast  6 units with lunch  10 units with dinner      Monitoring    Patient glucose self monitoring as follows: continuously using a continuous glucose monitor (CGM)  BG meter: Dexcom G7  BG results: see below:              Average Glucose: 205  Time in Range is 41%  Time above Range: 58%   Time below Range: 1%    Patient's most recent   Lab Results   Component Value Date    A1C 9.6 06/30/2023      Patient's A1C goal: <7.0    Activity: no regular exercise program    Healthy Eating:   Patient currently eats 3 meals 1 snacks per day   Wakes up 5-6 am and will eat breakfast  Will eat lunch ~11-12pm    24 hour diet recall:  Meal Time Food and Drink   Breakfast  Blueberry muffin, cheerio, protein shake, yogurt   Snack     Lunch  Ham croissants, soup, apple   Snack     Dinner  Meat + potatoes, peaches   Snack         Says he does not eat vegetables, may eat carrots or  celery  Trying to incorporate more fruit in diet      Problem Solving:      Patient is at risk of hypoglycemia?: YES  Hospitalizations for hyper or hypoglycemia: No    Healthy Coping and Stress Management:   Sources of stress identified by patient: My health      EDUCATION and INSTRUCTION PROVIDED AT THIS VISIT:    T2DM patient seen for follow-up at the request of Hayley Alatorre PA-C. He is here with his wife, Veronika, today. They were in [place] for over a month and have just returned to [place]. They are still in the process of getting back into a regular routine. Arnaldo's CGM data is reviewed. Time in Range is 41% which has improved since prior visit. He continues to take 30 units of Basaglar at night, 4 units of Novolog at breakfast, 6 units of Novolog at lunch, and 10 units of Novolog at dinner. He and his wife don't think now is a good time for learning carb counting, he prefers to stick to fixed doses. Explained that with fixed dosing, it is important to keep carbohydrate content consistent at meals. Discussed that he can aim for 60-75 grams of carbs at meals, and 15-30 grams of carbs at dinner. He will try to add more vegetables to diet, will begin with adding salad to dinner.  He says he had an low episode last Friday as he was fasting prior to a lab draw. He usually treats with food/drink. Discussed 15-15 Rule and buying glucose tablets as those are hard to over-treat on. Discussed using finger-sticks to double check when low as the sensor lags behind blood glucose meter by 15-20 minutes. He says he is learning a lot from Dexcom G7 and that it is keeping him accountable. Discussed using CGM as tool to see how different foods affect blood glucose. He verbalized understanding of education and instruction provided.    PLAN:  - Try adding at salad before meals. You can also buy steam-able frozen vegetables    - https://www.diabetesfoodhub.org/ for recipes    - Continue Basaglar to 30 units at bedtime    - 4  units of NovoLog with breakfast, 6 units of NovoLog with lunch, 10 units of NovoLog with dinner    - Correction scale 1/50/140:  For Pre-Meal  - 189 give 1 unit.   For Pre-Meal  - 239 give 2 units.   For Pre-Meal  - 289 give 3 units.   For Pre-Meal  - 339 give 4 units.   For Pre-Meal BG = or > 340 give 5 units.     - Bedtime correction scale:  At bedtime and overnight BG >200 AND - it has been at least 4 hours since last injection please correct any BG>200.    At bedtime/overnight:  For  - 249 give 1 units.   For  - 299 give 2 units.   For  - 349 give 3 units.   For BG = or > 350 give 4 units      FOLLOW-UP:    With Diabetes Education on December 5th  With Hayley Alatorre on 1/2/2024    Time spent with patient at today's visit was 45 minutes.      Any diabetes medication dose changes were made via the CDE Protocol and Collaborative Practice Agreement with [facility].  A copy of this encounter was provided to patient's referring provider.

## 2023-11-06 NOTE — PROGRESS NOTES
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  His tumor had a Ki-67 index of 10% and 1 of 4 lymph nodes were positive.  He has had a prolonged postoperative course and just finally as of today has had complete healing of his surgical wound.  He has ongoing significant problems with very labile glucoses.  He is doing fairly well with his bowel function now and only has a stool perhaps every other day.  He had one episode of intense flushing this past weekend which is the first time it happened to him and did not seem to have any precipitating event.  He has gained back all the weight he lost in the last 2 years.  His Parkinson symptoms are fairly well controlled but they always get worse at times of anxiety like today's clinic visit.    On exam today he appears well.  He is alert and oriented and able to participate actively in the visit.  He has a marked tremor.    I personally reviewed his CT scan over the results with him and his wife.  In the small bowel mesentery are several lymph nodes which are clearly increased in size. The largest is about 14 mm and had previously been about 7 mm.  I do not see any liver metastases or other sites of disease.    His lab work is notable for his chromogranin A level having gone from about 400 up to 600. He has normal electrolytes and renal function.  His bilirubin, albumin and liver enzymes are normal.  His blood counts are normal.      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  We will plan on getting a dotatate PET scan to clarify that such is the case and whether  there is more extensive disease than we currently recognize.  I discussed with the patient and his wife that sometimes we see accelerated growth rate after surgical procedures and this does not necessarily portend rapid regrowth of his tumor.  Assuming we do not see much more volume of disease on his PET scan I would plan on repeating CT scan in 2 to 3 months.  If he has the same rate of growth after another 3 months then we will need to consider starting active therapy with a somatostatin analog, but if we see stable disease over the next 3 months then I am going to recommend observation.  We went over symptoms that he might develop related to his tumor that he should bring to our attention as those would also push us towards starting active treatment.  We will follow-up with him once we see the results of the dotatate PET scan.

## 2023-11-06 NOTE — PROGRESS NOTES
[facility]: Cancer Care Initial Note                                    Discussion with Patient:                                                      Introduced self and role of RN Care Coordinator at [facility]. Provided my contact information, [facility] phone number (which has options to talk with a Nurse available 24/7 - triage and RNCC via this option during business hours).     Reviewed current clinic flow including telemedicine visits and RNCCs working remotely at varying intervals. Reviewed appropriate use of mychart and reinforced to not send symptoms through mychart.      Reviewed [facility] care team members including midlevel providers in oncology dept and Dr. Lyn's usual clinic hours.    Pt voiced understanding and appreciation of above information and denies any further questions, and she understands that I will follow and provide coordination as needed.       Assessment:                                                      Initial  Current living arrangement:: I live in a private home with spouse  Type of residence:: Apartment  Informal Support system:: Family  Bed or wheelchair confined:: No  Mobility Status: Independent  Transportation means:: Regular car    Intervention/Education provided during outreach:                                                       Patient to follow up as scheduled at next appt  Patient to call/Layer[employer]ulthart message with updates  Confirmed patient has clinic and triage numbers      Tawana Hinkle RN, BSN, OCN   RN Care Coordinator   [facility]

## 2023-11-06 NOTE — LETTER
11/6/2023         RE: Arnaldo Henderson  [address]  [facility] [address]      Sri Henderson returns today in follow-up of neuroendocrine tumor of the small bowel.    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  His tumor had a Ki-67 index of 10% and 1 of 4 lymph nodes were positive.  He has had a prolonged postoperative course and just finally as of today has had come pleat healing of his surgical wound.  He has ongoing significant problems with very labile glucoses.  He is doing fairly well with his bowel function now and only has a stool perhaps every other day.  He had one episode of intense flushing this past weekend which is the first time it happened to him and did not seem to have any precipitating event.  He is gained back all the weight he lost in the last 2 years.  His Parkinson symptoms are fairly well controlled but they always get worse at times of anxiety like today's clinic visit.    On exam today he appears well.  He is alert and oriented and able to participate actively in the visit.  He has a marked tremor.    I personally reviewed his CT scan over the results with him and his wife.  In the small bowel mesentery as several lymph nodes which is clearly increased in size the largest is about 14 mm have not previously been about 7 mm.  I do not see any liver metastases or other sites of disease.    His lab work is Notable for his chromogranin A level having gone from about 400 up to 600. He has normal electrolytes and renal function.  His bilirubin, albumin and liver enzymes are normal.  His blood counts are normal.      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  We will plan on getting a dotatate PET scan to clarify that such is the case and whether there is more extensive disease and we currently recognize.  I discussed with the patient and his wife that sometimes we see accelerated growth rate after surgical procedures and this is not necessarily portend rapid regrowth of his tumor.  Assuming we do not see much more volume of disease on his PET scan I would plan on repeating CT scan in 2 to 3 months.  If he has the same rate of growth after another 3 months then we will need to consider starting active therapy with a somatostatin analog, but if we see stable disease over the next 3 months then I am going to recommend observation.  We went over symptoms that he might develop related to his tumor that he should bring to our attention as those would also push us towards starting active treatment.  We will follow-up with him once we see the results of the dotatate PET scan.        Caio Lyn MD

## 2023-11-06 NOTE — PATIENT INSTRUCTIONS
Abdi Cadet,    It was nice seeing you today:    Plan:  - Try adding at salad before meals. You can also buy steam-able frozen vegetables    - https://www.diabetesfoodhub.org/ for recipes    - Continue Basaglar to 30 units at bedtime    - 4 units of NovoLog with breakfast, 6 units of NovoLog with lunch, increase to 10 units of NovoLog with dinner    - Correction scale 1/50/140:  For Pre-Meal  - 189 give 1 unit.   For Pre-Meal  - 239 give 2 units.   For Pre-Meal  - 289 give 3 units.   For Pre-Meal  - 339 give 4 units.   For Pre-Meal BG = or > 340 give 5 units.     - Bedtime correction scale:  At bedtime and overnight BG >200 AND - it has been at least 4 hours since last injection please correct any BG>200.    At bedtime/overnight:  For  - 249 give 1 units.   For  - 299 give 2 units.   For  - 349 give 3 units.   For BG = or > 350 give 4 units      Call or Mychart if you have any questions.  Let us know if you're having 2 or more Blood Glucose readings less than 70 or greater than 350 a week. Do not wait until your next appointment.    Idalia BELLO  Diabetes Education  [facility] [address] [address]  Phone: [phone]  Email: [email or web address]     Rinvoq Counseling: I discussed with the patient the risks of Rinvoq therapy including but not limited to upper respiratory tract infections, shingles, cold sores, bronchitis, nausea, cough, fever, acne, and headache. Live vaccines should be avoided.  This medication has been linked to serious infections; higher rate of mortality; malignancy and lymphoproliferative disorders; major adverse cardiovascular events; thrombosis; thrombocytopenia, anemia, and neutropenia; lipid elevations; liver enzyme elevations; and gastrointestinal perforations.

## 2023-11-06 NOTE — NURSING NOTE
"Chief Complaint   Patient presents with    RECHECK     Wound check       Vitals:    11/06/23 1014   BP: (!) 145/83   BP Location: Right arm   Patient Position: Sitting   Cuff Size: Adult Regular   Pulse: 77   SpO2: 96%   Weight: 86.7 kg (191 lb 1.6 oz)   Height: 1.727 m (5' 8\")       Body mass index is 29.06 kg/m .                          Mike Macdonald, EMT    "

## 2023-11-06 NOTE — LETTER
11/6/2023       RE: Arnaldo Henderson  [address]  [facility] [address]     Dear Colleague,    Thank you for referring your patient, Arnaldo Henderson, to the [facility] [place] [facility]. Please see a copy of my visit note below.    Mr. Howell is here for follow-up regarding his open wound.  He denies fevers chills or sweats.  He is scheduled to see Dr. Roa this afternoon.    On physical exam Mr. Howell appears to be alert oriented and happy.  He has gained some weight since my last exam and today weighs 191 pounds.  HEENT is without scleral icterus on abdominal exam his abdomen is soft and nontender his wound is well-healed.    Assessment/plan status post distal pancreatectomy and splenectomy for complications of severe acute pancreatitis.  Mild ongoing abdominal pain.  I am suspicious that this may represent some scar pain we will start physical therapy.  2.  Neuroendocrine tumor.  He is scheduled to see Dr. Lyn today.  No obvious surgical interventions needed at this time.    Visit time 25 minutes    Follow-up in 3 to 4 months.      Again, thank you for allowing me to participate in the care of your patient.      Sincerely,    Ashvin Haider MD

## 2023-11-09 DIAGNOSIS — T81.49XA SURGICAL SITE INFECTION: Primary | ICD-10-CM

## 2023-11-16 ENCOUNTER — LAB (OUTPATIENT)
Dept: LAB | Facility: CLINIC | Age: 68
End: 2023-11-16
Payer: MEDICARE

## 2023-11-16 DIAGNOSIS — E11.65 TYPE 2 DIABETES MELLITUS WITH HYPERGLYCEMIA, WITH LONG-TERM CURRENT USE OF INSULIN (H): ICD-10-CM

## 2023-11-16 DIAGNOSIS — E44.1 MILD PROTEIN-CALORIE MALNUTRITION (H): ICD-10-CM

## 2023-11-16 DIAGNOSIS — Z79.4 TYPE 2 DIABETES MELLITUS WITH HYPERGLYCEMIA, WITH LONG-TERM CURRENT USE OF INSULIN (H): ICD-10-CM

## 2023-11-16 LAB — HBA1C MFR BLD: 8.2 % (ref 0–5.6)

## 2023-11-16 PROCEDURE — 83036 HEMOGLOBIN GLYCOSYLATED A1C: CPT

## 2023-11-16 PROCEDURE — 36415 COLL VENOUS BLD VENIPUNCTURE: CPT

## 2023-11-19 ENCOUNTER — MYC MEDICAL ADVICE (OUTPATIENT)
Dept: ENDOCRINOLOGY | Facility: CLINIC | Age: 68
End: 2023-11-19
Payer: MEDICARE

## 2023-11-21 ENCOUNTER — HOSPITAL ENCOUNTER (EMERGENCY)
Facility: CLINIC | Age: 68
Discharge: HOME | End: 2023-11-22
Attending: STUDENT IN AN ORGANIZED HEALTH CARE EDUCATION/TRAINING PROGRAM | Admitting: STUDENT IN AN ORGANIZED HEALTH CARE EDUCATION/TRAINING PROGRAM
Payer: MEDICARE

## 2023-11-21 ENCOUNTER — HOSPITAL ENCOUNTER (OUTPATIENT)
Dept: PET IMAGING | Facility: CLINIC | Age: 68
Setting detail: NUCLEAR MEDICINE
Discharge: HOME | End: 2023-11-21
Attending: INTERNAL MEDICINE | Admitting: INTERNAL MEDICINE
Payer: MEDICARE

## 2023-11-21 ENCOUNTER — NURSE TRIAGE (OUTPATIENT)
Dept: NURSING | Facility: CLINIC | Age: 68
End: 2023-11-21
Payer: MEDICARE

## 2023-11-21 ENCOUNTER — TELEPHONE (OUTPATIENT)
Dept: MULTI SPECIALTY CLINIC | Facility: CLINIC | Age: 68
End: 2023-11-21
Payer: MEDICARE

## 2023-11-21 VITALS
SYSTOLIC BLOOD PRESSURE: 144 MMHG | OXYGEN SATURATION: 95 % | RESPIRATION RATE: 15 BRPM | HEART RATE: 73 BPM | DIASTOLIC BLOOD PRESSURE: 81 MMHG | TEMPERATURE: 97.3 F

## 2023-11-21 DIAGNOSIS — Z79.4 ENCOUNTER FOR LONG-TERM (CURRENT) USE OF INSULIN (H): ICD-10-CM

## 2023-11-21 DIAGNOSIS — C7A.8 NEUROENDOCRINE CARCINOMA OF SMALL BOWEL (H): ICD-10-CM

## 2023-11-21 DIAGNOSIS — E11.65 INADEQUATELY CONTROLLED DIABETES MELLITUS (H): Primary | ICD-10-CM

## 2023-11-21 DIAGNOSIS — R19.7 DIARRHEA, UNSPECIFIED TYPE: ICD-10-CM

## 2023-11-21 DIAGNOSIS — R73.9 HYPERGLYCEMIA: ICD-10-CM

## 2023-11-21 LAB
ALBUMIN SERPL BCG-MCNC: 4.3 G/DL (ref 3.5–5.2)
ALBUMIN UR-MCNC: NEGATIVE MG/DL
ALP SERPL-CCNC: 122 U/L (ref 40–150)
ALT SERPL W P-5'-P-CCNC: 6 U/L (ref 0–70)
ANION GAP SERPL CALCULATED.3IONS-SCNC: 11 MMOL/L (ref 7–15)
APPEARANCE UR: CLEAR
AST SERPL W P-5'-P-CCNC: 24 U/L (ref 0–45)
B-OH-BUTYR SERPL-SCNC: 0.2 MMOL/L
BASE EXCESS BLDV CALC-SCNC: 1.5 MMOL/L (ref -7.7–1.9)
BASOPHILS # BLD AUTO: 0.1 10E3/UL (ref 0–0.2)
BASOPHILS NFR BLD AUTO: 1 %
BILIRUB SERPL-MCNC: 1.2 MG/DL
BILIRUB UR QL STRIP: NEGATIVE
BUN SERPL-MCNC: 34.1 MG/DL (ref 8–23)
C DIFF TOX B STL QL: NEGATIVE
CALCIUM SERPL-MCNC: 9.5 MG/DL (ref 8.8–10.2)
CHLORIDE SERPL-SCNC: 99 MMOL/L (ref 98–107)
COLOR UR AUTO: YELLOW
CREAT SERPL-MCNC: 0.99 MG/DL (ref 0.67–1.17)
DEPRECATED HCO3 PLAS-SCNC: 26 MMOL/L (ref 22–29)
EGFRCR SERPLBLD CKD-EPI 2021: 82 ML/MIN/1.73M2
EOSINOPHIL # BLD AUTO: 0 10E3/UL (ref 0–0.7)
EOSINOPHIL NFR BLD AUTO: 0 %
ERYTHROCYTE [DISTWIDTH] IN BLOOD BY AUTOMATED COUNT: 14.8 % (ref 10–15)
GLUCOSE SERPL-MCNC: 205 MG/DL (ref 70–99)
GLUCOSE UR STRIP-MCNC: 1000 MG/DL
HCO3 BLDV-SCNC: 27 MMOL/L (ref 21–28)
HCT VFR BLD AUTO: 39.9 % (ref 40–53)
HGB BLD-MCNC: 13.4 G/DL (ref 13.3–17.7)
HGB UR QL STRIP: NEGATIVE
HYALINE CASTS: 1 /LPF
IMM GRANULOCYTES # BLD: 0 10E3/UL
IMM GRANULOCYTES NFR BLD: 0 %
KETONES UR STRIP-MCNC: NEGATIVE MG/DL
LEUKOCYTE ESTERASE UR QL STRIP: NEGATIVE
LYMPHOCYTES # BLD AUTO: 1.7 10E3/UL (ref 0.8–5.3)
LYMPHOCYTES NFR BLD AUTO: 17 %
MCH RBC QN AUTO: 32.8 PG (ref 26.5–33)
MCHC RBC AUTO-ENTMCNC: 33.6 G/DL (ref 31.5–36.5)
MCV RBC AUTO: 98 FL (ref 78–100)
MONOCYTES # BLD AUTO: 1.3 10E3/UL (ref 0–1.3)
MONOCYTES NFR BLD AUTO: 13 %
MUCOUS THREADS #/AREA URNS LPF: PRESENT /LPF
NEUTROPHILS # BLD AUTO: 6.9 10E3/UL (ref 1.6–8.3)
NEUTROPHILS NFR BLD AUTO: 69 %
NITRATE UR QL: NEGATIVE
NRBC # BLD AUTO: 0 10E3/UL
NRBC BLD AUTO-RTO: 0 /100
O2/TOTAL GAS SETTING VFR VENT: 30 %
PCO2 BLDV: 44 MM HG (ref 40–50)
PH BLDV: 7.39 [PH] (ref 7.32–7.43)
PH UR STRIP: 6 [PH] (ref 5–7)
PLATELET # BLD AUTO: 327 10E3/UL (ref 150–450)
PO2 BLDV: 46 MM HG (ref 25–47)
POTASSIUM SERPL-SCNC: 4.2 MMOL/L (ref 3.4–5.3)
PROT SERPL-MCNC: 8 G/DL (ref 6.4–8.3)
RBC # BLD AUTO: 4.08 10E6/UL (ref 4.4–5.9)
RBC URINE: 0 /HPF
SODIUM SERPL-SCNC: 136 MMOL/L (ref 135–145)
SP GR UR STRIP: 1.01 (ref 1–1.03)
UROBILINOGEN UR STRIP-MCNC: NORMAL MG/DL
WBC # BLD AUTO: 10 10E3/UL (ref 4–11)
WBC URINE: 1 /HPF

## 2023-11-21 PROCEDURE — 99284 EMERGENCY DEPT VISIT MOD MDM: CPT | Performed by: STUDENT IN AN ORGANIZED HEALTH CARE EDUCATION/TRAINING PROGRAM

## 2023-11-21 PROCEDURE — 87507 IADNA-DNA/RNA PROBE TQ 12-25: CPT | Performed by: STUDENT IN AN ORGANIZED HEALTH CARE EDUCATION/TRAINING PROGRAM

## 2023-11-21 PROCEDURE — 87493 C DIFF AMPLIFIED PROBE: CPT | Mod: XU | Performed by: STUDENT IN AN ORGANIZED HEALTH CARE EDUCATION/TRAINING PROGRAM

## 2023-11-21 PROCEDURE — 85025 COMPLETE CBC W/AUTO DIFF WBC: CPT | Performed by: STUDENT IN AN ORGANIZED HEALTH CARE EDUCATION/TRAINING PROGRAM

## 2023-11-21 PROCEDURE — 82803 BLOOD GASES ANY COMBINATION: CPT | Performed by: STUDENT IN AN ORGANIZED HEALTH CARE EDUCATION/TRAINING PROGRAM

## 2023-11-21 PROCEDURE — 258N000003 HC RX IP 258 OP 636: Performed by: STUDENT IN AN ORGANIZED HEALTH CARE EDUCATION/TRAINING PROGRAM

## 2023-11-21 PROCEDURE — 82010 KETONE BODYS QUAN: CPT | Performed by: STUDENT IN AN ORGANIZED HEALTH CARE EDUCATION/TRAINING PROGRAM

## 2023-11-21 PROCEDURE — 80053 COMPREHEN METABOLIC PANEL: CPT | Performed by: STUDENT IN AN ORGANIZED HEALTH CARE EDUCATION/TRAINING PROGRAM

## 2023-11-21 PROCEDURE — 99283 EMERGENCY DEPT VISIT LOW MDM: CPT | Mod: 25

## 2023-11-21 PROCEDURE — 81001 URINALYSIS AUTO W/SCOPE: CPT | Performed by: STUDENT IN AN ORGANIZED HEALTH CARE EDUCATION/TRAINING PROGRAM

## 2023-11-21 PROCEDURE — 250N000011 HC RX IP 250 OP 636: Mod: JZ | Performed by: INTERNAL MEDICINE

## 2023-11-21 PROCEDURE — A9592 HC RX IP 250 OP 636: HCPCS | Mod: JZ | Performed by: INTERNAL MEDICINE

## 2023-11-21 PROCEDURE — 78816 PET IMAGE W/CT FULL BODY: CPT | Mod: 26 | Performed by: RADIOLOGY

## 2023-11-21 PROCEDURE — G1010 CDSM STANSON: HCPCS | Performed by: RADIOLOGY

## 2023-11-21 PROCEDURE — 96360 HYDRATION IV INFUSION INIT: CPT

## 2023-11-21 PROCEDURE — G1010 CDSM STANSON: HCPCS | Mod: PI

## 2023-11-21 PROCEDURE — 36415 COLL VENOUS BLD VENIPUNCTURE: CPT | Performed by: STUDENT IN AN ORGANIZED HEALTH CARE EDUCATION/TRAINING PROGRAM

## 2023-11-21 RX ORDER — ONDANSETRON 2 MG/ML
4 INJECTION INTRAMUSCULAR; INTRAVENOUS EVERY 30 MIN PRN
Status: DISCONTINUED | OUTPATIENT
Start: 2023-11-21 | End: 2023-11-22 | Stop reason: HOSPADM

## 2023-11-21 RX ADMIN — COPPER CU 64 DOTATATE 4.6 MILLICURIE: 1 INJECTION, SOLUTION INTRAVENOUS at 12:28

## 2023-11-21 RX ADMIN — SODIUM CHLORIDE, POTASSIUM CHLORIDE, SODIUM LACTATE AND CALCIUM CHLORIDE 1000 ML: 600; 310; 30; 20 INJECTION, SOLUTION INTRAVENOUS at 20:52

## 2023-11-21 ASSESSMENT — ACTIVITIES OF DAILY LIVING (ADL)
ADLS_ACUITY_SCORE: 35
ADLS_ACUITY_SCORE: 35

## 2023-11-21 NOTE — TELEPHONE ENCOUNTER
"Pt gave verbal approval to speak with his wife Veronika. Veronika reports \"his blood sugars are 400+ and now he is having uncontrollable diarrhea\". PET scan today and diarrhea started \"right after he was done with his scan\". Veronika reports pt had a cortisone shot \"last Friday, ever since that time blood sugar has been very high\". Blood glucose has been reading \"high\" since 9 am despite using correction scale every four hours as directed through MyChart note 11/20/23. Pt reports \"urine is yellow\". Manual check of blood glucose also reads high per pt. Pt reports \"I feel fine\".     Writer advised Veronika pt should be seen in the ER tonight per protocol. On call for endocrinology will be paged however if she does not hear back from them she should bring pt to ER, call 911 if no other way to transport. Writer connected Veronika to paging  for further assistance.    Veronika verbalizes understanding and agrees to plan.         Reason for Disposition   Patient sounds very sick or weak to the triager    Additional Information   Negative: Unconscious or difficult to awaken   Negative: Acting confused (e.g., disoriented, slurred speech)   Negative: Very weak (e.g., can't stand)   Negative: Sounds like a life-threatening emergency to the triager   Negative: [1] Vomiting AND [2] signs of dehydration (e.g., very dry mouth, lightheaded, dark urine)   Negative: [1] Blood glucose > 240 mg/dL (13.3 mmol/L) AND [2] rapid breathing    Prot[facility]      Tech problems occurred prior to finishing charting.   "

## 2023-11-22 LAB
ADV 40+41 DNA STL QL NAA+NON-PROBE: NEGATIVE
ASTRO TYP 1-8 RNA STL QL NAA+NON-PROBE: NEGATIVE
C CAYETANENSIS DNA STL QL NAA+NON-PROBE: NEGATIVE
CAMPYLOBACTER DNA SPEC NAA+PROBE: NEGATIVE
CRYPTOSP DNA STL QL NAA+NON-PROBE: NEGATIVE
E COLI O157 DNA STL QL NAA+NON-PROBE: NORMAL
E HISTOLYT DNA STL QL NAA+NON-PROBE: NEGATIVE
EAEC ASTA GENE ISLT QL NAA+PROBE: NEGATIVE
EC STX1+STX2 GENES STL QL NAA+NON-PROBE: NEGATIVE
EPEC EAE GENE STL QL NAA+NON-PROBE: NEGATIVE
ETEC LTA+ST1A+ST1B TOX ST NAA+NON-PROBE: NEGATIVE
G LAMBLIA DNA STL QL NAA+NON-PROBE: NEGATIVE
NOROVIRUS GI+II RNA STL QL NAA+NON-PROBE: NEGATIVE
P SHIGELLOIDES DNA STL QL NAA+NON-PROBE: NEGATIVE
RVA RNA STL QL NAA+NON-PROBE: NEGATIVE
SALMONELLA SP RPOD STL QL NAA+PROBE: NEGATIVE
SAPO I+II+IV+V RNA STL QL NAA+NON-PROBE: NEGATIVE
SHIGELLA SP+EIEC IPAH ST NAA+NON-PROBE: NEGATIVE
V CHOLERAE DNA SPEC QL NAA+PROBE: NEGATIVE
VIBRIO DNA SPEC NAA+PROBE: NEGATIVE
Y ENTEROCOL DNA STL QL NAA+PROBE: NEGATIVE

## 2023-11-22 NOTE — DISCHARGE INSTRUCTIONS
For coming to the [facility] ED.  Your lab work today is reassuring.  We are unsure why you are having the diarrhea.  Your C. difficile is negative.  The recent steroid injection is most likely due to your higher than normal blood sugars.  There is no evidence of any serious complications of these.  Please watch your diet closely and use your insulin dosing as prescribed by your primary care/endocrinology team or whomever manages your insulin.  If you develop any new or concerning symptoms before you are able to follow-up in clinic in the next week or so please feel free to return to the emergency department at any time.

## 2023-11-22 NOTE — ED TRIAGE NOTES
Pt ambulatory to triage with c/o hyperglycemia. Pt states his BGs have been in the 400s the past few weeks. Had PET scan this AM, and now with uncontrollable diarrhea. Endocrine referred pt to ED for further evaluation.      Triage Assessment (Adult)       Row Name 11/21/23 1943          Triage Assessment    Airway WDL WDL        Respiratory WDL    Respiratory WDL WDL        Skin Circulation/Temperature WDL    Skin Circulation/Temperature WDL WDL        Cardiac WDL    Cardiac WDL WDL        Peripheral/Neurovascular WDL    Peripheral Neurovascular WDL WDL        Cognitive/Neuro/Behavioral WDL    Cognitive/Neuro/Behavioral WDL WDL

## 2023-11-22 NOTE — TELEPHONE ENCOUNTER
Patient's wife Veronika contacted us today at 6:20 PM regarding high BG readings not responding to insulin.    In summary:  68 years old with history of DM type II diagnosed in 2013, s/p distal pancreatectomy for chronic pancreatitis in June this year he had fair control of his DM hemoglobin A1c 8.2.  His current diabetes treatment:  Basaglar 30 units daily.  NovoLog 4 units with breakfast 6 units with lunch and 10 units with dinner plus sliding scale of 1: 50 for BG above 140 3 times daily AC and above 200 at the bedtime.    He has good adherence to his medication no missed doses of insulin.    He received intra-articular steroid injection on Friday, 11/17/2023 following that his BG remained high most of the time on reviewing Dexcom clarity Saturday BG was 400 throughout the day Sunday was the same apart from some improvement to 286 around 10 AM following that continue to fluctuate between 304 100 Monday remained 400 most of the day today 400 since 10 AM.  He checked fingerstick BG today shortly before calling us it was 531.    He denied any nausea or vomiting no abdominal pain however he developed new onset of watery diarrhea started today.    Recommendations:  He was advised to receive NovoLog 5 units now and to present to the emergency department to get further assessment for DKA/HHS and treatment.    Him and his wife verbalized understanding the advice and that they are going to follow it.

## 2023-11-22 NOTE — RESULT ENCOUNTER NOTE
Final Enteric Bacteria and Virus Panel by [facility] Stool is NEGATIVE for all tested organisms (bacteria/virus).  No treatment or change in treatment per [facility] Lab Result Enteric Bacteria and Virus Panel protocol.

## 2023-11-22 NOTE — ED PROVIDER NOTES
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  Patient had a recent cortisone injection in his right shoulder.  He has since been having some diarrhea and elevated blood sugars.  He is not having significant abdominal pain.  No blood in the stool.  No black tarry stools.  No urinary symptoms.  No major fevers or chills.  No significant nausea or vomiting.  He was told to come into the emergency department for evaluation of possible DKA.    Past Medical History  Past Medical History:   Diagnosis Date    CAD (coronary artery disease)     CABG    Diabetes mellitus, type 2 (H) 2013    Gastroesophageal reflux disease     Hypercholesteremia     Hypertension     Mixed conductive and sensorineural hearing loss of both ears     Mixed hearing loss     Pancreatic duct stricture     Pancreatitis     Parkinson disease     Second degree AV block      Past Surgical History:   Procedure Laterality Date    CA ANESTH CABG W/PUMP      CHOLECYSTECTOMY  2022    COLONOSCOPY N/A 01/12/2023    Procedure: colonoscopy with fluroscopy;  Surgeon: Ayden Fraire MD;  Location:  OR    ENDOSCOPIC RETROGRADE CHOLANGIOPANCREATOGRAM N/A 05/18/2023    Procedure: ENDOSCOPIC RETROGRADE CHOLANGIOPANCREATOGRAPHY, WITH  CYSTDUODENOSTOMY STENTS X2 REMOVAL;  Surgeon: Cedric Saldana MD;  Location: [facility] OR    ENT SURGERY       "LAPAROSCOPY DIAGNOSTIC (GENERAL) N/A 02/20/2023    Procedure: LAPAROSCOPY, DIAGNOSTIC; RETRIEVAL OF MIGRATED STENT;  Surgeon: Joseluis Lima MD;  Location: [facility] OR    ORTHOPEDIC SURGERY      PANCREATECTOMY PEUSTOW N/A 06/30/2023    Procedure: Open distal pancreactectomy with splenectomy, lysis of adhesions, resection of proximal illeum;  Surgeon: Ashvin Haider MD;  Location: [facility] OR    [place] CABG, ARTERIAL, TWO  2003     acetaminophen (TYLENOL) 325 MG tablet  aspirin (ASA) 81 MG EC tablet  atorvastatin (LIPITOR) 40 MG tablet  BD PEN NEEDLE LINDY 2ND GEN 32G X 4 MM miscellaneous  carbidopa-levodopa (SINEMET CR)  MG CR tablet  carbidopa-levodopa (SINEMET)  MG tablet  cefdinir (OMNICEF) 300 MG capsule  Continuous Blood Gluc  (DEXCOM G7 ) ALIA  Continuous Blood Gluc Sensor (DEXCOM G7 SENSOR) MISC  diphenhydrAMINE (BENADRYL) 25 MG tablet  gabapentin (NEURONTIN) 300 MG capsule  insulin aspart (NOVOLOG PEN) 100 UNIT/ML pen  insulin glargine (BASAGLAR KWIKPEN) 100 UNIT/ML pen  lipase-protease-amylase (ZENPEP) 52926-713777-466272 units CPEP  losartan (COZAAR) 50 MG tablet  magnesium oxide (MAG-OX) 400 MG tablet  methocarbamol (ROBAXIN) 500 MG tablet  Multiple Vitamin (MULTI-VITAMINS) TABS  omeprazole (PRILOSEC) 20 MG DR capsule  oxyCODONE (ROXICODONE) 5 MG tablet  polyethylene glycol (MIRALAX) 17 GM/Dose powder      Allergies   Allergen Reactions    Ciprofloxacin Other (See Comments), Hives, Itching, Rash and Unknown     Other reaction(s): Other (see comments)  Oral swelling per [facility]        Dilaudid [Hydromorphone] Rash    Morphine Rash     rash    Penicillins Rash     aka ancef/ rash      Citalopram Unknown    Oxycodone Rash     \"HORRIBLE, SEVERE RASH MAYBE CAUSED BY OXY\"     Family History  Family History   Problem Relation Age of Onset    Parkinsonism Mother     Lung Cancer Brother     Diabetes No family hx of      Social History   Social History     Tobacco Use    Smoking " status: Former     Types: Cigarettes    Smokeless tobacco: Never   Vaping Use    Vaping Use: Never used   Substance Use Topics    Alcohol use: Not Currently    Drug use: Not Currently      Past medical history, past surgical history, medications, allergies, family history, and social history were reviewed with the patient. No additional pertinent items.      A medically appropriate review of systems was performed with pertinent positives and negatives noted in the HPI, and all other systems negative.    Physical Exam   BP: (!) 144/81  Pulse: 73  Temp: 97.3  F (36.3  C)  Resp: 15  SpO2: 95 %  Physical Exam  Vital Signs Reviewed  Gen: Well nourished, well developed, resting comfortably, no acute distress  HEENT: NC/AT, PERRL, EOMI, MMM  Neck: Supple, FROM  CV: Regular Rate, no murmur/rub/gallop  Lungs/Chest: Normal Effort, CTAB  Abd: Non-distended, non-tender, no rigidity rebound or guarding  MSK/Back: FROM, no visible deformity  Neuro: A&Ox3, GCS 15, CN II-XII unremarkable  Skin: Warm, Dry, Intact, no visible lesions    ED Course, Procedures, & Data      Patient seen and evaluated in vertical triage  Labs obtained  Patient's wife concerned about C. difficile, tested, negative  Labs reassuring without signs of metabolic acidosis.  Blood sugar in a reasonable range to discharge home  Patient received IV fluids, thinks he can keep up with oral intake  Discharge with primary care/endocrinology follow-up  Patient and family comfortable with plan and return precautions  Procedures                 Results for orders placed or performed during the hospital encounter of 11/21/23   Comprehensive metabolic panel     Status: Abnormal   Result Value Ref Range    Sodium 136 135 - 145 mmol/L    Potassium 4.2 3.4 - 5.3 mmol/L    Carbon Dioxide (CO2) 26 22 - 29 mmol/L    Anion Gap 11 7 - 15 mmol/L    Urea Nitrogen 34.1 (H) 8.0 - 23.0 mg/dL    Creatinine 0.99 0.67 - 1.17 mg/dL    GFR Estimate 82 >60 mL/min/1.73m2    Calcium 9.5 8.8 -  10.2 mg/dL    Chloride 99 98 - 107 mmol/L    Glucose 205 (H) 70 - 99 mg/dL    Alkaline Phosphatase 122 40 - 150 U/L    AST 24 0 - 45 U/L    ALT 6 0 - 70 U/L    Protein Total 8.0 6.4 - 8.3 g/dL    Albumin 4.3 3.5 - 5.2 g/dL    Bilirubin Total 1.2 <=1.2 mg/dL   Blood gas venous     Status: None   Result Value Ref Range    pH Venous 7.39 7.32 - 7.43    pCO2 Venous 44 40 - 50 mm Hg    pO2 Venous 46 25 - 47 mm Hg    Bicarbonate Venous 27 21 - 28 mmol/L    Base Excess/Deficit 1.5 -7.7 - 1.9 mmol/L    FIO2 30    Ketone Beta-Hydroxybutyrate Quantitative     Status: Normal   Result Value Ref Range    Ketone (Beta-Hydroxybutyrate) Quantitative 0.20 <=0.30 mmol/L   UA with Microscopic reflex to Culture     Status: Abnormal    Specimen: Urine, Midstream   Result Value Ref Range    Color Urine Yellow Colorless, Straw, Light Yellow, Yellow    Appearance Urine Clear Clear    Glucose Urine 1000 (A) Negative mg/dL    Bilirubin Urine Negative Negative    Ketones Urine Negative Negative mg/dL    Specific Gravity Urine 1.010 1.003 - 1.035    Blood Urine Negative Negative    pH Urine 6.0 5.0 - 7.0    Protein Albumin Urine Negative Negative mg/dL    Urobilinogen Urine Normal Normal, 2.0 mg/dL    Nitrite Urine Negative Negative    Leukocyte Esterase Urine Negative Negative    Mucus Urine Present (A) None Seen /LPF    RBC Urine 0 <=2 /HPF    WBC Urine 1 <=5 /HPF    Hyaline Casts Urine 1 <=2 /LPF    Narrative    Urine Culture not indicated   CBC with platelets and differential     Status: Abnormal   Result Value Ref Range    WBC Count 10.0 4.0 - 11.0 10e3/uL    RBC Count 4.08 (L) 4.40 - 5.90 10e6/uL    Hemoglobin 13.4 13.3 - 17.7 g/dL    Hematocrit 39.9 (L) 40.0 - 53.0 %    MCV 98 78 - 100 fL    MCH 32.8 26.5 - 33.0 pg    MCHC 33.6 31.5 - 36.5 g/dL    RDW 14.8 10.0 - 15.0 %    Platelet Count 327 150 - 450 10e3/uL    % Neutrophils 69 %    % Lymphocytes 17 %    % Monocytes 13 %    % Eosinophils 0 %    % Basophils 1 %    % Immature  Granulocytes 0 %    NRBCs per 100 WBC 0 <1 /100    Absolute Neutrophils 6.9 1.6 - 8.3 10e3/uL    Absolute Lymphocytes 1.7 0.8 - 5.3 10e3/uL    Absolute Monocytes 1.3 0.0 - 1.3 10e3/uL    Absolute Eosinophils 0.0 0.0 - 0.7 10e3/uL    Absolute Basophils 0.1 0.0 - 0.2 10e3/uL    Absolute Immature Granulocytes 0.0 <=0.4 10e3/uL    Absolute NRBCs 0.0 10e3/uL   C. difficile Toxin B PCR with reflex to C. difficile Antigen and Toxins A/B EIA     Status: Normal    Specimen: Per Rectum; Stool   Result Value Ref Range    C Difficile Toxin B by PCR Negative Negative    Narrative    The [employer] Xpert C. difficile Assay, performed on the [employer], is a qualitative in vitro diagnostic test for rapid detection of toxin B gene sequences from unformed (liquid or soft) stool specimens collected from patients suspected of having Clostridioides difficile infection (CDI). The test utilizes automated real-time polymerase chain reaction (PCR) to detect toxin gene sequences associated with toxin producing C. difficile. The Xpert C. difficile Assay is intended as an aid in the diagnosis of CDI.   Enteric Bacteria and Virus Panel PCR     Status: Normal    Specimen: Per Rectum; Stool   Result Value Ref Range    Campylobacter species Negative Negative    Salmonella species Negative Negative    Vibrio species Negative Negative    Vibrio cholerae Negative Negative    Yersinia enterocolitica Negative Negative    Enteropathogenic E. coli (EPEC) Negative Negative, NA    Shiga-like toxin-producing E. coli (STEC) Negative Negative    Shigella/Enteroinvasive E. coli (EIEC) Negative Negative    Cryptosporidium species Negative Negative    Giardia lamblia Negative Negative    Norovirus Gl/Gll Negative Negative    Rotavirus A Negative Negative    Plesiomonas shigelloides Negative Negative    Enteroaggregative E. coli (EAEC) Negative Negative    Enterotoxigenic E. coli (ETEC) Negative Negative    E. coli O157 NA Negative, NA     Cyclospora cayetanensis Negative Negative    Entamoeba histolytica Negative Negative    Adenovirus F40/41 Negative Negative    Astrovirus Negative Negative    Sapovirus Negative Negative    Narrative    Assay performed using the FDA-cleared FilmArray GI Panel from [employer], [employer].  A negative result should not rule out infection in patients with a probability for gastrointestinal infection. The assay does not test for all potential infectious agents of diarrheal disease.  Positive results do not distinguish between a viable or replicating organism and the presence of a nonviable organism or nucleic acid, nor do they exclude the possibility of coinfection by organisms not in the panel.  Results are intended to aid in the diagnosis of illness and are meant to be used in conjunction with other clinical findings.  This test has been verified and is performed by the [facility] Laboratory at [facility]. This laboratory is certified under the Clinical Laboratory Improvement Amendments of 1988 (CLIA-88) as qualified to perform high complexity clinical laboratory testing.   CBC with platelets differential     Status: Abnormal    Narrative    The following orders were created for panel order CBC with platelets differential.  Procedure                               Abnormality         Status                     ---------                               -----------         ------                     CBC with platelets and d...[[id number]]  Abnormal            Final result                 Please view results for these tests on the individual orders.     Medications   ondansetron (ZOFRAN) injection 4 mg (has no administration in time range)   lactated ringers BOLUS 1,000 mL (0 mLs Intravenous Stopped 11/21/23 9233)     Labs Ordered and Resulted from Time of ED Arrival to Time of ED Departure   COMPREHENSIVE METABOLIC PANEL - Abnormal       Result Value    Sodium 136      Potassium 4.2      Carbon  Dioxide (CO2) 26      Anion Gap 11      Urea Nitrogen 34.1 (*)     Creatinine 0.99      GFR Estimate 82      Calcium 9.5      Chloride 99      Glucose 205 (*)     Alkaline Phosphatase 122      AST 24      ALT 6      Protein Total 8.0      Albumin 4.3      Bilirubin Total 1.2     ROUTINE UA WITH MICROSCOPIC REFLEX TO CULTURE - Abnormal    Color Urine Yellow      Appearance Urine Clear      Glucose Urine 1000 (*)     Bilirubin Urine Negative      Ketones Urine Negative      Specific Gravity Urine 1.010      Blood Urine Negative      pH Urine 6.0      Protein Albumin Urine Negative      Urobilinogen Urine Normal      Nitrite Urine Negative      Leukocyte Esterase Urine Negative      Mucus Urine Present (*)     RBC Urine 0      WBC Urine 1      Hyaline Casts Urine 1     CBC WITH PLATELETS AND DIFFERENTIAL - Abnormal    WBC Count 10.0      RBC Count 4.08 (*)     Hemoglobin 13.4      Hematocrit 39.9 (*)     MCV 98      MCH 32.8      MCHC 33.6      RDW 14.8      Platelet Count 327      % Neutrophils 69      % Lymphocytes 17      % Monocytes 13      % Eosinophils 0      % Basophils 1      % Immature Granulocytes 0      NRBCs per 100 WBC 0      Absolute Neutrophils 6.9      Absolute Lymphocytes 1.7      Absolute Monocytes 1.3      Absolute Eosinophils 0.0      Absolute Basophils 0.1      Absolute Immature Granulocytes 0.0      Absolute NRBCs 0.0     KETONE BETA-HYDROXYBUTYRATE QUANTITATIVE, RAPID - Normal    Ketone (Beta-Hydroxybutyrate) Quantitative 0.20     C. DIFFICILE TOXIN B PCR WITH REFLEX TO C. DIFFICILE ANTIGEN AND TOXINS A/B EIA - Normal    C Difficile Toxin B by PCR Negative     BLOOD GAS VENOUS    pH Venous 7.39      pCO2 Venous 44      pO2 Venous 46      Bicarbonate Venous 27      Base Excess/Deficit 1.5      FIO2 30       No orders to display          Critical care was not performed.     Medical Decision Making  The patient's presentation was of moderate complexity (an undiagnosed new problem with uncertain  diagnosis).    The patient's evaluation involved:  ordering and/or review of 3+ test(s) in this encounter (see separate area of note for details)    The patient's management necessitated moderate risk (prescription drug management including medications given in the ED).    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  Patient had reassuring vital signs and remained clinically stable in the emergency department.  His venous blood gas shows no signs of acidosis.  Ketones were within normal limits and not elevated.  C. difficile was negative.  No leukocytosis.  Urinalysis shows some spilling of glucose but no signs of infection.  He has no major respiratory or abdominal symptoms just infection be on the diarrhea.  Lab was called to add on an enteric panel although I do not think this will change acute management very much right now.  He has no significant signs of severe dehydration acute kidney injury or electrolyte imbalance.  Blood sugars are in the reassuring range, while still elevated they are not dramatically elevated.  He should be able to go home with his home insulin regimen and corrective dosing scales.  I suspect that the recent steroid injection is the most likely reason for his hyperglycemia.  Possible diarrhea is contributing as well.    Return precautions provided.  Instructed patient to do his best to adhere to dietary restrictions throughout the holiday season.  Follow-up with primary care and/or endocrinology.  Return  precautions provided.  No questions, or concerns for patient or family at time of discharge.  They are very excited to go home.    Discharge Medication List as of 11/22/2023 12:16 AM          Final diagnoses:   Hyperglycemia   Diarrhea, unspecified type       Jay Alvarado Jr., MD   [facility] EMERGENCY DEPARTMENT  11/21/2023     Jay Alvarado MD  11/22/23 0114

## 2023-11-27 ENCOUNTER — VIRTUAL VISIT (OUTPATIENT)
Dept: ONCOLOGY | Facility: CLINIC | Age: 68
End: 2023-11-27
Attending: INTERNAL MEDICINE
Payer: MEDICARE

## 2023-11-27 ENCOUNTER — TELEPHONE (OUTPATIENT)
Dept: ENDOCRINOLOGY | Facility: CLINIC | Age: 68
End: 2023-11-27
Payer: MEDICARE

## 2023-11-27 VITALS — WEIGHT: 190 LBS | BODY MASS INDEX: 28.79 KG/M2 | HEIGHT: 68 IN

## 2023-11-27 DIAGNOSIS — C77.2 MALIGNANT NEOPLASM METASTATIC TO INTRA-ABDOMINAL LYMPH NODE (H): ICD-10-CM

## 2023-11-27 DIAGNOSIS — C7A.8 NEUROENDOCRINE CARCINOMA OF SMALL BOWEL (H): Primary | ICD-10-CM

## 2023-11-27 PROCEDURE — 99214 OFFICE O/P EST MOD 30 MIN: CPT | Mod: 95 | Performed by: INTERNAL MEDICINE

## 2023-11-27 ASSESSMENT — PAIN SCALES - GENERAL: PAINLEVEL: NO PAIN (0)

## 2023-11-27 NOTE — TELEPHONE ENCOUNTER
Patient call:     Appointment type: return diabetes   Provider: Landry   Return date: see below   Speciality phone number: [phone]  Additional appointment(s) needed: N/A  Additional notes:   This patient wants clarification on what is causing his blood sugars to be so high and what to do in order to bring these levels down. They are concerned and want further guidance after being admitted on 11/21/23 for inadequately controlled diabetes. Next appointment is 1/2/24 with Hayley Alatorre.  Offered 12/19 JOSÉ in person Pt denied due to wife's cataracts surgery that day. Sent this message to nursing pool.    Diann Whittaker on 11/27/2023 at 1:05 PM

## 2023-11-27 NOTE — PROGRESS NOTES
Virtual Visit Details    Type of service:  Video Visit   Video Start Time:  5:30  Video End Time: 6:00    Originating Location (pt. 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    He is 68 years old.  His tumor was found incidentally when he was undergoing a pancreatectomy for chronic pancreatitis.  His tumor has a Ki-67 index of 10%.  At our last evaluation he had some enlarging mesenteric lymph nodes and has undergone further assessment to determine if that represents progressive disease or not.    Since we saw him last his overall condition is about the same.  He has his usual habit of a bowel movement every 2 to 3 days except for immediately following his dotatate scan when he had fairly severe diarrhea.  That has not been a recurring problem for him he has had no recent episodes of flushing.  He has his usual chronic abdominal pain with his not severe and not much changed.  His Parkinson's is stable and only bothers him a lot when he gets anxious.  He thinks his weight is stable.  His diabetes has been much more labile recently after he got a steroid injection in his shoulder.    I personally reviewed his dotatate scan and went over the results with him and his wife.  He has at least 2 what are probably small bowel implants and a couple of sites of kiersten disease in the mesentery.  He does not have any evidence of liver or bone mets.    His chromogranin A level is gone up a little bit at 600.  He has normal electrolytes and renal function.  His bilirubin, albumin and liver enzymes are normal.  His blood counts are normal.    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  He has small volume mildly progressive disease within the abdomen without any clearly associated symptoms.   For now his rate of growth and very small  volume disease does not warrant active intervention. We will plan on a follow-up scan in 3 to 4 months.

## 2023-11-27 NOTE — LETTER
11/27/2023         RE: Arnaldo Henderson  [address]  [place] [address]        Dear Colleague,    Thank you for referring your patient, Arnaldo Henderson, to the [facility]. Please see a copy of my visit note below.        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    He is 68 years old.  His tumor was found incidentally when he was undergoing a pancreatectomy for chronic pancreatitis.  His tumor has a Ki-67 index of 10%.  At our last evaluation he had some enlarging mesenteric lymph nodes and has undergone further assessment to determine if that represents progressive disease or not.    Since we saw him last his overall condition is about the same.  He has his usual habit of a bowel movement every 2 to 3 days except for immediately following his dotatate scan when he had fairly severe diarrhea.  That has not been a recurring problem for him he has had no recent episodes of flushing.  He has his usual chronic abdominal pain with his not severe and not much changed.  His Parkinson's is stable and only bothers him a lot when he gets anxious.  He thinks his weight is stable.  His diabetes has been much more labile recently after he got a steroid injection in his shoulder.    I personally reviewed his dotatate scan and went over the results with him and his wife.  He has at least 2 what are probably small bowel implants and a couple of sites of kiersten disease in the mesentery.  He does not have any evidence of liver or bone mets.    His chromogranin A level is gone up a little bit at 600.  He has normal electrolytes and renal function.  His bilirubin, albumin and liver enzymes are normal.  His blood counts are normal.    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  He has small volume mildly progressive disease within the abdomen without any clearly associated symptoms.   For now his  rate of growth and very small volume disease does not warrant active intervention. We will plan on a follow-up scan in 3 to 4 months.       Again, thank you for allowing me to participate in the care of your patient.        Sincerely,        Caio Lyn MD

## 2023-11-27 NOTE — LETTER
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    He is 68 years old.  His tumor was found incidentally when he was undergoing a pancreatectomy for chronic pancreatitis.  His tumor has a Ki-67 index of 10%.  At our last evaluation he had some enlarging mesenteric lymph nodes and has undergone further assessment to determine if that represents progressive disease or not.    Since we saw him last his overall condition is about the same.  He has his usual habit of a bowel movement every 2 to 3 days except for immediately following his dotatate scan when he had fairly severe diarrhea.  That has not been a recurring problem for him he has had no recent episodes of flushing.  He has his usual chronic abdominal pain with his not severe and not much changed.  His Parkinson's is stable and only bothers him a lot when he gets anxious.  He thinks his weight is stable.  His diabetes has been much more labile recently after he got a steroid injection in his shoulder.    I personally reviewed his dotatate scan and went over the results with him and his wife.  He has at least 2 what are probably small bowel implants and a couple of sites of kiersten disease in the mesentery.  He does not have any evidence of liver or bone mets.    His chromogranin A level is gone up a little bit at 600.  He has normal electrolytes and renal function.  His bilirubin, albumin and liver enzymes are normal.  His blood counts are normal.    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  He has small volume mildly progressive disease within the abdomen without any clearly associated symptoms.   For now his rate of growth and very small volume disease does not warrant active intervention. We will plan on a follow-up scan in 3 to 4 months.         Caio Lyn MD

## 2023-11-27 NOTE — NURSING NOTE
Is the patient currently in the state of [place]? YES    Visit mode:VIDEO    If the visit is dropped, the patient can be reconnected by: VIDEO VISIT: Send to e-mail at: [email or web address]    Will anyone else be joining the visit? Yes, Pt's wife is with the pt and will be joining the video visit per pt  (If patient encounters technical issues they should call [phone]    How would you like to obtain your AVS? MyChart    Are changes needed to the allergy or medication list? Pt stated no med changes    Reason for visit: RECHECK    No other vitals to report per pt    Chastity SINGHF

## 2023-11-28 ENCOUNTER — OFFICE VISIT (OUTPATIENT)
Dept: ENDOCRINOLOGY | Facility: CLINIC | Age: 68
End: 2023-11-28
Payer: MEDICARE

## 2023-11-28 VITALS
DIASTOLIC BLOOD PRESSURE: 93 MMHG | HEART RATE: 76 BPM | WEIGHT: 190 LBS | BODY MASS INDEX: 28.79 KG/M2 | OXYGEN SATURATION: 99 % | SYSTOLIC BLOOD PRESSURE: 159 MMHG | HEIGHT: 68 IN

## 2023-11-28 DIAGNOSIS — Z79.4 TYPE 2 DIABETES MELLITUS WITH HYPERGLYCEMIA, WITH LONG-TERM CURRENT USE OF INSULIN (H): ICD-10-CM

## 2023-11-28 DIAGNOSIS — E11.65 TYPE 2 DIABETES MELLITUS WITH HYPERGLYCEMIA, WITH LONG-TERM CURRENT USE OF INSULIN (H): ICD-10-CM

## 2023-11-28 PROCEDURE — 99215 OFFICE O/P EST HI 40 MIN: CPT | Performed by: PHYSICIAN ASSISTANT

## 2023-11-28 RX ORDER — INSULIN GLARGINE 100 [IU]/ML
INJECTION, SOLUTION SUBCUTANEOUS
Qty: 15 ML | Refills: 3 | Status: SHIPPED | OUTPATIENT
Start: 2023-11-28 | End: 2024-02-04

## 2023-11-28 ASSESSMENT — PAIN SCALES - GENERAL: PAINLEVEL: MODERATE PAIN (5)

## 2023-11-28 NOTE — LETTER
11/28/2023       RE: Arnaldo Henderson  [address]  [facility] [address]     Dear Colleague,    Thank you for referring your patient, Arnaldo Henderson, to the [facility] ENDOCR[facility]OLOG[facility] [place] [facility]. Please see a copy of my visit note below.                                    HPI  Arnaldo Henderson is a 68 year old male with type 2 diabetes mellitus. Hx of distal pancreatectomy for chronic pancreatitis. Pt has dx of neuroendocrine tumor of small bowel seen here by Oncology staff.   His wife Veronika is present today.  Pt last seen by Hayley Alatorre PA-C in 9/2023.  Since his last visit, he has been in the ED with hyperglycemia.  Recent steroid injection for shoulder pain.  Pt was dx with type 2 DM in 2013. Denies retinopathy, nephropathy and neuropathy.  Pt has hx of chronic pancreatitis with hx of gallstones, CAD- s/p CABG, Parkinson's disease and HTN.   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  Pt was seen by the inpatient diabetes team.  Hx of  midline abdominal incision infection has healed per patient and wife.  Denies fever or rigors.  For his diabetes, he is taking Basaglar 30 units subcutaneous at hs and Novolog 4 units with breakfast, 6 units with lunch and 10 units with dinner with correction ( 1 unit/50 for BG > 140 ).  Most recent A1C was 8.2 % on 11/16/2023. Previous A1C was 8.5 % in May 2023.  I reviewed and scanned pt's DexcomG7 sensor download data in his note below.  Blood sugar are too high.   He denies missing any insulin doses.  Recent steroid injection for shoulder pain and dx neuroendocrine tumor. Hx of distal pancreatectomy.  Pt states blood sugars have been high since he received the steroid injection in his shoulder.  On ROS today, abd incision has healed per patient and wife.  Denies fever or rigors.  Chronic back and abd pain.  Denies blurred vision, n/v,SOB at rest, cough, chest pain, blood in  "stool, melena or sx of neuropathy. Denies foot ulcers.    Diabetes Care  Retinopathy: none per patient. Seen here by Oph in 9/2023.  Nephropathy:none; urine microalbuminuria negative in Jan 2023. Pt taking Cozaar.  Neuropathy: none per patient. He states he takes Gabapentin for abd pain.  Foot Exam: no ulcers.  Taking aspirin: yes.  Lipids:LDL 33 in Oct 2022. Pt taking Lipitor.  Insulin: basal and meal time insulin with correction.   DM meds: none.  Testing: DexcomG7 sensor.                  ROS  See under HPI.    Allergies  Allergies   Allergen Reactions    Ciprofloxacin Other (See Comments), Hives, Itching, Rash and Unknown     Other reaction(s): Other (see comments)  Oral swelling per [facility]        Dilaudid [Hydromorphone] Rash    Morphine Rash     rash    Penicillins Rash     aka ancef/ rash      Citalopram Unknown    Oxycodone Rash     \"HORRIBLE, SEVERE RASH MAYBE CAUSED BY OXY\"       Medications  Current Outpatient Medications   Medication Sig Dispense Refill    acetaminophen (TYLENOL) 325 MG tablet Take 2 tablets (650 mg) by mouth every 4 hours as needed for other (For optimal non-opioid multimodal pain management to improve pain control.)      aspirin (ASA) 81 MG EC tablet Take 81 mg by mouth daily      atorvastatin (LIPITOR) 40 MG tablet Take 1 tablet by mouth daily      BD PEN NEEDLE LINDY 2ND GEN 32G X 4 MM miscellaneous USE TO INJECT INSULIN 4 TIMES A  each 3    carbidopa-levodopa (SINEMET)  MG tablet Take 2 tablets by mouth 3 times daily      Continuous Blood Gluc  (DEXCOM G7 ) ALIA Use to read blood sugars as per 's instructions. 1 each 0    Continuous Blood Gluc Sensor (DEXCOM G7 SENSOR) MISC Change every 10 days. 3 each 5    gabapentin (NEURONTIN) 300 MG capsule Take 300 mg by mouth every morning , 300 mg by mouth at midday, and 600 mg by mouth at bedtime      insulin aspart (NOVOLOG PEN) 100 UNIT/ML pen Inject 6 units with breakfast, 8 units with lunch " and 10 units with dinner plus correction. ( 1 unit/50 for BG > 140 ). Pt uses approx 55 units daily.PLEASE DO NOT FILL TODAY ( 11/28/2023). 15 mL 1    insulin glargine (BASAGLAR KWIKPEN) 100 UNIT/ML pen Inject 15 units subcutaneous each am and 30 units subcutaneous each pm. Please provided 90 day supply. 15 mL 3    lipase-protease-amylase (ZENPEP) [id number] units CPEP Take 1 capsule by mouth 3 times daily (with meals) 90 capsule 11    losartan (COZAAR) 50 MG tablet Take 50 mg by mouth At Bedtime      magnesium oxide (MAG-OX) 400 MG tablet Take 400 mg by mouth every other day      methocarbamol (ROBAXIN) 500 MG tablet Take 1 tablet (500 mg) by mouth every 6 hours as needed for muscle spasms 45 tablet 0    Multiple Vitamin (MULTI-VITAMINS) TABS Take 1 tablet by mouth daily      omeprazole (PRILOSEC) 20 MG DR capsule Take 20 mg by mouth daily      carbidopa-levodopa (SINEMET CR)  MG CR tablet Take 1 tablet by mouth At Bedtime         Family History  family history includes Lung Cancer in his brother; Parkinsonism in his mother.    Social History   reports that he has quit smoking. His smoking use included cigarettes. He has never used smokeless tobacco. He reports that he does not currently use alcohol. He reports that he does not currently use drugs.     Past Medical History  Past Medical History:   Diagnosis Date    CAD (coronary artery disease)     CABG    Diabetes mellitus, type 2 (H) 2013    Gastroesophageal reflux disease     Hypercholesteremia     Hypertension     Mixed conductive and sensorineural hearing loss of both ears     Mixed hearing loss     Pancreatic duct stricture     Pancreatitis     Parkinson disease     Second degree AV block        Past Surgical History:   Procedure Laterality Date    CA ANESTH CABG W/PUMP      CHOLECYSTECTOMY  2022    COLONOSCOPY N/A 01/12/2023    Procedure: colonoscopy with fluroscopy;  Surgeon: Ayden Fraire MD;  Location:  OR    ENDOSCOPIC RETROGRADE  "CHOLANGIOPANCREATOGRAM N/A 05/18/2023    Procedure: ENDOSCOPIC RETROGRADE CHOLANGIOPANCREATOGRAPHY, WITH  CYSTDUODENOSTOMY STENTS X2 REMOVAL;  Surgeon: Cedric Saldana MD;  Location: [facility] OR    ENT SURGERY      LAPAROSCOPY DIAGNOSTIC (GENERAL) N/A 02/20/2023    Procedure: LAPAROSCOPY, DIAGNOSTIC; RETRIEVAL OF MIGRATED STENT;  Surgeon: Joseluis Lima MD;  Location: [facility] OR    ORTHOPEDIC SURGERY      PANCREATECTOMY PEUSTOW N/A 06/30/2023    Procedure: Open distal pancreactectomy with splenectomy, lysis of adhesions, resection of proximal illeum;  Surgeon: Ashvin Haider MD;  Location: [facility] OR    [place] CABG, ARTERIAL, TWO  2003       Physical Exam  BP (!) 159/93   Pulse 76   Ht 1.727 m (5' 8\")   Wt 86.2 kg (190 lb)   SpO2 99%   BMI 28.89 kg/m       FEET: no ulcers.    RESULTS  Creatinine   Date Value Ref Range Status   11/21/2023 0.99 0.67 - 1.17 mg/dL Final     GFR Estimate   Date Value Ref Range Status   11/21/2023 82 >60 mL/min/1.73m2 Final     GFR, ESTIMATED POCT   Date Value Ref Range Status   11/03/2023 >60 >60 mL/min/1.73m2 Final     Hemoglobin A1C   Date Value Ref Range Status   11/16/2023 8.2 (H) 0.0 - 5.6 % Final     Potassium   Date Value Ref Range Status   11/21/2023 4.2 3.4 - 5.3 mmol/L Final     Potassium POCT   Date Value Ref Range Status   06/30/2023 4.7 3.5 - 5.0 mmol/L Final     ALT   Date Value Ref Range Status   11/21/2023 6 0 - 70 U/L Final     Comment:     Reference intervals for this test were updated on 6/12/2023 to more accurately reflect our healthy population. There may be differences in the flagging of prior results with similar values performed with this method. Interpretation of those prior results can be made in the context of the updated reference intervals.       AST   Date Value Ref Range Status   11/21/2023 24 0 - 45 U/L Final     Comment:     Reference intervals for this test were updated on 6/12/2023 to more accurately reflect our healthy population. There may be " differences in the flagging of prior results with similar values performed with this method. Interpretation of those prior results can be made in the context of the updated reference intervals.       Triglycerides (External)   Date Value Ref Range Status   06/02/2021 74 <150 mg/dL Final         ASSESSMENT/PLAN:      TYPE 2 DIABETES MELLITUS: Hx of type 2 diabetes mellitus dx in 2013 with hx of distal pancreatectomy on 6/3/2023 for chronic pancreatitis. 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  Pt's blood sugar values are high and he denies missing insulin doses. Recent steroid injection for shoulder pain. He states his abdominal incisional infection has healed. Pt clearing needs more insulin.  Add Basaglar 15 units subcutaneous each am and continue Basaglar 30 units subcutaneous at hs. Increase Novolog 6 units with breakfast, 8 units with lunch and 10 units with dinner with correction ( 1 unit/50 for BG > 140 ). May consider adding Metformin in the near future.  Pt has appt with Idalia Funez CDE/RD next week. He will need weekly follow up until his blood sugar values have improved. He wears his DexcomG7 sensor full time.   Pt denies hx of diabetic retinopathy - seen by Oph in 9/2023.  His urine microalbuminuria in Jan 2023. Pt taking Cozaar.  Most recent creat 0.99 with GFR 82 mL/min in 11/2023.  Pt denies sx of neuropathy- no  foot ulcers. He states he is taking Gabapentin for abdominal pain.    HTN: BP good at home. Continue current RX.  LIPIDS: LDL 33 in Oct 2022. Pt taking Lipitor.  4.   PARKINSON'S DISEASE: Not addressed.  5.   FOLLOW UP: With CDE/RD next week and weekly until blood sugar control has improved. Pt to send me a [employer] message this Thursday with an update. I will plan to review his DexcomG7 sensor data at that time and make additional insulin adjustments if needed. Pt has follow up with Hayley Alatorre PA-C on 1/2/2024.    Time spent reviewing chart, labs and DexcomG7  sensor data today =  6 minutes.  Time for clinic visit today = 30 minutes.  Time for documentation today = 15 minutes.    Total time for visit today = 51 minutes.    Valeri Gomez PA-C

## 2023-11-28 NOTE — PATIENT INSTRUCTIONS
Add Basaglar 15 unit in am and continue Basaglar 30 units at bedtime.  Increase Novolog 6 units with breakfast and increase Novolog 8 units with lunch with correction.  Continue Novolog 10 units with dinner.  Call with update this Thursday am.   Follow up with diabetes educator.

## 2023-11-29 NOTE — PROGRESS NOTES
HPI  Arnaldo Henderson is a 68 year old male with type 2 diabetes mellitus. Hx of distal pancreatectomy for chronic pancreatitis. Pt has dx of neuroendocrine tumor of small bowel seen here by Oncology staff.   His wife Veronika is present today.  Pt last seen by Hayley Alatorre PA-C in 9/2023.  Since his last visit, he has been in the ED with hyperglycemia.  Recent steroid injection for shoulder pain.  Pt was dx with type 2 DM in 2013. Denies retinopathy, nephropathy and neuropathy.  Pt has hx of chronic pancreatitis with hx of gallstones, CAD- s/p CABG, Parkinson's disease and HTN.   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  Pt was seen by the inpatient diabetes team.  Hx of  midline abdominal incision infection has healed per patient and wife.  Denies fever or rigors.  For his diabetes, he is taking Basaglar 30 units subcutaneous at hs and Novolog 4 units with breakfast, 6 units with lunch and 10 units with dinner with correction ( 1 unit/50 for BG > 140 ).  Most recent A1C was 8.2 % on 11/16/2023. Previous A1C was 8.5 % in May 2023.  I reviewed and scanned pt's DexcomG7 sensor download data in his note below.  Blood sugar are too high.   He denies missing any insulin doses.  Recent steroid injection for shoulder pain and dx neuroendocrine tumor. Hx of distal pancreatectomy.  Pt states blood sugars have been high since he received the steroid injection in his shoulder.  On ROS today, abd incision has healed per patient and wife.  Denies fever or rigors.  Chronic back and abd pain.  Denies blurred vision, n/v,SOB at rest, cough, chest pain, blood in stool, melena or sx of neuropathy. Denies foot ulcers.    Diabetes Care  Retinopathy: none per patient. Seen here by Oph in 9/2023.  Nephropathy:none; urine microalbuminuria negative in Jan 2023. Pt taking Cozaar.  Neuropathy: none per patient. He states he takes Gabapentin for abd pain.  Foot Exam: no ulcers.  Taking aspirin: yes.  Lipids:LDL 33 in Oct 2022. Pt  "taking Lipitor.  Insulin: basal and meal time insulin with correction.   DM meds: none.  Testing: DexcomG7 sensor.                  ROS  See under HPI.    Allergies  Allergies   Allergen Reactions    Ciprofloxacin Other (See Comments), Hives, Itching, Rash and Unknown     Other reaction(s): Other (see comments)  Oral swelling per [facility]        Dilaudid [Hydromorphone] Rash    Morphine Rash     rash    Penicillins Rash     aka ancef/ rash      Citalopram Unknown    Oxycodone Rash     \"HORRIBLE, SEVERE RASH MAYBE CAUSED BY OXY\"       Medications  Current Outpatient Medications   Medication Sig Dispense Refill    acetaminophen (TYLENOL) 325 MG tablet Take 2 tablets (650 mg) by mouth every 4 hours as needed for other (For optimal non-opioid multimodal pain management to improve pain control.)      aspirin (ASA) 81 MG EC tablet Take 81 mg by mouth daily      atorvastatin (LIPITOR) 40 MG tablet Take 1 tablet by mouth daily      BD PEN NEEDLE LINDY 2ND GEN 32G X 4 MM miscellaneous USE TO INJECT INSULIN 4 TIMES A  each 3    carbidopa-levodopa (SINEMET)  MG tablet Take 2 tablets by mouth 3 times daily      Continuous Blood Gluc  (DEXCOM G7 ) ALIA Use to read blood sugars as per 's instructions. 1 each 0    Continuous Blood Gluc Sensor (DEXCOM G7 SENSOR) MISC Change every 10 days. 3 each 5    gabapentin (NEURONTIN) 300 MG capsule Take 300 mg by mouth every morning , 300 mg by mouth at midday, and 600 mg by mouth at bedtime      insulin aspart (NOVOLOG PEN) 100 UNIT/ML pen Inject 6 units with breakfast, 8 units with lunch and 10 units with dinner plus correction. ( 1 unit/50 for BG > 140 ). Pt uses approx 55 units daily.PLEASE DO NOT FILL TODAY ( 11/28/2023). 15 mL 1    insulin glargine (BASAGLAR KWIKPEN) 100 UNIT/ML pen Inject 15 units subcutaneous each am and 30 units subcutaneous each pm. Please provided 90 day supply. 15 mL 3    lipase-protease-amylase (ZENPEP) " [id number] units CPEP Take 1 capsule by mouth 3 times daily (with meals) 90 capsule 11    losartan (COZAAR) 50 MG tablet Take 50 mg by mouth At Bedtime      magnesium oxide (MAG-OX) 400 MG tablet Take 400 mg by mouth every other day      methocarbamol (ROBAXIN) 500 MG tablet Take 1 tablet (500 mg) by mouth every 6 hours as needed for muscle spasms 45 tablet 0    Multiple Vitamin (MULTI-VITAMINS) TABS Take 1 tablet by mouth daily      omeprazole (PRILOSEC) 20 MG DR capsule Take 20 mg by mouth daily      carbidopa-levodopa (SINEMET CR)  MG CR tablet Take 1 tablet by mouth At Bedtime         Family History  family history includes Lung Cancer in his brother; Parkinsonism in his mother.    Social History   reports that he has quit smoking. His smoking use included cigarettes. He has never used smokeless tobacco. He reports that he does not currently use alcohol. He reports that he does not currently use drugs.     Past Medical History  Past Medical History:   Diagnosis Date    CAD (coronary artery disease)     CABG    Diabetes mellitus, type 2 (H) 2013    Gastroesophageal reflux disease     Hypercholesteremia     Hypertension     Mixed conductive and sensorineural hearing loss of both ears     Mixed hearing loss     Pancreatic duct stricture     Pancreatitis     Parkinson disease     Second degree AV block        Past Surgical History:   Procedure Laterality Date    CA ANESTH CABG W/PUMP      CHOLECYSTECTOMY  2022    COLONOSCOPY N/A 01/12/2023    Procedure: colonoscopy with fluroscopy;  Surgeon: Ayden Fraire MD;  Location:  OR    ENDOSCOPIC RETROGRADE CHOLANGIOPANCREATOGRAM N/A 05/18/2023    Procedure: ENDOSCOPIC RETROGRADE CHOLANGIOPANCREATOGRAPHY, WITH  CYSTDUODENOSTOMY STENTS X2 REMOVAL;  Surgeon: Cedric Saldana MD;  Location: [facility] OR    ENT SURGERY      LAPAROSCOPY DIAGNOSTIC (GENERAL) N/A 02/20/2023    Procedure: LAPAROSCOPY, DIAGNOSTIC; RETRIEVAL OF MIGRATED STENT;  Surgeon: Joseluis Lima  "MD Ruel;  Location: [facility] OR    ORTHOPEDIC SURGERY      PANCREATECTOMY CIRO N/A 06/30/2023    Procedure: Open distal pancreactectomy with splenectomy, lysis of adhesions, resection of proximal illeum;  Surgeon: Ashvin Haider MD;  Location: [facility] OR    [place] CABG, ARTERIAL, TWO  2003       Physical Exam  BP (!) 159/93   Pulse 76   Ht 1.727 m (5' 8\")   Wt 86.2 kg (190 lb)   SpO2 99%   BMI 28.89 kg/m       FEET: no ulcers.    RESULTS  Creatinine   Date Value Ref Range Status   11/21/2023 0.99 0.67 - 1.17 mg/dL Final     GFR Estimate   Date Value Ref Range Status   11/21/2023 82 >60 mL/min/1.73m2 Final     GFR, ESTIMATED POCT   Date Value Ref Range Status   11/03/2023 >60 >60 mL/min/1.73m2 Final     Hemoglobin A1C   Date Value Ref Range Status   11/16/2023 8.2 (H) 0.0 - 5.6 % Final     Potassium   Date Value Ref Range Status   11/21/2023 4.2 3.4 - 5.3 mmol/L Final     Potassium POCT   Date Value Ref Range Status   06/30/2023 4.7 3.5 - 5.0 mmol/L Final     ALT   Date Value Ref Range Status   11/21/2023 6 0 - 70 U/L Final     Comment:     Reference intervals for this test were updated on 6/12/2023 to more accurately reflect our healthy population. There may be differences in the flagging of prior results with similar values performed with this method. Interpretation of those prior results can be made in the context of the updated reference intervals.       AST   Date Value Ref Range Status   11/21/2023 24 0 - 45 U/L Final     Comment:     Reference intervals for this test were updated on 6/12/2023 to more accurately reflect our healthy population. There may be differences in the flagging of prior results with similar values performed with this method. Interpretation of those prior results can be made in the context of the updated reference intervals.       Triglycerides (External)   Date Value Ref Range Status   06/02/2021 74 <150 mg/dL Final         ASSESSMENT/PLAN:      TYPE 2 DIABETES MELLITUS: Hx of type " 2 diabetes mellitus dx in 2013 with hx of distal pancreatectomy on 6/3/2023 for chronic pancreatitis. 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  Pt's blood sugar values are high and he denies missing insulin doses. Recent steroid injection for shoulder pain. He states his abdominal incisional infection has healed. Pt clearing needs more insulin.  Add Basaglar 15 units subcutaneous each am and continue Basaglar 30 units subcutaneous at hs. Increase Novolog 6 units with breakfast, 8 units with lunch and 10 units with dinner with correction ( 1 unit/50 for BG > 140 ). May consider adding Metformin in the near future.  Pt has appt with Idalia Funez CDE/RD next week. He will need weekly follow up until his blood sugar values have improved. He wears his DexcomG7 sensor full time.   Pt denies hx of diabetic retinopathy - seen by Oph in 9/2023.  His urine microalbuminuria in Jan 2023. Pt taking Cozaar.  Most recent creat 0.99 with GFR 82 mL/min in 11/2023.  Pt denies sx of neuropathy- no  foot ulcers. He states he is taking Gabapentin for abdominal pain.    HTN: BP good at home. Continue current RX.  LIPIDS: LDL 33 in Oct 2022. Pt taking Lipitor.  4.   PARKINSON'S DISEASE: Not addressed.  5.   FOLLOW UP: With CDE/RD next week and weekly until blood sugar control has improved. Pt to send me a [employer] message this Thursday with an update. I will plan to review his DexcomG7 sensor data at that time and make additional insulin adjustments if needed. Pt has follow up with Hayley Alatorre PA-C on 1/2/2024.    Time spent reviewing chart, labs and DexcomG7 sensor data today =  6 minutes.  Time for clinic visit today = 30 minutes.  Time for documentation today = 15 minutes.    Total time for visit today = 51 minutes.    Valeri Gomez PA-C

## 2023-11-30 ENCOUNTER — MYC MEDICAL ADVICE (OUTPATIENT)
Dept: ENDOCRINOLOGY | Facility: CLINIC | Age: 68
End: 2023-11-30
Payer: MEDICARE

## 2023-12-05 ENCOUNTER — VIRTUAL VISIT (OUTPATIENT)
Dept: EDUCATION SERVICES | Facility: CLINIC | Age: 68
End: 2023-12-05
Payer: MEDICARE

## 2023-12-05 VITALS — WEIGHT: 190 LBS | BODY MASS INDEX: 28.89 KG/M2

## 2023-12-05 DIAGNOSIS — Z79.4 TYPE 2 DIABETES MELLITUS WITH HYPERGLYCEMIA, WITH LONG-TERM CURRENT USE OF INSULIN (H): Primary | ICD-10-CM

## 2023-12-05 DIAGNOSIS — E11.65 TYPE 2 DIABETES MELLITUS WITH HYPERGLYCEMIA, WITH LONG-TERM CURRENT USE OF INSULIN (H): Primary | ICD-10-CM

## 2023-12-05 PROCEDURE — G0108 DIAB MANAGE TRN  PER INDIV: HCPCS | Mod: VID | Performed by: DIETITIAN, REGISTERED

## 2023-12-05 ASSESSMENT — PAIN SCALES - GENERAL: PAINLEVEL: NO PAIN (0)

## 2023-12-05 NOTE — PROGRESS NOTES
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 yes:     Diabetes Medication(s)       Insulin       insulin aspart (NOVOLOG PEN) 100 UNIT/ML pen    Inject 6 units with breakfast, 8 units with lunch and 10 units with dinner plus correction. ( 1 unit/50 for BG > 140 ). Pt uses approx 55 units daily.PLEASE DO NOT FILL TODAY ( 11/28/2023).     insulin glargine (BASAGLAR KWIKPEN) 100 UNIT/ML pen    Inject 15 units subcutaneous each am and 30 units subcutaneous each pm. Please provided 90 day supply.          30 units of Basaglar at night  15 units of Basaglar in the morning    6 units of Novolog at breakfast  8 units of Novolog with lunch  10 units of Novolog with dinner    1/50/140 correction scale  1/50/200 bedtime correction scale      Monitoring  Patient glucose self monitoring as follows: continuously using a continuous glucose monitor (CGM)  BG meter: Dexcom G7  BG results: see below:              Average Glucose: 205  Time in Range is 11%  Time above Range: 89%   Time below Range: 0%  GMI is 10.0  Glucose Variability: 28.4%    Patient's most recent   Lab Results   Component Value Date    A1C 8.2 11/16/2023    A1C 9.6 06/30/2023      Patient's A1C goal: <7.0    Activity: no regular exercise program    Healthy Eating:    Patient currently eats 3 meals 1 snacks per day   Wakes up 5-6 am and will eat breakfast  Will eat lunch ~11-12pm  Dinner is around 5:30 - 6:30 pm    24 hour diet recall:  Meal Time Food and Drink   Breakfast 5 - 6 am Cheerios, 1 slice of toast, Activia yogurt, a muffin   Snack  apple   Lunch 11 - 12 pm [place] and soup   Snack     Dinner 5:30 - 6:30 pm Meat + potatoes,   Snack         Says he does not eat vegetables, may eat carrots or celery. Trying to include more fiber in diet  Trying to incorporate more fruit in diet      Problem Solving:      Patient is at risk of hypoglycemia?: YES  Hospitalizations for hyper or hypoglycemia: No    Healthy Coping and Stress Management:   Sources of stress identified by patient: My health      EDUCATION and INSTRUCTION PROVIDED AT THIS VISIT:    T2DM patient seen for follow-up at the request of Catherine Gomez PA-C for blood glucose review. He is here with his wife, Veronika, today. Pat met with Catherine Gomez on 11/28 and had an ED visit on 11/21 for hyperglycemia from a steroid injection he had received earlier.   Pat says he feels hungry all the time. He is trying to watch portions of carbohydrates at meals.  CGM reports reviewed, Time in Range is 11%, 89% highs, 0% lows. He had a low Monday evening 12/5 and waited 2 hours before taking his bedtime Basaglar. Reviewed onset and duration for Basaglar. Discussed that he took the correct steps, treat low BG first and take insulin later. Reviewed 15-15 rule, and using finger-sticks to treat lows as sensor lags behind blood glucose meter readings by 10-15 minutes. Patterns of highs noted throughout the day, recommend increasing insulin doses.  Veronika was wondering whether an endocrinology doctor can be added to Sri's team since he has only seen JUICE's at our clinic. I told them that would be a good idea but our Endocrinologists tend to have less availability and the appointment will likely be scheduled farther out.  We also reviewed some  functions of the pancreas as Sri has a hx of distal pancreatectomy. She says that they were never told what implications the procedure will have for Sri's diabetes. Sri is taking pancreas enzymes to aid digestion. Discussed that the distal pancreatectomy may lead to both insulin and glucagon deficiency.         PLAN:  - Increase Basaglar to 33 units (was 30) at bedtime, continue Basaglar 15 units in the morning    - Continue 4 units of Novolog at breakfast    - Increase to 7 (was 6) units of Novolog with lunch, 11 (was 10) units of NovoLog with dinner    - Correction scale 1/50/140:  For Pre-Meal  - 189 give 1 unit.   For Pre-Meal  - 239 give 2 units.   For Pre-Meal  - 289 give 3 units.   For Pre-Meal  - 339 give 4 units.   For Pre-Meal BG = or > 340 give 5 units.     - Bedtime correction scale:  At bedtime and overnight BG  > 200 AND - it has been at least 4 hours since last injection please correct any BG > 200.    At bedtime/overnight:  For  - 249 give 1 units.   For  - 299 give 2 units.   For  - 349 give 3 units.   For BG = or > 350 give 4 units      FOLLOW-UP:    With Diabetes Education on December 14th  With Hayley Alatorre on 1/2/2024    Time spent with patient at today's visit was 33 minutes.      Any diabetes medication dose changes were made via the CDE Protocol and Collaborative Practice Agreement with [facility].  A copy of this encounter was provided to patient's referring provider.

## 2023-12-05 NOTE — NURSING NOTE
Is the patient currently in the state of [place]? YES    Visit mode:VIDEO    If the visit is dropped, the patient can be reconnected by: VIDEO VISIT: Text to cell phone:   Telephone Information:   Mobile [phone]       Will anyone else be joining the visit? No  (If patient encounters technical issues they should call [phone])    How would you like to obtain your AVS? MyChart    Are changes needed to the allergy or medication list? No    Rooming Documentation: Assigned questionnaire(s) completed .    Reason for visit: RECHECK     MELISSA Guzmán

## 2023-12-05 NOTE — PROGRESS NOTES
Virtual Visit Details    Type of service:  Video Visit   Video Start Time: 10:59:14 am.  Video End Time: 11:32:50 am.  You were on the call for 33 minutes 35 seconds   Originating Location (pt. Location): Home    Distant Location (provider location):  Off-site  Platform used for Video Visit: [employer]

## 2023-12-06 ENCOUNTER — TELEPHONE (OUTPATIENT)
Dept: ENDOCRINOLOGY | Facility: CLINIC | Age: 68
End: 2023-12-06
Payer: MEDICARE

## 2023-12-06 NOTE — TELEPHONE ENCOUNTER
TALKED TO PT AND SCHEDULED  HIM WITH DR. CROWLEY  ON 01/10/2024 IN [facility]  Carri Bond on 12/6/2023 at 3:43 PM

## 2023-12-11 DIAGNOSIS — K85.91 NECROTIZING PANCREATITIS: Primary | ICD-10-CM

## 2023-12-11 RX ORDER — PANCRELIPASE LIPASE, PANCRELIPASE PROTEASE, PANCRELIPASE AMYLASE 40000; 126000; 168000 [USP'U]/1; [USP'U]/1; [USP'U]/1
1 CAPSULE, DELAYED RELEASE ORAL
Qty: 90 CAPSULE | Refills: 3 | Status: SHIPPED | OUTPATIENT
Start: 2023-12-11 | End: 2024-04-10

## 2023-12-12 ENCOUNTER — THERAPY VISIT (OUTPATIENT)
Dept: PHYSICAL THERAPY | Facility: CLINIC | Age: 68
End: 2023-12-12
Attending: SURGERY
Payer: MEDICARE

## 2023-12-12 DIAGNOSIS — G89.29 CHRONIC RIGHT-SIDED LOW BACK PAIN WITHOUT SCIATICA: Primary | ICD-10-CM

## 2023-12-12 DIAGNOSIS — R10.84 ABDOMINAL PAIN, GENERALIZED: ICD-10-CM

## 2023-12-12 DIAGNOSIS — T81.49XA SURGICAL SITE INFECTION: ICD-10-CM

## 2023-12-12 DIAGNOSIS — M54.50 CHRONIC RIGHT-SIDED LOW BACK PAIN WITHOUT SCIATICA: Primary | ICD-10-CM

## 2023-12-12 PROCEDURE — 97110 THERAPEUTIC EXERCISES: CPT | Mod: GP | Performed by: PHYSICAL THERAPIST

## 2023-12-12 PROCEDURE — 97161 PT EVAL LOW COMPLEX 20 MIN: CPT | Mod: GP | Performed by: PHYSICAL THERAPIST

## 2023-12-12 NOTE — PROGRESS NOTES
"PHYSICAL THERAPY EVALUATION  Type of Visit: Evaluation    See electronic medical record for Abuse and Falls Screening details.    Subjective       Presenting condition or subjective complaint: back and waist pain  Date of onset: 11/09/23 (referred to PT)    Relevant medical history: Cancer; Diabetes; Hearing problems; High blood pressure; Parkinson s Disease   Dates & types of surgery: heart 2001, gall bladder and appendix 2022 and 2023, mastoid 1976, pancreas and spleen    Pt notes most of his problems began about two years ago and have led to multiple abdominal surgeries over the course of a year, including focus on an infection.  While he continues to deal with those issues, he also has noted some back pain and periodic abdominal pain.  Pt indicates difficulty standing, like at a counteropt.  Feels like a \"ball\" that moves around toward the front side as well.  Walking can also be problematic, but not as consistently.  No particular pattern re: time of day.  Pain is not constant and \"not always bothersome.\"  Feels better sitting in recliner or a \"stiff chair.\"  Not really changing.  Gabapentin was helpful for abdominal pain but not back pain.    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  He reported being due for another dose of " medication.  INTEGUMENTARY (edema, incisions): Incisional abdominal scars without redness, drainage, warmth.  POSTURE: Pt stands and sits with scoliotic curve, hips shifted L relative to shoulders.  GAIT: Pt ambulates without device.  Minimal shuffling observed with no overt instability.  ROM: No gross lumbar restriction.  He noted extension reduced pain during the motion but not sustained afterward.  No symptoms with B SGIS.  PELVIC/SI SCREEN: Negative Arnaldo, thigh thrust, FADIR B.  Hip PROM IR 10 deg L, 30 deg R; ER 30 deg B.  STRENGTH: 4/5 B hip flexion, knee extension and knee flexion B.  TA 1/5 MMT.  DERMATOMES: WNL  NEURAL TENSION: Negative SLR B.  FLEXIBILITY: Limited hamstring flexibility B.  PALPATION: Tender to palpation R paralumbars.  No central tenderness to palpation posteriorly.  No abdominal tenderness to palpation, although pt notes this was tender at last MD visit.    Assessment & Plan   CLINICAL IMPRESSIONS  Medical Diagnosis: Surgical site infection    Treatment Diagnosis: Back, abdominal pain   Impression/Assessment: Patient is a 68 year old male with back and abdominal complaints.  The following significant findings have been identified: Pain, Decreased ROM/flexibility, Decreased strength, Impaired gait, Impaired muscle performance, Decreased activity tolerance, and Impaired posture. These impairments interfere with their ability to perform self care tasks and community mobility as compared to previous level of function.     Clinical Decision Making (Complexity):  Clinical Presentation: Stable/Uncomplicated  Clinical Presentation Rationale: based on medical and personal factors listed in PT evaluation  Clinical Decision Making (Complexity): Low complexity    PLAN OF CARE  Treatment Interventions:  Interventions: Manual Therapy, Neuromuscular Re-education, Therapeutic Activity, Therapeutic Exercise    Long Term Goals     PT Goal 1  Goal Description: Minutes pt will be able to stand:  60  Rationale: to maximize safety and independence within the home  Goal Progress: Minutes pt can stand: 30  Target Date: 02/06/24      Frequency of Treatment: once per week  Duration of Treatment: eight weeks    Education Assessment:        Risks and benefits of evaluation/treatment have been explained.   Patient/Family/caregiver agrees with Plan of Care.     Evaluation Time:     PT Ladarius Low Complexity Minutes (11978): 30    Signing Clinician: ANN-MARIE Way [facility]                                                                                   OUTPATIENT PHYSICAL THERAPY      PLAN OF TREATMENT FOR OUTPATIENT REHABILITATION   Patient's Last Name, First Name, MENDEZSELENE  BeatrizArnaldo  KATHERYN YOB: 1955   Provider's [facility]   [facility]   Medical Record No.  [id number]     Onset Date: 11/09/23 (referred to PT)  Start of Care Date: 12/12/23     Medical Diagnosis:  Surgical site infection      PT Treatment Diagnosis:  Back, abdominal pain Plan of Treatment  Frequency/Duration: once per week/ eight weeks    Certification date from 12/12/23 to 02/06/24         See note for plan of treatment details and functional goals     Yoel Daly, ANN-MARIE                         I CERTIFY THE NEED FOR THESE SERVICES FURNISHED UNDER        THIS PLAN OF TREATMENT AND WHILE UNDER MY CARE     (Physician attestation of this document indicates review and certification of the therapy plan).              Referring Provider:  Ashvin Haider    Initial Assessment  See Epic Evaluation- Start of Care Date: 12/12/23

## 2023-12-14 ENCOUNTER — VIRTUAL VISIT (OUTPATIENT)
Dept: EDUCATION SERVICES | Facility: CLINIC | Age: 68
End: 2023-12-14
Payer: MEDICARE

## 2023-12-14 DIAGNOSIS — Z79.4 TYPE 2 DIABETES MELLITUS WITH HYPERGLYCEMIA, WITH LONG-TERM CURRENT USE OF INSULIN (H): Primary | ICD-10-CM

## 2023-12-14 DIAGNOSIS — E11.65 TYPE 2 DIABETES MELLITUS WITH HYPERGLYCEMIA, WITH LONG-TERM CURRENT USE OF INSULIN (H): Primary | ICD-10-CM

## 2023-12-14 PROCEDURE — 99207 PR NO BILLABLE SERVICE THIS VISIT: CPT | Mod: VID | Performed by: DIETITIAN, REGISTERED

## 2023-12-14 NOTE — NURSING NOTE
Is the patient currently in the state of [place]? YES    Visit mode:VIDEO    If the visit is dropped, the patient can be reconnected by: VIDEO VISIT: Text to cell phone:   Telephone Information:   Mobile [phone]       Will anyone else be joining the visit? YES: How would they like to receive their invitation? Send to e-mail: PT's wife will be on video  (If patient encounters technical issues they should call [phone]    How would you like to obtain your AVS? MyChart    Are changes needed to the allergy or medication list? No    Reason for visit: RECHECK    Kamron SINGHF

## 2023-12-14 NOTE — PROGRESS NOTES
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 yes:     Diabetes Medication(s)       Insulin       insulin aspart (NOVOLOG PEN) 100 UNIT/ML pen    Inject 6 units with breakfast, 8 units with lunch and 10 units with dinner plus correction. ( 1 unit/50 for BG > 140 ). Pt uses approx 55 units daily.PLEASE DO NOT FILL TODAY ( 11/28/2023).     insulin glargine (BASAGLAR KWIKPEN) 100 UNIT/ML pen    Inject 15 units subcutaneous each am and 30 units subcutaneous each pm. Please provided 90 day supply.          30 units of Basaglar at night  15 units of Basaglar in the morning    6 units of Novolog at breakfast  8 units of Novolog with lunch  10 units of Novolog with dinner    1/50/140 correction scale  1/50/200 bedtime correction scale      Monitoring  Patient glucose self monitoring as follows: continuously using a continuous glucose monitor (CGM)  BG meter: Dexcom G7  BG results: see below:              Average Glucose: 207  Time in Range is 37%  Time above Range: 62%   Time below Range: 1%  GMI is 8.3  Glucose Variability: 36.4%    Patient's most recent   Lab Results   Component Value Date    A1C 8.2 11/16/2023    A1C 9.6 06/30/2023      Patient's A1C goal: <7.0    Activity: no regular exercise program    Healthy Eating:    Patient currently eats 3 meals 1 snacks per day   Wakes up 5-6 am and will eat breakfast  Will eat lunch ~11-12pm  Dinner is around 5:30 - 6:30 pm    24 hour diet recall:  Meal Time Food and Drink   Breakfast 5 - 6 am Cheerios, 1 slice of toast, Activia yogurt, a muffin   Snack  apple   Lunch 11 - 12 pm [place] and soup   Snack     Dinner 5:30 - 6:30 pm Meat + potatoes,   Snack         Says he does not eat vegetables, may eat carrots or celery. Trying to include more fiber in diet  Trying to incorporate more fruit in diet      Problem Solving:      Patient is at risk of hypoglycemia?: YES  Hospitalizations for hyper or hypoglycemia: No    Healthy Coping and Stress Management:   Sources of stress identified by patient: My health      EDUCATION and INSTRUCTION PROVIDED AT THIS VISIT:    T2DM patient seen for follow-up at the request of Catherine Gomez PA-C for blood glucose review. He has changed the timing of his Parkinson's medication and noticed changes in his blood glucoses. Is taking carbidopa-levodopa and avoiding high protein foods when he takes it. Pat says he feels hungry all the time. He is trying to include healthier snacks like apples.  CGM reports reviewed, Time in Range is 37%, 62% highs, 0% lows. Overall, glucose numbers are much improved, Time in Range has increased and readings over > 250 have decreased. He is having lows after breakfast, he was taking 7 units of Novolog at breakfast instead of 4. Instructed Pat to take 4 units of Novolog at breakfast. Pattern of post-prandial highs after lunch and dinner noted, will increase lunch and dinner insulin doses. He continues to add correctional insulin on top of meal doses.       PLAN:  - Continue Basaglar 33 units at bedtime, continue Basaglar 15 units in the morning    - Continue 4 units of Novolog at breakfast    - Increase to 8 (was 7) units of Novolog with lunch, 12 (was 11) units of NovoLog with dinner    - Correction scale 1/50/140:  For Pre-Meal BG  140 - 189 give 1 unit.   For Pre-Meal  - 239 give 2 units.   For Pre-Meal  - 289 give 3 units.   For Pre-Meal  - 339 give 4 units.   For Pre-Meal BG = or > 340 give 5 units.     - Bedtime correction scale:  At bedtime and overnight BG  > 200 AND - it has been at least 4 hours since last injection please correct any BG > 200.    At bedtime/overnight:  For  - 249 give 1 units.   For  - 299 give 2 units.   For  - 349 give 3 units.   For BG = or > 350 give 4 units      FOLLOW-UP:    Idalia to review Dexcom report next week and communicate via [employer]  With Hayley Alatorre on 1/2/2024  With Dr. Reynolds on 1/10/2024    Time spent with patient at today's visit was 25 minutes.      Any diabetes medication dose changes were made via the CDE Protocol and Collaborative Practice Agreement with [place] and  [facility].  A copy of this encounter was provided to patient's referring provider.

## 2023-12-14 NOTE — PATIENT INSTRUCTIONS
Abdi Fierro,    You are doing a great job at managing your diabetes, your glucoses are much improved!    PLAN:  - Continue Basaglar  33 units at bedtime, continue Basaglar 15 units in the morning    - Continue 4 units of Novolog at breakfast    - Increase to 8 (was 7) units of Novolog with lunch, 12 (was 11) units of Novolog with dinner    - Correction scale 1/50/140:  For Pre-Meal  - 189 give 1 unit.   For Pre-Meal  - 239 give 2 units.   For Pre-Meal  - 289 give 3 units.   For Pre-Meal  - 339 give 4 units.   For Pre-Meal BG = or > 340 give 5 units.     - Bedtime correction scale:  At bedtime and overnight BG  > 200 AND - it has been at least 4 hours since last injection please correct any BG > 200.    At bedtime/overnight:  For  - 249 give 1 units.   For  - 299 give 2 units.   For  - 349 give 3 units.   For BG = or > 350 give 4 units    FOLLOW-UP:    Idalia to review Dexcom report next week and communicate via [employer]  With Hayley Alatorre on 1/2/2024  With Dr. Reynolds on 1/10/2024

## 2023-12-14 NOTE — PROGRESS NOTES
Virtual Visit Details    Type of service:  Video Visit   Video Start Time: 11:00 am  Video End Time: 11:25 am    Originating Location (pt. Location): Home    Distant Location (provider location):  On-site  Platform used for Video Visit: Destinee

## 2023-12-20 ENCOUNTER — THERAPY VISIT (OUTPATIENT)
Dept: PHYSICAL THERAPY | Facility: CLINIC | Age: 68
End: 2023-12-20
Attending: SURGERY
Payer: MEDICARE

## 2023-12-20 DIAGNOSIS — T81.49XA SURGICAL SITE INFECTION: Primary | ICD-10-CM

## 2023-12-20 DIAGNOSIS — R10.84 ABDOMINAL PAIN, GENERALIZED: ICD-10-CM

## 2023-12-20 DIAGNOSIS — M54.50 CHRONIC RIGHT-SIDED LOW BACK PAIN WITHOUT SCIATICA: ICD-10-CM

## 2023-12-20 DIAGNOSIS — G89.29 CHRONIC RIGHT-SIDED LOW BACK PAIN WITHOUT SCIATICA: ICD-10-CM

## 2023-12-20 PROCEDURE — 97140 MANUAL THERAPY 1/> REGIONS: CPT | Mod: GP | Performed by: PHYSICAL THERAPIST

## 2023-12-20 PROCEDURE — 97110 THERAPEUTIC EXERCISES: CPT | Mod: GP | Performed by: PHYSICAL THERAPIST

## 2023-12-22 NOTE — PROGRESS NOTES
HPI  Arnaldo Henderson is a 68 year old male with type 2 diabetes mellitus. Hx of distal pancreatectomy for chronic pancreatitis S/P partial pancreatectomy and splenectomy on 6/30/2023.. Pt has dx of neuroendocrine tumor of small bowel seen here by Oncology staff.   Pt was dx with type 2 DM in 2013. Denies retinopathy, nephropathy and neuropathy.  Pt has hx of chronic pancreatitis with hx of gallstones, CAD- s/p CABG, Parkinson's disease and HTN.   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    He last saw Valeri Gomez in late November and BG much higher than average blood sugar of 326. Advised:  Add Basaglar 15 units subcutaneous each am and continue Basaglar 30 units subcutaneous at hs. Increase Novolog 6 units with breakfast, 8 units with lunch and 10 units with dinner with correction ( 1 unit/50 for BG > 140 ). May consider adding Metformin in the near future.  Pt has appt with Idalia Funez CDE/RD next week. He will need weekly follow up until his blood sugar values have improved.  He saw Idalia twice last month she continued to increase his insulin dosing in order to bring his blood sugar closer to goal.    He is here today in clinic with his wife Veronika.  Shoulder continues to get better.  Been advised to get an MRI but wondering if that is a good idea.  Wondering whether or not it is a good idea to get another steroid injection as it really did help his shoulder pain but believe it contributed to his high blood sugars.  Currently the shoulder pain is getting better and he is not really noticing limitations in function.  He is seeing physical therapy for his Parkinson's as well and his wife has suggested he ask them about his shoulder pain.    He has been taking his insulin doses consistently.  He has breakfast around 530 to 6:30 in the morning lunch around 11:30 in the morning.  He is having symptoms with most of the low blood sugars.    He and his wife are wondering how the neuroendocrine tumor is  "affecting his blood sugars.    CGMS data:  His average blood sugar has improved to 156.   Blood sugar is low 4% of the time, very low 1% of time.  Blood sugars occur in the early morning as well as following his midday and evening meals.  Also seems to occur following correction from very high values, at always.      Diabetes Care  Retinopathy: none per patient. Seen here by [facility] in 9/2023.  Nephropathy:none; urine microalbuminuria negative in Jan 2023. Pt taking Cozaar.  Neuropathy: none per patient. He states he takes Gabapentin for abd pain.  Foot Exam: no ulcers.  Taking aspirin: yes.  Lipids:LDL 33 in Oct 2022. Pt taking Lipitor.  Insulin: basal and meal time insulin with correction.   DM meds: none.  Testing: DexcomG7 sensor.      ROS  See under HPI.    Allergies  Allergies   Allergen Reactions    Ciprofloxacin Other (See Comments), Hives, Itching, Rash and Unknown     Other reaction(s): Other (see comments)  Oral swelling per [facility]        Dilaudid [Hydromorphone] Rash    Morphine Rash     rash    Penicillins Rash     aka ancef/ rash      Citalopram Unknown    Oxycodone Rash     \"HORRIBLE, SEVERE RASH MAYBE CAUSED BY OXY\"       Medications  Current Outpatient Medications   Medication Sig Dispense Refill    acetaminophen (TYLENOL) 325 MG tablet Take 2 tablets (650 mg) by mouth every 4 hours as needed for other (For optimal non-opioid multimodal pain management to improve pain control.)      aspirin (ASA) 81 MG EC tablet Take 81 mg by mouth daily      atorvastatin (LIPITOR) 40 MG tablet Take 1 tablet by mouth daily      BD PEN NEEDLE LINDY 2ND GEN 32G X 4 MM miscellaneous USE TO INJECT INSULIN 4 TIMES A  each 3    carbidopa-levodopa (SINEMET)  MG tablet Take 2 tablets by mouth 3 times daily      Continuous Blood Gluc  (DEXCOM G7 ) ALIA Use to read blood sugars as per 's instructions. 1 each 0    Continuous Blood Gluc Sensor (DEXCOM G7 SENSOR) MISC Change every 10 " days. 3 each 5    gabapentin (NEURONTIN) 300 MG capsule Take 300 mg by mouth every morning , 300 mg by mouth at midday, and 600 mg by mouth at bedtime      insulin aspart (NOVOLOG PEN) 100 UNIT/ML pen Inject 6 units with breakfast, 8 units with lunch and 10 units with dinner plus correction. ( 1 unit/50 for BG > 140 ). Pt uses approx 55 units daily.PLEASE DO NOT FILL TODAY ( 11/28/2023). 15 mL 1    insulin glargine (BASAGLAR KWIKPEN) 100 UNIT/ML pen Inject 15 units subcutaneous each am and 30 units subcutaneous each pm. Please provided 90 day supply. (Patient taking differently: Inject 15 units subcutaneous each am and 33 units subcutaneous each pm. Please provided 90 day supply.) 15 mL 3    lipase-protease-amylase (ZENPEP) 10321-405182-761868 units CPEP Take 1 capsule by mouth 3 times daily (with meals) 90 capsule 3    lipase-protease-amylase (ZENPEP) 10415-993557-348723 units CPEP Take 1 capsule by mouth 3 times daily (with meals) 90 capsule 11    losartan (COZAAR) 50 MG tablet Take 50 mg by mouth At Bedtime      magnesium oxide (MAG-OX) 400 MG tablet Take 400 mg by mouth every other day      methocarbamol (ROBAXIN) 500 MG tablet Take 1 tablet (500 mg) by mouth every 6 hours as needed for muscle spasms 45 tablet 0    Multiple Vitamin (MULTI-VITAMINS) TABS Take 1 tablet by mouth daily      omeprazole (PRILOSEC) 20 MG DR capsule Take 20 mg by mouth daily      carbidopa-levodopa (SINEMET CR)  MG CR tablet Take 1 tablet by mouth At Bedtime         Family History  family history includes Lung Cancer in his brother; Parkinsonism in his mother.    Social History   reports that he has quit smoking. His smoking use included cigarettes. He has never used smokeless tobacco. He reports that he does not currently use alcohol. He reports that he does not currently use drugs.     Past Medical History  Past Medical History:   Diagnosis Date    CAD (coronary artery disease)     CABG    Diabetes mellitus, type 2 (H) 2013     "Gastroesophageal reflux disease     Hypercholesteremia     Hypertension     Mixed conductive and sensorineural hearing loss of both ears     Mixed hearing loss     Pancreatic duct stricture     Pancreatitis     Parkinson disease     Second degree AV block        Past Surgical History:   Procedure Laterality Date    CA ANESTH CABG W/PUMP      CHOLECYSTECTOMY  2022    COLONOSCOPY N/A 01/12/2023    Procedure: colonoscopy with fluroscopy;  Surgeon: Ayden Fraire MD;  Location:  OR    ENDOSCOPIC RETROGRADE CHOLANGIOPANCREATOGRAM N/A 05/18/2023    Procedure: ENDOSCOPIC RETROGRADE CHOLANGIOPANCREATOGRAPHY, WITH  CYSTDUODENOSTOMY STENTS X2 REMOVAL;  Surgeon: Cedric Saldana MD;  Location: [facility] OR    ENT SURGERY      LAPAROSCOPY DIAGNOSTIC (GENERAL) N/A 02/20/2023    Procedure: LAPAROSCOPY, DIAGNOSTIC; RETRIEVAL OF MIGRATED STENT;  Surgeon: Joseluis Lima MD;  Location: [facility] OR    ORTHOPEDIC SURGERY      PANCREATECTOMY PEUSTOW N/A 06/30/2023    Procedure: Open distal pancreactectomy with splenectomy, lysis of adhesions, resection of proximal illeum;  Surgeon: Ashvin Haider MD;  Location: [facility] OR    NC CABG, ARTERIAL, TWO  2003       Physical Exam  BP (!) 148/77   Pulse 68   Wt 91.2 kg (201 lb)   SpO2 97%   BMI 30.56 kg/m     Is a pleasant male in no acute distress accompanied by his wife.  He is overweight somewhat fatigued appearing.  Voice is clear and articulate.  It is warm and moist.  Swelling in the lower extremities.      RESULTS  Recent Labs   Lab Test 11/21/23  2253 11/16/23  0818 11/03/23  1142 07/01/23  0410 06/30/23  0642 11/18/22  1025 06/02/21  0831   A1C  --  8.2*  --   --  9.6*  --   --    TRIG  --   --   --   --   --   --  74   CR 0.99  --  1.04   < >  --    < >  --     < > = values in this interval not displayed.     No components found for: \"LIPID\"]      ASSESSMENT/PLAN:      TYPE 2 DIABETES MELLITUS:   Sugars are currently at goal but with excessive hypoglycemia.  Advising him to " reduce his Basaglar dose to 14 units in the morning 30 in the evening nearly 10% reduction.  Also advising him to continue his breakfast dose of 4 units but decrease lunch dose to just 7 units and dinner dose to 10 units plus any correction.  Will continue current correction.   HTN: BP good at home. Continue current RX.  LIPIDS: LDL 33 in Oct 2022. Pt taking Lipitor.  4.   PARKINSON'S DISEASE: Is currently in physical therapy for this and will continue.  Does have follow-up with neurology scheduled as well.  5.  Neuroendocrine tumor: See Dr. Gail mcarthur next week  6.   FOLLOW UP: Blood sugars are near goal he will see Idalia again in another month and I am asking him to schedule him with myself or Valeri Gomez in 3 months again.    It is my privilege to be involved in the care of the above patient.     Hayley Alatorre PA-C, MPAS  [facility]  Diabetes, Endocrinology, and Metabolism  [phone] Appointments/Nurse Line  [phone] Fax  [phone] pager  On [facility] (inpatient)    50 minutes spent on the date of the encounter doing chart review, history and exam, documentation, education and counseling, as well as communication and coordination of care, and further activities as noted above.  This time includes time spent reviewing CGM.

## 2023-12-26 ENCOUNTER — THERAPY VISIT (OUTPATIENT)
Dept: PHYSICAL THERAPY | Facility: CLINIC | Age: 68
End: 2023-12-26
Attending: SURGERY
Payer: MEDICARE

## 2023-12-26 DIAGNOSIS — T81.49XA SURGICAL SITE INFECTION: Primary | ICD-10-CM

## 2023-12-26 DIAGNOSIS — G89.29 CHRONIC RIGHT-SIDED LOW BACK PAIN WITHOUT SCIATICA: ICD-10-CM

## 2023-12-26 DIAGNOSIS — R10.84 ABDOMINAL PAIN, GENERALIZED: ICD-10-CM

## 2023-12-26 DIAGNOSIS — M54.50 CHRONIC RIGHT-SIDED LOW BACK PAIN WITHOUT SCIATICA: ICD-10-CM

## 2023-12-26 PROCEDURE — 97110 THERAPEUTIC EXERCISES: CPT | Mod: GP | Performed by: PHYSICAL THERAPIST

## 2023-12-26 PROCEDURE — 97140 MANUAL THERAPY 1/> REGIONS: CPT | Mod: GP | Performed by: PHYSICAL THERAPIST

## 2023-12-27 ENCOUNTER — TELEPHONE (OUTPATIENT)
Dept: CARDIOLOGY | Facility: CLINIC | Age: 68
End: 2023-12-27
Payer: MEDICARE

## 2023-12-27 NOTE — TELEPHONE ENCOUNTER
[facility] Call Center    Phone Message    May a detailed message be left on voicemail: yes     Reason for Call: Other: Veronika wanted to pass a message along to Dr. Hernandez to inform her that she and Pat tried to schedule a cardio visit with Dr. Britton who Pat prefers to see but he does not have any openings. Veronika just wanted to inform Dr. Hernandez that they did follow up on the referral but also wanted to know if Dr. Hernandez wanted to see Pat prior to him being see by Dr. Britton. Please reach out to Veronika to discuss. Thank you!     Action Taken: Other: Cardiology    Travel Screening: Not Applicable    Thank you!  Specialty Access Center

## 2023-12-28 NOTE — TELEPHONE ENCOUNTER
Patient Contacted for the patient to call back and schedule the following:    Appointment type: New Cardiology  Provider: Any General Cardiology MD  Return date: Next available  Specialty phone number: [phone] option 1  Additional appointment(s) needed: NA  Additonal Notes: 12/28 Pt contacted about scheduling New Cardiology appt. Pt said he will be busy all day and to call back tomorrow. MB

## 2023-12-29 ENCOUNTER — TELEPHONE (OUTPATIENT)
Dept: CARDIOLOGY | Facility: CLINIC | Age: 68
End: 2023-12-29
Payer: MEDICARE

## 2024-01-02 ENCOUNTER — OFFICE VISIT (OUTPATIENT)
Dept: ENDOCRINOLOGY | Facility: CLINIC | Age: 69
End: 2024-01-02
Payer: MEDICARE

## 2024-01-02 VITALS
SYSTOLIC BLOOD PRESSURE: 148 MMHG | DIASTOLIC BLOOD PRESSURE: 77 MMHG | OXYGEN SATURATION: 97 % | HEART RATE: 68 BPM | WEIGHT: 201 LBS | BODY MASS INDEX: 30.56 KG/M2

## 2024-01-02 DIAGNOSIS — E44.1 MILD PROTEIN-CALORIE MALNUTRITION (H): ICD-10-CM

## 2024-01-02 DIAGNOSIS — Z79.4 TYPE 2 DIABETES MELLITUS WITH HYPERGLYCEMIA, WITH LONG-TERM CURRENT USE OF INSULIN (H): ICD-10-CM

## 2024-01-02 DIAGNOSIS — K86.1 CHRONIC BILIARY PANCREATITIS (H): ICD-10-CM

## 2024-01-02 DIAGNOSIS — E11.65 TYPE 2 DIABETES MELLITUS WITH HYPERGLYCEMIA, WITH LONG-TERM CURRENT USE OF INSULIN (H): ICD-10-CM

## 2024-01-02 DIAGNOSIS — C7B.8 OTHER SECONDARY NEUROENDOCRINE TUMORS (H): Primary | ICD-10-CM

## 2024-01-02 DIAGNOSIS — G20.B1 PARKINSON'S DISEASE WITH DYSKINESIA, UNSPECIFIED WHETHER MANIFESTATIONS FLUCTUATE (H): ICD-10-CM

## 2024-01-02 DIAGNOSIS — C77.2 MALIGNANT NEOPLASM METASTATIC TO INTRA-ABDOMINAL LYMPH NODE (H): ICD-10-CM

## 2024-01-02 PROCEDURE — 99215 OFFICE O/P EST HI 40 MIN: CPT | Performed by: PHYSICIAN ASSISTANT

## 2024-01-02 ASSESSMENT — PAIN SCALES - GENERAL: PAINLEVEL: NO PAIN (0)

## 2024-01-02 NOTE — PATIENT INSTRUCTIONS
Dear Sri,  Please reduce Lantus to 14 units in the morning and 30 units in the evening.  Please continue Novolog 4 units with breakfast, reduce lunch dose to 7 units, dinner to 10 units.    Continue your sliding scale for now:  - Correction scale 1/50/140:  For Pre-Meal  - 189 give 1 unit.   For Pre-Meal  - 239 give 2 units.   For Pre-Meal  - 289 give 3 units.   For Pre-Meal  - 339 give 4 units.   For Pre-Meal BG = or > 340 give 5 units.      - Bedtime correction scale:  At bedtime and overnight BG  > 200 AND - it has been at least 4 hours since last injection please correct any BG > 200.     At bedtime/overnight:  For  - 249 give 1 units.   For  - 299 give 2 units.   For  - 349 give 3 units.   For BG = or > 350 give 4 units    If seeing multiple BG <70, and >55 or again average BG >180, please let me know.      My best wishes,    Hayley Alatorre PA-C, [facility]  [facility]  Diabetes, Endocrinology, and Metabolism  [phone] Appointments/Nurse  [phone] Fax      Welcome to the [facility] Endocrinology and Diabetes Clinics     Our Endocrinology Clinics are here to provide you with a team-based, collaborative approach in the diagnosis and treatment of patients with diabetes and endocrine disorders. The team is made up of Physicians, Physician Assistants, Certified Diabetes Educators, Registered Nurses, Medical Assistants, Emergency Medical Technicians, and many others, all of whom have the unified goal of providing our patients with high quality care.     Please see below for some helpful tips to best navigate and use the [facility] Endocrinology clinic:     [place] Respect: At [facility], we are committed to a respectful and safe space for all patients, visitors, and staff.  We believe that mutual respect between patients and their care team is the foundation of quality care.  It is our expectation that you will be treated  with respect by your care team.  In turn, we ask that all communication with the care team (written and verbal) be respectful and free from profanity, threatening, or abusive language.  Disrespectful communication undermines our therapeutic relationship with you and may result in us being unable to continue to provide your care.    Refills: A provider must see you at least annually to prescribe and refill medications. This is to ensure your safety as well as meet insurance and compliance regulations.    Scheduling: Many of our Providers offer both in-person or video visits. Please call to schedule any needed follow ups as soon as possible because our provider schedules fill up very quickly. Our care team has the right to require an in-person visit when they believe that it is medically necessary.    Missed Appointments: If you need to cancel or miss your scheduled appointment, please call the clinic at [phone] to reschedule.  Please note if you repeatedly miss appointments or repeatedly miss appointments without calling to inform us ahead of time (no-show), the clinic may elect to not allow you to reschedule without speaking to a manager, may require a Partnership In Care Agreement prior to rescheduling, or could result in you no longer being able to receive care from the clinic. Providing the clinic with timely notification if you have to miss an appointment, allows us to better serve the needs of all of our patients.    Primary Care Provider: Our Endocrinologists are Specialists in their field. We expect you to have a Primary Care Provider established to handle any needs outside of your diabetes and endocrine care.  We would be happy to assist you find a Primary Care Provider, if you do not have one.    Liquid[employer]annerhart: [email or web address] is a wonderful resource that allows you access to your Care Team via online or the yobany. Please ask a member of the team if you would like help creating an account. Please note that it may take  up to 2 business days for a response. [employer] messages are not reviewed on weekends or after business hours.  Emergent or urgent care needs should never be communicated via [employer].  If you experience a medical emergency call 911 or go to the nearest emergency room.    Labs: It is recommended that you stay within the [facility] for labs but you are welcome to obtain ordered labs (with some exceptions) from any location of your choice as long as they are able to complete and process the needed labs. If you need us to fax orders to your preferred lab, please provide us the name and fax number of the lab you would like to go to so we can fax the orders. If your labs are drawn outside of the [facility], please have them fax the results to [phone] ([place]) or [phone] ([place]) or via [facility]. It is your responsibility to ensure that outside lab results are sent to us.    We look forward to working with you. Please do not hesitate to reach out with any questions.    Thank you,    The Endocrine Team    [facility] Address:   [place] Address:     [address] [place] [address]    Phone: [phone]  Fax: [phone]   [address] [place] [address]    Phone: [phone]  Fax: [phone]     [facility] Cost Estimate Phone Number: [phone]    General Lab and Imaging Scheduling Phone Number: [phone]

## 2024-01-02 NOTE — LETTER
1/2/2024       RE: Arnaldo Henderson  [address]  [facility] [address]     Dear Colleague,    Thank you for referring your patient, Arnaldo Henderson, to the [facility] [place] at [facility]. Please see a copy of my visit note below.    HPI  Arnaldo Henderson is a 68 year old male with type 2 diabetes mellitus. Hx of distal pancreatectomy for chronic pancreatitis S/P partial pancreatectomy and splenectomy on 6/30/2023.. Pt has dx of neuroendocrine tumor of small bowel seen here by Oncology staff.   Pt was dx with type 2 DM in 2013. Denies retinopathy, nephropathy and neuropathy.  Pt has hx of chronic pancreatitis with hx of gallstones, CAD- s/p CABG, Parkinson's disease and HTN.   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    He last saw Valeri Gomez in late November and BG much higher than average blood sugar of 326. Advised:  Add Basaglar 15 units subcutaneous each am and continue Basaglar 30 units subcutaneous at hs. Increase Novolog 6 units with breakfast, 8 units with lunch and 10 units with dinner with correction ( 1 unit/50 for BG > 140 ). May consider adding Metformin in the near future.  Pt has appt with Idalia Funez CDE/RD next week. He will need weekly follow up until his blood sugar values have improved.  He saw Idalia twice last month she continued to increase his insulin dosing in order to bring his blood sugar closer to goal.    He is here today in clinic with his wife Veronika.  Shoulder continues to get better.  Been advised to get an MRI but wondering if that is a good idea.  Wondering whether or not it is a good idea to get another steroid injection as it really did help his shoulder pain but believe it contributed to his high blood sugars.  Currently the shoulder pain is getting better and he is not really noticing limitations in function.  He is seeing physical therapy for his Parkinson's as well and his  "wife has suggested he ask them about his shoulder pain.    He has been taking his insulin doses consistently.  He has breakfast around 530 to 6:30 in the morning lunch around 11:30 in the morning.  He is having symptoms with most of the low blood sugars.    He and his wife are wondering how the neuroendocrine tumor is affecting his blood sugars.    CGMS data:  His average blood sugar has improved to 156.   Blood sugar is low 4% of the time, very low 1% of time.  Blood sugars occur in the early morning as well as following his midday and evening meals.  Also seems to occur following correction from very high values, at always.      Diabetes Care  Retinopathy: none per patient. Seen here by Oph in 9/2023.  Nephropathy:none; urine microalbuminuria negative in Jan 2023. Pt taking Cozaar.  Neuropathy: none per patient. He states he takes Gabapentin for abd pain.  Foot Exam: no ulcers.  Taking aspirin: yes.  Lipids:LDL 33 in Oct 2022. Pt taking Lipitor.  Insulin: basal and meal time insulin with correction.   DM meds: none.  Testing: DexcomG7 sensor.      ROS  See under HPI.    Allergies  Allergies   Allergen Reactions    Ciprofloxacin Other (See Comments), Hives, Itching, Rash and Unknown     Other reaction(s): Other (see comments)  Oral swelling per [facility]        Dilaudid [Hydromorphone] Rash    Morphine Rash     rash    Penicillins Rash     aka ancef/ rash      Citalopram Unknown    Oxycodone Rash     \"HORRIBLE, SEVERE RASH MAYBE CAUSED BY OXY\"       Medications  Current Outpatient Medications   Medication Sig Dispense Refill    acetaminophen (TYLENOL) 325 MG tablet Take 2 tablets (650 mg) by mouth every 4 hours as needed for other (For optimal non-opioid multimodal pain management to improve pain control.)      aspirin (ASA) 81 MG EC tablet Take 81 mg by mouth daily      atorvastatin (LIPITOR) 40 MG tablet Take 1 tablet by mouth daily      BD PEN NEEDLE LINDY 2ND GEN 32G X 4 MM miscellaneous USE TO INJECT INSULIN " 4 TIMES A  each 3    carbidopa-levodopa (SINEMET)  MG tablet Take 2 tablets by mouth 3 times daily      Continuous Blood Gluc  (DEXCOM G7 ) ALIA Use to read blood sugars as per 's instructions. 1 each 0    Continuous Blood Gluc Sensor (DEXCOM G7 SENSOR) MISC Change every 10 days. 3 each 5    gabapentin (NEURONTIN) 300 MG capsule Take 300 mg by mouth every morning , 300 mg by mouth at midday, and 600 mg by mouth at bedtime      insulin aspart (NOVOLOG PEN) 100 UNIT/ML pen Inject 6 units with breakfast, 8 units with lunch and 10 units with dinner plus correction. ( 1 unit/50 for BG > 140 ). Pt uses approx 55 units daily.PLEASE DO NOT FILL TODAY ( 11/28/2023). 15 mL 1    insulin glargine (BASAGLAR KWIKPEN) 100 UNIT/ML pen Inject 15 units subcutaneous each am and 30 units subcutaneous each pm. Please provided 90 day supply. (Patient taking differently: Inject 15 units subcutaneous each am and 33 units subcutaneous each pm. Please provided 90 day supply.) 15 mL 3    lipase-protease-amylase (ZENPEP) 56301-703088-177205 units CPEP Take 1 capsule by mouth 3 times daily (with meals) 90 capsule 3    lipase-protease-amylase (ZENPEP) 19480-182459-206992 units CPEP Take 1 capsule by mouth 3 times daily (with meals) 90 capsule 11    losartan (COZAAR) 50 MG tablet Take 50 mg by mouth At Bedtime      magnesium oxide (MAG-OX) 400 MG tablet Take 400 mg by mouth every other day      methocarbamol (ROBAXIN) 500 MG tablet Take 1 tablet (500 mg) by mouth every 6 hours as needed for muscle spasms 45 tablet 0    Multiple Vitamin (MULTI-VITAMINS) TABS Take 1 tablet by mouth daily      omeprazole (PRILOSEC) 20 MG DR capsule Take 20 mg by mouth daily      carbidopa-levodopa (SINEMET CR)  MG CR tablet Take 1 tablet by mouth At Bedtime         Family History  family history includes Lung Cancer in his brother; Parkinsonism in his mother.    Social History   reports that he has quit smoking. His  smoking use included cigarettes. He has never used smokeless tobacco. He reports that he does not currently use alcohol. He reports that he does not currently use drugs.     Past Medical History  Past Medical History:   Diagnosis Date    CAD (coronary artery disease)     CABG    Diabetes mellitus, type 2 (H) 2013    Gastroesophageal reflux disease     Hypercholesteremia     Hypertension     Mixed conductive and sensorineural hearing loss of both ears     Mixed hearing loss     Pancreatic duct stricture     Pancreatitis     Parkinson disease     Second degree AV block        Past Surgical History:   Procedure Laterality Date    CA ANESTH CABG W/PUMP      CHOLECYSTECTOMY  2022    COLONOSCOPY N/A 01/12/2023    Procedure: colonoscopy with fluroscopy;  Surgeon: Ayden Fraire MD;  Location:  OR    ENDOSCOPIC RETROGRADE CHOLANGIOPANCREATOGRAM N/A 05/18/2023    Procedure: ENDOSCOPIC RETROGRADE CHOLANGIOPANCREATOGRAPHY, WITH  CYSTDUODENOSTOMY STENTS X2 REMOVAL;  Surgeon: Cedric Saldana MD;  Location: [facility] OR    ENT SURGERY      LAPAROSCOPY DIAGNOSTIC (GENERAL) N/A 02/20/2023    Procedure: LAPAROSCOPY, DIAGNOSTIC; RETRIEVAL OF MIGRATED STENT;  Surgeon: Joseluis Lima MD;  Location: [facility] OR    ORTHOPEDIC SURGERY      PANCREATECTOMY PEUSTOW N/A 06/30/2023    Procedure: Open distal pancreactectomy with splenectomy, lysis of adhesions, resection of proximal illeum;  Surgeon: Ashvin Haider MD;  Location: [facility] OR    IL CABG, ARTERIAL, TWO  2003       Physical Exam  BP (!) 148/77   Pulse 68   Wt 91.2 kg (201 lb)   SpO2 97%   BMI 30.56 kg/m     Is a pleasant male in no acute distress accompanied by his wife.  He is overweight somewhat fatigued appearing.  Voice is clear and articulate.  It is warm and moist.  Swelling in the lower extremities.      RESULTS  Recent Labs   Lab Test 11/21/23  2253 11/16/23  0818 11/03/23  1142 07/01/23  0410 06/30/23  0642 11/18/22  1025 06/02/21  0831   A1C  --  8.2*  --   --   "9.6*  --   --    TRIG  --   --   --   --   --   --  74   CR 0.99  --  1.04   < >  --    < >  --     < > = values in this interval not displayed.     No components found for: \"LIPID\"]      ASSESSMENT/PLAN:      TYPE 2 DIABETES MELLITUS:   Sugars are currently at goal but with excessive hypoglycemia.  Advising him to reduce his Basaglar dose to 14 units in the morning 30 in the evening nearly 10% reduction.  Also advising him to continue his breakfast dose of 4 units but decrease lunch dose to just 7 units and dinner dose to 10 units plus any correction.  Will continue current correction.   HTN: BP good at home. Continue current RX.  LIPIDS: LDL 33 in Oct 2022. Pt taking Lipitor.  4.   PARKINSON'S DISEASE: Is currently in physical therapy for this and will continue.  Does have follow-up with neurology scheduled as well.  5.  Neuroendocrine tumor: See Dr. Gail mcarthur next week  6.   FOLLOW UP: Blood sugars are near goal he will see Idalia again in another month and I am asking him to schedule him with myself or Valeri Gomez in 3 months again.    It is my privilege to be involved in the care of the above patient.     Hayley Alatorre PA-C, MPAS  [facility]  Diabetes, Endocrinology, and Metabolism  [phone] Appointments/Nurse Line  [phone] Fax  [phone] pager  On VOCER[facility] (inpatient)    50 minutes spent on the date of the encounter doing chart review, history and exam, documentation, education and counseling, as well as communication and coordination of care, and further activities as noted above.  This time includes time spent reviewing CGM.                      "

## 2024-01-04 DIAGNOSIS — E11.9 TYPE 2 DIABETES MELLITUS WITHOUT COMPLICATION (H): ICD-10-CM

## 2024-01-04 RX ORDER — ACYCLOVIR 400 MG/1
TABLET ORAL
Qty: 3 EACH | Refills: 5 | Status: SHIPPED | OUTPATIENT
Start: 2024-01-04 | End: 2024-01-11

## 2024-01-04 NOTE — TELEPHONE ENCOUNTER
PRESCRIPTIONS MUST BE WRITTEN THIS WAY TO MAKE THEM MEDICARE COMPLIANT    DEXCOM G6 SENSORS  SIG: Change every 10 days.  QTY: 3  REFILLS: 5    -Prescriptions must be written after the clinical note date and will only be able to be used for 6 months from the date of the clinical notes. All prescriptions must be from same provider who patient had visit with. (We will be requesting new clinical notes and prescriptions every 6 months to meet Medicare Guidelines.)    Please contact us at [phone] (this number is for clinics only) with any questions. Please only give [phone] to patients.    [facility]   [address] [place] [address]  Phone # [phone]  Fax # [phone]

## 2024-01-10 ENCOUNTER — OFFICE VISIT (OUTPATIENT)
Dept: ENDOCRINOLOGY | Facility: CLINIC | Age: 69
End: 2024-01-10
Payer: MEDICARE

## 2024-01-10 VITALS
BODY MASS INDEX: 30.71 KG/M2 | HEART RATE: 56 BPM | WEIGHT: 202 LBS | DIASTOLIC BLOOD PRESSURE: 92 MMHG | OXYGEN SATURATION: 98 % | SYSTOLIC BLOOD PRESSURE: 168 MMHG

## 2024-01-10 DIAGNOSIS — Z79.4 TYPE 2 DIABETES MELLITUS TREATED WITH INSULIN (H): Primary | ICD-10-CM

## 2024-01-10 DIAGNOSIS — E11.9 TYPE 2 DIABETES MELLITUS TREATED WITH INSULIN (H): Primary | ICD-10-CM

## 2024-01-10 PROCEDURE — 99417 PROLNG OP E/M EACH 15 MIN: CPT | Performed by: INTERNAL MEDICINE

## 2024-01-10 PROCEDURE — 99215 OFFICE O/P EST HI 40 MIN: CPT | Performed by: INTERNAL MEDICINE

## 2024-01-10 RX ORDER — METFORMIN HCL 500 MG
TABLET, EXTENDED RELEASE 24 HR ORAL
Qty: 402 TABLET | Refills: 0 | Status: SHIPPED | OUTPATIENT
Start: 2024-01-10 | End: 2024-03-22

## 2024-01-10 NOTE — LETTER
1/10/2024         RE: Arnaldo Henderson  [address]  [facility] [address]        Dear Colleague,    Thank you for referring your patient, Arnaldo Henderson, to the [facility]. Please see a copy of my visit note below.    Endocrinology Clinic Visit 1/10/2024    NAME:  Arnaldo Henderson  PCP:  Alanna Lazaro  MRN:  [id number]  Reason for Consult: Diabetes    HISTORY OF PRESENT ILLNESS  Arnaldo Henderson is a 68 year old male with CAD s/p CABG, 2nd degree AV block, DM, GERD, HTN, h/o chronic pancreatitis s/p distal pancreatectomy, neuroendocrine tumor and PD who is here for diabetes management.     Per Dr. Lyn note 11/27/2023  He is 68 years old.  His tumor was found incidentally when he was undergoing a pancreatectomy for chronic pancreatitis.    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  He has small volume mildly progressive disease within the abdomen without any clearly associated symptoms.   For now his rate of growth and very small volume disease does not warrant active intervention. We will plan on a follow-up scan in 3 to 4 months.     Per Inpatient DM team note 7/2023  Arnaldo Henderson 67 y/o with PMH chronic pancreatitis, CAD, CABG, 2nd degree AVB, Parkinson's disease and chronic pain. He is now s/p distal pancreatectomy, splenectomy, YVAN, proximal ileum resection. He is admitted to the SICU for hemodynamic monitoring.  # T2DM not at goal with hyperglycemia.  Poor control prior to admission   # Stress induced hyperglycemia  # post pancreatic surgery  Plan:                  -Lantus 15 units                 -Novolog 1u/10 gm CHO coverage with meals and snacks                 -Continue insulin drip for now.                 -BG monitoring as per insulin drip protocol                 -hypoglycemia protocol                 -recommend diet with carb counting protocol                 -diabetes education needs will be assessed  closer to discharge                 -on discharge, will recommend outpatient follow up with [facility] Endocr[facility] service     Per most recent endocrine clinic visit with my colleague Hayley Landry PAC 1/2024   TYPE 2 DIABETES MELLITUS:   Sugars are currently at goal but with excessive hypoglycemia.  Advising him to reduce his Basaglar dose to 14 units in the morning 30 in the evening nearly 10% reduction.  Also advising him to continue his breakfast dose of 4 units but decrease lunch dose to just 7 units and dinner dose to 10 units plus any correction.  Will continue current correction.   5.  Neuroendocrine tumor: See Dr. Gail mcarthur next week    I confirmed with patient and wife and they confirmed that Pat is seeing me today for diabetes.  They would like to have a doctor on the team also with our diabetes team at the .    Prior to admission, he was on metformin 2000 mg/day, no side effect  Pt and wife reported seeing doctors for diabetes once during inpatient.  Report that about 60% of his pancreas was removed  Pt follows neuroendocrine tumor with Dr. Lyn no concerns.    Patient's main concerns today are his diabetes control after steroid injection and his recent lab results.  11/21/2023  Steroid injection, glucose was high   He may need another injection in the future and is concerned.  They are wondering about high BUN, previous abnormal LFT, low hematocrit and abnormal RBC    In terms of symptoms, he said PD is bothersome  But no problem taking insulin  Feels like hypoglycemia improve since lower insulin dose last week    Pt only has WAPA alarm set for urgent low.  Sometimes he could not feel low glucose.  He recently bought glucose tablets to help with lows    Current regimen  Basaglar 14 units AM 30 units hs  Novolog 4 units with breakfast + sliding scale insulin, lunch 7 + sliding scale insulin, supper 10 + sliding scale insulin, night sliding scale insulin    CGM 1 week data  TIR 49%, low 4%, very  low 1%         REVIEW OF SYSTEMS  10 point negative except as mentioned in HPI    Past Medical/Surgical History:  Past Medical History:   Diagnosis Date     CAD (coronary artery disease)     CABG     Diabetes mellitus, type 2 (H) 2013     Gastroesophageal reflux disease      Hypercholesteremia      Hypertension      Mixed conductive and sensorineural hearing loss of both ears      Mixed hearing loss      Pancreatic duct stricture      Pancreatitis      Parkinson disease      Second degree AV block      Past Surgical History:   Procedure Laterality Date     CA ANESTH CABG W/PUMP       CHOLECYSTECTOMY  2022     COLONOSCOPY N/A 01/12/2023    Procedure: colonoscopy with fluroscopy;  Surgeon: Ayden Fraire MD;  Location:  OR     ENDOSCOPIC RETROGRADE CHOLANGIOPANCREATOGRAM N/A 05/18/2023    Procedure: ENDOSCOPIC RETROGRADE CHOLANGIOPANCREATOGRAPHY, WITH  CYSTDUODENOSTOMY STENTS X2 REMOVAL;  Surgeon: Cedric Saldana MD;  Location: [facility] OR     ENT SURGERY       LAPAROSCOPY DIAGNOSTIC (GENERAL) N/A 02/20/2023    Procedure: LAPAROSCOPY, DIAGNOSTIC; RETRIEVAL OF MIGRATED STENT;  Surgeon: Joseluis Lima MD;  Location: [facility] OR     ORTHOPEDIC SURGERY       PANCREATECTOMY PEUSTOW N/A 06/30/2023    Procedure: Open distal pancreactectomy with splenectomy, lysis of adhesions, resection of proximal illeum;  Surgeon: Ashvin Haider MD;  Location: [facility] OR     [place] CABG, ARTERIAL, TWO  2003       Medications  Current Outpatient Medications   Medication     acetaminophen (TYLENOL) 325 MG tablet     aspirin (ASA) 81 MG EC tablet     atorvastatin (LIPITOR) 40 MG tablet     BD PEN NEEDLE LINDY 2ND GEN 32G X 4 MM miscellaneous     carbidopa-levodopa (SINEMET)  MG tablet     Continuous Blood Gluc  (DEXCOM G7 ) ALIA     Continuous Blood Gluc Sensor (DEXCOM G7 SENSOR) MISC     gabapentin (NEURONTIN) 300 MG capsule     Glucagon (BAQSIMI) 3 MG/DOSE nasal powder     insulin aspart (NOVOLOG PEN) 100 UNIT/ML pen  "    insulin glargine (BASAGLAR KWIKPEN) 100 UNIT/ML pen     lipase-protease-amylase (ZENPEP) 90551-584805-133652 units CPEP     lipase-protease-amylase (ZENPEP) 61934-160863-103142 units CPEP     losartan (COZAAR) 50 MG tablet     magnesium oxide (MAG-OX) 400 MG tablet     metFORMIN (GLUCOPHAGE XR) 500 MG 24 hr tablet     methocarbamol (ROBAXIN) 500 MG tablet     Multiple Vitamin (MULTI-VITAMINS) TABS     omeprazole (PRILOSEC) 20 MG DR capsule     carbidopa-levodopa (SINEMET CR)  MG CR tablet     No current facility-administered medications for this visit.       Allergies  Allergies   Allergen Reactions     Ciprofloxacin Other (See Comments), Hives, Itching, Rash and Unknown     Other reaction(s): Other (see comments)  Oral swelling per [facility]         Dilaudid [Hydromorphone] Rash     Morphine Rash     rash     Penicillins Rash     aka ancef/ rash       Citalopram Unknown     Oxycodone Rash     \"HORRIBLE, SEVERE RASH MAYBE CAUSED BY OXY\"         Family History  family history includes Lung Cancer in his brother; Parkinsonism in his mother.    Social History  Social History     Tobacco Use     Smoking status: Former     Types: Cigarettes     Smokeless tobacco: Never   Substance Use Topics     Alcohol use: Not Currently       Physical Exam  BP (!) 168/92 (BP Location: Right arm, Patient Position: Sitting, Cuff Size: Adult Regular)   Pulse 56   Wt 91.6 kg (202 lb)   SpO2 98%   BMI 30.71 kg/m    Body mass index is 30.71 kg/m .  GENERAL : Frail, no acute distress, hearing aid in place  EYES: No scleral icterus,  No proptosis  NECK: No visible masses.   RESP: Normal breathing  NEURO: awake, alert, responds appropriately to questions.      DATA REVIEW  Labs/Imaging  Lab Results   Component Value Date    A1C 8.2 11/16/2023    A1C 9.6 06/30/2023 6/30/23  Final Diagnosis   A(1). SPLEEN AND TAIL OF PANCREAS, RESECTION:  - Spleen with no significant histopathologic abnormality  - Pancreas with features of " chronic pancreatitis  - One benign lymph node (0/1)     B(2). DISTAL PANCREAS, RESECTION:  - Chronic pancreatitis     C(3). PROXIMAL ILEUM WITH STENOTIC AREA AND PRIOR ANASTOMOSIS, RESECTION:  - Well-differentiated neuroendocrine tumor, grade 2 of 3  - Tumor invades through the muscularis propria into the subserosal tissue  - Tumor size: 2.3 cm in greatest dimension  - Resection margin free of involvement (tumor 0.9 cm from the closest mesenteric margin)  - Lymphovascular invasion present  - Perineural invasion present  - One of four lymph nodes positive for metastatic neuroendocrine tumor (1/4)  - Please see tumor synoptic report for details         ASSESSMENT/PLAN:     Arnaldo Henderson is a 68 year old male with CAD s/p CABG, 2nd degree AV block, DM, GERD, HTN, h/o chronic pancreatitis s/p distal pancreatectomy, neuroendocrine tumor and PD who is here for diabetes management.     ## T2DM  ## H/o chronic pancreatitis  ## Partial pancreatectomy (report 60%)  ## CAD s/p CABG  ## Hypoglycemia unawareness  ## Grade 2 hypyglycemia  Today we discussed about diabetes and goal of treatment  Plan for goal A1c <8  CGM Time in target () >50%, and low (<70) <1%  Goal fasting 100-150, [id number] mg/dL    Previously tolerate metformin well  No h/o yeast/ bladder/ kidney infection      -- Continue Basaglar 14 units AM 30 units hs  -- Continue Novolog 4 units with breakfast + sliding scale insulin, 7 unit(s) with lunch + sliding scale insulin  -- decrease Novolog with supper to 7 unit(s)  + sliding scale insulin  -- continue bedtime novolog sliding scale insulin  -- start metformin 500 mg daily, if tolerate, increase weekly to 1000, 1500, 2000 mg daily  -- set low alarm at 80, hypoglycemia management discussed  -- Baqsimi for severe hypoglycemia as needed    Will get weekly glucose reading to review while titrating metformin. Follow up 4-6 weeks    60 minutes spent on the date of the encounter doing chart review, history  and exam, documentation and further activities as noted above.  This time excludes time spent reviewing CGM.         Mj Reynolds MD        Again, thank you for allowing me to participate in the care of your patient.        Sincerely,        Mj Reynolds MD

## 2024-01-10 NOTE — PATIENT INSTRUCTIONS
## T2DM  ## Partial pancreatectomy (60%)  ## CAD s/p CABG  ## Hypoglycemia unawareness  Today we discussed about diabetes and goal of treatment  Plan for goal A1c <8  CGM Time in target () >50%, and low (<70) <1%  Goal fasting 100-150, uqmgqmu439-689 mg/dL    -- Continue Basaglar 14 units AM 30 units hs  -- Continue Novolog 4 units with breakfast + sliding scale insulin, 7 unit(s) with lunch + sliding scale insulin  -- decrease Novolog with supper to 7 unit(s)  + sliding scale insulin  -- continue bedtime novolog sliding scale insulin  -- start metformin 500 mg daily, if tolerate, increase weekly to 1000, 1500, 2000 mg daily  -- set low alarm at 80  -- Baqsimi for severe hypoglycemia as needed    Follow up 4-6 weeks

## 2024-01-10 NOTE — PROGRESS NOTES
Endocrinology Clinic Visit 1/10/2024    NAME:  Arnaldo Henderson  PCP:  Alanna Lazaro MENDEZ  MRN:  [id number]  Reason for Consult: Diabetes    HISTORY OF PRESENT ILLNESS  Arnaldo Henderson is a 68 year old male with CAD s/p CABG, 2nd degree AV block, DM, GERD, HTN, h/o chronic pancreatitis s/p distal pancreatectomy, neuroendocrine tumor and PD who is here for diabetes management.     Per Dr. Lyn note 11/27/2023  He is 68 years old.  His tumor was found incidentally when he was undergoing a pancreatectomy for chronic pancreatitis.    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  He has small volume mildly progressive disease within the abdomen without any clearly associated symptoms.   For now his rate of growth and very small volume disease does not warrant active intervention. We will plan on a follow-up scan in 3 to 4 months.     Per Inpatient DM team note 7/2023  Arnaldo Henderson 69 y/o with PMH chronic pancreatitis, CAD, CABG, 2nd degree AVB, Parkinson's disease and chronic pain. He is now s/p distal pancreatectomy, splenectomy, VYAN, proximal ileum resection. He is admitted to the SICU for hemodynamic monitoring.  # T2DM not at goal with hyperglycemia.  Poor control prior to admission   # Stress induced hyperglycemia  # post pancreatic surgery  Plan:                  -Lantus 15 units                 -Novolog 1u/10 gm CHO coverage with meals and snacks                 -Continue insulin drip for now.                 -BG monitoring as per insulin drip protocol                 -hypoglycemia protocol                 -recommend diet with carb counting protocol                 -diabetes education needs will be assessed closer to discharge                 -on discharge, will recommend outpatient follow up with [facility] service     Per most recent endocrine clinic visit with my colleague Hayley Alatorre PAC 1/2024   TYPE 2 DIABETES MELLITUS:   Sugars are currently at goal  but with excessive hypoglycemia.  Advising him to reduce his Basaglar dose to 14 units in the morning 30 in the evening nearly 10% reduction.  Also advising him to continue his breakfast dose of 4 units but decrease lunch dose to just 7 units and dinner dose to 10 units plus any correction.  Will continue current correction.   5.  Neuroendocrine tumor: See Dr. Gail mcarthur next week    I confirmed with patient and wife and they confirmed that Pat is seeing me today for diabetes.  They would like to have a doctor on the team also with our diabetes team at the .    Prior to admission, he was on metformin 2000 mg/day, no side effect  Pt and wife reported seeing doctors for diabetes once during inpatient.  Report that about 60% of his pancreas was removed  Pt follows neuroendocrine tumor with Dr. Lyn no concerns.    Patient's main concerns today are his diabetes control after steroid injection and his recent lab results.  11/21/2023  Steroid injection, glucose was high   He may need another injection in the future and is concerned.  They are wondering about high BUN, previous abnormal LFT, low hematocrit and abnormal RBC    In terms of symptoms, he said PD is bothersome  But no problem taking insulin  Feels like hypoglycemia improve since lower insulin dose last week    Pt only has [employer] alarm set for urgent low.  Sometimes he could not feel low glucose.  He recently bought glucose tablets to help with lows    Current regimen  Basaglar 14 units AM 30 units hs  Novolog 4 units with breakfast + sliding scale insulin, lunch 7 + sliding scale insulin, supper 10 + sliding scale insulin, night sliding scale insulin    CGM 1 week data  TIR 49%, low 4%, very low 1%         REVIEW OF SYSTEMS  10 point negative except as mentioned in HPI    Past Medical/Surgical History:  Past Medical History:   Diagnosis Date    CAD (coronary artery disease)     CABG    Diabetes mellitus, type 2 (H) 2013    Gastroesophageal reflux disease      Hypercholesteremia     Hypertension     Mixed conductive and sensorineural hearing loss of both ears     Mixed hearing loss     Pancreatic duct stricture     Pancreatitis     Parkinson disease     Second degree AV block      Past Surgical History:   Procedure Laterality Date    CA ANESTH CABG W/PUMP      CHOLECYSTECTOMY  2022    COLONOSCOPY N/A 01/12/2023    Procedure: colonoscopy with fluroscopy;  Surgeon: Ayden Fraire MD;  Location:  OR    ENDOSCOPIC RETROGRADE CHOLANGIOPANCREATOGRAM N/A 05/18/2023    Procedure: ENDOSCOPIC RETROGRADE CHOLANGIOPANCREATOGRAPHY, WITH  CYSTDUODENOSTOMY STENTS X2 REMOVAL;  Surgeon: Cedric Saldana MD;  Location: [facility] OR    ENT SURGERY      LAPAROSCOPY DIAGNOSTIC (GENERAL) N/A 02/20/2023    Procedure: LAPAROSCOPY, DIAGNOSTIC; RETRIEVAL OF MIGRATED STENT;  Surgeon: Joseluis Lima MD;  Location: [facility] OR    ORTHOPEDIC SURGERY      PANCREATECTOMY PEUSTOW N/A 06/30/2023    Procedure: Open distal pancreactectomy with splenectomy, lysis of adhesions, resection of proximal illeum;  Surgeon: Ashvin Haider MD;  Location: [facility] OR    OR CABG, ARTERIAL, TWO  2003       Medications  Current Outpatient Medications   Medication    acetaminophen (TYLENOL) 325 MG tablet    aspirin (ASA) 81 MG EC tablet    atorvastatin (LIPITOR) 40 MG tablet    BD PEN NEEDLE LINDY 2ND GEN 32G X 4 MM miscellaneous    carbidopa-levodopa (SINEMET)  MG tablet    Continuous Blood Gluc  (DEXCOM G7 ) ALIA    Continuous Blood Gluc Sensor (DEXCOM G7 SENSOR) MISC    gabapentin (NEURONTIN) 300 MG capsule    Glucagon (BAQSIMI) 3 MG/DOSE nasal powder    insulin aspart (NOVOLOG PEN) 100 UNIT/ML pen    insulin glargine (BASAGLAR KWIKPEN) 100 UNIT/ML pen    lipase-protease-amylase (ZENPEP) [id number] units CPEP    lipase-protease-amylase (ZENPEP) [id number] units CPEP    losartan (COZAAR) 50 MG tablet    magnesium oxide (MAG-OX) 400 MG tablet    metFORMIN (GLUCOPHAGE XR) 500  "MG 24 hr tablet    methocarbamol (ROBAXIN) 500 MG tablet    Multiple Vitamin (MULTI-VITAMINS) TABS    omeprazole (PRILOSEC) 20 MG DR capsule    carbidopa-levodopa (SINEMET CR)  MG CR tablet     No current facility-administered medications for this visit.       Allergies  Allergies   Allergen Reactions    Ciprofloxacin Other (See Comments), Hives, Itching, Rash and Unknown     Other reaction(s): Other (see comments)  Oral swelling per [facility]        Dilaudid [Hydromorphone] Rash    Morphine Rash     rash    Penicillins Rash     aka ancef/ rash      Citalopram Unknown    Oxycodone Rash     \"HORRIBLE, SEVERE RASH MAYBE CAUSED BY OXY\"         Family History  family history includes Lung Cancer in his brother; Parkinsonism in his mother.    Social History  Social History     Tobacco Use    Smoking status: Former     Types: Cigarettes    Smokeless tobacco: Never   Substance Use Topics    Alcohol use: Not Currently       Physical Exam  BP (!) 168/92 (BP Location: Right arm, Patient Position: Sitting, Cuff Size: Adult Regular)   Pulse 56   Wt 91.6 kg (202 lb)   SpO2 98%   BMI 30.71 kg/m    Body mass index is 30.71 kg/m .  GENERAL : Frail, no acute distress, hearing aid in place  EYES: No scleral icterus,  No proptosis  NECK: No visible masses.   RESP: Normal breathing  NEURO: awake, alert, responds appropriately to questions.      DATA REVIEW  Labs/Imaging  Lab Results   Component Value Date    A1C 8.2 11/16/2023    A1C 9.6 06/30/2023 6/30/23  Final Diagnosis   A(1). SPLEEN AND TAIL OF PANCREAS, RESECTION:  - Spleen with no significant histopathologic abnormality  - Pancreas with features of chronic pancreatitis  - One benign lymph node (0/1)     B(2). DISTAL PANCREAS, RESECTION:  - Chronic pancreatitis     C(3). PROXIMAL ILEUM WITH STENOTIC AREA AND PRIOR ANASTOMOSIS, RESECTION:  - Well-differentiated neuroendocrine tumor, grade 2 of 3  - Tumor invades through the muscularis propria into the subserosal " tissue  - Tumor size: 2.3 cm in greatest dimension  - Resection margin free of involvement (tumor 0.9 cm from the closest mesenteric margin)  - Lymphovascular invasion present  - Perineural invasion present  - One of four lymph nodes positive for metastatic neuroendocrine tumor (1/4)  - Please see tumor synoptic report for details         ASSESSMENT/PLAN:     Arnaldo Henderson is a 68 year old male with CAD s/p CABG, 2nd degree AV block, DM, GERD, HTN, h/o chronic pancreatitis s/p distal pancreatectomy, neuroendocrine tumor and PD who is here for diabetes management.     ## T2DM  ## H/o chronic pancreatitis  ## Partial pancreatectomy (report 60%)  ## CAD s/p CABG  ## Hypoglycemia unawareness  ## Grade 2 hypyglycemia  Today we discussed about diabetes and goal of treatment  Plan for goal A1c <8  CGM Time in target () >50%, and low (<70) <1%  Goal fasting 100-150, [id number] mg/dL    Previously tolerate metformin well  No h/o yeast/ bladder/ kidney infection      -- Continue Basaglar 14 units AM 30 units hs  -- Continue Novolog 4 units with breakfast + sliding scale insulin, 7 unit(s) with lunch + sliding scale insulin  -- decrease Novolog with supper to 7 unit(s)  + sliding scale insulin  -- continue bedtime novolog sliding scale insulin  -- start metformin 500 mg daily, if tolerate, increase weekly to 1000, 1500, 2000 mg daily  -- set low alarm at 80, hypoglycemia management discussed  -- Baqsimi for severe hypoglycemia as needed    Will get weekly glucose reading to review while titrating metformin. Follow up 4-6 weeks    60 minutes spent on the date of the encounter doing chart review, history and exam, documentation and further activities as noted above.  This time excludes time spent reviewing CGM.         Mj Reynolds MD    01/15/24   Hi Sri,     My clinic checked and the copay is as following:  Baqsimi 2 pack - $421.13  Baqsimi 1 pack - $280.49  Glucagon emergency kit - $256.25    Looks like the  patient has about a $300 deductible. The other $121(for the Baqsimi 2 pack) is the amount of co-insurance (what it would likely cost per fill once the deductible is met).      If you make less than about $60147/ year or for family of 2 $45713/year.  We can try to apply for free glucagon.     Please let me know what you'd prefer.    01/18/24 1/12/24-1/18  [address]  TIR 61%  Low 6%  Very low 4%  Hypoglycemia 11 pm-4 am    Recommend decrease Basaglar from 14/30 to 14/26, decrease Novolog with supper from 7 units to 4 units      01/22/24   Hi Pat,    I just learned from our staff that patient assistant program for glucagon is discontinued 1/1/24 for new application.  Do you want me to send prescription for glucagon with lowest copay?    01/24/24   Please let the pt know that I reviewed CGM data 1/18-1/24, TIR 50% , low 5% and very low 3%.  Recommend decrease Basaglar from 14 units AM and 26 units PM to 14 units AM and 20 units PM.  Continue Humalog 4/7/4 units with breakfast/ lunch and supper plus correction scale as needed.    01/26/24   Per Amisha RN   I called patient. He states that symptoms have resolved. He continues on Metformin 1,000 mg daily and will increase to 1,500 mg on Saturday. Basaglar he has been taking 14 units in the morning and 24 units in the evening. Novolog 4 units at breakfast, 7 at lunch and 7 at dinner.     Called and discussed with patient, he is feeling fine.  Currently taking Basaglar 14 units AM and 20 units PM.  Humalog 4/7/4 units with breakfast/ lunch and supper plus correction scale as needed.  Tomorrow, patient will increase metformin to 1500 mg.  Recommend continue Basaglar 14 units AM and 20 units PM.  Decrease Humalog to only sliding scale with breakfast/ lunch and supper as needed.

## 2024-01-11 ENCOUNTER — TELEPHONE (OUTPATIENT)
Dept: ENDOCRINOLOGY | Facility: CLINIC | Age: 69
End: 2024-01-11

## 2024-01-11 DIAGNOSIS — E11.9 TYPE 2 DIABETES MELLITUS WITHOUT COMPLICATION (H): ICD-10-CM

## 2024-01-11 RX ORDER — ACYCLOVIR 400 MG/1
TABLET ORAL
Qty: 3 EACH | Refills: 5 | Status: ON HOLD | OUTPATIENT
Start: 2024-01-11 | End: 2024-02-07

## 2024-01-11 NOTE — TELEPHONE ENCOUNTER
PRESCRIPTIONS MUST BE WRITTEN THIS WAY TO MAKE THEM MEDICARE COMPLIANT    DEXCOM G7 SENSORS  SIG: Change every 10 days.  QTY: 3  REFILLS: 5    -Prescriptions must be written after the clinical note date and will only be able to be used for 6 months from the date of the clinical notes. All prescriptions must be from same provider who patient had visit with. (We will be requesting new clinical notes and prescriptions every 6 months to meet Medicare Guidelines.)    Please contact us at [phone] (this number is for clinics only) with any questions. Please only give [phone] to patients.    [facility]   [address] [place] [address]  Phone # [phone]  Fax # [phone]

## 2024-01-12 ENCOUNTER — TELEPHONE (OUTPATIENT)
Dept: CARDIOLOGY | Facility: CLINIC | Age: 69
End: 2024-01-12
Payer: MEDICARE

## 2024-01-12 NOTE — TELEPHONE ENCOUNTER
----- Message from Jorge Alva CMA sent at 1/12/2024  1:51 PM CST -----  Regarding: Needs General cards appointment  Pt needs a general cards appt sooner then may please assist.  Pt is having symptoms low bp.

## 2024-01-12 NOTE — TELEPHONE ENCOUNTER
Pt wife called in to inform care team that they made an appointment in may with General cards but that pt is having BP issues and the provider they saw stated that they should reach out to cardiology.  Advised RN Vis Secure chat and Rn advised pt get in sooner with  General. Sent mssg to [facility].

## 2024-01-12 NOTE — TELEPHONE ENCOUNTER
1/12 Scheduled pt to see [facility] Cardiology sooner w/ Dr. Dahl in [place]. Added pt to waitlist and sent staff message to Nabil Marcus and [facility] to see if pt can be seen sooner. MB

## 2024-01-16 ENCOUNTER — OFFICE VISIT (OUTPATIENT)
Dept: CARDIOLOGY | Facility: CLINIC | Age: 69
End: 2024-01-16
Attending: INTERNAL MEDICINE
Payer: MEDICARE

## 2024-01-16 VITALS
DIASTOLIC BLOOD PRESSURE: 91 MMHG | SYSTOLIC BLOOD PRESSURE: 183 MMHG | WEIGHT: 200.2 LBS | HEART RATE: 77 BPM | OXYGEN SATURATION: 99 % | BODY MASS INDEX: 30.44 KG/M2

## 2024-01-16 DIAGNOSIS — I95.1 ORTHOSTATIC HYPOTENSION: ICD-10-CM

## 2024-01-16 DIAGNOSIS — I44.1 MOBITZ (TYPE) I (WENCKEBACH'S) ATRIOVENTRICULAR BLOCK: Primary | ICD-10-CM

## 2024-01-16 DIAGNOSIS — I25.10 CORONARY ARTERY DISEASE INVOLVING NATIVE CORONARY ARTERY OF NATIVE HEART WITHOUT ANGINA PECTORIS: ICD-10-CM

## 2024-01-16 DIAGNOSIS — E78.2 MIXED HYPERLIPIDEMIA: ICD-10-CM

## 2024-01-16 DIAGNOSIS — I44.1 MOBITZ (TYPE) I (WENCKEBACH'S) ATRIOVENTRICULAR BLOCK: ICD-10-CM

## 2024-01-16 PROCEDURE — 93246 EXT ECG>7D<15D RECORDING: CPT | Performed by: INTERNAL MEDICINE

## 2024-01-16 PROCEDURE — 99417 PROLNG OP E/M EACH 15 MIN: CPT | Performed by: INTERNAL MEDICINE

## 2024-01-16 PROCEDURE — 93000 ELECTROCARDIOGRAM COMPLETE: CPT | Performed by: INTERNAL MEDICINE

## 2024-01-16 PROCEDURE — 99215 OFFICE O/P EST HI 40 MIN: CPT | Performed by: INTERNAL MEDICINE

## 2024-01-16 NOTE — TELEPHONE ENCOUNTER
01.16- Spoke w/patient, he is going to have his wife call back to schedule the appointment since she has the calendar with her. I informed the patient we will call back if we do not hear from her.

## 2024-01-16 NOTE — PROGRESS NOTES
General Cardiology Clinic[facility]    HPI: Mr. 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  He was recently seen in neurology clinic at [facility] on 1/12/2024.  He was noted to have heart rate of 39 with blood pressure of 94/53 mmHg.  After some monitoring blood pressure was 87/40 and heart rate was 59.  Patient tells me that he did not have any symptoms on that day.  He was recommended to see cardiology.  He has history of Mobitz type I AV block first noted on Holter in January 2023.  Subsequently he was seen in cardiology clinic a year ago and he was recommended follow-up.  He had a Lexiscan stress test a year ago which was normal.  Patient tells me that on some days he feels lightheaded, woozy and dizzy most so when he is upright and standing  Occurs 3-4 times a week.  Denies syncope, falls, chest pain, shortness of breath.  He has been on losartan over the last few years.  Medications:   Aspirin 81 every day  Atorvastatin 40 every day  Losartan 50 at bedtime  Omeprazole  Sinemet  Insulin    Medications, personal, family, and social history reviewed with patient and revised.    PAST MEDICAL HISTORY:  Past Medical History:   Diagnosis Date    CAD (coronary artery disease)     CABG    Diabetes mellitus, type 2 (H) 2013    Gastroesophageal reflux disease     Hypercholesteremia     Hypertension     Mixed conductive and sensorineural hearing loss of both ears     Mixed hearing loss      Pancreatic duct stricture     Pancreatitis     Parkinson disease     Second degree AV block        CURRENT MEDICATIONS:  Current Outpatient Medications   Medication Sig Dispense Refill    acetaminophen (TYLENOL) 325 MG tablet Take 2 tablets (650 mg) by mouth every 4 hours as needed for other (For optimal non-opioid multimodal pain management to improve pain control.)      aspirin (ASA) 81 MG EC tablet Take 81 mg by mouth daily      atorvastatin (LIPITOR) 40 MG tablet Take 1 tablet by mouth daily      BD PEN NEEDLE LINDY 2ND GEN 32G X 4 MM miscellaneous USE TO INJECT INSULIN 4 TIMES A  each 3    carbidopa-levodopa (SINEMET CR)  MG CR tablet Take 1 tablet by mouth At Bedtime      carbidopa-levodopa (SINEMET)  MG tablet Take 2 tablets by mouth 3 times daily      Continuous Blood Gluc  (DEXCOM G7 ) ALIA Use to read blood sugars as per 's instructions. 1 each 0    Continuous Blood Gluc Sensor (DEXCOM G7 SENSOR) MISC Change every 10 days. 3 each 5    gabapentin (NEURONTIN) 300 MG capsule Take 300 mg by mouth every morning , 300 mg by mouth at midday, and 600 mg by mouth at bedtime      Glucagon (BAQSIMI) 3 MG/DOSE nasal powder Spray 1 spray (3 mg) in nostril as needed (severe hypoglycemia) in the event of unconscious hypoglycemia or hypoglycemic seizure. May repeat dose if no response after 15 minutes. 2 each 1    insulin aspart (NOVOLOG PEN) 100 UNIT/ML pen Inject 6 units with breakfast, 8 units with lunch and 10 units with dinner plus correction. ( 1 unit/50 for BG > 140 ). Pt uses approx 55 units daily.PLEASE DO NOT FILL TODAY ( 11/28/2023). 15 mL 1    insulin glargine (BASAGLAR KWIKPEN) 100 UNIT/ML pen Inject 15 units subcutaneous each am and 30 units subcutaneous each pm. Please provided 90 day supply. (Patient taking differently: Inject 15 units subcutaneous each am and 33 units subcutaneous each pm. Please provided 90 day supply.) 15 mL 3    lipase-protease-amylase  (ZENPEP) [id number] units CPEP Take 1 capsule by mouth 3 times daily (with meals) 90 capsule 3    lipase-protease-amylase (ZENPEP) [id number] units CPEP Take 1 capsule by mouth 3 times daily (with meals) 90 capsule 11    losartan (COZAAR) 50 MG tablet Take 50 mg by mouth At Bedtime      magnesium oxide (MAG-OX) 400 MG tablet Take 400 mg by mouth every other day      metFORMIN (GLUCOPHAGE XR) 500 MG 24 hr tablet Take 1 tablet (500 mg) by mouth daily (with dinner) for 7 days, THEN 2 tablets (1,000 mg) daily (with dinner) for 7 days, THEN 3 tablets (1,500 mg) daily (with dinner) for 7 days, THEN 4 tablets (2,000 mg) daily (with dinner) for 90 days. 402 tablet 0    methocarbamol (ROBAXIN) 500 MG tablet Take 1 tablet (500 mg) by mouth every 6 hours as needed for muscle spasms 45 tablet 0    Multiple Vitamin (MULTI-VITAMINS) TABS Take 1 tablet by mouth daily      omeprazole (PRILOSEC) 20 MG DR capsule Take 20 mg by mouth daily         PAST SURGICAL HISTORY:  Past Surgical History:   Procedure Laterality Date    CA ANESTH CABG W/PUMP      CHOLECYSTECTOMY  2022    COLONOSCOPY N/A 01/12/2023    Procedure: colonoscopy with fluroscopy;  Surgeon: Ayden Fraire MD;  Location:  OR    ENDOSCOPIC RETROGRADE CHOLANGIOPANCREATOGRAM N/A 05/18/2023    Procedure: ENDOSCOPIC RETROGRADE CHOLANGIOPANCREATOGRAPHY, WITH  CYSTDUODENOSTOMY STENTS X2 REMOVAL;  Surgeon: Cedric Saldana MD;  Location: [facility] OR    ENT SURGERY      LAPAROSCOPY DIAGNOSTIC (GENERAL) N/A 02/20/2023    Procedure: LAPAROSCOPY, DIAGNOSTIC; RETRIEVAL OF MIGRATED STENT;  Surgeon: Joseluis Lima MD;  Location: [facility] OR    ORTHOPEDIC SURGERY      PANCREATECTOMY PEUSTOW N/A 06/30/2023    Procedure: Open distal pancreactectomy with splenectomy, lysis of adhesions, resection of proximal illeum;  Surgeon: Ashvin Haider MD;  Location: [facility] OR    SD CABG, ARTERIAL, TWO  2003       ALLERGIES:     Allergies   Allergen Reactions    Ciprofloxacin  "Other (See Comments), Hives, Itching, Rash and Unknown     Other reaction(s): Other (see comments)  Oral swelling per [facility]        Dilaudid [Hydromorphone] Rash    Morphine Rash     rash    Penicillins Rash     aka ancef/ rash      Citalopram Unknown    Oxycodone Rash     \"HORRIBLE, SEVERE RASH MAYBE CAUSED BY OXY\"       FAMILY HISTORY:  Family History   Problem Relation Age of Onset    Parkinsonism Mother     Lung Cancer Brother     Diabetes No family hx of          SOCIAL HISTORY:  Social History     Tobacco Use    Smoking status: Former     Types: Cigarettes    Smokeless tobacco: Never   Vaping Use    Vaping Use: Never used   Substance Use Topics    Alcohol use: Not Currently    Drug use: Not Currently       ROS:   Constitutional: No fever, chills, or sweats. Weight stable.   Cardiovascular: As per HPI.       Exam:  BP (!) 183/91 (BP Location: Right arm, Patient Position: Sitting, Cuff Size: Adult Regular)   Pulse 77   Wt 90.8 kg (200 lb 3.2 oz)   SpO2 99%   BMI 30.44 kg/m    GENERAL APPEARANCE: alert and no distress  HEENT: no icterus, no central cyanosis  LYMPH/NECK: no adenopathy, no asymmetry, JVP not elevated  RESPIRATORY: lungs clear to auscultation - no rales, rhonchi or wheezes, no use of accessory muscles, no retractions, respirations are unlabored, normal respiratory rate  CARDIOVASCULAR: regular rhythm, normal S1, S2, no S3 or S4 and no murmur, click or rub, precordium quiet with normal PMI.  GI: soft, non tender  EXTREMITIES: no edema  NEURO: alert, normal speech,and affect  SKIN: no ecchymoses, no rashes     I have reviewed the labs and personally reviewed the imaging below and made my comment in the assessment and plan.    Labs:  CBC RESULTS:   Lab Results   Component Value Date    WBC 10.0 11/21/2023    RBC 4.08 (L) 11/21/2023    HGB 13.4 11/21/2023    HCT 39.9 (L) 11/21/2023    MCV 98 11/21/2023    MCH 32.8 11/21/2023    MCHC 33.6 11/21/2023    RDW 14.8 11/21/2023     11/21/2023 "       BMP RESULTS:  Lab Results   Component Value Date     11/21/2023    POTASSIUM 4.2 11/21/2023    POTASSIUM 4.7 06/30/2023    CHLORIDE 99 11/21/2023    CO2 26 11/21/2023    ANIONGAP 11 11/21/2023     (H) 11/21/2023     (H) 07/26/2023    BUN 34.1 (H) 11/21/2023    CR 0.99 11/21/2023    GFRESTIMATED 82 11/21/2023    GFRESTIMATED >60 11/03/2023    JAKE 9.5 11/21/2023   Stress test PET scan 7/18/2022: peak HR 98 bpm, no ischemia, no arrhythmias     Echo 2/6/2023: EF 57%, no sig valve disease.     EKG : Personally reviewed in clinic today shows sinus rhythm, first-degree AV block, OK interval 394 ms.  No advanced AV block.       Holter monitor 24 hours 1/2023: sinus  Average 61 bpm, range 35-90 bpm, AV WB     Echocardiogram 2/6/2023   1. Sinus rhythm with AV block during study. Unable to determine type of  AV block based on rhythm strip.     2. Normal LV size with normal wall thickness. Calculated bi-planes LVEF   57%   (Normal function). Normal diastolic function.     3. Normal RV size and systolic function.     4. Moderate LA enlargement.     5. Mild RA enlargement.     6. No significant valvular abnormalities.     7. The IVC measures <2.1 cm with <50% collapse upon inspiration   consistent   with elevated right atrial pressure, 8 mmHg.     8. A prior study is not available for comparison.     Assessment and Plan:     # Parkinson's disease  # Diabetes type 2 uncontrolled  # Autonomic dysfunction with orthostatic hypotension   -Blood pressure seated for 1 minute 184/92 mmHg, heart rate 75bpm'  blood pressure standing for 3 minutes 129/80 mmHg, heart rate 75 bpm, patient asymptomatic.  I think due to uncontrolled diabetes and PD he has autonomic neuropathy leading to orthostatic hypotension which was asymptomatic in clinic today.  But few times a week he feels dizzy and lightheaded in the upright position which makes me think that probably he has symptomatic orthostatic hypotension.  Recommended  hydration.  Will continue with losartan 50 mg daily at bedtime.  I do not think increasing the losartan dose is a good idea at this time given significant orthostatic hypotension which will worsen with intensification of blood pressure management.  If he has orthostatic syncope I think he needs to come off losartan.    # Hypertension with orthostatic hypotension  -Continue evening dose of oral losartan.    # Mobitz type I AV block (Reviewed EKG in clinic today which shows sinus rhythm, first-degree AV block, ND interval 394 ms, no advanced AV block)  -He is not on any AV kiersten blocking agents.  LV function is normal as assessed by echocardiogram 2/6/2023.  Normal LVEF at 57%.  No significant valve disease.  -Recommend Zio patch    Return to clinic as needed.     Total time spent today for this visit is 65 minutes including precharting, face-to-face clinic visit, review of labs/imaging and medical documentation.    Beti ROSARIO MD  [facility] Division of Cardiology  Pager [phone]

## 2024-01-16 NOTE — PATIENT INSTRUCTIONS
Thank you for coming to the [facility] at [place]; please note the following instructions:    1. EKG today    2.  We have applied a holter (heart) monitor for you to wear for 14 days.  You may remove the heart monitor on 1/30/24 at 11:00am.  Please see the enclosed instructions for further information, as well as instructions for removal of the heart monitor and return mailing directions.  If you should have questions regarding your monitor, please call [place]. at [phone].  The Cardiology Nurse will contact you with your results (please see result notification details at bottom of summary).    *PLEASE DO NOT SHOWER OR INDUCE EXCESSIVE SWEATING IN THE FIRST 24 HOURS OF WEARING*    3. Cardiology follow up as needed          If you have any questions regarding your visit, please contact your care team:     CARDIOLOGY  TELEPHONE NUMBER   Gaby AYALA., Registered Nurse  Olamide CRUZ, Registered Nurse  Staci HOWARD, Registered Medical Assistant  Rhea AVILES, Certified Medical Assistant  Piper BELTRE, Clinic Assistant [phone] (select option 1)    *After hours: [phone]   For Scheduling Appts:     [phone] (select option 1)    *After hours: [phone]   For the Device Clinic (Pacemakers and ICD's)  Ellie TOVAR, Registered Nurse   During business hours: [phone]    *After business hours:  [phone] (select option 4)      Normal test result notifications will be released via [employer] or mailed within 7 business days.  All other test results, will be communicated via telephone once reviewed by your cardiologist.    If you need a medication refill, please contact your pharmacy.  Please allow 3 business days for your refill to be completed.    As always, thank you for trusting us with your health care needs!

## 2024-01-16 NOTE — LETTER
1/16/2024      RE: Arnaldo Henderson  [address]  [facility] [address]       Dear Colleague,    Thank you for the opportunity to participate in the care of your patient, Arnaldo Henderson, at the [facility]. Please see a copy of my visit note below.                                                                                                     General Cardiology Clinic-[place]    HPI: Mr. 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  He was recently seen in neurology clinic at [facility] on 1/12/2024.  He was noted to have heart rate of 39 with blood pressure of 94/53 mmHg.  After some monitoring blood pressure was 87/40 and heart rate was 59.  Patient tells me that he did not have any symptoms on that day.  He was recommended to see cardiology.  He has history of Mobitz type I AV block first noted on Holter in January 2023.  Subsequently he was seen in cardiology clinic a year ago and he was recommended follow-up.  He had a Lexiscan stress test a year ago which was normal.  Patient tells me that on some days he feels lightheaded, woozy and dizzy most so when he is upright and standing  Occurs 3-4 times a week.  Denies syncope, falls, chest pain, shortness of breath.  He has been on losartan over the last few years.  Medications:   Aspirin 81 every day  Atorvastatin 40 every day  Losartan 50 at bedtime  Omeprazole  Sinemet  Insulin    Medications, personal, family, and social history  reviewed with patient and revised.    PAST MEDICAL HISTORY:  Past Medical History:   Diagnosis Date    CAD (coronary artery disease)     CABG    Diabetes mellitus, type 2 (H) 2013    Gastroesophageal reflux disease     Hypercholesteremia     Hypertension     Mixed conductive and sensorineural hearing loss of both ears     Mixed hearing loss     Pancreatic duct stricture     Pancreatitis     Parkinson disease     Second degree AV block        CURRENT MEDICATIONS:  Current Outpatient Medications   Medication Sig Dispense Refill    acetaminophen (TYLENOL) 325 MG tablet Take 2 tablets (650 mg) by mouth every 4 hours as needed for other (For optimal non-opioid multimodal pain management to improve pain control.)      aspirin (ASA) 81 MG EC tablet Take 81 mg by mouth daily      atorvastatin (LIPITOR) 40 MG tablet Take 1 tablet by mouth daily      BD PEN NEEDLE LINDY 2ND GEN 32G X 4 MM miscellaneous USE TO INJECT INSULIN 4 TIMES A  each 3    carbidopa-levodopa (SINEMET CR)  MG CR tablet Take 1 tablet by mouth At Bedtime      carbidopa-levodopa (SINEMET)  MG tablet Take 2 tablets by mouth 3 times daily      Continuous Blood Gluc  (DEXCOM G7 ) ALIA Use to read blood sugars as per 's instructions. 1 each 0    Continuous Blood Gluc Sensor (DEXCOM G7 SENSOR) MISC Change every 10 days. 3 each 5    gabapentin (NEURONTIN) 300 MG capsule Take 300 mg by mouth every morning , 300 mg by mouth at midday, and 600 mg by mouth at bedtime      Glucagon (BAQSIMI) 3 MG/DOSE nasal powder Spray 1 spray (3 mg) in nostril as needed (severe hypoglycemia) in the event of unconscious hypoglycemia or hypoglycemic seizure. May repeat dose if no response after 15 minutes. 2 each 1    insulin aspart (NOVOLOG PEN) 100 UNIT/ML pen Inject 6 units with breakfast, 8 units with lunch and 10 units with dinner plus correction. ( 1 unit/50 for BG > 140 ). Pt uses approx 55 units daily.PLEASE DO NOT FILL TODAY (  11/28/2023). 15 mL 1    insulin glargine (BASAGLAR KWIKPEN) 100 UNIT/ML pen Inject 15 units subcutaneous each am and 30 units subcutaneous each pm. Please provided 90 day supply. (Patient taking differently: Inject 15 units subcutaneous each am and 33 units subcutaneous each pm. Please provided 90 day supply.) 15 mL 3    lipase-protease-amylase (ZENPEP) 07484-022119-325531 units CPEP Take 1 capsule by mouth 3 times daily (with meals) 90 capsule 3    lipase-protease-amylase (ZENPEP) 79874-090200-646834 units CPEP Take 1 capsule by mouth 3 times daily (with meals) 90 capsule 11    losartan (COZAAR) 50 MG tablet Take 50 mg by mouth At Bedtime      magnesium oxide (MAG-OX) 400 MG tablet Take 400 mg by mouth every other day      metFORMIN (GLUCOPHAGE XR) 500 MG 24 hr tablet Take 1 tablet (500 mg) by mouth daily (with dinner) for 7 days, THEN 2 tablets (1,000 mg) daily (with dinner) for 7 days, THEN 3 tablets (1,500 mg) daily (with dinner) for 7 days, THEN 4 tablets (2,000 mg) daily (with dinner) for 90 days. 402 tablet 0    methocarbamol (ROBAXIN) 500 MG tablet Take 1 tablet (500 mg) by mouth every 6 hours as needed for muscle spasms 45 tablet 0    Multiple Vitamin (MULTI-VITAMINS) TABS Take 1 tablet by mouth daily      omeprazole (PRILOSEC) 20 MG DR capsule Take 20 mg by mouth daily         PAST SURGICAL HISTORY:  Past Surgical History:   Procedure Laterality Date    CA ANESTH CABG W/PUMP      CHOLECYSTECTOMY  2022    COLONOSCOPY N/A 01/12/2023    Procedure: colonoscopy with fluroscopy;  Surgeon: Ayden Fraire MD;  Location:  OR    ENDOSCOPIC RETROGRADE CHOLANGIOPANCREATOGRAM N/A 05/18/2023    Procedure: ENDOSCOPIC RETROGRADE CHOLANGIOPANCREATOGRAPHY, WITH  CYSTDUODENOSTOMY STENTS X2 REMOVAL;  Surgeon: Cedric Saldana MD;  Location: [facility] OR    ENT SURGERY      LAPAROSCOPY DIAGNOSTIC (GENERAL) N/A 02/20/2023    Procedure: LAPAROSCOPY, DIAGNOSTIC; RETRIEVAL OF MIGRATED STENT;  Surgeon: Joseluis Lima MD;   "Location: [facility] OR    ORTHOPEDIC SURGERY      PANCREATECTOMY CIRO N/A 06/30/2023    Procedure: Open distal pancreactectomy with splenectomy, lysis of adhesions, resection of proximal illeum;  Surgeon: Ashvin Haider MD;  Location: [facility] OR    [place] CABG, ARTERIAL, TWO  2003       ALLERGIES:     Allergies   Allergen Reactions    Ciprofloxacin Other (See Comments), Hives, Itching, Rash and Unknown     Other reaction(s): Other (see comments)  Oral swelling per [facility]        Dilaudid [Hydromorphone] Rash    Morphine Rash     rash    Penicillins Rash     aka ancef/ rash      Citalopram Unknown    Oxycodone Rash     \"HORRIBLE, SEVERE RASH MAYBE CAUSED BY OXY\"       FAMILY HISTORY:  Family History   Problem Relation Age of Onset    Parkinsonism Mother     Lung Cancer Brother     Diabetes No family hx of          SOCIAL HISTORY:  Social History     Tobacco Use    Smoking status: Former     Types: Cigarettes    Smokeless tobacco: Never   Vaping Use    Vaping Use: Never used   Substance Use Topics    Alcohol use: Not Currently    Drug use: Not Currently       ROS:   Constitutional: No fever, chills, or sweats. Weight stable.   Cardiovascular: As per HPI.       Exam:  BP (!) 183/91 (BP Location: Right arm, Patient Position: Sitting, Cuff Size: Adult Regular)   Pulse 77   Wt 90.8 kg (200 lb 3.2 oz)   SpO2 99%   BMI 30.44 kg/m    GENERAL APPEARANCE: alert and no distress  HEENT: no icterus, no central cyanosis  LYMPH/NECK: no adenopathy, no asymmetry, JVP not elevated  RESPIRATORY: lungs clear to auscultation - no rales, rhonchi or wheezes, no use of accessory muscles, no retractions, respirations are unlabored, normal respiratory rate  CARDIOVASCULAR: regular rhythm, normal S1, S2, no S3 or S4 and no murmur, click or rub, precordium quiet with normal PMI.  GI: soft, non tender  EXTREMITIES: no edema  NEURO: alert, normal speech,and affect  SKIN: no ecchymoses, no rashes     I have reviewed the labs and personally " reviewed the imaging below and made my comment in the assessment and plan.    Labs:  CBC RESULTS:   Lab Results   Component Value Date    WBC 10.0 11/21/2023    RBC 4.08 (L) 11/21/2023    HGB 13.4 11/21/2023    HCT 39.9 (L) 11/21/2023    MCV 98 11/21/2023    MCH 32.8 11/21/2023    MCHC 33.6 11/21/2023    RDW 14.8 11/21/2023     11/21/2023       BMP RESULTS:  Lab Results   Component Value Date     11/21/2023    POTASSIUM 4.2 11/21/2023    POTASSIUM 4.7 06/30/2023    CHLORIDE 99 11/21/2023    CO2 26 11/21/2023    ANIONGAP 11 11/21/2023     (H) 11/21/2023     (H) 07/26/2023    BUN 34.1 (H) 11/21/2023    CR 0.99 11/21/2023    GFRESTIMATED 82 11/21/2023    GFRESTIMATED >60 11/03/2023    JAKE 9.5 11/21/2023   Stress test PET scan 7/18/2022: peak HR 98 bpm, no ischemia, no arrhythmias     Echo 2/6/2023: EF 57%, no sig valve disease.     EKG : Personally reviewed in clinic today shows sinus rhythm, first-degree AV block, MD interval 394 ms.  No advanced AV block.       Holter monitor 24 hours 1/2023: sinus  Average 61 bpm, range 35-90 bpm, AV WB     Echocardiogram 2/6/2023   1. Sinus rhythm with AV block during study. Unable to determine type of  AV block based on rhythm strip.     2. Normal LV size with normal wall thickness. Calculated bi-planes LVEF   57%   (Normal function). Normal diastolic function.     3. Normal RV size and systolic function.     4. Moderate LA enlargement.     5. Mild RA enlargement.     6. No significant valvular abnormalities.     7. The IVC measures <2.1 cm with <50% collapse upon inspiration   consistent   with elevated right atrial pressure, 8 mmHg.     8. A prior study is not available for comparison.     Assessment and Plan:     # Parkinson's disease  # Diabetes type 2 uncontrolled  # Autonomic dysfunction with orthostatic hypotension   -Blood pressure seated for 1 minute 184/92 mmHg, heart rate 75bpm'  blood pressure standing for 3 minutes 129/80 mmHg, heart rate 75  bpm, patient asymptomatic.  I think due to uncontrolled diabetes and PD he has autonomic neuropathy leading to orthostatic hypotension which was asymptomatic in clinic today.  But few times a week he feels dizzy and lightheaded in the upright position which makes me think that probably he has symptomatic orthostatic hypotension.  Recommended hydration.  Will continue with losartan 50 mg daily at bedtime.  I do not think increasing the losartan dose is a good idea at this time given significant orthostatic hypotension which will worsen with intensification of blood pressure management.  If he has orthostatic syncope I think he needs to come off losartan.    # Hypertension with orthostatic hypotension  -Continue evening dose of oral losartan.    # Mobitz type I AV block (Reviewed EKG in clinic today which shows sinus rhythm, first-degree AV block, ID interval 394 ms, no advanced AV block)  -He is not on any AV kiersten blocking agents.  LV function is normal as assessed by echocardiogram 2/6/2023.  Normal LVEF at 57%.  No significant valve disease.  -Recommend Zio patch    Return to clinic as needed.     Total time spent today for this visit is 65 minutes including precharting, face-to-face clinic visit, review of labs/imaging and medical documentation.    Beti ROSARIO MD  [facility] Division of Cardiology  Pager [phone]

## 2024-01-18 ENCOUNTER — TELEPHONE (OUTPATIENT)
Dept: ENDOCRINOLOGY | Facility: CLINIC | Age: 69
End: 2024-01-18
Payer: MEDICARE

## 2024-01-18 LAB
ATRIAL RATE - MUSE: 74 BPM
DIASTOLIC BLOOD PRESSURE - MUSE: NORMAL MMHG
INTERPRETATION ECG - MUSE: NORMAL
P AXIS - MUSE: 45 DEGREES
PR INTERVAL - MUSE: 394 MS
QRS DURATION - MUSE: 90 MS
QT - MUSE: 380 MS
QTC - MUSE: 421 MS
R AXIS - MUSE: 60 DEGREES
SYSTOLIC BLOOD PRESSURE - MUSE: NORMAL MMHG
T AXIS - MUSE: 67 DEGREES
VENTRICULAR RATE- MUSE: 74 BPM

## 2024-01-18 NOTE — TELEPHONE ENCOUNTER
Spoke to pt's wife- pt is sleeping will have pt call back or read [employer] message when he wakes up. Pt's wife is not on consent to communicate.

## 2024-01-18 NOTE — TELEPHONE ENCOUNTER
----- Message from Mj Reynolds MD sent at 1/18/2024  7:59 AM CST -----  Regarding: Insulin adjustment  Pls let patient know that I reviewed his CGM, recommend decrease   - Basaglar from 14 unit(s) am and 30 unit(s) pm  to 14 unit(s) am and 26 unit(s) PM  - Novolog with supper from 7 units to 4 units    Sending CGM for me to review 1 week

## 2024-01-19 ENCOUNTER — MYC MEDICAL ADVICE (OUTPATIENT)
Dept: ENDOCRINOLOGY | Facility: CLINIC | Age: 69
End: 2024-01-19
Payer: MEDICARE

## 2024-01-19 NOTE — TELEPHONE ENCOUNTER
Spoke w/ Pt: Confirms understanding of review and recommendation from Hayley Alatorre. No questions.   Chyna Lee, RN on 1/19/2024 at 1:11 PM           Call Back (Call back)  (Newest Message First)  View All Conversations on this Encounter  Hayley Alatorre PA-C  You 27 minutes ago (12:42 PM)     AA  I don't  think fever and chills related to Metformin and he should do a COVID test.  Can try and hold Metformin and see if resolves and then try to resume.     thanks

## 2024-01-24 ENCOUNTER — TELEPHONE (OUTPATIENT)
Dept: ENDOCRINOLOGY | Facility: CLINIC | Age: 69
End: 2024-01-24
Payer: MEDICARE

## 2024-01-24 NOTE — TELEPHONE ENCOUNTER
----- Message from Mj Reynolds MD sent at 1/24/2024  3:23 PM CST -----  Regarding: Dexcom review  Please let the pt know that I reviewed CGM data 1/18-1/24, TIR 50% , low 5% and very low 3%.  Recommend decrease Basaglar from 14 units AM and 26 units PM to 14 units AM and 20 units PM.  Continue Humalog 4/7/4 units with breakfast/ lunch and supper plus correction scale as needed.

## 2024-01-30 ENCOUNTER — VIRTUAL VISIT (OUTPATIENT)
Dept: ENDOCRINOLOGY | Facility: CLINIC | Age: 69
End: 2024-01-30
Payer: MEDICARE

## 2024-01-30 ENCOUNTER — THERAPY VISIT (OUTPATIENT)
Dept: PHYSICAL THERAPY | Facility: CLINIC | Age: 69
End: 2024-01-30
Payer: MEDICARE

## 2024-01-30 VITALS — BODY MASS INDEX: 30.11 KG/M2 | WEIGHT: 198 LBS

## 2024-01-30 DIAGNOSIS — G89.29 CHRONIC RIGHT-SIDED LOW BACK PAIN WITHOUT SCIATICA: ICD-10-CM

## 2024-01-30 DIAGNOSIS — Z79.4 TYPE 2 DIABETES MELLITUS TREATED WITH INSULIN (H): Primary | ICD-10-CM

## 2024-01-30 DIAGNOSIS — E11.9 TYPE 2 DIABETES MELLITUS TREATED WITH INSULIN (H): Primary | ICD-10-CM

## 2024-01-30 DIAGNOSIS — T81.49XA SURGICAL SITE INFECTION: Primary | ICD-10-CM

## 2024-01-30 DIAGNOSIS — R10.84 ABDOMINAL PAIN, GENERALIZED: ICD-10-CM

## 2024-01-30 DIAGNOSIS — M54.50 CHRONIC RIGHT-SIDED LOW BACK PAIN WITHOUT SCIATICA: ICD-10-CM

## 2024-01-30 PROCEDURE — 97110 THERAPEUTIC EXERCISES: CPT | Mod: GP | Performed by: PHYSICAL THERAPIST

## 2024-01-30 PROCEDURE — 99215 OFFICE O/P EST HI 40 MIN: CPT | Mod: 95 | Performed by: INTERNAL MEDICINE

## 2024-01-30 PROCEDURE — G2211 COMPLEX E/M VISIT ADD ON: HCPCS | Mod: 95 | Performed by: INTERNAL MEDICINE

## 2024-01-30 PROCEDURE — 97140 MANUAL THERAPY 1/> REGIONS: CPT | Mod: GP | Performed by: PHYSICAL THERAPIST

## 2024-01-30 NOTE — LETTER
1/30/2024         RE: Arnaldo Henderson  [address]  [facility] [address]        Dear Colleague,    Thank you for referring your patient, Arnaldo Henderson, to the [facility] Please see a copy of my visit note below.    Outcome for 01/30/24 10:13 AM: Data obtained via Dexcom website  Twyla Leger LPN                 Video-Visit Details    Type of service:  Video Visit  Video Start Time: 1232  Video End Time:1306  Originating Location (pt. Location): [place], [place]  Distant Location (provider location):  [facility]  Platform used for Video Visit: Destinee Reynolds MD  Endocrinology Clinic Visit     NAME:  Arnaldo Henderson  PCP:  Alanna Lazaro  MRN:  [id number]    Interval History  Over the weekend, pt increased metformin to 1500 mg.  Basaglar 14 units AM and 20 units PM.  And decreased Humalog to only sliding scale with breakfast/ lunch and supper as needed.    No SE on metformin  Glucose has been higher, mostly 200s-300s  Pt has 2 episodes of hypoglycemia early AM, asymptomatic.    CGM reviewed significant improvement in hypoglycemia since discontinue fixed dose insulin with meal, but also significant hypoglycemia (see scan)    HISTORY OF PRESENT ILLNESS  Arnaldo Henderson is a 68 year old male with CAD s/p CABG, 2nd degree AV block, DM, GERD, HTN, h/o chronic pancreatitis s/p distal pancreatectomy, neuroendocrine tumor and PD who is here for diabetes management.     Per Dr. Lyn note 11/27/2023  He is 68 years old.  His tumor was found incidentally when he was undergoing a pancreatectomy for chronic pancreatitis.    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  He has small volume mildly progressive disease within the abdomen without any clearly associated symptoms.   For now his rate of growth and very small volume disease does not warrant active intervention. We will plan on a follow-up scan in 3 to 4 months.      Per Inpatient DM team note 7/2023  Arnaldo Henderson 67 y/o with PMH chronic pancreatitis, CAD, CABG, 2nd degree AVB, Parkinson's disease and chronic pain. He is now s/p distal pancreatectomy, splenectomy, YVAN, proximal ileum resection. He is admitted to the SICU for hemodynamic monitoring.  # T2DM not at goal with hyperglycemia.  Poor control prior to admission   # Stress induced hyperglycemia  # post pancreatic surgery  Plan:                  -Lantus 15 units                 -Novolog 1u/10 gm CHO coverage with meals and snacks                 -Continue insulin drip for now.                 -BG monitoring as per insulin drip protocol                 -hypoglycemia protocol                 -recommend diet with carb counting protocol                 -diabetes education needs will be assessed closer to discharge                 -on discharge, will recommend outpatient follow up with [facility] Endocrinology service     Per most recent endocrine clinic visit with my colleague Hayley Alatorre PAC 1/2024   TYPE 2 DIABETES MELLITUS:   Sugars are currently at goal but with excessive hypoglycemia.  Advising him to reduce his Basaglar dose to 14 units in the morning 30 in the evening nearly 10% reduction.  Also advising him to continue his breakfast dose of 4 units but decrease lunch dose to just 7 units and dinner dose to 10 units plus any correction.  Will continue current correction.   5.  Neuroendocrine tumor: See Dr. Gail mcarthur next week    I confirmed with patient and wife and they confirmed that Pat is seeing me today for diabetes.  They would like to have a doctor on the team also with our diabetes team at the .    Prior to admission, he was on metformin 2000 mg/day, no side effect  Pt and wife reported seeing doctors for diabetes once during inpatient.  Report that about 60% of his pancreas was removed  Pt follows neuroendocrine tumor with Dr. Lyn no concerns.    Patient's main concerns today are his diabetes  control after steroid injection and his recent lab results.  11/21/2023  Steroid injection, glucose was high   He may need another injection in the future and is concerned.  They are wondering about high BUN, previous abnormal LFT, low hematocrit and abnormal RBC    In terms of symptoms, he said PD is bothersome  But no problem taking insulin  Feels like hypoglycemia improve since lower insulin dose last week    Pt only has dexcom alarm set for urgent low.  Sometimes he could not feel low glucose.  He recently bought glucose tablets to help with lows    Current regimen  Basaglar 14 units AM 30 units hs  Novolog 4 units with breakfast + sliding scale insulin, lunch 7 + sliding scale insulin, supper 10 + sliding scale insulin, night sliding scale insulin    CGM 1 week data  TIR 49%, low 4%, very low 1%         REVIEW OF SYSTEMS  10 point negative except as mentioned in HPI    Past Medical/Surgical History:  Past Medical History:   Diagnosis Date     CAD (coronary artery disease)     CABG     Diabetes mellitus, type 2 (H) 2013     Gastroesophageal reflux disease      Hypercholesteremia      Hypertension      Mixed conductive and sensorineural hearing loss of both ears      Mixed hearing loss      Pancreatic duct stricture      Pancreatitis      Parkinson disease      Second degree AV block      Past Surgical History:   Procedure Laterality Date     CA ANESTH CABG W/PUMP       CHOLECYSTECTOMY  2022     COLONOSCOPY N/A 01/12/2023    Procedure: colonoscopy with fluroscopy;  Surgeon: Ayden Fraire MD;  Location:  OR     ENDOSCOPIC RETROGRADE CHOLANGIOPANCREATOGRAM N/A 05/18/2023    Procedure: ENDOSCOPIC RETROGRADE CHOLANGIOPANCREATOGRAPHY, WITH  CYSTDUODENOSTOMY STENTS X2 REMOVAL;  Surgeon: Cedric Saldana MD;  Location: [facility] OR     ENT SURGERY       LAPAROSCOPY DIAGNOSTIC (GENERAL) N/A 02/20/2023    Procedure: LAPAROSCOPY, DIAGNOSTIC; RETRIEVAL OF MIGRATED STENT;  Surgeon: Joseluis Lima MD;  Location: [facility] OR  "    ORTHOPEDIC SURGERY       PANCREATECTOMY CIRO N/A 06/30/2023    Procedure: Open distal pancreactectomy with splenectomy, lysis of adhesions, resection of proximal illeum;  Surgeon: Ashvin Haider MD;  Location: [facility] OR     MD CABG, ARTERIAL, TWO  2003       Medications  Current Outpatient Medications   Medication     acetaminophen (TYLENOL) 325 MG tablet     aspirin (ASA) 81 MG EC tablet     atorvastatin (LIPITOR) 40 MG tablet     BD PEN NEEDLE LINDY 2ND GEN 32G X 4 MM miscellaneous     carbidopa-levodopa (SINEMET)  MG tablet     Continuous Blood Gluc  (DEXCOM G7 ) ALIA     Continuous Blood Gluc Sensor (DEXCOM G7 SENSOR) MISC     gabapentin (NEURONTIN) 300 MG capsule     Glucagon (BAQSIMI) 3 MG/DOSE nasal powder     insulin aspart (NOVOLOG PEN) 100 UNIT/ML pen     insulin glargine (BASAGLAR KWIKPEN) 100 UNIT/ML pen     lipase-protease-amylase (ZENPEP) [id number] units CPEP     lipase-protease-amylase (ZENPEP) [id number] units CPEP     losartan (COZAAR) 50 MG tablet     magnesium oxide (MAG-OX) 400 MG tablet     metFORMIN (GLUCOPHAGE XR) 500 MG 24 hr tablet     Multiple Vitamin (MULTI-VITAMINS) TABS     omeprazole (PRILOSEC) 20 MG DR capsule     carbidopa-levodopa (SINEMET CR)  MG CR tablet     methocarbamol (ROBAXIN) 500 MG tablet     No current facility-administered medications for this visit.       Allergies  Allergies   Allergen Reactions     Ciprofloxacin Other (See Comments), Hives, Itching, Rash and Unknown     Other reaction(s): Other (see comments)  Oral swelling per [facility]         Dilaudid [Hydromorphone] Rash     Morphine Rash     rash     Penicillins Rash     aka ancef/ rash       Citalopram Unknown     Oxycodone Rash     \"HORRIBLE, SEVERE RASH MAYBE CAUSED BY OXY\"         Family History  family history includes Lung Cancer in his brother; Parkinsonism in his mother.    Social History  Social History     Tobacco Use     Smoking status: Former "     Types: Cigarettes     Smokeless tobacco: Never   Substance Use Topics     Alcohol use: Not Currently       Physical Exam  Wt 89.8 kg (198 lb)   BMI 30.11 kg/m    Body mass index is 30.11 kg/m .  GENERAL : Frail, no acute distress, hearing aid in place  NEURO: awake, alert, responds appropriately to questions.      DATA REVIEW  Labs/Imaging  Lab Results   Component Value Date    A1C 8.2 11/16/2023    A1C 9.6 06/30/2023 6/30/23  Final Diagnosis   A(1). SPLEEN AND TAIL OF PANCREAS, RESECTION:  - Spleen with no significant histopathologic abnormality  - Pancreas with features of chronic pancreatitis  - One benign lymph node (0/1)     B(2). DISTAL PANCREAS, RESECTION:  - Chronic pancreatitis     C(3). PROXIMAL ILEUM WITH STENOTIC AREA AND PRIOR ANASTOMOSIS, RESECTION:  - Well-differentiated neuroendocrine tumor, grade 2 of 3  - Tumor invades through the muscularis propria into the subserosal tissue  - Tumor size: 2.3 cm in greatest dimension  - Resection margin free of involvement (tumor 0.9 cm from the closest mesenteric margin)  - Lymphovascular invasion present  - Perineural invasion present  - One of four lymph nodes positive for metastatic neuroendocrine tumor (1/4)  - Please see tumor synoptic report for details         ASSESSMENT/PLAN:   Arnaldo Henderson is a 68 year old male with CAD s/p CABG, 2nd degree AV block, DM, GERD, HTN, h/o chronic pancreatitis s/p distal pancreatectomy, neuroendocrine tumor and PD who is here for diabetes management.     ## T2DM  ## H/o chronic pancreatitis  ## Partial pancreatectomy (report 60%)  ## CAD s/p CABG  ## Hypoglycemia unawareness  ## Grade 2 hypyglycemia  Today we discussed about diabetes and goal of treatment  Plan for goal A1c <8  CGM Time in target () >50%, and low (<70) <1%  Goal fasting 100-150, [id number] mg/dL    Previously tolerate metformin well  No h/o yeast/ bladder/ kidney infection    Improvement in hypoglycemia but worsening hyperglycemia  --  Continue Basaglar 14 units AM 20 units hs  -- Restart Novolog 4 units with breakfast and lunch  -- decrease Novolog sliding scale insulin as needed 3 times/day before meal  190-239 take 1 unit(s)  240-289 take 2 unit(s)   290-339 take 3 unit(s)   >340 Take 4 unit(s)   -- bedtime novolog sliding scale insulin  200-249 take 1 unit(s)   250-300 take 2 unit(s)   >300 take 3 unit(s)   -- continue metformin 1500 mg daily and this weekend increase to 2000 mg/day  -- if more hypoglycemia on metformin 2000 mg/day, decrease Novolog fix dose to 2 unit(s) with breakfast and lunch  -- set low alarm at 90, and snooze every 15 mins, hypoglycemia management discussed  -- pending glucagon. Pt could not afford prescription, I'm reaching out to our pharmacy for possible PAP    Pt has Follow-up next week CDE and 2 weeks with me    The longitudinal plan of care for T2DM was addressed during this visit. Due to the added complexity in care, I will continue to support Pat in the subsequent management of this condition(s) and with the ongoing continuity of care of this condition(s).       Mj Reynolds MD            Again, thank you for allowing me to participate in the care of your patient.        Sincerely,        Mj Reynolds MD

## 2024-01-30 NOTE — NURSING NOTE
Is the patient currently in the state of [place]? YES    Visit mode:VIDEO    If the visit is dropped, the patient can be reconnected by: VIDEO VISIT: Text to cell phone:   Telephone Information:   Mobile [phone]       Will anyone else be joining the visit? NO  (If patient encounters technical issues they should call [phone]    How would you like to obtain your AVS? MyChart    Are changes needed to the allergy or medication list? No    Reason for visit: RECHECK and Video Visit    Chastity TORRES       normal...

## 2024-01-31 NOTE — PROGRESS NOTES
Video-Visit Details    Type of service:  Video Visit  Video Start Time: 1232  Video End Time:1306  Originating Location (pt. Location): [place], [place]  Distant Location (provider location):  [facility]  Platform used for Video Visit: Destinee Reynolds MD  Endocrinology Clinic Visit     NAME:  Arnaldo Henderson  PCP:  Alanna Lazaro  MRN:  [id number]    Interval History  Over the weekend, pt increased metformin to 1500 mg.  Basaglar 14 units AM and 20 units PM.  And decreased Humalog to only sliding scale with breakfast/ lunch and supper as needed.    No SE on metformin  Glucose has been higher, mostly 200s-300s  Pt has 2 episodes of hypoglycemia early AM, asymptomatic.    CGM reviewed significant improvement in hypoglycemia since discontinue fixed dose insulin with meal, but also significant hypoglycemia (see scan)    HISTORY OF PRESENT ILLNESS  Arnaldo Henderson is a 68 year old male with CAD s/p CABG, 2nd degree AV block, DM, GERD, HTN, h/o chronic pancreatitis s/p distal pancreatectomy, neuroendocrine tumor and PD who is here for diabetes management.     Per Dr. Lyn note 11/27/2023  He is 68 years old.  His tumor was found incidentally when he was undergoing a pancreatectomy for chronic pancreatitis.    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  He has small volume mildly progressive disease within the abdomen without any clearly associated symptoms.   For now his rate of growth and very small volume disease does not warrant active intervention. We will plan on a follow-up scan in 3 to 4 months.     Per Inpatient DM team note 7/2023  Arnaldo Henderson 69 y/o with PMH chronic pancreatitis, CAD, CABG, 2nd degree AVB, Parkinson's disease and chronic pain. He is now s/p distal pancreatectomy, splenectomy, YVAN, proximal ileum resection. He is admitted to the SICU for hemodynamic monitoring.  # T2DM not at goal with hyperglycemia.  Poor control prior to admission    # Stress induced hyperglycemia  # post pancreatic surgery  Plan:                  -Lantus 15 units                 -Novolog 1u/10 gm CHO coverage with meals and snacks                 -Continue insulin drip for now.                 -BG monitoring as per insulin drip protocol                 -hypoglycemia protocol                 -recommend diet with carb counting protocol                 -diabetes education needs will be assessed closer to discharge                 -on discharge, will recommend outpatient follow up with [facility] Endocrinology service     Per most recent endocrine clinic visit with my colleague Hayley Churchillar PAC 1/2024   TYPE 2 DIABETES MELLITUS:   Sugars are currently at goal but with excessive hypoglycemia.  Advising him to reduce his Basaglar dose to 14 units in the morning 30 in the evening nearly 10% reduction.  Also advising him to continue his breakfast dose of 4 units but decrease lunch dose to just 7 units and dinner dose to 10 units plus any correction.  Will continue current correction.   5.  Neuroendocrine tumor: See Dr. Gail mcarthur next week    I confirmed with patient and wife and they confirmed that Pat is seeing me today for diabetes.  They would like to have a doctor on the team also with our diabetes team at the .    Prior to admission, he was on metformin 2000 mg/day, no side effect  Pt and wife reported seeing doctors for diabetes once during inpatient.  Report that about 60% of his pancreas was removed  Pt follows neuroendocrine tumor with Dr. Lyn no concerns.    Patient's main concerns today are his diabetes control after steroid injection and his recent lab results.  11/21/2023  Steroid injection, glucose was high   He may need another injection in the future and is concerned.  They are wondering about high BUN, previous abnormal LFT, low hematocrit and abnormal RBC    In terms of symptoms, he said PD is bothersome  But no problem taking insulin  Feels like hypoglycemia  improve since lower insulin dose last week    Pt only has dexcom alarm set for urgent low.  Sometimes he could not feel low glucose.  He recently bought glucose tablets to help with lows    Current regimen  Basaglar 14 units AM 30 units hs  Novolog 4 units with breakfast + sliding scale insulin, lunch 7 + sliding scale insulin, supper 10 + sliding scale insulin, night sliding scale insulin    CGM 1 week data  TIR 49%, low 4%, very low 1%         REVIEW OF SYSTEMS  10 point negative except as mentioned in HPI    Past Medical/Surgical History:  Past Medical History:   Diagnosis Date    CAD (coronary artery disease)     CABG    Diabetes mellitus, type 2 (H) 2013    Gastroesophageal reflux disease     Hypercholesteremia     Hypertension     Mixed conductive and sensorineural hearing loss of both ears     Mixed hearing loss     Pancreatic duct stricture     Pancreatitis     Parkinson disease     Second degree AV block      Past Surgical History:   Procedure Laterality Date    CA ANESTH CABG W/PUMP      CHOLECYSTECTOMY  2022    COLONOSCOPY N/A 01/12/2023    Procedure: colonoscopy with fluroscopy;  Surgeon: Ayden Fraire MD;  Location:  OR    ENDOSCOPIC RETROGRADE CHOLANGIOPANCREATOGRAM N/A 05/18/2023    Procedure: ENDOSCOPIC RETROGRADE CHOLANGIOPANCREATOGRAPHY, WITH  CYSTDUODENOSTOMY STENTS X2 REMOVAL;  Surgeon: Cedric Saldana MD;  Location: [facility] OR    ENT SURGERY      LAPAROSCOPY DIAGNOSTIC (GENERAL) N/A 02/20/2023    Procedure: LAPAROSCOPY, DIAGNOSTIC; RETRIEVAL OF MIGRATED STENT;  Surgeon: Joseluis Lima MD;  Location: [facility] OR    ORTHOPEDIC SURGERY      PANCREATECTOMY [place]OW N/A 06/30/2023    Procedure: Open distal pancreactectomy with splenectomy, lysis of adhesions, resection of proximal illeum;  Surgeon: Ashvin Haider MD;  Location: [facility] OR    CO CABG, ARTERIAL, TWO  2003       Medications  Current Outpatient Medications   Medication    acetaminophen (TYLENOL) 325 MG tablet    aspirin (ASA) 81  "MG EC tablet    atorvastatin (LIPITOR) 40 MG tablet    BD PEN NEEDLE LINDY 2ND GEN 32G X 4 MM miscellaneous    carbidopa-levodopa (SINEMET)  MG tablet    Continuous Blood Gluc  (DEXCOM G7 ) ALIA    Continuous Blood Gluc Sensor (DEXCOM G7 SENSOR) MISC    gabapentin (NEURONTIN) 300 MG capsule    Glucagon (BAQSIMI) 3 MG/DOSE nasal powder    insulin aspart (NOVOLOG PEN) 100 UNIT/ML pen    insulin glargine (BASAGLAR KWIKPEN) 100 UNIT/ML pen    lipase-protease-amylase (ZENPEP) [id number] units CPEP    lipase-protease-amylase (ZENPEP) 07635-735462-284401 units CPEP    losartan (COZAAR) 50 MG tablet    magnesium oxide (MAG-OX) 400 MG tablet    metFORMIN (GLUCOPHAGE XR) 500 MG 24 hr tablet    Multiple Vitamin (MULTI-VITAMINS) TABS    omeprazole (PRILOSEC) 20 MG DR capsule    carbidopa-levodopa (SINEMET CR)  MG CR tablet    methocarbamol (ROBAXIN) 500 MG tablet     No current facility-administered medications for this visit.       Allergies  Allergies   Allergen Reactions    Ciprofloxacin Other (See Comments), Hives, Itching, Rash and Unknown     Other reaction(s): Other (see comments)  Oral swelling per [facility]        Dilaudid [Hydromorphone] Rash    Morphine Rash     rash    Penicillins Rash     aka ancef/ rash      Citalopram Unknown    Oxycodone Rash     \"HORRIBLE, SEVERE RASH MAYBE CAUSED BY OXY\"         Family History  family history includes Lung Cancer in his brother; Parkinsonism in his mother.    Social History  Social History     Tobacco Use    Smoking status: Former     Types: Cigarettes    Smokeless tobacco: Never   Substance Use Topics    Alcohol use: Not Currently       Physical Exam  Wt 89.8 kg (198 lb)   BMI 30.11 kg/m    Body mass index is 30.11 kg/m .  GENERAL : Frail, no acute distress, hearing aid in place  NEURO: awake, alert, responds appropriately to questions.      DATA REVIEW  Labs/Imaging  Lab Results   Component Value Date    A1C 8.2 11/16/2023    A1C 9.6 " 06/30/2023 6/30/23  Final Diagnosis   A(1). SPLEEN AND TAIL OF PANCREAS, RESECTION:  - Spleen with no significant histopathologic abnormality  - Pancreas with features of chronic pancreatitis  - One benign lymph node (0/1)     B(2). DISTAL PANCREAS, RESECTION:  - Chronic pancreatitis     C(3). PROXIMAL ILEUM WITH STENOTIC AREA AND PRIOR ANASTOMOSIS, RESECTION:  - Well-differentiated neuroendocrine tumor, grade 2 of 3  - Tumor invades through the muscularis propria into the subserosal tissue  - Tumor size: 2.3 cm in greatest dimension  - Resection margin free of involvement (tumor 0.9 cm from the closest mesenteric margin)  - Lymphovascular invasion present  - Perineural invasion present  - One of four lymph nodes positive for metastatic neuroendocrine tumor (1/4)  - Please see tumor synoptic report for details         ASSESSMENT/PLAN:   Arnaldo Henderson is a 68 year old male with CAD s/p CABG, 2nd degree AV block, DM, GERD, HTN, h/o chronic pancreatitis s/p distal pancreatectomy, neuroendocrine tumor and PD who is here for diabetes management.     ## T2DM  ## H/o chronic pancreatitis  ## Partial pancreatectomy (report 60%)  ## CAD s/p CABG  ## Hypoglycemia unawareness  ## Grade 2 hypyglycemia  Today we discussed about diabetes and goal of treatment  Plan for goal A1c <8  CGM Time in target () >50%, and low (<70) <1%  Goal fasting 100-150, [id number] mg/dL    Previously tolerate metformin well  No h/o yeast/ bladder/ kidney infection    Improvement in hypoglycemia but worsening hyperglycemia  -- Continue Basaglar 14 units AM 20 units hs  -- Restart Novolog 4 units with breakfast and lunch  -- decrease Novolog sliding scale insulin as needed 3 times/day before meal  190-239 take 1 unit(s)  240-289 take 2 unit(s)   290-339 take 3 unit(s)   >340 Take 4 unit(s)   -- bedtime novolog sliding scale insulin  200-249 take 1 unit(s)   250-300 take 2 unit(s)   >300 take 3 unit(s)   -- continue metformin 1500 mg  daily and this weekend increase to 2000 mg/day  -- if more hypoglycemia on metformin 2000 mg/day, decrease Novolog fix dose to 2 unit(s) with breakfast and lunch  -- set low alarm at 90, and snooze every 15 mins, hypoglycemia management discussed  -- pending glucagon. Pt could not afford prescription, I'm reaching out to our pharmacy for possible PAP    Pt has Follow-up next week CDE and 2 weeks with me    The longitudinal plan of care for T2DM was addressed during this visit. Due to the added complexity in care, I will continue to support Pat in the subsequent management of this condition(s) and with the ongoing continuity of care of this condition(s).       Mj Reynolds MD

## 2024-02-01 RX ORDER — DASIGLUCAGON 0.6 MG/.6ML
0.6 INJECTION, SOLUTION SUBCUTANEOUS
Qty: 1.2 ML | Refills: 1 | Status: SHIPPED | OUTPATIENT
Start: 2024-02-01

## 2024-02-04 ENCOUNTER — APPOINTMENT (OUTPATIENT)
Dept: RADIOLOGY | Facility: HOSPITAL | Age: 69
End: 2024-02-04
Attending: EMERGENCY MEDICINE
Payer: MEDICARE

## 2024-02-04 ENCOUNTER — APPOINTMENT (OUTPATIENT)
Dept: OCCUPATIONAL THERAPY | Facility: HOSPITAL | Age: 69
End: 2024-02-04
Attending: HOSPITALIST
Payer: MEDICARE

## 2024-02-04 ENCOUNTER — NURSE TRIAGE (OUTPATIENT)
Dept: NURSING | Facility: CLINIC | Age: 69
End: 2024-02-04
Payer: MEDICARE

## 2024-02-04 ENCOUNTER — APPOINTMENT (OUTPATIENT)
Dept: CT IMAGING | Facility: HOSPITAL | Age: 69
End: 2024-02-04
Attending: EMERGENCY MEDICINE
Payer: MEDICARE

## 2024-02-04 ENCOUNTER — APPOINTMENT (OUTPATIENT)
Dept: PHYSICAL THERAPY | Facility: HOSPITAL | Age: 69
End: 2024-02-04
Attending: HOSPITALIST
Payer: MEDICARE

## 2024-02-04 ENCOUNTER — HOSPITAL ENCOUNTER (OUTPATIENT)
Facility: HOSPITAL | Age: 69
Setting detail: OBSERVATION
Discharge: HOME HEALTH | End: 2024-02-07
Attending: EMERGENCY MEDICINE | Admitting: HOSPITALIST
Payer: MEDICARE

## 2024-02-04 DIAGNOSIS — G20.B1 PARKINSON'S DISEASE WITH DYSKINESIA, UNSPECIFIED WHETHER MANIFESTATIONS FLUCTUATE (H): Primary | ICD-10-CM

## 2024-02-04 DIAGNOSIS — Z95.1 HISTORY OF CORONARY ARTERY BYPASS GRAFT: ICD-10-CM

## 2024-02-04 DIAGNOSIS — I10 PRIMARY HYPERTENSION: ICD-10-CM

## 2024-02-04 DIAGNOSIS — E11.65 TYPE 2 DIABETES MELLITUS WITH HYPERGLYCEMIA, WITH LONG-TERM CURRENT USE OF INSULIN (H): ICD-10-CM

## 2024-02-04 DIAGNOSIS — I95.1 ORTHOSTATIC HYPOTENSION: ICD-10-CM

## 2024-02-04 DIAGNOSIS — Z86.79 HISTORY OF CAD (CORONARY ARTERY DISEASE): ICD-10-CM

## 2024-02-04 DIAGNOSIS — Z86.69 HISTORY OF PARKINSON'S DISEASE: ICD-10-CM

## 2024-02-04 DIAGNOSIS — I44.1 SECOND DEGREE AV BLOCK: ICD-10-CM

## 2024-02-04 DIAGNOSIS — Z79.4 TYPE 2 DIABETES MELLITUS WITH HYPERGLYCEMIA, WITH LONG-TERM CURRENT USE OF INSULIN (H): ICD-10-CM

## 2024-02-04 DIAGNOSIS — R53.1 WEAKNESS: ICD-10-CM

## 2024-02-04 PROBLEM — C7A.8 NEUROENDOCRINE CARCINOMA OF SMALL BOWEL (H): Status: ACTIVE | Noted: 2023-07-27

## 2024-02-04 PROBLEM — M62.81 MUSCLE WEAKNESS (GENERALIZED): Status: ACTIVE | Noted: 2022-04-15

## 2024-02-04 PROBLEM — R10.84 ABDOMINAL PAIN, GENERALIZED: Status: ACTIVE | Noted: 2023-12-12

## 2024-02-04 LAB
ALBUMIN SERPL BCG-MCNC: 3.6 G/DL (ref 3.5–5.2)
ALBUMIN UR-MCNC: NEGATIVE MG/DL
ALP SERPL-CCNC: 96 U/L (ref 40–150)
ALT SERPL W P-5'-P-CCNC: <5 U/L (ref 0–70)
ANION GAP SERPL CALCULATED.3IONS-SCNC: 7 MMOL/L (ref 7–15)
APPEARANCE UR: CLEAR
AST SERPL W P-5'-P-CCNC: 22 U/L (ref 0–45)
BASOPHILS # BLD AUTO: ABNORMAL 10*3/UL
BASOPHILS # BLD MANUAL: 0 10E3/UL (ref 0–0.2)
BASOPHILS NFR BLD AUTO: ABNORMAL %
BASOPHILS NFR BLD MANUAL: 0 %
BILIRUB DIRECT SERPL-MCNC: 0.25 MG/DL (ref 0–0.3)
BILIRUB SERPL-MCNC: 0.7 MG/DL
BILIRUB UR QL STRIP: NEGATIVE
BUN SERPL-MCNC: 21.4 MG/DL (ref 8–23)
CALCIUM SERPL-MCNC: 8.8 MG/DL (ref 8.8–10.2)
CHLORIDE SERPL-SCNC: 105 MMOL/L (ref 98–107)
CK SERPL-CCNC: 139 U/L (ref 39–308)
COLOR UR AUTO: ABNORMAL
CREAT SERPL-MCNC: 0.91 MG/DL (ref 0.67–1.17)
DEPRECATED HCO3 PLAS-SCNC: 28 MMOL/L (ref 22–29)
EGFRCR SERPLBLD CKD-EPI 2021: >90 ML/MIN/1.73M2
EOSINOPHIL # BLD AUTO: ABNORMAL 10*3/UL
EOSINOPHIL # BLD MANUAL: 0.1 10E3/UL (ref 0–0.7)
EOSINOPHIL NFR BLD AUTO: ABNORMAL %
EOSINOPHIL NFR BLD MANUAL: 1 %
ERYTHROCYTE [DISTWIDTH] IN BLOOD BY AUTOMATED COUNT: 13.1 % (ref 10–15)
FLUAV RNA SPEC QL NAA+PROBE: NEGATIVE
FLUBV RNA RESP QL NAA+PROBE: NEGATIVE
GLUCOSE BLDC GLUCOMTR-MCNC: 121 MG/DL (ref 70–99)
GLUCOSE BLDC GLUCOMTR-MCNC: 141 MG/DL (ref 70–99)
GLUCOSE BLDC GLUCOMTR-MCNC: 212 MG/DL (ref 70–99)
GLUCOSE BLDC GLUCOMTR-MCNC: 223 MG/DL (ref 70–99)
GLUCOSE SERPL-MCNC: 89 MG/DL (ref 70–99)
GLUCOSE UR STRIP-MCNC: NEGATIVE MG/DL
HCT VFR BLD AUTO: 40.9 % (ref 40–53)
HGB BLD-MCNC: 13.6 G/DL (ref 13.3–17.7)
HGB UR QL STRIP: NEGATIVE
HYALINE CASTS: 4 /LPF
IMM GRANULOCYTES # BLD: ABNORMAL 10*3/UL
IMM GRANULOCYTES NFR BLD: ABNORMAL %
KETONES UR STRIP-MCNC: NEGATIVE MG/DL
LACTATE SERPL-SCNC: 1.1 MMOL/L (ref 0.7–2)
LEUKOCYTE ESTERASE UR QL STRIP: NEGATIVE
LIPASE SERPL-CCNC: 15 U/L (ref 13–60)
LYMPHOCYTES # BLD AUTO: ABNORMAL 10*3/UL
LYMPHOCYTES # BLD MANUAL: 3.7 10E3/UL (ref 0.8–5.3)
LYMPHOCYTES NFR BLD AUTO: ABNORMAL %
LYMPHOCYTES NFR BLD MANUAL: 41 %
MAGNESIUM SERPL-MCNC: 1.8 MG/DL (ref 1.7–2.3)
MCH RBC QN AUTO: 33.7 PG (ref 26.5–33)
MCHC RBC AUTO-ENTMCNC: 33.3 G/DL (ref 31.5–36.5)
MCV RBC AUTO: 101 FL (ref 78–100)
MONOCYTES # BLD AUTO: ABNORMAL 10*3/UL
MONOCYTES # BLD MANUAL: 1.5 10E3/UL (ref 0–1.3)
MONOCYTES NFR BLD AUTO: ABNORMAL %
MONOCYTES NFR BLD MANUAL: 16 %
MUCOUS THREADS #/AREA URNS LPF: PRESENT /LPF
NEUTROPHILS # BLD AUTO: ABNORMAL 10*3/UL
NEUTROPHILS # BLD MANUAL: 3.8 10E3/UL (ref 1.6–8.3)
NEUTROPHILS NFR BLD AUTO: ABNORMAL %
NEUTROPHILS NFR BLD MANUAL: 42 %
NITRATE UR QL: NEGATIVE
NRBC # BLD AUTO: 0 10E3/UL
NRBC BLD AUTO-RTO: 0 /100
PH UR STRIP: 6.5 [PH] (ref 5–7)
PLAT MORPH BLD: ABNORMAL
PLATELET # BLD AUTO: 332 10E3/UL (ref 150–450)
POTASSIUM SERPL-SCNC: 4.1 MMOL/L (ref 3.4–5.3)
PROT SERPL-MCNC: 7 G/DL (ref 6.4–8.3)
RBC # BLD AUTO: 4.04 10E6/UL (ref 4.4–5.9)
RBC MORPH BLD: ABNORMAL
RBC URINE: 0 /HPF
RSV RNA SPEC NAA+PROBE: NEGATIVE
SARS-COV-2 RNA RESP QL NAA+PROBE: NEGATIVE
SODIUM SERPL-SCNC: 140 MMOL/L (ref 135–145)
SP GR UR STRIP: 1.01 (ref 1–1.03)
UROBILINOGEN UR STRIP-MCNC: <2 MG/DL
WBC # BLD AUTO: 9.1 10E3/UL (ref 4–11)
WBC URINE: <1 /HPF

## 2024-02-04 PROCEDURE — 99285 EMERGENCY DEPT VISIT HI MDM: CPT | Mod: 25

## 2024-02-04 PROCEDURE — 83690 ASSAY OF LIPASE: CPT | Performed by: EMERGENCY MEDICINE

## 2024-02-04 PROCEDURE — 85007 BL SMEAR W/DIFF WBC COUNT: CPT | Performed by: EMERGENCY MEDICINE

## 2024-02-04 PROCEDURE — 71045 X-RAY EXAM CHEST 1 VIEW: CPT

## 2024-02-04 PROCEDURE — 83605 ASSAY OF LACTIC ACID: CPT | Performed by: EMERGENCY MEDICINE

## 2024-02-04 PROCEDURE — 82962 GLUCOSE BLOOD TEST: CPT

## 2024-02-04 PROCEDURE — 250N000013 HC RX MED GY IP 250 OP 250 PS 637: Performed by: HOSPITALIST

## 2024-02-04 PROCEDURE — 87637 SARSCOV2&INF A&B&RSV AMP PRB: CPT | Performed by: EMERGENCY MEDICINE

## 2024-02-04 PROCEDURE — 97165 OT EVAL LOW COMPLEX 30 MIN: CPT | Mod: GO

## 2024-02-04 PROCEDURE — 250N000013 HC RX MED GY IP 250 OP 250 PS 637: Performed by: EMERGENCY MEDICINE

## 2024-02-04 PROCEDURE — 93005 ELECTROCARDIOGRAM TRACING: CPT | Performed by: EMERGENCY MEDICINE

## 2024-02-04 PROCEDURE — 96361 HYDRATE IV INFUSION ADD-ON: CPT

## 2024-02-04 PROCEDURE — 82550 ASSAY OF CK (CPK): CPT | Performed by: HOSPITALIST

## 2024-02-04 PROCEDURE — 96360 HYDRATION IV INFUSION INIT: CPT | Mod: 59

## 2024-02-04 PROCEDURE — G0378 HOSPITAL OBSERVATION PER HR: HCPCS

## 2024-02-04 PROCEDURE — 85027 COMPLETE CBC AUTOMATED: CPT | Performed by: EMERGENCY MEDICINE

## 2024-02-04 PROCEDURE — 83735 ASSAY OF MAGNESIUM: CPT | Performed by: EMERGENCY MEDICINE

## 2024-02-04 PROCEDURE — 97162 PT EVAL MOD COMPLEX 30 MIN: CPT | Mod: GP

## 2024-02-04 PROCEDURE — 81001 URINALYSIS AUTO W/SCOPE: CPT | Performed by: EMERGENCY MEDICINE

## 2024-02-04 PROCEDURE — 87040 BLOOD CULTURE FOR BACTERIA: CPT | Performed by: EMERGENCY MEDICINE

## 2024-02-04 PROCEDURE — 99223 1ST HOSP IP/OBS HIGH 75: CPT | Mod: AI | Performed by: HOSPITALIST

## 2024-02-04 PROCEDURE — 80053 COMPREHEN METABOLIC PANEL: CPT | Performed by: EMERGENCY MEDICINE

## 2024-02-04 PROCEDURE — 250N000012 HC RX MED GY IP 250 OP 636 PS 637: Performed by: HOSPITALIST

## 2024-02-04 PROCEDURE — 258N000003 HC RX IP 258 OP 636: Performed by: EMERGENCY MEDICINE

## 2024-02-04 PROCEDURE — 36415 COLL VENOUS BLD VENIPUNCTURE: CPT | Performed by: EMERGENCY MEDICINE

## 2024-02-04 PROCEDURE — 97116 GAIT TRAINING THERAPY: CPT | Mod: GP

## 2024-02-04 PROCEDURE — 97535 SELF CARE MNGMENT TRAINING: CPT | Mod: GO

## 2024-02-04 PROCEDURE — 99222 1ST HOSP IP/OBS MODERATE 55: CPT | Performed by: INTERNAL MEDICINE

## 2024-02-04 PROCEDURE — 70450 CT HEAD/BRAIN W/O DYE: CPT | Mod: MG

## 2024-02-04 RX ORDER — ONDANSETRON 4 MG/1
4 TABLET, ORALLY DISINTEGRATING ORAL EVERY 6 HOURS PRN
Status: DISCONTINUED | OUTPATIENT
Start: 2024-02-04 | End: 2024-02-07 | Stop reason: HOSPADM

## 2024-02-04 RX ORDER — GABAPENTIN 300 MG/1
600 CAPSULE ORAL ONCE
Status: COMPLETED | OUTPATIENT
Start: 2024-02-04 | End: 2024-02-04

## 2024-02-04 RX ORDER — ONDANSETRON 2 MG/ML
4 INJECTION INTRAMUSCULAR; INTRAVENOUS EVERY 6 HOURS PRN
Status: DISCONTINUED | OUTPATIENT
Start: 2024-02-04 | End: 2024-02-07 | Stop reason: HOSPADM

## 2024-02-04 RX ORDER — INSULIN GLARGINE 100 [IU]/ML
INJECTION, SOLUTION SUBCUTANEOUS
COMMUNITY
End: 2024-02-20

## 2024-02-04 RX ORDER — CARBIDOPA AND LEVODOPA 25; 100 MG/1; MG/1
2 TABLET ORAL
Status: DISCONTINUED | OUTPATIENT
Start: 2024-02-04 | End: 2024-02-07 | Stop reason: HOSPADM

## 2024-02-04 RX ORDER — PANTOPRAZOLE SODIUM 40 MG/1
40 TABLET, DELAYED RELEASE ORAL
Status: DISCONTINUED | OUTPATIENT
Start: 2024-02-05 | End: 2024-02-07 | Stop reason: HOSPADM

## 2024-02-04 RX ORDER — LOSARTAN POTASSIUM 50 MG/1
50 TABLET ORAL AT BEDTIME
Status: DISCONTINUED | OUTPATIENT
Start: 2024-02-04 | End: 2024-02-07

## 2024-02-04 RX ORDER — CARBIDOPA AND LEVODOPA 50; 200 MG/1; MG/1
1 TABLET, EXTENDED RELEASE ORAL AT BEDTIME
Status: DISCONTINUED | OUTPATIENT
Start: 2024-02-04 | End: 2024-02-07 | Stop reason: HOSPADM

## 2024-02-04 RX ORDER — POLYETHYLENE GLYCOL 3350 17 G/17G
17 POWDER, FOR SOLUTION ORAL DAILY
Status: DISCONTINUED | OUTPATIENT
Start: 2024-02-04 | End: 2024-02-07 | Stop reason: HOSPADM

## 2024-02-04 RX ORDER — AMOXICILLIN 250 MG
1 CAPSULE ORAL 2 TIMES DAILY PRN
Status: DISCONTINUED | OUTPATIENT
Start: 2024-02-04 | End: 2024-02-07 | Stop reason: HOSPADM

## 2024-02-04 RX ORDER — ASPIRIN 81 MG/1
81 TABLET ORAL DAILY
Status: DISCONTINUED | OUTPATIENT
Start: 2024-02-05 | End: 2024-02-07 | Stop reason: HOSPADM

## 2024-02-04 RX ORDER — ACETAMINOPHEN 325 MG/1
650 TABLET ORAL EVERY 4 HOURS PRN
Status: DISCONTINUED | OUTPATIENT
Start: 2024-02-04 | End: 2024-02-07 | Stop reason: HOSPADM

## 2024-02-04 RX ORDER — AMOXICILLIN 250 MG
2 CAPSULE ORAL 2 TIMES DAILY PRN
Status: DISCONTINUED | OUTPATIENT
Start: 2024-02-04 | End: 2024-02-07 | Stop reason: HOSPADM

## 2024-02-04 RX ORDER — METFORMIN HCL 500 MG
1500 TABLET, EXTENDED RELEASE 24 HR ORAL
Status: DISCONTINUED | OUTPATIENT
Start: 2024-02-04 | End: 2024-02-05

## 2024-02-04 RX ORDER — GABAPENTIN 300 MG/1
600 CAPSULE ORAL 3 TIMES DAILY
Status: DISCONTINUED | OUTPATIENT
Start: 2024-02-04 | End: 2024-02-07 | Stop reason: HOSPADM

## 2024-02-04 RX ORDER — BISACODYL 10 MG
10 SUPPOSITORY, RECTAL RECTAL DAILY PRN
Status: DISCONTINUED | OUTPATIENT
Start: 2024-02-04 | End: 2024-02-07 | Stop reason: HOSPADM

## 2024-02-04 RX ORDER — INSULIN ASPART 100 [IU]/ML
INJECTION, SOLUTION INTRAVENOUS; SUBCUTANEOUS
COMMUNITY
End: 2024-08-15

## 2024-02-04 RX ORDER — INSULIN ASPART 100 [IU]/ML
5-12 INJECTION, SOLUTION INTRAVENOUS; SUBCUTANEOUS
COMMUNITY
End: 2024-03-27

## 2024-02-04 RX ORDER — DIAZEPAM 2 MG
2 TABLET ORAL EVERY 6 HOURS PRN
Status: DISCONTINUED | OUTPATIENT
Start: 2024-02-04 | End: 2024-02-07 | Stop reason: HOSPADM

## 2024-02-04 RX ORDER — DEXTROSE MONOHYDRATE 25 G/50ML
25-50 INJECTION, SOLUTION INTRAVENOUS
Status: DISCONTINUED | OUTPATIENT
Start: 2024-02-04 | End: 2024-02-07 | Stop reason: HOSPADM

## 2024-02-04 RX ORDER — ATORVASTATIN CALCIUM 40 MG/1
40 TABLET, FILM COATED ORAL DAILY
Status: DISCONTINUED | OUTPATIENT
Start: 2024-02-05 | End: 2024-02-07 | Stop reason: HOSPADM

## 2024-02-04 RX ORDER — NICOTINE POLACRILEX 4 MG
15-30 LOZENGE BUCCAL
Status: DISCONTINUED | OUTPATIENT
Start: 2024-02-04 | End: 2024-02-07 | Stop reason: HOSPADM

## 2024-02-04 RX ORDER — CARBIDOPA AND LEVODOPA 25; 100 MG/1; MG/1
2 TABLET ORAL ONCE
Status: COMPLETED | OUTPATIENT
Start: 2024-02-04 | End: 2024-02-04

## 2024-02-04 RX ADMIN — CARBIDOPA AND LEVODOPA 2 TABLET: 25; 100 TABLET ORAL at 17:53

## 2024-02-04 RX ADMIN — METFORMIN ER 500 MG 1500 MG: 500 TABLET ORAL at 16:06

## 2024-02-04 RX ADMIN — LOSARTAN POTASSIUM 50 MG: 50 TABLET, FILM COATED ORAL at 21:35

## 2024-02-04 RX ADMIN — GABAPENTIN 600 MG: 300 CAPSULE ORAL at 11:08

## 2024-02-04 RX ADMIN — CARBIDOPA AND LEVODOPA 2 TABLET: 25; 100 TABLET ORAL at 11:09

## 2024-02-04 RX ADMIN — ACETAMINOPHEN 650 MG: 325 TABLET ORAL at 19:39

## 2024-02-04 RX ADMIN — DIAZEPAM 2 MG: 2 TABLET ORAL at 19:36

## 2024-02-04 RX ADMIN — GABAPENTIN 600 MG: 300 CAPSULE ORAL at 19:38

## 2024-02-04 RX ADMIN — CARBIDOPA AND LEVODOPA 1 TABLET: 50; 200 TABLET, EXTENDED RELEASE ORAL at 21:36

## 2024-02-04 RX ADMIN — INSULIN ASPART 1 UNITS: 100 INJECTION, SOLUTION INTRAVENOUS; SUBCUTANEOUS at 18:50

## 2024-02-04 RX ADMIN — PANCRELIPASE LIPASE, PANCRELIPASE PROTEASE, PANCRELIPASE AMYLASE 2 CAPSULE: 20000; 63000; 84000 CAPSULE, DELAYED RELEASE ORAL at 16:06

## 2024-02-04 RX ADMIN — SODIUM CHLORIDE 500 ML: 9 INJECTION, SOLUTION INTRAVENOUS at 08:51

## 2024-02-04 RX ADMIN — INSULIN ASPART 1 UNITS: 100 INJECTION, SOLUTION INTRAVENOUS; SUBCUTANEOUS at 16:11

## 2024-02-04 RX ADMIN — GABAPENTIN 600 MG: 300 CAPSULE ORAL at 15:01

## 2024-02-04 ASSESSMENT — ACTIVITIES OF DAILY LIVING (ADL)
ADLS_ACUITY_SCORE: 35
ADLS_ACUITY_SCORE: 38
ADLS_ACUITY_SCORE: 36
ADLS_ACUITY_SCORE: 42
ADLS_ACUITY_SCORE: 36
ADLS_ACUITY_SCORE: 36
ADLS_ACUITY_SCORE: 35
ADLS_ACUITY_SCORE: 35
DEPENDENT_IADLS:: CLEANING;COOKING

## 2024-02-04 ASSESSMENT — ENCOUNTER SYMPTOMS
FEVER: 0
COUGH: 0
PALPITATIONS: 0
HEADACHES: 0
DIAPHORESIS: 0
HEMATURIA: 0
SHORTNESS OF BREATH: 0
NAUSEA: 0
WEAKNESS: 0
BLOOD IN STOOL: 0
ABDOMINAL PAIN: 0
CHILLS: 0
DYSURIA: 0

## 2024-02-04 NOTE — CONSULTS
Care Management Initial Consult    General Information  Assessment completed with: Spouse or significant other, spouse Veronika  Type of CM/SW Visit: Initial Assessment    Primary Care Provider verified and updated as needed: Yes   Readmission within the last 30 days: no previous admission in last 30 days      Reason for Consult: discharge planning  Advance Care Planning: Advance Care Planning Reviewed: no concerns identified          Communication Assessment  Patient's communication style: spoken language (English or Bilingual)             Cognitive  Cognitive/Neuro/Behavioral: WDL                      Living Environment:   People in home: spouse  Veronika Fierro  Current living Arrangements: apartment      Able to return to prior arrangements: other (see comments) (TBD)  Living Arrangement Comments: scheduled to close on and move into a townhome in one week    Family/Social Support:  Care provided by: self, spouse/significant other  Provides care for: no one  Marital Status:   Wife  Veronika       Description of Support System: Supportive, Involved    Support Assessment: Adequate family and caregiver support    Current Resources:   Patient receiving home care services: No     Community Resources: None  Equipment currently used at home:    Supplies currently used at home: Diabetic Supplies    Employment/Financial:  Employment Status:          Financial Concerns:             Does the patient's insurance plan have a 3 day qualifying hospital stay waiver?  No    Lifestyle & Psychosocial Needs:  Social Determinants of Health     Food Insecurity: Not on file   Depression: Not at risk (1/2/2024)    PHQ-2     PHQ-2 Score: 0   Housing Stability: Not on file   Tobacco Use: Medium Risk (2/4/2024)    Patient History     Smoking Tobacco Use: Former     Smokeless Tobacco Use: Never     Passive Exposure: Not on file   Financial Resource Strain: Not on file   Alcohol Use: Not on file   Transportation Needs: Not on file   Physical  Activity: Not on file   Interpersonal Safety: Not on file   Stress: Not on file   Social Connections: Not on file       Functional Status:  Prior to admission patient needed assistance:   Dependent ADLs:: Independent, Ambulation-no assistive device  Dependent IADLs:: Cleaning, Cooking  Assesssment of Functional Status: Not at  functional baseline    Mental Health Status:          Chemical Dependency Status:                Values/Beliefs:  Spiritual, Cultural Beliefs, Sabianism Practices, Values that affect care:                 Additional Information:  Met with patient Sri and spouse Veronika at the bedside for initial assessment. Patient slept on and off throughout the visit. They live together in a senior apartment. Per Veronika, patient is independent with his ADLs and most IADLs at baseline. Ambulates independently.  He does not have any services. Uses a dexcom for diabetes.     AZAEL discussed. PT/OT/Cards/neuro eval pending. CM to follow hospital progression, treatment team recommendations and assist with discharge planning as needed.     Spouse to transport.    Barbara Ann RN

## 2024-02-04 NOTE — PROGRESS NOTES
02/04/24 1325   Appointment Info   Signing Clinician's Name / Credentials (OT) Marilyn RAMAN de Hasameer BOURGEOIS/L   Living Environment   People in Home spouse   Current Living Arrangements apartment   Home Accessibility no concerns   Transportation Anticipated car, drives self   Self-Care   Current Activity Tolerance good   Fall history within last six months no   Activity/Exercise/Self-Care Comment Pt  is independent with ADLs and IADLs.  Pt drives and manges his own meds.   General Information   Onset of Illness/Injury or Date of Surgery 02/04/24   Referring Physician Downs   Patient/Family Therapy Goal Statement (OT) home   Additional Occupational Profile Info/Pertinent History of Current Problem Pt admitted with weakness and bradykinesia, Parkinson's Disease   Existing Precautions/Restrictions fall   Cognitive Status Examination   Cognitive Status Comments Alert and oriented x 4.  Slow response time.   Visual Perception   Visual Impairment/Limitations WFL   Sensory   Sensory Quick Adds sensation intact   Posture   Posture not impaired   Range of Motion Comprehensive   General Range of Motion no range of motion deficits identified   Strength Comprehensive (MMT)   Comment, General Manual Muscle Testing (MMT) Assessment WFL for ADLs   Bed Mobility   Comment (Bed Mobility) SBA with increased time   Transfers   Transfer Comments SBA/CGA   Balance   Balance Comments CGA   Activities of Daily Living   BADL Assessment/Intervention   (LB dressing Min A)   Clinical Impression   Criteria for Skilled Therapeutic Interventions Met (OT) Yes, treatment indicated   OT Diagnosis impaired ADL independence   OT Problem List-Impairments impacting ADL balance;cognition;mobility   Assessment of Occupational Performance 1-3 Performance Deficits   Planned Therapy Interventions (OT) ADL retraining;IADL retraining;balance training;cognition;transfer training   Risk & Benefits of therapy have been explained evaluation/treatment results  reviewed;participants included;patient;spouse/significant other   Clinical Impression Comments Pt seen bedside for OT eval and treatment.  Pt demonstrates decrased independence with ADLs.  OT to continue to address.  Recommend home with family support as needed.  Pt may benefit from driving assessment due to slow reaction time noted during eval.   OT Total Evaluation Time   OT Eval, Low Complexity Minutes (91151) 10   Therapy Certification   Start of Care Date 02/04/24   Certification date from 02/04/24   Certification date to 02/11/24   Medical Diagnosis bradykinesia   OT Goals   Therapy Frequency (OT) Daily   OT Predicted Duration/Target Date for Goal Attainment 02/04/24   OT Goals Lower Body Dressing;Transfers;OT Goal 1;OT Goal 2   OT: Lower Body Dressing Supervision/stand-by assist   OT: Transfer Supervision/stand-by assist   OT: Goal 1 Pt will participate in formal cognitive assessment to A with discharge planning   OT: Goal 2 Pt will tolerate 5 mnutes standing act with SBA to incrase I with ADLs   Self-Care/Home Management   Self-Care/Home Mgmt/ADL, Compensatory, Meal Prep Minutes (72733) 8   Treatment Detail/Skilled Intervention OT sandra completed, treatment is as follows.  Pt stands from bed several times with dressing and mobility.  CGA for sit to stand.  Pt needs Min A with socks and donning Depends as well as VC.  Pt needs increased time for dressing tasks.  Pt is able to ambulate with CGA with FWW and then without AD.  Pt was left in w/c with nursing.   OT Discharge Planning   OT Plan cog screen, functional mobility, LB dressing, stand at sink for G/H   OT Discharge Recommendation (DC Rec) home with assist   OT Rationale for DC Rec Recommend home with family assist as needed.  Pt would benefit from driving assessment due to slow reaction time observed during OT eval.  Pt and family may benefit from out pt Parkinson's program with education and thearpy.   OT Brief overview of current status CGA/Min A                                                                                    [facility]      OUTPATIENT OCCUPATIONAL THERAPY  EVALUATION  PLAN OF TREATMENT FOR OUTPATIENT REHABILITATION  (COMPLETE FOR INITIAL CLAIMS ONLY)  Patient's Last Name, First Name, M.I.  YOB: 1955  Arnaldo Henderson                          Provider's Name  [facility] Medical Record No.  [id number]                               Onset Date:  02/04/24   Start of Care Date:  (P) 02/04/24     Type:     ___PT   _X_OT   ___SLP Medical Diagnosis:  (P) bradykinesia                        OT Diagnosis:  (P) impaired ADL independence   Visits from SOC:  1   _________________________________________________________________________________  Plan of Treatment/Functional Goals    Planned Interventions: (P) ADL retraining, IADL retraining, balance training, cognition, transfer training   Goals: See Occupational Therapy Goals on Care Plan in [employer] electronic health record.    Therapy Frequency: (P) Daily  Predicted Duration of Therapy Intervention: (P) 02/04/24  _________________________________________________________________________________    I CERTIFY THE NEED FOR THESE SERVICES FURNISHED UNDER        THIS PLAN OF TREATMENT AND WHILE UNDER MY CARE .             Physician Signature               Date    X_____________________________________________________                  Certification date from: (P) 02/04/24, Certification date to: (P) 02/11/24    Referring Physician: Severino (P)            Initial Assessment        See Occupational Therapy evaluation dated (P) 02/04/24 in [employer] electronic health record.

## 2024-02-04 NOTE — ED NOTES
Report given to KAI Avendano at this time. Receiving RN made aware that hospitalist has seen pt and will order something for his restlessness; pt has male condom catheter in place, and reports wanting to be repositioned every now and then.  Wife Veronika is at bedside at this time.

## 2024-02-04 NOTE — PROGRESS NOTES
Physical Therapy        02/04/24 1328   Appointment Info   Signing Clinician's Name / Credentials (PT) Herlinda Escoto DPT   Quick Adds   Quick Adds Certification   Living Environment   People in Home spouse   Current Living Arrangements apartment   Home Accessibility no concerns   Transportation Anticipated car, drives self   Self-Care   Equipment Currently Used at Home cane, straight;none   Fall history within last six months no   Activity/Exercise/Self-Care Comment Pt is independent with ADLs and IADLs. Pt drives and manges his own meds.   General Information   Onset of Illness/Injury or Date of Surgery 02/04/24   Referring Physician Jody Severino MD   Patient/Family Therapy Goals Statement (PT) none stated   Pertinent History of Current Problem (include personal factors and/or comorbidities that impact the POC) 68 year old male admitted on 2/4/2024. He has very complicated past medical history including distal pancreatectomy following chronic pancreatitis with stent migration and disconnection of pancreatic duct, IDDM, neuroendocrine tumor of small bowel, Parkinson's disease, CAD s/p CABG, hypertension presents for evaluation of multiple complaints including weakness, bradykinesia, spells where his body feels heavy and woozy, muscle tension.   Existing Precautions/Restrictions fall   Cognition   Follows Commands (Cognition) WFL   Safety Deficit (Cognition) minimal deficit;insight into deficits/self-awareness;impulsivity   Pain Assessment   Patient Currently in Pain No   Posture    Posture Forward head position   Range of Motion (ROM)   Range of Motion ROM is WFL   Strength (Manual Muscle Testing)   Strength (Manual Muscle Testing) Deficits observed during functional mobility   Bed Mobility   Comment, (Bed Mobility) SBA to slowly reach EOB sitting from supine.   Transfers   Comment, (Transfers) CGA sit>stand, leans posterior LEs against bed for balance   Gait/Stairs (Locomotion)   Comment, (Gait/Stairs) 10'  without AD, CGA, shaky, decreased pace and step length   Balance   Balance Comments impaired; recommend formal assessment   Sensory Examination   Sensory Perception patient reports no sensory changes   Coordination   Coordination no deficits were identified   Clinical Impression   PT Diagnosis (PT) gait instability   Influenced by the following impairments decreased balance and stregth   Functional limitations due to impairments fall risk, decreased household mobility   Clinical Presentation (PT Evaluation Complexity) stable   Clinical Presentation Rationale presents as medically diagnosed   Clinical Decision Making (Complexity) moderate complexity   Planned Therapy Interventions (PT) balance training;bed mobility training;gait training;neuromuscular re-education;ROM (range of motion);stair training;strengthening;transfer training;progressive activity/exercise   Risk & Benefits of therapy have been explained evaluation/treatment results reviewed;participants voiced agreement with care plan;participants included;patient;spouse/significant other   PT Total Evaluation Time   PT Eval, Moderate Complexity Minutes (86544) 12   Therapy Certification   Start of care date 02/04/24   Certification date from 02/04/24   Certification date to 02/10/24   Medical Diagnosis Parkinson's Disease   Physical Therapy Goals   PT Frequency Daily   PT Predicted Duration/Target Date for Goal Attainment 02/10/24   PT Goals Bed Mobility;Transfers;Gait   PT: Bed Mobility Independent;Supine to/from sit   PT: Transfers Independent;Sit to/from stand   PT: Gait Independent;Greater than 200 feet   Interventions   Interventions Quick Adds Gait Training   Gait Training   Gait Training Minutes (54558) 8   Symptoms Noted During/After Treatment (Gait Training) dizziness   Treatment Detail/Skilled Intervention Cues for safety; pt demonstrates decreased step length, path deviation, fast pace. FWW did not improve gait pattern or path significantly.    Distance in Feet 100' with FWW, 150' with no AD   Bacon Level (Gait Training) contact guard   Physical Assistance Level (Gait Training) 1 person assist   Impairments (Gait Analysis/Training) balance impaired;strength decreased   PT Discharge Planning   PT Plan GT and dynamic balance without AD ; Kaye(?)   PT Discharge Recommendation (DC Rec) home with outpatient physical therapy   PT Rationale for DC Rec pt would benefit greatly from outpatient Parkinson therapy program, e.g. [facility].   PT Brief overview of current status ambulating with SBA, minimal symptoms   Total Session Time   Timed Code Treatment Minutes 8   Total Session Time (sum of timed and untimed services) 20     [facility]  OUTPATIENT PHYSICAL THERAPY EVALUATION  PLAN OF TREATMENT FOR OUTPATIENT REHABILITATION  (COMPLETE FOR INITIAL CLAIMS ONLY)  Patient's Last Name, First Name, M.I.  YOB: 1955  Arnaldo Henderson                        Provider's Name  [facility] Medical Record No.  [id number]                             Onset Date:  02/04/24   Start of Care Date:  02/04/24   Type:     _X_PT   ___OT   ___SLP Medical Diagnosis:  Parkinson's Disease              PT Diagnosis:  gait instability Visits from SOC:  1     See note for plan of treatment, functional goals and certification details    I CERTIFY THE NEED FOR THESE SERVICES FURNISHED UNDER        THIS PLAN OF TREATMENT AND WHILE UNDER MY CARE     (Physician co-signature of this document indicates review and certification of the therapy plan).                Herlinda Escoto, DPT 2/4/2024

## 2024-02-04 NOTE — TELEPHONE ENCOUNTER
"Spouse calling. He authorized to communicate (none on file)  Sees  who thought was part of the [facility] at [facility]. Dr Mj Reynolds. Wants to talk with someone on call. Can they go to [facility]?  \"His  blood sugars are out of wack and he has Parkinson's disease. He literally can't move.\" I told her it sounds like she should be calling 911 for transport. I told her the on call MD can't do anything over  the phone but they would want him evaluated. She agrees to getting him to the ER. I explained Dr Reynolds may not even be on call so they may not be able to talk with that provider. She understands.  Corine Alvarado RN  [facility]    Reason for Disposition   [1] Follow-up call to recent contact AND [2] information only call, no triage required    Additional Information   Negative: [1] Caller is not with the adult (patient) AND [2] reporting urgent symptoms   Negative: Lab result questions   Negative: Medication questions   Negative: Caller can't be reached by phone   Negative: Caller has already spoken to PCP or another triager   Negative: RN needs further essential information from caller in order to complete triage   Negative: Requesting regular office appointment   Negative: [1] Caller requesting NON-URGENT health information AND [2] PCP's office is the best resource   Negative: General information question, no triage required and triager able to answer question   Negative: Question about upcoming scheduled test, no triage required and triager able to answer question   Negative: [1] Caller is not with the adult (patient) AND [2] probable NON-URGENT symptoms   Negative: Health Information question, no triage required and triager able to answer question    Protocols used: Information Only Call - No Triage-A-    "

## 2024-02-04 NOTE — PHARMACY-ADMISSION MEDICATION HISTORY
Pharmacist Admission Medication History    Admission medication history is complete. The information provided in this note is only as accurate as the sources available at the time of the update.    Information Source(s): Patient, Family member, and CareEverywhere/SureScripts via in-person    Pertinent Information: Patient's wife provided medication list w/ admin times  Started metformin on 1/13/24 with weekly titration. He did not increased to 2000 mg on Saturday as prescribed due to illness. Continues 1500 mg daily.     Changes made to PTA medication list:  Added: sliding scale insulin   Deleted: Methocarbamol  Changed: Gabapentin, basaglar, meal time insulin    Allergies reviewed with patient and updates made in EHR: yes    Medication History Completed By: Mary Mooney [facility] 2/4/2024 9:26 AM    Prior to Admission medications    Medication Sig Last Dose Taking? Auth Provider Long Term End Date   acetaminophen (TYLENOL) 325 MG tablet Take 2 tablets (650 mg) by mouth every 4 hours as needed for other (For optimal non-opioid multimodal pain management to improve pain control.) Unknown at PRN Yes Ashvin Haider MD     aspirin (ASA) 81 MG EC tablet Take 81 mg by mouth daily 2/4/2024 at Am Yes Reported, Patient     atorvastatin (LIPITOR) 40 MG tablet Take 1 tablet by mouth daily 2/4/2024 at AM Yes Reported, Patient Yes    carbidopa-levodopa (SINEMET CR)  MG CR tablet Take 1 tablet by mouth At Bedtime 2/3/2024 at HS Yes Reported, Patient Yes    carbidopa-levodopa (SINEMET)  MG tablet Take 2 tablets by mouth 3 times daily 2/4/2024 at AM Yes Unknown, Entered By History Yes    dasiglucagon HCl (ZEGALOGUE) 0.6 MG/0.6ML SOAJ injection Inject 0.6 mg Subcutaneous once as needed (use for severe hypoglycemia) Unknown at PRN Yes Mj Reynolds MD     gabapentin (NEURONTIN) 300 MG capsule Take 600 mg by mouth 3 times daily 2/4/2024 at AM Yes Reported, Patient No    insulin aspart (NOVOLOG FLEXPEN) 100  UNIT/ML pen Inject 4 Units Subcutaneous 2 times daily (with meals) 2/4/2024 at AM    Yes Unknown, Entered By History No    insulin aspart (NOVOLOG FLEXPEN) 100 UNIT/ML pen Inject subcutaneous per sliding scale three times daily with meals:  190-239 = 1 unit  240-289 = 2 units  290-339 = 3 units  >340 = 4 units   And Inject subcutaneously at bedtime per sliding scale:  200-249 = 1 unit  250-300 = 2 units   >300 = 3 units 2/3/2024 at AM    Yes Unknown, Entered By History No    insulin glargine 100 UNIT/ML pen Inject 14 units subcutaneous every morning and 20 units every night at bedtime 2/4/2024 at AM (14 units) Yes Unknown, Entered By History No    lipase-protease-amylase (ZENPEP) 05190-683106-633485 units CPEP Take 1 capsule by mouth 3 times daily (with meals) 2/4/2024 at AM Yes Cedric Saldana MD     losartan (COZAAR) 50 MG tablet Take 50 mg by mouth At Bedtime 2/3/2024 at HS Yes Reported, Patient Yes    magnesium oxide (MAG-OX) 400 MG tablet Take 400 mg by mouth every other day 2/3/2024 at AM Yes Unknown, Entered By History     metFORMIN (GLUCOPHAGE XR) 500 MG 24 hr tablet Take 1 tablet (500 mg) by mouth daily (with dinner) for 7 days, THEN 2 tablets (1,000 mg) daily (with dinner) for 7 days, THEN 3 tablets (1,500 mg) daily (with dinner) for 7 days, THEN 4 tablets (2,000 mg) daily (with dinner) for 90 days. 2/4/2024 at AM (1500 mg) Yes Mj Reynolds MD Yes 4/30/24   Multiple Vitamin (MULTI-VITAMINS) TABS Take 1 tablet by mouth daily 2/4/2024 at AM Yes Reported, Patient     omeprazole (PRILOSEC) 20 MG DR capsule Take 20 mg by mouth daily 2/4/2024 at AM Yes Reported, Patient     BD PEN NEEDLE LINDY 2ND GEN 32G X 4 MM miscellaneous USE TO INJECT INSULIN 4 TIMES A DAY   Valeri Gomez PA-C     Continuous Blood Gluc  (DEXCOM G7 ) ALIA Use to read blood sugars as per 's instructions.   Valeri Gomez PA-C     Continuous Blood Gluc Sensor (DEXCOM G7 SENSOR) MISC Change  every 10 days.   Mj Reynolds MD     Glucagon (BAQSIMI) 3 MG/DOSE nasal powder Spray 1 spray (3 mg) in nostril as needed (severe hypoglycemia) in the event of unconscious hypoglycemia or hypoglycemic seizure. May repeat dose if no response after 15 minutes.   Mj Reynolds MD Yes

## 2024-02-04 NOTE — PLAN OF CARE
Problem: Adult Inpatient Plan of Care  Goal: Optimal Comfort and Wellbeing  Outcome: Progressing   Sad this am, happier with getting up and walking with therapy. Wife at bedside, updated on plan of care.     Report called to KAI David.     Goal Outcome Evaluation:

## 2024-02-04 NOTE — ED PROVIDER NOTES
EMERGENCY DEPARTMENT ENCOUNTER      NAME: Arnaldo Henderson  AGE: 68 year old male  YOB: 1955  MRN: [id number]  EVALUATION DATE & TIME: 2/4/2024  7:41 AM    PCP: Alanna Lazaro    ED PROVIDER: Zofia Rouse M.D.      CHIEF COMPLAINT     Chief Complaint   Patient presents with    Generalized Weakness         FINAL IMPRESSION:     1. Weakness    2. Second degree AV block    3. History of Parkinson's disease    4. History of coronary artery bypass graft    5. History of CAD (coronary artery disease)          MEDICAL DECISION MAKING:       Pertinent Labs & Imaging studies reviewed. (See chart for details)    68 year old male presents to the Emergency Department for evaluation of weakness    History  Supplemental history from wife  External Record(s) review as documented below    Exam    Nontoxic cooperative and pleasant.  Pacemaker midline chest scar.  Resting tremor of the right upper extremity.  Differential Diagnosis include but not limited to sepsis arrhythmia electrolyte abnormalities seizures acute coronary syndrome CVA among others.      Vital Signs: Reviewed initially hypotensive  EKG: Sinus rhythm poor anterior progression there is lengthening of the OK interval.  Second-degree AV block  Imaging: I independently interpreted CT head no intracranial hemorrhage.Formal read by radiologist.  Home meds: Reviewed  ED meds: Gabapentin  Fluids: Saline  Labs:  K 4.1  Cr 0.91  Wbc 9.1  Hgb 13.6  platelets 332  Influenza COVID-negative.  Normal magnesium.    Clinical Impression and Decision Making    68-year-old male presents with wife.  Patient has a history of Parkinson's and recently had an event monitor place.  He had noticed that he had generalized weakness where he is unable to get from a sitting position.  He does not fall.  Today he was more weak than usual.  No trauma.      Initially came to triage was hypotensive brought to a room.  On arrival patient placed supine in the bed in the first blood  pressure was systolic 130s.    He reports these episodes where he feels generalized weak.  He has cramping in the right upper extremity despite taking his Parkinson medications.  He does not lose consciousness.  There is no incontinence.    On examination he is nontoxic looks like he does not feel well.  Rhythm appears irregular on the monitor.    EKG reveals increasing FL interval Mobitz type II.    Electrolytes are normal.    No evidence of infection.    CT head done to evaluate for any spontaneous bleed not found.    I was able to witness 1 of these episodes where he feels weak.  He remains in sinus rhythm no hypotension no hypoxia tremor of the right upper extremity.  Seizure is in the differential.    I am concerned about his irregular rhythm.  What it appears that he is on Mobitz 1 sometimes he becomes quite bradycardic.  I feel that patient needs admission.  Event monitor need to be question.  Would benefit from cardiology and neurology consult.    Spoke with hospitalist who accepts patient.    Addendum.  Patient turning his event monitor on Thursday after wearing it for 30 days.  Unfortunately and unable to see the results.  He was ordered because of Mobitz type I block    Date of Study: 1/16/24   Ordering Provider: Beti Dahl MD   Clinical Indications: Mobitz (type) I (Wenckebach's) atrioventricular block [I44.1 (ICD-10-CM)]     In addition to the work out documented, I considered the following work up antibiotics.  No evidence of infection.           Medical Decision Making    History:  Supplemental history from: Documented in chart and Family Member/Significant Other  External Record(s) reviewed: Documented in chart    Work Up:  Chart documentation includes differential considered and any EKGs or imaging independently interpreted by provider, where specified.  In additional to work up documented, I considered the following work up: Documented in chart, if applicable.    External  consultation:  Discussion of management with another provider: Documented in chart, if applicable    Complicating factors:  Care impacted by chronic illness: Diabetes, Hypertension, and Other: Parkinsons, CABG  Care affected by social determinants of health: N/A    Disposition considerations: Admit.      Review of External Records  Hospital/Clinic: [facility]  Date: 02/01/2024  GERD.     External Consultation  Cardiologist, Dr. aCstro  Hospitalist, Dr. Severino      ED COURSE     7:43 AM I met with the patient, obtained history, performed an initial exam, and discussed options and plan for diagnostics and treatment here in the ED.  8:06 AM reevaluated and updated.  8:22 AM I spoke to Dr. Castro, cardiology.   8:24 AM I am updating the patient.   9:23 AM I am paging the admitting.  9:35 AM I spoke to the hospitalist, Dr. Severino. Recommends cardiac tele obs.   11:40 AM reevaluated and updated.    At the conclusion of the encounter I discussed the results of all of the tests and the disposition. The questions were answered. The patient and his wife acknowledged understanding and were agreeable with the care plan.         MEDICATIONS GIVEN IN THE EMERGENCY:     Medications   sodium chloride 0.9% BOLUS 500 mL (0 mLs Intravenous Stopped 2/4/24 1052)   carbidopa-levodopa (SINEMET)  MG per tablet 2 tablet (2 tablets Oral $Given 2/4/24 1109)   gabapentin (NEURONTIN) capsule 600 mg (600 mg Oral $Given 2/4/24 1108)       NEW PRESCRIPTIONS STARTED AT TODAY'S ER VISIT     New Prescriptions    No medications on file          =================================================================    HPI     Patient information was obtained from: the patient's nurse, the patient, the patient's wife    Use of : N/A         Arnaldo Henderson is a 68 year old male who presents by walk-in for evaluation of generalized weakness.     Per the patient's nurse, the patient is hypotensive with a blood pressure in the 70s/40s. He  "has had episodes of being unable to get up from sitting. These symptoms began after he started taking Metformin one week ago. He also takes insulin.     Per the patient, he felt weak earlier today, 02/04, although he is not weak now. He further denies having a headache, vision changes, diaphoresis, chills, a fever, syncope, chest pain, heart palpitations, shortness of breath, a cough, nausea, abdominal pain, dysuria, hematuria, leg swelling, new rashes or black or bloody stool. He had a bypass surgery 20 years ago.     Per the patient's wife, the patient sometimes has difficulties ambulating due to his Parkinson's. However, this has worsened over the last few months to the point that the patient must lower himself to the floor and \"scoot\" across the floor when getting out of bed. The patient's wife says that this became significantly worse on 02/03. She denies that he has taken any falls or that he has been confused. She notes that he just got done wearing a heart monitor.       REVIEW OF SYSTEMS   Review of Systems   Constitutional:  Negative for chills, diaphoresis and fever.   Eyes:  Negative for visual disturbance.   Respiratory:  Negative for cough and shortness of breath.    Cardiovascular:  Negative for chest pain, palpitations and leg swelling.   Gastrointestinal:  Negative for abdominal pain, blood in stool and nausea.   Genitourinary:  Negative for dysuria and hematuria.   Skin:  Negative for rash.   Neurological:  Negative for syncope, weakness and headaches.        PAST MEDICAL HISTORY:     Past Medical History:   Diagnosis Date    CAD (coronary artery disease)     CABG    Diabetes mellitus, type 2 (H) 2013    Gastroesophageal reflux disease     Hypercholesteremia     Hypertension     Mixed conductive and sensorineural hearing loss of both ears     Mixed hearing loss     Pancreatic duct stricture     Pancreatitis     Parkinson disease     Second degree AV block        PAST SURGICAL HISTORY:     Past " Surgical History:   Procedure Laterality Date    CA ANESTH CABG W/PUMP      CHOLECYSTECTOMY  2022    COLONOSCOPY N/A 01/12/2023    Procedure: colonoscopy with fluroscopy;  Surgeon: Ayden Fraire MD;  Location: [facility] OR    ENDOSCOPIC RETROGRADE CHOLANGIOPANCREATOGRAM N/A 05/18/2023    Procedure: ENDOSCOPIC RETROGRADE CHOLANGIOPANCREATOGRAPHY, WITH  CYSTDUODENOSTOMY STENTS X2 REMOVAL;  Surgeon: Cedric Saldana MD;  Location: [facility] OR    ENT SURGERY      LAPAROSCOPY DIAGNOSTIC (GENERAL) N/A 02/20/2023    Procedure: LAPAROSCOPY, DIAGNOSTIC; RETRIEVAL OF MIGRATED STENT;  Surgeon: Joseluis Lima MD;  Location: [facility] OR    ORTHOPEDIC SURGERY      PANCREATECTOMY PEUSTOW N/A 06/30/2023    Procedure: Open distal pancreactectomy with splenectomy, lysis of adhesions, resection of proximal illeum;  Surgeon: Ashvin Haider MD;  Location: [facility] OR    SC CABG, ARTERIAL, TWO  2003         CURRENT MEDICATIONS:   acetaminophen (TYLENOL) 325 MG tablet  aspirin (ASA) 81 MG EC tablet  atorvastatin (LIPITOR) 40 MG tablet  carbidopa-levodopa (SINEMET CR)  MG CR tablet  carbidopa-levodopa (SINEMET)  MG tablet  dasiglucagon HCl (ZEGALOGUE) 0.6 MG/0.6ML SOAJ injection  gabapentin (NEURONTIN) 300 MG capsule  insulin aspart (NOVOLOG FLEXPEN) 100 UNIT/ML pen  insulin aspart (NOVOLOG FLEXPEN) 100 UNIT/ML pen  insulin glargine 100 UNIT/ML pen  lipase-protease-amylase (ZENPEP) 07112-009100-048694 units CPEP  losartan (COZAAR) 50 MG tablet  magnesium oxide (MAG-OX) 400 MG tablet  metFORMIN (GLUCOPHAGE XR) 500 MG 24 hr tablet  Multiple Vitamin (MULTI-VITAMINS) TABS  omeprazole (PRILOSEC) 20 MG DR capsule  BD PEN NEEDLE LINDY 2ND GEN 32G X 4 MM miscellaneous  Continuous Blood Gluc  (DEXCOM G7 ) ALIA  Continuous Blood Gluc Sensor (DEXCOM G7 SENSOR) MISC  Glucagon (BAQSIMI) 3 MG/DOSE nasal powder         ALLERGIES:     Allergies   Allergen Reactions    Ciprofloxacin Other (See Comments), Hives, Itching, Rash and  "Unknown     Other reaction(s): Other (see comments)  Oral swelling per [facility]        Dilaudid [Hydromorphone] Rash    Morphine Rash     rash    Penicillins Rash     aka ancef/ rash      Citalopram Unknown    Oxycodone Rash     \"HORRIBLE, SEVERE RASH MAYBE CAUSED BY OXY\"       FAMILY HISTORY:     Family History   Problem Relation Age of Onset    Parkinsonism Mother     Lung Cancer Brother     Diabetes No family hx of        SOCIAL HISTORY:     Social History     Socioeconomic History    Marital status:    Tobacco Use    Smoking status: Former     Types: Cigarettes    Smokeless tobacco: Never   Vaping Use    Vaping Use: Never used   Substance and Sexual Activity    Alcohol use: Not Currently    Drug use: Not Currently       VITALS:   BP (!) 152/76   Pulse 79   Temp 97.6  F (36.4  C) (Temporal)   Resp 22   SpO2 98%     PHYSICAL EXAM     Physical Exam  Vitals and nursing note reviewed. Exam conducted with a chaperone present.   Constitutional:       Appearance: He is obese. He is not toxic-appearing.   Skin:     Capillary Refill: Capillary refill takes less than 2 seconds.      Coloration: Skin is pale.   Neurological:      Mental Status: He is alert and oriented to person, place, and time.      Comments: Tremor right upper extremity         Physical Exam   Constitutional: Non-toxic. Looks like he doesn't feel well.     Head: Atraumatic.     Nose: Nose normal.     Mouth/Throat: Oropharynx is clear and moist.     Eyes: EOM are normal. Pupils are equal, round, and reactive to light.     Ears: Bilateral pearly white tympanic membranes. Hearing aids.     Neck: Normal range of motion. Neck supple.     Cardiovascular: Normal rate, regular rhythm and normal heart sounds.    Pulmonary/Chest: Normal effort  and breath sounds normal.     Abdominal: soft nontender.    Musculoskeletal: Normal range of motion.     Neurological: Moves upper and lower extremities equally. No focal neurological deficits.     Lymphatics: " no edema    : NA    Skin: Skin is warm and dry. Midline chest and midline abdominal scars.     Psychiatric: Normal mood and affect. Behavior is normal.       LAB:     All pertinent labs reviewed and interpreted.  Labs Ordered and Resulted from Time of ED Arrival to Time of ED Departure   GLUCOSE BY METER - Abnormal       Result Value    GLUCOSE BY METER POCT 121 (*)    ROUTINE UA WITH MICROSCOPIC REFLEX TO CULTURE - Abnormal    Color Urine Light Yellow      Appearance Urine Clear      Glucose Urine Negative      Bilirubin Urine Negative      Ketones Urine Negative      Specific Gravity Urine 1.014      Blood Urine Negative      pH Urine 6.5      Protein Albumin Urine Negative      Urobilinogen Urine <2.0      Nitrite Urine Negative      Leukocyte Esterase Urine Negative      Mucus Urine Present (*)     RBC Urine 0      WBC Urine <1      Hyaline Casts Urine 4 (*)    CBC WITH PLATELETS AND DIFFERENTIAL - Abnormal    WBC Count 9.1      RBC Count 4.04 (*)     Hemoglobin 13.6      Hematocrit 40.9       (*)     MCH 33.7 (*)     MCHC 33.3      RDW 13.1      Platelet Count 332      % Neutrophils        % Lymphocytes        % Monocytes        % Eosinophils        % Basophils        % Immature Granulocytes        NRBCs per 100 WBC 0      Absolute Neutrophils        Absolute Lymphocytes        Absolute Monocytes        Absolute Eosinophils        Absolute Basophils        Absolute Immature Granulocytes        Absolute NRBCs 0.0     DIFFERENTIAL - Abnormal    % Neutrophils 42      % Lymphocytes 41      % Monocytes 16      % Eosinophils 1      % Basophils 0      Absolute Neutrophils 3.8      Absolute Lymphocytes 3.7      Absolute Monocytes 1.5 (*)     Absolute Eosinophils 0.1      Absolute Basophils 0.0      RBC Morphology Confirmed RBC Indices      Platelet Assessment        Value: Automated Count Confirmed. Platelet morphology is normal.   BASIC METABOLIC PANEL - Normal    Sodium 140      Potassium 4.1      Chloride  105      Carbon Dioxide (CO2) 28      Anion Gap 7      Urea Nitrogen 21.4      Creatinine 0.91      GFR Estimate >90      Calcium 8.8      Glucose 89     HEPATIC FUNCTION PANEL - Normal    Protein Total 7.0      Albumin 3.6      Bilirubin Total 0.7      Alkaline Phosphatase 96      AST 22      ALT <5      Bilirubin Direct 0.25     LIPASE - Normal    Lipase 15     MAGNESIUM - Normal    Magnesium 1.8     LACTIC ACID WHOLE BLOOD - Normal    Lactic Acid 1.1     INFLUENZA A/B, RSV, & SARS-COV2 PCR - Normal    Influenza A PCR Negative      Influenza B PCR Negative      RSV PCR Negative      SARS CoV2 PCR Negative     BLOOD CULTURE   BLOOD CULTURE        RADIOLOGY:     Reviewed all pertinent imaging. Please see official radiology report.  XR Chest Port 1 View   Final Result   IMPRESSION: Previous sternotomy for coronary artery bypass. Heart size and vascularity are normal. Shallow inspiration with no focal consolidation, pneumothorax nor pleural effusion.      Head CT w/o contrast   Final Result   IMPRESSION:   1.  No acute intracranial injury, hemorrhage, mass, or CT evidence of recent ischemia.              EKG:     EKG #1  Sinus rhythm poor anterior progression lengthening of the AR interval second-degree AV block    Time: [id number]    Ventricular rate 54 bmp  Axis normal  AR interval ms  QRS duration 94 ms  QT//402 ms    Compared to previous EKG on 3/16/2024 first-degree AV block  I have independently reviewed and interpreted the EKG(s) documented above.      PROCEDURES:     Procedures      I, Marylou Munguia, am serving as a scribe to document services personally performed by Dr. Rouse based on my observation and the provider's statements to me. I, Zofia Rouse MD attest that Marylou Munguia is acting in a scribe capacity, has observed my performance of the services and has documented them in accordance with my direction.    Zofia Rouse M.D.  Emergency Medicine  [facility]  [facility] EMERGENCY DEPARTMENT  [address] [place] [id number]  [phone]  Dept: [phone]       Zofia Roues MD  02/04/24 1141       Zofia Rouse MD  02/04/24 1141

## 2024-02-04 NOTE — H&P
[facility]    History and Physical - Hospitalist Service       Date of Admission:  2024  Arnaldo Henderson,  1955, MRN [id number]  PCP: Alanna Lazaro    Assessment & Plan      Arnaldo Henderson is a 68 year old male admitted on 2024. He has very complicated past medical history including distal pancreatectomy following chronic pancreatitis with stent migration and disconnection of pancreatic duct, IDDM, neuroendocrine tumor of small bowel, Parkinson's disease, CAD s/p CABG, hypertension presents for evaluation of multiple complaints including weakness, bradykinesia, spells where his body feels heavy and woozy, muscle tension.  Found to have bradycardia, uncertain if this is the cause of his symptoms or possibly in need of adjustment of his Parkinson's meds    # Weakness  # Bradykinesia  # Parkinson's disease  -Patient noticed worsening symptoms in the last 3 weeks since addition of metformin to his medication regimen.  Unsure if this may have impacted metabolism of his Sinemet, no drug-drug interaction between them per [facility].  -Will ask neurology to see if patient should be uptitrated on his Sinemet  -PT and OT    # Spells of body heaviness  # Question of symptomatic bradycardia  -Uncertain etiology  -Check orthostatics  -Patient indicated he was currently having a spell when I saw him and his heart rate was 78.  He was noted with some heart rates down to 48 in the ED with heart block, cardiology have been asked to evaluate.  Uncertain if a pacemaker might help his symptoms.  It could be worth ruling it out  -Monitor on telemetry, hopefully we could see if there is any correlation with symptoms if he has further episodes    # Concern for possible serotonin syndrome?  -Wife is concerned that patient's known neuroendocrine tumor may have become functional i.e. secreting serotonin  -Patient noted flushing here in the ED but wife did not notice this at home  -Patient has been  noticing worsening muscle rigidity however he is not febrile  -We can check CK  -Testing for urine serotonin is fairly involved from what I can tell and requires dietary modification with low tryptophan diet for 3 days before hand etc.  Would recommend he arrange this with his oncologist and would not attempt it here.    # Diabetes  -Insulin-dependent, status post distal pancreatectomy in 2023  -Home regimen is Lantus 14 units in the morning and 20 units at bedtime, NovoLog 4 units twice daily with meals plus sliding scale  -He was recently started on metformin as well for better blood sugar control  -Okay to continue home metformin, but I would not go up on this any further due to concerns about whether it could be interacting with his other medications as above.  -Continue home Lantus at lower dose 12 units in the morning and 16 units at bedtime, plus NovoLog mealtime carb count 1:30 and sliding scale    # Anxiety  -Seems to be playing a significant role in symptoms currently  -Wonder if a little bit of Valium might help him with his muscle rigidity and anxiety as well.  Trial dose    # Muscle rigidity  -Possibly due to undertreated Parkinson's and/or serotonin syndrome as above. To some extent chronic though  -Trial dose of Valium as above  -He is allergic to basically all opioid options  -Checking CK as above  -He has already been doing PT but it sounds like it is more of a massage PT for his chronic abdominal pain  - PT/OT  -Neuro consult as above    # History of coronary artery disease test post CABG  -Home aspirin, statin    # Neuropathy, Home gabapentin  # Hypertension, home losartan  # GERD, home PPI  # Pancreatic insufficiency.  Status post distal pancreatectomy in 2023.  He had migration of pancreatic stent that resulted in disconnection of pancreatic duct from duodenum, required open surgery and distal pancreatectomy.  Continue his home pancreatic enzymes.  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  Follows with Dr. Lyn at the [facility].  Not on any active treatment as felt to have small disease burden but is on active surveillance.  Has a follow-up appointment coming up soon.       DVT Prophylaxis: Moderate risk. SCDs  Diet: Regular Diet Adult  Santos Catheter: Not present  Lines: None     Cardiac Monitoring: ACTIVE order. Indication: presyncope  Code Status: Full Code.  Discussed and confirmed with patient    Clinically Significant Risk Factors Present on Admission                # Drug Induced Platelet Defect: home medication list includes an antiplatelet medication   # Hypertension: Noted on problem list     # DMII: A1C = 8.2 % (Ref range: 0.0 - 5.6 %) within past 6 months               Disposition Plan      Expected Discharge Date: 02/05/2024                The patient's care was discussed with the Patient and Patient's Family.    Jody Severino MD  Hospitalist Service  [facility]  Securely message with [employer] (more info)  Text page via [email or web address] Paging/Directory     ______________________________________________________________________    Chief Complaint   Muscle rigidity, leg weakness, spells where he feels like his body gets heavy    History is obtained from the patient    History of Present Illness   Arnaldo Henderson is a 68 year old male who is here for aforementioned symptoms.   Patient is here for multiple complaints.  Patient reports over the last 3 weeks, he has been having increasing weakness in his legs.  He feels his symptoms began around the time he was started on metformin for his diabetes.  Over the last week, he is also been noticing periods where his \"body feels real heavy.\"  He also notes a \"woozy\" as if he has just woke up.  He indicated he was having these feelings while I was visiting with him.  His heart rate at the time was 78.  Patient complained his legs are currently very tense and his hips and neck are hurting due to the positioning he needs " to lie in in order for his legs not to hurt.  Wife notes patient currently appears flushed and she had read this was a symptom of serotonin syndrome related to his neuroendocrine tumor.  She had not noticed flushing at home and she is wondering if it is because it is warm in the room.  Patient complains of abdominal pain which is chronic and unchanged.  He does get PT which is some sort of massage PT from a specialist for chronic scar tissue in his abdomen.  He denies any fever, chills, cough, chest pain, nausea, vomiting.  States his bowel movements and urination have been normal.      Past Medical History    Past Medical History:   Diagnosis Date    CAD (coronary artery disease)     CABG    Diabetes mellitus, type 2 (H) 2013    Gastroesophageal reflux disease     Hypercholesteremia     Hypertension     Mixed conductive and sensorineural hearing loss of both ears     Mixed hearing loss     Pancreatic duct stricture     Pancreatitis     Parkinson disease     Second degree AV block        Past Surgical History   Past Surgical History:   Procedure Laterality Date    CA ANESTH CABG W/PUMP      CHOLECYSTECTOMY  2022    COLONOSCOPY N/A 01/12/2023    Procedure: colonoscopy with fluroscopy;  Surgeon: Ayden Fraire MD;  Location:  OR    ENDOSCOPIC RETROGRADE CHOLANGIOPANCREATOGRAM N/A 05/18/2023    Procedure: ENDOSCOPIC RETROGRADE CHOLANGIOPANCREATOGRAPHY, WITH  CYSTDUODENOSTOMY STENTS X2 REMOVAL;  Surgeon: Cedric Saldana MD;  Location: [facility] OR    ENT SURGERY      LAPAROSCOPY DIAGNOSTIC (GENERAL) N/A 02/20/2023    Procedure: LAPAROSCOPY, DIAGNOSTIC; RETRIEVAL OF MIGRATED STENT;  Surgeon: Joseluis Lima MD;  Location: [facility] OR    ORTHOPEDIC SURGERY      PANCREATECTOMY PEUSTOW N/A 06/30/2023    Procedure: Open distal pancreactectomy with splenectomy, lysis of adhesions, resection of proximal illeum;  Surgeon: Ashvin Haider MD;  Location: [facility] OR    WA CABG, ARTERIAL, TWO  2003       Prior to Admission  Medications   Prior to Admission Medications   Prescriptions Last Dose Informant Patient Reported? Taking?   BD PEN NEEDLE LINDY 2ND GEN 32G X 4 MM miscellaneous   No No   Sig: USE TO INJECT INSULIN 4 TIMES A DAY   Continuous Blood Gluc  (DEXCOM G7 ) ALIA   No No   Sig: Use to read blood sugars as per 's instructions.   Continuous Blood Gluc Sensor (DEXCOM G7 SENSOR) MISC   No No   Sig: Change every 10 days.   Glucagon (BAQSIMI) 3 MG/DOSE nasal powder   No No   Sig: Spray 1 spray (3 mg) in nostril as needed (severe hypoglycemia) in the event of unconscious hypoglycemia or hypoglycemic seizure. May repeat dose if no response after 15 minutes.   Multiple Vitamin (MULTI-VITAMINS) TABS 2/4/2024 at AM  Yes Yes   Sig: Take 1 tablet by mouth daily   acetaminophen (TYLENOL) 325 MG tablet Unknown at PRN  No Yes   Sig: Take 2 tablets (650 mg) by mouth every 4 hours as needed for other (For optimal non-opioid multimodal pain management to improve pain control.)   aspirin (ASA) 81 MG EC tablet 2/4/2024 at Am  Yes Yes   Sig: Take 81 mg by mouth daily   atorvastatin (LIPITOR) 40 MG tablet 2/4/2024 at AM  Yes Yes   Sig: Take 1 tablet by mouth daily   carbidopa-levodopa (SINEMET CR)  MG CR tablet 2/3/2024 at HS  Yes Yes   Sig: Take 1 tablet by mouth At Bedtime   carbidopa-levodopa (SINEMET)  MG tablet 2/4/2024 at AM  Yes Yes   Sig: Take 2 tablets by mouth 3 times daily   dasiglucagon HCl (ZEGALOGUE) 0.6 MG/0.6ML SOAJ injection Unknown at PRN  No Yes   Sig: Inject 0.6 mg Subcutaneous once as needed (use for severe hypoglycemia)   gabapentin (NEURONTIN) 300 MG capsule 2/4/2024 at AM  Yes Yes   Sig: Take 600 mg by mouth 3 times daily   insulin aspart (NOVOLOG FLEXPEN) 100 UNIT/ML pen 2/4/2024 at AM  Yes Yes   Sig: Inject 4 Units Subcutaneous 2 times daily (with meals)   insulin aspart (NOVOLOG FLEXPEN) 100 UNIT/ML pen 2/3/2024 at AM  Yes Yes   Sig: Inject subcutaneous per sliding scale three  times daily with meals:  190-239 = 1 unit  240-289 = 2 units  290-339 = 3 units  >340 = 4 units   And Inject subcutaneously at bedtime per sliding scale:  200-249 = 1 unit  250-300 = 2 units   >300 = 3 units   insulin glargine 100 UNIT/ML pen 2/4/2024 at AM (14 units)  Yes Yes   Sig: Inject 14 units subcutaneous every morning and 20 units every night at bedtime   lipase-protease-amylase (ZENPEP) 23339-183521-476102 units CPEP 2/4/2024 at AM  No Yes   Sig: Take 1 capsule by mouth 3 times daily (with meals)   losartan (COZAAR) 50 MG tablet 2/3/2024 at HS  Yes Yes   Sig: Take 50 mg by mouth At Bedtime   magnesium oxide (MAG-OX) 400 MG tablet 2/3/2024 at AM  Yes Yes   Sig: Take 400 mg by mouth every other day   metFORMIN (GLUCOPHAGE XR) 500 MG 24 hr tablet 2/4/2024 at AM (1500 mg)  No Yes   Sig: Take 1 tablet (500 mg) by mouth daily (with dinner) for 7 days, THEN 2 tablets (1,000 mg) daily (with dinner) for 7 days, THEN 3 tablets (1,500 mg) daily (with dinner) for 7 days, THEN 4 tablets (2,000 mg) daily (with dinner) for 90 days.   omeprazole (PRILOSEC) 20 MG DR capsule 2/4/2024 at AM  Yes Yes   Sig: Take 20 mg by mouth daily      Facility-Administered Medications: None           Physical Exam   Vital Signs: Temp: 97.6  F (36.4  C) Temp src: Temporal BP: (!) 152/76 Pulse: 79   Resp: 22 SpO2: 97 %      Weight: 0 lbs 0 oz  General: in mild distress male lying in hospital bed oriented x3.  Somewhat distracted by pain  HEENT: Head normocephalic atraumatic, oral mucosa moist. Sclerae anicteric  CV: Regular rhythm, normal rate, no murmurs  Resp: No wheezes, no rales or rhonchi, no focal consolidations  GI: Belly soft, nondistended, nontender, bowel sounds present.  Areas of puckering and scarring on his abdomen noted which are well-healed.  Skin: No rashes or lesions  Extremities: Trace ankle edema bilaterally  Psych: Normal affect, anxious mood  Neuro: Grossly normal    Medical Decision Making             Data   Imaging  results reviewed over the past 24 hrs:   Recent Results (from the past 24 hour(s))   Head CT w/o contrast    Narrative    EXAM: CT HEAD W/O CONTRAST  LOCATION: [facility]  DATE: 2/4/2024    INDICATION: weakness  COMPARISON: None.  TECHNIQUE: Routine CT Head without IV contrast. Multiplanar reformats. Dose reduction techniques were used.    FINDINGS:  INTRACRANIAL CONTENTS: No intracranial hemorrhage, extraaxial collection, or mass effect. Tiny chronic infarct left cerebellum. No CT evidence of acute infarct. Mild presumed chronic small vessel ischemic changes. Normal ventricles and sulci.     VISUALIZED ORBITS/SINUSES/MASTOIDS: No intraorbital abnormality. No paranasal sinus mucosal disease. Postoperative changes right mastoidectomy.    BONES/SOFT TISSUES: No acute abnormality.      Impression    IMPRESSION:  1.  No acute intracranial injury, hemorrhage, mass, or CT evidence of recent ischemia.     XR Chest Port 1 View    Narrative    EXAM: XR CHEST PORT 1 VIEW  LOCATION: [facility]  DATE: 2/4/2024    INDICATION: Weakness.  COMPARISON: PET scan 11/21/2023      Impression    IMPRESSION: Previous sternotomy for coronary artery bypass. Heart size and vascularity are normal. Shallow inspiration with no focal consolidation, pneumothorax nor pleural effusion.     Recent Results (from the past 12 hour(s))   Glucose by meter    Collection Time: 02/04/24  7:40 AM   Result Value Ref Range    GLUCOSE BY METER POCT 121 (H) 70 - 99 mg/dL   Lipase    Collection Time: 02/04/24  7:54 AM   Result Value Ref Range    Lipase 15 13 - 60 U/L   Magnesium    Collection Time: 02/04/24  7:54 AM   Result Value Ref Range    Magnesium 1.8 1.7 - 2.3 mg/dL   Lactic acid whole blood    Collection Time: 02/04/24  7:54 AM   Result Value Ref Range    Lactic Acid 1.1 0.7 - 2.0 mmol/L   CBC with platelets and differential    Collection Time: 02/04/24  7:54 AM   Result Value Ref Range    WBC Count 9.1  4.0 - 11.0 10e3/uL    RBC Count 4.04 (L) 4.40 - 5.90 10e6/uL    Hemoglobin 13.6 13.3 - 17.7 g/dL    Hematocrit 40.9 40.0 - 53.0 %     (H) 78 - 100 fL    MCH 33.7 (H) 26.5 - 33.0 pg    MCHC 33.3 31.5 - 36.5 g/dL    RDW 13.1 10.0 - 15.0 %    Platelet Count 332 150 - 450 10e3/uL    % Neutrophils      % Lymphocytes      % Monocytes      % Eosinophils      % Basophils      % Immature Granulocytes      NRBCs per 100 WBC 0 <1 /100    Absolute Neutrophils      Absolute Lymphocytes      Absolute Monocytes      Absolute Eosinophils      Absolute Basophils      Absolute Immature Granulocytes      Absolute NRBCs 0.0 10e3/uL   Manual Differential    Collection Time: 02/04/24  7:54 AM   Result Value Ref Range    % Neutrophils 42 %    % Lymphocytes 41 %    % Monocytes 16 %    % Eosinophils 1 %    % Basophils 0 %    Absolute Neutrophils 3.8 1.6 - 8.3 10e3/uL    Absolute Lymphocytes 3.7 0.8 - 5.3 10e3/uL    Absolute Monocytes 1.5 (H) 0.0 - 1.3 10e3/uL    Absolute Eosinophils 0.1 0.0 - 0.7 10e3/uL    Absolute Basophils 0.0 0.0 - 0.2 10e3/uL    RBC Morphology Confirmed RBC Indices     Platelet Assessment  Automated Count Confirmed. Platelet morphology is normal.     Automated Count Confirmed. Platelet morphology is normal.   Symptomatic Influenza A/B, RSV, & SARS-CoV2 PCR (COVID-19) Nasopharyngeal    Collection Time: 02/04/24  7:58 AM    Specimen: Nasopharyngeal; Swab   Result Value Ref Range    Influenza A PCR Negative Negative    Influenza B PCR Negative Negative    RSV PCR Negative Negative    SARS CoV2 PCR Negative Negative   Basic metabolic panel    Collection Time: 02/04/24  8:39 AM   Result Value Ref Range    Sodium 140 135 - 145 mmol/L    Potassium 4.1 3.4 - 5.3 mmol/L    Chloride 105 98 - 107 mmol/L    Carbon Dioxide (CO2) 28 22 - 29 mmol/L    Anion Gap 7 7 - 15 mmol/L    Urea Nitrogen 21.4 8.0 - 23.0 mg/dL    Creatinine 0.91 0.67 - 1.17 mg/dL    GFR Estimate >90 >60 mL/min/1.73m2    Calcium 8.8 8.8 - 10.2 mg/dL     Glucose 89 70 - 99 mg/dL   Hepatic function panel    Collection Time: 02/04/24  8:39 AM   Result Value Ref Range    Protein Total 7.0 6.4 - 8.3 g/dL    Albumin 3.6 3.5 - 5.2 g/dL    Bilirubin Total 0.7 <=1.2 mg/dL    Alkaline Phosphatase 96 40 - 150 U/L    AST 22 0 - 45 U/L    ALT <5 0 - 70 U/L    Bilirubin Direct 0.25 0.00 - 0.30 mg/dL   UA with Microscopic reflex to Culture    Collection Time: 02/04/24  9:10 AM    Specimen: Urine, Clean Catch   Result Value Ref Range    Color Urine Light Yellow Colorless, Straw, Light Yellow, Yellow    Appearance Urine Clear Clear    Glucose Urine Negative Negative mg/dL    Bilirubin Urine Negative Negative    Ketones Urine Negative Negative mg/dL    Specific Gravity Urine 1.014 1.001 - 1.030    Blood Urine Negative Negative    pH Urine 6.5 5.0 - 7.0    Protein Albumin Urine Negative Negative mg/dL    Urobilinogen Urine <2.0 <2.0 mg/dL    Nitrite Urine Negative Negative    Leukocyte Esterase Urine Negative Negative    Mucus Urine Present (A) None Seen /LPF    RBC Urine 0 <=2 /HPF    WBC Urine <1 <=5 /HPF    Hyaline Casts Urine 4 (H) <=2 /LPF

## 2024-02-04 NOTE — CONSULTS
CARDIOLOGY CONSULT NOTE         Assessment:   1.  Second degree AV block-review of ECG does show periods of Wenkebach, 3-2 with bursts of possibly 2-1 Mobitz type II heart block with 2-1 conduction.  At this point time, does not appear to be symptomatic, in addition has had an evaluation for this in the past, I find records at least 2-year-old that this is happens.  No indication for permanent programmable pacemaker just yet.  2.  First-degree AV block-this is chronic.  No need for pacemaker therapy or further therapy.  3.  Coronary artery disease-this was two-vessel disease based on angiography in [facility].  This was performed due to cardiac arrest.  This prompted coronary artery bypass grafting.  4.  Coronary artery bypass grafting-February 2003 he had a LIMA to the LAD, vein graft to diagonal.  Most recent ischemic evaluation was in the rubidium stress PET in November 2020 which showed no ischemia, no scar, and preserved ejection fraction of 65%.  5.  History of Parkinson's disease-so noted and defer to neurology.  6.  Hypertension-elevated, will restart home meds, might need to consider increasing doses of these.  7.  Type 2 diabetes mellitus with hyperglycemia, with long-term current use of insulin (H)-so noted with most recent hemoglobin A1c 8.2 and defer to hospitalist.  8.  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  9.  Autonomic dysfunction-felt to be due to diabetes, defer to primary care clinic.     Plan:   1.  At this point in time no permanent programmable pacemaker.  Agree with telemetry as you are doing.  2.  Will check echocardiogram.  3.  Can follow-up with primary cardiologist Dr. Dahl at [facility].     Current History:   [facility] has been requested by Dr. Jody Severino to evaluate Arnaldo Henderson  who is a 68 year old year old white male for second-degree heart block.  Patient lives independently with his wife.  He had a  asystolic cardiac arrest in [place] around 2003 prompting angiography and subsequent two-vessel coronary artery bypass grafting.  Numerous ischemic evaluations and assessments of ejection fraction since then have been unremarkable.  He has since then developed neuroendocrine carcinoma of the small bowel as well as subsequent Parkinson's disease.  Both of these have affected him with debilitation.  He has had progressive diminished endurance and increased fatigue over the last several years and specifically over the last year and a half, no longer going to [place] during the winter.  Today, he was so weak that he was unable to get out of bed, he was brought to the emergency department where he was noted to have Mobitz type I second-degree heart block, with periods of possibly 2-1 Mobitz type II second-degree heart block.  His symptoms do not necessarily worsen during this but cardiology consultation is requested.  He has had this based on records for at least a year with various Holter monitors and heart monitor is being performed.  He in general may go for walks down the hallway of his apartment but denies any major syncope, presyncope, chest pains, palpitations, significant shortness of breath, PND, orthopnea or peripheral edema.    Past Medical History:     Past Medical History:   Diagnosis Date    CAD (coronary artery disease)     CABG    Diabetes mellitus, type 2 (H) 2013    Gastroesophageal reflux disease     Hypercholesteremia     Hypertension     Mixed conductive and sensorineural hearing loss of both ears     Pancreatic duct stricture     Pancreatitis     Parkinson disease     Second degree AV block       Past history is negative for tuberculosis, rheumatic fever, cerebrovascular accident, chronic kidney disease, peptic ulcer disease, chronic obstructive pulmonary disease, or thyroid disorder.    Past Surgical History:     Past Surgical History:   Procedure Laterality Date    CA ANESTH CABG W/PUMP       CHOLECYSTECTOMY  2022    COLONOSCOPY N/A 01/12/2023    Procedure: colonoscopy with fluroscopy;  Surgeon: Ayden Fraire MD;  Location: [facility] OR    ENDOSCOPIC RETROGRADE CHOLANGIOPANCREATOGRAM N/A 05/18/2023    Procedure: ENDOSCOPIC RETROGRADE CHOLANGIOPANCREATOGRAPHY, WITH  CYSTDUODENOSTOMY STENTS X2 REMOVAL;  Surgeon: Cedric Saldana MD;  Location: [facility] OR    ENT SURGERY      LAPAROSCOPY DIAGNOSTIC (GENERAL) N/A 02/20/2023    Procedure: LAPAROSCOPY, DIAGNOSTIC; RETRIEVAL OF MIGRATED STENT;  Surgeon: Joseluis Lima MD;  Location: [facility] OR    ORTHOPEDIC SURGERY      PANCREATECTOMY PEUSTOW N/A 06/30/2023    Procedure: Open distal pancreactectomy with splenectomy, lysis of adhesions, resection of proximal illeum;  Surgeon: Ashvin Haider MD;  Location: [facility] OR    OH CABG, ARTERIAL, TWO  2003     Family History:   Reviewed, and negative for coronary artery disease.    Social History:   Reviewed, and he  reports that he has quit smoking. His smoking use included cigarettes, 1 to 2 packs a day for maybe 20 years quitting 40 years ago. He has never used smokeless tobacco. He reports that he does not currently use alcohol. He reports that he does not currently use drugs.  He is retired from manufacturing, lives in apartment independently with his wife, and primary care provider is Dr. Betsy Lazaro.    Meds:      [START ON 2/5/2024] aspirin  81 mg Oral Daily    [START ON 2/5/2024] atorvastatin  40 mg Oral Daily    carbidopa-levodopa  1 tablet Oral At Bedtime    carbidopa-levodopa  2 tablet Oral TID w/meals    gabapentin  600 mg Oral TID    insulin aspart   Subcutaneous TID w/meals    insulin aspart  1-3 Units Subcutaneous TID AC    [START ON 2/5/2024] insulin glargine  12 Units Subcutaneous QAM AC    And    insulin glargine  16 Units Subcutaneous At Bedtime    lipase-protease-amylase  1 capsule Oral TID w/meals    losartan  50 mg Oral At Bedtime    metFORMIN  1,500 mg Oral Daily with supper    [START ON  2/5/2024] pantoprazole  40 mg Oral QAM AC    polyethylene glycol  17 g Oral Daily     Allergies:   Ciprofloxacin, Dilaudid [hydromorphone], Morphine, Penicillins, Citalopram, and Oxycodone    Review of Systems:   A 12 point comprehensive review of systems was  as follows:   General: Positive for fatigue, negative weight loss or weight gain  Constitutional: negative for anorexia, chills, fevers, malaise, night sweats   Eyes: negative for cataracts, color blindness, contacts/glasses, glaucoma, icterus, irritation, redness and visual disturbance  Ears, nose, mouth, throat, and face: negative for ear drainage, earaches, epistaxis, facial trauma, hearing loss, hoarseness, nasal congestion, snoring, sore mouth, sore throat, tinnitus and voice change  Respiratory: negative for cough, dyspnea on exertion,  hemoptysis, sputum production, pleurisy, stridor, wheezing, PND, orthopnea  Cardiovascular: negative for chest pain, chest pressure/discomfort, claudication, dyspnea, exertional chest pressure/discomfort, fatigue, irregular heart beat, lower extremity edema, near-syncope,  palpitations,  syncope   Gastrointestinal: negative for abdominal pain, change in bowel haibits, constipation, diarrhea, dyspepsia, dysphagia, jaundice, melena, nausea, odynophagia, reflux symptoms and vomiting  Genitourinary: negative for decreased stream  Hematologic/lymphatic: negative for bleeding  Musculoskeletal: negative for arthralgias, back pain, bone pain, muscle weakness, myalgias, neck pain and stiff joints  Neurological: Positive tremors, gait problems, negative for syncope, presyncope, coordination problems, dizziness, headaches, memory problems, paresthesia, seizures, speech problems, tremors, vertigo and weakness    Objective:     Physical Exam:  BP (!) 177/77 (BP Location: Right arm)   Pulse 77   Temp 98  F (36.7  C) (Oral)   Resp 18   SpO2 95%      Head:    Normocephalic, without obvious abnormality, atraumatic   Eyes:    PERRL,  "conjunctiva/corneas clear, EOM's intact,           Nose:   Nares normal, septum midline, mucosa normal, no drainage  or sinus tenderness   Throat:   Lips, mucosa, and tongue normal; teeth and gums normal   Neck:   Supple, symmetrical, trachea midline, no adenopathy;        thyroid:  No enlargement/tenderness/nodules; no carotid    bruit or JVD   Back:     Symmetric, no curvature, ROM normal, no CVA tenderness   Lungs:     Clear to auscultation bilaterally, respirations unlabored   Chest wall:    No tenderness, midline sternotomy scar   Heart:    Regular rate and rhythm, S1 and S2 normal, no murmur, rub   or gallop   Abdomen:     Soft, non-tender, bowel sounds active all four quadrants,     no masses, no organomegaly   Extremities:   Extremities normal, atraumatic, no cyanosis or edema   Pulses:   2+ and symmetric all extremities   Skin:   Skin color, texture, turgor normal, no rashes or lesions   Neurologic:   CNII-XII intact. Normal strength, sensation and reflexes       throughout     Cardiographics and Imaging  Radiology Results: Chest x-ray shows Previous sternotomy for coronary artery bypass. Heart size and vascularity are normal. Shallow inspiration with no focal consolidation, pneumothorax nor pleural effusion.     EKG Results: personally reviewed sinus rhythm, first-degree AV block, secondary AV block, Mobitz type I-II conduction, possibly Mobitz type I/Mobitz type II with 2-1 conduction seen.  No acute ischemic changes.    Lab Review   Pertinent Labs  Lab Results: personally reviewed       No results found for: \"CKTOTAL\", \"CKMB\", \"TROPONINI\"    Lab Results   Component Value Date    BUN 21.4 02/04/2024     02/04/2024    CO2 28 02/04/2024       Lab Results   Component Value Date    WBC 9.1 02/04/2024    HGB 13.6 02/04/2024    HCT 40.9 02/04/2024     (H) 02/04/2024     02/04/2024       No results found for: \"BNP\"    Clinically Significant Risk Factors Present on Admission                # " Drug Induced Platelet Defect: home medication list includes an antiplatelet medication   # Hypertension: Noted on problem list     # DMII: A1C = 8.2 % (Ref range: 0.0 - 5.6 %) within past 6 months              Cardiac Arrhythmia: Bradycardia, unspecified

## 2024-02-04 NOTE — ED TRIAGE NOTES
Episodic generalized weakness episodes lasting 1-2 hours x 1 week since starting metformin. Hypotensive in triage. Heart rate irregular. Denies weakness this morning.

## 2024-02-05 ENCOUNTER — APPOINTMENT (OUTPATIENT)
Dept: PHYSICAL THERAPY | Facility: HOSPITAL | Age: 69
End: 2024-02-05
Payer: MEDICARE

## 2024-02-05 ENCOUNTER — APPOINTMENT (OUTPATIENT)
Dept: CARDIOLOGY | Facility: HOSPITAL | Age: 69
End: 2024-02-05
Attending: INTERNAL MEDICINE
Payer: MEDICARE

## 2024-02-05 ENCOUNTER — APPOINTMENT (OUTPATIENT)
Dept: OCCUPATIONAL THERAPY | Facility: HOSPITAL | Age: 69
End: 2024-02-05
Payer: MEDICARE

## 2024-02-05 ENCOUNTER — APPOINTMENT (OUTPATIENT)
Dept: MRI IMAGING | Facility: HOSPITAL | Age: 69
End: 2024-02-05
Attending: PSYCHIATRY & NEUROLOGY
Payer: MEDICARE

## 2024-02-05 ENCOUNTER — APPOINTMENT (OUTPATIENT)
Dept: NEUROLOGY | Facility: HOSPITAL | Age: 69
End: 2024-02-05
Attending: PSYCHIATRY & NEUROLOGY
Payer: MEDICARE

## 2024-02-05 LAB
ATRIAL RATE - MUSE: 77 BPM
DIASTOLIC BLOOD PRESSURE - MUSE: 62 MMHG
GLUCOSE BLDC GLUCOMTR-MCNC: 174 MG/DL (ref 70–99)
GLUCOSE BLDC GLUCOMTR-MCNC: 188 MG/DL (ref 70–99)
GLUCOSE BLDC GLUCOMTR-MCNC: 252 MG/DL (ref 70–99)
GLUCOSE BLDC GLUCOMTR-MCNC: 283 MG/DL (ref 70–99)
GLUCOSE BLDC GLUCOMTR-MCNC: 291 MG/DL (ref 70–99)
GLUCOSE BLDC GLUCOMTR-MCNC: 63 MG/DL (ref 70–99)
GLUCOSE BLDC GLUCOMTR-MCNC: 84 MG/DL (ref 70–99)
INTERPRETATION ECG - MUSE: NORMAL
LVEF ECHO: NORMAL
P AXIS - MUSE: 82 DEGREES
PR INTERVAL - MUSE: NORMAL MS
QRS DURATION - MUSE: 94 MS
QT - MUSE: 418 MS
QTC - MUSE: 373 MS
R AXIS - MUSE: 81 DEGREES
SYSTOLIC BLOOD PRESSURE - MUSE: 135 MMHG
T AXIS - MUSE: 80 DEGREES
VENTRICULAR RATE- MUSE: 48 BPM

## 2024-02-05 PROCEDURE — 250N000012 HC RX MED GY IP 250 OP 636 PS 637: Performed by: STUDENT IN AN ORGANIZED HEALTH CARE EDUCATION/TRAINING PROGRAM

## 2024-02-05 PROCEDURE — 99233 SBSQ HOSP IP/OBS HIGH 50: CPT | Performed by: STUDENT IN AN ORGANIZED HEALTH CARE EDUCATION/TRAINING PROGRAM

## 2024-02-05 PROCEDURE — 97116 GAIT TRAINING THERAPY: CPT | Mod: GP | Performed by: PHYSICAL THERAPIST

## 2024-02-05 PROCEDURE — 97530 THERAPEUTIC ACTIVITIES: CPT | Mod: GO

## 2024-02-05 PROCEDURE — 70553 MRI BRAIN STEM W/O & W/DYE: CPT | Mod: MG

## 2024-02-05 PROCEDURE — 95816 EEG AWAKE AND DROWSY: CPT | Mod: 26 | Performed by: PSYCHIATRY & NEUROLOGY

## 2024-02-05 PROCEDURE — G0378 HOSPITAL OBSERVATION PER HR: HCPCS

## 2024-02-05 PROCEDURE — A9585 GADOBUTROL INJECTION: HCPCS | Performed by: STUDENT IN AN ORGANIZED HEALTH CARE EDUCATION/TRAINING PROGRAM

## 2024-02-05 PROCEDURE — 82962 GLUCOSE BLOOD TEST: CPT

## 2024-02-05 PROCEDURE — 97535 SELF CARE MNGMENT TRAINING: CPT | Mod: GO

## 2024-02-05 PROCEDURE — 93306 TTE W/DOPPLER COMPLETE: CPT | Mod: 26 | Performed by: INTERNAL MEDICINE

## 2024-02-05 PROCEDURE — 99205 OFFICE O/P NEW HI 60 MIN: CPT | Performed by: PSYCHIATRY & NEUROLOGY

## 2024-02-05 PROCEDURE — G1010 CDSM STANSON: HCPCS

## 2024-02-05 PROCEDURE — 95819 EEG AWAKE AND ASLEEP: CPT

## 2024-02-05 PROCEDURE — 97110 THERAPEUTIC EXERCISES: CPT | Mod: GP | Performed by: PHYSICAL THERAPIST

## 2024-02-05 PROCEDURE — 250N000013 HC RX MED GY IP 250 OP 250 PS 637: Performed by: STUDENT IN AN ORGANIZED HEALTH CARE EDUCATION/TRAINING PROGRAM

## 2024-02-05 PROCEDURE — 93306 TTE W/DOPPLER COMPLETE: CPT

## 2024-02-05 PROCEDURE — 255N000002 HC RX 255 OP 636: Performed by: STUDENT IN AN ORGANIZED HEALTH CARE EDUCATION/TRAINING PROGRAM

## 2024-02-05 PROCEDURE — 250N000013 HC RX MED GY IP 250 OP 250 PS 637: Performed by: HOSPITALIST

## 2024-02-05 PROCEDURE — 99417 PROLNG OP E/M EACH 15 MIN: CPT | Performed by: PSYCHIATRY & NEUROLOGY

## 2024-02-05 RX ORDER — GADOBUTROL 604.72 MG/ML
9 INJECTION INTRAVENOUS ONCE
Status: COMPLETED | OUTPATIENT
Start: 2024-02-05 | End: 2024-02-05

## 2024-02-05 RX ADMIN — CARBIDOPA AND LEVODOPA 2 TABLET: 25; 100 TABLET ORAL at 17:44

## 2024-02-05 RX ADMIN — GADOBUTROL 9 ML: 604.72 INJECTION INTRAVENOUS at 20:23

## 2024-02-05 RX ADMIN — INSULIN ASPART 1 UNITS: 100 INJECTION, SOLUTION INTRAVENOUS; SUBCUTANEOUS at 13:28

## 2024-02-05 RX ADMIN — DIAZEPAM 2 MG: 2 TABLET ORAL at 13:30

## 2024-02-05 RX ADMIN — CARBIDOPA AND LEVODOPA 2 TABLET: 25; 100 TABLET ORAL at 08:35

## 2024-02-05 RX ADMIN — INSULIN ASPART 1 UNITS: 100 INJECTION, SOLUTION INTRAVENOUS; SUBCUTANEOUS at 17:42

## 2024-02-05 RX ADMIN — GABAPENTIN 600 MG: 300 CAPSULE ORAL at 08:34

## 2024-02-05 RX ADMIN — CARBIDOPA AND LEVODOPA 1 TABLET: 50; 200 TABLET, EXTENDED RELEASE ORAL at 21:22

## 2024-02-05 RX ADMIN — Medication 1 MG: at 22:11

## 2024-02-05 RX ADMIN — Medication 1 MG: at 00:33

## 2024-02-05 RX ADMIN — Medication 81 MG: at 08:35

## 2024-02-05 RX ADMIN — GABAPENTIN 600 MG: 300 CAPSULE ORAL at 17:44

## 2024-02-05 RX ADMIN — PANCRELIPASE LIPASE, PANCRELIPASE PROTEASE, PANCRELIPASE AMYLASE 1 CAPSULE: 20000; 63000; 84000 CAPSULE, DELAYED RELEASE ORAL at 08:35

## 2024-02-05 RX ADMIN — PANTOPRAZOLE SODIUM 40 MG: 40 TABLET, DELAYED RELEASE ORAL at 07:06

## 2024-02-05 RX ADMIN — DIAZEPAM 2 MG: 2 TABLET ORAL at 19:10

## 2024-02-05 RX ADMIN — INSULIN GLARGINE 8 UNITS: 100 INJECTION, SOLUTION SUBCUTANEOUS at 09:12

## 2024-02-05 RX ADMIN — PANCRELIPASE LIPASE, PANCRELIPASE PROTEASE, PANCRELIPASE AMYLASE 2 CAPSULE: 20000; 63000; 84000 CAPSULE, DELAYED RELEASE ORAL at 12:45

## 2024-02-05 RX ADMIN — ATORVASTATIN CALCIUM 40 MG: 40 TABLET, FILM COATED ORAL at 08:35

## 2024-02-05 RX ADMIN — GABAPENTIN 600 MG: 300 CAPSULE ORAL at 12:37

## 2024-02-05 RX ADMIN — LOSARTAN POTASSIUM 50 MG: 50 TABLET, FILM COATED ORAL at 21:21

## 2024-02-05 RX ADMIN — CARBIDOPA AND LEVODOPA 2 TABLET: 25; 100 TABLET ORAL at 12:37

## 2024-02-05 RX ADMIN — PANCRELIPASE LIPASE, PANCRELIPASE PROTEASE, PANCRELIPASE AMYLASE 2 CAPSULE: 20000; 63000; 84000 CAPSULE, DELAYED RELEASE ORAL at 17:44

## 2024-02-05 ASSESSMENT — ACTIVITIES OF DAILY LIVING (ADL)
ADLS_ACUITY_SCORE: 41
ADLS_ACUITY_SCORE: 45
ADLS_ACUITY_SCORE: 41
ADLS_ACUITY_SCORE: 45
ADLS_ACUITY_SCORE: 40
ADLS_ACUITY_SCORE: 40
ADLS_ACUITY_SCORE: 45
ADLS_ACUITY_SCORE: 40
ADLS_ACUITY_SCORE: 45
ADLS_ACUITY_SCORE: 41

## 2024-02-05 NOTE — PLAN OF CARE
"PRIMARY DIAGNOSIS: \"GENERIC\" NURSING  OUTPATIENT/OBSERVATION GOALS TO BE MET BEFORE DISCHARGE:  ADLs back to baseline: No    Activity and level of assistance: Up with maximum assistance. Consider SW and/or PT evaluation.     Pain status: Improved with use of alternative comfort measures i.e.: positioning, anx med    Return to near baseline physical activity: No     Discharge Planner Nurse   Safe discharge environment identified: No  Barriers to discharge: Yes       Entered by: Joann De Leon RN 02/04/2024 11:54 PM     Please review provider order for any additional goals.   Nurse to notify provider when observation goals have been met and patient is ready for discharge.Goal Outcome Evaluation:                        "

## 2024-02-05 NOTE — CONSULTS
NEUROLOGY CONSULTATION NOTE     Arnaldo Henderson,  1955, MRN [id number] Date: 2024     [facility]   Code status:  Full Code   PCP: Alanna Lazaro, [phone]      ASSESSMENT & PLAN   Diagnosis code: Spells of generalized weakness    Spells of generalized weakness with brain fog  Orthostatic hypotension  History of Parkinson's disease    Head CT negative for acute structural lesions  MRI brain/MRI C-spine  EEG negative for epilepsy.  Spells are not very suggestive of seizures and will hold off on seizure medication  Spells would be unrelated to the Parkinson's disease  Could be related to low blood pressure and I would recommend checking his blood pressure during the next spell.  Also check blood glucose.  He could have postprandial hypotension as well since the spells are more after meals though not always.  Continue current PTA dose of carbidopa levodopa.  Symptoms could be related to neuroendocrine tumor and would defer management to primary team/appropriate outpatient specialist.       Chief Complaint   Patient presents with    Generalized Weakness        HISTORY OF PRESENT ILLNESS     We have been requested by Jody Severino MD  to evaluate Arnaldo Henderson who is a 68 year old  male for spells of generalized weakness and Parkinson's disease.  Patient has longstanding history of Parkinson disease and follows with [facility], [facility].  Per last neurology notes he was on carbidopa levodopa.  He has been having issues with low blood pressure and low heart rate.    He tells me that over the last 1 to 2 years and more frequently in the last month he has been having spells of brain fog along with generalized weakness.  He is unable to ambulate during the spells.  These happen more in the afternoon late evening.  There is no correlation with the food that he is eating.  He does have a diagnosis of neuroendocrine tumor which could be contributing per the family to the spells.   Denies any lightheadedness chest pains or palpitations.  Chronically does have low blood pressure.     PAST MEDICAL & SURGICAL HISTORY     Medical History  Past Medical History:   Diagnosis Date    CAD (coronary artery disease)     CABG    Diabetes mellitus, type 2 (H) 2013    Gastroesophageal reflux disease     Hypercholesteremia     Hypertension     Mixed conductive and sensorineural hearing loss of both ears     Mixed hearing loss     Pancreatic duct stricture     Pancreatitis     Parkinson disease     Second degree AV block      Surgical History  Past Surgical History:   Procedure Laterality Date    CA ANESTH CABG W/PUMP      CHOLECYSTECTOMY  2022    COLONOSCOPY N/A 01/12/2023    Procedure: colonoscopy with fluroscopy;  Surgeon: Ayden Fraire MD;  Location:  OR    ENDOSCOPIC RETROGRADE CHOLANGIOPANCREATOGRAM N/A 05/18/2023    Procedure: ENDOSCOPIC RETROGRADE CHOLANGIOPANCREATOGRAPHY, WITH  CYSTDUODENOSTOMY STENTS X2 REMOVAL;  Surgeon: Cedric Saldana MD;  Location: [facility] OR    ENT SURGERY      LAPAROSCOPY DIAGNOSTIC (GENERAL) N/A 02/20/2023    Procedure: LAPAROSCOPY, DIAGNOSTIC; RETRIEVAL OF MIGRATED STENT;  Surgeon: Joseluis Lima MD;  Location: [facility] OR    ORTHOPEDIC SURGERY      PANCREATECTOMY PEUSTOW N/A 06/30/2023    Procedure: Open distal pancreactectomy with splenectomy, lysis of adhesions, resection of proximal illeum;  Surgeon: Ashvin Haider MD;  Location: [facility] OR    NC CABG, ARTERIAL, TWO  2003        SOCIAL HISTORY     Social History     Tobacco Use    Smoking status: Former     Types: Cigarettes    Smokeless tobacco: Never   Vaping Use    Vaping Use: Never used   Substance Use Topics    Alcohol use: Not Currently    Drug use: Not Currently        FAMILY HISTORY     Reviewed, and family history includes Lung Cancer in his brother; Parkinsonism in his mother.     ALLERGIES     Allergies   Allergen Reactions    Ciprofloxacin Other (See Comments), Hives, Itching, Rash and Unknown      "Other reaction(s): Other (see comments)  Oral swelling per [facility]        Dilaudid [Hydromorphone] Rash    Morphine Rash     rash    Penicillins Rash     aka ancef/ rash      Citalopram Unknown    Oxycodone Rash     \"HORRIBLE, SEVERE RASH MAYBE CAUSED BY OXY\"        REVIEW OF SYSTEMS     12 system ROS was done other than the HPI this was negative.  Pertinent positives noted in the HPI.     HOME & HOSPITAL MEDICATIONS     Prior to Admission Medications  Medications Prior to Admission   Medication Sig Dispense Refill Last Dose    acetaminophen (TYLENOL) 325 MG tablet Take 2 tablets (650 mg) by mouth every 4 hours as needed for other (For optimal non-opioid multimodal pain management to improve pain control.)   Unknown at PRN    aspirin (ASA) 81 MG EC tablet Take 81 mg by mouth daily   2/4/2024 at Am    atorvastatin (LIPITOR) 40 MG tablet Take 1 tablet by mouth daily   2/4/2024 at AM    carbidopa-levodopa (SINEMET CR)  MG CR tablet Take 1 tablet by mouth At Bedtime   2/3/2024 at HS    carbidopa-levodopa (SINEMET)  MG tablet Take 2 tablets by mouth 3 times daily   2/4/2024 at AM    dasiglucagon HCl (ZEGALOGUE) 0.6 MG/0.6ML SOAJ injection Inject 0.6 mg Subcutaneous once as needed (use for severe hypoglycemia) 1.2 mL 1 Unknown at PRN    gabapentin (NEURONTIN) 300 MG capsule Take 600 mg by mouth 3 times daily   2/4/2024 at AM    insulin aspart (NOVOLOG FLEXPEN) 100 UNIT/ML pen Inject 4 Units Subcutaneous 2 times daily (with meals)   2/4/2024 at AM    insulin aspart (NOVOLOG FLEXPEN) 100 UNIT/ML pen Inject subcutaneous per sliding scale three times daily with meals:  190-239 = 1 unit  240-289 = 2 units  290-339 = 3 units  >340 = 4 units   And Inject subcutaneously at bedtime per sliding scale:  200-249 = 1 unit  250-300 = 2 units   >300 = 3 units   2/3/2024 at AM    insulin glargine 100 UNIT/ML pen Inject 14 units subcutaneous every morning and 20 units every night at bedtime   2/4/2024 at AM (14 units)    " lipase-protease-amylase (ZENPEP) 05068-852188-194200 units CPEP Take 1 capsule by mouth 3 times daily (with meals) 90 capsule 3 2/4/2024 at AM    losartan (COZAAR) 50 MG tablet Take 50 mg by mouth At Bedtime   2/3/2024 at HS    magnesium oxide (MAG-OX) 400 MG tablet Take 400 mg by mouth every other day   2/3/2024 at AM    metFORMIN (GLUCOPHAGE XR) 500 MG 24 hr tablet Take 1 tablet (500 mg) by mouth daily (with dinner) for 7 days, THEN 2 tablets (1,000 mg) daily (with dinner) for 7 days, THEN 3 tablets (1,500 mg) daily (with dinner) for 7 days, THEN 4 tablets (2,000 mg) daily (with dinner) for 90 days. 402 tablet 0 2/4/2024 at AM (1500 mg)    Multiple Vitamin (MULTI-VITAMINS) TABS Take 1 tablet by mouth daily   2/4/2024 at AM    omeprazole (PRILOSEC) 20 MG DR capsule Take 20 mg by mouth daily   2/4/2024 at AM    BD PEN NEEDLE LINDY 2ND GEN 32G X 4 MM miscellaneous USE TO INJECT INSULIN 4 TIMES A  each 3     Continuous Blood Gluc  (DEXCOM G7 ) ALIA Use to read blood sugars as per 's instructions. 1 each 0     Continuous Blood Gluc Sensor (DEXCOM G7 SENSOR) MISC Change every 10 days. 3 each 5     Glucagon (BAQSIMI) 3 MG/DOSE nasal powder Spray 1 spray (3 mg) in nostril as needed (severe hypoglycemia) in the event of unconscious hypoglycemia or hypoglycemic seizure. May repeat dose if no response after 15 minutes. 2 each 1        Hospital Medications   aspirin  81 mg Oral Daily    atorvastatin  40 mg Oral Daily    carbidopa-levodopa  1 tablet Oral At Bedtime    carbidopa-levodopa  2 tablet Oral TID w/meals    gabapentin  600 mg Oral TID    insulin aspart   Subcutaneous TID w/meals    insulin aspart  1-3 Units Subcutaneous TID AC    insulin glargine  8 Units Subcutaneous QAM AC    And    insulin glargine  12 Units Subcutaneous At Bedtime    lipase-protease-amylase  2 capsule Oral TID w/meals    losartan  50 mg Oral At Bedtime    pantoprazole  40 mg Oral QAM AC    polyethylene glycol  17  g Oral Daily        PHYSICAL EXAM     Vital signs  Temp:  [97.5  F (36.4  C)-98.1  F (36.7  C)] 98.1  F (36.7  C)  Pulse:  [49-89] 51  Resp:  [15-20] 20  BP: (111-190)/(55-84) 149/63  SpO2:  [92 %-97 %] 95 %    PHYSICAL EXAMINATION  VITALS: BP (!) 149/63 (BP Location: Right arm)   Pulse 51   Temp 98.1  F (36.7  C) (Oral)   Resp 20   SpO2 95%   GENERAL -Health appearing, No apparent distress  EYES- No scleral icterus, no eyelid droop, Pupils - see Neuro section  HEENT - Normocephalic, atraumatic, Hearing grossly intact; Oral mucosa moist and pink in color. External Ears and nose intact.   Neck - supple   PULM - Good spontaneous respiratory effort;   CV- Pedal pulses present with no peripheral edema/ No significant varicosities.  MSK- Gait - see Neuro section; Strength and tone- see Neuro section; Range of motion grossly intact.  PSYCH -cooperative    Neurological  Mental status - Patient is awake and grossly oriented to self, place and time. Attention span is normal. Language is fluent and follows commands appropriately.   Cranial nerves - CN II-XII intact. Pupils are reactive and symmetric; EOMI, VFIT, NLF symmetric  Motor - There is no pronator drift. Motor exam shows 5/5 strength in all extremities.  Tone - Tone is symmetric bilaterally in upper and lower extremities.  Resting tremor in the right upper extremity worse because patient is anxious.  Reflexes - Reflexes are symmetric/decreased.  Sensation - Sensory exam is grossly intact to light touch, pain.  Coordination - Finger to nose and heel to shin is without dysmetria.  Gait and station --needs assistance to ambulate.  Formal gait testing cannot be done due to safety concerns from ongoing medical issues.       DIAGNOSTIC STUDIES     Pertinent Radiology   CT head  IMPRESSION:  1.  No acute intracranial injury, hemorrhage, mass, or CT evidence of recent ischemia.     EEG  IMPRESSION/REPORT/PLAN  This is a normal EEG during wakefulness and drowsiness.  Further clinical correlation is needed.     Please note that the absence of epileptiform abnormalities does not exclude the possibility of epilepsy in any patient.     Component      Latest Ref Rng 11/16/2023  8:18 AM 11/21/2023  10:53 PM 2/4/2024  8:39 AM   Sodium      135 - 145 mmol/L  136     Potassium      3.4 - 5.3 mmol/L  4.2     Carbon Dioxide (CO2)      22 - 29 mmol/L  26     Anion Gap      7 - 15 mmol/L  11     Urea Nitrogen      8.0 - 23.0 mg/dL  34.1 (H)     Creatinine      0.67 - 1.17 mg/dL  0.99     GFR Estimate      >60 mL/min/1.73m2  82     Calcium      8.8 - 10.2 mg/dL  9.5     Chloride      98 - 107 mmol/L  99     Glucose      70 - 99 mg/dL  205 (H)     Alkaline Phosphatase      40 - 150 U/L  122     AST      0 - 45 U/L  24     ALT      0 - 70 U/L  6     Protein Total      6.4 - 8.3 g/dL  8.0     Albumin      3.5 - 5.2 g/dL  4.3     Bilirubin Total      <=1.2 mg/dL  1.2     Hemoglobin A1C      0.0 - 5.6 % 8.2 (H)      CK Total      39 - 308 U/L   139       Legend:  (H) High       Recent Results (from the past 24 hour(s))   Glucose by meter    Collection Time: 02/04/24  6:43 PM   Result Value Ref Range    GLUCOSE BY METER POCT 223 (H) 70 - 99 mg/dL   Glucose by meter    Collection Time: 02/04/24  9:30 PM   Result Value Ref Range    GLUCOSE BY METER POCT 212 (H) 70 - 99 mg/dL   Glucose by meter    Collection Time: 02/05/24  3:55 AM   Result Value Ref Range    GLUCOSE BY METER POCT 84 70 - 99 mg/dL   Glucose by meter    Collection Time: 02/05/24  7:22 AM   Result Value Ref Range    GLUCOSE BY METER POCT 63 (L) 70 - 99 mg/dL   Glucose by meter    Collection Time: 02/05/24  9:03 AM   Result Value Ref Range    GLUCOSE BY METER POCT 174 (H) 70 - 99 mg/dL   Echocardiogram Complete    Collection Time: 02/05/24 10:40 AM   Result Value Ref Range    LVEF  > 65%    Glucose by meter    Collection Time: 02/05/24 12:28 PM   Result Value Ref Range    GLUCOSE BY METER POCT 188 (H) 70 - 99 mg/dL       Total time spent  for face to face visit, reviewing labs/imaging studies, counseling and coordination of care was: Over 80 min More than 50% of this time was spent on counseling and coordination of care.    Counseling patient and family.  Reviewing chart.  Coordination of care with the primary team.  Patient new to me.    Jose Morales MD  Neurologist  [facility]  Tel:- [phone]

## 2024-02-05 NOTE — PROGRESS NOTES
"PRIMARY DIAGNOSIS: \"GENERIC\" NURSING  OUTPATIENT/OBSERVATION GOALS TO BE MET BEFORE DISCHARGE:  ADLs back to baseline: No    Activity and level of assistance: Up with maximum assistance. Consider SW and/or PT evaluation.     Pain status: Improved-controlled with oral pain medications.    Return to near baseline physical activity: No     Discharge Planner Nurse   Safe discharge environment identified: No  Barriers to discharge: Yes       Entered by: Diana Rashid RN 02/05/2024 11:17 AM     Patient alert and oriented x4. Patient will be having an MRI later - metformin held by provider. Patient is being followed by po/OT. Patient is assist of 2 for mobility. Tele is 1st degree av block. Blood sugars are all over the board - 63 this morning and then 174 after breakfast - Lantus was adjusted from 12-8 units.Patient is on a regular diet. Patient is on Room Air.   "

## 2024-02-05 NOTE — PROGRESS NOTES
Bedside EEG was performed that included photic, auditory, and sensory stimulation. Hyperventilation was not possible given the patient's clinical state.   Skin intact.  EEG #   [id number] EEG system used.    Neurology dictation to follow up.

## 2024-02-05 NOTE — PLAN OF CARE
Cardiology team will sign-off for now. Please do not hesitate toconsult us again if new questions or concerns arise.    Teresa Barboza MD., S

## 2024-02-05 NOTE — PROGRESS NOTES
[facility]    Medicine Progress Note - Hospitalist Service    Date of Admission:  2/4/2024    Assessment & Plan   Arnaldo Henderson is a 68 year old male admitted on 2/4/2024. He has very complicated past medical history including distal pancreatectomy following chronic pancreatitis with stent migration and disconnection of pancreatic duct, IDDM, neuroendocrine tumor of small bowel, Parkinson's disease, CAD s/p CABG, hypertension presents for evaluation of multiple complaints including weakness, bradykinesia, spells where his body feels heavy and woozy, muscle tension.  Found to have bradycardia, uncertain if this is the cause of his symptoms or possibly in need of adjustment of his Parkinson's meds     # Weakness  # Bradykinesia  # Parkinson's disease  -Patient noticed worsening symptoms in the last 3 weeks since addition of metformin to his medication regimen.  Unsure if this may have impacted metabolism of his Sinemet, no drug-drug interaction between them per [employer].  -EEG done 02/05, results pending  -MRI brain and MRI cervical spine ordered by neurology  -Neurology evaluation pending  -PT/OT recommend outpatient PT     # Spells of body heaviness  # Question of symptomatic bradycardia  -Uncertain etiology  Orthostatics positive  -Seen by cardiology for heart block, found to have Mobitz type II heart block no indication for permanent, pacemaker.  First-degree AV block which is chronic  -Telemetry monitoring  -Cardiology signed off     # Concern for possible serotonin syndrome?  -Wife is concerned that patient's known neuroendocrine tumor may have become functional i.e. secreting serotonin  -Patient noted flushing here in the ED but wife did not notice this at home  -Patient has been noticing worsening muscle rigidity however he is not febrile, CK normal  -Testing for urine serotonin is fairly involved and requires dietary modification with low tryptophan diet for 3 days before hand etc.   Would recommend he arrange this with his oncologist and would not attempt it here.     # Diabetes  -Insulin-dependent, status post distal pancreatectomy in 2023  -Home regimen is Lantus 14 units in the morning and 20 units at bedtime, NovoLog 4 units twice daily with meals plus sliding scale  -He was recently started on metformin as well for better blood sugar control  -Holding home metformin during hospital stay  -Reduce the dose to Lantus 8 units in the morning and 12 units at night due to hypoglycemia this am, plus NovoLog mealtime carb count 1:30 and sliding scale low     # Anxiety  -Seems to be playing a significant role in symptoms currently  -Wonder if a little bit of Valium might help him with his muscle rigidity and anxiety as well.  Trial dose     # Muscle rigidity  -Possibly due to undertreated Parkinson's and/or serotonin syndrome as above. To some extent chronic though  -Trial dose of Valium as above  -He is allergic to basically all opioid options  -CK nomal  -He has already been doing PT but it sounds like it is more of a massage PT for his chronic abdominal pain  -Neuro consult as above     # History of coronary artery disease test post CABG  -Home aspirin, statin     # Neuropathy, Home gabapentin  # Hypertension, home losartan  # GERD, home PPI  # Pancreatic insufficiency.  Status post distal pancreatectomy in 2023.  He had migration of pancreatic stent that resulted in disconnection of pancreatic duct from duodenum, required open surgery and distal pancreatectomy.  Continue his home pancreatic enzymes.  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  Follows with Dr. Lyn at the [place].  Not on any active treatment as felt to have small disease burden but is on active surveillance.  Has a follow-up appointment coming up soon       Observation Goals: -diagnostic tests and consults completed and resulted, -vital signs normal or at patient baseline, -tolerating oral intake to  maintain hydration, -adequate pain control on oral analgesics, -returns to baseline functional status, -safe disposition plan has been identified, Nurse to notify provider when observation goals have been met and patient is ready for discharge.  Diet: Regular Diet Adult    DVT Prophylaxis: Pneumatic Compression Devices  Santos Catheter: Not present  Lines: None     Cardiac Monitoring: ACTIVE order. Indication: presyncope  Code Status: Full Code      Clinically Significant Risk Factors Present on Admission                # Drug Induced Platelet Defect: home medication list includes an antiplatelet medication   # Hypertension: Noted on problem list     # DMII: A1C = 8.2 % (Ref range: 0.0 - 5.6 %) within past 6 months               Disposition Plan     Expected Discharge Date: 02/05/2024      Destination: home with family              Melissa Stark MD  Hospitalist Service  [facility]  Securely message with [employer] (more info)  Text page via [employer] Paging/Directory   ______________________________________________________________________    Interval History   Patient was examined at the bedside with wife present.  States that he occasionally is feeling heaviness in his limbs.  EEG done today, pending neurology evaluation  Wife is concerned that his symptoms could be from the neuroendocrine tumor, will asked patient to follow-up with primary oncology    Physical Exam   Vital Signs: Temp: 98.1  F (36.7  C) Temp src: Oral BP: (!) 149/63 Pulse: 51   Resp: 20 SpO2: 95 % O2 Device: None (Room air)    Weight: 0 lbs 0 oz    General: in mild distress male lying in hospital bed oriented x3  HEENT: Head normocephalic atraumatic, oral mucosa moist. Sclerae anicteric  CV: Regular rhythm, normal rate, no murmurs  Resp: No wheezes, no rales or rhonchi, no focal consolidations  GI: Belly soft, nondistended, nontender, bowel sounds present.  Areas of puckering and scarring on his abdomen noted which are  well-healed.  Skin: No rashes or lesions  Extremities: Trace ankle edema bilaterally  Psych: Normal affect, anxious mood  Neuro: Grossly normal    Medical Decision Making       45 MINUTES SPENT BY ME on the date of service doing chart review, history, exam, documentation & further activities per the note.      Data         Imaging results reviewed over the past 24 hrs:   Recent Results (from the past 24 hour(s))   Echocardiogram Complete   Result Value    LVEF  > 65%    Narrative    [id number]  [id number]  [id number]  [id number]^GAEL^LES[address] [address]     Name: SANDRA WASHINGTON  MRN: [id number]  : 1955  Study Date: 2024 09:54 AM  Age: 68 yrs  Gender: Male  Patient [facility]: [facility]  Reason For Study: 2nd degree AV Block  Ordering Physician: LOUIS MAYO  Performed By: KYLEE     BSA: 2.0 m2  Height: 68 in  Weight: 198 lb  HR: 76  BP: 180/84 mmHg  ______________________________________________________________________________  Procedure  Complete Echo Adult. No hemodynamically significant valvular abnormalities on  2D or color flow imaging. There is no comparison study available.  ______________________________________________________________________________  Interpretation Summary     1.Left ventricular size, wall motion and function are normal. The ejection  fraction is > 65%. LV might suggest dehydration.  2.Mildly decreased right ventricular systolic function  3.No hemodynamically significant valvular abnormalities on 2D or color flow  imaging.  There is no comparison study available.  ______________________________________________________________________________  I      WMSI = 1.00     % Normal = 100     X - Cannot   0 -                      (2) - Mildly 2 -          Segments  Size  Interpret    Hyperkinetic 1 - Normal  Hypokinetic  Hypokinetic  1-2     small                                                     7 -          3-5      moderate  3 - Akinetic  4 -          5 -         6 - Akinetic Dyskinetic   6-14    large               Dyskinetic   Aneurysmal  w/scar       w/scar       15-16   diffuse     Left Ventricle  Left ventricular size, wall motion and function are normal. The ejection  fraction is > 65%. There is normal left ventricular wall thickness. Left  ventricular diastolic function is normal. No regional wall motion  abnormalities noted.     Right Ventricle  The right ventricle is normal size. TAPSE is abnormal, which is consistent  with abnormal right ventricular systolic function. Mildly decreased right  ventricular systolic function.     Atria  Normal left atrial size. Right atrial size is normal. There is no color  Doppler evidence of an atrial shunt.     Mitral Valve  Mitral valve leaflets appear normal. There is no evidence of mitral stenosis  or clinically significant mitral regurgitation. There is no mitral  regurgitation noted. There is no mitral valve stenosis.     Tricuspid Valve  The tricuspid valve is not well visualized, but is grossly normal. Right  ventricle systolic pressure estimate normal. There is physiologic tricuspid  regurgitation. There is no tricuspid stenosis.     Aortic Valve  Aortic valve leaflets appear normal. There is no evidence of aortic stenosis  or clinically significant aortic regurgitation. The aortic valve is  trileaflet. No aortic regurgitation is present. No aortic stenosis is present.     Pulmonic Valve  The pulmonic valve is not well seen, but is grossly normal. This degree of  valvular regurgitation is within normal limits. There is no pulmonic valvular  stenosis.     Vessels  The aorta root is normal. Normal size ascending aorta. IVC diameter <2.1 cm  collapsing >50% with sniff suggests a normal RA pressure of 3 mmHg.     Pericardium  There is no pericardial effusion.     Rhythm  Sinus rhythm was noted.     ______________________________________________________________________________  MMode/2D Measurements &  Calculations  IVSd: 1.1 cm  LVIDd: 4.5 cm  LVIDs: 2.3 cm  LVPWd: 0.64 cm  FS: 48.4 %     LV mass(C)d: 128.2 grams  LV mass(C)dI: 63.0 grams/m2  LA dimension: 4.1 cm  asc Aorta Diam: 3.2 cm  LVOT diam: 2.2 cm  LVOT area: 3.9 cm2  Asc Ao diam index BSA (cm/m2): 1.6  Asc Ao diam index Ht(cm/m): 1.8  LA Volume (BP): 57.6 ml  LA Volume Index (BP): 28.2 ml/m2     LA Volume Indexed (AL/bp): 29.8 ml/m2  RV Base: 3.6 cm  RWT: 0.29  TAPSE: 1.5 cm     Doppler Measurements & Calculations  MV E max tejinder: 94.0 cm/sec  Ao V2 max: 126.0 cm/sec  Ao max P.0 mmHg  Ao V2 mean: 86.2 cm/sec  Ao mean PG: 3.4 mmHg  Ao V2 VTI: 22.8 cm  JUANPALBO(I,D): 2.6 cm2  JUANPABLO(V,D): 2.6 cm2  LV V1 max P.8 mmHg  LV V1 max: 83.1 cm/sec  LV V1 VTI: 15.3 cm  SV(LVOT): 59.9 ml  SI(LVOT): 29.4 ml/m2  PA V2 max: 94.5 cm/sec  PA max PG: 3.6 mmHg  PA acc time: 0.08 sec     AV Tejinder Ratio (DI): 0.66  JUANPABLO Index (cm2/m2): 1.3  E/E' av.2  Lateral E/e': 6.0  Medial E/e': 6.4  RV S Tejinder: 15.2 cm/sec     ______________________________________________________________________________  Report approved by: Carolina Henson 2024 02:02 PM

## 2024-02-05 NOTE — PLAN OF CARE
"PRIMARY DIAGNOSIS: \"GENERIC\" NURSING  OUTPATIENT/OBSERVATION GOALS TO BE MET BEFORE DISCHARGE:  ADLs back to baseline: No    Activity and level of assistance: Up with maximum assistance.     Pain status: Pain free.    Return to near baseline physical activity: No     Discharge Planner Nurse   Safe discharge environment identified: No  Barriers to discharge: Yes       Entered by: Toni Richardson RN 02/05/2024 7:29 AM     Please review provider order for any additional goals.   Nurse to notify provider when observation goals have been met and patient is ready for discharge.  "

## 2024-02-05 NOTE — PLAN OF CARE
Problem: Adult Inpatient Plan of Care  Goal: Absence of Hospital-Acquired Illness or Injury  Intervention: Identify and Manage Fall Risk  Recent Flowsheet Documentation  Taken 2/4/2024 1900 by Joann De Leon RN  Safety Promotion/Fall Prevention: safety round/check completed  Taken 2/4/2024 1500 by Joann De Leon RN  Safety Promotion/Fall Prevention: safety round/check completed  Intervention: Prevent Skin Injury  Recent Flowsheet Documentation  Taken 2/4/2024 1900 by Joann De Leon RN  Body Position: weight shifting  Taken 2/4/2024 1500 by Joann De Leon RN  Body Position: weight shifting     Problem: Fall Injury Risk  Goal: Absence of Fall and Fall-Related Injury  Intervention: Identify and Manage Contributors  Recent Flowsheet Documentation  Taken 2/4/2024 1900 by Joann De Leon RN  Medication Review/Management:   medications reviewed   provider consulted  Taken 2/4/2024 1500 by Joann De Leon RN  Medication Review/Management:   medications reviewed   provider consulted  Intervention: Promote Injury-Free Environment  Recent Flowsheet Documentation  Taken 2/4/2024 1900 by Joann De Leon RN  Safety Promotion/Fall Prevention: safety round/check completed  Taken 2/4/2024 1500 by Joann De Leon RN  Safety Promotion/Fall Prevention: safety round/check completed   Goal Outcome Evaluation:    A&O. Anxious. A2 gb/w. HTN. Tele 1st degree AVB. . Ortho +, provider aware. Unable to complete pivot to chair. Urinary freq

## 2024-02-05 NOTE — PLAN OF CARE
"PRIMARY DIAGNOSIS: \"GENERIC\" NURSING  OUTPATIENT/OBSERVATION GOALS TO BE MET BEFORE DISCHARGE:  ADLs back to baseline: No    Activity and level of assistance: Up with maximum assistance. Consider SW and/or PT evaluation.     Pain status: Improved with use of alternative comfort measures i.e.: positioning, anx med    Return to near baseline physical activity: No     Discharge Planner Nurse   Safe discharge environment identified: No  Barriers to discharge: Yes       Entered by: Joann De Leon RN 02/04/2024 11:52 PM     Please review provider order for any additional goals.   Nurse to notify provider when observation goals have been met and patient is ready for discharge.Goal Outcome Evaluation:                        "

## 2024-02-05 NOTE — PROGRESS NOTES
PRIMARY DIAGNOSIS: ACUTE PAIN  OUTPATIENT/OBSERVATION GOALS TO BE MET BEFORE DISCHARGE:  1. Pain Status: Improved-controlled with oral pain medications.    2. Return to near baseline physical activity: No    3. Cleared for discharge by consultants (if involved): No    Discharge Planner Nurse   Safe discharge environment identified: No  Barriers to discharge: Yes       Entered by: Diana Rashid RN 02/05/2024 3:13 PM    Patient had an EEG today - results are pending - notes states hyperventilation was not possible. Metformin was held due to the ordered MRI. Patient is on a regular diet. Patient is not complaining of pain at present. Patient is on a regular diet and is being followed by OT/PT.

## 2024-02-05 NOTE — PLAN OF CARE
Notified MD Dr. Basurto at 0406 AM regarding  FYI, pt's tele is 2nd degree type 2 block  (has hx of this per cardiology's note) and heart rate is 39 .  NNO.

## 2024-02-06 ENCOUNTER — MYC MEDICAL ADVICE (OUTPATIENT)
Dept: CARDIOLOGY | Facility: CLINIC | Age: 69
End: 2024-02-06

## 2024-02-06 ENCOUNTER — TELEPHONE (OUTPATIENT)
Dept: ENDOCRINOLOGY | Facility: CLINIC | Age: 69
End: 2024-02-06

## 2024-02-06 ENCOUNTER — APPOINTMENT (OUTPATIENT)
Dept: OCCUPATIONAL THERAPY | Facility: HOSPITAL | Age: 69
End: 2024-02-06
Payer: MEDICARE

## 2024-02-06 ENCOUNTER — MYC MEDICAL ADVICE (OUTPATIENT)
Dept: CARDIOLOGY | Facility: CLINIC | Age: 69
End: 2024-02-06
Payer: MEDICARE

## 2024-02-06 ENCOUNTER — APPOINTMENT (OUTPATIENT)
Dept: PHYSICAL THERAPY | Facility: HOSPITAL | Age: 69
End: 2024-02-06
Payer: MEDICARE

## 2024-02-06 LAB
ANION GAP SERPL CALCULATED.3IONS-SCNC: 13 MMOL/L (ref 7–15)
BUN SERPL-MCNC: 20.1 MG/DL (ref 8–23)
CALCIUM SERPL-MCNC: 8.8 MG/DL (ref 8.8–10.2)
CHLORIDE SERPL-SCNC: 100 MMOL/L (ref 98–107)
CREAT SERPL-MCNC: 0.84 MG/DL (ref 0.67–1.17)
DEPRECATED HCO3 PLAS-SCNC: 22 MMOL/L (ref 22–29)
EGFRCR SERPLBLD CKD-EPI 2021: >90 ML/MIN/1.73M2
ERYTHROCYTE [DISTWIDTH] IN BLOOD BY AUTOMATED COUNT: 12.9 % (ref 10–15)
GLUCOSE BLDC GLUCOMTR-MCNC: 259 MG/DL (ref 70–99)
GLUCOSE BLDC GLUCOMTR-MCNC: 284 MG/DL (ref 70–99)
GLUCOSE BLDC GLUCOMTR-MCNC: 316 MG/DL (ref 70–99)
GLUCOSE BLDC GLUCOMTR-MCNC: 360 MG/DL (ref 70–99)
GLUCOSE SERPL-MCNC: 279 MG/DL (ref 70–99)
HCT VFR BLD AUTO: 41.6 % (ref 40–53)
HGB BLD-MCNC: 14.6 G/DL (ref 13.3–17.7)
MCH RBC QN AUTO: 34.2 PG (ref 26.5–33)
MCHC RBC AUTO-ENTMCNC: 35.1 G/DL (ref 31.5–36.5)
MCV RBC AUTO: 97 FL (ref 78–100)
PLATELET # BLD AUTO: 197 10E3/UL (ref 150–450)
POTASSIUM SERPL-SCNC: 5.1 MMOL/L (ref 3.4–5.3)
RBC # BLD AUTO: 4.27 10E6/UL (ref 4.4–5.9)
SODIUM SERPL-SCNC: 135 MMOL/L (ref 135–145)
WBC # BLD AUTO: 10.3 10E3/UL (ref 4–11)

## 2024-02-06 PROCEDURE — 99232 SBSQ HOSP IP/OBS MODERATE 35: CPT | Performed by: HOSPITALIST

## 2024-02-06 PROCEDURE — G0378 HOSPITAL OBSERVATION PER HR: HCPCS

## 2024-02-06 PROCEDURE — 85027 COMPLETE CBC AUTOMATED: CPT | Performed by: STUDENT IN AN ORGANIZED HEALTH CARE EDUCATION/TRAINING PROGRAM

## 2024-02-06 PROCEDURE — 36415 COLL VENOUS BLD VENIPUNCTURE: CPT | Performed by: STUDENT IN AN ORGANIZED HEALTH CARE EDUCATION/TRAINING PROGRAM

## 2024-02-06 PROCEDURE — 250N000013 HC RX MED GY IP 250 OP 250 PS 637: Performed by: STUDENT IN AN ORGANIZED HEALTH CARE EDUCATION/TRAINING PROGRAM

## 2024-02-06 PROCEDURE — 97116 GAIT TRAINING THERAPY: CPT | Mod: GP | Performed by: PHYSICAL THERAPIST

## 2024-02-06 PROCEDURE — 97535 SELF CARE MNGMENT TRAINING: CPT | Mod: GO

## 2024-02-06 PROCEDURE — 99215 OFFICE O/P EST HI 40 MIN: CPT | Performed by: PSYCHIATRY & NEUROLOGY

## 2024-02-06 PROCEDURE — 250N000013 HC RX MED GY IP 250 OP 250 PS 637: Performed by: HOSPITALIST

## 2024-02-06 PROCEDURE — 82962 GLUCOSE BLOOD TEST: CPT

## 2024-02-06 PROCEDURE — 97530 THERAPEUTIC ACTIVITIES: CPT | Mod: GP | Performed by: PHYSICAL THERAPIST

## 2024-02-06 PROCEDURE — 80048 BASIC METABOLIC PNL TOTAL CA: CPT | Performed by: STUDENT IN AN ORGANIZED HEALTH CARE EDUCATION/TRAINING PROGRAM

## 2024-02-06 RX ADMIN — POLYETHYLENE GLYCOL 3350 17 G: 17 POWDER, FOR SOLUTION ORAL at 08:02

## 2024-02-06 RX ADMIN — GABAPENTIN 600 MG: 300 CAPSULE ORAL at 06:07

## 2024-02-06 RX ADMIN — GABAPENTIN 600 MG: 300 CAPSULE ORAL at 18:18

## 2024-02-06 RX ADMIN — ATORVASTATIN CALCIUM 40 MG: 40 TABLET, FILM COATED ORAL at 08:03

## 2024-02-06 RX ADMIN — PANTOPRAZOLE SODIUM 40 MG: 40 TABLET, DELAYED RELEASE ORAL at 08:03

## 2024-02-06 RX ADMIN — GABAPENTIN 600 MG: 300 CAPSULE ORAL at 12:56

## 2024-02-06 RX ADMIN — CARBIDOPA AND LEVODOPA 2 TABLET: 25; 100 TABLET ORAL at 12:56

## 2024-02-06 RX ADMIN — Medication 81 MG: at 08:02

## 2024-02-06 RX ADMIN — Medication 1 MG: at 23:47

## 2024-02-06 RX ADMIN — LOSARTAN POTASSIUM 50 MG: 50 TABLET, FILM COATED ORAL at 22:14

## 2024-02-06 RX ADMIN — PANCRELIPASE LIPASE, PANCRELIPASE PROTEASE, PANCRELIPASE AMYLASE 2 CAPSULE: 20000; 63000; 84000 CAPSULE, DELAYED RELEASE ORAL at 08:04

## 2024-02-06 RX ADMIN — DIAZEPAM 2 MG: 2 TABLET ORAL at 02:49

## 2024-02-06 RX ADMIN — PANCRELIPASE LIPASE, PANCRELIPASE PROTEASE, PANCRELIPASE AMYLASE 2 CAPSULE: 20000; 63000; 84000 CAPSULE, DELAYED RELEASE ORAL at 12:57

## 2024-02-06 RX ADMIN — PANCRELIPASE LIPASE, PANCRELIPASE PROTEASE, PANCRELIPASE AMYLASE 2 CAPSULE: 20000; 63000; 84000 CAPSULE, DELAYED RELEASE ORAL at 18:19

## 2024-02-06 RX ADMIN — INSULIN ASPART 1 UNITS: 100 INJECTION, SOLUTION INTRAVENOUS; SUBCUTANEOUS at 08:08

## 2024-02-06 RX ADMIN — INSULIN GLARGINE 8 UNITS: 100 INJECTION, SOLUTION SUBCUTANEOUS at 08:06

## 2024-02-06 RX ADMIN — INSULIN ASPART 1 UNITS: 100 INJECTION, SOLUTION INTRAVENOUS; SUBCUTANEOUS at 12:59

## 2024-02-06 RX ADMIN — INSULIN ASPART 1 UNITS: 100 INJECTION, SOLUTION INTRAVENOUS; SUBCUTANEOUS at 18:19

## 2024-02-06 RX ADMIN — CARBIDOPA AND LEVODOPA 2 TABLET: 25; 100 TABLET ORAL at 18:19

## 2024-02-06 RX ADMIN — CARBIDOPA AND LEVODOPA 1 TABLET: 50; 200 TABLET, EXTENDED RELEASE ORAL at 22:14

## 2024-02-06 RX ADMIN — CARBIDOPA AND LEVODOPA 2 TABLET: 25; 100 TABLET ORAL at 06:07

## 2024-02-06 ASSESSMENT — ACTIVITIES OF DAILY LIVING (ADL)
ADLS_ACUITY_SCORE: 40
ADLS_ACUITY_SCORE: 41
ADLS_ACUITY_SCORE: 40

## 2024-02-06 NOTE — PROGRESS NOTES
NEUROLOGY PROGRESS NOTE     Arnaldo Henderson,  1955, MRN [id number] Date: 2024     [facility]   Code status:  Full Code   PCP: Alanna Lazaro, [phone]      ASSESSMENT & PLAN   Diagnosis code: Spells of generalized weakness    Spells of generalized weakness with brain fog  Orthostatic hypotension  History of Parkinson's disease  Moderate cervical spinal stenosis.    Head CT negative for acute structural lesions  MRI brain negative for acute structural lesions  MRI C-spine shows moderate spinal stenosis most likely unrelated to patient's symptoms  EEG negative for epilepsy.  Spells are not very suggestive of seizures and will hold off on seizure medication  Spells would be unrelated to the Parkinson's disease  Could be related to low blood pressure and I would recommend checking his blood pressure during the next spell.  Also check blood glucose.  He could have postprandial hypotension as well since the spells are more after meals though not always.  Continue current PTA dose of carbidopa levodopa.  Symptoms could be related to neuroendocrine tumor and would defer management to primary team/appropriate outpatient specialist.       Chief Complaint   Patient presents with    Generalized Weakness        HISTORY OF PRESENT ILLNESS     We have been requested by Jody Severino MD  to evaluate Arnaldo Henderson who is a 68 year old  male for spells of generalized weakness and Parkinson's disease.  Patient has longstanding history of Parkinson disease and follows with [facility]  Per last neurology notes he was on carbidopa levodopa.  He has been having issues with low blood pressure and low heart rate.    He tells me that over the last 1 to 2 years and more frequently in the last month he has been having spells of brain fog along with generalized weakness.  He is unable to ambulate during the spells.  These happen more in the afternoon late evening.  There is no  correlation with the food that he is eating.  He does have a diagnosis of neuroendocrine tumor which could be contributing per the family to the spells.  Denies any lightheadedness chest pains or palpitations.  Chronically does have low blood pressure.    2/6/24  Patient has not had any further spells.  Explained to him that with negative testing I suspect symptoms are more related to the blood pressure dropping during one of the spells.  Will await for the next.  Continue working with physical therapy meantime.  Continue home dose of carbidopa levodopa.  Parkinson's appears to be stable on exam.     PAST MEDICAL & SURGICAL HISTORY     Medical History  Past Medical History:   Diagnosis Date    CAD (coronary artery disease)     CABG    Diabetes mellitus, type 2 (H) 2013    Gastroesophageal reflux disease     Hypercholesteremia     Hypertension     Mixed conductive and sensorineural hearing loss of both ears     Mixed hearing loss     Pancreatic duct stricture     Pancreatitis     Parkinson disease     Second degree AV block      Surgical History  Past Surgical History:   Procedure Laterality Date    CA ANESTH CABG W/PUMP      CHOLECYSTECTOMY  2022    COLONOSCOPY N/A 01/12/2023    Procedure: colonoscopy with fluroscopy;  Surgeon: Ayden Fraire MD;  Location:  OR    ENDOSCOPIC RETROGRADE CHOLANGIOPANCREATOGRAM N/A 05/18/2023    Procedure: ENDOSCOPIC RETROGRADE CHOLANGIOPANCREATOGRAPHY, WITH  CYSTDUODENOSTOMY STENTS X2 REMOVAL;  Surgeon: Cedric Saldana MD;  Location: [facility] OR    ENT SURGERY      LAPAROSCOPY DIAGNOSTIC (GENERAL) N/A 02/20/2023    Procedure: LAPAROSCOPY, DIAGNOSTIC; RETRIEVAL OF MIGRATED STENT;  Surgeon: Joseluis Lima MD;  Location: [facility] OR    ORTHOPEDIC SURGERY      PANCREATECTOMY PEUSTOW N/A 06/30/2023    Procedure: Open distal pancreactectomy with splenectomy, lysis of adhesions, resection of proximal illeum;  Surgeon: Ashvin Haider MD;  Location: [facility] OR    OK CABG, ARTERIAL, TWO   "2003        SOCIAL HISTORY     Social History     Tobacco Use    Smoking status: Former     Types: Cigarettes    Smokeless tobacco: Never   Vaping Use    Vaping Use: Never used   Substance Use Topics    Alcohol use: Not Currently    Drug use: Not Currently        FAMILY HISTORY     Reviewed, and family history includes Lung Cancer in his brother; Parkinsonism in his mother.     ALLERGIES     Allergies   Allergen Reactions    Ciprofloxacin Other (See Comments), Hives, Itching, Rash and Unknown     Other reaction(s): Other (see comments)  Oral swelling per [facility]        Dilaudid [Hydromorphone] Rash    Morphine Rash     rash    Penicillins Rash     aka ancef/ rash      Citalopram Unknown    Oxycodone Rash     \"HORRIBLE, SEVERE RASH MAYBE CAUSED BY OXY\"        REVIEW OF SYSTEMS     12 system ROS was done other than the HPI this was negative.  Pertinent positives noted in the HPI.     HOME & HOSPITAL MEDICATIONS     Prior to Admission Medications  Medications Prior to Admission   Medication Sig Dispense Refill Last Dose    acetaminophen (TYLENOL) 325 MG tablet Take 2 tablets (650 mg) by mouth every 4 hours as needed for other (For optimal non-opioid multimodal pain management to improve pain control.)   Unknown at PRN    aspirin (ASA) 81 MG EC tablet Take 81 mg by mouth daily   2/4/2024 at Am    atorvastatin (LIPITOR) 40 MG tablet Take 1 tablet by mouth daily   2/4/2024 at AM    carbidopa-levodopa (SINEMET CR)  MG CR tablet Take 1 tablet by mouth At Bedtime   2/3/2024 at HS    carbidopa-levodopa (SINEMET)  MG tablet Take 2 tablets by mouth 3 times daily   2/4/2024 at AM    dasiglucagon HCl (ZEGALOGUE) 0.6 MG/0.6ML SOAJ injection Inject 0.6 mg Subcutaneous once as needed (use for severe hypoglycemia) 1.2 mL 1 Unknown at PRN    gabapentin (NEURONTIN) 300 MG capsule Take 600 mg by mouth 3 times daily   2/4/2024 at AM    insulin aspart (NOVOLOG FLEXPEN) 100 UNIT/ML pen Inject 4 Units Subcutaneous 2 times " daily (with meals)   2/4/2024 at AM    insulin aspart (NOVOLOG FLEXPEN) 100 UNIT/ML pen Inject subcutaneous per sliding scale three times daily with meals:  190-239 = 1 unit  240-289 = 2 units  290-339 = 3 units  >340 = 4 units   And Inject subcutaneously at bedtime per sliding scale:  200-249 = 1 unit  250-300 = 2 units   >300 = 3 units   2/3/2024 at AM    insulin glargine 100 UNIT/ML pen Inject 14 units subcutaneous every morning and 20 units every night at bedtime   2/4/2024 at AM (14 units)    lipase-protease-amylase (ZENPEP) 36771-299911-854498 units CPEP Take 1 capsule by mouth 3 times daily (with meals) 90 capsule 3 2/4/2024 at AM    losartan (COZAAR) 50 MG tablet Take 50 mg by mouth At Bedtime   2/3/2024 at HS    magnesium oxide (MAG-OX) 400 MG tablet Take 400 mg by mouth every other day   2/3/2024 at AM    metFORMIN (GLUCOPHAGE XR) 500 MG 24 hr tablet Take 1 tablet (500 mg) by mouth daily (with dinner) for 7 days, THEN 2 tablets (1,000 mg) daily (with dinner) for 7 days, THEN 3 tablets (1,500 mg) daily (with dinner) for 7 days, THEN 4 tablets (2,000 mg) daily (with dinner) for 90 days. 402 tablet 0 2/4/2024 at AM (1500 mg)    Multiple Vitamin (MULTI-VITAMINS) TABS Take 1 tablet by mouth daily   2/4/2024 at AM    omeprazole (PRILOSEC) 20 MG DR capsule Take 20 mg by mouth daily   2/4/2024 at AM    BD PEN NEEDLE LINDY 2ND GEN 32G X 4 MM miscellaneous USE TO INJECT INSULIN 4 TIMES A  each 3     Continuous Blood Gluc  (DEXCOM G7 ) ALIA Use to read blood sugars as per 's instructions. 1 each 0     Continuous Blood Gluc Sensor (DEXCOM G7 SENSOR) MISC Change every 10 days. 3 each 5     Glucagon (BAQSIMI) 3 MG/DOSE nasal powder Spray 1 spray (3 mg) in nostril as needed (severe hypoglycemia) in the event of unconscious hypoglycemia or hypoglycemic seizure. May repeat dose if no response after 15 minutes. 2 each 1        Hospital Medications   aspirin  81 mg Oral Daily    atorvastatin   40 mg Oral Daily    carbidopa-levodopa  1 tablet Oral At Bedtime    carbidopa-levodopa  2 tablet Oral TID w/meals    gabapentin  600 mg Oral TID    insulin aspart   Subcutaneous TID w/meals    insulin aspart  1-3 Units Subcutaneous TID AC    insulin glargine  8 Units Subcutaneous QAM AC    And    insulin glargine  12 Units Subcutaneous At Bedtime    lipase-protease-amylase  2 capsule Oral TID w/meals    losartan  50 mg Oral At Bedtime    pantoprazole  40 mg Oral QAM AC    polyethylene glycol  17 g Oral Daily        PHYSICAL EXAM     Vital signs  Temp:  [97.5  F (36.4  C)-98.6  F (37  C)] 97.8  F (36.6  C)  Pulse:  [51-76] 76  Resp:  [15-22] 18  BP: (138-190)/(63-81) 144/69  SpO2:  [89 %-99 %] 99 %    PHYSICAL EXAMINATION  VITALS: BP (!) 144/69 (BP Location: Left arm)   Pulse 76   Temp 97.8  F (36.6  C) (Oral)   Resp 18   SpO2 99%   GENERAL -Health appearing, No apparent distress  EYES- No scleral icterus, no eyelid droop, Pupils - see Neuro section  HEENT - Normocephalic, atraumatic, Hearing grossly intact; Oral mucosa moist and pink in color. External Ears and nose intact.   Neck - supple   PULM - Good spontaneous respiratory effort;   CV- Pedal pulses present with no peripheral edema/ No significant varicosities.  MSK- Gait - see Neuro section; Strength and tone- see Neuro section; Range of motion grossly intact.  PSYCH -cooperative    Neurological  Mental status - Patient is awake and grossly oriented to self, place and time. Attention span is normal. Language is fluent and follows commands appropriately.   Cranial nerves - CN II-XII intact. Pupils are reactive and symmetric; EOMI, VFIT, NLF symmetric  Motor - There is no pronator drift. Motor exam shows 5/5 strength in all extremities.  Tone - Tone is symmetric bilaterally in upper and lower extremities.  Resting tremor in the right upper extremity worse because patient is anxious.  Reflexes - Reflexes are symmetric/decreased.  Sensation - Sensory exam is  grossly intact to light touch, pain.  Coordination - Finger to nose and heel to shin is without dysmetria.  Gait and station --needs assistance to ambulate.  Formal gait testing cannot be done due to safety concerns from ongoing medical issues.  Exam stable.     DIAGNOSTIC STUDIES     Pertinent Radiology   CT head  IMPRESSION:  1.  No acute intracranial injury, hemorrhage, mass, or CT evidence of recent ischemia.     EEG  IMPRESSION/REPORT/PLAN  This is a normal EEG during wakefulness and drowsiness. Further clinical correlation is needed.     Please note that the absence of epileptiform abnormalities does not exclude the possibility of epilepsy in any patient.     MRI-image does reviewed.  Spinal stenosis noted.  HEAD MRI:   1.  No acute intracranial process.  2.  Generalized brain atrophy and presumed microvascular ischemic changes as detailed above.     CERVICAL SPINE MRI:  1.  Multilevel degenerative spondylolisthesis of the cervical spine, as above, with up to moderate central spinal canal stenosis at the levels of C3-C4, C4-C5 and C5-C6.  2.  No abnormal spinal cord signal or intramedullary/leptomeningeal enhancement.    Component      Latest Ref Rng 11/16/2023  8:18 AM 11/21/2023  10:53 PM 2/4/2024  8:39 AM   Sodium      135 - 145 mmol/L  136     Potassium      3.4 - 5.3 mmol/L  4.2     Carbon Dioxide (CO2)      22 - 29 mmol/L  26     Anion Gap      7 - 15 mmol/L  11     Urea Nitrogen      8.0 - 23.0 mg/dL  34.1 (H)     Creatinine      0.67 - 1.17 mg/dL  0.99     GFR Estimate      >60 mL/min/1.73m2  82     Calcium      8.8 - 10.2 mg/dL  9.5     Chloride      98 - 107 mmol/L  99     Glucose      70 - 99 mg/dL  205 (H)     Alkaline Phosphatase      40 - 150 U/L  122     AST      0 - 45 U/L  24     ALT      0 - 70 U/L  6     Protein Total      6.4 - 8.3 g/dL  8.0     Albumin      3.5 - 5.2 g/dL  4.3     Bilirubin Total      <=1.2 mg/dL  1.2     Hemoglobin A1C      0.0 - 5.6 % 8.2 (H)      CK Total      39 - 308  U/L   139       Legend:  (H) High       Recent Results (from the past 24 hour(s))   Glucose by meter    Collection Time: 02/05/24 12:28 PM   Result Value Ref Range    GLUCOSE BY METER POCT 188 (H) 70 - 99 mg/dL   Glucose by meter    Collection Time: 02/05/24  4:39 PM   Result Value Ref Range    GLUCOSE BY METER POCT 283 (H) 70 - 99 mg/dL   Glucose by meter    Collection Time: 02/05/24  4:55 PM   Result Value Ref Range    GLUCOSE BY METER POCT 252 (H) 70 - 99 mg/dL   Glucose by meter    Collection Time: 02/05/24  9:31 PM   Result Value Ref Range    GLUCOSE BY METER POCT 291 (H) 70 - 99 mg/dL   Glucose by meter    Collection Time: 02/06/24  7:17 AM   Result Value Ref Range    GLUCOSE BY METER POCT 259 (H) 70 - 99 mg/dL   Basic metabolic panel    Collection Time: 02/06/24  7:41 AM   Result Value Ref Range    Sodium 135 135 - 145 mmol/L    Potassium 5.1 3.4 - 5.3 mmol/L    Chloride 100 98 - 107 mmol/L    Carbon Dioxide (CO2) 22 22 - 29 mmol/L    Anion Gap 13 7 - 15 mmol/L    Urea Nitrogen 20.1 8.0 - 23.0 mg/dL    Creatinine 0.84 0.67 - 1.17 mg/dL    GFR Estimate >90 >60 mL/min/1.73m2    Calcium 8.8 8.8 - 10.2 mg/dL    Glucose 279 (H) 70 - 99 mg/dL   CBC with platelets    Collection Time: 02/06/24  7:41 AM   Result Value Ref Range    WBC Count 10.3 4.0 - 11.0 10e3/uL    RBC Count 4.27 (L) 4.40 - 5.90 10e6/uL    Hemoglobin 14.6 13.3 - 17.7 g/dL    Hematocrit 41.6 40.0 - 53.0 %    MCV 97 78 - 100 fL    MCH 34.2 (H) 26.5 - 33.0 pg    MCHC 35.1 31.5 - 36.5 g/dL    RDW 12.9 10.0 - 15.0 %    Platelet Count 197 150 - 450 10e3/uL       Total time spent for face to face visit, reviewing labs/imaging studies, counseling and coordination of care was: Over 50 min More than 50% of this time was spent on counseling and coordination of care.      Counseling patient.  Multiple test reviewed.  Multiple questions answered.  Coordination of care with the therapist.    Jose Morales MD  Neurologist  [facility]  [place]  Tel:- [phone]

## 2024-02-06 NOTE — PROGRESS NOTES
"PRIMARY DIAGNOSIS: \"GENERIC\" NURSING  OUTPATIENT/OBSERVATION GOALS TO BE MET BEFORE DISCHARGE:  ADLs back to baseline: No    Activity and level of assistance: Up with standby assistance.    Pain status: Pain free.    Return to near baseline physical activity: No     Discharge Planner Nurse   Safe discharge environment identified: No  Barriers to discharge: Yes       Entered by: Diana Rashid RN 02/06/2024 11:25 AM     Patient is now off 2 L NC to keep sats above 90. Patient is being followed by OT/PT. Tele is 1st degree av block. Patient's last blood sugar was 259 at 07:17. Patient is on a regular diet. Patient wife is feeling overwhelmed with cares for her . Author reached out to Social Work for possible options. Wife was encouraged to write questions down for hospitalist as she was frustrated with  for not asking any while Neurologist was present. The question has been raised byt the wife if patient will need a higher level of care.   "

## 2024-02-06 NOTE — DISCHARGE INSTRUCTIONS
For information on if you qualify for Elderly Waiver. Fill out [employer] assessment online or by calling [phone] for information through [facility].   [facility] Social Servics # as well   [phone]

## 2024-02-06 NOTE — PLAN OF CARE
Physical Therapy Discharge Summary    Reason for therapy discharge:    All goals and outcomes met, no further needs identified.    Progress towards therapy goal(s). See goals on Care Plan in [facility] electronic health record for goal details.  Goals met    Therapy recommendation(s):    Continued therapy is recommended.  Rationale/Recommendations:  recommend outpatient PT.    Agnes Carranza, PT  2/6/2024

## 2024-02-06 NOTE — PLAN OF CARE
PRIMARY DIAGNOSIS: ACUTE PAIN  OUTPATIENT/OBSERVATION GOALS TO BE MET BEFORE DISCHARGE:  1. Pain Status: Pain free.    2. Return to near baseline physical activity: No    3. Cleared for discharge by consultants (if involved): No    Discharge Planner Nurse   Safe discharge environment identified: No  Barriers to discharge: Yes       Entered by: Zarina Fong RN 02/06/2024 7:03 AM     Please review provider order for any additional goals.   Nurse to notify provider when observation goals have been met and patient is ready for discharge.Goal Outcome Evaluation:

## 2024-02-06 NOTE — PLAN OF CARE
PRIMARY DIAGNOSIS: ACUTE PAIN  OUTPATIENT/OBSERVATION GOALS TO BE MET BEFORE DISCHARGE:  1. Pain Status: Pain free.    2. Return to near baseline physical activity: No    3. Cleared for discharge by consultants (if involved): No    Discharge Planner Nurse   Safe discharge environment identified: No  Barriers to discharge: Yes       Entered by: Zarina Fong RN 02/06/2024 7:02 AM     Please review provider order for any additional goals.   Nurse to notify provider when observation goals have been met and patient is ready for discharge.Goal Outcome Evaluation:

## 2024-02-06 NOTE — PROGRESS NOTES
I met with patient and his wife, Veronika. They request home care for pt/ot/RN rather than OP PT. [facility] [place] [facility] [place] [place] requested resources for Elderly Waiver . (Given)

## 2024-02-06 NOTE — TELEPHONE ENCOUNTER
Provider form uploaded under the media tab for Zegalogue through the [employer] Patient Assistance Program.  Please complete provider form and send to me via e-mail and I will submit and follow with the free drug program until the renewal application is approved.    If form is blurry and you would like me to send via e-mail, please let me know.     Thank you,     Sergio Lemos, [facility]  Pharmacy Clinic [facility]  [email or web address]   Phone: [phone]  Fax: [phone]

## 2024-02-06 NOTE — UTILIZATION REVIEW
Concurrent stay review; Secondary Review Determination     Under the authority of the Utilization Management Committee, the utilization review process indicated a secondary review on Arnaldo Henderson.  The review outcome is based on review of the medical records, discussions with staff, and applying clinical experience noted on the date of the review.        (x) Observation Status Appropriate - Concurrent stay review    RATIONALE FOR DETERMINATION   68 year old male with a complicated past medical history including distal pancreatectomy following chronic pancreatitis with stent migration and disconnection of pancreatic duct, IDDM, neuroendocrine tumor of small bowel, Parkinson's disease, CAD s/p CABG, hypertension who presents for evaluation of multiple complaints including weakness, bradykinesia, spells where his body feels heavy and woozy, muscle tension.  negative work up including MRI brain, stable vital signs , neurology and cardiology consult, no plan for pacemaker st this point , PT/OT evaluation reccommended home discharge.    Patient is clinically improving and there is no clear indication to change patient's status to inpatient. The severity of illness, intensity of service provided, expected LOS and risk for adverse outcome make the care appropriate for observation.    The information on this document is developed by the utilization review team in order for the business office to ensure compliance.  This only denotes the appropriateness of proper admission status and does not reflect the quality of care rendered.         The definitions of Inpatient Status and Observation Status used in making the determination above are those provided in the CMS Coverage Manual, Chapter 1 and Chapter 6, section 70.4.      Sincerely,   Jon Johnson MD  Utilization Review  Physician Advisor  [facility]

## 2024-02-06 NOTE — PROGRESS NOTES
"PRIMARY DIAGNOSIS: \"GENERIC\" NURSING  OUTPATIENT/OBSERVATION GOALS TO BE MET BEFORE DISCHARGE:  ADLs back to baseline: No    Activity and level of assistance: Up with standby assistance.    Pain status: Improved-controlled with oral pain medications.    Return to near baseline physical activity: No     Discharge Planner Nurse   Safe discharge environment identified: No  Barriers to discharge: Yes       Entered by: Diana Rashid RN 02/06/2024 3:02 PM     Patient sleeping this afternoon. Patient's wife was able to get information from both the hospitalist and the , she was much relieved as was he. The family now has a plan for the future and resources to use.   "

## 2024-02-06 NOTE — PLAN OF CARE
Goal Outcome Evaluation:      Plan of Care Reviewed With: patient    Overall Patient Progress: no change    Patient is on Tele - 1st degree av block. Patient has anxiety about small spaces - 5/10 - valium on board for his MRI. Metformin was held for procedure as well. Last blood sugar was 252.

## 2024-02-06 NOTE — TELEPHONE ENCOUNTER
Patient application forms mailed to patient for his [employer] through the [employer] Patient Assistance Program.     Thank you,     Sergio Lemos [facility]  Pharmacy Clinic [facility]  [email or web address]   Phone: [phone]  Fax: [phone]

## 2024-02-07 ENCOUNTER — TELEPHONE (OUTPATIENT)
Dept: ENDOCRINOLOGY | Facility: CLINIC | Age: 69
End: 2024-02-07

## 2024-02-07 ENCOUNTER — APPOINTMENT (OUTPATIENT)
Dept: OCCUPATIONAL THERAPY | Facility: HOSPITAL | Age: 69
End: 2024-02-07
Payer: MEDICARE

## 2024-02-07 VITALS
DIASTOLIC BLOOD PRESSURE: 79 MMHG | HEART RATE: 75 BPM | SYSTOLIC BLOOD PRESSURE: 152 MMHG | OXYGEN SATURATION: 94 % | RESPIRATION RATE: 16 BRPM | TEMPERATURE: 98.4 F

## 2024-02-07 DIAGNOSIS — E11.9 TYPE 2 DIABETES MELLITUS WITHOUT COMPLICATION (H): ICD-10-CM

## 2024-02-07 PROBLEM — I95.1 ORTHOSTATIC HYPOTENSION: Status: ACTIVE | Noted: 2024-02-07

## 2024-02-07 LAB
GLUCOSE BLDC GLUCOMTR-MCNC: 305 MG/DL (ref 70–99)
GLUCOSE BLDC GLUCOMTR-MCNC: 312 MG/DL (ref 70–99)

## 2024-02-07 PROCEDURE — 250N000013 HC RX MED GY IP 250 OP 250 PS 637: Performed by: HOSPITALIST

## 2024-02-07 PROCEDURE — 97535 SELF CARE MNGMENT TRAINING: CPT | Mod: GO

## 2024-02-07 PROCEDURE — 97530 THERAPEUTIC ACTIVITIES: CPT | Mod: GO

## 2024-02-07 PROCEDURE — 250N000013 HC RX MED GY IP 250 OP 250 PS 637: Performed by: STUDENT IN AN ORGANIZED HEALTH CARE EDUCATION/TRAINING PROGRAM

## 2024-02-07 PROCEDURE — G0378 HOSPITAL OBSERVATION PER HR: HCPCS

## 2024-02-07 PROCEDURE — 99239 HOSP IP/OBS DSCHRG MGMT >30: CPT | Performed by: HOSPITALIST

## 2024-02-07 PROCEDURE — 82962 GLUCOSE BLOOD TEST: CPT

## 2024-02-07 PROCEDURE — 99233 SBSQ HOSP IP/OBS HIGH 50: CPT | Performed by: STUDENT IN AN ORGANIZED HEALTH CARE EDUCATION/TRAINING PROGRAM

## 2024-02-07 RX ORDER — LOSARTAN POTASSIUM 25 MG/1
25 TABLET ORAL AT BEDTIME
Qty: 30 TABLET | Refills: 0 | Status: SHIPPED | OUTPATIENT
Start: 2024-02-07 | End: 2024-03-22

## 2024-02-07 RX ORDER — LOSARTAN POTASSIUM 25 MG/1
25 TABLET ORAL AT BEDTIME
Status: DISCONTINUED | OUTPATIENT
Start: 2024-02-07 | End: 2024-02-07 | Stop reason: HOSPADM

## 2024-02-07 RX ORDER — ACYCLOVIR 400 MG/1
TABLET ORAL
Qty: 9 EACH | Refills: 1 | Status: SHIPPED | OUTPATIENT
Start: 2024-02-07 | End: 2024-05-07

## 2024-02-07 RX ADMIN — PANTOPRAZOLE SODIUM 40 MG: 40 TABLET, DELAYED RELEASE ORAL at 06:49

## 2024-02-07 RX ADMIN — PANCRELIPASE LIPASE, PANCRELIPASE PROTEASE, PANCRELIPASE AMYLASE 2 CAPSULE: 20000; 63000; 84000 CAPSULE, DELAYED RELEASE ORAL at 08:09

## 2024-02-07 RX ADMIN — DIAZEPAM 2 MG: 2 TABLET ORAL at 15:19

## 2024-02-07 RX ADMIN — ATORVASTATIN CALCIUM 40 MG: 40 TABLET, FILM COATED ORAL at 08:09

## 2024-02-07 RX ADMIN — CARBIDOPA AND LEVODOPA 2 TABLET: 25; 100 TABLET ORAL at 12:51

## 2024-02-07 RX ADMIN — POLYETHYLENE GLYCOL 3350 17 G: 17 POWDER, FOR SOLUTION ORAL at 08:07

## 2024-02-07 RX ADMIN — Medication 81 MG: at 08:09

## 2024-02-07 RX ADMIN — GABAPENTIN 600 MG: 300 CAPSULE ORAL at 06:49

## 2024-02-07 RX ADMIN — PANCRELIPASE LIPASE, PANCRELIPASE PROTEASE, PANCRELIPASE AMYLASE 2 CAPSULE: 20000; 63000; 84000 CAPSULE, DELAYED RELEASE ORAL at 12:52

## 2024-02-07 RX ADMIN — GABAPENTIN 600 MG: 300 CAPSULE ORAL at 12:51

## 2024-02-07 RX ADMIN — INSULIN ASPART 2 UNITS: 100 INJECTION, SOLUTION INTRAVENOUS; SUBCUTANEOUS at 12:54

## 2024-02-07 RX ADMIN — INSULIN ASPART 1 UNITS: 100 INJECTION, SOLUTION INTRAVENOUS; SUBCUTANEOUS at 08:15

## 2024-02-07 RX ADMIN — CARBIDOPA AND LEVODOPA 2 TABLET: 25; 100 TABLET ORAL at 06:49

## 2024-02-07 ASSESSMENT — ACTIVITIES OF DAILY LIVING (ADL)
ADLS_ACUITY_SCORE: 41

## 2024-02-07 NOTE — PLAN OF CARE
Goal Outcome Evaluation:      Plan of Care Reviewed With: patient    Overall Patient Progress: improving    Patient will be discharging to home with wife. Patient has resources for care from social work. Education was provided about Parkinson's. Questions were encouraged and answered. Belongings were gathered and sent with patient.   Patient discharged to home  via Private Car.  Accompanied by spouse and staff.  Discharge instructions were reviewed with patient, opportunity offered to ask questions.    Prescriptions e-prescribed to pharmacy.  Access discontinued: Yes  Care plan and education discontinued: Yes  Belongings were sent home with patient/family:  cell phone - and clothings...    Patient had a valium prior to discharge to help with the transition to home.

## 2024-02-07 NOTE — PLAN OF CARE
"PRIMARY DIAGNOSIS: \"GENERIC\" NURSING  OUTPATIENT/OBSERVATION GOALS TO BE MET BEFORE DISCHARGE:  ADLs back to baseline: Yes    Activity and level of assistance: Up with standby assistance.    Pain status: Pain free.    Return to near baseline physical activity: Yes     Discharge Planner Nurse   Safe discharge environment identified: Yes  Barriers to discharge: Yes       Entered by: Diann Gan RN 02/07/2024 1:51 AM     Please review provider order for any additional goals.   Nurse to notify provider when observation goals have been met and patient is ready for discharge.Goal Outcome Evaluation:                        "

## 2024-02-07 NOTE — PROGRESS NOTES
Care Management Discharge Note    Discharge Date: 02/07/2024       Discharge Disposition:  home    Discharge Services:  [facility] Care for RN PT OT    Discharge DME:      Discharge Transportation: wife will transport  Private pay costs discussed: Not applicable    Does the patient's insurance plan have a 3 day qualifying hospital stay waiver?  No    PAS Confirmation Code:  NA  Patient/family educated on Medicare website which has current facility and service quality ratings:  NA    Education Provided on the Discharge Plan: yes   Persons Notified of Discharge Plans: patient/wife  Patient/Family in Agreement with the Plan:  yes    Handoff Referral Completed: Yes    Additional Information:  Patient to discharge home with wife. [facility] care orders fax[facility] to [facility].    FLORENCIO Rubio

## 2024-02-07 NOTE — PROGRESS NOTES
"PRIMARY DIAGNOSIS: \"GENERIC\" NURSING  OUTPATIENT/OBSERVATION GOALS TO BE MET BEFORE DISCHARGE:  ADLs back to baseline: No    Activity and level of assistance: Up with standby assistance.    Pain status: Pain free.    Return to near baseline physical activity: No     Discharge Planner Nurse   Safe discharge environment identified: No  Barriers to discharge: Yes       Entered by: Diana Rashid RN 02/07/2024 11:17 AM   Patient alert and oriented x4. Blood sugars are all over the board - 305 at 07:24. Patient is an assist of 1 with gait belt and walker for mobility. Patient is on tele - 1st degree AV block. There is still talk of alternative care post discharge.   "

## 2024-02-07 NOTE — PROGRESS NOTES
[facility]  Neurology Consultation - Progress Note    Patient Name:  Arnaldo Henderson  Date of Service:  February 7, 2024    Subjective:    Patient continues to have episodes of lightheaded sensation when he is seated or standing, and this resolves when he lays down.    Objective:    Vitals: /63 (BP Location: Left arm)   Pulse 75   Temp 98  F (36.7  C) (Oral)   Resp 18   SpO2 95%   General: Lying in bed, NAD  Head: Atraumatic, normocephalic   Cardiac: no lower extremity edema  Neurologic:  Mental Status: Fully alert, attentive and oriented. Speech clear and fluent.   Cranial Nerves: EOM intact, without nystagmus. Facial movements symmetric at rest and with activation.    Motor: No abnormal movements.  Moving all 4 extremities antigravity.  Full strength in the proximal and distal arms and legs bilaterally.   Rigidity in the arms bilaterally-there are several catches at the wrists and elbows  Sensory: Intact to light touch x 4 extremities   Station/Gait: Not assessed    Pertinent Investigations:    I have personally reviewed most recent and pertinent labs, tests, and radiological images.     Assessment  #Parkinson disease  #Orthostatic hypotension    Patient's symptoms are consistent with orthostatic hypotension, given that he feels as if he is going to pass out when he is in the seated or standing position, and it resolves when he lays down.  Orthostatic vitals were measured here including a systolic of 214 while lying, dropping to 120 systolic when standing.  Patients with Parkinson disease are at greater risk of autonomic dysfunction including orthostatic hypotension, supine hypertension, so I do recommend sleeping with head of bed elevated and conservative recommendations as outlined below.    If he continues to be symptomatic despite reduced losartan dose, this may have to be discontinued.  Considering how good he looks from a Parkinson perspective, Sinemet may also  have to be reduced, as this can also contribute to orthostatic hypotension.  My next move would be to discontinue his controlled release carbidopa levodopa.  Midodrine may have to be considered down the line after these other measures have been taken.        Recommendations:   -Discussed conservative recommendations for orthostatic hypotension including:   -Drinking full glass of water upon waking in the morning, maintaining hydration throughout the day, drinking a full glass of water if patient becomes symptomatic during the day   -Sleeping with head of bed elevated at least 10 degrees   -Sitting at the edge of bed before standing, pumping legs to promote blood flow   -Walking within reach of supportive devices including a walker, if needed, and sitting down or laying down if symptomatic  -Reduce losartan to 25 mg daily, may have to be eliminated entirely if patient continues to be orthostatic  -Continue PTA carbidopa/levodopa (2 tablets  3 times daily, 1 tablet  long-acting)  - Close outpatient follow-up with neurology and primary care  -Outpatient neurology referral made to [facility] at patient's request  - Inpatient neurology will sign off at this time      Thank you for involving Neurology in the care of Arnaldo Henderson.  Please do not hesitate to call with questions.     Noel Marie MD     Billed as a level 3 visit due to patient presenting with an acute condition leading to severe loss of bodily function.  I reviewed notes from neurology, from the hospitalist service, reviewed his vitals including positive orthostatic, and labs including normal BMP, elevated glucose 279, normal CBC including white count, platelets, hemoglobin.

## 2024-02-07 NOTE — DISCHARGE SUMMARY
[facility]  Hospitalist Discharge Summary      Date of Admission:  2/4/2024  Date of Discharge:  2/7/2024  Discharging Provider: Mackenzie Yen MD  Discharge Service: Hospitalist Service    Discharge Diagnoses   Orthostatic hypotension   Parkinson's Disease     Clinically Significant Risk Factors     # DMII: A1C = 8.2 % (Ref range: 0.0 - 5.6 %) within past 6 months       Follow-ups Needed After Discharge   Follow-up Appointments     Follow-up and recommended labs and tests       Follow up with primary care provider, Alanna Lazaro, within 7 days for   hospital follow- up and orthostatic hypotension follow p.  No follow up   labs or test are needed.    Follow up with Endocrinology , within 7-14 days  to evaluate medication   change. No follow up labs or test are needed.  Follow up with Neurology , within 7-14 days  to follow up Parkinsons and   autonomic dysfunction. No follow up labs or test are needed.            Unresulted Labs Ordered in the Past 30 Days of this Admission       Date and Time Order Name Status Description    2/4/2024  7:51 AM Blood Culture Peripheral Blood Preliminary     2/4/2024  7:51 AM Blood Culture Peripheral Blood Preliminary         These results will be followed up by hospitalist team    Discharge Disposition   Discharged to home  Condition at discharge: Stable    Hospital Course   Arnaldo Henderson is a 68 year old male with PMH including distal pancreatectomy following chronic pancreatitis with stent migration and disconnection of pancreatic duct, IDDM, neuroendocrine tumor of small bowel, Parkinson's disease, CAD s/p CABG, hypertension presents for evaluation of multiple complaints including weakness, bradykinesia, spells where his body feels heavy and woozy, muscle tension.  Found to have bradycardia and with orthostatic hypotension.  Patient evaluated by neurology.  EEG normal, MRI brain and MRI cervical spine with no acute intracranial process, generalized  brain atrophy and presumed microvascular ischemic changes, multilevel degenerative spondylolisthesis of cervical spine with up to moderate central spinal canal stenosis at levels of C3-C6.  Neurology evaluated patient symptoms consistent with orthostatic hypotension. Recommendations from neurology include conservative management of orthostatic hypotension, reduction of losartan to 25 mg and close outpatient follow-up.  Patient evaluated by cardiology for heart block found to have Mobitz type II with no indication for permanent pacemaker.  Workup for urine serotonin was deferred to outpatient given dietary modification needed prior to testing.  Patient's other chronic conditions were managed with PTA medication.     Patient seen today in stable for discharge home with home care services.         Consultations This Hospital Stay   NEUROLOGY IP CONSULT  CARDIOLOGY IP CONSULT  CARE MANAGEMENT / SOCIAL WORK IP CONSULT  PHYSICAL THERAPY ADULT IP CONSULT  OCCUPATIONAL THERAPY ADULT IP CONSULT  CARE MANAGEMENT / SOCIAL WORK IP CONSULT    Code Status   Full Code    Time Spent on this Encounter   I, Mackenzie Yen MD, personally saw the patient today and spent greater than 30 minutes discharging this patient.       Mackenzie Yen MD  [facility] EXTENDED RECOVERY AND SHORT STAY  [address] [place], [place] [id number]  Phone: [phone]  Fax: [phone]  ______________________________________________________________________    Physical Exam   Vital Signs: Temp: 98.4  F (36.9  C) Temp src: Oral BP: (!) 152/79 Pulse: 75   Resp: 16 SpO2: 94 % O2 Device: None (Room air)    Weight: 0 lbs 0 oz  Constitutional: alert, cooperative, and no apparent distress  Eyes: pupils equal, round and reactive to light and extra-ocular muscles intact  ENT: atramatic, oral pharynx with moist mucus membranes  Respiratory: no increased work of breathing, good air exchange, and clear to auscultation  Cardiovascular:  regular rate and rhythm and normal S1 and S2  GI: normal bowel sounds, soft, non-distended, and non-tender  Musculoskeletal: full range of motion noted  Neurologic: Awake, alert, oriented to name, place and time.  Cranial nerves II-XII are grossly intact.  Motor is 5 out of 5 bilaterally.          Primary Care Physician   Alanna Lazaro    Discharge Orders      Adult Neurology  Referral      Home Care Referral      Reason for your hospital stay    Orthostatic hypotension, parkinson's disease     Follow-up and recommended labs and tests     Follow up with primary care provider, Alanna Lazaro, within 7 days for hospital follow- up and orthostatic hypotension follow p.  No follow up labs or test are needed.    Follow up with Endocrinology , within 7-14 days  to evaluate medication change. No follow up labs or test are needed.  Follow up with Neurology , within 7-14 days  to follow up Parkinsons and autonomic dysfunction. No follow up labs or test are needed.     Activity    Your activity upon discharge: activity as tolerated     Walker Order for DME - ONLY FOR DME    I, the undersigned, certify that the above prescribed supplies are medically necessary for this patient and is both reasonable and necessary in reference to accepted standards of medical and necessary in reference to accepted standards of medical practice in the treatment of this patient's condition and is not prescribed as a convenience.      Diet    Follow this diet upon discharge: Orders Placed This Encounter      Regular Diet Adult       Significant Results and Procedures   Results for orders placed or performed during the hospital encounter of 02/04/24   Head CT w/o contrast    Narrative    EXAM: CT HEAD W/O CONTRAST  LOCATION: [facility]  DATE: 2/4/2024    INDICATION: weakness  COMPARISON: None.  TECHNIQUE: Routine CT Head without IV contrast. Multiplanar reformats. Dose reduction techniques were  used.    FINDINGS:  INTRACRANIAL CONTENTS: No intracranial hemorrhage, extraaxial collection, or mass effect. Tiny chronic infarct left cerebellum. No CT evidence of acute infarct. Mild presumed chronic small vessel ischemic changes. Normal ventricles and sulci.     VISUALIZED ORBITS/SINUSES/MASTOIDS: No intraorbital abnormality. No paranasal sinus mucosal disease. Postoperative changes right mastoidectomy.    BONES/SOFT TISSUES: No acute abnormality.      Impression    IMPRESSION:  1.  No acute intracranial injury, hemorrhage, mass, or CT evidence of recent ischemia.     XR Chest Port 1 View    Narrative    EXAM: XR CHEST PORT 1 VIEW  LOCATION: [facility]  DATE: 2/4/2024    INDICATION: Weakness.  COMPARISON: PET scan 11/21/2023      Impression    IMPRESSION: Previous sternotomy for coronary artery bypass. Heart size and vascularity are normal. Shallow inspiration with no focal consolidation, pneumothorax nor pleural effusion.   MR Cervical Spine w/o & w Contrast    Narrative    EXAM: MR BRAIN W/O and W CONTRAST, MR CERVICAL SPINE W/O and W CONTRAST  LOCATION: [facility]  DATE: 2/5/2024    INDICATION: Spells of generalized weakness, brain fog.  COMPARISON: CT head dated 02/04/2024.  CONTRAST: 9 mL Gadavist.  TECHNIQUE:   1) Routine multiplanar multisequence head MRI without and with intravenous contrast.  2) Routine Cervical Spine MRI without and with IV contrast.    FINDINGS:  HEAD MRI:  INTRACRANIAL CONTENTS: No acute or subacute infarct. Small chronic bilateral cerebellar lacunar-type infarctions. No mass, acute hemorrhage, or extra-axial fluid collections. Scattered nonspecific T2/FLAIR hyperintensities within the cerebral white   matter most consistent with mild chronic microvascular ischemic change. Mild generalized cerebral atrophy. No hydrocephalus. Normal position of the cerebellar tonsils. No pathologic enhancement.    SELLA: No abnormality accounting for  technique.    OSSEOUS STRUCTURES/SOFT TISSUES: Normal marrow signal. The major intracranial vascular flow voids are maintained.     ORBITS: No abnormality accounting for technique.     SINUSES/MASTOIDS: No paranasal sinus mucosal disease. No middle ear or mastoid effusion.     CERVICAL SPINE:   Normal cervical spine vertebral body heights. There is a chronic-appearing superior endplate compression deformity identified involving the T1 vertebral body with approximately 20% height loss noted anteriorly. Overall, marrow signal pattern is normal   for patient age. No suspicious marrow edema identified. No enhancing marrow lesions seen.    There is trace grade 1 retrolisthesis at the level of C4 on C5. Additionally, there is trace grade 1 anterolisthesis at C7 on T1. No abnormal spinal cord signal. No intramedullary or leptomeningeal enhancement. No extraspinal abnormality.    Craniovertebral junction and C1-C2: Normal.    C2-C3: Disc desiccation. Disc height maintained. Bilateral facet arthropathy, right greater than left. Mild right neural foraminal stenosis. No left neural foraminal stenosis. Small midline disc osteophyte complex/protrusion. Mild ligamentum flavum   thickening. Minimal central spinal canal stenosis.    C3-C4: Disc desiccation. Minimal disc height loss. Bilateral facet arthropathy. Ligamentum flavum thickening. Minimal-to-mild bilateral neural foraminal stenosis. Posterior disc osteophyte complex/bulge with moderate central spinal canal stenosis.     C4-C5: Disc desiccation. Mild disc height loss. Posterior disc osteophyte complex/bulge with effacement of the ventral CSF. Ligamentum flavum thickening. Bilateral facet arthropathy. No significant neural foraminal stenosis. Moderate central spinal canal   stenosis.     C5-C6: Disc desiccation. Mild disc height loss. Posterior disc osteophyte complex/bulge with associated effacement of the ventral CSF and contouring of the ventral spinal cord. Ligamentum  flavum thickening. Bilateral facet arthropathy. Mild left neural   foraminal stenosis. Mild right neural foraminal stenosis. Moderate central spinal canal stenosis.     C6-C7: Disc desiccation. Mild disc height loss. Posterior disc osteophyte complex/bulge with effacement of the ventral CSF. Bilateral facet arthropathy, left worse than right. Mild left neural foraminal stenosis. No right neural foraminal stenosis. Mild   central spinal canal stenosis.     C7-T1: Disc desiccation. Minimal disc height loss. Right-sided facet arthropathy. Shallow disc bulge. Minimal central spinal canal stenosis. Mild right neural foraminal stenosis. No left neural foraminal stenosis.      Impression    IMPRESSION:  HEAD MRI:   1.  No acute intracranial process.  2.  Generalized brain atrophy and presumed microvascular ischemic changes as detailed above.    CERVICAL SPINE MRI:  1.  Multilevel degenerative spondylolisthesis of the cervical spine, as above, with up to moderate central spinal canal stenosis at the levels of C3-C4, C4-C5 and C5-C6.  2.  No abnormal spinal cord signal or intramedullary/leptomeningeal enhancement.   MR Brain w/o & w Contrast    Narrative    EXAM: MR BRAIN W/O and W CONTRAST, MR CERVICAL SPINE W/O and W CONTRAST  LOCATION: [facility]  DATE: 2/5/2024    INDICATION: Spells of generalized weakness, brain fog.  COMPARISON: CT head dated 02/04/2024.  CONTRAST: 9 mL Gadavist.  TECHNIQUE:   1) Routine multiplanar multisequence head MRI without and with intravenous contrast.  2) Routine Cervical Spine MRI without and with IV contrast.    FINDINGS:  HEAD MRI:  INTRACRANIAL CONTENTS: No acute or subacute infarct. Small chronic bilateral cerebellar lacunar-type infarctions. No mass, acute hemorrhage, or extra-axial fluid collections. Scattered nonspecific T2/FLAIR hyperintensities within the cerebral white   matter most consistent with mild chronic microvascular ischemic change. Mild generalized  cerebral atrophy. No hydrocephalus. Normal position of the cerebellar tonsils. No pathologic enhancement.    SELLA: No abnormality accounting for technique.    OSSEOUS STRUCTURES/SOFT TISSUES: Normal marrow signal. The major intracranial vascular flow voids are maintained.     ORBITS: No abnormality accounting for technique.     SINUSES/MASTOIDS: No paranasal sinus mucosal disease. No middle ear or mastoid effusion.     CERVICAL SPINE:   Normal cervical spine vertebral body heights. There is a chronic-appearing superior endplate compression deformity identified involving the T1 vertebral body with approximately 20% height loss noted anteriorly. Overall, marrow signal pattern is normal   for patient age. No suspicious marrow edema identified. No enhancing marrow lesions seen.    There is trace grade 1 retrolisthesis at the level of C4 on C5. Additionally, there is trace grade 1 anterolisthesis at C7 on T1. No abnormal spinal cord signal. No intramedullary or leptomeningeal enhancement. No extraspinal abnormality.    Craniovertebral junction and C1-C2: Normal.    C2-C3: Disc desiccation. Disc height maintained. Bilateral facet arthropathy, right greater than left. Mild right neural foraminal stenosis. No left neural foraminal stenosis. Small midline disc osteophyte complex/protrusion. Mild ligamentum flavum   thickening. Minimal central spinal canal stenosis.    C3-C4: Disc desiccation. Minimal disc height loss. Bilateral facet arthropathy. Ligamentum flavum thickening. Minimal-to-mild bilateral neural foraminal stenosis. Posterior disc osteophyte complex/bulge with moderate central spinal canal stenosis.     C4-C5: Disc desiccation. Mild disc height loss. Posterior disc osteophyte complex/bulge with effacement of the ventral CSF. Ligamentum flavum thickening. Bilateral facet arthropathy. No significant neural foraminal stenosis. Moderate central spinal canal   stenosis.     C5-C6: Disc desiccation. Mild disc height  loss. Posterior disc osteophyte complex/bulge with associated effacement of the ventral CSF and contouring of the ventral spinal cord. Ligamentum flavum thickening. Bilateral facet arthropathy. Mild left neural   foraminal stenosis. Mild right neural foraminal stenosis. Moderate central spinal canal stenosis.     C6-C7: Disc desiccation. Mild disc height loss. Posterior disc osteophyte complex/bulge with effacement of the ventral CSF. Bilateral facet arthropathy, left worse than right. Mild left neural foraminal stenosis. No right neural foraminal stenosis. Mild   central spinal canal stenosis.     C7-T1: Disc desiccation. Minimal disc height loss. Right-sided facet arthropathy. Shallow disc bulge. Minimal central spinal canal stenosis. Mild right neural foraminal stenosis. No left neural foraminal stenosis.      Impression    IMPRESSION:  HEAD MRI:   1.  No acute intracranial process.  2.  Generalized brain atrophy and presumed microvascular ischemic changes as detailed above.    CERVICAL SPINE MRI:  1.  Multilevel degenerative spondylolisthesis of the cervical spine, as above, with up to moderate central spinal canal stenosis at the levels of C3-C4, C4-C5 and C5-C6.  2.  No abnormal spinal cord signal or intramedullary/leptomeningeal enhancement.   Echocardiogram Complete     Value    LVEF  > 65%    [facility]    [id number]  [id number]  [id number]  [id number]^GAEL^LES^[place] [address]     Name: SANDRA WASHINGTON  MRN: [id number]  : 1955  Study Date: 2024 09:54 AM  Age: 68 yrs  Gender: Male  Patient Location: [facility]  Reason For Study: 2nd degree AV Block  Ordering Physician: LOUIS MAYO  Performed By: KYLEE     BSA: 2.0 m2  Height: 68 in  Weight: 198 lb  HR: 76  BP: 180/84 mmHg  ______________________________________________________________________________  Procedure  Complete Echo Adult. No hemodynamically significant valvular abnormalities on  2D or color  flow imaging. There is no comparison study available.  ______________________________________________________________________________  Interpretation Summary     1.Left ventricular size, wall motion and function are normal. The ejection  fraction is > 65%. LV might suggest dehydration.  2.Mildly decreased right ventricular systolic function  3.No hemodynamically significant valvular abnormalities on 2D or color flow  imaging.  There is no comparison study available.  ______________________________________________________________________________  I      WMSI = 1.00     % Normal = 100     X - Cannot   0 -                      (2) - Mildly 2 -          Segments  Size  Interpret    Hyperkinetic 1 - Normal  Hypokinetic  Hypokinetic  1-2     small                                                     7 -          3-5      moderate  3 - Akinetic 4 -          5 -         6 - Akinetic Dyskinetic   6-14    large               Dyskinetic   Aneurysmal  w/scar       w/scar       15-16   diffuse     Left Ventricle  Left ventricular size, wall motion and function are normal. The ejection  fraction is > 65%. There is normal left ventricular wall thickness. Left  ventricular diastolic function is normal. No regional wall motion  abnormalities noted.     Right Ventricle  The right ventricle is normal size. TAPSE is abnormal, which is consistent  with abnormal right ventricular systolic function. Mildly decreased right  ventricular systolic function.     Atria  Normal left atrial size. Right atrial size is normal. There is no color  Doppler evidence of an atrial shunt.     Mitral Valve  Mitral valve leaflets appear normal. There is no evidence of mitral stenosis  or clinically significant mitral regurgitation. There is no mitral  regurgitation noted. There is no mitral valve stenosis.     Tricuspid Valve  The tricuspid valve is not well visualized, but is grossly normal. Right  ventricle systolic pressure estimate normal. There is  physiologic tricuspid  regurgitation. There is no tricuspid stenosis.     Aortic Valve  Aortic valve leaflets appear normal. There is no evidence of aortic stenosis  or clinically significant aortic regurgitation. The aortic valve is  trileaflet. No aortic regurgitation is present. No aortic stenosis is present.     Pulmonic Valve  The pulmonic valve is not well seen, but is grossly normal. This degree of  valvular regurgitation is within normal limits. There is no pulmonic valvular  stenosis.     Vessels  The aorta root is normal. Normal size ascending aorta. IVC diameter <2.1 cm  collapsing >50% with sniff suggests a normal RA pressure of 3 mmHg.     Pericardium  There is no pericardial effusion.     Rhythm  Sinus rhythm was noted.     ______________________________________________________________________________  MMode/2D Measurements & Calculations  IVSd: 1.1 cm  LVIDd: 4.5 cm  LVIDs: 2.3 cm  LVPWd: 0.64 cm  FS: 48.4 %     LV mass(C)d: 128.2 grams  LV mass(C)dI: 63.0 grams/m2  LA dimension: 4.1 cm  asc Aorta Diam: 3.2 cm  LVOT diam: 2.2 cm  LVOT area: 3.9 cm2  Asc Ao diam index BSA (cm/m2): 1.6  Asc Ao diam index Ht(cm/m): 1.8  LA Volume (BP): 57.6 ml  LA Volume Index (BP): 28.2 ml/m2     LA Volume Indexed (AL/bp): 29.8 ml/m2  RV Base: 3.6 cm  RWT: 0.29  TAPSE: 1.5 cm     Doppler Measurements & Calculations  MV E max tejinder: 94.0 cm/sec  Ao V2 max: 126.0 cm/sec  Ao max P.0 mmHg  Ao V2 mean: 86.2 cm/sec  Ao mean PG: 3.4 mmHg  Ao V2 VTI: 22.8 cm  JUANPABLO(I,D): 2.6 cm2  JUANPABLO(V,D): 2.6 cm2  LV V1 max P.8 mmHg  LV V1 max: 83.1 cm/sec  LV V1 VTI: 15.3 cm  SV(LVOT): 59.9 ml  SI(LVOT): 29.4 ml/m2  PA V2 max: 94.5 cm/sec  PA max PG: 3.6 mmHg  PA acc time: 0.08 sec     AV Tejinder Ratio (DI): 0.66  JUANPABLO Index (cm2/m2): 1.3  E/E' av.2  Lateral E/e': 6.0  Medial E/e': 6.4  RV S Tejinder: 15.2 cm/sec     ______________________________________________________________________________  Report approved by: Carolina Henson  02/05/2024 02:02 PM             Discharge Medications   Current Discharge Medication List        CONTINUE these medications which have CHANGED    Details   losartan (COZAAR) 25 MG tablet Take 1 tablet (25 mg) by mouth at bedtime for 30 days  Qty: 30 tablet, Refills: 0    Associated Diagnoses: Primary hypertension           CONTINUE these medications which have NOT CHANGED    Details   acetaminophen (TYLENOL) 325 MG tablet Take 2 tablets (650 mg) by mouth every 4 hours as needed for other (For optimal non-opioid multimodal pain management to improve pain control.)    Associated Diagnoses: History of partial pancreatectomy      aspirin (ASA) 81 MG EC tablet Take 81 mg by mouth daily      atorvastatin (LIPITOR) 40 MG tablet Take 1 tablet by mouth daily      carbidopa-levodopa (SINEMET CR)  MG CR tablet Take 1 tablet by mouth At Bedtime      carbidopa-levodopa (SINEMET)  MG tablet Take 2 tablets by mouth 3 times daily      dasiglucagon HCl (ZEGALOGUE) 0.6 MG/0.6ML SOAJ injection Inject 0.6 mg Subcutaneous once as needed (use for severe hypoglycemia)  Qty: 1.2 mL, Refills: 1    Associated Diagnoses: Type 2 diabetes mellitus treated with insulin (H)      gabapentin (NEURONTIN) 300 MG capsule Take 600 mg by mouth 3 times daily      !! insulin aspart (NOVOLOG FLEXPEN) 100 UNIT/ML pen Inject 4 Units Subcutaneous 2 times daily (with meals)      !! insulin aspart (NOVOLOG FLEXPEN) 100 UNIT/ML pen Inject subcutaneous per sliding scale three times daily with meals:  190-239 = 1 unit  240-289 = 2 units  290-339 = 3 units  >340 = 4 units   And Inject subcutaneously at bedtime per sliding scale:  200-249 = 1 unit  250-300 = 2 units   >300 = 3 units      insulin glargine 100 UNIT/ML pen Inject 14 units subcutaneous every morning and 20 units every night at bedtime      lipase-protease-amylase (ZENPEP) 15741-019853-763282 units CPEP Take 1 capsule by mouth 3 times daily (with meals)  Qty: 90 capsule, Refills: 3     Comments:  provided med sent to [facility] pharmacy for distribution  Associated Diagnoses: Necrotizing pancreatitis      magnesium oxide (MAG-OX) 400 MG tablet Take 400 mg by mouth every other day      metFORMIN (GLUCOPHAGE XR) 500 MG 24 hr tablet Take 1 tablet (500 mg) by mouth daily (with dinner) for 7 days, THEN 2 tablets (1,000 mg) daily (with dinner) for 7 days, THEN 3 tablets (1,500 mg) daily (with dinner) for 7 days, THEN 4 tablets (2,000 mg) daily (with dinner) for 90 days.  Qty: 402 tablet, Refills: 0    Associated Diagnoses: Type 2 diabetes mellitus treated with insulin (H)      Multiple Vitamin (MULTI-VITAMINS) TABS Take 1 tablet by mouth daily      omeprazole (PRILOSEC) 20 MG DR capsule Take 20 mg by mouth daily      BD PEN NEEDLE LINDY 2ND GEN 32G X 4 MM miscellaneous USE TO INJECT INSULIN 4 TIMES A DAY  Qty: 400 each, Refills: 3    Associated Diagnoses: Type 2 diabetes mellitus without complication (H)      Continuous Blood Gluc  (DEXCOM G7 ) ALIA Use to read blood sugars as per 's instructions.  Qty: 1 each, Refills: 0    Associated Diagnoses: Type 2 diabetes mellitus without complication (H)      Continuous Blood Gluc Sensor (DEXCOM G7 SENSOR) MISC Change every 10 days.  Qty: 9 each, Refills: 1    Comments: Per Prescription Modification CPA  Associated Diagnoses: Type 2 diabetes mellitus without complication (H)      Glucagon (BAQSIMI) 3 MG/DOSE nasal powder Spray 1 spray (3 mg) in nostril as needed (severe hypoglycemia) in the event of unconscious hypoglycemia or hypoglycemic seizure. May repeat dose if no response after 15 minutes.  Qty: 2 each, Refills: 1    Associated Diagnoses: Type 2 diabetes mellitus treated with insulin (H)       !! - Potential duplicate medications found. Please discuss with provider.        Allergies   Allergies   Allergen Reactions    Ciprofloxacin Other (See Comments), Hives, Itching, Rash and Unknown     Other reaction(s): Other (see  "comments)  Oral swelling per [facility]        Dilaudid [Hydromorphone] Rash    Morphine Rash     rash    Penicillins Rash     aka ancef/ rash      Citalopram Unknown    Oxycodone Rash     \"HORRIBLE, SEVERE RASH MAYBE CAUSED BY OXY\"     "

## 2024-02-07 NOTE — PROGRESS NOTES
"PRIMARY DIAGNOSIS: \"GENERIC\" NURSING  OUTPATIENT/OBSERVATION GOALS TO BE MET BEFORE DISCHARGE:  ADLs back to baseline: Yes    Activity and level of assistance: Assist x 1    Pain status: Pain free.    Return to near baseline physical activity: Yes     Discharge Planner Nurse   Safe discharge environment identified: Yes  Barriers to discharge: Yes         "

## 2024-02-07 NOTE — PLAN OF CARE
Occupational Therapy Discharge Summary    Reason for therapy discharge:    Discharged to remains in hospital    Progress towards therapy goal(s). See goals on Care Plan in [facility] electronic health record for goal details.  Goals met    Therapy recommendation(s):    Patient may benefit from home care occupational therapy services to ensure safety in home environment. Patient able to perform ADLs in hospitals with SBA.

## 2024-02-07 NOTE — PROGRESS NOTES
"PRIMARY DIAGNOSIS: \"GENERIC\" NURSING  OUTPATIENT/OBSERVATION GOALS TO BE MET BEFORE DISCHARGE:  ADLs back to baseline: Yes    Activity and level of assistance: Assist x 1    Pain status: Pain free.    Return to near baseline physical activity: Yes     Discharge Planner Nurse   Safe discharge environment identified: Yes  Barriers to discharge: Yes       Entered by: Arnaldo King RN 02/06/2024 11:29 PM     A & O x 4. VSS except hypertensive on RA. Denies pain. Tele: 1st deg AV block. Consistent carb 60g diet, tolerating well. , 360. Uses urinal & commode, makes needs known. R PIV SL. Assist x 1. Neurology following. Nursing continue to monitor.  "

## 2024-02-07 NOTE — TELEPHONE ENCOUNTER
The order for Dexcom sensors was updated from #3 + 5 refill to  #9 + 1 refills, due to Medicare B compliance guidelines (medicare will now sometimes pay for 3 months at a time).  A new Rx was sent to pharmacy and order was updated in Epic.  Per Prescription Modification CPA

## 2024-02-07 NOTE — PLAN OF CARE
"PRIMARY DIAGNOSIS: \"GENERIC\" NURSING  OUTPATIENT/OBSERVATION GOALS TO BE MET BEFORE DISCHARGE:  ADLs back to baseline: Yes    Activity and level of assistance: Up with standby assistance.    Pain status: Pain free.    Return to near baseline physical activity: Yes     Discharge Planner Nurse   Safe discharge environment identified: Yes  Barriers to discharge: No       Entered by: Diann Gan RN 02/07/2024 4:55 AM   A & O x 4. VSS except  occasional BP in 160s on RA. Denies pain. Tele: 1st deg AV block. Consistent carb 60g diet, tolerating well. , 360. Uses urinal & commode, makes needs known. R PIV SL. Assist x 1. Neurology following. Nursing continue to monitor.   Please review provider order for any additional goals.   Nurse to notify provider when observation goals have been met and patient is ready for discharge.Goal Outcome Evaluation:                        "

## 2024-02-08 ENCOUNTER — TELEPHONE (OUTPATIENT)
Dept: CARDIOLOGY | Facility: CLINIC | Age: 69
End: 2024-02-08
Payer: MEDICARE

## 2024-02-08 ENCOUNTER — PATIENT OUTREACH (OUTPATIENT)
Dept: CARE COORDINATION | Facility: CLINIC | Age: 69
End: 2024-02-08
Payer: MEDICARE

## 2024-02-08 DIAGNOSIS — Z95.0 CARDIAC PACEMAKER IN SITU: ICD-10-CM

## 2024-02-08 DIAGNOSIS — I44.2 HEART BLOCK AV THIRD DEGREE (H): ICD-10-CM

## 2024-02-08 DIAGNOSIS — I44.1 HEART BLOCK AV SECOND DEGREE: Primary | ICD-10-CM

## 2024-02-08 DIAGNOSIS — I45.5 SINUS PAUSE: ICD-10-CM

## 2024-02-08 PROCEDURE — 93248 EXT ECG>7D<15D REV&INTERPJ: CPT | Performed by: INTERNAL MEDICINE

## 2024-02-08 RX ORDER — CLINDAMYCIN PHOSPHATE 900 MG/50ML
900 INJECTION, SOLUTION INTRAVENOUS
Status: CANCELLED | OUTPATIENT
Start: 2024-02-08

## 2024-02-08 RX ORDER — SODIUM CHLORIDE 9 MG/ML
INJECTION, SOLUTION INTRAVENOUS CONTINUOUS
Status: CANCELLED | OUTPATIENT
Start: 2024-02-08

## 2024-02-08 RX ORDER — LIDOCAINE 40 MG/G
CREAM TOPICAL
Status: CANCELLED | OUTPATIENT
Start: 2024-02-08

## 2024-02-08 NOTE — TELEPHONE ENCOUNTER
I was asked to review a patch monitor for patient with 10 second pause.    I [place] [facility] [place] AV Wenckebach noted during ERCP in May 2023. Wenckebach had previously been noted and he was asymptomatic. No pacemaker was indicated.    Patient saw Dr. Dahl at [facility] 1/16/2024 to establish follow up for CAD/CABG. She had ordered a patch monitor to follow up on baseline prolonged VT interval and AV Wenckebach. Patch monitor was worn from 1/16-1/30/2024.    Patient was admitted to [facility] from 2/4-2/7/2024 for weakness and fogginess, found to have orthostatic hypotension which he has had issues with from autonomic dysfunction due to Parkinson's. Cardiology was consulted for AV Wenckebach on telemetry with no symptoms or changes in BP, no pacemaker was recommended.    Ziopatch monitor results processed today, and notification was given for a 10-second pause.    My personal review of patch monitor:  Dates 1/16-1/30/2024.  Sinus with prolonged VT, average 79 bpm, range  bpm, PACs/PVCs <1%.  Frequent episodes of AV Wenckebach. Patient triggered events correlated with sinus rhythm, with and without Wenckebach.  On 1/20/2024 at 12:42 am, one 4-beat run NSVT, no symptoms.  On  1/24/2024 at 4:42 pm: 10.5 seconds of sinus rhythm with AV block and no escape rhythm - no events triggered by patient.  1/27/2024 at 1:50 am: 3.4 seconds or AV block.      I spoke with patient and his wife Veronika by phone. He had no syncope or presyncope that he recalled while wearing monitor. He is feeling much better since his BP meds were reduced in hospital. His main issues had been orthostatic dizziness and generalized weakness for which he was recently hospitalized, without any AV block noted (EKG on 2/4/2024 was sinus with AV Wenckebach). However, given 10.5 seconds of AV block during waking hours, a pacemaker seems reasonable at this time. Given that the event occurred on 1/24, and  he's been in hospital since then without any AV block, I will plan to schedule elective pacemaker soon rather than for him to go to ED.    Will schedule pacemaker implant, with MAC given his Parkinson's. Left side dual chamber. Patient is in agreement.  In the meantime should he have significant dizziness or syncope he will come to ED immediately.    Jenna Hernandez MD

## 2024-02-08 NOTE — PROGRESS NOTES
"Clinic Care Coordination Contact  [facility]: Post-Discharge Note  SITUATION                                                      Admission:    Admission Date: 02/04/24   Reason for Admission: Generalized weakness  Discharge:   Discharge Date: 02/07/24  Discharge Diagnosis: Orthostatic hypotension, Parkinson's Disease    BACKGROUND                                                      Per hospital discharge summary and inpatient provider notes:    Arnaldo Henderson is a 68 year old male who presents by walk-in for evaluation of generalized weakness.      Per the patient's nurse, the patient is hypotensive with a blood pressure in the 70s/40s. He has had episodes of being unable to get up from sitting. These symptoms began after he started taking Metformin one week ago. He also takes insulin.      Per the patient, he felt weak earlier today, 02/04, although he is not weak now. He further denies having a headache, vision changes, diaphoresis, chills, a fever, syncope, chest pain, heart palpitations, shortness of breath, a cough, nausea, abdominal pain, dysuria, hematuria, leg swelling, new rashes or black or bloody stool. He had a bypass surgery 20 years ago.      Per the patient's wife, the patient sometimes has difficulties ambulating due to his Parkinson's. However, this has worsened over the last few months to the point that the patient must lower himself to the floor and \"scoot\" across the floor when getting out of bed. The patient's wife says that this became significantly worse on 02/03. She denies that he has taken any falls or that he has been confused. She notes that he just got done wearing a heart monitor.     ASSESSMENT      Discharge Assessment  How are you doing now that you are home?: \"I'm fine, so far so good.\"  How are your symptoms? (Red Flag symptoms escalate to triage hotline per guidelines): Improved  Do you feel your condition is stable enough to be safe at home until your provider visit?: " Yes  Does the patient have their discharge instructions? : Yes  Does the patient have questions regarding their discharge instructions? : No  Were you started on any new medications or were there changes to any of your previous medications? : Yes (Change to an existing medication)  Does the patient have all of their medications?: Yes  Do you have questions regarding any of your medications? : No  Do you have all of your needed medical supplies or equipment (DME)?  (i.e. oxygen tank, CPAP, cane, etc.): Yes  Discharge follow-up appointment scheduled within 14 calendar days? : Yes  Discharge Follow Up Appointment Date: 02/13/24  Discharge Follow Up Appointment Scheduled with?: Specialty Care Provider (Endocrinology appt.)  Is patient agreeable to assistance with scheduling? : Yes    Post-op ([facility] Only)  If the patient had a surgery or procedure, do they have any questions for a nurse?: No      PLAN                                                      Outpatient Plan:      Follow-ups Needed After Discharge  Follow-up Appointments     Follow-up and recommended labs and tests       Follow up with primary care provider, Alanna Lazaro, within 7 days for   hospital follow- up and orthostatic hypotension follow p.  No follow up   labs or test are needed.    Follow up with Endocrinology , within 7-14 days  to evaluate medication   change. No follow up labs or test are needed.  Follow up with Neurology , within 7-14 days  to follow up Parkinsons and   autonomic dysfunction. No follow up labs or test are needed.      Future Appointments   Date Time Provider Department Center   2/13/2024 12:30 PM Mj Reynolds MD [facility]   3/12/2024 12:30 PM  LAB [facility]   3/12/2024  1:20 PM [facility]   3/13/2024  2:00 PM [facility]   3/14/2024 10:00 AM Ashvin Haider MD [facility]   3/18/2024  1:00 PM Caio Lyn MD [facility]   3/20/2024 11:30 AM Gail  MD Mj [facility]ENC [facility]   3/27/2024 12:30 PM Valeri Gomez PA-C [facility]         For any urgent concerns, please contact our 24 hour nurse triage line: [phone] ([phone])         LESVIA Oliveira  [phone]  [facility]

## 2024-02-08 NOTE — TELEPHONE ENCOUNTER
Patient saw THE [employer] message. No response at this time.    Olamide Flores, RN, BSN  Cardiology RN Care Coordinator   [place]   Phone: [phone]  Fax: [phone] ([place]) [phone] ([place])

## 2024-02-09 ENCOUNTER — TELEPHONE (OUTPATIENT)
Dept: EDUCATION SERVICES | Facility: CLINIC | Age: 69
End: 2024-02-09
Payer: MEDICARE

## 2024-02-09 LAB
BACTERIA BLD CULT: NO GROWTH
BACTERIA BLD CULT: NO GROWTH

## 2024-02-09 NOTE — TELEPHONE ENCOUNTER
Little Banda a call back. She says that Pat was in the hospital which is why they had to cancel the Diabetes Education appointment. Sri is now home and we got him logged back into his Dexcom account. Veronika notes that Sri's medical conditions appear to have worsened after Pat started taking Metformin and she wonders is there is a correlation. I told her that I did not know but she could bring this up with Dr. Reynolds on their appointment on 2/13. They have not been taking metformin and do not wish to restart it.    We rescheduled our appointment for 3/18 in person at 11 am.

## 2024-02-09 NOTE — TELEPHONE ENCOUNTER
[facility] Call Center    Phone Message    May a detailed message be left on voicemail: no     Reason for Call: Other: Requesting a call back from Abiel Dee Dee, requesting pin for husbands decom had to cancel due to hospitalization not sure when she would like to see patient. Please call to discuss, thank you!      Action Taken: Message routed to:  [facility] ([facility]): Diab ed    Travel Screening: Not Applicable

## 2024-02-13 ENCOUNTER — OFFICE VISIT (OUTPATIENT)
Dept: ENDOCRINOLOGY | Facility: CLINIC | Age: 69
End: 2024-02-13
Payer: MEDICARE

## 2024-02-13 VITALS
SYSTOLIC BLOOD PRESSURE: 77 MMHG | DIASTOLIC BLOOD PRESSURE: 41 MMHG | OXYGEN SATURATION: 94 % | HEART RATE: 82 BPM | BODY MASS INDEX: 30.88 KG/M2 | WEIGHT: 203.1 LBS

## 2024-02-13 DIAGNOSIS — Z79.4 INSULIN DEPENDENT TYPE 2 DIABETES MELLITUS (H): Primary | ICD-10-CM

## 2024-02-13 DIAGNOSIS — E11.9 INSULIN DEPENDENT TYPE 2 DIABETES MELLITUS (H): Primary | ICD-10-CM

## 2024-02-13 PROCEDURE — 99215 OFFICE O/P EST HI 40 MIN: CPT | Performed by: INTERNAL MEDICINE

## 2024-02-13 NOTE — LETTER
2/13/2024         RE: Arnaldo Henderson  [address]  [place] [address]        Dear Colleague,    Thank you for referring your patient, Arnaldo Henderson, to the [facility] [place]. Please see a copy of my visit note below.    Endocrinology Clinic Visit     Arnaldo Henderson is a 68 year old male with CAD s/p CABG, 2nd degree AV block, DM, GERD, HTN, h/o chronic pancreatitis s/p distal pancreatectomy, neuroendocrine tumor and PD who is here for diabetes management.       Interval History  Pt was recently admitted.  Metformin was discontinued since admission and patient has not resume.  Pt does not want to restart metformin.  Report only taking insulin  Basaglar 14 unit(s) AM and 30 unit(s) PM  Novolog 4/7/10 with meal plus sliding scale insulin  Pt injects Novolog 15 mins before meal and rotate site of injection    Pt is getting pace maker put in later this month    CGM: improvement in hypoglycemia, but TIR worsening with more hyperglycemia (especially after meal)    Initial visit 1/10/24    Per Dr. Lyn note 11/27/2023  He is 68 years old.  His tumor was found incidentally when he was undergoing a pancreatectomy for chronic pancreatitis.    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  He has small volume mildly progressive disease within the abdomen without any clearly associated symptoms.   For now his rate of growth and very small volume disease does not warrant active intervention. We will plan on a follow-up scan in 3 to 4 months.     Per Inpatient DM team note 7/2023  Arnaldo Henderson 67 y/o with PMH chronic pancreatitis, CAD, CABG, 2nd degree AVB, Parkinson's disease and chronic pain. He is now s/p distal pancreatectomy, splenectomy, YVAN, proximal ileum resection. He is admitted to the SICU for hemodynamic monitoring.  # T2DM not at goal with hyperglycemia.  Poor control prior to admission   # Stress induced hyperglycemia  #  post pancreatic surgery  Plan:                  -Lantus 15 units                 -Novolog 1u/10 gm CHO coverage with meals and snacks                 -Continue insulin drip for now.                 -BG monitoring as per insulin drip protocol                 -hypoglycemia protocol                 -recommend diet with carb counting protocol                 -diabetes education needs will be assessed closer to discharge                 -on discharge, will recommend outpatient follow up with [facility] Endocrinology service     Per most recent endocrine clinic visit with my colleague Hayley [facility] 1/2024   TYPE 2 DIABETES MELLITUS:   Sugars are currently at goal but with excessive hypoglycemia.  Advising him to reduce his Basaglar dose to 14 units in the morning 30 in the evening nearly 10% reduction.  Also advising him to continue his breakfast dose of 4 units but decrease lunch dose to just 7 units and dinner dose to 10 units plus any correction.  Will continue current correction.   5.  Neuroendocrine tumor: See Dr. Gail mcarthur next week    I confirmed with patient and wife and they confirmed that Pat is seeing me today for diabetes.  They would like to have a doctor on the team also with our diabetes team at the .    Prior to admission, he was on metformin 2000 mg/day, no side effect  Pt and wife reported seeing doctors for diabetes once during inpatient.  Report that about 60% of his pancreas was removed  Pt follows neuroendocrine tumor with Dr. Lyn no concerns.    Patient's main concerns today are his diabetes control after steroid injection and his recent lab results.  11/21/2023  Steroid injection, glucose was high   He may need another injection in the future and is concerned.  They are wondering about high BUN, previous abnormal LFT, low hematocrit and abnormal RBC    In terms of symptoms, he said PD is bothersome  But no problem taking insulin  Feels like hypoglycemia improve since lower insulin dose  last week    Pt only has EyesBot alarm set for urgent low.  Sometimes he could not feel low glucose.  He recently bought glucose tablets to help with lows    Current regimen  Basaglar 14 units AM 30 units hs  Novolog 4 units with breakfast + sliding scale insulin, lunch 7 + sliding scale insulin, supper 10 + sliding scale insulin, night sliding scale insulin    CGM 1 week data  TIR 49%, low 4%, very low 1%         REVIEW OF SYSTEMS  10 point negative except as mentioned in HPI    Past Medical/Surgical History:  Past Medical History:   Diagnosis Date     CAD (coronary artery disease)     CABG     Diabetes mellitus, type 2 (H) 2013     Gastroesophageal reflux disease      Hypercholesteremia      Hypertension      Mixed conductive and sensorineural hearing loss of both ears      Mixed hearing loss      Pancreatic duct stricture      Pancreatitis      Parkinson disease      Second degree AV block      Past Surgical History:   Procedure Laterality Date     CA ANESTH CABG W/PUMP       CHOLECYSTECTOMY  2022     COLONOSCOPY N/A 01/12/2023    Procedure: colonoscopy with fluroscopy;  Surgeon: Ayden Fraire MD;  Location:  OR     ENDOSCOPIC RETROGRADE CHOLANGIOPANCREATOGRAM N/A 05/18/2023    Procedure: ENDOSCOPIC RETROGRADE CHOLANGIOPANCREATOGRAPHY, WITH  CYSTDUODENOSTOMY STENTS X2 REMOVAL;  Surgeon: Cedric Saldana MD;  Location: [facility] OR     ENT SURGERY       LAPAROSCOPY DIAGNOSTIC (GENERAL) N/A 02/20/2023    Procedure: LAPAROSCOPY, DIAGNOSTIC; RETRIEVAL OF MIGRATED STENT;  Surgeon: Joseluis Lima MD;  Location: [facility] OR     ORTHOPEDIC SURGERY       PANCREATECTOMY PEUSTOW N/A 06/30/2023    Procedure: Open distal pancreactectomy with splenectomy, lysis of adhesions, resection of proximal illeum;  Surgeon: Ashvin Haider MD;  Location: [facility] OR     CA CABG, ARTERIAL, TWO  2003       Medications  Current Outpatient Medications   Medication     acetaminophen (TYLENOL) 325 MG tablet     aspirin (ASA) 81 MG EC  "tablet     atorvastatin (LIPITOR) 40 MG tablet     BD PEN NEEDLE LINDY 2ND GEN 32G X 4 MM miscellaneous     carbidopa-levodopa (SINEMET CR)  MG CR tablet     carbidopa-levodopa (SINEMET)  MG tablet     Continuous Blood Gluc  (DEXCOM G7 ) ALIA     Continuous Blood Gluc Sensor (DEXCOM G7 SENSOR) MISC     dasiglucagon HCl (ZEGALOGUE) 0.6 MG/0.6ML SOAJ injection     gabapentin (NEURONTIN) 300 MG capsule     Glucagon (BAQSIMI) 3 MG/DOSE nasal powder     insulin aspart (NOVOLOG FLEXPEN) 100 UNIT/ML pen     insulin glargine 100 UNIT/ML pen     lipase-protease-amylase (ZENPEP) 07079-817071-026793 units CPEP     losartan (COZAAR) 25 MG tablet     magnesium oxide (MAG-OX) 400 MG tablet     Multiple Vitamin (MULTI-VITAMINS) TABS     omeprazole (PRILOSEC) 20 MG DR capsule     insulin aspart (NOVOLOG FLEXPEN) 100 UNIT/ML pen     metFORMIN (GLUCOPHAGE XR) 500 MG 24 hr tablet     No current facility-administered medications for this visit.       Allergies  Allergies   Allergen Reactions     Ciprofloxacin Other (See Comments), Hives, Itching, Rash and Unknown     Other reaction(s): Other (see comments)  Oral swelling per [facility]         Dilaudid [Hydromorphone] Rash     Morphine Rash     rash     Penicillins Rash     aka ancef/ rash       Citalopram Unknown     Oxycodone Rash     \"HORRIBLE, SEVERE RASH MAYBE CAUSED BY OXY\"         Family History  family history includes Lung Cancer in his brother; Parkinsonism in his mother.    Social History  Social History     Tobacco Use     Smoking status: Former     Types: Cigarettes     Smokeless tobacco: Never   Substance Use Topics     Alcohol use: Not Currently       Physical Exam  BP (!) 77/41 (BP Location: Right arm, Patient Position: Sitting, Cuff Size: Adult Regular)   Pulse 82   Wt 92.1 kg (203 lb 1.6 oz)   SpO2 94%   BMI 30.88 kg/m    Body mass index is 30.88 kg/m .  GENERAL : Frail, no acute distress, hearing aid in place  Abd: no palpable subcu " scar/ hematoma  NEURO: awake, alert, responds appropriately to questions.      DATA REVIEW  Labs/Imaging  Lab Results   Component Value Date    A1C 8.2 11/16/2023    A1C 9.6 06/30/2023            ASSESSMENT/PLAN:   Arnaldo Henderson is a 68 year old male with CAD s/p CABG, 2nd degree AV block, DM, GERD, HTN, h/o chronic pancreatitis s/p distal pancreatectomy, neuroendocrine tumor and PD who is here for diabetes management.     ## T2DM  ## H/o chronic pancreatitis  ## Partial pancreatectomy (report 60%)  ## CAD s/p CABG  ## Hypoglycemia unawareness  ## H/o Grade 2 hypoglycemia  ## Cannot tolerate metformin  Hypoglycemia improve, but worsening hyperglycemia  Plan for goal A1c 8  CGM Time in target () >50%, and low (<70) <1%  Goal fasting 100-150, [id number] mg/dL  Unable to use GLP1 agonist given h/o pancreatitis  Pt declined restarting metformin  Will check endogenous insulin reserve, may consider SGLT2i  Difficult to control glucose by multiple daily injection, discussed automated insulin pump with no carb counting required, patient is interested.  -- continue Basaglar 14 unit(s) AM and 30 unit(s) PM [for procedure decrease to 24 unit(s) PM and 10 unit(s) AM the day of procedure]  -- increase Novolog with meals to 6/10/10 unit(s)   -- continue sliding scale insulin   -- fasting blood work  -- CDE for possible insulin pump (Medtronic 780G and Ilet-betabionic)  -- pending patient assistance glucagon  -- try glucose gel for hypoglycemia      Follow up 4-6 weeks  Orders Placed This Encounter   Procedures     C-peptide     Glucose     Adult Diabetes Education  Referral            Mj Reynolds MD                  Again, thank you for allowing me to participate in the care of your patient.        Sincerely,        Mj Reynolds MD

## 2024-02-13 NOTE — PROGRESS NOTES
Endocrinology Clinic Visit     Arnaldo Henderson is a 68 year old male with CAD s/p CABG, 2nd degree AV block, DM, GERD, HTN, h/o chronic pancreatitis s/p distal pancreatectomy, neuroendocrine tumor and PD who is here for diabetes management.       Interval History  Pt was recently admitted.  Metformin was discontinued since admission and patient has not resume.  Pt does not want to restart metformin.  Report only taking insulin  Basaglar 14 unit(s) AM and 30 unit(s) PM  Novolog 4/7/10 with meal plus sliding scale insulin  Pt injects Novolog 15 mins before meal and rotate site of injection    Pt is getting pace maker put in later this month    CGM: improvement in hypoglycemia, but TIR worsening with more hyperglycemia (especially after meal)    Initial visit 1/10/24    Per Dr. Lyn note 11/27/2023  He is 68 years old.  His tumor was found incidentally when he was undergoing a pancreatectomy for chronic pancreatitis.    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  He has small volume mildly progressive disease within the abdomen without any clearly associated symptoms.   For now his rate of growth and very small volume disease does not warrant active intervention. We will plan on a follow-up scan in 3 to 4 months.     Per Inpatient DM team note 7/2023  Arnaldo Henderson 69 y/o with PMH chronic pancreatitis, CAD, CABG, 2nd degree AVB, Parkinson's disease and chronic pain. He is now s/p distal pancreatectomy, splenectomy, YVAN, proximal ileum resection. He is admitted to the SICU for hemodynamic monitoring.  # T2DM not at goal with hyperglycemia.  Poor control prior to admission   # Stress induced hyperglycemia  # post pancreatic surgery  Plan:                  -Lantus 15 units                 -Novolog 1u/10 gm CHO coverage with meals and snacks                 -Continue insulin drip for now.                 -BG monitoring as per insulin drip protocol                 -hypoglycemia  protocol                 -recommend diet with carb counting protocol                 -diabetes education needs will be assessed closer to discharge                 -on discharge, will recommend outpatient follow up with [facility]ology service     Per most recent endocrine clinic visit with my colleague Hayley Alatorre PAC 1/2024   TYPE 2 DIABETES MELLITUS:   Sugars are currently at goal but with excessive hypoglycemia.  Advising him to reduce his Basaglar dose to 14 units in the morning 30 in the evening nearly 10% reduction.  Also advising him to continue his breakfast dose of 4 units but decrease lunch dose to just 7 units and dinner dose to 10 units plus any correction.  Will continue current correction.   5.  Neuroendocrine tumor: See Dr. Gail mcarthur next week    I confirmed with patient and wife and they confirmed that Pat is seeing me today for diabetes.  They would like to have a doctor on the team also with our diabetes team at the .    Prior to admission, he was on metformin 2000 mg/day, no side effect  Pt and wife reported seeing doctors for diabetes once during inpatient.  Report that about 60% of his pancreas was removed  Pt follows neuroendocrine tumor with Dr. Lyn no concerns.    Patient's main concerns today are his diabetes control after steroid injection and his recent lab results.  11/21/2023  Steroid injection, glucose was high   He may need another injection in the future and is concerned.  They are wondering about high BUN, previous abnormal LFT, low hematocrit and abnormal RBC    In terms of symptoms, he said PD is bothersome  But no problem taking insulin  Feels like hypoglycemia improve since lower insulin dose last week    Pt only has Geni alarm set for urgent low.  Sometimes he could not feel low glucose.  He recently bought glucose tablets to help with lows    Current regimen  Basaglar 14 units AM 30 units hs  Novolog 4 units with breakfast + sliding scale insulin, lunch 7 +  sliding scale insulin, supper 10 + sliding scale insulin, night sliding scale insulin    CGM 1 week data  TIR 49%, low 4%, very low 1%         REVIEW OF SYSTEMS  10 point negative except as mentioned in HPI    Past Medical/Surgical History:  Past Medical History:   Diagnosis Date    CAD (coronary artery disease)     CABG    Diabetes mellitus, type 2 (H) 2013    Gastroesophageal reflux disease     Hypercholesteremia     Hypertension     Mixed conductive and sensorineural hearing loss of both ears     Mixed hearing loss     Pancreatic duct stricture     Pancreatitis     Parkinson disease     Second degree AV block      Past Surgical History:   Procedure Laterality Date    CA ANESTH CABG W/PUMP      CHOLECYSTECTOMY  2022    COLONOSCOPY N/A 01/12/2023    Procedure: colonoscopy with fluroscopy;  Surgeon: Ayden Fraire MD;  Location:  OR    ENDOSCOPIC RETROGRADE CHOLANGIOPANCREATOGRAM N/A 05/18/2023    Procedure: ENDOSCOPIC RETROGRADE CHOLANGIOPANCREATOGRAPHY, WITH  CYSTDUODENOSTOMY STENTS X2 REMOVAL;  Surgeon: Cedric Saldana MD;  Location: [facility] OR    ENT SURGERY      LAPAROSCOPY DIAGNOSTIC (GENERAL) N/A 02/20/2023    Procedure: LAPAROSCOPY, DIAGNOSTIC; RETRIEVAL OF MIGRATED STENT;  Surgeon: Joseluis Lima MD;  Location: [facility] OR    ORTHOPEDIC SURGERY      PANCREATECTOMY PEUSTOW N/A 06/30/2023    Procedure: Open distal pancreactectomy with splenectomy, lysis of adhesions, resection of proximal illeum;  Surgeon: Ashvin Haider MD;  Location: [facility] OR    SD CABG, ARTERIAL, TWO  2003       Medications  Current Outpatient Medications   Medication    acetaminophen (TYLENOL) 325 MG tablet    aspirin (ASA) 81 MG EC tablet    atorvastatin (LIPITOR) 40 MG tablet    BD PEN NEEDLE LINDY 2ND GEN 32G X 4 MM miscellaneous    carbidopa-levodopa (SINEMET CR)  MG CR tablet    carbidopa-levodopa (SINEMET)  MG tablet    Continuous Blood Gluc  (DEXCOM G7 ) ALIA    Continuous Blood Gluc Sensor  "(DEXCOM G7 SENSOR) MISC    dasiglucagon HCl (ZEGALOGUE) 0.6 MG/0.6ML SOAJ injection    gabapentin (NEURONTIN) 300 MG capsule    Glucagon (BAQSIMI) 3 MG/DOSE nasal powder    insulin aspart (NOVOLOG FLEXPEN) 100 UNIT/ML pen    insulin glargine 100 UNIT/ML pen    lipase-protease-amylase (ZENPEP) 56213-914480-448000 units CPEP    losartan (COZAAR) 25 MG tablet    magnesium oxide (MAG-OX) 400 MG tablet    Multiple Vitamin (MULTI-VITAMINS) TABS    omeprazole (PRILOSEC) 20 MG DR capsule    insulin aspart (NOVOLOG FLEXPEN) 100 UNIT/ML pen    metFORMIN (GLUCOPHAGE XR) 500 MG 24 hr tablet     No current facility-administered medications for this visit.       Allergies  Allergies   Allergen Reactions    Ciprofloxacin Other (See Comments), Hives, Itching, Rash and Unknown     Other reaction(s): Other (see comments)  Oral swelling per [facility]        Dilaudid [Hydromorphone] Rash    Morphine Rash     rash    Penicillins Rash     aka ancef/ rash      Citalopram Unknown    Oxycodone Rash     \"HORRIBLE, SEVERE RASH MAYBE CAUSED BY OXY\"         Family History  family history includes Lung Cancer in his brother; Parkinsonism in his mother.    Social History  Social History     Tobacco Use    Smoking status: Former     Types: Cigarettes    Smokeless tobacco: Never   Substance Use Topics    Alcohol use: Not Currently       Physical Exam  BP (!) 77/41 (BP Location: Right arm, Patient Position: Sitting, Cuff Size: Adult Regular)   Pulse 82   Wt 92.1 kg (203 lb 1.6 oz)   SpO2 94%   BMI 30.88 kg/m    Body mass index is 30.88 kg/m .  GENERAL : Frail, no acute distress, hearing aid in place  Abd: no palpable subcu scar/ hematoma  NEURO: awake, alert, responds appropriately to questions.      DATA REVIEW  Labs/Imaging  Lab Results   Component Value Date    A1C 8.2 11/16/2023    A1C 9.6 06/30/2023            ASSESSMENT/PLAN:   Arnaldo Henderson is a 68 year old male with CAD s/p CABG, 2nd degree AV block, DM, GERD, HTN, h/o chronic " pancreatitis s/p distal pancreatectomy, neuroendocrine tumor and PD who is here for diabetes management.     ## T2DM  ## H/o chronic pancreatitis  ## Partial pancreatectomy (report 60%)  ## CAD s/p CABG  ## Hypoglycemia unawareness  ## H/o Grade 2 hypoglycemia  ## Cannot tolerate metformin  Hypoglycemia improve, but worsening hyperglycemia  Plan for goal A1c 8  CGM Time in target () >50%, and low (<70) <1%  Goal fasting 100-150, [id number] mg/dL  Unable to use GLP1 agonist given h/o pancreatitis  Pt declined restarting metformin  Will check endogenous insulin reserve, may consider SGLT2i  Difficult to control glucose by multiple daily injection, discussed automated insulin pump with no carb counting required, patient is interested.  -- continue Basaglar 14 unit(s) AM and 30 unit(s) PM [for procedure decrease to 24 unit(s) PM and 10 unit(s) AM the day of procedure]  -- increase Novolog with meals to 6/10/10 unit(s)   -- continue sliding scale insulin   -- fasting blood work  -- CDE for possible insulin pump (Medtronic 780G and Ilet-betabionic)  -- pending patient assistance glucagon  -- try glucose gel for hypoglycemia      Follow up 4-6 weeks  Orders Placed This Encounter   Procedures    C-peptide    Glucose    Adult Diabetes Education  Referral            Mj Reynolds MD    02/23/24   Hi Sri     I reviewed CGM data from this week.  Still low glucose about 4%, mostly during the day.  Recommend decrease Novolog from 6/10/10 with meals to 5/8/10 with meals.

## 2024-02-14 DIAGNOSIS — G20.A1 PARKINSON'S DISEASE, UNSPECIFIED WHETHER DYSKINESIA PRESENT, UNSPECIFIED WHETHER MANIFESTATIONS FLUCTUATE (H): ICD-10-CM

## 2024-02-14 DIAGNOSIS — G20.B1 PARKINSON'S DISEASE WITH DYSKINESIA, UNSPECIFIED WHETHER MANIFESTATIONS FLUCTUATE (H): Primary | ICD-10-CM

## 2024-02-14 DIAGNOSIS — Z82.0 FAMILY HISTORY OF PARKINSONISM: ICD-10-CM

## 2024-02-14 PROBLEM — R00.1 BRADYCARDIA: Status: ACTIVE | Noted: 2024-01-12

## 2024-02-14 PROBLEM — I95.9 HYPOTENSION: Status: ACTIVE | Noted: 2024-01-12

## 2024-02-14 RX ORDER — PANCRELIPASE LIPASE, PANCRELIPASE PROTEASE, PANCRELIPASE AMYLASE 20000; 63000; 84000 [USP'U]/1; [USP'U]/1; [USP'U]/1
CAPSULE, DELAYED RELEASE ORAL
COMMUNITY
Start: 2023-07-26 | End: 2024-04-25

## 2024-02-14 RX ORDER — PANTOPRAZOLE SODIUM 40 MG/1
40 TABLET, DELAYED RELEASE ORAL DAILY
COMMUNITY
Start: 2023-07-26 | End: 2024-03-26

## 2024-02-14 NOTE — PROGRESS NOTES
[address]    [place] [facility] [address]     [phone] (M)   [phone] (H)     [email or web address]    Veronika Pena spouse      Keren balbuena daughter    Genetic testing ?   Family history of parkinsonism in mother    Parkinson's disease with dyskinesia, unspecified whether manifestations fluctuate, Ref by EVERT SAMUEL, Recs in [facility], Sched per Spouse Veronika            Medications            Acetaminophen tylenol 325mg        Aspirin 81mg         Atorvastatin lipitor 40mg         Carbidopa/levodopa Sinemet CR 50/200    1    CArbidopa/levodopa sinemet 25/100 2 2 2     Dasiglucagon zegalogue 0.6mg/0.6ml prn       Gabapentin neurontin 300mg 2 2 2     Glucagon baqsimi 3mg/dose nasal powder        Insulin aspart novolog        Insulin aspart novolog        Insulin glargine         Lipase protease amylase zenpep        Losartan cozaar 25mg         Magnesium oxide 400mg         Metformin glucophage XR 500mg 24hr        MVI        Omeprazole prilosec 20mg         Pantoprazole protonic 40mg        Zenpep 2000 93020 above                 I was asked to review a patch monitor for patient with 10 second pause.     I met patient once on 6/1/2023 [place] [facility] [place] evaluation of AV Wenckebach noted during ERCP in May 2023. Wenckebach had previously been noted and he was asymptomatic. No pacemaker was indicated.     Patient saw Dr. Dahl at [facility] 1/16/2024 to establish follow up for CAD/CABG. She had ordered a patch monitor to follow up on baseline prolonged NH interval and AV Wenckebach. Patch monitor was worn from 1/16-1/30/2024.     Patient was admitted to [facility] from 2/4-2/7/2024 for weakness and fogginess, found to have orthostatic hypotension which he has had issues with from autonomic dysfunction due to Parkinson's. Cardiology was consulted for AV Wenckebach on telemetry with no symptoms or changes in BP, no pacemaker was recommended.     Ziopatch monitor results processed today,  and notification was given for a 10-second pause.     My personal review of patch monitor:  Dates 1/16-1/30/2024.  Sinus with prolonged MS, average 79 bpm, range  bpm, PACs/PVCs <1%.  Frequent episodes of AV Wenckebach. Patient triggered events correlated with sinus rhythm, with and without Wenckebach.  On 1/20/2024 at 12:42 am, one 4-beat run NSVT, no symptoms.  On  1/24/2024 at 4:42 pm: 10.5 seconds of sinus rhythm with AV block and no escape rhythm - no events triggered by patient.  1/27/2024 at 1:50 am: 3.4 seconds or AV block.        I spoke with patient and his wife Veronika by phone. He had no syncope or presyncope that he recalled while wearing monitor. He is feeling much better since his BP meds were reduced in hospital. His main issues had been orthostatic dizziness and generalized weakness for which he was recently hospitalized, without any AV block noted (EKG on 2/4/2024 was sinus with AV Wenckebach). However, given 10.5 seconds of AV block during waking hours, a pacemaker seems reasonable at this time. Given that the event occurred on 1/24, and he's been in hospital since then without any AV block, I will plan to schedule elective pacemaker soon rather than for him to go to ED.     Will schedule pacemaker implant, with MAC given his Parkinson's. Left side dual chamber. Patient is in agreement.  In the meantime should he have significant dizziness or syncope he will come to ED immediately.     Jenna Hernandez MD      Date of Admission:  2/4/2024  Date of Discharge:  2/7/2024  Discharging Provider: Mackenzie Yen MD  Discharge Service: Hospitalist Service        Discharge Diagnoses  Orthostatic hypotension   Parkinson's Disease     Arnaldo Henderson is a 68 year old male with PMH including distal pancreatectomy following chronic pancreatitis with stent migration and disconnection of pancreatic duct, IDDM, neuroendocrine tumor of small bowel, Parkinson's disease, CAD s/p CABG, hypertension presents for  evaluation of multiple complaints including weakness, bradykinesia, spells where his body feels heavy and woozy, muscle tension.  Found to have bradycardia and with orthostatic hypotension.  Patient evaluated by neurology.  EEG normal, MRI brain and MRI cervical spine with no acute intracranial process, generalized brain atrophy and presumed microvascular ischemic changes, multilevel degenerative spondylolisthesis of cervical spine with up to moderate central spinal canal stenosis at levels of C3-C6.  Neurology evaluated patient symptoms consistent with orthostatic hypotension. Recommendations from neurology include conservative management of orthostatic hypotension, reduction of losartan to 25 mg and close outpatient follow-up.  Patient evaluated by cardiology for heart block found to have Mobitz type II with no indication for permanent pacemaker.  Workup for urine serotonin was deferred to outpatient given dietary modification needed prior to testing.  Patient's other chronic conditions were managed with PTA medication.     1.Left ventricular size, wall motion and function are normal. The ejection  fraction is > 65%. LV might suggest dehydration.  2.Mildly decreased right ventricular systolic function  3.No hemodynamically significant valvular abnormalities on 2D or color flow  imaging.  There is no comparison study available.    Interval History  Pt was recently admitted.  Metformin was discontinued since admission and patient has not resume.  Pt does not want to restart metformin.  Report only taking insulin  Basaglar 14 unit(s) AM and 30 unit(s) PM  Novolog 4/7/10 with meal plus sliding scale insulin  Pt injects Novolog 15 mins before meal and rotate site of injection     Pt is getting pace maker put in later this month     CGM: improvement in hypoglycemia, but TIR worsening with more hyperglycemia (especially after meal)    Initial visit 1/10/24     Per Dr. Lyn note 11/27/2023  He is 68 years old.  His  tumor was found incidentally when he was undergoing a pancreatectomy for chronic pancreatitis.    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  He has small volume mildly progressive disease within the abdomen without any clearly associated symptoms.   For now his rate of growth and very small volume disease does not warrant active intervention. We will plan on a follow-up scan in 3 to 4 months.      Per Inpatient DM team note 7/2023  Arnaldo Henderson 69 y/o with PMH chronic pancreatitis, CAD, CABG, 2nd degree AVB, Parkinson's disease and chronic pain. He is now s/p distal pancreatectomy, splenectomy, YVAN, proximal ileum resection. He is admitted to the SICU for hemodynamic monitoring.  # T2DM not at goal with hyperglycemia.  Poor control prior to admission   # Stress induced hyperglycemia  # post pancreatic surgery  Plan:                  -Lantus 15 units                 -Novolog 1u/10 gm CHO coverage with meals and snacks                 -Continue insulin drip for now.                 -BG monitoring as per insulin drip protocol                 -hypoglycemia protocol                 -recommend diet with carb counting protocol                 -diabetes education needs will be assessed closer to discharge                 -on discharge, will recommend outpatient follow up with [facility]      Per most recent endocrine clinic visit with my colleague Hayley Alatorre PAC 1/2024  1.  TYPE 2 DIABETES MELLITUS:   Sugars are currently at goal but with excessive hypoglycemia.  Advising him to reduce his Basaglar dose to 14 units in the morning 30 in the evening nearly 10% reduction.  Also advising him to continue his breakfast dose of 4 units but decrease lunch dose to just 7 units and dinner dose to 10 units plus any correction.  Will continue current correction.   5.  Neuroendocrine tumor: See Dr. Gail mcarthur next week     I confirmed with patient and wife and they  confirmed that Pat is seeing me today for diabetes.  They would like to have a doctor on the team also with our diabetes team at the .     Prior to admission, he was on metformin 2000 mg/day, no side effect  Pt and wife reported seeing doctors for diabetes once during inpatient.  Report that about 60% of his pancreas was removed  Pt follows neuroendocrine tumor with Dr. Lyn no concerns.     Patient's main concerns today are his diabetes control after steroid injection and his recent lab results.  11/21/2023  Steroid injection, glucose was high   He may need another injection in the future and is concerned.  They are wondering about high BUN, previous abnormal LFT, low hematocrit and abnormal RBC     In terms of symptoms, he said PD is bothersome  But no problem taking insulin  Feels like hypoglycemia improve since lower insulin dose last week     Pt only has [employer] alarm set for urgent low.  Sometimes he could not feel low glucose.  He recently bought glucose tablets to help with lows     Current regimen  Basaglar 14 units AM 30 units hs  Novolog 4 units with breakfast + sliding scale insulin, lunch 7 + sliding scale insulin, supper 10 + sliding scale insulin, night sliding scale insulin     CGM 1 week data  TIR 49%, low 4%, very low 1%    ASSESSMENT/PLAN:   Arnaldo Henderson is a 68 year old male with CAD s/p CABG, 2nd degree AV block, DM, GERD, HTN, h/o chronic pancreatitis s/p distal pancreatectomy, neuroendocrine tumor and PD who is here for diabetes management.      ## T2DM  ## H/o chronic pancreatitis  ## Partial pancreatectomy (report 60%)  ## CAD s/p CABG  ## Hypoglycemia unawareness  ## H/o Grade 2 hypoglycemia  ## Cannot tolerate metformin  Hypoglycemia improve, but worsening hyperglycemia  Plan for goal A1c 8  CGM Time in target () >50%, and low (<70) <1%  Goal fasting 100-150, [id number] mg/dL  Unable to use GLP1 agonist given h/o pancreatitis  Pt declined restarting metformin  Will check  endogenous insulin reserve, may consider SGLT2i  Difficult to control glucose by multiple daily injection, discussed automated insulin pump with no carb counting required, patient is interested.  -- continue Basaglar 14 unit(s) AM and 30 unit(s) PM [for procedure decrease to 24 unit(s) PM and 10 unit(s) AM the day of procedure]  -- increase Novolog with meals to 6/10/10 unit(s)   -- continue sliding scale insulin   -- fasting blood work  -- CDE for possible insulin pump (Medtronic 780G and Ilet-betabionic)  -- pending patient assistance glucagon  -- try glucose gel for hypoglycemia     ______________________________________________________________________________              Brain and cervical spine MRI 2/5/2024  INDICATION: Spells of generalized weakness, brain fog.    HEAD MRI:   1.  No acute intracranial process.  2.  Generalized brain atrophy and presumed microvascular ischemic changes as detailed above.     CERVICAL SPINE MRI:  1.  Multilevel degenerative spondylolisthesis of the cervical spine, as above, with up to moderate central spinal canal stenosis at the levels of C3-C4, C4-C5 and C5-C6.  2.  No abnormal spinal cord signal or intramedullary/leptomeningeal enhancement    Head CT scan 2/4/2024  No acute intracranial injury, hemorrhage, mass, or CT evidence of recent ischemia.    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    - No thoracic or pelvic metastases.     In detail:   1. Several enlarged Dotatate avid superior mesenteric lymph nodes are  consistent with kiersten metastatic disease.  1a. 2 smaller foci of focal dotatate uptake likely metastatic disease  (without CT correlate on non-contrast exam) - 2 implants on  small  bowel in the right anterior abdomen  1b. 2 small areas of increased Dotatate uptake that correspond with  indistinct soft tissue density on CT likely metastatic - soft tissue  encasing the hepatic artery midline, density in the upper mesentery  inferior to the gastric antrum.      2. No evidence of thoracic metastatic disease.  3. No definite liver metastases.  4. Post surgical changes of distal pancreatectomy and splenectomy with  decreased soft tissue stranding in postoperative fluid compared to  prior evaluation.     I have personally reviewed the examination and initial interpretation  and I agree with the findings.     TOM WHITMAN MD     11/3/2023 CT scan  1.  Multiple mildly enlarged enhancing mesenteric lymph nodes in the central mesentery suspicious for carcinoid metastasis, mildly increased in size since prior study. No metastatic disease in the chest     2.  Improved postoperative edema in the pancreatectomy bed.        Dean Resendez MD - 01/12/2024 10:00 AM CST  Formatting of this note might be different from the original.  Neurology progress note    Chief complaint: Follow up..    I am seeing this 68-year-old man in follow up of Parkinson's disease. He is accompanied by his wife. I last saw him about a month ago but I wanted to follow up with him as we made some minor changes at that time to account for wearing off, and I would like to see if they have worked or whether further changes were necessary. He states that things have worked quite well and he is doing much better now, however he has noticed presyncopal symptoms more frequently. He has not aware of any dyskinesias. Today his heart rate is low. I noticed that he had a Holter monitor. For 1 day about a year ago and no abnormalities were found at that time, and he also has had blood pressure fluctuations. He did see electrophysiology at some point, and apparently the possibility that some point he might need a pacemaker was  raised.    I reviewed the PMH, PSH, FH, SH, medications and allergies from the EHR and through care everywhere.     Blood pressure today is 94/53 with heart rate of 39. A little later we checked it again and he was 87/40 with a heart rate of 59. His neurological examination has not changed. He has very little dyskinesia today.    Impression:  1. Parkinson's disease, stage 2  2. Dyskinesia due to Parkinson's disease.  3. Motor fluctuations likely related to protein intake interfering with the absorption of levodopa, improved since taking the levodopa on an empty stomach.    Discussion:  No further changes are necessary to his medication regimen. However he does have low blood pressure, and also bradycardia. The to maybe complicating his situation causing dizziness lightheadedness and might put him at risk for syncope. He has multiple reasons for orthostatic hypotension including his diabetes, his Parkinson's, and a number of medications that he is taking. He is also on some antihypertensives, and he has had some high blood pressures in the past so he may still need to continue to be on some of the antihypertensives but certainly in view of his low blood pressures on the low heart rate those should be reviewed again. The combination of orthostatic hypotension with bradycardia would certainly put him at very high risk for syncope. Therefore I think a review by the electrophysiologist with possibly a longer cardiac monitoring might be in order as well. I discussed all of this with the patient and his wife. I will plan to see him in follow-up in 6 months time, but he will call me if there is problems with his medications in the meanwhile.    Recommendations:  1. Continue with the same dose of carbidopa/levodopa. Wait at least 1 hour after taking your pills before you eat anything that contains protein.  2. Your heart rate today was 39 with a blood pressure of 94/53. When your blood pressure dropped to 87/40 your heart  rate only went up to 59. You should talk to your doctor and your cardiologist as both the low blood pressure and the slow heart rate will make you dizzy. Although the low blood pressure can be a symptom of Parkinson's it is also a symptom of diabetes, and you are on medications to lower your blood pressure. I do not have an explanation for the slow heart rate, but your cardiologist may want to do a longer term heart monitoring to decide whether additional testing or a pacemaker are necessary/appropriate.  3. Follow-up with me in September.     Dean Resendez MD  Electronically signed by Dean Resendez MD at 01/12/2024 11:26 AM CST      Dean Resendez MD - 05/14/2021 12:45 PM CDT  Formatting of this note might be different from the original.  Neurology consultation.    I am seeing this 66-year-old man with a question of Parkinson's disease. A received records and the letter from the patient's wife prior to the visit today. The patient has had what he describes as neurological problems for quite some time. He walks with a shuffle, developed very slow movements, has developed tremors in the right upper extremity, and has had mood changes, his wife thinks that he is depressed. He has increasingly hard time to adjust to things. There may be some mental slowing. There have been some personality changes. His wife states that he has a going on for about a year or 2. He was seen [place] where he had an MRI of the brain which was unremarkable, an MRI of the cervical spine, and an MRI of the lumbar spine love which showed some trivial age-related degenerative changes, and a carotid ultrasound which was unremarkable. At the end of all this the patient's primary care physician concluded that she has suffered from essential tremor. No treatment was offered. He did receive some physical therapy with slight improvement. The patient's mother had Parkinson's disease, so they were concerned that the patient might  have Parkinson's and requested a neurology consultation for which he is here today.He reports difficulty with getting up from chairs. Sense of smell has never been good. He sleeps well. Wife reports that he has had active dreams. He denies memory problems. No constipation.    I reviewed the PMH, PSH, FH, SH, medications and allergies from the EHR and through care everywhere if necessary.    A comprehensive review of systems was negative with the exception of what has been mentioned under the history of present illness and the symptoms associated with the chronic conditions listed in PMH. All others are negative.    134/79, 201.6 lb., HR 68 R.  General: Normal appearance for age  Mental status: Alert, oriented, with normal attention, concentration, language, memory, fund of knowledge. Clock drawing, serial sevens, and short-term memory are intact. MiniCog 5/5  Cranial nerves: Visual fields full, eye movements conjugate, pupillary responses symmetric, funduscopic examination benign. Face movement and sensation, tongue and palate movement, shoulder shrug and neck muscle strength, swallowing and hearing ) with hearing aids) are normal to bedside testing. Moderately decreased facial expression, weak and slightly dysphonic voice without dysarthria.  Motor: Normal muscle strength, muscle bulk, and marked rigidity in the neck and the right upper extremity moderate in the left upper extremity, and mild rigidity in the lower extremities.  Deep tendon reflexes: Normal and symmetric without Babinski signs.  Sensory: No deficits to light touch and vibration.  Coordination: Normal finger-nose-finger, heel-knee-shin. Very intermittently present but moderate in amplitude tremor at rest in the right upper extremity. Severe bradykinesia in the upper extremities mild bradykinesia in the lower extremities.  Gait and balance: Normal tandem gait, Romberg test, and flexed posture posture. There is no freezing of gait. He walks with very  reduced arm swing, slightly propulsive gait, and he scuffs his feet.  UPDRS Motor score 41    Impression:  1. Parkinson's disease, stage 2  2. DDD lumbar and cervical spine.    Discussion:  Clinical presentation is typical for idiopathic Parkinson's disease. The patient has no unusual findings to suggest an atypical parkinsonian syndrome but that cannot be ruled out at this stage. His workup so far was negative for other causes. He has prior exposure to cleaning agents, and a family history of Parkinson's disease. His mother apparently also suffered from hallucinations with her Parkinson's disease. However he currently has no psychotic features, and no cognitive dysfunction. I talked to him and his wife about the diagnosis, prognosis, and treatment. I think that his symptoms and findings are sufficient to this poor and to warrant symptomatic therapy and we discussed extensively the use of levodopa. He is willing to try it and will start a low dose increase the dosage gradually and I will see him in 2 months time. I also think that he will benefit from physical therapy. I provided him with resources and booklets regarding Parkinson's disease.    Recommendations:  1. Start carbidopa/levodopa 25/100, 1/2 tablet three times a day for 1 week, then 1 tablet three times a day after thath for 1 week, then 1.5 tablet three times a day until you see me back. Take it 30 minutes before each meal with water. If it makes you sick to your stomach take it with food.  2. Physical Therapy.  3. Come back for a follow up in 2 months. Call sooner if there are side effects.    Dean Resendez MD    Electronically signed by Dean Resendez MD at 05/14/2021 1:23 PM CDT

## 2024-02-15 ENCOUNTER — LAB (OUTPATIENT)
Dept: LAB | Facility: CLINIC | Age: 69
End: 2024-02-15
Payer: MEDICARE

## 2024-02-15 DIAGNOSIS — Z79.4 INSULIN DEPENDENT TYPE 2 DIABETES MELLITUS (H): ICD-10-CM

## 2024-02-15 DIAGNOSIS — E11.9 INSULIN DEPENDENT TYPE 2 DIABETES MELLITUS (H): ICD-10-CM

## 2024-02-15 LAB
FASTING STATUS PATIENT QL REPORTED: YES
GLUCOSE SERPL-MCNC: 87 MG/DL (ref 70–99)

## 2024-02-15 PROCEDURE — 84681 ASSAY OF C-PEPTIDE: CPT

## 2024-02-15 PROCEDURE — 36415 COLL VENOUS BLD VENIPUNCTURE: CPT

## 2024-02-15 PROCEDURE — 82947 ASSAY GLUCOSE BLOOD QUANT: CPT

## 2024-02-16 LAB — C PEPTIDE SERPL-MCNC: <0.1 NG/ML (ref 0.9–6.9)

## 2024-02-16 NOTE — TELEPHONE ENCOUNTER
Patient portion submitted to [employer]. Just waiting on provider portion to send.     Thank you,     Sergio Lemos [facility]  Pharmacy Clinic [facility]  [email or web address]   Phone: [phone]  Fax: [phone]

## 2024-02-20 DIAGNOSIS — E11.65 TYPE 2 DIABETES MELLITUS WITH HYPERGLYCEMIA (H): ICD-10-CM

## 2024-02-20 RX ORDER — INSULIN GLARGINE 100 [IU]/ML
INJECTION, SOLUTION SUBCUTANEOUS
Qty: 45 ML | Refills: 0 | Status: SHIPPED | OUTPATIENT
Start: 2024-02-20 | End: 2024-05-07

## 2024-02-20 NOTE — TELEPHONE ENCOUNTER
"Requested Prescriptions   Pending Prescriptions Disp Refills    INSULIN GLARGINE 100 UNIT/ML pen [Pharmacy Med Name: BASAGLAR 100 UNIT/ML KWIKPEN]  1     Sig: INJECT 15 UNITS SUBCUTANEOUS EACH AM AND 30 UNITS SUBCUTANEOUS EACH PM.PLEASE PROVIDED 90 DAY SUPPLY       Long Acting Insulin Protocol Failed - 2/20/2024 11:31 AM        Failed - HgbA1C in past 3 or 6 months     If HgbA1C is 8 or greater, it needs to be on file within the past 3 months.  If less than 8, must be on file within the past 6 months.     Recent Labs   Lab Test 11/16/23  0818 06/30/23  0642 07/24/22  0418   A1C 8.2*   < >  --    40289  --   --  7.2*    < > = values in this interval not displayed.             Passed - Serum creatinine on file in past 12 months     Recent Labs   Lab Test 02/06/24  0741   CR 0.84       Ok to refill medication if creatinine is low          Passed - Medication is active on med list        Passed - Patient is age 18 or older        Passed - Recent (6 mo) or future (30 days) visit within the authorizing provider's specialty     Patient had office visit in the last 6 months or has a visit in the next 30 days with authorizing provider or within the authorizing provider's specialty.  See \"Patient Info\" tab in inbasket, or \"Choose Columns\" in Meds & Orders section of the refill encounter.           Insulin Protocol Failed - 2/20/2024 11:31 AM        Failed - Chart Review Required     Review Chart.    Do not approve if insulin is used in a pump.  Instead, direct refill request to the patient's endocrinologist.  If the patient doesn't have an endocrinologist, then send the refill to the patient's PCP for review            Passed - Medication is active on med list        Passed - Has GFR on file in past 12 months and most recent value is normal        Passed - Recent (6 mo) or future (90 days) visit within the authorizing provider's specialty     The patient must have completed an in-person or virtual visit within the past 6 " months or has a future visit scheduled within the next 90 days with the authorizing provider s specialty.  Urgent care and e-visits do not quality as an office visit for this protocol.          Passed - Medication indicated for associated diagnosis     Medication is associated with one or more of the following diagnoses:   - Type 1 diabetes mellitus  - Type 2 diabetes mellitus  - Diabetic nephropathy; Prophylaxis  - Neuropathy due to diabetes mellitus; Prophylaxis  - Retinopathy due to diabetes mellitus; Prophylaxis  - Diabetes mellitus associated with cystic fibrosis  - Disorder of cardiovascular system; Prophylaxis - Type 1 diabetes mellitus   - Disorder of cardiovascular system; Prophylaxis - Type 2 diabetes mellitus            Passed - Patient is 18 years of age or older

## 2024-02-20 NOTE — TELEPHONE ENCOUNTER
Provider form uploaded under the media tab for Zegalogue through the [employer] Patient Assistance Program.  Please complete provider form and send to me via e-mail and I will submit and follow with the free drug program until the renewal application is approved.     If form is blurry and you would like me to send via e-mail, please let me know.      Thank you,     Sergio Lemos, [facility]  Pharmacy Clinic [facility]  [email or web address]   Phone: [phone]  Fax: [phone]

## 2024-02-21 NOTE — TELEPHONE ENCOUNTER
FREE DRUG APPLICATION INITIATED    Medication:  Zegalogue  Free Drug Program Name:  Nura Nordisk  Date Submitted: 2/21/2024  1:04 PM  Phone #: [phone]  Fax #: [phone]  Additional Information: Provider form received and faxed to fax number listed above. Fax confirmed sent.    Thank you,     Sergio Lemos [facility]  Pharmacy Clinic [facility]  [email or web address]   Phone: [phone]  Fax: [phone]

## 2024-02-23 NOTE — TELEPHONE ENCOUNTER
FREE DRUG APPLICATION APPROVED    Medication:  Zegalogue  Program Name:  Nura Nordisk  Effective Date: 1/1/2024  Expiration Date: 12/31/2024  Pharmacy Filling the Rx:  [facility]  Patient Notified: Yes - via [employer]   Additional Information: Representative states that this medication will be shipped to the patients home. Unsure if that is accurate as it's not insulin.    Thank you,     Sergio Lemos [facility]  Pharmacy Clinic [facility]  [email or web address]   Phone: [phone]  Fax: [phone]

## 2024-02-23 NOTE — TELEPHONE ENCOUNTER
RECORDS RECEIVED FROM: internal   REASON FOR VISIT: Parkinson's disease with dyskinesia    PROVIDER: Dr. Juan Jose Armenta   DATE OF APPT: 3/26/24   NOTES (FOR ALL VISITS) STATUS DETAILS   OFFICE NOTE from referring provider Internal SEE INPATIENT NOTES   OFFICE NOTE from other specialist Care Everywhere Dr Dean Resendez @  Neurology:  1/12/24 12/6/23 6/9/23 11/11/22  (Additional encounters)   DISCHARGE REPORT from the ER Internal [facility]:  2/4/24-2/7/24   MEDICATION LIST Internal    IMAGING  (FOR ALL VISITS)     MRI (HEAD, NECK, SPINE) Internal [facility]:  MRI Cervical Spine 2/5/24  MRI Brain 2/5/24   CT (HEAD, NECK, SPINE) Internal [facility]:  Ct head 2/4/24

## 2024-02-27 ENCOUNTER — ANESTHESIA EVENT (OUTPATIENT)
Dept: CARDIOLOGY | Facility: CLINIC | Age: 69
End: 2024-02-27
Payer: MEDICARE

## 2024-02-27 NOTE — H&P
Electrophysiology Pre-Procedure History and Physical    Arnaldo Henderson MRN# [id number]   Age: 68 year old YOB: 1955      Date of Procedure: 2/27/24 [facility]      Date of Exam 2/27/2024 Facility (Same day)       HPI:  Arnaldo Henderson is a 68 year old male with past medical history significant for chronic pancreatitis s/p distal pancreatectomy, type II DM, parkinson's disease and neuroendocrine tumor. Patient also has history of AV Wenckebach noted during ERCP in May 2023. He was seen by EP at this time, he was asymptomatic, no pacemaker was indicated. Patient saw Dr Dahl in follow up on 1/16/24, ziopatch ordered due to prolonged [place] interval and history of AV Wenckebach. Patient was then admitted to [facility] from 2/4-2/7/2024 for weakness and fogginess, found to have orthostatic hypotension which he has had issues with from autonomic dysfunction due to Parkinson's. Cardiology was consulted for AV Wenckebach on telemetry with no symptoms or changes in BP, no pacemaker was recommended. Follow up zio monitor was worn from 1/16-1/30/2024, average HR 79 bpm, frequent episodes of AV Wenckebach. He had one 10.5 second occurrence of AVB with no escape on 1/24/24 at 4:42 pm and 3.4 seconds on 1/27/24 at 1:50 am. Symptom activations were associated to sinus with and without AVB. Dr Hernandez discussed results and PPM implant with patient and his wife. They were agreeable to proceeding, he presents today for PPM implant.          Active problem list:     Patient Active Problem List    Diagnosis Date Noted    Parkinson's disease, unspecified whether dyskinesia present, unspecified whether manifestations fluctuate 02/14/2024     Priority: Medium    Family history of parkinsonism 02/14/2024     Priority: Medium    Orthostatic hypotension 02/07/2024     Priority: Medium    Weakness 02/04/2024     Priority: Medium    Second degree AV block 02/04/2024      Priority: Medium    History of Parkinson's disease 02/04/2024     Priority: Medium    Hypotension 01/12/2024     Priority: Medium    Bradycardia 01/12/2024     Priority: Medium    Other secondary neuroendocrine tumors (H) 01/02/2024     Priority: Medium    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) 01/02/2024     Priority: Medium    Chronic biliary pancreatitis (H) 01/02/2024     Priority: Medium    Chronic right-sided low back pain without sciatica 12/12/2023     Priority: Medium    Abdominal pain, generalized 12/12/2023     Priority: Medium    Neuroendocrine carcinoma of small bowel (H) 07/27/2023     Priority: Medium     Check 1.24 for follow-up [place]      Surgical site infection 07/26/2023     Priority: Medium    Wound infection 07/26/2023     Priority: Medium    Cholecystitis, acute 07/05/2023     Priority: Medium    Retained foreign body 02/20/2023     Priority: Medium    Acute bilateral low back pain without sciatica 10/24/2022     Priority: Medium    Impaired gait 10/24/2022     Priority: Medium    Physical deconditioning 10/24/2022     Priority: Medium    History of cholecystectomy 08/10/2022     Priority: Medium    Failure to thrive in adult 08/10/2022     Priority: Medium    Hyponatremia 07/24/2022     Priority: Medium    Pseudocyst of pancreas 07/21/2022     Priority: Medium     Formatting of this note might be different from the original. Cholecystitis surg in [place], pancreatitis and multiple stents to pancreas . ! Stent migrated to small bowel. Laparotomy planned for retrieval 2 23 Formatting of this note might be different from the original. Formatting of this note might be different from the original. Added automatically from request for surgery [id number] Formatting of this note might be different from the original. Added automatically from request for surgery [id number]  Formatting of this note might be different from the original. Added automatically from request for surgery [id number]  Formatting of this note might be different from the original. Formatting of this note might be different from the original. Added automatically from request for surgery [id number]      Acute pancreatitis 04/18/2022     Priority: Medium     Formatting of this note might be different from the original. Formatting of this note might be different from the original. Added automatically from request for surgery [id number] Formatting of this note might be different from the original. Added automatically from request for surgery [id number]  Formatting of this note might be different from the original. Added automatically from request for surgery [id number]      Cervicalgia 04/15/2022     Priority: Medium    Long term (current) use of aspirin 04/15/2022     Priority: Medium    Mild protein-calorie malnutrition (H24) 04/15/2022     Priority: Medium    Muscle weakness (generalized) 04/15/2022     Priority: Medium    Need for assistance with personal care 04/15/2022     Priority: Medium    Parkinson's disease with dyskinesia, unspecified whether manifestations fluctuate 05/14/2021     Priority: Medium    Cancer of skin of scrotum (H) 02/05/2019     Priority: Medium    Atherosclerotic heart disease of native coronary artery without angina pectoris 06/04/2018     Priority: Medium    History of coronary artery bypass surgery 06/04/2018     Priority: Medium    Other hydrocele 06/04/2018     Priority: Medium    Sensorineural hearing loss (SNHL) of right ear with unrestricted hearing of left ear 06/04/2018     Priority: Medium    Adenomatous polyp of colon 03/03/2016     Priority: Medium    Type 2 diabetes mellitus with hyperglycemia, with long-term current use of insulin (H) 12/17/2013     Priority: Medium     Formatting of this note might be different from the original. new dx Formatting of this note might be different from the original. Formatting of this note might be different from the original. new dx Formatting of this note might be  different from the original. new dx Formatting of this note might be different from the original. new dx  Formatting of this note might be different from the original. new dx Formatting of this note might be different from the original. Formatting of this note might be different from the original. new dx Formatting of this note might be different from the original. new dx Formatting of this note might be different from the original. new dx      Mixed conductive and sensorineural hearing loss 03/05/2007     Priority: Medium     Formatting of this note might be different from the original. Overview: ICD 10 Formatting of this note might be different from the original. ICD 10 Formatting of this note might be different from the original. ICD 10 ICD 10  Formatting of this note might be different from the original. ICD 10 Formatting of this note might be different from the original. ICD 10 ICD 10      Gastroesophageal reflux disease without esophagitis 03/14/2003     Priority: Medium     Formatting of this note might be different from the original. ESOPHAGEAL REFLUX(aka GERD) Formatting of this note might be different from the original. Formatting of this note might be different from the original. ESOPHAGEAL REFLUX(aka GERD) Formatting of this note might be different from the original. ESOPHAGEAL REFLUX(aka GERD) Formatting of this note might be different from the original. ESOPHAGEAL REFLUX(aka GERD)  Formatting of this note might be different from the original. ESOPHAGEAL REFLUX(aka GERD) Formatting of this note might be different from the original. Formatting of this note might be different from the original. ESOPHAGEAL REFLUX(aka GERD) Formatting of this note might be different from the original. ESOPHAGEAL REFLUX(aka GERD) Formatting of this note might be different from the original. ESOPHAGEAL REFLUX(aka GERD)      Hyperlipidemia, unspecified 03/14/2003     Priority: Medium     Formatting of this note might be  different from the original. Other and unspecified hyperlipidemia (HRC) Formatting of this note might be different from the original. Formatting of this note might be different from the original. Other and unspecified hyperlipidemia (HRC) Formatting of this note might be different from the original. Other and unspecified hyperlipidemia (HRC) Formatting of this note might be different from the original. Other and unspecified hyperlipidemia (HRC)  Formatting of this note might be different from the original. Other and unspecified hyperlipidemia (HRC) Formatting of this note might be different from the original. Formatting of this note might be different from the original. Other and unspecified hyperlipidemia (HRC) Formatting of this note might be different from the original. Other and unspecified hyperlipidemia (HRC) Formatting of this note might be different from the original. Other and unspecified hyperlipidemia (HRC)      Hypertension 03/14/2003     Priority: Medium     Formatting of this note might be different from the original. Epic  Formatting of this note might be different from the original. MI and CABG 3/2003 Epic  Formatting of this note might be different from the original. Epic              Medications (include herbals and vitamins):      No current facility-administered medications for this encounter.     Current Outpatient Medications   Medication Sig    acetaminophen (TYLENOL) 325 MG tablet Take 2 tablets (650 mg) by mouth every 4 hours as needed for other (For optimal non-opioid multimodal pain management to improve pain control.)    aspirin (ASA) 81 MG EC tablet Take 81 mg by mouth daily    atorvastatin (LIPITOR) 40 MG tablet Take 1 tablet by mouth daily    BD PEN NEEDLE LINDY 2ND GEN 32G X 4 MM miscellaneous USE TO INJECT INSULIN 4 TIMES A DAY    carbidopa-levodopa (SINEMET CR)  MG CR tablet Take 1 tablet by mouth At Bedtime    carbidopa-levodopa (SINEMET)  MG tablet Take 2 tablets by  mouth 3 times daily    Continuous Blood Gluc  (DEXCOM G7 ) ALIA Use to read blood sugars as per 's instructions.    Continuous Blood Gluc Sensor (DEXCOM G7 SENSOR) MISC Change every 10 days.    dasiglucagon HCl (ZEGALOGUE) 0.6 MG/0.6ML SOAJ injection Inject 0.6 mg Subcutaneous once as needed (use for severe hypoglycemia)    gabapentin (NEURONTIN) 300 MG capsule Take 600 mg by mouth 3 times daily    Glucagon (BAQSIMI) 3 MG/DOSE nasal powder Spray 1 spray (3 mg) in nostril as needed (severe hypoglycemia) in the event of unconscious hypoglycemia or hypoglycemic seizure. May repeat dose if no response after 15 minutes.    insulin aspart (NOVOLOG FLEXPEN) 100 UNIT/ML pen Inject 6-10 Units Subcutaneous 3 times daily (with meals) Take 6 units with breakfast, 10 unit(s) with lunch and 10 unit(s) with supper    insulin aspart (NOVOLOG FLEXPEN) 100 UNIT/ML pen Inject subcutaneous per sliding scale three times daily with meals:  190-239 = 1 unit  240-289 = 2 units  290-339 = 3 units  >340 = 4 units   And Inject subcutaneously at bedtime per sliding scale:  200-249 = 1 unit  250-300 = 2 units   >300 = 3 units    INSULIN GLARGINE 100 UNIT/ML pen INJECT 15 UNITS SUBCUTANEOUS EACH AM AND 30 UNITS SUBCUTANEOUS EACH PM.PLEASE PROVIDED 90 DAY SUPPLY    lipase-protease-amylase (ZENPEP) 70089-841569-922480 units CPEP Take 1 capsule by mouth 3 times daily (with meals)    losartan (COZAAR) 25 MG tablet Take 1 tablet (25 mg) by mouth at bedtime for 30 days    magnesium oxide (MAG-OX) 400 MG tablet Take 400 mg by mouth every other day    metFORMIN (GLUCOPHAGE XR) 500 MG 24 hr tablet Take 1 tablet (500 mg) by mouth daily (with dinner) for 7 days, THEN 2 tablets (1,000 mg) daily (with dinner) for 7 days, THEN 3 tablets (1,500 mg) daily (with dinner) for 7 days, THEN 4 tablets (2,000 mg) daily (with dinner) for 90 days. (Patient not taking: Reported on 2/13/2024)    Multiple Vitamin (MULTI-VITAMINS) TABS Take 1  "tablet by mouth daily    omeprazole (PRILOSEC) 20 MG DR capsule Take 20 mg by mouth daily    pantoprazole (PROTONIX) 40 MG EC tablet Take 40 mg by mouth daily    ZENPEP 18218-88144 units CPEP            Medication List         Notice    Cannot display discharge medications because the patient has not yet been admitted.              Allergies:      Allergies   Allergen Reactions    Ciprofloxacin Other (See Comments), Hives, Itching, Rash and Unknown     Other reaction(s): Other (see comments)  Oral swelling per [facility]        Dilaudid [Hydromorphone] Rash    Morphine Rash     rash    Penicillins Rash     aka ancef/ rash      Citalopram Unknown    Oxycodone Rash     \"HORRIBLE, SEVERE RASH MAYBE CAUSED BY OXY\"             Social History:     Social History     Tobacco Use    Smoking status: Former     Types: Cigarettes    Smokeless tobacco: Never   Substance Use Topics    Alcohol use: Not Currently            Physical Exam:   All vitals have been reviewed    Airway assessment:   Patient is able to open mouth wide  Patient is able to stick out tongue}      ENT:   normocepalic, without obvious abnormality     Neck:   supple, symmetrical, trachea midline     Lungs:   No increased work of breathing, good air exchange, clear to auscultation bilaterally, no crackles or wheezing     Cardiovascular:   normal S1 and S2 and no edema             Lab / Radiology Results:     Lab Results   Component Value Date    WBC 10.3 02/06/2024    RBC 4.27 02/06/2024    HGB 14.6 02/06/2024    HCT 41.6 02/06/2024    MCV 97 02/06/2024    RDW 12.9 02/06/2024     02/06/2024      Lab Results   Component Value Date    WBC 10.3 02/06/2024     Lab Results   Component Value Date     02/06/2024     Lab Results   Component Value Date    HGB 14.6 02/06/2024    HCT 41.6 02/06/2024     Lab Results   Component Value Date     02/06/2024    CO2 22 02/06/2024    BUN 20.1 02/06/2024     Lab Results   Component Value Date     " 02/06/2024    CO2 22 02/06/2024    BUN 20.1 02/06/2024             Plan:   Patient's active problems diagnostically and therapeutically optimized for the planned procedure. Patient here for pacemaker implant. We discussed with the patient the rationale for PPM placement, alternative therapies, technical aspects of the surgical procedure, risks/benefits of therapy and need for long-term follow-up in the Device Clinic.  The risk of pacemaker placement include: over sedation, reaction to local anesthetic, reaction to narcotics or benzodiazipines used for moderate secation, localized bleeding, internal bleeding, collapsed lung, and acute or late infections. There is the possibilty of unforseen complications as well such as device or lead failure or lead dislodgement. Patient states understanding and wishes to procced.       Sapphire Mojica PA-C  [facility]  Electrophysiology Consult Service  Pager: [phone]

## 2024-02-28 ENCOUNTER — ANCILLARY PROCEDURE (OUTPATIENT)
Dept: CARDIOLOGY | Facility: CLINIC | Age: 69
End: 2024-02-28
Payer: MEDICARE

## 2024-02-28 ENCOUNTER — ANESTHESIA (OUTPATIENT)
Dept: CARDIOLOGY | Facility: CLINIC | Age: 69
End: 2024-02-28
Payer: MEDICARE

## 2024-02-28 ENCOUNTER — APPOINTMENT (OUTPATIENT)
Dept: GENERAL RADIOLOGY | Facility: CLINIC | Age: 69
End: 2024-02-28
Payer: MEDICARE

## 2024-02-28 ENCOUNTER — HOSPITAL ENCOUNTER (OUTPATIENT)
Facility: CLINIC | Age: 69
Discharge: HOME | End: 2024-02-28
Attending: INTERNAL MEDICINE | Admitting: INTERNAL MEDICINE
Payer: MEDICARE

## 2024-02-28 VITALS
DIASTOLIC BLOOD PRESSURE: 93 MMHG | TEMPERATURE: 98 F | HEART RATE: 74 BPM | RESPIRATION RATE: 15 BRPM | SYSTOLIC BLOOD PRESSURE: 166 MMHG | OXYGEN SATURATION: 95 %

## 2024-02-28 DIAGNOSIS — Z95.0 CARDIAC PACEMAKER IN SITU: Primary | ICD-10-CM

## 2024-02-28 DIAGNOSIS — I44.1 HEART BLOCK AV SECOND DEGREE: ICD-10-CM

## 2024-02-28 DIAGNOSIS — I44.2 HEART BLOCK AV THIRD DEGREE (H): ICD-10-CM

## 2024-02-28 DIAGNOSIS — I45.5 SINUS PAUSE: ICD-10-CM

## 2024-02-28 LAB
ACANTHOCYTES BLD QL SMEAR: SLIGHT
ANION GAP SERPL CALCULATED.3IONS-SCNC: 9 MMOL/L (ref 7–15)
AUER BODIES BLD QL SMEAR: ABNORMAL
BASO STIPL BLD QL SMEAR: ABNORMAL
BITE CELLS BLD QL SMEAR: ABNORMAL
BLISTER CELLS BLD QL SMEAR: ABNORMAL
BUN SERPL-MCNC: 20.6 MG/DL (ref 8–23)
BURR CELLS BLD QL SMEAR: SLIGHT
CALCIUM SERPL-MCNC: 9 MG/DL (ref 8.8–10.2)
CHLORIDE SERPL-SCNC: 105 MMOL/L (ref 98–107)
CREAT SERPL-MCNC: 1 MG/DL (ref 0.67–1.17)
DACRYOCYTES BLD QL SMEAR: ABNORMAL
DEPRECATED HCO3 PLAS-SCNC: 24 MMOL/L (ref 22–29)
EGFRCR SERPLBLD CKD-EPI 2021: 81 ML/MIN/1.73M2
ELLIPTOCYTES BLD QL SMEAR: ABNORMAL
ERYTHROCYTE [DISTWIDTH] IN BLOOD BY AUTOMATED COUNT: 13.7 % (ref 10–15)
FRAGMENTS BLD QL SMEAR: ABNORMAL
GLUCOSE BLDC GLUCOMTR-MCNC: 94 MG/DL (ref 70–99)
GLUCOSE BLDC GLUCOMTR-MCNC: 95 MG/DL (ref 70–99)
GLUCOSE SERPL-MCNC: 102 MG/DL (ref 70–99)
HCT VFR BLD AUTO: 41.8 % (ref 40–53)
HGB BLD-MCNC: 13.8 G/DL (ref 13.3–17.7)
HGB C CRYSTALS: ABNORMAL
HOWELL-JOLLY BOD BLD QL SMEAR: ABNORMAL
MCH RBC QN AUTO: 33.4 PG (ref 26.5–33)
MCHC RBC AUTO-ENTMCNC: 33 G/DL (ref 31.5–36.5)
MCV RBC AUTO: 101 FL (ref 78–100)
MDC_IDC_LEAD_CONNECTION_STATUS: NORMAL
MDC_IDC_LEAD_CONNECTION_STATUS: NORMAL
MDC_IDC_LEAD_IMPLANT_DT: NORMAL
MDC_IDC_LEAD_IMPLANT_DT: NORMAL
MDC_IDC_LEAD_LOCATION: NORMAL
MDC_IDC_LEAD_LOCATION: NORMAL
MDC_IDC_LEAD_LOCATION_DETAIL_1: NORMAL
MDC_IDC_LEAD_LOCATION_DETAIL_1: NORMAL
MDC_IDC_LEAD_MFG: NORMAL
MDC_IDC_LEAD_MFG: NORMAL
MDC_IDC_LEAD_MODEL: NORMAL
MDC_IDC_LEAD_MODEL: NORMAL
MDC_IDC_LEAD_POLARITY_TYPE: NORMAL
MDC_IDC_LEAD_POLARITY_TYPE: NORMAL
MDC_IDC_LEAD_SERIAL: NORMAL
MDC_IDC_LEAD_SERIAL: NORMAL
MDC_IDC_LEAD_SPECIAL_FUNCTION: NORMAL
MDC_IDC_LEAD_SPECIAL_FUNCTION: NORMAL
MDC_IDC_MSMT_BATTERY_DTM: NORMAL
MDC_IDC_MSMT_BATTERY_RRT_TRIGGER: 2.62
MDC_IDC_MSMT_BATTERY_STATUS: NORMAL
MDC_IDC_MSMT_BATTERY_VOLTAGE: 3.19 V
MDC_IDC_MSMT_LEADCHNL_RA_IMPEDANCE_VALUE: 418 OHM
MDC_IDC_MSMT_LEADCHNL_RA_IMPEDANCE_VALUE: 551 OHM
MDC_IDC_MSMT_LEADCHNL_RA_PACING_THRESHOLD_AMPLITUDE: 0.5 V
MDC_IDC_MSMT_LEADCHNL_RA_PACING_THRESHOLD_PULSEWIDTH: 0.4 MS
MDC_IDC_MSMT_LEADCHNL_RA_SENSING_INTR_AMPL: 2.5 MV
MDC_IDC_MSMT_LEADCHNL_RV_IMPEDANCE_VALUE: 532 OHM
MDC_IDC_MSMT_LEADCHNL_RV_IMPEDANCE_VALUE: 665 OHM
MDC_IDC_MSMT_LEADCHNL_RV_PACING_THRESHOLD_AMPLITUDE: 0.5 V
MDC_IDC_MSMT_LEADCHNL_RV_PACING_THRESHOLD_PULSEWIDTH: 0.4 MS
MDC_IDC_MSMT_LEADCHNL_RV_SENSING_INTR_AMPL: 17.75 MV
MDC_IDC_PG_IMPLANT_DTM: NORMAL
MDC_IDC_PG_MFG: NORMAL
MDC_IDC_PG_MODEL: NORMAL
MDC_IDC_PG_SERIAL: NORMAL
MDC_IDC_PG_TYPE: NORMAL
MDC_IDC_SESS_CLINIC_NAME: NORMAL
MDC_IDC_SESS_DTM: NORMAL
MDC_IDC_SESS_TYPE: NORMAL
MDC_IDC_SET_BRADY_AT_MODE_SWITCH_RATE: 171 {BEATS}/MIN
MDC_IDC_SET_BRADY_HYSTRATE: NORMAL
MDC_IDC_SET_BRADY_LOWRATE: 60 {BEATS}/MIN
MDC_IDC_SET_BRADY_MAX_SENSOR_RATE: 130 {BEATS}/MIN
MDC_IDC_SET_BRADY_MAX_TRACKING_RATE: 130 {BEATS}/MIN
MDC_IDC_SET_BRADY_MODE: NORMAL
MDC_IDC_SET_BRADY_PAV_DELAY_HIGH: 140 MS
MDC_IDC_SET_BRADY_PAV_DELAY_LOW: 180 MS
MDC_IDC_SET_BRADY_SAV_DELAY_HIGH: 110 MS
MDC_IDC_SET_BRADY_SAV_DELAY_LOW: 150 MS
MDC_IDC_SET_LEADCHNL_RA_PACING_AMPLITUDE: 3.5 V
MDC_IDC_SET_LEADCHNL_RA_PACING_ANODE_ELECTRODE_1: NORMAL
MDC_IDC_SET_LEADCHNL_RA_PACING_ANODE_LOCATION_1: NORMAL
MDC_IDC_SET_LEADCHNL_RA_PACING_CAPTURE_MODE: NORMAL
MDC_IDC_SET_LEADCHNL_RA_PACING_CATHODE_ELECTRODE_1: NORMAL
MDC_IDC_SET_LEADCHNL_RA_PACING_CATHODE_LOCATION_1: NORMAL
MDC_IDC_SET_LEADCHNL_RA_PACING_POLARITY: NORMAL
MDC_IDC_SET_LEADCHNL_RA_PACING_PULSEWIDTH: 0.4 MS
MDC_IDC_SET_LEADCHNL_RA_SENSING_ANODE_ELECTRODE_1: NORMAL
MDC_IDC_SET_LEADCHNL_RA_SENSING_ANODE_LOCATION_1: NORMAL
MDC_IDC_SET_LEADCHNL_RA_SENSING_CATHODE_ELECTRODE_1: NORMAL
MDC_IDC_SET_LEADCHNL_RA_SENSING_CATHODE_LOCATION_1: NORMAL
MDC_IDC_SET_LEADCHNL_RA_SENSING_POLARITY: NORMAL
MDC_IDC_SET_LEADCHNL_RA_SENSING_SENSITIVITY: 0.3 MV
MDC_IDC_SET_LEADCHNL_RV_PACING_AMPLITUDE: 3.5 V
MDC_IDC_SET_LEADCHNL_RV_PACING_ANODE_ELECTRODE_1: NORMAL
MDC_IDC_SET_LEADCHNL_RV_PACING_ANODE_LOCATION_1: NORMAL
MDC_IDC_SET_LEADCHNL_RV_PACING_CAPTURE_MODE: NORMAL
MDC_IDC_SET_LEADCHNL_RV_PACING_CATHODE_ELECTRODE_1: NORMAL
MDC_IDC_SET_LEADCHNL_RV_PACING_CATHODE_LOCATION_1: NORMAL
MDC_IDC_SET_LEADCHNL_RV_PACING_POLARITY: NORMAL
MDC_IDC_SET_LEADCHNL_RV_PACING_PULSEWIDTH: 0.4 MS
MDC_IDC_SET_LEADCHNL_RV_SENSING_ANODE_ELECTRODE_1: NORMAL
MDC_IDC_SET_LEADCHNL_RV_SENSING_ANODE_LOCATION_1: NORMAL
MDC_IDC_SET_LEADCHNL_RV_SENSING_CATHODE_ELECTRODE_1: NORMAL
MDC_IDC_SET_LEADCHNL_RV_SENSING_CATHODE_LOCATION_1: NORMAL
MDC_IDC_SET_LEADCHNL_RV_SENSING_POLARITY: NORMAL
MDC_IDC_SET_LEADCHNL_RV_SENSING_SENSITIVITY: 0.9 MV
MDC_IDC_SET_ZONE_DETECTION_INTERVAL: 350 MS
MDC_IDC_SET_ZONE_DETECTION_INTERVAL: 400 MS
MDC_IDC_SET_ZONE_STATUS: NORMAL
MDC_IDC_SET_ZONE_STATUS: NORMAL
MDC_IDC_SET_ZONE_TYPE: NORMAL
MDC_IDC_SET_ZONE_VENDOR_TYPE: NORMAL
MDC_IDC_STAT_AT_BURDEN_PERCENT: 0 %
MDC_IDC_STAT_AT_DTM_END: NORMAL
MDC_IDC_STAT_AT_DTM_START: NORMAL
MDC_IDC_STAT_BRADY_AP_VP_PERCENT: 0.66 %
MDC_IDC_STAT_BRADY_AP_VS_PERCENT: 0.12 %
MDC_IDC_STAT_BRADY_AS_VP_PERCENT: 8.03 %
MDC_IDC_STAT_BRADY_AS_VS_PERCENT: 91.2 %
MDC_IDC_STAT_BRADY_DTM_END: NORMAL
MDC_IDC_STAT_BRADY_DTM_START: NORMAL
MDC_IDC_STAT_BRADY_RA_PERCENT_PACED: 0.66 %
MDC_IDC_STAT_BRADY_RV_PERCENT_PACED: 8.68 %
MDC_IDC_STAT_EPISODE_RECENT_COUNT: 0
MDC_IDC_STAT_EPISODE_RECENT_COUNT_DTM_END: NORMAL
MDC_IDC_STAT_EPISODE_RECENT_COUNT_DTM_START: NORMAL
MDC_IDC_STAT_EPISODE_TOTAL_COUNT: 0
MDC_IDC_STAT_EPISODE_TOTAL_COUNT_DTM_END: NORMAL
MDC_IDC_STAT_EPISODE_TOTAL_COUNT_DTM_START: NORMAL
MDC_IDC_STAT_EPISODE_TYPE: NORMAL
MDC_IDC_STAT_TACHYTHERAPY_RECENT_DTM_END: NORMAL
MDC_IDC_STAT_TACHYTHERAPY_RECENT_DTM_START: NORMAL
MDC_IDC_STAT_TACHYTHERAPY_TOTAL_DTM_END: NORMAL
MDC_IDC_STAT_TACHYTHERAPY_TOTAL_DTM_START: NORMAL
NEUTS HYPERSEG BLD QL SMEAR: ABNORMAL
PLAT MORPH BLD: ABNORMAL
PLATELET # BLD AUTO: 350 10E3/UL (ref 150–450)
POLYCHROMASIA BLD QL SMEAR: ABNORMAL
POTASSIUM SERPL-SCNC: 4.5 MMOL/L (ref 3.4–5.3)
RBC # BLD AUTO: 4.13 10E6/UL (ref 4.4–5.9)
RBC AGGLUT BLD QL: ABNORMAL
RBC MORPH BLD: ABNORMAL
ROULEAUX BLD QL SMEAR: ABNORMAL
SICKLE CELLS BLD QL SMEAR: ABNORMAL
SMUDGE CELLS BLD QL SMEAR: ABNORMAL
SODIUM SERPL-SCNC: 138 MMOL/L (ref 135–145)
SPHEROCYTES BLD QL SMEAR: ABNORMAL
STOMATOCYTES BLD QL SMEAR: ABNORMAL
TARGETS BLD QL SMEAR: ABNORMAL
TOXIC GRANULES BLD QL SMEAR: ABNORMAL
VARIANT LYMPHS BLD QL SMEAR: ABNORMAL
WBC # BLD AUTO: 9.9 10E3/UL (ref 4–11)

## 2024-02-28 PROCEDURE — 80048 BASIC METABOLIC PNL TOTAL CA: CPT | Performed by: INTERNAL MEDICINE

## 2024-02-28 PROCEDURE — 93005 ELECTROCARDIOGRAM TRACING: CPT

## 2024-02-28 PROCEDURE — 85018 HEMOGLOBIN: CPT | Performed by: INTERNAL MEDICINE

## 2024-02-28 PROCEDURE — C1785 PMKR, DUAL, RATE-RESP: HCPCS | Performed by: INTERNAL MEDICINE

## 2024-02-28 PROCEDURE — 82962 GLUCOSE BLOOD TEST: CPT

## 2024-02-28 PROCEDURE — 272N000001 HC OR GENERAL SUPPLY STERILE: Performed by: INTERNAL MEDICINE

## 2024-02-28 PROCEDURE — C1898 LEAD, PMKR, OTHER THAN TRANS: HCPCS | Performed by: INTERNAL MEDICINE

## 2024-02-28 PROCEDURE — 93280 PM DEVICE PROGR EVAL DUAL: CPT

## 2024-02-28 PROCEDURE — 33208 INSRT HEART PM ATRIAL & VENT: CPT | Performed by: ANESTHESIOLOGY

## 2024-02-28 PROCEDURE — 999N000065 XR CHEST PORT 1 VIEW

## 2024-02-28 PROCEDURE — 258N000003 HC RX IP 258 OP 636: Performed by: ANESTHESIOLOGY

## 2024-02-28 PROCEDURE — C1894 INTRO/SHEATH, NON-LASER: HCPCS | Performed by: INTERNAL MEDICINE

## 2024-02-28 PROCEDURE — 33208 INSRT HEART PM ATRIAL & VENT: CPT | Mod: KX | Performed by: INTERNAL MEDICINE

## 2024-02-28 PROCEDURE — 250N000011 HC RX IP 250 OP 636: Performed by: INTERNAL MEDICINE

## 2024-02-28 PROCEDURE — 999N000134 HC STATISTIC PP CARE STAGE 3

## 2024-02-28 PROCEDURE — 93280 PM DEVICE PROGR EVAL DUAL: CPT | Mod: 26 | Performed by: INTERNAL MEDICINE

## 2024-02-28 PROCEDURE — 71045 X-RAY EXAM CHEST 1 VIEW: CPT | Mod: 26 | Performed by: RADIOLOGY

## 2024-02-28 PROCEDURE — 370N000017 HC ANESTHESIA TECHNICAL FEE, PER MIN: Performed by: INTERNAL MEDICINE

## 2024-02-28 PROCEDURE — 250N000011 HC RX IP 250 OP 636: Performed by: ANESTHESIOLOGY

## 2024-02-28 PROCEDURE — 250N000009 HC RX 250: Performed by: INTERNAL MEDICINE

## 2024-02-28 PROCEDURE — 250N000009 HC RX 250: Performed by: ANESTHESIOLOGY

## 2024-02-28 PROCEDURE — 250N000013 HC RX MED GY IP 250 OP 250 PS 637

## 2024-02-28 PROCEDURE — C1769 GUIDE WIRE: HCPCS | Performed by: INTERNAL MEDICINE

## 2024-02-28 PROCEDURE — 999N000054 HC STATISTIC EKG NON-CHARGEABLE

## 2024-02-28 PROCEDURE — 36415 COLL VENOUS BLD VENIPUNCTURE: CPT | Performed by: INTERNAL MEDICINE

## 2024-02-28 DEVICE — IMP LEAD PACING BIPOLAR CAPSUREFIX NOVUS 52CM 5076-52: Type: IMPLANTABLE DEVICE | Site: RIGHT ATRIUM | Status: FUNCTIONAL

## 2024-02-28 DEVICE — PACEMAKER AZURE MRI XT DR: Type: IMPLANTABLE DEVICE | Site: CHEST | Status: FUNCTIONAL

## 2024-02-28 DEVICE — IMP LEAD PACING BIPOLAR CAPSUREFIX NOVUS 58CM 5076-58: Type: IMPLANTABLE DEVICE | Site: RIGHT VENTRICLE | Status: FUNCTIONAL

## 2024-02-28 RX ORDER — LIDOCAINE HYDROCHLORIDE 20 MG/ML
INJECTION, SOLUTION INFILTRATION; PERINEURAL
Status: DISCONTINUED | OUTPATIENT
Start: 2024-02-28 | End: 2024-02-28 | Stop reason: HOSPADM

## 2024-02-28 RX ORDER — LOSARTAN POTASSIUM 50 MG/1
TABLET ORAL
COMMUNITY
Start: 2024-02-22 | End: 2024-03-22

## 2024-02-28 RX ORDER — PROPOFOL 10 MG/ML
INJECTION, EMULSION INTRAVENOUS CONTINUOUS PRN
Status: DISCONTINUED | OUTPATIENT
Start: 2024-02-28 | End: 2024-02-28

## 2024-02-28 RX ORDER — NALOXONE HYDROCHLORIDE 0.4 MG/ML
0.4 INJECTION, SOLUTION INTRAMUSCULAR; INTRAVENOUS; SUBCUTANEOUS
Status: DISCONTINUED | OUTPATIENT
Start: 2024-02-28 | End: 2024-02-28 | Stop reason: HOSPADM

## 2024-02-28 RX ORDER — FENTANYL CITRATE 50 UG/ML
25 INJECTION, SOLUTION INTRAMUSCULAR; INTRAVENOUS EVERY 5 MIN PRN
Status: DISCONTINUED | OUTPATIENT
Start: 2024-02-28 | End: 2024-02-28 | Stop reason: HOSPADM

## 2024-02-28 RX ORDER — FENTANYL CITRATE 50 UG/ML
50 INJECTION, SOLUTION INTRAMUSCULAR; INTRAVENOUS EVERY 5 MIN PRN
Status: DISCONTINUED | OUTPATIENT
Start: 2024-02-28 | End: 2024-02-28 | Stop reason: HOSPADM

## 2024-02-28 RX ORDER — CEPHALEXIN 500 MG/1
500 TABLET ORAL 3 TIMES DAILY
Qty: 15 TABLET | Refills: 0 | Status: SHIPPED | OUTPATIENT
Start: 2024-02-28 | End: 2024-02-28

## 2024-02-28 RX ORDER — SODIUM CHLORIDE, SODIUM LACTATE, POTASSIUM CHLORIDE, CALCIUM CHLORIDE 600; 310; 30; 20 MG/100ML; MG/100ML; MG/100ML; MG/100ML
INJECTION, SOLUTION INTRAVENOUS CONTINUOUS
Status: DISCONTINUED | OUTPATIENT
Start: 2024-02-28 | End: 2024-02-28 | Stop reason: HOSPADM

## 2024-02-28 RX ORDER — NALOXONE HYDROCHLORIDE 0.4 MG/ML
0.2 INJECTION, SOLUTION INTRAMUSCULAR; INTRAVENOUS; SUBCUTANEOUS
Status: DISCONTINUED | OUTPATIENT
Start: 2024-02-28 | End: 2024-02-28 | Stop reason: HOSPADM

## 2024-02-28 RX ORDER — FENTANYL CITRATE 50 UG/ML
INJECTION, SOLUTION INTRAMUSCULAR; INTRAVENOUS PRN
Status: DISCONTINUED | OUTPATIENT
Start: 2024-02-28 | End: 2024-02-28

## 2024-02-28 RX ORDER — ONDANSETRON 2 MG/ML
4 INJECTION INTRAMUSCULAR; INTRAVENOUS EVERY 30 MIN PRN
Status: DISCONTINUED | OUTPATIENT
Start: 2024-02-28 | End: 2024-02-28 | Stop reason: HOSPADM

## 2024-02-28 RX ORDER — CLINDAMYCIN HCL 300 MG
300 CAPSULE ORAL 4 TIMES DAILY
Qty: 20 CAPSULE | Refills: 0 | Status: SHIPPED | OUTPATIENT
Start: 2024-02-28 | End: 2024-03-04

## 2024-02-28 RX ORDER — SODIUM CHLORIDE 9 MG/ML
INJECTION, SOLUTION INTRAVENOUS CONTINUOUS
Status: DISCONTINUED | OUTPATIENT
Start: 2024-02-28 | End: 2024-02-28 | Stop reason: HOSPADM

## 2024-02-28 RX ORDER — LIDOCAINE HYDROCHLORIDE 20 MG/ML
INJECTION, SOLUTION INFILTRATION; PERINEURAL PRN
Status: DISCONTINUED | OUTPATIENT
Start: 2024-02-28 | End: 2024-02-28

## 2024-02-28 RX ORDER — CLINDAMYCIN PHOSPHATE 900 MG/50ML
900 INJECTION, SOLUTION INTRAVENOUS
Status: COMPLETED | OUTPATIENT
Start: 2024-02-28 | End: 2024-02-28

## 2024-02-28 RX ORDER — NALOXONE HYDROCHLORIDE 0.4 MG/ML
0.1 INJECTION, SOLUTION INTRAMUSCULAR; INTRAVENOUS; SUBCUTANEOUS
Status: DISCONTINUED | OUTPATIENT
Start: 2024-02-28 | End: 2024-02-28 | Stop reason: HOSPADM

## 2024-02-28 RX ORDER — ACETAMINOPHEN 325 MG/1
975 TABLET ORAL EVERY 4 HOURS PRN
Status: DISCONTINUED | OUTPATIENT
Start: 2024-02-28 | End: 2024-02-28 | Stop reason: HOSPADM

## 2024-02-28 RX ORDER — SODIUM CHLORIDE, SODIUM LACTATE, POTASSIUM CHLORIDE, CALCIUM CHLORIDE 600; 310; 30; 20 MG/100ML; MG/100ML; MG/100ML; MG/100ML
INJECTION, SOLUTION INTRAVENOUS CONTINUOUS PRN
Status: DISCONTINUED | OUTPATIENT
Start: 2024-02-28 | End: 2024-02-28

## 2024-02-28 RX ORDER — LIDOCAINE 40 MG/G
CREAM TOPICAL
Status: DISCONTINUED | OUTPATIENT
Start: 2024-02-28 | End: 2024-02-28 | Stop reason: HOSPADM

## 2024-02-28 RX ORDER — ONDANSETRON 4 MG/1
4 TABLET, ORALLY DISINTEGRATING ORAL EVERY 30 MIN PRN
Status: DISCONTINUED | OUTPATIENT
Start: 2024-02-28 | End: 2024-02-28 | Stop reason: HOSPADM

## 2024-02-28 RX ADMIN — PHENYLEPHRINE HYDROCHLORIDE 50 MCG: 10 INJECTION INTRAVENOUS at 09:44

## 2024-02-28 RX ADMIN — SODIUM CHLORIDE, POTASSIUM CHLORIDE, SODIUM LACTATE AND CALCIUM CHLORIDE: 600; 310; 30; 20 INJECTION, SOLUTION INTRAVENOUS at 08:21

## 2024-02-28 RX ADMIN — PHENYLEPHRINE HYDROCHLORIDE 100 MCG: 10 INJECTION INTRAVENOUS at 09:31

## 2024-02-28 RX ADMIN — PHENYLEPHRINE HYDROCHLORIDE 50 MCG: 10 INJECTION INTRAVENOUS at 09:10

## 2024-02-28 RX ADMIN — MIDAZOLAM 1 MG: 1 INJECTION INTRAMUSCULAR; INTRAVENOUS at 08:28

## 2024-02-28 RX ADMIN — FENTANYL CITRATE 25 MCG: 50 INJECTION INTRAMUSCULAR; INTRAVENOUS at 10:14

## 2024-02-28 RX ADMIN — ACETAMINOPHEN 975 MG: 325 TABLET, FILM COATED ORAL at 13:17

## 2024-02-28 RX ADMIN — PHENYLEPHRINE HYDROCHLORIDE 100 MCG: 10 INJECTION INTRAVENOUS at 09:55

## 2024-02-28 RX ADMIN — LIDOCAINE HYDROCHLORIDE 60 MG: 20 INJECTION, SOLUTION INFILTRATION; PERINEURAL at 08:37

## 2024-02-28 RX ADMIN — PHENYLEPHRINE HYDROCHLORIDE 100 MCG: 10 INJECTION INTRAVENOUS at 10:32

## 2024-02-28 RX ADMIN — PHENYLEPHRINE HYDROCHLORIDE 100 MCG: 10 INJECTION INTRAVENOUS at 09:38

## 2024-02-28 RX ADMIN — PHENYLEPHRINE HYDROCHLORIDE 50 MCG: 10 INJECTION INTRAVENOUS at 09:19

## 2024-02-28 RX ADMIN — PHENYLEPHRINE HYDROCHLORIDE 50 MCG: 10 INJECTION INTRAVENOUS at 09:03

## 2024-02-28 RX ADMIN — PROPOFOL 100 MCG/KG/MIN: 10 INJECTION, EMULSION INTRAVENOUS at 08:37

## 2024-02-28 RX ADMIN — PHENYLEPHRINE HYDROCHLORIDE 100 MCG: 10 INJECTION INTRAVENOUS at 10:23

## 2024-02-28 RX ADMIN — CLINDAMYCIN PHOSPHATE 900 MG: 900 INJECTION, SOLUTION INTRAVENOUS at 07:48

## 2024-02-28 RX ADMIN — PHENYLEPHRINE HYDROCHLORIDE 50 MCG: 10 INJECTION INTRAVENOUS at 09:27

## 2024-02-28 ASSESSMENT — ACTIVITIES OF DAILY LIVING (ADL)
ADLS_ACUITY_SCORE: 37
ADLS_ACUITY_SCORE: 36
ADLS_ACUITY_SCORE: 37

## 2024-02-28 ASSESSMENT — ENCOUNTER SYMPTOMS: DYSRHYTHMIAS: 1

## 2024-02-28 NOTE — PROGRESS NOTES
"    VIDEO VISIT    Date of Visit: 2024  Name: Arnaldo Henderson  Date of Birth 1955  Update meds - in [facility]    : 1955    ELISA: 2024    MRN: [id number]  [address]    [place] [facility] [address]     [phone] (M)   [phone] (H)      [email or web address]     Veronika Pena spouse  [phone]     Keren balbuena daughter     Genetic testing ?   Family history of parkinsonism in mother     Parkinson's disease with dyskinesia, unspecified whether manifestations fluctuate, Ref by EVERT SAMUEL, Recs in [facility], Sched per Spouse Veronika     Seen by Parashos - notes below    He granted proxy access to his mychart.     Assessment:  (G20.A1) Parkinson's disease, unspecified whether dyskinesia present, unspecified whether manifestations fluctuate  (primary encounter diagnosis)  Family history of parkinsonism - mother     From Parashos recent visit  1. Parkinson's disease, stage 2  2. Dyskinesia due to Parkinson's disease.  3. Motor fluctuations likely related to protein intake interfering with the absorption of levodopa, improved since taking the levodopa on an empty stomach.    5am up line up pills  6am takes carbidopa/levodopa and insulin  Then eats breakfast - generally he is waiting a bit - eating between 630 or 7am so waiting 30 - 60 minutes.     10 or 11a - head starts to feel numb - not light headedness and legs are heavy.   He has problems moving his feet and most likely he is wearing off/worn off  He has a lot of \"freezing\" throughout the day.   The morning is the best time.   He has freezing  He is a \"different person\" later in the day and evenings   Falls asleep early in evening - 730 and 8pm watching TV  Gets up at 9pm and/or wife wakes him up and has to help him get into bed  Has problems sleeping  Has a bed rail to help him move  He is sleeping in a separate bed as he is at various angles in the bed.     Started 2.5 years ago in [place] - Parkinson has progressed " "\"tremendously\"   Dr Resendez has not made medication changes.     11am pill and takes 1 hour or 1.5 hours to get the dose to work   4p     Review of diagnosis    Parkinson disease  Duration 4 years or so.   Side onset: right  Right handed.   Symptoms - had gait problems  - lack of arm swing, shuffling   Symptoms - sleep issues were early issues as well as lack of smell  Clinical diagnosis   No specific testing    Avoidance of dopamine blockers   Not taking    Motor complication review   Wearing off   ?dyskinesias - not  clear if he has this but may be some facial dyskinesias per his wife.  Not clear if there is a relationship between the medication and the dyskinesias.   Freezing that may be aggravated by anxiety   Has more shuffling    Review of Impulse control disorders   Always hungry   Seen by Dr. Haider and discussed weight gain  No other ICD issues.     Review of surgical or medication options   reviewed    Gait/Balance/Falls   No falls.   Has freezing  Not using assistive device    Exercise/Therapy performed/offered   Physical therapy - after visit today  Abundio Barbosa PT ?  [facility]     Cognitive/Driving   He is not concerned  Wife has been a bit concerned - left the garage door open   He had an amazing memory when they met but has short term memory problems  Has not had cognitive testing  - can consider this.   Driving  - very little - short distances; wife prefers that he not drive.   He is managing his own medicine and insulin, etc.   Organizes his pill containers, etc.     Mood   Anxiety is disabling   QTc was okay 2/28/2024  He may have been on citalopram/celexa - details not clear in chart.   He stopped it and threw it out.     Therapist - has not worked with a therapist  He has problems waiting with other people when they are shopping.     Denies depression    Wife Veronika on the call today.     Profession in the past -- 7 years retired.   He worked in [employer], " all sorts of jobs.     Using [employer] mobility at 1200 pm for therapy     Going to [place] and leaving Friday and will be there for one week and will be selling their place as it is too difficulty to travel. Bought one way tickets as the sale is still not final.     1st marriage  2nd marriage - clarisa - having surgery with Dr. Umaña   Had an cerebral angiogram - they want to address the 80% carotid artery stenosis.     Hallucinations/delusions   Denies   No illusions    Sleep   Naps in front of tv  Gets up to go to bed at 9pm   Sleeps for 4 solid hours and then toss and turns for 4 hours  Get up at 4 or 5am and then lays in bed till 4 or 430 or 5a and then gets up  Sleeping in separate bed from spouse  Is angled over the bed  He has a railing.   He may move around in the bed   possible RBD/dream enactment behavior - per his wife - may be less  He sleeps alone now - sleep issue prompted a consultation.   Snoring - sometimes - recently no but not clear.   Sleep study - has not done one.     Bladder/Renal/Prostate/Gyn/Other   Urinary frequency  Nocturia 2/noc  No control issues   PSA was normal about 2 y ears ago.     GI/Constipation/GERD   Acute cholecystitis  Cholecystectomy  Malignant neuroendocrine tumor   GERD  Was put on omeprazole long ago and remains on this.   He had problems with his esophagus and had to be dilated, etc. He had ripples  This was 20 years ago, etc.   He has not had problems like this.  No coughing   Weight issue ?  Failure to thrive   Malnutrition  Lipase/protease/amylase  He has a bowel movement every 3 days - long standing  Not taking anything for his bowels per se  Encouraged to be active    ENDO/Lipid/DM/Bone density/Thyroid  Partial pancreatectomy  Pseudocyst pancreas    Diabetes  A1c was 8.2 on 11/6/2023  Insulin  Intolerance to metformin  Seen nutritionist for this.   Needs to have another one done.     Lipid disorder   Atorvastatin lipitor 40mg     Cardio/heart/Hyper or Hypotensive  "  Hypotension  pacemaker  2/28/2024  Bradycardia  Bypass surgery x2 2003  Losartan cozaar 50mg restarted after hospital    Vision/Dry Eyes/Cataracts/Glaucoma/Macular   Wears glasses  Had recent visual evaluation   No clear retinopathy from diabetes    Heme/Anticoagulation/Antiplatelet/Anemia/Other  Aspirin 81mg daily   No other blood thinners.     ENT/Resp  Hearing loss   ?former smoker - tobacco - off and on   Smell perception - never been good.     Skin/Cancer/Seborrhea/other  Skin cancer of scrotum  Seborrheic dermatitis   Few spots on his scalp    Musculoskeletal/Pain/Headache  Neck pain  Back issues since \"surgery\" - like a bubble that moves around his back and is intermittent.     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     His tumor is well-differentiated with a Ki-67 index of 10%. He has not had any symptoms referable to his cancer and at our last evaluation had only small volume kiersten disease in the mesentery.         Medications      6a  11am 4p  9p      Acetaminophen tylenol 325mg prn            Aspirin 81mg   1           Atorvastatin lipitor 40mg   1           Carbidopa/levodopa Sinemet CR 50/200       1     CArbidopa/levodopa sinemet 25/100 2 2 2       Dasiglucagon zegalogue 0.6mg/0.6ml prn           Gabapentin neurontin 300mg 2 2 2       Glucagon baqsimi 3mg/dose nasal  prn            Insulin aspart novolog sliding            Insulin aspart novolog             Insulin glargine  15      30      Lipase protease amylase zenpep  3/day           Losartan cozaar 50mg 1           Magnesium oxide 400mg  qod            Metformin glucophage XR 500mg 24hr off            MVI 1            Omeprazole prilosec 20mg  1           Pantoprazole protonic 40mg off           Zenpep 2000 70219 above                            Plan:    S/p " pacemaker  Ongoing blood pressure issues - highs and lows   Losartan cozaar 50mg was restarted and had been on 25mg in hospital  They are working with pcp and possibly cardiology  157 systolic 5pm   Only checking blood pressure in the evening.   He does not check morning blood pressures  QTc was 402ms on ECG on 2/28/2024  Need ongoing monitoring of blood pressure - highs and lows     Parkinson genetics study with family history of parkinson in mother   And son has orthostatic hypotension  PDgeneration  [email or web address]   [phone]  [email or web address]    Pharmacy ([facility]) consultation and medication management  Please call the scheduling number @ [phone] to set up an appointment with pharmacists Cheyanne Harrell or Morenita Delgado.     Due to wearing off he would probably benefit from a shortening in the dose interval changing from 5 hour to 4 hour interval or so.     Sinemet 25/100 (carbidopa/levodopa) 2 tabs @ 6am, 2 @10a, 2@2p and 1@5p  Sinemet 50/200 (carbidopa/levodopa) at 9pm    Neuropsychological evaluation - baseline cognitive evaluation  Will take time to get scheduled.     Return to see Candida Jackson NP in 3 months.     Anxiety has increased and impacts social life  May have been citalopram celexa in the past  Has not seen a therapist and wife has been encouraging this.   He may be open to seeing someone  Has not started on anxiety medication recently.     [facility]  [facility]  [address] [place], [place] [id number]    Whtiney Renteria, Ph.D., LP  [phone]    Isabelle West,Ph.D.,LP  [phone] (inpatient)    Orlando Jasso,Ph.D.,LP  [phone]    Maliha Echevarria, Ph.D.  [phone]    Phyllis Mercedes, Ph.D., LP  [phone]    Jay Bales, Ph.D., ABPP, LP  [phone]    Samantha Watters,Ph.D., LP  [phone]    Jenna Haider psychologist  [phone]   [place] [address]  "[facility] [address] [place] [id number].    Medical Decision Making:  #  Chronic progressive medical conditions addressed  yes  Review and/or interpretation of unique test or documentation from a provider outside of neurology yes   Independent historian provided additional details  yes   Prescription drug management and review of potential side effects and/or monitoring for side effects  yes   Health impacted by social determinants of health  no    I have reviewed the note as documented above.  This accurately captures the substance of my conversation with the patient and total time spent preparing for visit, executing visit and completing visit on the day of the visit:  80 minutes.     The longitudinal plan of care for Arnaldo Henderson was addressed during this visit. Due to the added complexity in care, I will continue to support Arnaldo Henderson in the subsequent management of this condition(s) and with the ongoing continuity of care of this condition(s).    Juan Jose Armenta MD      ------------------------------------------------------------------------------------------------------------------------------------------------------------------------    Video-Visit Details    The patient has been notified of following:     \"After a review of the patient s situation, this visit was changed from an in-person visit to a video visit to reduce the risk of COVID-19 exposure.   The patient is being evaluated via a billable video visit.\"    \"This video visit will be conducted via a call between you and your physician/provider. We have found that certain health care needs can be provided without the need for an in-person physical exam.  This service lets us provide the care you need with a video conversation.  If a prescription is necessary we can send it directly to your pharmacy.  If lab work is needed we can place an order for that and you can then stop by our lab to have the test done at a later time.    If during the " "course of the call the physician/provider feels a video visit is not appropriate, you will not be charged for this service.\"     Patient has given verbal consent for Video visit? Yes    Patient would like the video invitation sent by:     Type of service:  Video Visit    Video Start Time:     Video End Time (time video stopped):     Duration:  minutes - see above    Originating Location (pt. Location):     Distant Location (provider location):  [facility]UR[facility]     Mode of Communication:  Video Conference via [employer] (and if not possible then [facility])      Juan Jose Armenta MD      --------------------------------------------------------------------------------------------------------------    Arnaldo Henderson is a 68 year old male who is being evaluated via a billable video visit.      Charts reviewed  Consult from  Images reviewed        I have reviewed and updated the patient's Past Medical History, Social History, Family History and Medication List.    ALLERGIES  Ciprofloxacin, Dilaudid [hydromorphone], Morphine, Penicillins, Citalopram, Clindamycin, and Oxycodone    Lasts visit details if there was a last visit:       I was asked to review a patch monitor for patient with 10 second pause.     I met patient once on 6/1/2023 at [facility] for evaluation of AV Wenckebach noted during ERCP in May 2023. Wenckebach had previously been noted and he was asymptomatic. No pacemaker was indicated.     Patient saw Dr. Dahl at [facility] 1/16/2024 to establish follow up for CAD/CABG. She had ordered a patch monitor to follow up on baseline prolonged UT interval and AV Wenckebach. Patch monitor was worn from 1/16-1/30/2024.     Patient was admitted to [facility] from 2/4-2/7/2024 for weakness and fogginess, found to have orthostatic hypotension which he has had issues with from autonomic dysfunction due to Parkinson's. Cardiology was consulted for AV Wenckebach on telemetry with no " symptoms or changes in BP, no pacemaker was recommended.     Ziopatch monitor results processed today, and notification was given for a 10-second pause.     My personal review of patch monitor:  Dates 1/16-1/30/2024.  Sinus with prolonged WI, average 79 bpm, range  bpm, PACs/PVCs <1%.  Frequent episodes of AV Wenckebach. Patient triggered events correlated with sinus rhythm, with and without Wenckebach.  On 1/20/2024 at 12:42 am, one 4-beat run NSVT, no symptoms.  On  1/24/2024 at 4:42 pm: 10.5 seconds of sinus rhythm with AV block and no escape rhythm - no events triggered by patient.  1/27/2024 at 1:50 am: 3.4 seconds or AV block.        I spoke with patient and his wife Veronika by phone. He had no syncope or presyncope that he recalled while wearing monitor. He is feeling much better since his BP meds were reduced in hospital. His main issues had been orthostatic dizziness and generalized weakness for which he was recently hospitalized, without any AV block noted (EKG on 2/4/2024 was sinus with AV Wenckebach). However, given 10.5 seconds of AV block during waking hours, a pacemaker seems reasonable at this time. Given that the event occurred on 1/24, and he's been in hospital since then without any AV block, I will plan to schedule elective pacemaker soon rather than for him to go to ED.     Will schedule pacemaker implant, with MAC given his Parkinson's. Left side dual chamber. Patient is in agreement.  In the meantime should he have significant dizziness or syncope he will come to ED immediately.     Jenna Hernandez MD        Date of Admission:  2/4/2024  Date of Discharge:  2/7/2024  Discharging Provider: Mackenzie Yen MD  Discharge Service: Hospitalist Service        Discharge Diagnoses  Orthostatic hypotension   Parkinson's Disease      Arnaldo Henderson is a 68 year old male with PMH including distal pancreatectomy following chronic pancreatitis with stent migration and disconnection of pancreatic duct,  IDDM, neuroendocrine tumor of small bowel, Parkinson's disease, CAD s/p CABG, hypertension presents for evaluation of multiple complaints including weakness, bradykinesia, spells where his body feels heavy and woozy, muscle tension.  Found to have bradycardia and with orthostatic hypotension.  Patient evaluated by neurology.  EEG normal, MRI brain and MRI cervical spine with no acute intracranial process, generalized brain atrophy and presumed microvascular ischemic changes, multilevel degenerative spondylolisthesis of cervical spine with up to moderate central spinal canal stenosis at levels of C3-C6.  Neurology evaluated patient symptoms consistent with orthostatic hypotension. Recommendations from neurology include conservative management of orthostatic hypotension, reduction of losartan to 25 mg and close outpatient follow-up.  Patient evaluated by cardiology for heart block found to have Mobitz type II with no indication for permanent pacemaker.  Workup for urine serotonin was deferred to outpatient given dietary modification needed prior to testing.  Patient's other chronic conditions were managed with PTA medication.      1.Left ventricular size, wall motion and function are normal. The ejection  fraction is > 65%. LV might suggest dehydration.  2.Mildly decreased right ventricular systolic function  3.No hemodynamically significant valvular abnormalities on 2D or color flow  imaging.  There is no comparison study available.     Interval History  Pt was recently admitted.  Metformin was discontinued since admission and patient has not resume.  Pt does not want to restart metformin.  Report only taking insulin  Basaglar 14 unit(s) AM and 30 unit(s) PM  Novolog 4/7/10 with meal plus sliding scale insulin  Pt injects Novolog 15 mins before meal and rotate site of injection     Pt is getting pace maker put in later this month     CGM: improvement in hypoglycemia, but TIR worsening with more hyperglycemia  (especially after meal)     Initial visit 1/10/24     Per Dr. Lyn note 11/27/2023  He is 68 years old.  His tumor was found incidentally when he was undergoing a pancreatectomy for chronic pancreatitis.    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  He has small volume mildly progressive disease within the abdomen without any clearly associated symptoms.   For now his rate of growth and very small volume disease does not warrant active intervention. We will plan on a follow-up scan in 3 to 4 months.      Per Inpatient DM team note 7/2023  Arnaldo Henderson 67 y/o with PMH chronic pancreatitis, CAD, CABG, 2nd degree AVB, Parkinson's disease and chronic pain. He is now s/p distal pancreatectomy, splenectomy, YVAN, proximal ileum resection. He is admitted to the SICU for hemodynamic monitoring.  # T2DM not at goal with hyperglycemia.  Poor control prior to admission   # Stress induced hyperglycemia  # post pancreatic surgery  Plan:                  -Lantus 15 units                 -Novolog 1u/10 gm CHO coverage with meals and snacks                 -Continue insulin drip for now.                 -BG monitoring as per insulin drip protocol                 -hypoglycemia protocol                 -recommend diet with carb counting protocol                 -diabetes education needs will be assessed closer to discharge                 -on discharge, will recommend outpatient follow up with [facility]      Per most recent endocrine clinic visit with my colleague Hayley Alatorre PAC 1/2024   TYPE 2 DIABETES MELLITUS:   Sugars are currently at goal but with excessive hypoglycemia.  Advising him to reduce his Basaglar dose to 14 units in the morning 30 in the evening nearly 10% reduction.  Also advising him to continue his breakfast dose of 4 units but decrease lunch dose to just 7 units and dinner dose to 10 units plus any correction.  Will continue current correction.    5.  Neuroendocrine tumor: See Dr. Gail mcarthur next week     I confirmed with patient and wife and they confirmed that Pat is seeing me today for diabetes.  They would like to have a doctor on the team also with our diabetes team at the .     Prior to admission, he was on metformin 2000 mg/day, no side effect  Pt and wife reported seeing doctors for diabetes once during inpatient.  Report that about 60% of his pancreas was removed  Pt follows neuroendocrine tumor with Dr. Lyn no concerns.     Patient's main concerns today are his diabetes control after steroid injection and his recent lab results.  11/21/2023  Steroid injection, glucose was high   He may need another injection in the future and is concerned.  They are wondering about high BUN, previous abnormal LFT, low hematocrit and abnormal RBC     In terms of symptoms, he said PD is bothersome  But no problem taking insulin  Feels like hypoglycemia improve since lower insulin dose last week     Pt only has dexcom alarm set for urgent low.  Sometimes he could not feel low glucose.  He recently bought glucose tablets to help with lows     Current regimen  Basaglar 14 units AM 30 units hs  Novolog 4 units with breakfast + sliding scale insulin, lunch 7 + sliding scale insulin, supper 10 + sliding scale insulin, night sliding scale insulin     CGM 1 week data  TIR 49%, low 4%, very low 1%     ASSESSMENT/PLAN:   Arnaldo Henderson is a 68 year old male with CAD s/p CABG, 2nd degree AV block, DM, GERD, HTN, h/o chronic pancreatitis s/p distal pancreatectomy, neuroendocrine tumor and PD who is here for diabetes management.      ## T2DM  ## H/o chronic pancreatitis  ## Partial pancreatectomy (report 60%)  ## CAD s/p CABG  ## Hypoglycemia unawareness  ## H/o Grade 2 hypoglycemia  ## Cannot tolerate metformin  Hypoglycemia improve, but worsening hyperglycemia  Plan for goal A1c 8  CGM Time in target () >50%, and low (<70) <1%  Goal fasting 100-150,  [id number] mg/dL  Unable to use GLP1 agonist given h/o pancreatitis  Pt declined restarting metformin  Will check endogenous insulin reserve, may consider SGLT2i  Difficult to control glucose by multiple daily injection, discussed automated insulin pump with no carb counting required, patient is interested.  -- continue Basaglar 14 unit(s) AM and 30 unit(s) PM [for procedure decrease to 24 unit(s) PM and 10 unit(s) AM the day of procedure]  -- increase Novolog with meals to 6/10/10 unit(s)   -- continue sliding scale insulin   -- fasting blood work  -- CDE for possible insulin pump (Medtronic 780G and Ilet-betabionic)  -- pending patient assistance glucagon  -- try glucose gel for hypoglycemia     ______________________________________________________________________________           Brain and cervical spine MRI 2/5/2024  INDICATION: Spells of generalized weakness, brain fog.     HEAD MRI:   1.  No acute intracranial process.  2.  Generalized brain atrophy and presumed microvascular ischemic changes as detailed above.     CERVICAL SPINE MRI:  1.  Multilevel degenerative spondylolisthesis of the cervical spine, as above, with up to moderate central spinal canal stenosis at the levels of C3-C4, C4-C5 and C5-C6.  2.  No abnormal spinal cord signal or intramedullary/leptomeningeal enhancement     Head CT scan 2/4/2024  No acute intracranial injury, hemorrhage, mass, or CT evidence of recent ischemia.     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    - No thoracic or pelvic metastases.     In detail:   1. Several enlarged Dotatate avid superior mesenteric lymph nodes are  consistent with kiersten metastatic disease.  1a. 2 smaller foci of  focal dotatate uptake likely metastatic disease  (without CT correlate on non-contrast exam) - 2 implants on small  bowel in the right anterior abdomen  1b. 2 small areas of increased Dotatate uptake that correspond with  indistinct soft tissue density on CT likely metastatic - soft tissue  encasing the hepatic artery midline, density in the upper mesentery  inferior to the gastric antrum.      2. No evidence of thoracic metastatic disease.  3. No definite liver metastases.  4. Post surgical changes of distal pancreatectomy and splenectomy with  decreased soft tissue stranding in postoperative fluid compared to  prior evaluation.     I have personally reviewed the examination and initial interpretation  and I agree with the findings.     TOM WHITMAN MD      11/3/2023 CT scan  1.  Multiple mildly enlarged enhancing mesenteric lymph nodes in the central mesentery suspicious for carcinoid metastasis, mildly increased in size since prior study. No metastatic disease in the chest     2.  Improved postoperative edema in the pancreatectomy bed.        Dean Resendez MD - 01/12/2024 10:00 AM CST  Formatting of this note might be different from the original.  Neurology progress note    Chief complaint: Follow up..    I am seeing this 68-year-old man in follow up of Parkinson's disease. He is accompanied by his wife. I last saw him about a month ago but I wanted to follow up with him as we made some minor changes at that time to account for wearing off, and I would like to see if they have worked or whether further changes were necessary. He states that things have worked quite well and he is doing much better now, however he has noticed presyncopal symptoms more frequently. He has not aware of any dyskinesias. Today his heart rate is low. I noticed that he had a Holter monitor. For 1 day about a year ago and no abnormalities were found at that time, and he also has had blood pressure fluctuations. He did see  electrophysiology at some point, and apparently the possibility that some point he might need a pacemaker was raised.    I reviewed the PMH, PSH, FH, SH, medications and allergies from the EHR and through care everywhere.     Blood pressure today is 94/53 with heart rate of 39. A little later we checked it again and he was 87/40 with a heart rate of 59. His neurological examination has not changed. He has very little dyskinesia today.    Impression:  1. Parkinson's disease, stage 2  2. Dyskinesia due to Parkinson's disease.  3. Motor fluctuations likely related to protein intake interfering with the absorption of levodopa, improved since taking the levodopa on an empty stomach.    Discussion:  No further changes are necessary to his medication regimen. However he does have low blood pressure, and also bradycardia. The to maybe complicating his situation causing dizziness lightheadedness and might put him at risk for syncope. He has multiple reasons for orthostatic hypotension including his diabetes, his Parkinson's, and a number of medications that he is taking. He is also on some antihypertensives, and he has had some high blood pressures in the past so he may still need to continue to be on some of the antihypertensives but certainly in view of his low blood pressures on the low heart rate those should be reviewed again. The combination of orthostatic hypotension with bradycardia would certainly put him at very high risk for syncope. Therefore I think a review by the electrophysiologist with possibly a longer cardiac monitoring might be in order as well. I discussed all of this with the patient and his wife. I will plan to see him in follow-up in 6 months time, but he will call me if there is problems with his medications in the meanwhile.    Recommendations:  1. Continue with the same dose of carbidopa/levodopa. Wait at least 1 hour after taking your pills before you eat anything that contains protein.  2. Your  heart rate today was 39 with a blood pressure of 94/53. When your blood pressure dropped to 87/40 your heart rate only went up to 59. You should talk to your doctor and your cardiologist as both the low blood pressure and the slow heart rate will make you dizzy. Although the low blood pressure can be a symptom of Parkinson's it is also a symptom of diabetes, and you are on medications to lower your blood pressure. I do not have an explanation for the slow heart rate, but your cardiologist may want to do a longer term heart monitoring to decide whether additional testing or a pacemaker are necessary/appropriate.  3. Follow-up with me in September.     Dean Resendez MD  Electronically signed by Dean Resendez MD at 01/12/2024 11:26 AM CST        Dean Resendez MD - 05/14/2021 12:45 PM CDT  Formatting of this note might be different from the original.  Neurology consultation.    I am seeing this 66-year-old man with a question of Parkinson's disease. A received records and the letter from the patient's wife prior to the visit today. The patient has had what he describes as neurological problems for quite some time. He walks with a shuffle, developed very slow movements, has developed tremors in the right upper extremity, and has had mood changes, his wife thinks that he is depressed. He has increasingly hard time to adjust to things. There may be some mental slowing. There have been some personality changes. His wife states that he has a going on for about a year or 2. He was seen [place] where he had an MRI of the brain which was unremarkable, an MRI of the cervical spine, and an MRI of the lumbar spine love which showed some trivial age-related degenerative changes, and a carotid ultrasound which was unremarkable. At the end of all this the patient's primary care physician concluded that she has suffered from essential tremor. No treatment was offered. He did receive some physical therapy with  slight improvement. The patient's mother had Parkinson's disease, so they were concerned that the patient might have Parkinson's and requested a neurology consultation for which he is here today.He reports difficulty with getting up from chairs. Sense of smell has never been good. He sleeps well. Wife reports that he has had active dreams. He denies memory problems. No constipation.    I reviewed the PMH, PSH, FH, SH, medications and allergies from the EHR and through care everywhere if necessary.    A comprehensive review of systems was negative with the exception of what has been mentioned under the history of present illness and the symptoms associated with the chronic conditions listed in PMH. All others are negative.    134/79, 201.6 lb., HR 68 R.  General: Normal appearance for age  Mental status: Alert, oriented, with normal attention, concentration, language, memory, fund of knowledge. Clock drawing, serial sevens, and short-term memory are intact. MiniCog 5/5  Cranial nerves: Visual fields full, eye movements conjugate, pupillary responses symmetric, funduscopic examination benign. Face movement and sensation, tongue and palate movement, shoulder shrug and neck muscle strength, swallowing and hearing ) with hearing aids) are normal to bedside testing. Moderately decreased facial expression, weak and slightly dysphonic voice without dysarthria.  Motor: Normal muscle strength, muscle bulk, and marked rigidity in the neck and the right upper extremity moderate in the left upper extremity, and mild rigidity in the lower extremities.  Deep tendon reflexes: Normal and symmetric without Babinski signs.  Sensory: No deficits to light touch and vibration.  Coordination: Normal finger-nose-finger, heel-knee-shin. Very intermittently present but moderate in amplitude tremor at rest in the right upper extremity. Severe bradykinesia in the upper extremities mild bradykinesia in the lower extremities.  Gait and balance:  Normal tandem gait, Romberg test, and flexed posture posture. There is no freezing of gait. He walks with very reduced arm swing, slightly propulsive gait, and he scuffs his feet.  UPDRS Motor score 41    Impression:  1. Parkinson's disease, stage 2  2. DDD lumbar and cervical spine.    Discussion:  Clinical presentation is typical for idiopathic Parkinson's disease. The patient has no unusual findings to suggest an atypical parkinsonian syndrome but that cannot be ruled out at this stage. His workup so far was negative for other causes. He has prior exposure to cleaning agents, and a family history of Parkinson's disease. His mother apparently also suffered from hallucinations with her Parkinson's disease. However he currently has no psychotic features, and no cognitive dysfunction. I talked to him and his wife about the diagnosis, prognosis, and treatment. I think that his symptoms and findings are sufficient to this poor and to warrant symptomatic therapy and we discussed extensively the use of levodopa. He is willing to try it and will start a low dose increase the dosage gradually and I will see him in 2 months time. I also think that he will benefit from physical therapy. I provided him with resources and booklets regarding Parkinson's disease.    Recommendations:  1. Start carbidopa/levodopa 25/100, 1/2 tablet three times a day for 1 week, then 1 tablet three times a day after thath for 1 week, then 1.5 tablet three times a day until you see me back. Take it 30 minutes before each meal with water. If it makes you sick to your stomach take it with food.  2. Physical Therapy.  3. Come back for a follow up in 2 months. Call sooner if there are side effects.    Dean Resendez MD    Electronically signed by Dean Resendez MD at 05/14/2021 1:23 PM CDT         14 Review of systems  are negative except for   Patient Active Problem List   Diagnosis    Retained foreign body    Acute pancreatitis     Cholecystitis, acute    Surgical site infection    Wound infection    Acute bilateral low back pain without sciatica    Adenomatous polyp of colon    Atherosclerotic heart disease of native coronary artery without angina pectoris    Cancer of skin of scrotum (H)    Cervicalgia    Gastroesophageal reflux disease without esophagitis    History of cholecystectomy    History of coronary artery bypass surgery    Hyperlipidemia, unspecified    Hypertension    Impaired gait    Long term (current) use of aspirin    Mild protein-calorie malnutrition (H24)    Mixed conductive and sensorineural hearing loss    Muscle weakness (generalized)    Need for assistance with personal care    Other hydrocele    Parkinson's disease with dyskinesia, unspecified whether manifestations fluctuate    Physical deconditioning    Pseudocyst of pancreas    Sensorineural hearing loss (SNHL) of right ear with unrestricted hearing of left ear    Type 2 diabetes mellitus with hyperglycemia, with long-term current use of insulin (H)    Neuroendocrine carcinoma of small bowel (H)    Failure to thrive in adult    Hyponatremia    Chronic right-sided low back pain without sciatica    Abdominal pain, generalized    Other secondary neuroendocrine tumors (H)    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)    Chronic biliary pancreatitis (H)    Weakness    Second degree AV block    History of Parkinson's disease    Orthostatic hypotension    Hypotension    Bradycardia    Parkinson's disease, unspecified whether dyskinesia present, unspecified whether manifestations fluctuate    Family history of parkinsonism    Cardiac pacemaker in situ    Seborrheic dermatitis        Allergies   Allergen Reactions    Ciprofloxacin Other (See Comments), Hives, Itching, Rash and Unknown     Other reaction(s): Other (see comments)  Oral swelling per [facility]        Dilaudid [Hydromorphone] Rash    Morphine Rash     rash    Penicillins Rash     aka ancef/  "rash      Citalopram Unknown    Clindamycin Itching and Rash    Oxycodone Rash     \"HORRIBLE, SEVERE RASH MAYBE CAUSED BY OXY\"     Past Surgical History:   Procedure Laterality Date    CA ANESTH CABG W/PUMP      CHOLECYSTECTOMY  2022    COLONOSCOPY N/A 01/12/2023    Procedure: colonoscopy with fluroscopy;  Surgeon: Ayden Fraire MD;  Location:  OR    ENDOSCOPIC RETROGRADE CHOLANGIOPANCREATOGRAM N/A 05/18/2023    Procedure: ENDOSCOPIC RETROGRADE CHOLANGIOPANCREATOGRAPHY, WITH  CYSTDUODENOSTOMY STENTS X2 REMOVAL;  Surgeon: Cedric Saldana MD;  Location: [facility] OR    ENT SURGERY      EP PACEMAKER DEVICE & LEAD IMPLANT- RIGHT ATRIAL & RIGHT VENTRICULAR N/A 2/28/2024    Procedure: Pacemaker Device & Lead Implant - Right Atrial & Right Ventricular;  Surgeon: Jenna Hernandez MD;  Location:  [facility]ARD[facility] CATH LAB    LAPAROSCOPY DIAGNOSTIC (GENERAL) N/A 02/20/2023    Procedure: LAPAROSCOPY, DIAGNOSTIC; RETRIEVAL OF MIGRATED STENT;  Surgeon: Joseluis Lima MD;  Location: [facility] OR    ORTHOPEDIC SURGERY      PANCREATECTOMY PEUSTOW N/A 06/30/2023    Procedure: Open distal pancreactectomy with splenectomy, lysis of adhesions, resection of proximal illeum;  Surgeon: Ashvin Haider MD;  [facility]    PA CABG, ARTERIAL, TWO  2003     Past Medical History:   Diagnosis Date    CAD (coronary artery disease)     CABG    Diabetes mellitus, type 2 (H) 2013    Family history of parkinsonism 02/14/2024    Gastroesophageal reflux disease     Hypercholesteremia     Hypertension     Mixed conductive and sensorineural hearing loss of both ears     Mixed hearing loss     Pancreatic duct stricture     Pancreatitis     Parkinson disease     Parkinson's disease, unspecified whether dyskinesia present, unspecified whether manifestations fluctuate 02/14/2024    Seborrheic dermatitis 03/22/2024    Second degree AV block      Social History     Socioeconomic History    Marital status:      Spouse name: Not on file    Number " of children: Not on file    Years of education: Not on file    Highest education level: Not on file   Occupational History    Not on file   Tobacco Use    Smoking status: Former     Types: Cigarettes    Smokeless tobacco: Never   Vaping Use    Vaping Use: Never used   Substance and Sexual Activity    Alcohol use: Not Currently    Drug use: Not Currently    Sexual activity: Not on file   Other Topics Concern    Not on file   Social History Narrative    Not on file     Social Determinants of Health     Financial Resource Strain: Not on file   Food Insecurity: Not on file   Transportation Needs: Not on file   Physical Activity: Not on file   Stress: Not on file   Social Connections: Not on file   Interpersonal Safety: Not on file   Housing Stability: Not on file     Family History   Problem Relation Age of Onset    Parkinsonism Mother     Lung Cancer Brother     Diabetes No family hx of      Current Outpatient Medications   Medication Sig Dispense Refill    acetaminophen (TYLENOL) 325 MG tablet Take 2 tablets (650 mg) by mouth every 4 hours as needed for other (For optimal non-opioid multimodal pain management to improve pain control.)      aspirin (ASA) 81 MG EC tablet Take 81 mg by mouth daily      atorvastatin (LIPITOR) 40 MG tablet Take 1 tablet by mouth daily      BD PEN NEEDLE LINDY 2ND GEN 32G X 4 MM miscellaneous USE TO INJECT INSULIN 4 TIMES A  each 3    carbidopa-levodopa (SINEMET CR)  MG CR tablet Take 1 tablet by mouth At Bedtime      carbidopa-levodopa (SINEMET)  MG tablet Take 2 tablets by mouth 3 times daily      Continuous Blood Gluc  (DEXCOM G7 ) ALIA Use to read blood sugars as per 's instructions. 1 each 0    Continuous Blood Gluc Sensor (DEXCOM G7 SENSOR) MISC Change every 10 days. 9 each 1    dasiglucagon HCl (ZEGALOGUE) 0.6 MG/0.6ML SOAJ injection Inject 0.6 mg Subcutaneous once as needed (use for severe hypoglycemia) 1.2 mL 1    gabapentin (NEURONTIN)  300 MG capsule Take 600 mg by mouth 3 times daily      Glucagon (BAQSIMI) 3 MG/DOSE nasal powder Spray 1 spray (3 mg) in nostril as needed (severe hypoglycemia) in the event of unconscious hypoglycemia or hypoglycemic seizure. May repeat dose if no response after 15 minutes. 2 each 1    insulin aspart (NOVOLOG FLEXPEN) 100 UNIT/ML pen Inject 6-10 Units Subcutaneous 3 times daily (with meals) Take 6 units with breakfast, 10 unit(s) with lunch and 10 unit(s) with supper      insulin aspart (NOVOLOG FLEXPEN) 100 UNIT/ML pen Inject subcutaneous per sliding scale three times daily with meals:  190-239 = 1 unit  240-289 = 2 units  290-339 = 3 units  >340 = 4 units   And Inject subcutaneously at bedtime per sliding scale:  200-249 = 1 unit  250-300 = 2 units   >300 = 3 units      INSULIN GLARGINE 100 UNIT/ML pen INJECT 15 UNITS SUBCUTANEOUS EACH AM AND 30 UNITS SUBCUTANEOUS EACH PM.PLEASE PROVIDED 90 DAY SUPPLY 45 mL 0    lipase-protease-amylase (ZENPEP) 43467-510119-728860 units CPEP Take 1 capsule by mouth 3 times daily (with meals) 90 capsule 3    magnesium oxide (MAG-OX) 400 MG tablet Take 400 mg by mouth every other day      Multiple Vitamin (MULTI-VITAMINS) TABS Take 1 tablet by mouth daily      omeprazole (PRILOSEC) 20 MG DR capsule Take 20 mg by mouth daily      pantoprazole (PROTONIX) 40 MG EC tablet Take 40 mg by mouth daily      ZENPEP 42678-34629 units CPEP          \

## 2024-02-28 NOTE — Clinical Note
Anesthesia CRNA assumes care for sedation/medication admin at this time. See anesthesia record for record.

## 2024-02-28 NOTE — ANESTHESIA POSTPROCEDURE EVALUATION
Patient: Arnaldo Henderson    Procedure: Procedure(s):  Pacemaker Device & Lead Implant - Right Atrial & Right Ventricular       Anesthesia Type:  MAC    Note:  Disposition: Outpatient   Postop Pain Control: Uneventful            Sign Out: Well controlled pain   PONV: No   Neuro/Psych: Uneventful            Sign Out: Acceptable/Baseline neuro status   Airway/Respiratory: Uneventful            Sign Out: Acceptable/Baseline resp. status   CV/Hemodynamics: Uneventful            Sign Out: Acceptable CV status; No obvious hypovolemia; No obvious fluid overload   Other NRE: NONE   DID A NON-ROUTINE EVENT OCCUR? No           Last vitals:  Vitals Value Taken Time   /68 02/28/24 1233   Temp 36.7  C (98  F) 02/28/24 1230   Pulse 70 02/28/24 1234   Resp 11 02/28/24 1234   SpO2 95 % 02/28/24 1230   Vitals shown include unfiled device data.    Electronically Signed By: Diann Maharaj MD  February 28, 2024  1:06 PM

## 2024-02-28 NOTE — PROGRESS NOTES
Pt tolerating po intake, voided and ambulated in [place] with steady gait. Bp slightly high in 160s systolic but within discharge parameter-pt anxious to go home. Rhythm: NSR, occasionally V-paced. C/O mild pain at the incision site rating 3/10-tylenol given. Left upper chest incision site covered with primapore c/d/I with no bleeding. L arm in sling. Discharge instructions were reviewed by Device RN and pt, daughter and wife have no further questions. Copy of AVS given. Pt seen by [employer] rep. CXR done and reviewed. PIV x 2 removed. Prescription filled and picked up by pt. Pt escorted via wheelchair to his wife's car. All belongings returned to pt.

## 2024-02-28 NOTE — DISCHARGE INSTRUCTIONS
Home Care after a Pacemaker Implant  After you go home:  Have an adult stay with you for 24 hours.  Drink plenty of fluids.  You may eat your normal diet, unless your doctor tells you otherwise.  For 24 hours:  Relax and take it easy.  Do NOT smoke.  Do NOT make any important or legal decisions.  Do NOT drive or operate machines at home or at work.  Do NOT drink alcohol  Wound care:  Keep your incision (surgery wound) dry for 3 days.  After 3 days, you may remove the outer bandage.  Keep the strips of tape on.  They will be removed at your clinic visit.  Check for signs of infection each day.  These include increased redness, swelling, drainage, bleeding or a fever over 101 F (38.3 C).  Call us immediately if you see any of these signs.  If there are no signs of infection, you may shower in 3 days.  Do not submerge the incision (in a bath tub, hot tub, or swimming pool) until fully healed.    Pain:   You may have mild to moderate pain for 3 to 5 days.  Take acetaminophen (Tylenol) or ibuprofen (Advil) for the pain.  Call us if the pain is severe or lasts more than 5 days.    Activity:  After 24 hours, slowly return to your normal activities.   Healing will take 4 to 6 weeks.  No driving for 3 days  Avoid climbing a ladder alone.  It is best to stay within 4 feet of the ground.  Avoid anything that may cause rough contact or a hard hit to your chest.  This includes football, hockey, and other contact sports.  For at least 4 weeks:  Do not raise your affected arm above your shoulder.  Do not use your affected arm to push, pull, or lift anything over 10 pounds.  Avoid repetitive upper body activities for 6 weeks (ie: golf, swimming, and weight lifting)    Telling others about your device:  Before you have any medical tests or treatments, tell the doctors, dentists, and other care providers about your device.  There are a few tests and treatments that may interfere with your device.  (These include MRI, radiation  therapy, electrocautery, and others.)  Your care team may need to take special steps to keep you safe.  Before you leave the hospital, you will receive a temporary ID card.  A permanent card will be mailed to you about 6 to 8 weeks later.  Always carry the ID card with you.  It has important details about your device.  You should also get a MedicAlert ID.  Please ask us for a MedicAlert brochure, or go to [email or web address].    Safety near electrical equipment:  All of these are safe to use when in good repair:  Microwaves  Radios  Cordless phone  Remote controls  Small electrical tools  Cell phones: Keep cell phones at least 6 inches from your device.  Do not carry the phone in a pocket near your device.  Security saavedra: It is okay to walk through security saavedra at the airports and department stores.  Tell airport security that you have a pacemaker.  They should keep the screening wand at least 6 inches from your device.  Full-body scanners are safe.  Avoid the following:   MRI tests in the hospital unless you have a MRI safe pacemaker.          Arc welding, chain saws and high-powered industrial or commercial tools.   Power lines, power plants and large power generators.   Electric body fat scales.   Magnetic mattress pads or pillow.    Follow-Up Visits:  Return to the clinic in 7 to 10 days to have your device and wound checked.    Device follow-up after your initial clinic visit will take place every 3 months and as needed until your battery reaches end of service. The device follow up will take place in person or remotely utilizing your home monitor.    Questions?  Please call [facility].   Device Nurse:          Business Hours:  [phone]                       After Hours:  [phone]   Choose option 4, then ask for the on-call device nurse at job code [id number].    Your next device clinic appointment is scheduled on:    3/12/24 at 11:00 AM.            [facility]  [facility] - [address]  [address] [place]  [address]

## 2024-02-28 NOTE — ANESTHESIA CARE TRANSFER NOTE
Patient: Arnaldo Henderson    Procedure: Procedure(s):  Pacemaker Device & Lead Implant - Right Atrial & Right Ventricular       Diagnosis: sinus pause, heart block  Diagnosis Additional Information: No value filed.    Anesthesia Type:   MAC     Note:    Oropharynx: oropharynx clear of all foreign objects and spontaneously breathing  Level of Consciousness: awake  Oxygen Supplementation: room air    Independent Airway: airway patency satisfactory and stable  Dentition: dentition unchanged  Vital Signs Stable: post-procedure vital signs reviewed and stable  Report to RN Given: handoff report given  Patient transferred to: PACU    Handoff Report: Identifed the Patient, Identified the Reponsible Provider, Reviewed the pertinent medical history, Discussed the surgical course, Reviewed Intra-OP anesthesia mangement and issues during anesthesia, Set expectations for post-procedure period and Allowed opportunity for questions and acknowledgement of understanding      Vitals:  Vitals Value Taken Time   /55 02/28/24 1051   Temp     Pulse 72 02/28/24 1051   Resp 14    SpO2 95 % 02/28/24 1054   Vitals shown include unfiled device data.    Electronically Signed By: KRYSTYNA Steiner CRNA  February 28, 2024  10:55 AM

## 2024-02-28 NOTE — ANESTHESIA PREPROCEDURE EVALUATION
Anesthesia Pre-Procedure Evaluation    Patient: Arnaldo Henderson   MRN: [id number] : 1955        Procedure : Procedure(s):  Pacemaker Device & Lead Implant - Right Atrial & Right Ventricular          Past Medical History:   Diagnosis Date    CAD (coronary artery disease)     CABG    Diabetes mellitus, type 2 (H)     Family history of parkinsonism 2024    Gastroesophageal reflux disease     Hypercholesteremia     Hypertension     Mixed conductive and sensorineural hearing loss of both ears     Mixed hearing loss     Pancreatic duct stricture     Pancreatitis     Parkinson disease     Parkinson's disease, unspecified whether dyskinesia present, unspecified whether manifestations fluctuate 2024    Second degree AV block       Past Surgical History:   Procedure Laterality Date    CA ANESTH CABG W/PUMP      CHOLECYSTECTOMY      COLONOSCOPY N/A 2023    Procedure: colonoscopy with fluroscopy;  Surgeon: Ayden Fraire MD;  Location:  OR    ENDOSCOPIC RETROGRADE CHOLANGIOPANCREATOGRAM N/A 2023    Procedure: ENDOSCOPIC RETROGRADE CHOLANGIOPANCREATOGRAPHY, WITH  CYSTDUODENOSTOMY STENTS X2 REMOVAL;  Surgeon: Cedric Saldana MD;  Location: [facility] OR    ENT SURGERY      LAPAROSCOPY DIAGNOSTIC (GENERAL) N/A 2023    Procedure: LAPAROSCOPY, DIAGNOSTIC; RETRIEVAL OF MIGRATED STENT;  Surgeon: Joseluis Lima MD;  Location: [facility] OR    ORTHOPEDIC SURGERY      PANCREATECTOMY PEUSTOW N/A 2023    Procedure: Open distal pancreactectomy with splenectomy, lysis of adhesions, resection of proximal illeum;  Surgeon: Ashvin Haider MD;  Location: [facility] OR    MD CABG, ARTERIAL, TWO        Allergies   Allergen Reactions    Ciprofloxacin Other (See Comments), Hives, Itching, Rash and Unknown     Other reaction(s): Other (see comments)  Oral swelling per [facility]        Dilaudid [Hydromorphone] Rash    Morphine Rash     rash    Penicillins Rash     aka ancef/ rash      Citalopram  "Unknown    Oxycodone Rash     \"HORRIBLE, SEVERE RASH MAYBE CAUSED BY OXY\"      Social History     Tobacco Use    Smoking status: Former     Types: Cigarettes    Smokeless tobacco: Never   Substance Use Topics    Alcohol use: Not Currently      Wt Readings from Last 1 Encounters:   02/13/24 92.1 kg (203 lb 1.6 oz)        Anesthesia Evaluation   Pt has had prior anesthetic. Type: General.        ROS/MED HX  ENT/Pulmonary:  - neg pulmonary ROS     Neurologic:     (+)                 Parkinson's disease,               Cardiovascular:     (+)  hypertension- -  CAD -  CABG-date: 6/2018. -                        dysrhythmias, 2nd Deg Heart Block,             METS/Exercise Tolerance:     Hematologic:     (+)       history of blood transfusion, no previous transfusion reaction,        Musculoskeletal:   (+)  arthritis,             GI/Hepatic: Comment: Distal Pancreatectomy 2 to chronic pancreatitis  Biliary duct stricture    (+) GERD, Asymptomatic on medication,                  Renal/Genitourinary:       Endo:     (+) type I DM,    Using insulin,                 Psychiatric/Substance Use:  - neg psychiatric ROS     Infectious Disease:  - neg infectious disease ROS     Malignancy:  - neg malignancy ROS     Other:            Physical Exam    Airway        Mallampati: II   TM distance: > 3 FB   Neck ROM: limited   Mouth opening: > 3 cm    Respiratory Devices and Support         Dental       (+) Modest Abnormalities - crowns, retainers, 1 or 2 missing teeth      Cardiovascular   cardiovascular exam normal          Pulmonary   pulmonary exam normal                OUTSIDE LABS:  CBC:   Lab Results   Component Value Date    WBC 10.3 02/06/2024    WBC 9.1 02/04/2024    HGB 14.6 02/06/2024    HGB 13.6 02/04/2024    HCT 41.6 02/06/2024    HCT 40.9 02/04/2024     02/06/2024     02/04/2024     BMP:   Lab Results   Component Value Date     02/06/2024     02/04/2024    POTASSIUM 5.1 02/06/2024    POTASSIUM 4.1 " "02/04/2024    CHLORIDE 100 02/06/2024    CHLORIDE 105 02/04/2024    CO2 22 02/06/2024    CO2 28 02/04/2024    BUN 20.1 02/06/2024    BUN 21.4 02/04/2024    CR 0.84 02/06/2024    CR 0.91 02/04/2024    GLC 94 02/28/2024    GLC 87 02/15/2024     COAGS:   Lab Results   Component Value Date    INR 1.24 (H) 07/25/2023     POC: No results found for: \"BGM\", \"HCG\", \"HCGS\"  HEPATIC:   Lab Results   Component Value Date    ALBUMIN 3.6 02/04/2024    PROTTOTAL 7.0 02/04/2024    ALT <5 02/04/2024    AST 22 02/04/2024    ALKPHOS 96 02/04/2024    BILITOTAL 0.7 02/04/2024     OTHER:   Lab Results   Component Value Date    PH 7.42 06/30/2023    LACT 1.1 02/04/2024    A1C 8.2 (H) 11/16/2023    JAKE 8.8 02/06/2024    PHOS 3.4 07/26/2023    MAG 1.8 02/04/2024    LIPASE 15 02/04/2024    AMYLASE 14 (L) 07/03/2023       Anesthesia Plan    ASA Status:  3    NPO Status:  NPO Appropriate    Anesthesia Type: MAC.   Induction: Propofol.   Maintenance: TIVA.   Techniques and Equipment:     - Lines/Monitors: 2nd IV     Consents    Anesthesia Plan(s) and associated risks, benefits, and realistic alternatives discussed. Questions answered and patient/representative(s) expressed understanding.     - Discussed:     - Discussed with:  Patient      - Extended Intubation/Ventilatory Support Discussed: Yes.      - Patient is DNR/DNI Status: No     Use of blood products discussed: No .     Postoperative Care    Pain management: Multi-modal analgesia.   PONV prophylaxis: Ondansetron (or other 5HT-3)     Comments:              PAC Discussion and Assessment    ASA Classification: 3    Anesthetic techniques and relevant risks discussed: MAC with GA as backup  Invasive monitoring and risk discussed: No    Possibility and Risk of blood transfusion discussed: No  NPO instructions given: NPO after midnight    Needs early admission to pre-op area: No                                            Diann Maharaj MD    I have reviewed the pertinent notes and labs in " the chart from the past 30 days and (re)examined the patient.  Any updates or changes from those notes are reflected in this note.     # Hyponatremia: Lowest Na = 135 mmol/L in last 30 days, will monitor as appropriate         # Drug Induced Platelet Defect: home medication list includes an antiplatelet medication  # DMII: A1C = N/A within past 6 months

## 2024-02-28 NOTE — Clinical Note
Report called to PACU Chaya QUINN. Procedure indication, procedure, outcome, and post-status reviewed. The pt is removed to PACU monitored in the company of the CRNA, in awake and stable condition.

## 2024-02-28 NOTE — OR NURSING
Patient's clothing, shoes, and wallet given to spouse (Veronika). Patient's glasses, hearing aids, and Parkinson medication will remain with the patient. Patient took last dose at 5 AM this morning and due to take next dose at 10 AM, per patient. Report given to CRNA to tell recovery RN.

## 2024-02-28 NOTE — PROGRESS NOTES
Pt arrived on 2A from PACU post pacemaker implant. Alert and oriented. Vitals stable. Rhythm: NSR. C/O some discomfort on the Left incision area, SHALA paged for tylenol. Incision c/d/I. Left arm in sling. Pt eating/drinking. Device RN at bedside. Wife Veronika on her way down.

## 2024-03-01 LAB
ATRIAL RATE - MUSE: 74 BPM
ATRIAL RATE - MUSE: 78 BPM
DIASTOLIC BLOOD PRESSURE - MUSE: NORMAL MMHG
DIASTOLIC BLOOD PRESSURE - MUSE: NORMAL MMHG
INTERPRETATION ECG - MUSE: NORMAL
INTERPRETATION ECG - MUSE: NORMAL
P AXIS - MUSE: 61 DEGREES
P AXIS - MUSE: NORMAL DEGREES
PR INTERVAL - MUSE: 472 MS
PR INTERVAL - MUSE: NORMAL MS
QRS DURATION - MUSE: 90 MS
QRS DURATION - MUSE: 92 MS
QT - MUSE: 390 MS
QT - MUSE: 418 MS
QTC - MUSE: 402 MS
QTC - MUSE: 413 MS
R AXIS - MUSE: 33 DEGREES
R AXIS - MUSE: 58 DEGREES
SYSTOLIC BLOOD PRESSURE - MUSE: NORMAL MMHG
SYSTOLIC BLOOD PRESSURE - MUSE: NORMAL MMHG
T AXIS - MUSE: 64 DEGREES
T AXIS - MUSE: 73 DEGREES
VENTRICULAR RATE- MUSE: 59 BPM
VENTRICULAR RATE- MUSE: 64 BPM

## 2024-03-08 ENCOUNTER — OFFICE VISIT (OUTPATIENT)
Dept: SURGERY | Facility: CLINIC | Age: 69
End: 2024-03-08
Payer: MEDICARE

## 2024-03-08 VITALS
OXYGEN SATURATION: 97 % | SYSTOLIC BLOOD PRESSURE: 157 MMHG | DIASTOLIC BLOOD PRESSURE: 84 MMHG | HEIGHT: 68 IN | BODY MASS INDEX: 30.72 KG/M2 | HEART RATE: 75 BPM | WEIGHT: 202.7 LBS

## 2024-03-08 DIAGNOSIS — D3A.8 NEUROENDOCRINE TUMOR (H): Primary | ICD-10-CM

## 2024-03-08 DIAGNOSIS — Z90.81 STATUS POST SPLENECTOMY: ICD-10-CM

## 2024-03-08 PROCEDURE — 99214 OFFICE O/P EST MOD 30 MIN: CPT | Performed by: SURGERY

## 2024-03-08 ASSESSMENT — PAIN SCALES - GENERAL: PAINLEVEL: NO PAIN (0)

## 2024-03-08 NOTE — PROGRESS NOTES
Mr. Howell is here for follow-up after his distal pancreatectomy and splenectomy.  Overall from a pancreas standpoint he has been doing reasonably well.  He denies abdominal pain.  His appetite is good and he is gaining weight.  He denies pancreatic exocrine insufficiency.    He has recently had a pacemaker implanted.    He has neuroendocrine tumors that we diagnosed at the time of his distal pancreatectomy and splenectomy.  These are being followed by Dr. Lyn.  Interestingly, about 2 years ago he had a appendectomy performed at [facility] in [place] for findings from a colonoscopy.  I am unfortunately unable to access records through [facility] using care everywhere.  We have requested path reports, operative reports, and discharge summaries from his time at [facility].    On physical exam, Mr. Howell is a well-developed well-nourished male appearing his stated age.  HEENT is without scleral icterus.  Chest is clear.  Abdomen is soft and nontender with a well-healed midline incision.    Assessment/plan:    1.  Status post distal pancreatectomy and splenectomy for severe acute pancreatitis and complications related to presumed gallstone pancreatitis.  No further interventions necessary.    2.  Neuroendocrine tumors with uncertain primary.  Was this related to his appendix?  Will obtain records from [facility] in [place] for further delineation of this issue.  He is scheduled to see Dr. Lyn for follow-up of his neuroendocrine tumors.    3.  Patient with 9 polyps seen on his screening colonoscopy 2 to 3 years ago.  I think that he would benefit from a follow-up colonoscopy.  I will ask Dr. Lyn to weigh in as he is also following him for his neuroendocrine tumors.    I would be happy to see Mr. Howell again in the future at any time that I can be of assistance.    Visit time 30 minutes

## 2024-03-08 NOTE — NURSING NOTE
"Chief Complaint   Patient presents with    RECHECK     Return consult       Vitals:    03/08/24 1012   BP: (!) 157/84   BP Location: Right arm   Patient Position: Sitting   Cuff Size: Adult Regular   Pulse: 75   SpO2: 97%   Weight: 91.9 kg (202 lb 11.2 oz)   Height: 1.727 m (5' 8\")       Body mass index is 30.82 kg/m .                          Mike Macdonald, EMT    "

## 2024-03-08 NOTE — LETTER
3/8/2024       RE: Arnaldo Henderson  [address] [address]       Dear Colleague,    Thank you for referring your patient, Arnaldo Henderson, to the [facility] [place] [facility]. Please see a copy of my visit note below.    Mr. Howell is here for follow-up after his distal pancreatectomy and splenectomy.  Overall from a pancreas standpoint he has been doing reasonably well.  He denies abdominal pain.  His appetite is good and he is gaining weight.  He denies pancreatic exocrine insufficiency.    He has recently had a pacemaker implanted.    He has neuroendocrine tumors that we diagnosed at the time of his distal pancreatectomy and splenectomy.  These are being followed by Dr. Lyn.  Interestingly, about 2 years ago he had a appendectomy performed at [facility] in [place] for findings from a colonoscopy.  I am unfortunately unable to access records through [facility] using care everywhere.  We have requested path reports, operative reports, and discharge summaries from his time at [facility].    On physical exam, Mr. Howell is a well-developed well-nourished male appearing his stated age.  HEENT is without scleral icterus.  Chest is clear.  Abdomen is soft and nontender with a well-healed midline incision.    Assessment/plan:    1.  Status post distal pancreatectomy and splenectomy for severe acute pancreatitis and complications related to presumed gallstone pancreatitis.  No further interventions necessary.    2.  Neuroendocrine tumors with uncertain primary.  Was this related to his appendix?  Will obtain records from [facility] in [place] for further delineation of this issue.  He is scheduled to see Dr. Lyn for follow-up of his neuroendocrine tumors.    3.  Patient with 9 polyps seen on his screening colonoscopy 2 to 3 years ago.  I think that he would benefit from a follow-up colonoscopy.   I will ask Dr. Lyn to weigh in as he is also following him for his neuroendocrine tumors.    I would be happy to see Mr. Howell again in the future at any time that I can be of assistance.    Visit time 30 minutes        Again, thank you for allowing me to participate in the care of your patient.      Sincerely,    Ashvin Haider MD

## 2024-03-09 ENCOUNTER — HEALTH MAINTENANCE LETTER (OUTPATIENT)
Age: 69
End: 2024-03-09

## 2024-03-12 ENCOUNTER — ANCILLARY PROCEDURE (OUTPATIENT)
Dept: CT IMAGING | Facility: CLINIC | Age: 69
End: 2024-03-12
Attending: INTERNAL MEDICINE
Payer: MEDICARE

## 2024-03-12 ENCOUNTER — LAB (OUTPATIENT)
Dept: LAB | Facility: CLINIC | Age: 69
End: 2024-03-12
Payer: MEDICARE

## 2024-03-12 ENCOUNTER — ANCILLARY PROCEDURE (OUTPATIENT)
Dept: CARDIOLOGY | Facility: CLINIC | Age: 69
End: 2024-03-12
Attending: INTERNAL MEDICINE
Payer: MEDICARE

## 2024-03-12 DIAGNOSIS — C7A.8 NEUROENDOCRINE CARCINOMA OF SMALL BOWEL (H): ICD-10-CM

## 2024-03-12 DIAGNOSIS — C77.2 MALIGNANT NEOPLASM METASTATIC TO INTRA-ABDOMINAL LYMPH NODE (H): ICD-10-CM

## 2024-03-12 DIAGNOSIS — I44.1 HEART BLOCK AV SECOND DEGREE: ICD-10-CM

## 2024-03-12 DIAGNOSIS — I45.5 SINUS PAUSE: ICD-10-CM

## 2024-03-12 DIAGNOSIS — Z95.0 CARDIAC PACEMAKER IN SITU: ICD-10-CM

## 2024-03-12 LAB
ALBUMIN SERPL BCG-MCNC: 4.1 G/DL (ref 3.5–5.2)
ALP SERPL-CCNC: 116 U/L (ref 40–150)
ALT SERPL W P-5'-P-CCNC: 13 U/L (ref 0–70)
ANION GAP SERPL CALCULATED.3IONS-SCNC: 9 MMOL/L (ref 7–15)
AST SERPL W P-5'-P-CCNC: 51 U/L (ref 0–45)
BASOPHILS # BLD AUTO: 0.1 10E3/UL (ref 0–0.2)
BASOPHILS NFR BLD AUTO: 1 %
BILIRUB SERPL-MCNC: 0.9 MG/DL
BUN SERPL-MCNC: 17.7 MG/DL (ref 8–23)
CALCIUM SERPL-MCNC: 8.7 MG/DL (ref 8.8–10.2)
CHLORIDE SERPL-SCNC: 100 MMOL/L (ref 98–107)
CREAT SERPL-MCNC: 0.94 MG/DL (ref 0.67–1.17)
DEPRECATED HCO3 PLAS-SCNC: 26 MMOL/L (ref 22–29)
EGFRCR SERPLBLD CKD-EPI 2021: 88 ML/MIN/1.73M2
EOSINOPHIL # BLD AUTO: 0.5 10E3/UL (ref 0–0.7)
EOSINOPHIL NFR BLD AUTO: 5 %
ERYTHROCYTE [DISTWIDTH] IN BLOOD BY AUTOMATED COUNT: 13.4 % (ref 10–15)
GLUCOSE SERPL-MCNC: 113 MG/DL (ref 70–99)
HCT VFR BLD AUTO: 43.2 % (ref 40–53)
HGB BLD-MCNC: 14.4 G/DL (ref 13.3–17.7)
HOLD SPECIMEN: NORMAL
IMM GRANULOCYTES # BLD: 0 10E3/UL
IMM GRANULOCYTES NFR BLD: 0 %
LYMPHOCYTES # BLD AUTO: 2.1 10E3/UL (ref 0.8–5.3)
LYMPHOCYTES NFR BLD AUTO: 22 %
MCH RBC QN AUTO: 33.7 PG (ref 26.5–33)
MCHC RBC AUTO-ENTMCNC: 33.3 G/DL (ref 31.5–36.5)
MCV RBC AUTO: 101 FL (ref 78–100)
MONOCYTES # BLD AUTO: 1.3 10E3/UL (ref 0–1.3)
MONOCYTES NFR BLD AUTO: 13 %
NEUTROPHILS # BLD AUTO: 5.6 10E3/UL (ref 1.6–8.3)
NEUTROPHILS NFR BLD AUTO: 59 %
NRBC # BLD AUTO: 0 10E3/UL
NRBC BLD AUTO-RTO: 0 /100
PLATELET # BLD AUTO: 301 10E3/UL (ref 150–450)
POTASSIUM SERPL-SCNC: 4 MMOL/L (ref 3.4–5.3)
PROT SERPL-MCNC: 7.4 G/DL (ref 6.4–8.3)
RBC # BLD AUTO: 4.27 10E6/UL (ref 4.4–5.9)
SODIUM SERPL-SCNC: 135 MMOL/L (ref 135–145)
WBC # BLD AUTO: 9.6 10E3/UL (ref 4–11)

## 2024-03-12 PROCEDURE — 93280 PM DEVICE PROGR EVAL DUAL: CPT | Performed by: INTERNAL MEDICINE

## 2024-03-12 PROCEDURE — 74177 CT ABD & PELVIS W/CONTRAST: CPT | Mod: MG | Performed by: STUDENT IN AN ORGANIZED HEALTH CARE EDUCATION/TRAINING PROGRAM

## 2024-03-12 PROCEDURE — 71260 CT THORAX DX C+: CPT | Mod: MG | Performed by: STUDENT IN AN ORGANIZED HEALTH CARE EDUCATION/TRAINING PROGRAM

## 2024-03-12 PROCEDURE — 82040 ASSAY OF SERUM ALBUMIN: CPT

## 2024-03-12 PROCEDURE — 85025 COMPLETE CBC W/AUTO DIFF WBC: CPT

## 2024-03-12 PROCEDURE — 36415 COLL VENOUS BLD VENIPUNCTURE: CPT

## 2024-03-12 PROCEDURE — G1010 CDSM STANSON: HCPCS | Performed by: STUDENT IN AN ORGANIZED HEALTH CARE EDUCATION/TRAINING PROGRAM

## 2024-03-12 RX ORDER — IOPAMIDOL 755 MG/ML
99 INJECTION, SOLUTION INTRAVASCULAR ONCE
Status: COMPLETED | OUTPATIENT
Start: 2024-03-12 | End: 2024-03-12

## 2024-03-12 RX ADMIN — IOPAMIDOL 99 ML: 755 INJECTION, SOLUTION INTRAVASCULAR at 12:28

## 2024-03-12 NOTE — NURSING NOTE
Chief Complaint   Patient presents with    Blood Draw     Labs drawn via PIV by RN in lab.      Herlinda Milner RN

## 2024-03-12 NOTE — DISCHARGE INSTRUCTIONS

## 2024-03-12 NOTE — TELEPHONE ENCOUNTER
LM for pt to c/b- Received pt's medication Zegalogue in clinic at [place] ([facility]). Pt can  Tuesday -Friday 8-3pm.

## 2024-03-12 NOTE — PATIENT INSTRUCTIONS
It was a pleasure to see you in clinic today.  Please do not hesitate to call with any questions or concerns.  We look forward to seeing you in clinic at your next device check in 3 months, 6/4/2024.    Latoya Maharaj, RN  Electrophysiology Nurse Clinician  [facility]    During Business Hours Please Call:  [phone]  After Hours Please Call:  [phone] - select option #4 and ask for job code [phone]

## 2024-03-18 ENCOUNTER — MEDICAL CORRESPONDENCE (OUTPATIENT)
Dept: HEALTH INFORMATION MANAGEMENT | Facility: CLINIC | Age: 69
End: 2024-03-18

## 2024-03-18 ENCOUNTER — ALLIED HEALTH/NURSE VISIT (OUTPATIENT)
Dept: EDUCATION SERVICES | Facility: CLINIC | Age: 69
End: 2024-03-18
Payer: MEDICARE

## 2024-03-18 ENCOUNTER — ONCOLOGY VISIT (OUTPATIENT)
Dept: ONCOLOGY | Facility: CLINIC | Age: 69
End: 2024-03-18
Attending: INTERNAL MEDICINE
Payer: MEDICARE

## 2024-03-18 VITALS
BODY MASS INDEX: 30.94 KG/M2 | OXYGEN SATURATION: 95 % | WEIGHT: 203.5 LBS | SYSTOLIC BLOOD PRESSURE: 144 MMHG | RESPIRATION RATE: 16 BRPM | TEMPERATURE: 97.9 F | HEART RATE: 82 BPM | DIASTOLIC BLOOD PRESSURE: 85 MMHG

## 2024-03-18 DIAGNOSIS — Z79.4 INSULIN DEPENDENT TYPE 2 DIABETES MELLITUS (H): ICD-10-CM

## 2024-03-18 DIAGNOSIS — C7A.8 NEUROENDOCRINE CARCINOMA OF SMALL BOWEL (H): Primary | ICD-10-CM

## 2024-03-18 DIAGNOSIS — E11.9 INSULIN DEPENDENT TYPE 2 DIABETES MELLITUS (H): ICD-10-CM

## 2024-03-18 DIAGNOSIS — C77.2 MALIGNANT NEOPLASM METASTATIC TO INTRA-ABDOMINAL LYMPH NODE (H): ICD-10-CM

## 2024-03-18 LAB
MDC_IDC_LEAD_CONNECTION_STATUS: NORMAL
MDC_IDC_LEAD_CONNECTION_STATUS: NORMAL
MDC_IDC_LEAD_IMPLANT_DT: NORMAL
MDC_IDC_LEAD_IMPLANT_DT: NORMAL
MDC_IDC_LEAD_LOCATION: NORMAL
MDC_IDC_LEAD_LOCATION: NORMAL
MDC_IDC_LEAD_LOCATION_DETAIL_1: NORMAL
MDC_IDC_LEAD_LOCATION_DETAIL_1: NORMAL
MDC_IDC_LEAD_MFG: NORMAL
MDC_IDC_LEAD_MFG: NORMAL
MDC_IDC_LEAD_MODEL: NORMAL
MDC_IDC_LEAD_MODEL: NORMAL
MDC_IDC_LEAD_POLARITY_TYPE: NORMAL
MDC_IDC_LEAD_POLARITY_TYPE: NORMAL
MDC_IDC_LEAD_SERIAL: NORMAL
MDC_IDC_LEAD_SERIAL: NORMAL
MDC_IDC_LEAD_SPECIAL_FUNCTION: NORMAL
MDC_IDC_LEAD_SPECIAL_FUNCTION: NORMAL
MDC_IDC_MSMT_BATTERY_DTM: NORMAL
MDC_IDC_MSMT_BATTERY_REMAINING_LONGEVITY: 151 MO
MDC_IDC_MSMT_BATTERY_RRT_TRIGGER: 2.62
MDC_IDC_MSMT_BATTERY_STATUS: NORMAL
MDC_IDC_MSMT_BATTERY_VOLTAGE: 3.21 V
MDC_IDC_MSMT_LEADCHNL_RA_IMPEDANCE_VALUE: 456 OHM
MDC_IDC_MSMT_LEADCHNL_RA_PACING_THRESHOLD_AMPLITUDE: 0.75 V
MDC_IDC_MSMT_LEADCHNL_RA_PACING_THRESHOLD_PULSEWIDTH: 0.4 MS
MDC_IDC_MSMT_LEADCHNL_RA_SENSING_INTR_AMPL: 2.12 MV
MDC_IDC_MSMT_LEADCHNL_RV_IMPEDANCE_VALUE: 532 OHM
MDC_IDC_MSMT_LEADCHNL_RV_PACING_THRESHOLD_AMPLITUDE: 0.75 V
MDC_IDC_MSMT_LEADCHNL_RV_PACING_THRESHOLD_PULSEWIDTH: 0.4 MS
MDC_IDC_MSMT_LEADCHNL_RV_SENSING_INTR_AMPL: 18.88 MV
MDC_IDC_PG_IMPLANT_DTM: NORMAL
MDC_IDC_PG_MFG: NORMAL
MDC_IDC_PG_MODEL: NORMAL
MDC_IDC_PG_SERIAL: NORMAL
MDC_IDC_PG_TYPE: NORMAL
MDC_IDC_SESS_CLINIC_NAME: NORMAL
MDC_IDC_SESS_DTM: NORMAL
MDC_IDC_SESS_TYPE: NORMAL
MDC_IDC_SET_BRADY_AT_MODE_SWITCH_RATE: 171 {BEATS}/MIN
MDC_IDC_SET_BRADY_HYSTRATE: NORMAL
MDC_IDC_SET_BRADY_LOWRATE: 60 {BEATS}/MIN
MDC_IDC_SET_BRADY_MAX_SENSOR_RATE: 130 {BEATS}/MIN
MDC_IDC_SET_BRADY_MAX_TRACKING_RATE: 130 {BEATS}/MIN
MDC_IDC_SET_BRADY_MODE: NORMAL
MDC_IDC_SET_BRADY_PAV_DELAY_HIGH: 140 MS
MDC_IDC_SET_BRADY_PAV_DELAY_LOW: 350 MS
MDC_IDC_SET_BRADY_SAV_DELAY_HIGH: 110 MS
MDC_IDC_SET_BRADY_SAV_DELAY_LOW: 350 MS
MDC_IDC_SET_LEADCHNL_RA_PACING_AMPLITUDE: 3.5 V
MDC_IDC_SET_LEADCHNL_RA_PACING_ANODE_ELECTRODE_1: NORMAL
MDC_IDC_SET_LEADCHNL_RA_PACING_ANODE_LOCATION_1: NORMAL
MDC_IDC_SET_LEADCHNL_RA_PACING_CAPTURE_MODE: NORMAL
MDC_IDC_SET_LEADCHNL_RA_PACING_CATHODE_ELECTRODE_1: NORMAL
MDC_IDC_SET_LEADCHNL_RA_PACING_CATHODE_LOCATION_1: NORMAL
MDC_IDC_SET_LEADCHNL_RA_PACING_POLARITY: NORMAL
MDC_IDC_SET_LEADCHNL_RA_PACING_PULSEWIDTH: 0.4 MS
MDC_IDC_SET_LEADCHNL_RA_SENSING_ANODE_ELECTRODE_1: NORMAL
MDC_IDC_SET_LEADCHNL_RA_SENSING_ANODE_LOCATION_1: NORMAL
MDC_IDC_SET_LEADCHNL_RA_SENSING_CATHODE_ELECTRODE_1: NORMAL
MDC_IDC_SET_LEADCHNL_RA_SENSING_CATHODE_LOCATION_1: NORMAL
MDC_IDC_SET_LEADCHNL_RA_SENSING_POLARITY: NORMAL
MDC_IDC_SET_LEADCHNL_RA_SENSING_SENSITIVITY: 0.3 MV
MDC_IDC_SET_LEADCHNL_RV_PACING_AMPLITUDE: 3.5 V
MDC_IDC_SET_LEADCHNL_RV_PACING_ANODE_ELECTRODE_1: NORMAL
MDC_IDC_SET_LEADCHNL_RV_PACING_ANODE_LOCATION_1: NORMAL
MDC_IDC_SET_LEADCHNL_RV_PACING_CAPTURE_MODE: NORMAL
MDC_IDC_SET_LEADCHNL_RV_PACING_CATHODE_ELECTRODE_1: NORMAL
MDC_IDC_SET_LEADCHNL_RV_PACING_CATHODE_LOCATION_1: NORMAL
MDC_IDC_SET_LEADCHNL_RV_PACING_POLARITY: NORMAL
MDC_IDC_SET_LEADCHNL_RV_PACING_PULSEWIDTH: 0.4 MS
MDC_IDC_SET_LEADCHNL_RV_SENSING_ANODE_ELECTRODE_1: NORMAL
MDC_IDC_SET_LEADCHNL_RV_SENSING_ANODE_LOCATION_1: NORMAL
MDC_IDC_SET_LEADCHNL_RV_SENSING_CATHODE_ELECTRODE_1: NORMAL
MDC_IDC_SET_LEADCHNL_RV_SENSING_CATHODE_LOCATION_1: NORMAL
MDC_IDC_SET_LEADCHNL_RV_SENSING_POLARITY: NORMAL
MDC_IDC_SET_LEADCHNL_RV_SENSING_SENSITIVITY: 0.9 MV
MDC_IDC_SET_ZONE_DETECTION_INTERVAL: 350 MS
MDC_IDC_SET_ZONE_DETECTION_INTERVAL: 400 MS
MDC_IDC_SET_ZONE_STATUS: NORMAL
MDC_IDC_SET_ZONE_STATUS: NORMAL
MDC_IDC_SET_ZONE_TYPE: NORMAL
MDC_IDC_SET_ZONE_VENDOR_TYPE: NORMAL
MDC_IDC_STAT_AT_BURDEN_PERCENT: 0 %
MDC_IDC_STAT_AT_DTM_END: NORMAL
MDC_IDC_STAT_AT_DTM_START: NORMAL
MDC_IDC_STAT_BRADY_AP_VP_PERCENT: 1.25 %
MDC_IDC_STAT_BRADY_AP_VS_PERCENT: 1.23 %
MDC_IDC_STAT_BRADY_AS_VP_PERCENT: 69.32 %
MDC_IDC_STAT_BRADY_AS_VS_PERCENT: 28.2 %
MDC_IDC_STAT_BRADY_DTM_END: NORMAL
MDC_IDC_STAT_BRADY_DTM_START: NORMAL
MDC_IDC_STAT_BRADY_RA_PERCENT_PACED: 1.72 %
MDC_IDC_STAT_BRADY_RV_PERCENT_PACED: 70.57 %
MDC_IDC_STAT_EPISODE_RECENT_COUNT: 0
MDC_IDC_STAT_EPISODE_RECENT_COUNT_DTM_END: NORMAL
MDC_IDC_STAT_EPISODE_RECENT_COUNT_DTM_START: NORMAL
MDC_IDC_STAT_EPISODE_TOTAL_COUNT: 0
MDC_IDC_STAT_EPISODE_TOTAL_COUNT_DTM_END: NORMAL
MDC_IDC_STAT_EPISODE_TOTAL_COUNT_DTM_START: NORMAL
MDC_IDC_STAT_EPISODE_TYPE: NORMAL
MDC_IDC_STAT_TACHYTHERAPY_RECENT_DTM_END: NORMAL
MDC_IDC_STAT_TACHYTHERAPY_RECENT_DTM_START: NORMAL
MDC_IDC_STAT_TACHYTHERAPY_TOTAL_DTM_END: NORMAL
MDC_IDC_STAT_TACHYTHERAPY_TOTAL_DTM_START: NORMAL

## 2024-03-18 PROCEDURE — 99207 PR NO BILLABLE SERVICE THIS VISIT: CPT | Performed by: DIETITIAN, REGISTERED

## 2024-03-18 PROCEDURE — 99215 OFFICE O/P EST HI 40 MIN: CPT | Performed by: INTERNAL MEDICINE

## 2024-03-18 PROCEDURE — G0463 HOSPITAL OUTPT CLINIC VISIT: HCPCS | Performed by: INTERNAL MEDICINE

## 2024-03-18 ASSESSMENT — PAIN SCALES - GENERAL: PAINLEVEL: NO PAIN (0)

## 2024-03-18 NOTE — PATIENT INSTRUCTIONS
[email or web address]    PLAN:  - Continue Basaglar 30 units at bedtime, continue Basaglar 14 units in the morning    - Continue 5 units of Novolog at breakfast    - Continue 8 units of Novolog with lunch, 10 units of Novolog with dinner    - Correction scale 1/50/140:  For Pre-Meal  - 189 give 1 unit.   For Pre-Meal  - 239 give 2 units.   For Pre-Meal  - 289 give 3 units.   For Pre-Meal  - 339 give 4 units.   For Pre-Meal BG = or > 340 give 5 units.     - Bedtime correction scale:  At bedtime and overnight BG  > 200 AND - it has been at least 4 hours since last injection please correct any BG > 200.    At bedtime/overnight:  For  - 249 give 1 units.   For  - 299 give 2 units.   For  - 349 give 3 units.   For BG = or > 350 give 4 units    Contact information:   To schedule a Diabetes Education appointment, call [phone]  If you have concerns, please send a [employer] message or call the clinic at [phone].    For more urgent concerns that do not require [phone], please call [phone] after hours/weekends and ask to speak with the Endocrinologist on call.      Please let us know if you are having low blood sugars less than 70 or over 300 mg/dL.  Do not wait until your next appointment if this is happening.

## 2024-03-18 NOTE — PROGRESS NOTES
Outcome for 03/18/24 11:18 AM: Data uploaded on Dexcom  Twyla Leger LPN   Outcome for 03/25/24 9:48 AM: Data obtained via Dexcom website  Twyla Leger LPN     Patient is showing 3/5 MNCM met. BP out range and A1c not in range   Twyla Leger LPN

## 2024-03-18 NOTE — PROGRESS NOTES
Diabetes Self-Management Education & Support    Arnaldo Henderson presents today for education related to Type 2 diabetes    Patient is being treated with:  insulin  He is accompanied by self and spouse, Veronika    Year of diagnosis: 2013 with recent distal pancreatectomy on 6/3/2023  Referring provider:  Valeri Gomez PA-C   Living Situation: with spouse, continues to go between [place] and [place]  Employment: Retired    PATIENT CONCERNS RELATED TO DIABETES SELF MANAGEMENT: seen for blood glucose review and insulin adjustment  Recently had pacemaker put in on Feb 28th.  Following up with new neurologist regarding neuroendocrine tumor      Taking Medication:     Current Diabetes Management per Patient:  Taking diabetes medications? yes:     Diabetes Medication(s)       Biguanides       metFORMIN (GLUCOPHAGE XR) 500 MG 24 hr tablet Take 1 tablet (500 mg) by mouth daily (with dinner) for 7 days, THEN 2 tablets (1,000 mg) daily (with dinner) for 7 days, THEN 3 tablets (1,500 mg) daily (with dinner) for 7 days, THEN 4 tablets (2,000 mg) daily (with dinner) for 90 days.       Diabetic Other       dasiglucagon HCl (ZEGALOGUE) 0.6 MG/0.6ML SOAJ injection Inject 0.6 mg Subcutaneous once as needed (use for severe hypoglycemia)     Glucagon (BAQSIMI) 3 MG/DOSE nasal powder Spray 1 spray (3 mg) in nostril as needed (severe hypoglycemia) in the event of unconscious hypoglycemia or hypoglycemic seizure. May repeat dose if no response after 15 minutes.       Insulin       insulin aspart (NOVOLOG FLEXPEN) 100 UNIT/ML pen Inject 6-10 Units Subcutaneous 3 times daily (with meals) Take 6 units with breakfast, 10 unit(s) with lunch and 10 unit(s) with supper     insulin aspart (NOVOLOG FLEXPEN) 100 UNIT/ML pen Inject subcutaneous per sliding scale three times daily with meals:  190-239 = 1 unit  240-289 = 2 units  290-339 = 3 units  >340 = 4 units   And Inject subcutaneously at bedtime per sliding scale:  200-249 = 1  unit  250-300 = 2 units   >300 = 3 units     INSULIN GLARGINE 100 UNIT/ML pen INJECT 15 UNITS SUBCUTANEOUS EACH AM AND 30 UNITS SUBCUTANEOUS EACH PM.PLEASE PROVIDED 90 DAY SUPPLY          30 units of Basaglar at night  14 units of Basaglar in the morning    5 units of Novolog at breakfast  8 units of Novolog with lunch  10 units of Novolog with dinner    1/50/140 correction scale  1/50/200 bedtime correction scale      Monitoring  Patient glucose self monitoring as follows: continuously using a continuous glucose monitor (CGM)  BG meter: Dexcom G7  BG results: see below:            Average Glucose: 199  Time in Range is 43%  Time above Range: 57%   Time below Range: 0%  GMI is 8.3  Glucose Variability: 35.7%    Patient's most recent   Lab Results   Component Value Date    A1C 8.2 11/16/2023    A1C 9.6 06/30/2023      Patient's A1C goal: <7.0    Activity: no regular exercise program, Parkinson's affects his ability to be active    Healthy Eating:   Patient currently eats 3 meals 1 snacks per day   Wakes up 5-6 am and will eat breakfast  Will eat lunch ~11-12pm  Dinner is around 5:30 - 6:30 pm    24 hour diet recall:  Meal Time Food and Drink   Breakfast 5 - 6 am Cheerios, 1 slice of toast, Activia yogurt, a muffin   Snack  apple   Lunch 11 - 12 pm [place] and soup   Snack     Dinner 5:30 - 6:30 pm Meat + potatoes,   Snack         Says he does not eat vegetables, may eat carrots or celery. Trying to include more fiber in diet  Trying to incorporate more fruit in diet and prefers apples    Recently he and his wife moved into a new apartment, have been moving in past 2 weeks. Not eating much home cooked food      Problem Solving:      Patient is at risk of hypoglycemia?: YES  Hospitalizations for hyper or hypoglycemia: No    Healthy Coping and Stress Management:   Sources of stress identified by patient: My health      EDUCATION and INSTRUCTION PROVIDED AT THIS VISIT:    T2DM patient seen for follow-up at the request  of Catherine Gomez PA-C for blood glucose review. He has stopped taking Metformin. Was able to get Zegalogue through [employer]. He will show his wife how to use it. Taking 30 units of Basaglar at night, 14 units of Basaglar in the morning. For Novolo units at breakfast, 8 units at lunch, and 10 units at dinner. Using 1/50/140 correction scale at meals and 1/50/200 bedtime correction scale.   CGM reports reviewed, Time in Range is 43%, 57% highs, 0% lows. Goal for patient is Time in Target Range of 50% per Dr. Reynolds. He stopped taking Metformin. Overall, glucose numbers are much improved, Time in Range has increased and readings over > 250 have decreased. Lows have lessened since mealtime Novolog doses were adjusted. Pattern of post-prandial highs after lunch and dinner noted, this may be due to the chaos of moving into a new home and not eating home-cooked meals as often. He and his wife will work on getting back into a regular meal routine. They are planning to head to [place] in a few weeks to sell their condo.  No adjustments made in insulin doses today.    PLAN:  - Continue Basaglar 30 units at bedtime, continue Basaglar 14 units in the morning    - Continue 5 units of Novolog at breakfast    - Continue 8 units of Novolog with lunch, 10 units of Novolog with dinner    - Correction scale 1/50/140:  For Pre-Meal  - 189 give 1 unit.   For Pre-Meal  - 239 give 2 units.   For Pre-Meal  - 289 give 3 units.   For Pre-Meal  - 339 give 4 units.   For Pre-Meal BG = or > 340 give 5 units.     - Bedtime correction scale:  At bedtime and overnight BG  > 200 AND - it has been at least 4 hours since last injection please correct any BG > 200.    At bedtime/overnight:  For  - 249 give 1 units.   For  - 299 give 2 units.   For  - 349 give 3 units.   For BG = or > 350 give 4 units      FOLLOW-UP:    With Catherine Gomez PA-C on 3/27  With Dr. Reynolds on   With Diabetes Education  as needed    Time spent with patient at today's visit was 25 minutes.      Any diabetes medication dose changes were made via the CDE Protocol and Collaborative Practice Agreement with [facility] and  [facility].  A copy of this encounter was provided to patient's referring provider.

## 2024-03-18 NOTE — LETTER
3/18/2024         RE: Arnaldo Henderson  [address] [address]        Dear Colleague,    Thank you for referring your patient, Arnaldo Henderson, to the [facility]. Please see a copy of my visit note below.      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    He is 68 years old and had an incidental finding of his cancer while undergoing a pancreatectomy for chronic pancreatitis.  His tumor is well-differentiated with a Ki-67 index of 10%.  He has not had any symptoms referable to his cancer and at our last evaluation had only small volume kiersten disease in the mesentery.    He tells me that overall his condition is about the same since we saw him last except perhaps his Parkinson symptoms are getting worse.  He reports having more problems with freezing up and has an upcoming evaluation in the next few weeks with a new neurologist.  Since we saw him last he is also had a new pacemaker placed for bradycardia.  He reports no change in his bowel habits and only moves his bowels maybe every second or third day.  He said no anne-marie flushing although his wife thinks he gets a rash on his face sometimes.  He has no significant pain.    On exam today he appears well but has a definite pill-rolling tremor and not much facial expression.  He has some seborrheic dermatitis on his cheeks.  He has no icterus.    I personally reviewed his CT scan with over the results with him and his wife.  He has 1 readily identifiable mesenteric node that is not very big and is unchanged from his prior scan.  I do not see any sites of disease otherwise.    His electrolytes and renal function are normal.  His bilirubin, albumin and liver enzymes are normal.  His blood counts are normal.  His chromogranin A level is still pending.    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  He has no active symptoms and no  significant evidence of progression.  His wife had numerous questions about many details of his imaging and lab work and we went over those extensively today.  We will plan on reevaluating his disease status again in another 3 to 4 months.  Will plan to start stretching out our evaluations a little bit further apart if we continue to see stable disease.      Total time by me today on the date of service was greater than 45 minutes, most spent on the above discussions.      Again, thank you for allowing me to participate in the care of your patient.        Sincerely,        Caio Lyn MD

## 2024-03-18 NOTE — PROGRESS NOTES
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    He is 68 years old and had an incidental finding of his cancer while undergoing a pancreatectomy for chronic pancreatitis.  His tumor is well-differentiated with a Ki-67 index of 10%.  He has not had any symptoms referable to his cancer and at our last evaluation had only small volume kiersten disease in the mesentery.    He tells me that overall his condition is about the same since we saw him last except perhaps his Parkinson symptoms are getting worse.  He reports having more problems with freezing up and has an upcoming evaluation in the next few weeks with a new neurologist.  Since we saw him last he is also had a new pacemaker placed for bradycardia.  He reports no change in his bowel habits and only moves his bowels maybe every second or third day.  He said no anne-marie flushing although his wife thinks he gets a rash on his face sometimes.  He has no significant pain.    On exam today he appears well but has a definite pill-rolling tremor and not much facial expression.  He has some seborrheic dermatitis on his cheeks.  He has no icterus.    I personally reviewed his CT scan with over the results with him and his wife.  He has 1 readily identifiable mesenteric node that is not very big and is unchanged from his prior scan.  I do not see any sites of disease otherwise.    His electrolytes and renal function are normal.  His bilirubin, albumin and liver enzymes are normal.  His blood counts are normal.  His chromogranin A level is still pending.    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  He has no active symptoms and no significant evidence of progression.  His wife had numerous questions about many details of his imaging and lab work and we went over those extensively today.  We will plan on reevaluating his disease status again in another 3 to 4 months.  Will plan to start  stretching out our evaluations a little bit further apart if we continue to see stable disease.      Total time by me today on the date of service was greater than 45 minutes, most spent on the above discussions.

## 2024-03-18 NOTE — NURSING NOTE
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    Weight as of this encounter: 92.3 kg (203 lb 8 oz). Body surface area is 2.1 meters squared.  No Pain (0) Comment: Data Unavailable   No LMP for male patient.  Allergies reviewed: Yes  Medications reviewed: Yes    Medications: Medication refills not needed today.  Pharmacy name entered into [employer]:    [facility] - [place], [place] - [address]  [id number] - [place] [id number]    Frailty Screening:   Is the patient here for a new oncology consult visit in cancer care? 2. No      Clinical concerns: none       Fiorella Alvarenga              "

## 2024-03-18 NOTE — LETTER
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    He is 68 years old and had an incidental finding of his cancer while undergoing a pancreatectomy for chronic pancreatitis.  His tumor is well-differentiated with a Ki-67 index of 10%.  He has not had any symptoms referable to his cancer and at our last evaluation had only small volume kiersten disease in the mesentery.    He tells me that overall his condition is about the same since we saw him last except perhaps his Parkinson symptoms are getting worse.  He reports having more problems with freezing up and has an upcoming evaluation in the next few weeks with a new neurologist.  Since we saw him last he is also had a new pacemaker placed for bradycardia.  He reports no change in his bowel habits and only moves his bowels maybe every second or third day.  He said no anne-marie flushing although his wife thinks he gets a rash on his face sometimes.  He has no significant pain.    On exam today he appears well but has a definite pill-rolling tremor and not much facial expression.  He has some seborrheic dermatitis on his cheeks.  He has no icterus.    I personally reviewed his CT scan with over the results with him and his wife.  He has 1 readily identifiable mesenteric node that is not very big and is unchanged from his prior scan.  I do not see any sites of disease otherwise.    His electrolytes and renal function are normal.  His bilirubin, albumin and liver enzymes are normal.  His blood counts are normal.  His chromogranin A level is still pending.    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  He has no active symptoms and no significant evidence of progression.  His wife had numerous questions about many details of his imaging and lab work and we went over those extensively today.  We will plan on  reevaluating his disease status again in another 3 to 4 months.  Will plan to start stretching out our evaluations a little bit further apart if we continue to see stable disease.      Total time by me today on the date of service was greater than 45 minutes, most spent on the above discussions.        Caio Lyn MD

## 2024-03-22 PROBLEM — L21.9 SEBORRHEIC DERMATITIS: Status: ACTIVE | Noted: 2024-03-22

## 2024-03-25 ENCOUNTER — TELEPHONE (OUTPATIENT)
Dept: NEUROLOGY | Facility: CLINIC | Age: 69
End: 2024-03-25
Payer: MEDICARE

## 2024-03-26 ENCOUNTER — PRE VISIT (OUTPATIENT)
Dept: NEUROLOGY | Facility: CLINIC | Age: 69
End: 2024-03-26

## 2024-03-26 ENCOUNTER — TELEPHONE (OUTPATIENT)
Dept: NEUROPSYCHOLOGY | Facility: CLINIC | Age: 69
End: 2024-03-26

## 2024-03-26 ENCOUNTER — VIRTUAL VISIT (OUTPATIENT)
Dept: NEUROLOGY | Facility: CLINIC | Age: 69
End: 2024-03-26
Attending: STUDENT IN AN ORGANIZED HEALTH CARE EDUCATION/TRAINING PROGRAM
Payer: MEDICARE

## 2024-03-26 DIAGNOSIS — G20.A1 PARKINSON'S DISEASE, UNSPECIFIED WHETHER DYSKINESIA PRESENT, UNSPECIFIED WHETHER MANIFESTATIONS FLUCTUATE (H): Primary | ICD-10-CM

## 2024-03-26 DIAGNOSIS — F43.23 ADJUSTMENT DISORDER WITH MIXED ANXIETY AND DEPRESSED MOOD: ICD-10-CM

## 2024-03-26 DIAGNOSIS — G20.B1 PARKINSON'S DISEASE WITH DYSKINESIA, UNSPECIFIED WHETHER MANIFESTATIONS FLUCTUATE (H): ICD-10-CM

## 2024-03-26 DIAGNOSIS — L21.9 SEBORRHEIC DERMATITIS: ICD-10-CM

## 2024-03-26 DIAGNOSIS — F09 COGNITIVE DISORDER: ICD-10-CM

## 2024-03-26 PROCEDURE — 99417 PROLNG OP E/M EACH 15 MIN: CPT | Mod: 95 | Performed by: PSYCHIATRY & NEUROLOGY

## 2024-03-26 PROCEDURE — 99215 OFFICE O/P EST HI 40 MIN: CPT | Mod: 95 | Performed by: PSYCHIATRY & NEUROLOGY

## 2024-03-26 RX ORDER — CARBIDOPA AND LEVODOPA 25; 100 MG/1; MG/1
TABLET ORAL
Qty: 630 TABLET | Refills: 3 | Status: SHIPPED | OUTPATIENT
Start: 2024-03-26 | End: 2024-10-03

## 2024-03-26 RX ORDER — LOSARTAN POTASSIUM 50 MG/1
50 TABLET ORAL DAILY
COMMUNITY
Start: 2024-03-26 | End: 2024-08-08

## 2024-03-26 NOTE — LETTER
"3/26/2024       RE: Arnaldo Henderson  [address] [facility] [address]     Dear Colleague,    Thank you for referring your patient, Arnaldo Henderson, to the [facility]UROLOG[facility] [place] [facility]. Please see a copy of my visit note below.        VIDEO VISIT    Date of Visit: 2024  Name: Arnaldo Henderson  Date of Birth 1955  Update meds - in hospital    : 1955    ELISA: 2024    MRN: [id number]  [address]    [facility] [address]     [phone] (M)   [phone] (H)      [email or web address]     Veronika Pena spouse  [phone]     Keren balbuena daughter     Genetic testing ?   Family history of parkinsonism in mother     Parkinson's disease with dyskinesia, unspecified whether manifestations fluctuate, Ref by EVERT SAMUEL, [place] in [facility], Sched per Spouse Veronika     Seen by Parashos - notes below    He granted proxy access to his mychart.     Assessment:  (G20.A1) Parkinson's disease, unspecified whether dyskinesia present, unspecified whether manifestations fluctuate  (primary encounter diagnosis)  Family history of parkinsonism - mother     From Parashos recent visit  1. Parkinson's disease, stage 2  2. Dyskinesia due to Parkinson's disease.  3. Motor fluctuations likely related to protein intake interfering with the absorption of levodopa, improved since taking the levodopa on an empty stomach.    5am up line up pills  6am takes carbidopa/levodopa and insulin  Then eats breakfast - generally he is waiting a bit - eating between 630 or 7am so waiting 30 - 60 minutes.     10 or 11a - head starts to feel numb - not light headedness and legs are heavy.   He has problems moving his feet and most likely he is wearing off/worn off  He has a lot of \"freezing\" throughout the day.   The morning is the best time.   He has freezing  He is a \"different person\" later in the day and evenings   Falls asleep early in " "evening - 730 and 8pm watching TV  Gets up at 9pm and/or wife wakes him up and has to help him get into bed  Has problems sleeping  Has a bed rail to help him move  He is sleeping in a separate bed as he is at various angles in the bed.     Started 2.5 years ago in [place] - Parkinson has progressed \"tremendously\"   Dr Resendez has not made medication changes.     11am pill and takes 1 hour or 1.5 hours to get the dose to work   4p     Review of diagnosis    Parkinson disease  Duration 4 years or so.   Side onset: right  Right handed.   Symptoms - had gait problems  - lack of arm swing, shuffling   Symptoms - sleep issues were early issues as well as lack of smell  Clinical diagnosis   No specific testing    Avoidance of dopamine blockers   Not taking    Motor complication review   Wearing off   ?dyskinesias - not  clear if he has this but may be some facial dyskinesias per his wife.  Not clear if there is a relationship between the medication and the dyskinesias.   Freezing that may be aggravated by anxiety   Has more shuffling    Review of Impulse control disorders   Always hungry   Seen by Dr. Haider and discussed weight gain  No other ICD issues.     Review of surgical or medication options   reviewed    Gait/Balance/Falls   No falls.   Has freezing  Not using assistive device    Exercise/Therapy performed/offered   Physical therapy - after visit today  Abundio Barbosa PT ?  [facility]     Cognitive/Driving   He is not concerned  Wife has been a bit concerned - left the garage door open   He had an amazing memory when they met but has short term memory problems  Has not had cognitive testing  - can consider this.   Driving  - very little - short distances; wife prefers that he not drive.   He is managing his own medicine and insulin, etc.   Organizes his pill containers, etc.     Mood   Anxiety is disabling   QTc was okay 2/28/2024  He may have been on citalopram/celexa - details not " clear in chart.   He stopped it and threw it out.     Therapist - has not worked with a therapist  He has problems waiting with other people when they are shopping.     Denies depression    Wife Clarisa on the call today.     Profession in the past -- 7 years retired.   He worked in manufacturing things, all sorts of jobs.     Using [employer] mobility at 1200 pm for therapy     Going to [place] and leaving Friday and will be there for one week and will be selling their place as it is too difficulty to travel. Bought one way tickets as the sale is still not final.     1st marriage  2nd marriage - clarisa - having surgery with Dr. Umaña   Had an cerebral angiogram - they want to address the 80% carotid artery stenosis.     Hallucinations/delusions   Denies   No illusions    Sleep   Naps in front of tv  Gets up to go to bed at 9pm   Sleeps for 4 solid hours and then toss and turns for 4 hours  Get up at 4 or 5am and then lays in bed till 4 or 430 or 5a and then gets up  Sleeping in separate bed from spouse  Is angled over the bed  He has a railing.   He may move around in the bed   possible RBD/dream enactment behavior - per his wife - may be less  He sleeps alone now - sleep issue prompted a consultation.   Snoring - sometimes - recently no but not clear.   Sleep study - has not done one.     Bladder/Renal/Prostate/Gyn/Other   Urinary frequency  Nocturia 2/noc  No control issues   PSA was normal about 2 y ears ago.     GI/Constipation/GERD   Acute cholecystitis  Cholecystectomy  Malignant neuroendocrine tumor   GERD  Was put on omeprazole long ago and remains on this.   He had problems with his esophagus and had to be dilated, etc. He had ripples  This was 20 years ago, etc.   He has not had problems like this.  No coughing   Weight issue ?  Failure to thrive   Malnutrition  Lipase/protease/amylase  He has a bowel movement every 3 days - long standing  Not taking anything for his bowels per se  Encouraged to be  "active    ENDO/Lipid/DM/Bone density/Thyroid  Partial pancreatectomy  Pseudocyst pancreas    Diabetes  A1c was 8.2 on 11/6/2023  Insulin  Intolerance to metformin  Seen nutritionist for this.   Needs to have another one done.     Lipid disorder   Atorvastatin lipitor 40mg     Cardio/heart/Hyper or Hypotensive   Hypotension  pacemaker  2/28/2024  Bradycardia  Bypass surgery x2 2003  Losartan cozaar 50mg restarted after hospital    Vision/Dry Eyes/Cataracts/Glaucoma/Macular   Wears glasses  Had recent visual evaluation   No clear retinopathy from diabetes    Heme/Anticoagulation/Antiplatelet/Anemia/Other  Aspirin 81mg daily   No other blood thinners.     ENT/Resp  Hearing loss   ?former smoker - tobacco - off and on   Smell perception - never been good.     Skin/Cancer/Seborrhea/other  Skin cancer of scrotum  Seborrheic dermatitis   Few spots on his scalp    Musculoskeletal/Pain/Headache  Neck pain  Back issues since \"surgery\" - like a bubble that moves around his back and is intermittent.     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     His tumor is well-differentiated with a Ki-67 index of 10%. He has not had any symptoms referable to his cancer and at our last evaluation had only small volume kiersten disease in the mesentery.         Medications      6a  11am 4p  9p      Acetaminophen tylenol 325mg prn            Aspirin 81mg   1           Atorvastatin lipitor 40mg   1           Carbidopa/levodopa Sinemet CR 50/200       1     CArbidopa/levodopa sinemet 25/100 2 2 2       Dasiglucagon zegalogue 0.6mg/0.6ml prn           Gabapentin neurontin 300mg 2 2 2       Glucagon baqsimi 3mg/dose nasal  prn            Insulin aspart novolog sliding            Insulin aspart novolog             Insulin glargine  15      30      Lipase protease " amylase zenpep  3/day           Losartan cozaar 50mg 1           Magnesium oxide 400mg  qod            Metformin glucophage XR 500mg 24hr off            MVI 1            Omeprazole prilosec 20mg  1           Pantoprazole protonic 40mg off           Zenpep 2000 83011 above                            Plan:    S/p pacemaker  Ongoing blood pressure issues - highs and lows   Losartan cozaar 50mg was restarted and had been on 25mg in hospital  They are working with pcp and possibly cardiology  157 systolic 5pm   Only checking blood pressure in the evening.   He does not check morning blood pressures  QTc was 402ms on ECG on 2/28/2024  Need ongoing monitoring of blood pressure - highs and lows     Parkinson genetics study with family history of parkinson in mother   And son has orthostatic hypotension  PDgeneration  [email or web address]   [phone]  [email or web address]    Pharmacy ([facility]) consultation and medication management  Please call the scheduling number @ [phone] to set up an appointment with pharmacists Cheyanne Harrell or Morenita Delgado.     Due to wearing off he would probably benefit from a shortening in the dose interval changing from 5 hour to 4 hour interval or so.     Sinemet 25/100 (carbidopa/levodopa) 2 tabs @ 6am, 2 @10a, 2@2p and 1@5p  Sinemet 50/200 (carbidopa/levodopa) at 9pm    Neuropsychological evaluation - baseline cognitive evaluation  Will take time to get scheduled.     Return to see Candida Jackson NP in 3 months.     Anxiety has increased and impacts social life  May have been citalopram celexa in the past  Has not seen a therapist and wife has been encouraging this.   He may be open to seeing someone  Has not started on anxiety medication recently.     [facility]  [address] [place] [id number]    Whitney Renteria, Ph.D., LP  [phone]    Isabelle West,Ph.D.,LP  [phone]  "(inpatient)    Orlando Jasso,Ph.D.,  [phone]    Maliha Echevarria, Ph.D.  [phone]    Phyllis Mercedes, Ph.D.,   [phone]    Jay Bales, Ph.D., [facility],   [phone]    Samantha Watters,Ph.D.,   [phone]    Jenna Haider psychologist  [phone]   [address] [place] [id number].    Medical Decision Making:  #  Chronic progressive medical conditions addressed  yes  Review and/or interpretation of unique test or documentation from a provider outside of neurology yes   Independent historian provided additional details  yes   Prescription drug management and review of potential side effects and/or monitoring for side effects  yes   Health impacted by social determinants of health  no    I have reviewed the note as documented above.  This accurately captures the substance of my conversation with the patient and total time spent preparing for visit, executing visit and completing visit on the day of the visit:  80 minutes.     The longitudinal plan of care for Arnaldo Henderson was addressed during this visit. Due to the added complexity in care, I will continue to support Arnaldo KATHERYN Henderson in the subsequent management of this condition(s) and with the ongoing continuity of care of this condition(s).    Juan Jose Armenta MD      ------------------------------------------------------------------------------------------------------------------------------------------------------------------------    Video-Visit Details    The patient has been notified of following:     \"After a review of the patient s situation, this visit was changed from an in-person visit to a video visit to reduce the risk of COVID-19 exposure.   The patient is being evaluated via a billable video visit.\"    \"This video visit will be conducted via a call between you and your physician/provider. We have found that certain health care needs can be provided without the need for an in-person " "physical exam.  This service lets us provide the care you need with a video conversation.  If a prescription is necessary we can send it directly to your pharmacy.  If lab work is needed we can place an order for that and you can then stop by our lab to have the test done at a later time.    If during the course of the call the physician/provider feels a video visit is not appropriate, you will not be charged for this service.\"     Patient has given verbal consent for Video visit? Yes    Patient would like the video invitation sent by:     Type of service:  Video Visit    Video Start Time:     Video End Time (time video stopped):     Duration:  minutes - see above    Originating Location (pt. Location):     Distant Location (provider location):  [facility] NEUROLOGY [facility]     Mode of Communication:  Video Conference via Booklr (and if not possible then doximity)      Juan Jose Armenta MD      --------------------------------------------------------------------------------------------------------------    Arnaldo Henderson is a 68 year old male who is being evaluated via a billable video visit.      Charts reviewed  Consult from  Images reviewed        I have reviewed and updated the patient's Past Medical History, Social History, Family History and Medication List.    ALLERGIES  Ciprofloxacin, Dilaudid [hydromorphone], Morphine, Penicillins, Citalopram, Clindamycin, and Oxycodone    Lasts visit details if there was a last visit:       I was asked to review a patch monitor for patient with 10 second pause.     I met patient once on 6/1/2023 at [facility] for evaluation of AV Wenckebach noted during ERCP in May 2023. Wenckebach had previously been noted and he was asymptomatic. No pacemaker was indicated.     Patient saw Dr. Dahl at [facility] 1/16/2024 to establish follow up for CAD/CABG. She had ordered a patch monitor to follow up on baseline prolonged AR interval and AV Wenckebach. Patch " monitor was worn from 1/16-1/30/2024.     Patient was admitted to [facility] from 2/4-2/7/2024 for weakness and fogginess, found to have orthostatic hypotension which he has had issues with from autonomic dysfunction due to Parkinson's. Cardiology was consulted for AV Wenckebach on telemetry with no symptoms or changes in BP, no pacemaker was recommended.     Ziopatch monitor results processed today, and notification was given for a 10-second pause.     My personal review of patch monitor:  Dates 1/16-1/30/2024.  Sinus with prolonged AK, average 79 bpm, range  bpm, PACs/PVCs <1%.  Frequent episodes of AV Wenckebach. Patient triggered events correlated with sinus rhythm, with and without Wenckebach.  On 1/20/2024 at 12:42 am, one 4-beat run NSVT, no symptoms.  On  1/24/2024 at 4:42 pm: 10.5 seconds of sinus rhythm with AV block and no escape rhythm - no events triggered by patient.  1/27/2024 at 1:50 am: 3.4 seconds or AV block.        I spoke with patient and his wife Veronika by phone. He had no syncope or presyncope that he recalled while wearing monitor. He is feeling much better since his BP meds were reduced in hospital. His main issues had been orthostatic dizziness and generalized weakness for which he was recently hospitalized, without any AV block noted (EKG on 2/4/2024 was sinus with AV Wenckebach). However, given 10.5 seconds of AV block during waking hours, a pacemaker seems reasonable at this time. Given that the event occurred on 1/24, and he's been in hospital since then without any AV block, I will plan to schedule elective pacemaker soon rather than for him to go to ED.     Will schedule pacemaker implant, with MAC given his Parkinson's. Left side dual chamber. Patient is in agreement.  In the meantime should he have significant dizziness or syncope he will come to ED immediately.     Jenna Hernandez MD        Date of Admission:  2/4/2024  Date of Discharge:  2/7/2024  Discharging Provider:  Mackenzie Yen MD  Discharge Service: Hospitalist Service        Discharge Diagnoses  Orthostatic hypotension   Parkinson's Disease      Arnaldo Henderson is a 68 year old male with PMH including distal pancreatectomy following chronic pancreatitis with stent migration and disconnection of pancreatic duct, IDDM, neuroendocrine tumor of small bowel, Parkinson's disease, CAD s/p CABG, hypertension presents for evaluation of multiple complaints including weakness, bradykinesia, spells where his body feels heavy and woozy, muscle tension.  Found to have bradycardia and with orthostatic hypotension.  Patient evaluated by neurology.  EEG normal, MRI brain and MRI cervical spine with no acute intracranial process, generalized brain atrophy and presumed microvascular ischemic changes, multilevel degenerative spondylolisthesis of cervical spine with up to moderate central spinal canal stenosis at levels of C3-C6.  Neurology evaluated patient symptoms consistent with orthostatic hypotension. Recommendations from neurology include conservative management of orthostatic hypotension, reduction of losartan to 25 mg and close outpatient follow-up.  Patient evaluated by cardiology for heart block found to have Mobitz type II with no indication for permanent pacemaker.  Workup for urine serotonin was deferred to outpatient given dietary modification needed prior to testing.  Patient's other chronic conditions were managed with PTA medication.      1.Left ventricular size, wall motion and function are normal. The ejection  fraction is > 65%. LV might suggest dehydration.  2.Mildly decreased right ventricular systolic function  3.No hemodynamically significant valvular abnormalities on 2D or color flow  imaging.  There is no comparison study available.     Interval History  Pt was recently admitted.  Metformin was discontinued since admission and patient has not resume.  Pt does not want to restart metformin.  Report only taking  insulin  Basaglar 14 unit(s) AM and 30 unit(s) PM  Novolog 4/7/10 with meal plus sliding scale insulin  Pt injects Novolog 15 mins before meal and rotate site of injection     Pt is getting pace maker put in later this month     CGM: improvement in hypoglycemia, but TIR worsening with more hyperglycemia (especially after meal)     Initial visit 1/10/24     Per Dr. Lyn note 11/27/2023  He is 68 years old.  His tumor was found incidentally when he was undergoing a pancreatectomy for chronic pancreatitis.    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  He has small volume mildly progressive disease within the abdomen without any clearly associated symptoms.   For now his rate of growth and very small volume disease does not warrant active intervention. We will plan on a follow-up scan in 3 to 4 months.      Per Inpatient DM team note 7/2023  Arnaldo Henderson 67 y/o with PMH chronic pancreatitis, CAD, CABG, 2nd degree AVB, Parkinson's disease and chronic pain. He is now s/p distal pancreatectomy, splenectomy, YVAN, proximal ileum resection. He is admitted to the SICU for hemodynamic monitoring.  # T2DM not at goal with hyperglycemia.  Poor control prior to admission   # Stress induced hyperglycemia  # post pancreatic surgery  Plan:                  -Lantus 15 units                 -Novolog 1u/10 gm CHO coverage with meals and snacks                 -Continue insulin drip for now.                 -BG monitoring as per insulin drip protocol                 -hypoglycemia protocol                 -recommend diet with carb counting protocol                 -diabetes education needs will be assessed closer to discharge                 -on discharge, will recommend outpatient follow up with [facility] service      Per most recent endocrine clinic visit with my colleague Hayley Alatorre PAC 1/2024   TYPE 2 DIABETES MELLITUS:   Sugars are currently at goal but with excessive  hypoglycemia.  Advising him to reduce his Basaglar dose to 14 units in the morning 30 in the evening nearly 10% reduction.  Also advising him to continue his breakfast dose of 4 units but decrease lunch dose to just 7 units and dinner dose to 10 units plus any correction.  Will continue current correction.   5.  Neuroendocrine tumor: See Dr. Gail mcarthur next week     I confirmed with patient and wife and they confirmed that Pat is seeing me today for diabetes.  They would like to have a doctor on the team also with our diabetes team at the .     Prior to admission, he was on metformin 2000 mg/day, no side effect  Pt and wife reported seeing doctors for diabetes once during inpatient.  Report that about 60% of his pancreas was removed  Pt follows neuroendocrine tumor with Dr. Lyn no concerns.     Patient's main concerns today are his diabetes control after steroid injection and his recent lab results.  11/21/2023  Steroid injection, glucose was high   He may need another injection in the future and is concerned.  They are wondering about high BUN, previous abnormal LFT, low hematocrit and abnormal RBC     In terms of symptoms, he said PD is bothersome  But no problem taking insulin  Feels like hypoglycemia improve since lower insulin dose last week     Pt only has Antengo alarm set for urgent low.  Sometimes he could not feel low glucose.  He recently bought glucose tablets to help with lows     Current regimen  Basaglar 14 units AM 30 units hs  Novolog 4 units with breakfast + sliding scale insulin, lunch 7 + sliding scale insulin, supper 10 + sliding scale insulin, night sliding scale insulin     CGM 1 week data  TIR 49%, low 4%, very low 1%     ASSESSMENT/PLAN:   Arnaldo Henderson is a 68 year old male with CAD s/p CABG, 2nd degree AV block, DM, GERD, HTN, h/o chronic pancreatitis s/p distal pancreatectomy, neuroendocrine tumor and PD who is here for diabetes management.      ## T2DM  ## H/o chronic  pancreatitis  ## Partial pancreatectomy (report 60%)  ## CAD s/p CABG  ## Hypoglycemia unawareness  ## H/o Grade 2 hypoglycemia  ## Cannot tolerate metformin  Hypoglycemia improve, but worsening hyperglycemia  Plan for goal A1c 8  CGM Time in target () >50%, and low (<70) <1%  Goal fasting 100-150, [id number] mg/dL  Unable to use GLP1 agonist given h/o pancreatitis  Pt declined restarting metformin  Will check endogenous insulin reserve, may consider SGLT2i  Difficult to control glucose by multiple daily injection, discussed automated insulin pump with no carb counting required, patient is interested.  -- continue Basaglar 14 unit(s) AM and 30 unit(s) PM [for procedure decrease to 24 unit(s) PM and 10 unit(s) AM the day of procedure]  -- increase Novolog with meals to 6/10/10 unit(s)   -- continue sliding scale insulin   -- fasting blood work  -- CDE for possible insulin pump (Medtronic 780G and Ilet-betabionic)  -- pending patient assistance glucagon  -- try glucose gel for hypoglycemia     ______________________________________________________________________________           Brain and cervical spine MRI 2/5/2024  INDICATION: Spells of generalized weakness, brain fog.     HEAD MRI:   1.  No acute intracranial process.  2.  Generalized brain atrophy and presumed microvascular ischemic changes as detailed above.     CERVICAL SPINE MRI:  1.  Multilevel degenerative spondylolisthesis of the cervical spine, as above, with up to moderate central spinal canal stenosis at the levels of C3-C4, C4-C5 and C5-C6.  2.  No abnormal spinal cord signal or intramedullary/leptomeningeal enhancement     Head CT scan 2/4/2024  No acute intracranial injury, hemorrhage, mass, or CT evidence of recent ischemia.     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    - No thoracic or pelvic metastases.     In detail:   1. Several enlarged Dotatate avid superior mesenteric lymph nodes are  consistent with kiersten metastatic disease.  1a. 2 smaller foci of focal dotatate uptake likely metastatic disease  (without CT correlate on non-contrast exam) - 2 implants on small  bowel in the right anterior abdomen  1b. 2 small areas of increased Dotatate uptake that correspond with  indistinct soft tissue density on CT likely metastatic - soft tissue  encasing the hepatic artery midline, density in the upper mesentery  inferior to the gastric antrum.      2. No evidence of thoracic metastatic disease.  3. No definite liver metastases.  4. Post surgical changes of distal pancreatectomy and splenectomy with  decreased soft tissue stranding in postoperative fluid compared to  prior evaluation.     I have personally reviewed the examination and initial interpretation  and I agree with the findings.     TOM WHITMAN MD      11/3/2023 CT scan  1.  Multiple mildly enlarged enhancing mesenteric lymph nodes in the central mesentery suspicious for carcinoid metastasis, mildly increased in size since prior study. No metastatic disease in the chest     2.  Improved postoperative edema in the pancreatectomy bed.        Dena Resendez MD - 01/12/2024 10:00 AM CST  Formatting of this note might be different from the original.  Neurology progress note    Chief complaint: Follow up..    I am seeing this 68-year-old man in follow up of Parkinson's disease. He is accompanied by his wife. I last saw him about a month ago but I wanted to follow up with him as we made some minor changes at that time to account for wearing off, and I would like to see if they have worked or whether further changes were necessary. He states that things have worked quite well and he is doing much  better now, however he has noticed presyncopal symptoms more frequently. He has not aware of any dyskinesias. Today his heart rate is low. I noticed that he had a Holter monitor. For 1 day about a year ago and no abnormalities were found at that time, and he also has had blood pressure fluctuations. He did see electrophysiology at some point, and apparently the possibility that some point he might need a pacemaker was raised.    I reviewed the PMH, PSH, FH, SH, medications and allergies from the EHR and through care everywhere.     Blood pressure today is 94/53 with heart rate of 39. A little later we checked it again and he was 87/40 with a heart rate of 59. His neurological examination has not changed. He has very little dyskinesia today.    Impression:  1. Parkinson's disease, stage 2  2. Dyskinesia due to Parkinson's disease.  3. Motor fluctuations likely related to protein intake interfering with the absorption of levodopa, improved since taking the levodopa on an empty stomach.    Discussion:  No further changes are necessary to his medication regimen. However he does have low blood pressure, and also bradycardia. The to maybe complicating his situation causing dizziness lightheadedness and might put him at risk for syncope. He has multiple reasons for orthostatic hypotension including his diabetes, his Parkinson's, and a number of medications that he is taking. He is also on some antihypertensives, and he has had some high blood pressures in the past so he may still need to continue to be on some of the antihypertensives but certainly in view of his low blood pressures on the low heart rate those should be reviewed again. The combination of orthostatic hypotension with bradycardia would certainly put him at very high risk for syncope. Therefore I think a review by the electrophysiologist with possibly a longer cardiac monitoring might be in order as well. I discussed all of this with the patient and his wife.  I will plan to see him in follow-up in 6 months time, but he will call me if there is problems with his medications in the meanwhile.    Recommendations:  1. Continue with the same dose of carbidopa/levodopa. Wait at least 1 hour after taking your pills before you eat anything that contains protein.  2. Your heart rate today was 39 with a blood pressure of 94/53. When your blood pressure dropped to 87/40 your heart rate only went up to 59. You should talk to your doctor and your cardiologist as both the low blood pressure and the slow heart rate will make you dizzy. Although the low blood pressure can be a symptom of Parkinson's it is also a symptom of diabetes, and you are on medications to lower your blood pressure. I do not have an explanation for the slow heart rate, but your cardiologist may want to do a longer term heart monitoring to decide whether additional testing or a pacemaker are necessary/appropriate.  3. Follow-up with me in September.     Dean Resendez MD  Electronically signed by Dean Resendez MD at 01/12/2024 11:26 AM CST        Dean Resendez MD - 05/14/2021 12:45 PM CDT  Formatting of this note might be different from the original.  Neurology consultation.    I am seeing this 66-year-old man with a question of Parkinson's disease. A received records and the letter from the patient's wife prior to the visit today. The patient has had what he describes as neurological problems for quite some time. He walks with a shuffle, developed very slow movements, has developed tremors in the right upper extremity, and has had mood changes, his wife thinks that he is depressed. He has increasingly hard time to adjust to things. There may be some mental slowing. There have been some personality changes. His wife states that he has a going on for about a year or 2. He was seen [place] where he had an MRI of the brain which was unremarkable, an MRI of the cervical spine, and an MRI of the  lumbar spine love which showed some trivial age-related degenerative changes, and a carotid ultrasound which was unremarkable. At the end of all this the patient's primary care physician concluded that she has suffered from essential tremor. No treatment was offered. He did receive some physical therapy with slight improvement. The patient's mother had Parkinson's disease, so they were concerned that the patient might have Parkinson's and requested a neurology consultation for which he is here today.He reports difficulty with getting up from chairs. Sense of smell has never been good. He sleeps well. Wife reports that he has had active dreams. He denies memory problems. No constipation.    I reviewed the PMH, PSH, FH, SH, medications and allergies from the EHR and through care everywhere if necessary.    A comprehensive review of systems was negative with the exception of what has been mentioned under the history of present illness and the symptoms associated with the chronic conditions listed in PMH. All others are negative.    134/79, 201.6 lb., HR 68 R.  General: Normal appearance for age  Mental status: Alert, oriented, with normal attention, concentration, language, memory, fund of knowledge. Clock drawing, serial sevens, and short-term memory are intact. MiniCog 5/5  Cranial nerves: Visual fields full, eye movements conjugate, pupillary responses symmetric, funduscopic examination benign. Face movement and sensation, tongue and palate movement, shoulder shrug and neck muscle strength, swallowing and hearing ) with hearing aids) are normal to bedside testing. Moderately decreased facial expression, weak and slightly dysphonic voice without dysarthria.  Motor: Normal muscle strength, muscle bulk, and marked rigidity in the neck and the right upper extremity moderate in the left upper extremity, and mild rigidity in the lower extremities.  Deep tendon reflexes: Normal and symmetric without Babinski  signs.  Sensory: No deficits to light touch and vibration.  Coordination: Normal finger-nose-finger, heel-knee-shin. Very intermittently present but moderate in amplitude tremor at rest in the right upper extremity. Severe bradykinesia in the upper extremities mild bradykinesia in the lower extremities.  Gait and balance: Normal tandem gait, Romberg test, and flexed posture posture. There is no freezing of gait. He walks with very reduced arm swing, slightly propulsive gait, and he scuffs his feet.  UPDRS Motor score 41    Impression:  1. Parkinson's disease, stage 2  2. DDD lumbar and cervical spine.    Discussion:  Clinical presentation is typical for idiopathic Parkinson's disease. The patient has no unusual findings to suggest an atypical parkinsonian syndrome but that cannot be ruled out at this stage. His workup so far was negative for other causes. He has prior exposure to cleaning agents, and a family history of Parkinson's disease. His mother apparently also suffered from hallucinations with her Parkinson's disease. However he currently has no psychotic features, and no cognitive dysfunction. I talked to him and his wife about the diagnosis, prognosis, and treatment. I think that his symptoms and findings are sufficient to this poor and to warrant symptomatic therapy and we discussed extensively the use of levodopa. He is willing to try it and will start a low dose increase the dosage gradually and I will see him in 2 months time. I also think that he will benefit from physical therapy. I provided him with resources and booklets regarding Parkinson's disease.    Recommendations:  1. Start carbidopa/levodopa 25/100, 1/2 tablet three times a day for 1 week, then 1 tablet three times a day after thath for 1 week, then 1.5 tablet three times a day until you see me back. Take it 30 minutes before each meal with water. If it makes you sick to your stomach take it with food.  2. Physical Therapy.  3. Come back for  a follow up in 2 months. Call sooner if there are side effects.    Dean Resendez MD    Electronically signed by Dean Resendez MD at 05/14/2021 1:23 PM CDT         14 Review of systems  are negative except for   Patient Active Problem List   Diagnosis    Retained foreign body    Acute pancreatitis    Cholecystitis, acute    Surgical site infection    Wound infection    Acute bilateral low back pain without sciatica    Adenomatous polyp of colon    Atherosclerotic heart disease of native coronary artery without angina pectoris    Cancer of skin of scrotum (H)    Cervicalgia    Gastroesophageal reflux disease without esophagitis    History of cholecystectomy    History of coronary artery bypass surgery    Hyperlipidemia, unspecified    Hypertension    Impaired gait    Long term (current) use of aspirin    Mild protein-calorie malnutrition (H24)    Mixed conductive and sensorineural hearing loss    Muscle weakness (generalized)    Need for assistance with personal care    Other hydrocele    Parkinson's disease with dyskinesia, unspecified whether manifestations fluctuate    Physical deconditioning    Pseudocyst of pancreas    Sensorineural hearing loss (SNHL) of right ear with unrestricted hearing of left ear    Type 2 diabetes mellitus with hyperglycemia, with long-term current use of insulin (H)    Neuroendocrine carcinoma of small bowel (H)    Failure to thrive in adult    Hyponatremia    Chronic right-sided low back pain without sciatica    Abdominal pain, generalized    Other secondary neuroendocrine tumors (H)    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)    Chronic biliary pancreatitis (H)    Weakness    Second degree AV block    History of Parkinson's disease    Orthostatic hypotension    Hypotension    Bradycardia    Parkinson's disease, unspecified whether dyskinesia present, unspecified whether manifestations fluctuate    Family history of parkinsonism    Cardiac pacemaker  "in situ    Seborrheic dermatitis        Allergies   Allergen Reactions    Ciprofloxacin Other (See Comments), Hives, Itching, Rash and Unknown     Other reaction(s): Other (see comments)  Oral swelling per [facility]        Dilaudid [Hydromorphone] Rash    Morphine Rash     rash    Penicillins Rash     aka ancef/ rash      Citalopram Unknown    Clindamycin Itching and Rash    Oxycodone Rash     \"HORRIBLE, SEVERE RASH MAYBE CAUSED BY OXY\"     Past Surgical History:   Procedure Laterality Date    CA ANESTH CABG W/PUMP      CHOLECYSTECTOMY  2022    COLONOSCOPY N/A 01/12/2023    Procedure: colonoscopy with fluroscopy;  Surgeon: Ayden Fraire MD;  Location:  OR    ENDOSCOPIC RETROGRADE CHOLANGIOPANCREATOGRAM N/A 05/18/2023    Procedure: ENDOSCOPIC RETROGRADE CHOLANGIOPANCREATOGRAPHY, WITH  CYSTDUODENOSTOMY STENTS X2 REMOVAL;  Surgeon: Cedric Saldana MD;  Location: [facility] OR    ENT SURGERY      EP PACEMAKER DEVICE & LEAD IMPLANT- RIGHT ATRIAL & RIGHT VENTRICULAR N/A 2/28/2024    Procedure: Pacemaker Device & Lead Implant - Right Atrial & Right Ventricular;  Surgeon: Jenna Hernandez MD;  Location:  [facility]    LAPAROSCOPY DIAGNOSTIC (GENERAL) N/A 02/20/2023    Procedure: LAPAROSCOPY, DIAGNOSTIC; RETRIEVAL OF MIGRATED STENT;  Surgeon: Joseluis Lima MD;  [facility]    ORTHOPEDIC SURGERY      PANCREATECTOMY PEUSTOW N/A 06/30/2023    Procedure: Open distal pancreactectomy with splenectomy, lysis of adhesions, resection of proximal illeum;  Surgeon: Ashvin Haider MD;  Location:  OR    AL CABG, ARTERIAL, TWO  2003     Past Medical History:   Diagnosis Date    CAD (coronary artery disease)     CABG    Diabetes mellitus, type 2 (H) 2013    Family history of parkinsonism 02/14/2024    Gastroesophageal reflux disease     Hypercholesteremia     Hypertension     Mixed conductive and sensorineural hearing loss of both ears     Mixed hearing loss     Pancreatic duct stricture     Pancreatitis  "    Parkinson disease     Parkinson's disease, unspecified whether dyskinesia present, unspecified whether manifestations fluctuate 02/14/2024    Seborrheic dermatitis 03/22/2024    Second degree AV block      Social History     Socioeconomic History    Marital status:      Spouse name: Not on file    Number of children: Not on file    Years of education: Not on file    Highest education level: Not on file   Occupational History    Not on file   Tobacco Use    Smoking status: Former     Types: Cigarettes    Smokeless tobacco: Never   Vaping Use    Vaping Use: Never used   Substance and Sexual Activity    Alcohol use: Not Currently    Drug use: Not Currently    Sexual activity: Not on file   Other Topics Concern    Not on file   Social History Narrative    Not on file     Social Determinants of Health     Financial Resource Strain: Not on file   Food Insecurity: Not on file   Transportation Needs: Not on file   Physical Activity: Not on file   Stress: Not on file   Social Connections: Not on file   Interpersonal Safety: Not on file   Housing Stability: Not on file     Family History   Problem Relation Age of Onset    Parkinsonism Mother     Lung Cancer Brother     Diabetes No family hx of      Current Outpatient Medications   Medication Sig Dispense Refill    acetaminophen (TYLENOL) 325 MG tablet Take 2 tablets (650 mg) by mouth every 4 hours as needed for other (For optimal non-opioid multimodal pain management to improve pain control.)      aspirin (ASA) 81 MG EC tablet Take 81 mg by mouth daily      atorvastatin (LIPITOR) 40 MG tablet Take 1 tablet by mouth daily      BD PEN NEEDLE LINDY 2ND GEN 32G X 4 MM miscellaneous USE TO INJECT INSULIN 4 TIMES A  each 3    carbidopa-levodopa (SINEMET CR)  MG CR tablet Take 1 tablet by mouth At Bedtime      carbidopa-levodopa (SINEMET)  MG tablet Take 2 tablets by mouth 3 times daily      Continuous Blood Gluc  (DEXCOM G7 ) ALIA Use to  read blood sugars as per 's instructions. 1 each 0    Continuous Blood Gluc Sensor (DEXCOM G7 SENSOR) MISC Change every 10 days. 9 each 1    dasiglucagon HCl (ZEGALOGUE) 0.6 MG/0.6ML SOAJ injection Inject 0.6 mg Subcutaneous once as needed (use for severe hypoglycemia) 1.2 mL 1    gabapentin (NEURONTIN) 300 MG capsule Take 600 mg by mouth 3 times daily      Glucagon (BAQSIMI) 3 MG/DOSE nasal powder Spray 1 spray (3 mg) in nostril as needed (severe hypoglycemia) in the event of unconscious hypoglycemia or hypoglycemic seizure. May repeat dose if no response after 15 minutes. 2 each 1    insulin aspart (NOVOLOG FLEXPEN) 100 UNIT/ML pen Inject 6-10 Units Subcutaneous 3 times daily (with meals) Take 6 units with breakfast, 10 unit(s) with lunch and 10 unit(s) with supper      insulin aspart (NOVOLOG FLEXPEN) 100 UNIT/ML pen Inject subcutaneous per sliding scale three times daily with meals:  190-239 = 1 unit  240-289 = 2 units  290-339 = 3 units  >340 = 4 units   And Inject subcutaneously at bedtime per sliding scale:  200-249 = 1 unit  250-300 = 2 units   >300 = 3 units      INSULIN GLARGINE 100 UNIT/ML pen INJECT 15 UNITS SUBCUTANEOUS EACH AM AND 30 UNITS SUBCUTANEOUS EACH PM.PLEASE PROVIDED 90 DAY SUPPLY 45 mL 0    lipase-protease-amylase (ZENPEP) 49683-187894-172003 units CPEP Take 1 capsule by mouth 3 times daily (with meals) 90 capsule 3    magnesium oxide (MAG-OX) 400 MG tablet Take 400 mg by mouth every other day      Multiple Vitamin (MULTI-VITAMINS) TABS Take 1 tablet by mouth daily      omeprazole (PRILOSEC) 20 MG DR capsule Take 20 mg by mouth daily      pantoprazole (PROTONIX) 40 MG EC tablet Take 40 mg by mouth daily      ZENPEP 91063-91097 units CPEP            Again, thank you for allowing me to participate in the care of your patient.      Sincerely,    Juan Jose Armenta MD

## 2024-03-26 NOTE — PATIENT INSTRUCTIONS
Medications      6a  11am 4p  9p      Acetaminophen tylenol 325mg prn            Aspirin 81mg   1           Atorvastatin lipitor 40mg   1           Carbidopa/levodopa Sinemet CR 50/200       1     CArbidopa/levodopa sinemet 25/100 2 2 2       Dasiglucagon zegalogue 0.6mg/0.6ml prn           Gabapentin neurontin 300mg 2 2 2       Glucagon baqsimi 3mg/dose nasal  prn            Insulin aspart novolog sliding            Insulin aspart novolog             Insulin glargine  15      30      Lipase protease amylase zenpep  3/day           Losartan cozaar 50mg 1           Magnesium oxide 400mg  qod            Metformin glucophage XR 500mg 24hr off            MVI 1            Omeprazole prilosec 20mg  1           Pantoprazole protonic 40mg off           Zenpep 2000 87749 above                            Plan:    S/p pacemaker  Ongoing blood pressure issues - highs and lows   Losartan cozaar 50mg was restarted and had been on 25mg in hospital  They are working with pcp and possibly cardiology  157 systolic 5pm   Only checking blood pressure in the evening.   He does not check morning blood pressures  QTc was 402ms on ECG on 2/28/2024  Need ongoing monitoring of blood pressure - highs and lows       Parkinson genetics study with family history of parkinson in mother   And son has orthostatic hypotension  PDgeneration  [email or web address]   [phone]  [email or web address]    Pharmacy ([facility]) consultation and medication management  Please call the scheduling number @ [phone] to set up an appointment with pharmacists Cheyanne Harrell or Morenita Delgado.     Due to wearing off he would probably benefit from a shortening in the dose interval changing from 5 hour to 4 hour interval or so.     Sinemet 25/100 (carbidopa/levodopa) 2 tabs @ 6am, 2 @10a, 2@2p and 1@5p  Sinemet 50/200 (carbidopa/levodopa) at 9pm    Neuropsychological evaluation - baseline cognitive  evaluation  Will take time to get scheduled.     Return to see Candida Jackson NP in 3 months.     Anxiety has increased and impacts social life  May have been citalopram celexa in the past  Has not seen a therapist and wife has been encouraging this.   He may be open to seeing someone  Has not started on anxiety medication recently.     [facility]  [address] [place] [id number]    Whitney Renteria, Ph.D., LP  [phone]    Isabelle West,Ph.D.,LP  [phone] (inpatient)    Orlando Jasso,Ph.D.,LP  [phone]    Maliha Echevarria, Ph.D.  [phone]    Phyllis Mercedes, Ph.D.,   [phone]    Jay Bales, Ph.D., [facility]  [phone]    Samantha Watters,Ph.D.,   [phone]    Jenan Haider psychologist  [phone]   [place] [id number] [facility] [address]  [place] [address].

## 2024-03-26 NOTE — TELEPHONE ENCOUNTER
[facility] Call Center    Phone Message    May a detailed message be left on voicemail: yes     Reason for Call: Appointment Intake    Referring Provider Name: Juan Jose Armenta MD   Diagnosis and/or Symptoms: Cognitive disorder  Parkinson's Disease    Action Taken: Other: Routed to [facility]    Travel Screening: Not Applicable        Please review and advise on scheduling. Per scheduling guidelines Cognitive disorder requires review

## 2024-03-27 ENCOUNTER — VIRTUAL VISIT (OUTPATIENT)
Dept: ENDOCRINOLOGY | Facility: CLINIC | Age: 69
End: 2024-03-27
Payer: MEDICARE

## 2024-03-27 ENCOUNTER — TELEPHONE (OUTPATIENT)
Dept: NEUROLOGY | Facility: CLINIC | Age: 69
End: 2024-03-27

## 2024-03-27 VITALS — WEIGHT: 203 LBS | BODY MASS INDEX: 30.87 KG/M2

## 2024-03-27 DIAGNOSIS — Z79.4 TYPE 2 DIABETES MELLITUS WITH HYPERGLYCEMIA, WITH LONG-TERM CURRENT USE OF INSULIN (H): Primary | ICD-10-CM

## 2024-03-27 DIAGNOSIS — E11.65 TYPE 2 DIABETES MELLITUS WITH HYPERGLYCEMIA, WITH LONG-TERM CURRENT USE OF INSULIN (H): Primary | ICD-10-CM

## 2024-03-27 PROCEDURE — 99214 OFFICE O/P EST MOD 30 MIN: CPT | Mod: 95 | Performed by: PHYSICIAN ASSISTANT

## 2024-03-27 RX ORDER — INSULIN ASPART 100 [IU]/ML
5-12 INJECTION, SOLUTION INTRAVENOUS; SUBCUTANEOUS
Qty: 40 ML | Refills: 3 | Status: SHIPPED | OUTPATIENT
Start: 2024-03-27 | End: 2024-08-07

## 2024-03-27 NOTE — PROGRESS NOTES
Time of start: 12:24 pm   Time of end: 12:36 pm   Total duration of video visit:12 minutes.  Providers location: Off-site.  Patient's location: [place].    HPI  Arnaldo Henderson is a 68 year old male with type 2 diabetes mellitus. Hx of distal pancreatectomy for chronic pancreatitis. Pt has dx of neuroendocrine tumor of small bowel seen here by Oncology staff.   Pt last seen by Dr. Reynolds in 2/20214.  Pt was dx with type 2 DM in 2013. Denies retinopathy, nephropathy and neuropathy.  Pt has hx of chronic pancreatitis with hx of gallstones, CAD- s/p CABG, Parkinson's disease and HTN.   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  Recently had a pacemaker inserted.  For his diabetes, he is taking Basaglar 14 units subcutaneous each am and 30 units subcutaneous at hs and Novolog 5 units with breakfast, 8 units with lunch and 10 units with dinner with correction ( 1 unit/50 for BG > 140 ).  C-peptide low < 0.1 ng/mL on 2/15/2024.  Most recent A1C was 8.2 % on 11/16/2023. Previous A1C was 8.5 % in May 2023.  I reviewed and scanned pt's DexcomG7 sensor download data in his note below.  Blood sugar are highest following meals during the day and evening.  He denies missing any insulin doses.  On ROS today, Parkinson's med schedule recently changed per patient.  Chronic back and abd pain.  Some fatigue.  Denies blurred vision, n/v,SOB at rest, cough, fevers,chest pain, blood in stool, melena or sx of neuropathy.   Denies foot ulcers.    Diabetes Care  Retinopathy: none per patient. Seen here by Oph in 9/2023.  Nephropathy:none; urine microalbuminuria negative in Jan 2023. Pt taking Cozaar.  Neuropathy: none per patient.   Foot Exam: no exam today.  Taking aspirin: yes.  Lipids:LDL 33 in Oct 2022. Pt taking Lipitor.  Insulin: basal and meal time insulin with correction.      DM meds: none.  Patient did not tolerate metformin  Testing: DexcomG7 sensor.                  ROS  See under HPI.    Allergies  Allergies  "  Allergen Reactions    Ciprofloxacin Other (See Comments), Hives, Itching, Rash and Unknown     Other reaction(s): Other (see comments)  Oral swelling per [facility]        Dilaudid [Hydromorphone] Rash    Morphine Rash     rash    Penicillins Rash     aka ancef/ rash      Citalopram Unknown    Clindamycin Itching and Rash    Oxycodone Rash     \"HORRIBLE, SEVERE RASH MAYBE CAUSED BY OXY\"       Medications  Current Outpatient Medications   Medication Sig Dispense Refill    acetaminophen (TYLENOL) 325 MG tablet Take 2 tablets (650 mg) by mouth every 4 hours as needed for other (For optimal non-opioid multimodal pain management to improve pain control.)      aspirin (ASA) 81 MG EC tablet Take 81 mg by mouth daily      atorvastatin (LIPITOR) 40 MG tablet Take 1 tablet by mouth daily      BD PEN NEEDLE LINDY 2ND GEN 32G X 4 MM miscellaneous USE TO INJECT INSULIN 4 TIMES A  each 3    carbidopa-levodopa (SINEMET CR)  MG CR tablet Take 1 tablet by mouth at bedtime @9p      carbidopa-levodopa (SINEMET)  MG tablet 2@6a, 2@10a, 2@2p and 1@5p 630 tablet 3    Continuous Blood Gluc  (DEXCOM G7 ) ALIA Use to read blood sugars as per 's instructions. 1 each 0    Continuous Blood Gluc Sensor (DEXCOM G7 SENSOR) MISC Change every 10 days. 9 each 1    dasiglucagon HCl (ZEGALOGUE) 0.6 MG/0.6ML SOAJ injection Inject 0.6 mg Subcutaneous once as needed (use for severe hypoglycemia) 1.2 mL 1    gabapentin (NEURONTIN) 300 MG capsule Take 600 mg by mouth 3 times daily      Glucagon (BAQSIMI) 3 MG/DOSE nasal powder Spray 1 spray (3 mg) in nostril as needed (severe hypoglycemia) in the event of unconscious hypoglycemia or hypoglycemic seizure. May repeat dose if no response after 15 minutes. 2 each 1    insulin aspart (NOVOLOG FLEXPEN) 100 UNIT/ML pen Inject 6-10 Units Subcutaneous 3 times daily (with meals) Take 6 units with breakfast, 10 unit(s) with lunch and 10 unit(s) with supper      insulin " aspart (NOVOLOG FLEXPEN) 100 UNIT/ML pen Inject subcutaneous per sliding scale three times daily with meals:  190-239 = 1 unit  240-289 = 2 units  290-339 = 3 units  >340 = 4 units   And Inject subcutaneously at bedtime per sliding scale:  200-249 = 1 unit  250-300 = 2 units   >300 = 3 units      INSULIN GLARGINE 100 UNIT/ML pen INJECT 15 UNITS SUBCUTANEOUS EACH AM AND 30 UNITS SUBCUTANEOUS EACH PM.PLEASE PROVIDED 90 DAY SUPPLY 45 mL 0    lipase-protease-amylase (ZENPEP) 43583-922517-797281 units CPEP Take 1 capsule by mouth 3 times daily (with meals) 90 capsule 3    losartan (COZAAR) 50 MG tablet Take 1 tablet (50 mg) by mouth daily      magnesium oxide (MAG-OX) 400 MG tablet Take 400 mg by mouth every other day      Multiple Vitamin (MULTI-VITAMINS) TABS Take 1 tablet by mouth daily      omeprazole (PRILOSEC) 20 MG DR capsule Take 20 mg by mouth daily      ZENPEP 33262-99382 units CPEP          Family History  family history includes Autism Spectrum Disorder in his grandson; Heart Disease in his son; Lung Cancer in his brother; Other - See Comments in his daughter, father, mother, sister, sister, and son; Parkinsonism (age of onset: 65) in his mother; Psychosis in his mother.    Social History   reports that he has quit smoking. His smoking use included cigarettes. He has never used smokeless tobacco. He reports that he does not currently use alcohol. He reports that he does not currently use drugs.     Past Medical History  Past Medical History:   Diagnosis Date    CAD (coronary artery disease)     CABG    Diabetes mellitus, type 2 (H) 2013    Family history of parkinsonism 02/14/2024    Gastroesophageal reflux disease     Hypercholesteremia     Hypertension     Mixed conductive and sensorineural hearing loss of both ears     Mixed hearing loss     Pancreatic duct stricture     Pancreatitis     Parkinson disease     Parkinson's disease, unspecified whether dyskinesia present, unspecified whether manifestations  fluctuate 02/14/2024    Seborrheic dermatitis 03/22/2024    Second degree AV block        Past Surgical History:   Procedure Laterality Date    CA ANESTH CABG W/PUMP      CHOLECYSTECTOMY  2022    COLONOSCOPY N/A 01/12/2023    Procedure: colonoscopy with fluroscopy;  Surgeon: Ayden Fraire MD;  Location:  OR    ENDOSCOPIC RETROGRADE CHOLANGIOPANCREATOGRAM N/A 05/18/2023    Procedure: ENDOSCOPIC RETROGRADE CHOLANGIOPANCREATOGRAPHY, WITH  CYSTDUODENOSTOMY STENTS X2 REMOVAL;  Surgeon: Cedric Saldana MD;  Location: [facility] OR    ENT SURGERY      EP PACEMAKER DEVICE & LEAD IMPLANT- RIGHT ATRIAL & RIGHT VENTRICULAR N/A 02/28/2024    Procedure: Pacemaker Device & Lead Implant - Right Atrial & Right Ventricular;  Surgeon: Jenna Hernandez MD;  [facility]    LAPAROSCOPY DIAGNOSTIC (GENERAL) N/A 02/20/2023    Procedure: LAPAROSCOPY, DIAGNOSTIC; RETRIEVAL OF MIGRATED STENT;  Surgeon: Joseluis Lima MD;  Location: [facility] OR    ORTHOPEDIC SURGERY      OTHER SURGICAL HISTORY      skin cancer ? from scrotum    PANCREATECTOMY PEUSTOW N/A 06/30/2023    Procedure: Open distal pancreactectomy with splenectomy, lysis of adhesions, resection of proximal illeum;  Surgeon: Ashvin Haider MD;  Location: [facility] OR    SD CABG, ARTERIAL, TWO  2003       Physical Exam  Wt 92.1 kg (203 lb)   BMI 30.87 kg/m       FEET: no ulcers.    RESULTS  Creatinine   Date Value Ref Range Status   03/12/2024 0.94 0.67 - 1.17 mg/dL Final     GFR Estimate   Date Value Ref Range Status   03/12/2024 88 >60 mL/min/1.73m2 Final     GFR, ESTIMATED POCT   Date Value Ref Range Status   11/03/2023 >60 >60 mL/min/1.73m2 Final     Hemoglobin A1C   Date Value Ref Range Status   11/16/2023 8.2 (H) 0.0 - 5.6 % Final     Potassium   Date Value Ref Range Status   03/12/2024 4.0 3.4 - 5.3 mmol/L Final     Potassium POCT   Date Value Ref Range Status   06/30/2023 4.7 3.5 - 5.0 mmol/L Final     ALT   Date Value Ref Range Status   03/12/2024 13 0  - 70 U/L Final     Comment:     Reference intervals for this test were updated on 6/12/2023 to more accurately reflect our healthy population. There may be differences in the flagging of prior results with similar values performed with this method. Interpretation of those prior results can be made in the context of the updated reference intervals.       AST   Date Value Ref Range Status   03/12/2024 51 (H) 0 - 45 U/L Final     Comment:     Reference intervals for this test were updated on 6/12/2023 to more accurately reflect our healthy population. There may be differences in the flagging of prior results with similar values performed with this method. Interpretation of those prior results can be made in the context of the updated reference intervals.       Triglycerides (External)   Date Value Ref Range Status   06/02/2021 74 <150 mg/dL Final         ASSESSMENT/PLAN:      TYPE 2 DIABETES MELLITUS: Hx of type 2 diabetes mellitus dx in 2013 with hx of distal pancreatectomy on 6/3/2023 for chronic pancreatitis. 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  C-peptide low <0.1 ng/mL on 2/15/2024. Pt's blood sugar values are high and he denies missing insulin doses. I have him increase Novolog 10 units with lunch ( was 8 units ) and increase 12 units with dinner ( was 10 units ). Continue Basaglar 14 units subcutaneous each am and Basaglar 30 units subcutaneous at hs. Continue Novolog 5 units with breakfast with correction ( 1 unit/50 for BG > 140 ). Pt did NOT tolerate Metformin.   He wears his DexcomG7 sensor full time.   Pt denies hx of diabetic retinopathy - seen by Oph in 9/2023.  His urine microalbuminuria in Jan 2023. Pt taking Cozaar.  Most recent creat 0.94 with GFR 88 mL/min in 3/2024.  Pt denies sx of neuropathy- denies foot ulcers.   HTN: BP good at home. Continue current RX.  LIPIDS: LDL 33 in Oct 2022. Pt taking Lipitor.  4.   PARKINSON'S DISEASE: Not addressed.  5.   FOLLOW UP: With  Dr. Reynolds on 5/7/2024.    Time spent reviewing chart, labs and DexcomG7 sensor data today = 5 minutes.  Time for video visit today = 12 minutes.  Time for documentation today = 15 minutes.    Total time for visit today = 32 minutes.    Valeri Gomez PA-C

## 2024-03-27 NOTE — TELEPHONE ENCOUNTER
Triage instructions routed to clinic scheduling team.    Schedule per protocol    Timeline: First Available  Location: [facility] NEUROPSYCHOLOGY,  NEUROPSYCHOLOGY, or [facility]UR[facility]Y  Preferred Provider: Brian, Wade, or Lulú  Visit Type: NEUROPSYCH EVAL or Neuropsych Interview Only (testing scheduled shortly after interview)  Special Instructions:    Kandis Tipton  Psychometrist

## 2024-03-27 NOTE — TELEPHONE ENCOUNTER
Called pt to schedule: pt stated he will call later to schedule.     Heidy Chanel on 3/27/2024 at 9:18 AM

## 2024-03-27 NOTE — LETTER
3/27/2024       RE: Arnaldo Henderson  [address] [facility] [address]     Dear Colleague,    Thank you for referring your patient, Arnaldo Henderson, to the [facility]CR[facility]OLOG[facility] [place] [facility]. Please see a copy of my visit note below.    Outcome for 03/18/24 11:18 AM: Data uploaded on Dexcom  Twyla Leger LPN   Outcome for 03/25/24 9:48 AM: Data obtained via Dexcom website  Twyla Leger LPN     Patient is showing 3/5 [place] met. BP out range and A1c not in range   Twyla Leger LPN              Time of start: 12:24 pm   Time of end: 12:36 pm   Total duration of video visit:12 minutes.  Providers location: Off-site.  Patient's location: [place].    HPI  Arnaldo Henderson is a 68 year old male with type 2 diabetes mellitus. Hx of distal pancreatectomy for chronic pancreatitis. Pt has dx of neuroendocrine tumor of small bowel seen here by Oncology staff.   Pt last seen by Dr. Reynolds in 2/20214.  Pt was dx with type 2 DM in 2013. Denies retinopathy, nephropathy and neuropathy.  Pt has hx of chronic pancreatitis with hx of gallstones, CAD- s/p CABG, Parkinson's disease and HTN.   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  Recently had a pacemaker inserted.  For his diabetes, he is taking Basaglar 14 units subcutaneous each am and 30 units subcutaneous at hs and Novolog 5 units with breakfast, 8 units with lunch and 10 units with dinner with correction ( 1 unit/50 for BG > 140 ).  C-peptide low < 0.1 ng/mL on 2/15/2024.  Most recent A1C was 8.2 % on 11/16/2023. Previous A1C was 8.5 % in May 2023.  I reviewed and scanned pt's DexcomG7 sensor download data in his note below.  Blood sugar are highest following meals during the day and evening.  He denies missing any insulin doses.  On ROS today, Parkinson's med schedule recently changed per patient.  Chronic back and abd pain.  Some fatigue.  Denies blurred vision, n/v,SOB at rest,  "cough, fevers,chest pain, blood in stool, melena or sx of neuropathy.   Denies foot ulcers.    Diabetes Care  Retinopathy: none per patient. Seen here by Oph in 9/2023.  Nephropathy:none; urine microalbuminuria negative in Jan 2023. Pt taking Cozaar.  Neuropathy: none per patient.   Foot Exam: no exam today.  Taking aspirin: yes.  Lipids:LDL 33 in Oct 2022. Pt taking Lipitor.  Insulin: basal and meal time insulin with correction.      DM meds: none.  Patient did not tolerate metformin  Testing: DexcomG7 sensor.                  ROS  See under HPI.    Allergies  Allergies   Allergen Reactions    Ciprofloxacin Other (See Comments), Hives, Itching, Rash and Unknown     Other reaction(s): Other (see comments)  Oral swelling per [facility]        Dilaudid [Hydromorphone] Rash    Morphine Rash     rash    Penicillins Rash     aka ancef/ rash      Citalopram Unknown    Clindamycin Itching and Rash    Oxycodone Rash     \"HORRIBLE, SEVERE RASH MAYBE CAUSED BY OXY\"       Medications  Current Outpatient Medications   Medication Sig Dispense Refill    acetaminophen (TYLENOL) 325 MG tablet Take 2 tablets (650 mg) by mouth every 4 hours as needed for other (For optimal non-opioid multimodal pain management to improve pain control.)      aspirin (ASA) 81 MG EC tablet Take 81 mg by mouth daily      atorvastatin (LIPITOR) 40 MG tablet Take 1 tablet by mouth daily      BD PEN NEEDLE LINDY 2ND GEN 32G X 4 MM miscellaneous USE TO INJECT INSULIN 4 TIMES A  each 3    carbidopa-levodopa (SINEMET CR)  MG CR tablet Take 1 tablet by mouth at bedtime @9p      carbidopa-levodopa (SINEMET)  MG tablet 2@6a, 2@10a, 2@2p and 1@5p 630 tablet 3    Continuous Blood Gluc  (DEXCOM G7 ) ALIA Use to read blood sugars as per 's instructions. 1 each 0    Continuous Blood Gluc Sensor (DEXCOM G7 SENSOR) MISC Change every 10 days. 9 each 1    dasiglucagon HCl (ZEGALOGUE) 0.6 MG/0.6ML SOAJ injection Inject 0.6 mg " Subcutaneous once as needed (use for severe hypoglycemia) 1.2 mL 1    gabapentin (NEURONTIN) 300 MG capsule Take 600 mg by mouth 3 times daily      Glucagon (BAQSIMI) 3 MG/DOSE nasal powder Spray 1 spray (3 mg) in nostril as needed (severe hypoglycemia) in the event of unconscious hypoglycemia or hypoglycemic seizure. May repeat dose if no response after 15 minutes. 2 each 1    insulin aspart (NOVOLOG FLEXPEN) 100 UNIT/ML pen Inject 6-10 Units Subcutaneous 3 times daily (with meals) Take 6 units with breakfast, 10 unit(s) with lunch and 10 unit(s) with supper      insulin aspart (NOVOLOG FLEXPEN) 100 UNIT/ML pen Inject subcutaneous per sliding scale three times daily with meals:  190-239 = 1 unit  240-289 = 2 units  290-339 = 3 units  >340 = 4 units   And Inject subcutaneously at bedtime per sliding scale:  200-249 = 1 unit  250-300 = 2 units   >300 = 3 units      INSULIN GLARGINE 100 UNIT/ML pen INJECT 15 UNITS SUBCUTANEOUS EACH AM AND 30 UNITS SUBCUTANEOUS EACH PM.PLEASE PROVIDED 90 DAY SUPPLY 45 mL 0    lipase-protease-amylase (ZENPEP) [id number] units CPEP Take 1 capsule by mouth 3 times daily (with meals) 90 capsule 3    losartan (COZAAR) 50 MG tablet Take 1 tablet (50 mg) by mouth daily      magnesium oxide (MAG-OX) 400 MG tablet Take 400 mg by mouth every other day      Multiple Vitamin (MULTI-VITAMINS) TABS Take 1 tablet by mouth daily      omeprazole (PRILOSEC) 20 MG DR capsule Take 20 mg by mouth daily      ZENPEP 38737-55940 units CPEP          Family History  family history includes Autism Spectrum Disorder in his grandson; Heart Disease in his son; Lung Cancer in his brother; Other - See Comments in his daughter, father, mother, sister, sister, and son; Parkinsonism (age of onset: 65) in his mother; Psychosis in his mother.    Social History   reports that he has quit smoking. His smoking use included cigarettes. He has never used smokeless tobacco. He reports that he does not currently use  alcohol. He reports that he does not currently use drugs.     Past Medical History  Past Medical History:   Diagnosis Date    CAD (coronary artery disease)     CABG    Diabetes mellitus, type 2 (H) 2013    Family history of parkinsonism 02/14/2024    Gastroesophageal reflux disease     Hypercholesteremia     Hypertension     Mixed conductive and sensorineural hearing loss of both ears     Mixed hearing loss     Pancreatic duct stricture     Pancreatitis     Parkinson disease     Parkinson's disease, unspecified whether dyskinesia present, unspecified whether manifestations fluctuate 02/14/2024    Seborrheic dermatitis 03/22/2024    Second degree AV block        Past Surgical History:   Procedure Laterality Date    CA ANESTH CABG W/PUMP      CHOLECYSTECTOMY  2022    COLONOSCOPY N/A 01/12/2023    Procedure: colonoscopy with fluroscopy;  Surgeon: Ayden Fraire MD;  Location:  OR    ENDOSCOPIC RETROGRADE CHOLANGIOPANCREATOGRAM N/A 05/18/2023    Procedure: ENDOSCOPIC RETROGRADE CHOLANGIOPANCREATOGRAPHY, WITH  CYSTDUODENOSTOMY STENTS X2 REMOVAL;  Surgeon: Cedric Saldana MD;  Location: [facility] OR    ENT SURGERY      EP PACEMAKER DEVICE & LEAD IMPLANT- RIGHT ATRIAL & RIGHT VENTRICULAR N/A 02/28/2024    Procedure: Pacemaker Device & Lead Implant - Right Atrial & Right Ventricular;  Surgeon: Jenna Hernandez MD;  Location:  [facility]    LAPAROSCOPY DIAGNOSTIC (GENERAL) N/A 02/20/2023    Procedure: LAPAROSCOPY, DIAGNOSTIC; RETRIEVAL OF MIGRATED STENT;  Surgeon: Joseluis Lima MD;  Location: [facility] OR    ORTHOPEDIC SURGERY      OTHER SURGICAL HISTORY      skin cancer ? from scrotum    PANCREATECTOMY PEUSTOW N/A 06/30/2023    Procedure: Open distal pancreactectomy with splenectomy, lysis of adhesions, resection of proximal illeum;  Surgeon: Ashvin Haider MD;  Location: [facility] OR    LA CABG, ARTERIAL, TWO  2003       Physical Exam  Wt 92.1 kg (203 lb)   BMI 30.87 kg/m       FEET: no  ulcers.    RESULTS  Creatinine   Date Value Ref Range Status   03/12/2024 0.94 0.67 - 1.17 mg/dL Final     GFR Estimate   Date Value Ref Range Status   03/12/2024 88 >60 mL/min/1.73m2 Final     GFR, ESTIMATED POCT   Date Value Ref Range Status   11/03/2023 >60 >60 mL/min/1.73m2 Final     Hemoglobin A1C   Date Value Ref Range Status   11/16/2023 8.2 (H) 0.0 - 5.6 % Final     Potassium   Date Value Ref Range Status   03/12/2024 4.0 3.4 - 5.3 mmol/L Final     Potassium POCT   Date Value Ref Range Status   06/30/2023 4.7 3.5 - 5.0 mmol/L Final     ALT   Date Value Ref Range Status   03/12/2024 13 0 - 70 U/L Final     Comment:     Reference intervals for this test were updated on 6/12/2023 to more accurately reflect our healthy population. There may be differences in the flagging of prior results with similar values performed with this method. Interpretation of those prior results can be made in the context of the updated reference intervals.       AST   Date Value Ref Range Status   03/12/2024 51 (H) 0 - 45 U/L Final     Comment:     Reference intervals for this test were updated on 6/12/2023 to more accurately reflect our healthy population. There may be differences in the flagging of prior results with similar values performed with this method. Interpretation of those prior results can be made in the context of the updated reference intervals.       Triglycerides (External)   Date Value Ref Range Status   06/02/2021 74 <150 mg/dL Final         ASSESSMENT/PLAN:      TYPE 2 DIABETES MELLITUS: Hx of type 2 diabetes mellitus dx in 2013 with hx of distal pancreatectomy on 6/3/2023 for chronic pancreatitis. 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  C-peptide low <0.1 ng/mL on 2/15/2024. Pt's blood sugar values are high and he denies missing insulin doses. I have him increase Novolog 10 units with lunch ( was 8 units ) and increase 12 units with dinner ( was 10 units ). Continue Basaglar 14 units  subcutaneous each am and Basaglar 30 units subcutaneous at hs. Continue Novolog 5 units with breakfast with correction ( 1 unit/50 for BG > 140 ). Pt did NOT tolerate Metformin.   He wears his DexcomG7 sensor full time.   Pt denies hx of diabetic retinopathy - seen by Oph in 9/2023.  His urine microalbuminuria in Jan 2023. Pt taking Cozaar.  Most recent creat 0.94 with GFR 88 mL/min in 3/2024.  Pt denies sx of neuropathy- denies foot ulcers.   HTN: BP good at home. Continue current RX.  LIPIDS: LDL 33 in Oct 2022. Pt taking Lipitor.  4.   PARKINSON'S DISEASE: Not addressed.  5.   FOLLOW UP: With Dr. Reynolds on 5/7/2024.    Time spent reviewing chart, labs and DexcomG7 sensor data today = 5 minutes.  Time for video visit today = 12 minutes.  Time for documentation today = 15 minutes.    Total time for visit today = 32 minutes.    Valeri Gomez PA-C

## 2024-03-27 NOTE — NURSING NOTE
Is the patient currently in the state of [place]? YES    Visit mode:VIDEO    If the visit is dropped, the patient can be reconnected by: VIDEO VISIT: Text to cell phone:   Telephone Information:   Mobile [phone]       Will anyone else be joining the visit? NO  (If patient encounters technical issues they should call [phone])    How would you like to obtain your AVS? MyChart    Are changes needed to the allergy or medication list? No    Reason for visit: RECHECK and Video Visit    Chastity TORRES

## 2024-03-28 ENCOUNTER — TELEPHONE (OUTPATIENT)
Dept: NEUROLOGY | Facility: CLINIC | Age: 69
End: 2024-03-28
Payer: MEDICARE

## 2024-03-28 NOTE — TELEPHONE ENCOUNTER
"Sent [place] (1st Attempt) and Patient Contacted for the patient to call back and schedule the following:    Appointment type: New Movement Disorder  Provider: KRYSTYNA Connor CNP  Return date: Around 6/26/2024  Specialty phone number: [phone]  Additional appointment(s) needed: N/A  Additional Notes: Per Dr. Armenta \"Candida new appointment 3 months\"    Spoke with patient, he stated he was unavailable to schedule. Writer offered to sent User[employer] with phone # for call back. Patient accepted, stated he will call back this afternoon.    Rajeev Morales on 3/28/2024 at 10:53 AM  "

## 2024-04-01 NOTE — PROGRESS NOTES
Pt last seen in PT 01/30.  See chart for medical processes since then.  No further PT is scheduled.  Consider note dated 01/30 to serve as final summary.

## 2024-04-10 DIAGNOSIS — K85.91 NECROTIZING PANCREATITIS: ICD-10-CM

## 2024-04-10 RX ORDER — PANCRELIPASE LIPASE, PANCRELIPASE PROTEASE, PANCRELIPASE AMYLASE 40000; 126000; 168000 [USP'U]/1; [USP'U]/1; [USP'U]/1
1 CAPSULE, DELAYED RELEASE ORAL
Qty: 90 CAPSULE | Refills: 11 | Status: SHIPPED | OUTPATIENT
Start: 2024-04-10

## 2024-04-10 RX ORDER — PANCRELIPASE LIPASE, PANCRELIPASE PROTEASE, PANCRELIPASE AMYLASE 40000; 126000; 168000 [USP'U]/1; [USP'U]/1; [USP'U]/1
1 CAPSULE, DELAYED RELEASE ORAL
Qty: 90 CAPSULE | Refills: 11 | OUTPATIENT
Start: 2024-04-10 | End: 2024-04-10

## 2024-04-25 ENCOUNTER — VIRTUAL VISIT (OUTPATIENT)
Dept: NEUROLOGY | Facility: CLINIC | Age: 69
End: 2024-04-25
Attending: PSYCHIATRY & NEUROLOGY
Payer: MEDICARE

## 2024-04-25 DIAGNOSIS — Z78.9 TAKES DIETARY SUPPLEMENTS: ICD-10-CM

## 2024-04-25 DIAGNOSIS — G20.A1 PARKINSON'S DISEASE, UNSPECIFIED WHETHER DYSKINESIA PRESENT, UNSPECIFIED WHETHER MANIFESTATIONS FLUCTUATE (H): Primary | ICD-10-CM

## 2024-04-25 DIAGNOSIS — E78.5 HYPERLIPIDEMIA, UNSPECIFIED HYPERLIPIDEMIA TYPE: ICD-10-CM

## 2024-04-25 DIAGNOSIS — E11.65 TYPE 2 DIABETES MELLITUS WITH HYPERGLYCEMIA, WITH LONG-TERM CURRENT USE OF INSULIN (H): ICD-10-CM

## 2024-04-25 DIAGNOSIS — I10 BENIGN ESSENTIAL HYPERTENSION: ICD-10-CM

## 2024-04-25 DIAGNOSIS — K21.9 GASTROESOPHAGEAL REFLUX DISEASE, UNSPECIFIED WHETHER ESOPHAGITIS PRESENT: ICD-10-CM

## 2024-04-25 DIAGNOSIS — R10.84 ABDOMINAL PAIN, GENERALIZED: ICD-10-CM

## 2024-04-25 DIAGNOSIS — Z79.4 TYPE 2 DIABETES MELLITUS WITH HYPERGLYCEMIA, WITH LONG-TERM CURRENT USE OF INSULIN (H): ICD-10-CM

## 2024-04-25 DIAGNOSIS — K85.90 ACUTE PANCREATITIS, UNSPECIFIED COMPLICATION STATUS, UNSPECIFIED PANCREATITIS TYPE: ICD-10-CM

## 2024-04-25 NOTE — Clinical Note
4/25/2024       RE: Arnaldo Henderson  [address] [facility], [facility] [address]     Dear Colleague,    Thank you for referring your patient, Arnaldo Henderson, to the [facility] [place] at [facility]. Please see a copy of my visit note below.    Medication Therapy Management (MTM) Encounter    ASSESSMENT:                            Medication Adherence/Access: {adherencechoices:064225}    ***:   ***    PLAN:                            ***  -consider discussing reducing, then stopping, omeprazole, to see if it is necessary  -start checking blood pressure twice daily   -start drinking a glass of water while the coffee is brewing  -if you are having trouble getting back to sleep during the night, get out of bed and do something non-stimulating for 15 min until you feel sleepy again    Send med list    Follow-up: {followuptest2:377807}    SUBJECTIVE/OBJECTIVE:                          Sri Henderson is a 68 year old male contacted via secure video for an initial visit. He was referred to me from Dr. Armenta, Neurology. Patient was accompanied by wife, Veronika.     Reason for visit: med review.    Allergies/ADRs: Reviewed in chart  Past Medical History: Reviewed in chart  Tobacco: He reports that he has quit smoking. His smoking use included cigarettes. He has never used smokeless tobacco.  Alcohol: not currently using  Caffeine: 3 cups of coffee per day and one Coke  Medication Adherence/Access: no issues reported    Diabetes  -insulin glarigine 14 units in the morning, 30 units every evening  -Novolog 5 units +sliding scale, QAM, 8 units +sliding scale with lunch, 10units +sliding scale at dinner, then sliding scale at bedtime  -glucagon, Zegalogue for severe hypoglycemia--never used    Aspirin 81mg daily  Patient is not experiencing side effects. Working closely with Endo--due for A1c that was missed with previous lab appt  Blood sugar monitoring:  "Continuous Glucose Monitor   Current diabetes symptoms: none  Diet/Exercise: plans to start walking more     Eye exam is up to date  Foot exam: up to date  Urine Albumin: No results found for: \"UMALCR\"   Lab Results   Component Value Date    A1C 8.2 (H) 11/16/2023       GERD     Omeprazole 20 mg once daily   Patient feels that current regimen is effective. Reported taking for at least a few years. Symptoms were often occurring at night, but hasn't had any issues for a long time.       Hypertension    -losartan 50mg daily  Patient reports no current medication side effects  Patient self monitors blood pressure.  Sometimes notices orthostatic hypotension and has been hospitalized for such. Had recommended drinking full glass of water first thing in the morning. Working with Cardiology and has been requested to check BP twice daily, though this has been difficult  73/49, then next day 146/83  Other times ranges 110-130s/70-80s     BP Readings from Last 3 Encounters:   03/18/24 (!) 144/85   03/08/24 (!) 157/84   02/28/24 (!) 166/93     Pulse Readings from Last 3 Encounters:   03/18/24 82   03/08/24 75   02/28/24 74       Hyperlipidemia    atorvastatin 40mg daily  Patient reports no significant myalgias or other side effects.  The ASCVD Risk score (Tamy DK, et al., 2019) failed to calculate for the following reasons:    The valid total cholesterol range is 130 to 320 mg/dL       Recent Labs   Lab Test 06/02/21  0831   TRIG 74       Parkinson's Disease:    Medication Dose Timing   6am 10am 2pm 5pm 9pm    Carbidopa/levodopa 25/100 2 2 2 1     Carbidopa/levodopa ER 50/100     1                        Patient last saw Neurology about one month ago, at which time dosing interval was shortened, adding a fourth daytime dose. He has noticed more consistent symptom management throughout the day and far less wearing off, though still happens a couple times per week. Denied much tremor or stiffness. Wearing off described as " "\"everything slows down, my head feels numb.\"   He falls asleep pretty easily around 9:30-10pm. Sometimes (50%) wakes up during the night and may have a hard time getting back to sleep. He doesn't feel stiff, but does have a hard time getting comfortable. He may then wake around 4-5am. About once per week, he will sleep from 9p-5a uninterrupted.  He does think it's been a bit better with recent carbidopa/levodopa dose change.        Abdominal pain:    -gabapentin 600mg TID  Had burning pain in his stomach that has resolved since starting gabapentin from pain provider. No concerns.       Pancreatitis:   -Zenpep 1 cap TID with meals  Chronic treatment. No issues.    Supplements:   -magnesium 400mg every other day  -multivitamin daily  No reported issues at this time.     Today's Vitals: There were no vitals taken for this visit.  ----------------      I spent 60 minutes with this patient today. { :[id number]}. A copy of the visit note was provided to the patient's provider(s).    A summary of these recommendations was sent via clinic portal.    Morenita Delgado PharmD  Medication Therapy Management Pharmacist  [facility]        Telemedicine Visit Details  Type of service:  Video Conference via [employer]  Start Time: ***  End Time: {video/phone visit end time:853316}     Medication Therapy Recommendations  No medication therapy recommendations to display     Medication Therapy Management (MTM) Encounter    ASSESSMENT:                            Medication Adherence/Access: No issues identified    Diabetes   Will schedule lab appointment for updated A1c and follow up with Endocrinology.    GERD    Discuss ongoing need for omeprazole with PCP and consider trial without the medication to determine if still necessary.    Hypertension   Discussed importance of checking blood pressure and encouraged him to start checking twice daily. Reviewed various ideas to make it easier and help remember. " Ultimately decided on keeping the blood pressure cuff by the bed to check in the AM and PM.   Also brainstormed ways to remember to drink water first thing in the morning. He agreed to drink glass of water while the coffee is brewing.  Continue close follow up with Cardiology.    Hyperlipidemia   Stable. Continue current medication.    Parkinson's Disease:    Improved with recent daytime dose increase. Will plan to continue current dosing.   Discussed sleep techniques, such as getting out of bed to do something non-stimulating if he can't get back to sleep after about 15 minutes, which he plans to try.    Abdominal pain:    Stable. Continue current medication.    Pancreatitis:   Stable. Continue current medication.    Supplements:   Stable. Continue current medication.    PLAN:                            -Discuss with your PCP whether you could try off of omeprazole to see if still needed  -start checking blood pressure twice daily. You thought you might try to keep it by the bed.  -start drinking a glass of water while the coffee is brewing every morning  -if you are having trouble getting back to sleep during the night, get out of bed and do something non-stimulating for 15 minutes until you feel sleepy again    Follow-up: 6 months or sooner if needed    SUBJECTIVE/OBJECTIVE:                          Sri Henderson is a 68 year old male contacted via secure video for an initial visit. He was referred to me from Dr. Armenta, Neurology. Patient was accompanied by wife, Veronika.     Reason for visit: med review.    Allergies/ADRs: Reviewed in chart  Past Medical History: Reviewed in chart  Tobacco: He reports that he has quit smoking. His smoking use included cigarettes. He has never used smokeless tobacco.  Alcohol: not currently using  Caffeine: 3 cups of coffee and one Coke per day  Medication Adherence/Access: no issues reported    Diabetes  -insulin glarigine 14 units in the morning, 30 units every evening  -Novolog 5  units +sliding scale QAM, 8 units +sliding scale with lunch, 10units +sliding scale at dinner, then sliding scale at bedtime  -glucagon, Zegalogue for severe hypoglycemia--never used    Aspirin 81mg daily  Patient is not experiencing side effects. Working closely with Endo--due for A1c that was missed with previous lab appt  Blood sugar monitoring: Continuous Glucose Monitor   Current diabetes symptoms: none  Diet/Exercise: plans to start walking more     Eye exam is up to date  Foot exam: up to date  Lab Results   Component Value Date    A1C 8.2 (H) 11/16/2023       GERD     Omeprazole 20 mg once daily   Patient feels that current regimen is effective. Reported taking for at least a few years. Symptoms were often occurring at night, but hasn't had any issues for a long time.       Hypertension    -losartan 50mg daily  Patient reports no current medication side effects  Patient self monitors blood pressure.  Sometimes notices orthostatic hypotension and has been hospitalized for such. Had been recommended to drink full glass of water first thing in the morning, but he hasn't been doing this. Likes to have a warm drink first thing in the morning. Working with Cardiology and has been requested to check BP twice daily, though this has been difficult to maintain. Hasn't checked for awhile. Previous BPs highly variable.  73/49, then next day 146/83  Other times ranges 110-130s/70-80s     BP Readings from Last 3 Encounters:   03/18/24 (!) 144/85   03/08/24 (!) 157/84   02/28/24 (!) 166/93     Pulse Readings from Last 3 Encounters:   03/18/24 82   03/08/24 75   02/28/24 74       Hyperlipidemia    atorvastatin 40mg daily  Patient reports no significant myalgias or other side effects.  The ASCVD Risk score (Tamy DESAI, et al., 2019) failed to calculate for the following reasons:    The valid total cholesterol range is 130 to 320 mg/dL         Parkinson's Disease:    Medication Dose Timing   6am 10am 2pm 5pm 9pm  "  Carbidopa/levodopa 25/100 2 2 2 1    Carbidopa/levodopa ER 50/100     1     Patient last saw Neurology about one month ago, at which time dosing interval was shortened, adding a fourth daytime dose. He has noticed more consistent symptom management throughout the day and far less wearing off, though still happens a couple times per week. Denied much tremor or stiffness. Wearing off described as \"everything slows down, my head feels numb.\"   He falls asleep pretty easily around 9:30-10pm. Sometimes (50%) wakes up during the night and may have a hard time getting back to sleep. He doesn't feel stiff, but does have a hard time getting comfortable. He may then wake around 4-5am, sometimes goes back to bed after a few hours. About once per week, he will sleep from 9p-5a uninterrupted.  He does think it's been a bit better with recent carbidopa/levodopa dose increase.        Abdominal pain:    -gabapentin 600mg TID  Had burning pain in his stomach/abdominal area that has resolved since starting gabapentin from pain provider. No concerns.       Pancreatitis:   -Zenpep 1 cap TID with meals  Chronic treatment. No issues.    Supplements:   -magnesium 400mg every other day  -multivitamin daily  No reported issues at this time.     Today's Vitals: There were no vitals taken for this visit.  ----------------      I spent 60 minutes with this patient today. { :[id number]}. A copy of the visit note was provided to the patient's provider(s).    A summary of these recommendations was sent via clinic portal.    Morenita Delgado, PharmD  Medication Therapy Management Pharmacist  [facility] [place]        Telemedicine Visit Details  Type of service:  Video Conference via [employer]  Start Time: ***  End Time: {video/phone visit end time:368153}     Medication Therapy Recommendations  No medication therapy recommendations to display       Again, thank you for allowing me to participate in the care of your " patient.      Sincerely,    Morenita Delgado, PharmD

## 2024-04-25 NOTE — PROGRESS NOTES
Medication Therapy Management (MTM) Encounter    ASSESSMENT:                            Medication Adherence/Access: No issues identified    Diabetes   Will schedule lab appointment for updated A1c and follow up with Endocrinology.    GERD    Discuss ongoing need for omeprazole with PCP and consider trial without the medication to determine if still necessary.    Hypertension   Discussed importance of checking blood pressure and encouraged him to start checking twice daily. Reviewed various ideas to make it easier and help remember. Ultimately decided on keeping the blood pressure cuff by the bed to check in the AM and PM.   Also brainstormed ways to remember to drink water first thing in the morning. He agreed to drink glass of water while the coffee is brewing.  Continue close follow up with Cardiology.    Hyperlipidemia   Stable. Continue current medication.    Parkinson's Disease:    Improved with recent daytime dose increase. Will plan to continue current dosing.   Discussed sleep techniques, such as getting out of bed to do something non-stimulating if he can't get back to sleep after about 15 minutes, which he plans to try.    Abdominal pain:    Stable. Continue current medication.    Pancreatitis:   Stable. Continue current medication.    Supplements:   Stable. Continue current medication.    PLAN:                            -Discuss with your PCP whether you could try off of omeprazole to see if still needed  -start checking blood pressure twice daily. You thought you might try to keep it by the bed.  -start drinking a glass of water while the coffee is brewing every morning  -if you are having trouble getting back to sleep during the night, get out of bed and do something non-stimulating for 15 minutes until you feel sleepy again    Follow-up: 6 months or sooner if needed    SUBJECTIVE/OBJECTIVE:                          Sri Henderson is a 68 year old male contacted via secure video for an initial visit. He  was referred to me from Dr. Armenta, Neurology. Patient was accompanied by wife, Veronika.     Reason for visit: med review.    Allergies/ADRs: Reviewed in chart  Past Medical History: Reviewed in chart  Tobacco: He reports that he has quit smoking. His smoking use included cigarettes. He has never used smokeless tobacco.  Alcohol: not currently using  Caffeine: 3 cups of coffee and one Coke per day  Medication Adherence/Access: no issues reported    Diabetes   -insulin glarigine 14 units in the morning, 30 units every evening  -Novolog 5 units +sliding scale QAM, 8 units +sliding scale with lunch, 10units +sliding scale at dinner, then sliding scale at bedtime  -glucagon, Zegalogue for severe hypoglycemia--never used    Aspirin 81mg daily  Patient is not experiencing side effects. Working closely with Endo--due for A1c that was missed with previous lab appt  Blood sugar monitoring: Continuous Glucose Monitor   Current diabetes symptoms: none  Diet/Exercise: plans to start walking more     Eye exam is up to date  Foot exam: up to date  Lab Results   Component Value Date    A1C 8.2 (H) 11/16/2023       GERD      Omeprazole 20 mg once daily   Patient feels that current regimen is effective. Reported taking for at least a few years. Symptoms were often occurring at night, but hasn't had any issues for a long time.       Hypertension     -losartan 50mg daily  Patient reports no current medication side effects  Patient self monitors blood pressure.  Sometimes notices orthostatic hypotension and has been hospitalized for such. Had been recommended to drink full glass of water first thing in the morning, but he hasn't been doing this. Likes to have a warm drink first thing in the morning. Working with Cardiology and has been requested to check BP twice daily, though this has been difficult to maintain. Hasn't checked for awhile. Previous BPs highly variable.  73/49, then next day 146/83  Other times ranges 110-130s/70-80s    "  BP Readings from Last 3 Encounters:   03/18/24 (!) 144/85   03/08/24 (!) 157/84   02/28/24 (!) 166/93     Pulse Readings from Last 3 Encounters:   03/18/24 82   03/08/24 75   02/28/24 74       Hyperlipidemia     atorvastatin 40mg daily  Patient reports no significant myalgias or other side effects.  The ASCVD Risk score (Tamy DESAI, et al., 2019) failed to calculate for the following reasons:    The valid total cholesterol range is 130 to 320 mg/dL         Parkinson's Disease:    Medication Dose Timing   6am 10am 2pm 5pm 9pm   Carbidopa/levodopa 25/100 2 2 2 1    Carbidopa/levodopa ER 50/100     1     Patient last saw Neurology about one month ago, at which time dosing interval was shortened, adding a fourth daytime dose. He has noticed more consistent symptom management throughout the day and far less wearing off, though still happens a couple times per week. Denied much tremor or stiffness. Wearing off described as \"everything slows down, my head feels numb.\"   He falls asleep pretty easily around 9:30-10pm. Sometimes (50%) wakes up during the night and may have a hard time getting back to sleep. He doesn't feel stiff, but does have a hard time getting comfortable. He may then wake around 4-5am, sometimes goes back to bed after a few hours. About once per week, he will sleep from 9p-5a uninterrupted.  He does think it's been a bit better with recent carbidopa/levodopa dose increase.        Abdominal pain:    -gabapentin 600mg TID  Had burning pain in his stomach/abdominal area that has resolved since starting gabapentin from pain provider. No concerns.       Pancreatitis:   -Zenpep 1 cap TID with meals  Chronic treatment. No issues.    Supplements:   -magnesium 400mg every other day  -multivitamin daily  No reported issues at this time.     ----------------    I spent 50 minutes with this patient today. A copy of the visit note was provided to the patient's provider(s).    A summary of these recommendations was " sent via clinic portal.    Morenita Delgado PharmD  Medication Therapy Management Pharmacist  [facility]    Telemedicine Visit Details  Type of service:  Video Conference via [employer]  Start Time:  10:00am  End Time:  10:50am     Medication Therapy Recommendations  No medication therapy recommendations to display

## 2024-04-26 NOTE — PATIENT INSTRUCTIONS
"Recommendations from today's MTM visit:                                                    MTM (medication therapy management) is a service provided by a clinical pharmacist designed to help you get the most of out of your medicines.   Today we reviewed what your medicines are for, how to know if they are working, that your medicines are safe and how to make your medicine regimen as easy as possible.      -Discuss with your PCP whether you could try off of omeprazole to see if still needed  -start checking blood pressure twice daily. You thought you might try to keep it by the bed.  -start drinking a glass of water while the coffee is brewing every morning  -if you are having trouble getting back to sleep during the night, get out of bed and do something non-stimulating for 15 minutes until you feel sleepy again    Follow-up: 6 months or sooner if needed    It was great speaking with you today.  I value your experience and would be very thankful for your time in providing feedback in our clinic survey. In the next few days, you may receive an email or text message from [employer] with a link to a survey related to your  clinical pharmacist.\"     To schedule another MTM appointment, please call the clinic directly or you may call the MTM scheduling line at [phone].    My Clinical Pharmacist's contact information:                                                      Please feel free to contact me with any questions or concerns you have.      Morenita Delgado, PharmD  Medication Therapy Management Pharmacist  [facility]    "

## 2024-05-02 ENCOUNTER — MYC MEDICAL ADVICE (OUTPATIENT)
Dept: CARDIOLOGY | Facility: CLINIC | Age: 69
End: 2024-05-02
Payer: MEDICARE

## 2024-05-02 NOTE — TELEPHONE ENCOUNTER
Patient returned message with update blood pressure reading.      BP Readings from Last 3 Encounters:   03/18/24 (!) 144/85   03/08/24 (!) 157/84   02/28/24 (!) 166/93     Today's Blood Pressure: 130/66    Location: Home BP    Patient reported blood pressure updated in Epic. Blood pressure falls within [place] Community Measures guidelines.  Patient will follow up as previously advised.      *RN please review BP's listed on attachment.    NITA Cedeño

## 2024-05-06 NOTE — TELEPHONE ENCOUNTER
Called and updated patient. Patient reports no concerns at this time. Patient will update clinic if he starts feeling lightheaded or dizzy.    Olamide Flores RN, BSN  Cardiology RN Care Coordinator   [place]   Phone: [phone]  Fax: [phone] ([place]) [phone] ([place])

## 2024-05-07 ENCOUNTER — OFFICE VISIT (OUTPATIENT)
Dept: ENDOCRINOLOGY | Facility: CLINIC | Age: 69
End: 2024-05-07
Payer: MEDICARE

## 2024-05-07 VITALS
BODY MASS INDEX: 31.96 KG/M2 | DIASTOLIC BLOOD PRESSURE: 75 MMHG | SYSTOLIC BLOOD PRESSURE: 111 MMHG | HEART RATE: 95 BPM | WEIGHT: 210.2 LBS | OXYGEN SATURATION: 96 %

## 2024-05-07 DIAGNOSIS — Z79.4 TYPE 2 DIABETES MELLITUS WITH HYPERGLYCEMIA, WITH LONG-TERM CURRENT USE OF INSULIN (H): Primary | ICD-10-CM

## 2024-05-07 DIAGNOSIS — E11.65 TYPE 2 DIABETES MELLITUS WITH HYPERGLYCEMIA, WITH LONG-TERM CURRENT USE OF INSULIN (H): Primary | ICD-10-CM

## 2024-05-07 PROCEDURE — 95251 CONT GLUC MNTR ANALYSIS I&R: CPT | Performed by: INTERNAL MEDICINE

## 2024-05-07 PROCEDURE — 99214 OFFICE O/P EST MOD 30 MIN: CPT | Mod: 24 | Performed by: INTERNAL MEDICINE

## 2024-05-07 RX ORDER — ACYCLOVIR 400 MG/1
TABLET ORAL
Qty: 9 EACH | Refills: 3 | Status: SHIPPED | OUTPATIENT
Start: 2024-05-07 | End: 2024-05-13

## 2024-05-07 RX ORDER — INSULIN GLARGINE 100 [IU]/ML
INJECTION, SOLUTION SUBCUTANEOUS
Qty: 50 ML | Refills: 1 | Status: SHIPPED | OUTPATIENT
Start: 2024-05-07

## 2024-05-07 NOTE — PROGRESS NOTES
Endocrinology Clinic Visit     Arnaldo Henderson is a 68 year old male with CAD s/p CABG, 2nd degree AV block, DM, GERD, HTN, h/o chronic pancreatitis s/p distal pancreatectomy, neuroendocrine tumor and PD who is here for diabetes management.       Interval History  Pt met with my colleague Catherine Gomez 3/2024,  increase Novolog 10 units with lunch ( was 8 units ) and increase 12 units with dinner ( was 10 units ).   Continue Basaglar 14 units subcutaneous each am and Basaglar 30 units subcutaneous at hs.     Pt decreased Novolog to 5 unit(s) with breakfast, 8 unit(s) with lunch and 10 unit(s) with supper  Basaglar 14 unit(s) AM and 30 unit(s) PM    CGM 7 days data Ave glucose 183 TIR 55% and low <1%    No concerns today    Initial visit 1/10/24    Per Dr. Lyn note 11/27/2023  He is 68 years old.  His tumor was found incidentally when he was undergoing a pancreatectomy for chronic pancreatitis.    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  He has small volume mildly progressive disease within the abdomen without any clearly associated symptoms.   For now his rate of growth and very small volume disease does not warrant active intervention. We will plan on a follow-up scan in 3 to 4 months.     Per Inpatient DM team note 7/2023  Arnaldo Henderson 69 y/o with PMH chronic pancreatitis, CAD, CABG, 2nd degree AVB, Parkinson's disease and chronic pain. He is now s/p distal pancreatectomy, splenectomy, YVAN, proximal ileum resection. He is admitted to the SICU for hemodynamic monitoring.  # T2DM not at goal with hyperglycemia.  Poor control prior to admission   # Stress induced hyperglycemia  # post pancreatic surgery  Plan:                  -Lantus 15 units                 -Novolog 1u/10 gm CHO coverage with meals and snacks                 -Continue insulin drip for now.                 -BG monitoring as per insulin drip protocol                 -hypoglycemia protocol                  -recommend diet with carb counting protocol                 -diabetes education needs will be assessed closer to discharge                 -on discharge, will recommend outpatient follow up with [facility] Endocrinology service     Per most recent endocrine clinic visit with my colleague Hayley Alatorre PAC 1/2024   TYPE 2 DIABETES MELLITUS:   Sugars are currently at goal but with excessive hypoglycemia.  Advising him to reduce his Basaglar dose to 14 units in the morning 30 in the evening nearly 10% reduction.  Also advising him to continue his breakfast dose of 4 units but decrease lunch dose to just 7 units and dinner dose to 10 units plus any correction.  Will continue current correction.   5.  Neuroendocrine tumor: See Dr. Gail mcarthur next week    I confirmed with patient and wife and they confirmed that Pat is seeing me today for diabetes.  They would like to have a doctor on the team also with our diabetes team at the .    Prior to admission, he was on metformin 2000 mg/day, no side effect  Pt and wife reported seeing doctors for diabetes once during inpatient.  Report that about 60% of his pancreas was removed  Pt follows neuroendocrine tumor with Dr. Lyn no concerns.    Patient's main concerns today are his diabetes control after steroid injection and his recent lab results.  11/21/2023  Steroid injection, glucose was high   He may need another injection in the future and is concerned.  They are wondering about high BUN, previous abnormal LFT, low hematocrit and abnormal RBC    In terms of symptoms, he said PD is bothersome  But no problem taking insulin  Feels like hypoglycemia improve since lower insulin dose last week    Pt only has [employer] alarm set for urgent low.  Sometimes he could not feel low glucose.  He recently bought glucose tablets to help with lows    Current regimen  Basaglar 14 units AM 30 units hs  Novolog 4 units with breakfast + sliding scale insulin, lunch 7 + sliding scale insulin,  supper 10 + sliding scale insulin, night sliding scale insulin    CGM 1 week data  TIR 49%, low 4%, very low 1%         REVIEW OF SYSTEMS  10 point negative except as mentioned in HPI    Past Medical/Surgical History:  Past Medical History:   Diagnosis Date    CAD (coronary artery disease)     CABG    Diabetes mellitus, type 2 (H) 2013    Family history of parkinsonism 02/14/2024    Gastroesophageal reflux disease     Hypercholesteremia     Hypertension     Mixed conductive and sensorineural hearing loss of both ears     Mixed hearing loss     Pancreatic duct stricture     Pancreatitis     Parkinson disease     Parkinson's disease, unspecified whether dyskinesia present, unspecified whether manifestations fluctuate 02/14/2024    Seborrheic dermatitis 03/22/2024    Second degree AV block      Past Surgical History:   Procedure Laterality Date    CA ANESTH CABG W/PUMP      CHOLECYSTECTOMY  2022    COLONOSCOPY N/A 01/12/2023    Procedure: colonoscopy with fluroscopy;  Surgeon: Ayden Fraire MD;  Location:  OR    ENDOSCOPIC RETROGRADE CHOLANGIOPANCREATOGRAM N/A 05/18/2023    Procedure: ENDOSCOPIC RETROGRADE CHOLANGIOPANCREATOGRAPHY, WITH  CYSTDUODENOSTOMY STENTS X2 REMOVAL;  Surgeon: Cedric Saldana MD;  Location: [facility] OR    ENT SURGERY      EP PACEMAKER DEVICE & LEAD IMPLANT- RIGHT ATRIAL & RIGHT VENTRICULAR N/A 02/28/2024    Procedure: Pacemaker Device & Lead Implant - Right Atrial & Right Ventricular;  Surgeon: Jenna Hernandez MD;  Location:  HEART CARDIAC CATH LAB    LAPAROSCOPY DIAGNOSTIC (GENERAL) N/A 02/20/2023    Procedure: LAPAROSCOPY, DIAGNOSTIC; RETRIEVAL OF MIGRATED STENT;  Surgeon: Joseluis Lima MD;  Location: [facility] OR    ORTHOPEDIC SURGERY      OTHER SURGICAL HISTORY      skin cancer ? from scrotum    PANCREATECTOMY [place] N/A 06/30/2023    Procedure: Open distal pancreactectomy with splenectomy, lysis of adhesions, resection of proximal illeum;  Surgeon: Ashvin Haider MD;  Location:  [facility] OR    [place] CABG, ARTERIAL, TWO  2003       Medications  Current Outpatient Medications   Medication Sig Dispense Refill    aspirin (ASA) 81 MG EC tablet Take 81 mg by mouth daily      atorvastatin (LIPITOR) 40 MG tablet Take 1 tablet by mouth daily      BD PEN NEEDLE LINDY 2ND GEN 32G X 4 MM miscellaneous USE TO INJECT INSULIN 4 TIMES A  each 3    carbidopa-levodopa (SINEMET CR)  MG CR tablet Take 1 tablet by mouth at bedtime @9p      carbidopa-levodopa (SINEMET)  MG tablet 2@6a, 2@10a, 2@2p and 1@5p 630 tablet 3    Continuous Blood Gluc  (DEXCOM G7 ) ALIA Use to read blood sugars as per 's instructions. 1 each 0    Continuous Glucose Sensor (DEXCOM G7 SENSOR) MISC Change every 10 days. 9 each 3    dasiglucagon HCl (ZEGALOGUE) 0.6 MG/0.6ML SOAJ injection Inject 0.6 mg Subcutaneous once as needed (use for severe hypoglycemia) 1.2 mL 1    gabapentin (NEURONTIN) 300 MG capsule Take 600 mg by mouth 3 times daily      Glucagon (BAQSIMI) 3 MG/DOSE nasal powder Spray 1 spray (3 mg) in nostril as needed (severe hypoglycemia) in the event of unconscious hypoglycemia or hypoglycemic seizure. May repeat dose if no response after 15 minutes. 2 each 1    insulin aspart (NOVOLOG FLEXPEN) 100 UNIT/ML pen Inject 5-12 Units Subcutaneous 3 times daily (with meals) Take 5 units with breakfast, 10 unit(s) with lunch and 12 unit(s) with supper 40 mL 3    insulin aspart (NOVOLOG FLEXPEN) 100 UNIT/ML pen Inject subcutaneous per sliding scale three times daily with meals:  190-239 = 1 unit  240-289 = 2 units  290-339 = 3 units  >340 = 4 units   And Inject subcutaneously at bedtime per sliding scale:  200-249 = 1 unit  250-300 = 2 units   >300 = 3 units      insulin glargine 100 UNIT/ML pen Take 14 unit(s) AM and 30 unit(s) HS, and adjust as needed upto 50 unit(s) /day 50 mL 1    lipase-protease-amylase (ZENPEP) 81398-049613-110768 units CPEP Take 1 capsule by mouth 3 times daily (with meals)  "90 capsule 11    losartan (COZAAR) 50 MG tablet Take 1 tablet (50 mg) by mouth daily      magnesium oxide (MAG-OX) 400 MG tablet Take 400 mg by mouth every other day      Multiple Vitamin (MULTI-VITAMINS) TABS Take 1 tablet by mouth daily      omeprazole (PRILOSEC) 20 MG DR capsule Take 20 mg by mouth daily       No current facility-administered medications for this visit.       Allergies  Allergies   Allergen Reactions    Ciprofloxacin Other (See Comments), Hives, Itching, Rash and Unknown     Other reaction(s): Other (see comments)  Oral swelling per [facility]        Dilaudid [Hydromorphone] Rash    Morphine Rash     rash    Penicillins Rash     aka ancef/ rash      Citalopram Unknown    Clindamycin Itching and Rash    Oxycodone Rash     \"HORRIBLE, SEVERE RASH MAYBE CAUSED BY OXY\"         Family History  family history includes Autism Spectrum Disorder in his grandson; Heart Disease in his son; Lung Cancer in his brother; Other - See Comments in his daughter, father, mother, sister, sister, and son; Parkinsonism (age of onset: 65) in his mother; Psychosis in his mother.    Social History  Social History     Tobacco Use    Smoking status: Former     Types: Cigarettes    Smokeless tobacco: Never    Tobacco comments:     Quit 10 years ago - quit a few times; started at age 12 or 13 and then stopped at 26 years and then stopped for 15 years got  and started smoking again   Substance Use Topics    Alcohol use: Not Currently     Comment: quit 2 years ago       Physical Exam  /75   Pulse 95   Wt 95.3 kg (210 lb 3.2 oz)   SpO2 96%   BMI 31.96 kg/m    Body mass index is 31.96 kg/m .  GENERAL : Frail, no acute distress, hearing aid in place  NEURO: awake, alert, responds appropriately to questions.      DATA REVIEW  Labs/Imaging  Lab Results   Component Value Date    A1C 8.2 11/16/2023    A1C 9.6 06/30/2023       Latest Reference Range & Units 02/15/24 07:36   Glucose 70 - 99 mg/dL 87      Latest " Reference Range & Units 02/15/24 07:36   C-Peptide 0.9 - 6.9 ng/mL <0.1 (L)   (L): Data is abnormally low    ASSESSMENT/PLAN:   Arnaldo Henderson is a 68 year old male with CAD s/p CABG, 2nd degree AV block, DM, GERD, HTN, h/o chronic pancreatitis s/p distal pancreatectomy, neuroendocrine tumor and PD who is here for diabetes management.     ## T2DM  ## H/o chronic pancreatitis  ## Partial pancreatectomy (report 60%)  ## CAD s/p CABG  ## Hypoglycemia unawareness  ## H/o Grade 2 hypoglycemia  ## Cannot tolerate metformin  goal A1c 8  CGM Time in target () >50%, and low (<70) <1%  Currently at goal  Unable to use GLP1 agonist given h/o pancreatitis  Low C-peptide, hold off SGLT2i    -- labs today  -- continue current insulin      Follow up 3-4 months    Orders Placed This Encounter   Procedures    Hemoglobin A1c    Albumin Random Urine Quantitative with Creat Ratio    TSH with free T4 reflex            Mj Reynolds MD    02/23/24   Hi Sri     I reviewed CGM data from this week.  Still low glucose about 4%, mostly during the day.  Recommend decrease Novolog from 6/10/10 with meals to 5/8/10 with meals.

## 2024-05-07 NOTE — PATIENT INSTRUCTIONS
## T2DM  Currently at goal  Goal is time in range 50% and low <1%  -- labs today  -- continue current insulin      Follow up 3-4 months

## 2024-05-07 NOTE — LETTER
5/7/2024         RE: Arnaldo Henderson  [address] [place] [address]        Dear Colleague,    Thank you for referring your patient, Arnaldo Henderson, to the [facility]. Please see a copy of my visit note below.        Endocrinology Clinic Visit     Arnaldo Henderson is a 68 year old male with CAD s/p CABG, 2nd degree AV block, DM, GERD, HTN, h/o chronic pancreatitis s/p distal pancreatectomy, neuroendocrine tumor and PD who is here for diabetes management.       Interval History  Pt met with my colleague Catherine Gomez 3/2024,  increase Novolog 10 units with lunch ( was 8 units ) and increase 12 units with dinner ( was 10 units ).   Continue Basaglar 14 units subcutaneous each am and Basaglar 30 units subcutaneous at hs.     Pt decreased Novolog to 5 unit(s) with breakfast, 8 unit(s) with lunch and 10 unit(s) with supper  Basaglar 14 unit(s) AM and 30 unit(s) PM    CGM 7 days data Ave glucose 183 TIR 55% and low <1%    No concerns today    Initial visit 1/10/24    Per Dr. Lyn note 11/27/2023  He is 68 years old.  His tumor was found incidentally when he was undergoing a pancreatectomy for chronic pancreatitis.    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  He has small volume mildly progressive disease within the abdomen without any clearly associated symptoms.   For now his rate of growth and very small volume disease does not warrant active intervention. We will plan on a follow-up scan in 3 to 4 months.     Per Inpatient DM team note 7/2023  Arnaldo Henderson 69 y/o with PMH chronic pancreatitis, CAD, CABG, 2nd degree AVB, Parkinson's disease and chronic pain. He is now s/p distal pancreatectomy, splenectomy, YVAN, proximal ileum resection. He is admitted to the SICU for hemodynamic monitoring.  # T2DM not at goal with hyperglycemia.  Poor control prior to admission   # Stress induced hyperglycemia  # post pancreatic surgery  Plan:                   -Lantus 15 units                 -Novolog 1u/10 gm CHO coverage with meals and snacks                 -Continue insulin drip for now.                 -BG monitoring as per insulin drip protocol                 -hypoglycemia protocol                 -recommend diet with carb counting protocol                 -diabetes education needs will be assessed closer to discharge                 -on discharge, will recommend outpatient follow up with [facility] Endocrinology service     Per most recent endocrine clinic visit with my colleague Hayley Churchillar PAC 1/2024   TYPE 2 DIABETES MELLITUS:   Sugars are currently at goal but with excessive hypoglycemia.  Advising him to reduce his Basaglar dose to 14 units in the morning 30 in the evening nearly 10% reduction.  Also advising him to continue his breakfast dose of 4 units but decrease lunch dose to just 7 units and dinner dose to 10 units plus any correction.  Will continue current correction.   5.  Neuroendocrine tumor: See Dr. Gail mcarthur next week    I confirmed with patient and wife and they confirmed that Pat is seeing me today for diabetes.  They would like to have a doctor on the team also with our diabetes team at the .    Prior to admission, he was on metformin 2000 mg/day, no side effect  Pt and wife reported seeing doctors for diabetes once during inpatient.  Report that about 60% of his pancreas was removed  Pt follows neuroendocrine tumor with Dr. Lyn no concerns.    Patient's main concerns today are his diabetes control after steroid injection and his recent lab results.  11/21/2023  Steroid injection, glucose was high   He may need another injection in the future and is concerned.  They are wondering about high BUN, previous abnormal LFT, low hematocrit and abnormal RBC    In terms of symptoms, he said PD is bothersome  But no problem taking insulin  Feels like hypoglycemia improve since lower insulin dose last week    Pt only has dexcom  alarm set for urgent low.  Sometimes he could not feel low glucose.  He recently bought glucose tablets to help with lows    Current regimen  Basaglar 14 units AM 30 units hs  Novolog 4 units with breakfast + sliding scale insulin, lunch 7 + sliding scale insulin, supper 10 + sliding scale insulin, night sliding scale insulin    CGM 1 week data  TIR 49%, low 4%, very low 1%         REVIEW OF SYSTEMS  10 point negative except as mentioned in HPI    Past Medical/Surgical History:  Past Medical History:   Diagnosis Date     CAD (coronary artery disease)     CABG     Diabetes mellitus, type 2 (H) 2013     Family history of parkinsonism 02/14/2024     Gastroesophageal reflux disease      Hypercholesteremia      Hypertension      Mixed conductive and sensorineural hearing loss of both ears      Mixed hearing loss      Pancreatic duct stricture      Pancreatitis      Parkinson disease      Parkinson's disease, unspecified whether dyskinesia present, unspecified whether manifestations fluctuate 02/14/2024     Seborrheic dermatitis 03/22/2024     Second degree AV block      Past Surgical History:   Procedure Laterality Date     CA ANESTH CABG W/PUMP       CHOLECYSTECTOMY  2022     COLONOSCOPY N/A 01/12/2023    Procedure: colonoscopy with fluroscopy;  Surgeon: Ayden Fraire MD;  Location:  OR     ENDOSCOPIC RETROGRADE CHOLANGIOPANCREATOGRAM N/A 05/18/2023    Procedure: ENDOSCOPIC RETROGRADE CHOLANGIOPANCREATOGRAPHY, WITH  CYSTDUODENOSTOMY STENTS X2 REMOVAL;  Surgeon: Cedric Saldana MD;  Location:  OR     ENT SURGERY       EP PACEMAKER DEVICE & LEAD IMPLANT- RIGHT ATRIAL & RIGHT VENTRICULAR N/A 02/28/2024    Procedure: Pacemaker Device & Lead Implant - Right Atrial & Right Ventricular;  Surgeon: Jenna Hernandez MD;  Location:  HEART CARDIAC CATH LAB     LAPAROSCOPY DIAGNOSTIC (GENERAL) N/A 02/20/2023    Procedure: LAPAROSCOPY, DIAGNOSTIC; RETRIEVAL OF MIGRATED STENT;  Surgeon: Joseluis Lima MD;  Location:   OR     ORTHOPEDIC SURGERY       OTHER SURGICAL HISTORY      skin cancer ? from scrotum     PANCREATECTOMY CIRO N/A 06/30/2023    Procedure: Open distal pancreactectomy with splenectomy, lysis of adhesions, resection of proximal illeum;  Surgeon: Ashvin Haider MD;  Location: [facility] OR     [place] CABG, ARTERIAL, TWO  2003       Medications  Current Outpatient Medications   Medication Sig Dispense Refill     aspirin (ASA) 81 MG EC tablet Take 81 mg by mouth daily       atorvastatin (LIPITOR) 40 MG tablet Take 1 tablet by mouth daily       BD PEN NEEDLE LINDY 2ND GEN 32G X 4 MM miscellaneous USE TO INJECT INSULIN 4 TIMES A  each 3     carbidopa-levodopa (SINEMET CR)  MG CR tablet Take 1 tablet by mouth at bedtime @9p       carbidopa-levodopa (SINEMET)  MG tablet 2@6a, 2@10a, 2@2p and 1@5p 630 tablet 3     Continuous Blood Gluc  (DEXCOM G7 ) ALIA Use to read blood sugars as per 's instructions. 1 each 0     Continuous Glucose Sensor (DEXCOM G7 SENSOR) MISC Change every 10 days. 9 each 3     dasiglucagon HCl (ZEGALOGUE) 0.6 MG/0.6ML SOAJ injection Inject 0.6 mg Subcutaneous once as needed (use for severe hypoglycemia) 1.2 mL 1     gabapentin (NEURONTIN) 300 MG capsule Take 600 mg by mouth 3 times daily       Glucagon (BAQSIMI) 3 MG/DOSE nasal powder Spray 1 spray (3 mg) in nostril as needed (severe hypoglycemia) in the event of unconscious hypoglycemia or hypoglycemic seizure. May repeat dose if no response after 15 minutes. 2 each 1     insulin aspart (NOVOLOG FLEXPEN) 100 UNIT/ML pen Inject 5-12 Units Subcutaneous 3 times daily (with meals) Take 5 units with breakfast, 10 unit(s) with lunch and 12 unit(s) with supper 40 mL 3     insulin aspart (NOVOLOG FLEXPEN) 100 UNIT/ML pen Inject subcutaneous per sliding scale three times daily with meals:  190-239 = 1 unit  240-289 = 2 units  290-339 = 3 units  >340 = 4 units   And Inject subcutaneously at bedtime per sliding  "scale:  200-249 = 1 unit  250-300 = 2 units   >300 = 3 units       insulin glargine 100 UNIT/ML pen Take 14 unit(s) AM and 30 unit(s) HS, and adjust as needed upto 50 unit(s) /day 50 mL 1     lipase-protease-amylase (ZENPEP) [id number] units CPEP Take 1 capsule by mouth 3 times daily (with meals) 90 capsule 11     losartan (COZAAR) 50 MG tablet Take 1 tablet (50 mg) by mouth daily       magnesium oxide (MAG-OX) 400 MG tablet Take 400 mg by mouth every other day       Multiple Vitamin (MULTI-VITAMINS) TABS Take 1 tablet by mouth daily       omeprazole (PRILOSEC) 20 MG DR capsule Take 20 mg by mouth daily       No current facility-administered medications for this visit.       Allergies  Allergies   Allergen Reactions     Ciprofloxacin Other (See Comments), Hives, Itching, Rash and Unknown     Other reaction(s): Other (see comments)  Oral swelling per [facility]         Dilaudid [Hydromorphone] Rash     Morphine Rash     rash     Penicillins Rash     aka ancef/ rash       Citalopram Unknown     Clindamycin Itching and Rash     Oxycodone Rash     \"HORRIBLE, SEVERE RASH MAYBE CAUSED BY OXY\"         Family History  family history includes Autism Spectrum Disorder in his grandson; Heart Disease in his son; Lung Cancer in his brother; Other - See Comments in his daughter, father, mother, sister, sister, and son; Parkinsonism (age of onset: 65) in his mother; Psychosis in his mother.    Social History  Social History     Tobacco Use     Smoking status: Former     Types: Cigarettes     Smokeless tobacco: Never     Tobacco comments:     Quit 10 years ago - quit a few times; started at age 12 or 13 and then stopped at 26 years and then stopped for 15 years got  and started smoking again   Substance Use Topics     Alcohol use: Not Currently     Comment: quit 2 years ago       Physical Exam  /75   Pulse 95   Wt 95.3 kg (210 lb 3.2 oz)   SpO2 96%   BMI 31.96 kg/m    Body mass index is 31.96 " kg/m .  GENERAL : Frail, no acute distress, hearing aid in place  NEURO: awake, alert, responds appropriately to questions.      DATA REVIEW  Labs/Imaging  Lab Results   Component Value Date    A1C 8.2 11/16/2023    A1C 9.6 06/30/2023       Latest Reference Range & Units 02/15/24 07:36   Glucose 70 - 99 mg/dL 87      Latest Reference Range & Units 02/15/24 07:36   C-Peptide 0.9 - 6.9 ng/mL <0.1 (L)   (L): Data is abnormally low    ASSESSMENT/PLAN:   Arnaldo Henderson is a 68 year old male with CAD s/p CABG, 2nd degree AV block, DM, GERD, HTN, h/o chronic pancreatitis s/p distal pancreatectomy, neuroendocrine tumor and PD who is here for diabetes management.     ## T2DM  ## H/o chronic pancreatitis  ## Partial pancreatectomy (report 60%)  ## CAD s/p CABG  ## Hypoglycemia unawareness  ## H/o Grade 2 hypoglycemia  ## Cannot tolerate metformin  goal A1c 8  CGM Time in target () >50%, and low (<70) <1%  Currently at goal  Unable to use GLP1 agonist given h/o pancreatitis  Low C-peptide, hold off SGLT2i    -- labs today  -- continue current insulin      Follow up 3-4 months    Orders Placed This Encounter   Procedures     Hemoglobin A1c     Albumin Random Urine Quantitative with Creat Ratio     TSH with free T4 reflex            Mj Reynolds MD    02/23/24   Hi Pat,     I reviewed CGM data from this week.  Still low glucose about 4%, mostly during the day.  Recommend decrease Novolog from 6/10/10 with meals to 5/8/10 with meals.        ## T2DM  ## H/o chronic pancreatitis  ## Partial pancreatectomy (report 60%)  ## CAD s/p CABG  ## Hypoglycemia unawareness  ## Grade 2 hypoglycemia  ## Cannot tolerate metformin  -- continue Basaglar 14 unit(s) AM and 30 unit(s) PM [for procedure decrease to 24 unit(s) PM and 10 unit(s) AM the day of procedure]  -- increase Novolog with meals to 6/10/10 unit(s)   -- continue sliding scale insulin   -- fasting blood work  -- CDE for possible insulin pump (Medtronic 780G and  Ilet-betabionic)  -- pending patient assistance glucagon  -- try glucose gel for hypoglycemia      Follow up 4-6 weeks      Again, thank you for allowing me to participate in the care of your patient.        Sincerely,        Mj Reynolds MD

## 2024-05-09 ENCOUNTER — LAB (OUTPATIENT)
Dept: LAB | Facility: CLINIC | Age: 69
End: 2024-05-09
Payer: MEDICARE

## 2024-05-09 DIAGNOSIS — C7A.8 NEUROENDOCRINE CARCINOMA OF SMALL BOWEL (H): Primary | ICD-10-CM

## 2024-05-09 DIAGNOSIS — E11.65 TYPE 2 DIABETES MELLITUS WITH HYPERGLYCEMIA, WITH LONG-TERM CURRENT USE OF INSULIN (H): ICD-10-CM

## 2024-05-09 DIAGNOSIS — C77.2 MALIGNANT NEOPLASM METASTATIC TO INTRA-ABDOMINAL LYMPH NODE (H): ICD-10-CM

## 2024-05-09 DIAGNOSIS — Z79.4 TYPE 2 DIABETES MELLITUS WITH HYPERGLYCEMIA, WITH LONG-TERM CURRENT USE OF INSULIN (H): ICD-10-CM

## 2024-05-09 LAB
CREAT UR-MCNC: 50.6 MG/DL
HBA1C MFR BLD: 7.2 % (ref 0–5.6)
HOLD SPECIMEN: NORMAL
MICROALBUMIN UR-MCNC: <12 MG/L
MICROALBUMIN/CREAT UR: NORMAL MG/G{CREAT}
TSH SERPL DL<=0.005 MIU/L-ACNC: 1.46 UIU/ML (ref 0.3–4.2)

## 2024-05-09 PROCEDURE — 36415 COLL VENOUS BLD VENIPUNCTURE: CPT

## 2024-05-09 PROCEDURE — 83036 HEMOGLOBIN GLYCOSYLATED A1C: CPT

## 2024-05-09 PROCEDURE — 84443 ASSAY THYROID STIM HORMONE: CPT

## 2024-05-09 PROCEDURE — 82043 UR ALBUMIN QUANTITATIVE: CPT

## 2024-05-09 PROCEDURE — 82570 ASSAY OF URINE CREATININE: CPT

## 2024-05-09 NOTE — RESULT ENCOUNTER NOTE
Hello -    Here are my comments about the recent results. A1c improve from 8.2 to 7.2. Congratulations!    Please let us know if you have any questions or concerns.    Regards,  Mj Reynolds MD

## 2024-05-13 ENCOUNTER — TELEPHONE (OUTPATIENT)
Dept: ENDOCRINOLOGY | Facility: CLINIC | Age: 69
End: 2024-05-13
Payer: MEDICARE

## 2024-05-13 DIAGNOSIS — Z79.4 TYPE 2 DIABETES MELLITUS WITH HYPERGLYCEMIA, WITH LONG-TERM CURRENT USE OF INSULIN (H): ICD-10-CM

## 2024-05-13 DIAGNOSIS — E11.65 TYPE 2 DIABETES MELLITUS WITH HYPERGLYCEMIA, WITH LONG-TERM CURRENT USE OF INSULIN (H): ICD-10-CM

## 2024-05-13 RX ORDER — ACYCLOVIR 400 MG/1
TABLET ORAL
Qty: 9 EACH | Refills: 3 | Status: SHIPPED | OUTPATIENT
Start: 2024-05-13 | End: 2024-08-07

## 2024-05-13 NOTE — RESULT ENCOUNTER NOTE
Hello -    Here are my comments about the recent results: A1c improve and at goal <8.0.  Thyroid function test is normal.  Urine exam normal, no evidence of diabetes involves kidney.    Regards,   Mj Reynolds MD

## 2024-05-13 NOTE — TELEPHONE ENCOUNTER
At time of OV pt was asked for pharmacy if anything needed to be sent [facility] stated [facility]   Will re send to

## 2024-05-13 NOTE — TELEPHONE ENCOUNTER
Health Call Center    Phone Message    May a detailed message be left on voicemail: yes     Reason for Call: Medication Question or concern regarding medication   Prescription Clarification  Name of Medication: Continuous Glucose Sensor (DEXCOM G7 SENSOR) [facility]   Prescribing Provider: Dr. Reynolds   Pharmacy:   [place], [place] - [address] [place] [address] [phone]      What on the order needs clarification? Patient and his wife called stating the [facility] pharmacy told them the prescription was cancelled. Writer noticed the prescription was sent to [facility] in [place] on 05/07/2024 and patient doesn't know why because they've been going through [facility] pharmacy for this. The prescription needs to be sent to [facility] pharmacy and cancelled at [facility] pharmacy. Please call patient back to discuss further.

## 2024-05-20 ENCOUNTER — MYC MEDICAL ADVICE (OUTPATIENT)
Dept: CARDIOLOGY | Facility: CLINIC | Age: 69
End: 2024-05-20
Payer: MEDICARE

## 2024-05-22 NOTE — TELEPHONE ENCOUNTER
Called and relayed information to patient and patient's spouse.    Olamide Flores, RN, BSN  Cardiology RN Care Coordinator   [place]   Phone: [phone]  Fax: [phone] ([place]) [phone] ([place])

## 2024-05-29 ENCOUNTER — MYC MEDICAL ADVICE (OUTPATIENT)
Dept: ONCOLOGY | Facility: CLINIC | Age: 69
End: 2024-05-29
Payer: MEDICARE

## 2024-06-03 NOTE — PROGRESS NOTES
ELECTROPHYSIOLOGY CLINIC VISIT    Assessment/Recommendations   Assessment/Plan:    Arnaldo Henderson is a 68 year old male with past medical history significant for chronic pancreatitis s/p distal pancreatectomy, type II DM, CAD s/p CABGx2 (2003), parkinson's disease and neuroendocrine tumor.     Paroxysmal complete AV block s/p PPM  Underwent dual chamber PPM 2/28/24. Device check today with normal device function, stable lead trends, AP 9.7%,  98.4%, intrinsic rhythm 2:1 AVB. Reprogrammed to DDDR since  increased to 98%. Incision site well healed.    - Echo prior to follow up d/t high  percentages    - Continue routine device follow up     CAD s/p CABGx2 2003   Stable. BP today 105/73. Continue ASA 81 mg daily and lipitor 40 mg daily.     Follow up in 1 year with an echo prior.      History of Present Illness/Subjective    Mr. Arnaldo Henderson is a 69 year old male who comes in today for EP follow-up s/p PPM.    Patient also has history of AV Wenckebach noted during ERCP in May 2023. He was seen by EP at this time, he was asymptomatic, no pacemaker was indicated. Patient saw Dr Dahl in follow up on 1/16/24, ziopatch ordered due to prolonged VT interval and history of AV Wenckebach. Patient was then admitted to [facility] from 2/4-2/7/2024 for weakness and fogginess, found to have orthostatic hypotension which he has had issues with from autonomic dysfunction due to Parkinson's. Cardiology was consulted for AV Wenckebach on telemetry with no symptoms or changes in BP, no pacemaker was recommended. Follow up zio monitor was worn from 1/16-1/30/2024, average HR 79 bpm, frequent episodes of AV Wenckebach. He had one 10.5 second occurrence of AVB with no escape on 1/24/24 at 4:42 pm and 3.4 seconds on 1/27/24 at 1:50 am. Symptom activations were associated to sinus with and without AVB. Dr Hernandez discussed results and PPM implant with patient and his wife. They were agreeable to proceeding, he underwent  dual chamber PPM 2/28/24.    He presents today for follow up, he has been feeling well since implant. He has not noted any changes in symptoms following implant. Incision site well healed. He otherwise has no new cardiac concerns today, denies chest discomfort, palpitations, peripheral edema, shortness of breath,  orthopnea, pre-syncope, or syncope. Device interrogation shows normal device function, stable lead trends, AP 9.7%,  98.4%.     I have reviewed and updated the patient's Past Medical History, Social History, Family History and Medication List.     Cardiographics (Personally Reviewed) :   Echo: 2/05/2024   Interpretation Summary  1.Left ventricular size, wall motion and function are normal. The ejection  fraction is > 65%. LV might suggest dehydration.  2.Mildly decreased right ventricular systolic function  3.No hemodynamically significant valvular abnormalities on 2D or color flow  imaging.  There is no comparison study available.    PET (November 2020 OSH)   No ischemia, no scar. Preserved LVEF 65%.        Physical Examination   /73 (BP Location: Right arm, Patient Position: Chair, Cuff Size: Adult Large)   Pulse 87   Wt 96.7 kg (213 lb 1.6 oz)   SpO2 100%   BMI 32.40 kg/m    Wt Readings from Last 3 Encounters:   06/04/24 96.7 kg (213 lb 1.6 oz)   05/07/24 95.3 kg (210 lb 3.2 oz)   03/27/24 92.1 kg (203 lb)     General Appearance:   Alert, well-appearing and in no acute distress.   HEENT: Atraumatic, normocephalic. MMM.   Chest/Lungs:   Respirations unlabored.  Lungs are clear to auscultation.   Cardiovascular:   Regular rate and rhythm.  S1/S2. No murmur.    Abdomen:  Soft, nontender, nondistended.   Extremities: No cyanosis or clubbing. No edema.    Musculoskeletal: Moves all extremities.     Skin: Warm, dry, intact.    Neurologic: Mood and affect are appropriate.  Alert and oriented to person, place, time, and situation.          Medications  Allergies   ASA 81 mg daily   Lipitor 40 mg  "daily   Losartan 50 mg daily     Vitamins   Prilosec   Sinemet   Gabapentin   Insulin    Allergies   Allergen Reactions    Ciprofloxacin Other (See Comments), Hives, Itching, Rash and Unknown     Other reaction(s): Other (see comments)  Oral swelling per [facility]        Dilaudid [Hydromorphone] Rash    Morphine Rash     rash    Penicillins Rash     aka ancef/ rash      Citalopram Unknown    Clindamycin Itching and Rash    Oxycodone Rash     \"HORRIBLE, SEVERE RASH MAYBE CAUSED BY OXY\"         Lab Results (Personally Reviewed)    Chemistry/lipid CBC Cardiac Enzymes/BNP/TSH/INR   Lab Results   Component Value Date    BUN 17.7 03/12/2024     03/12/2024    CO2 26 03/12/2024     Creatinine   Date Value Ref Range Status   03/12/2024 0.94 0.67 - 1.17 mg/dL Final       No results found for: \"CHOL\", \"HDL\", \"LDL\", \"CHOLHDL\"   Lab Results   Component Value Date    WBC 9.6 03/12/2024    HGB 14.4 03/12/2024    HCT 43.2 03/12/2024     (H) 03/12/2024     03/12/2024    Lab Results   Component Value Date    TSH 1.46 05/09/2024    INR 1.24 (H) 07/25/2023        The patient states understanding and is agreeable with the plan.     Sapphire Mojica PA-C  [facility]  Electrophysiology Consult Service  Pager: [phone]    I spent a total of 20 minutes face to face with Arnaldo Henderson during today's office visit. I have spent an additional 15 minutes today on chart review and documentation.                   "

## 2024-06-04 ENCOUNTER — OFFICE VISIT (OUTPATIENT)
Dept: CARDIOLOGY | Facility: CLINIC | Age: 69
End: 2024-06-04
Payer: MEDICARE

## 2024-06-04 ENCOUNTER — ANCILLARY PROCEDURE (OUTPATIENT)
Dept: CARDIOLOGY | Facility: CLINIC | Age: 69
End: 2024-06-04
Attending: INTERNAL MEDICINE
Payer: MEDICARE

## 2024-06-04 VITALS
BODY MASS INDEX: 32.4 KG/M2 | WEIGHT: 213.1 LBS | SYSTOLIC BLOOD PRESSURE: 105 MMHG | HEART RATE: 87 BPM | OXYGEN SATURATION: 100 % | DIASTOLIC BLOOD PRESSURE: 73 MMHG

## 2024-06-04 DIAGNOSIS — I45.5 SINUS PAUSE: ICD-10-CM

## 2024-06-04 DIAGNOSIS — I44.2 HEART BLOCK AV THIRD DEGREE (H): ICD-10-CM

## 2024-06-04 DIAGNOSIS — I44.1 HEART BLOCK AV SECOND DEGREE: ICD-10-CM

## 2024-06-04 DIAGNOSIS — Z95.0 CARDIAC PACEMAKER IN SITU: ICD-10-CM

## 2024-06-04 PROCEDURE — 93280 PM DEVICE PROGR EVAL DUAL: CPT | Performed by: INTERNAL MEDICINE

## 2024-06-04 PROCEDURE — G0463 HOSPITAL OUTPT CLINIC VISIT: HCPCS

## 2024-06-04 PROCEDURE — 99214 OFFICE O/P EST MOD 30 MIN: CPT | Mod: 25

## 2024-06-04 ASSESSMENT — PAIN SCALES - GENERAL: PAINLEVEL: NO PAIN (0)

## 2024-06-04 NOTE — PATIENT INSTRUCTIONS
You were seen in the Electrophysiology Clinic today by: Sapphire BETANCOURT    Plan:       Follow up Visit:  1 year with device check and echocardiogram prior        If you have further questions, please utilize [facility] to contact us.     Your Care Team:    EP Cardiology   Telephone Number     Nurse Line  Merlene Lin, RN   Jodi Quintero, RN  Benjamin Dillard, KAI   [phone]     For scheduling appointments:   Jorge   [phone]   For procedure scheduling:    Yolis Ferris     [phone]   For the Device Clinic (Pacemakers, ICDs, Loop Recorders)    During business hours: [phone]  After business hours:   [phone]- select option 4 and ask for job code [id number].       On-call cardiologist for after hours or on weekends:   [phone], option #4, and ask to speak to the on-call cardiologist.     Cardiovascular Clinic:   [address] [place] [address]. [place] [address]      As always, Thank you for trusting us with your health care needs!

## 2024-06-04 NOTE — PATIENT INSTRUCTIONS
It was a pleasure to see you in clinic today. Please do not hesitate to call with any questions or concerns.     Ellie Mir, RN  Electrophysiology Nurse Clinician  [facility]  During business hours call:  [phone]  Urgent needs after hours- please call: [phone]- select option #4 and ask for job code [id number].

## 2024-06-04 NOTE — LETTER
6/4/2024      RE: Arnaldo Henderson  [address] [address]       Dear Colleague,    Thank you for the opportunity to participate in the care of your patient, Arnaldo Henderson, at the [facility] [place] [facility]. Please see a copy of my visit note below.        ELECTROPHYSIOLOGY CLINIC VISIT    Assessment/Recommendations   Assessment/Plan:    Arnaldo Henderson is a 68 year old male with past medical history significant for chronic pancreatitis s/p distal pancreatectomy, type II DM, CAD s/p CABGx2 (2003), parkinson's disease and neuroendocrine tumor.     Paroxysmal complete AV block s/p PPM  Underwent dual chamber PPM 2/28/24. Device check today with normal device function, stable lead trends, AP 9.7%,  98.4%, intrinsic rhythm 2:1 AVB. Reprogrammed to DDDR since  increased to 98%. Incision site well healed.    - Echo prior to follow up d/t high  percentages    - Continue routine device follow up     CAD s/p CABGx2 2003   Stable. BP today 105/73. Continue ASA 81 mg daily and lipitor 40 mg daily.     Follow up in 1 year with an echo prior.      History of Present Illness/Subjective    Mr. Arnaldo Henderson is a 69 year old male who comes in today for EP follow-up s/p PPM.    Patient also has history of AV Wenckebach noted during ERCP in May 2023. He was seen by EP at this time, he was asymptomatic, no pacemaker was indicated. Patient saw Dr Dahl in follow up on 1/16/24, ziopatch ordered due to prolonged [place] interval and history of AV Wenckebach. Patient was then admitted to [facility] from 2/4-2/7/2024 for weakness and fogginess, found to have orthostatic hypotension which he has had issues with from autonomic dysfunction due to Parkinson's. Cardiology was consulted for AV Wenckebach on telemetry with no symptoms or changes in BP, no pacemaker was recommended. Follow up zio monitor was worn from 1/16-1/30/2024, average HR 79  bpm, frequent episodes of AV Wenckebach. He had one 10.5 second occurrence of AVB with no escape on 1/24/24 at 4:42 pm and 3.4 seconds on 1/27/24 at 1:50 am. Symptom activations were associated to sinus with and without AVB. Dr Hernandez discussed results and PPM implant with patient and his wife. They were agreeable to proceeding, he underwent dual chamber PPM 2/28/24.    He presents today for follow up, he has been feeling well since implant. He has not noted any changes in symptoms following implant. Incision site well healed. He otherwise has no new cardiac concerns today, denies chest discomfort, palpitations, peripheral edema, shortness of breath,  orthopnea, pre-syncope, or syncope. Device interrogation shows normal device function, stable lead trends, AP 9.7%,  98.4%.     I have reviewed and updated the patient's Past Medical History, Social History, Family History and Medication List.     Cardiographics (Personally Reviewed) :   Echo: 2/05/2024   Interpretation Summary  1.Left ventricular size, wall motion and function are normal. The ejection  fraction is > 65%. LV might suggest dehydration.  2.Mildly decreased right ventricular systolic function  3.No hemodynamically significant valvular abnormalities on 2D or color flow  imaging.  There is no comparison study available.    PET (November 2020 OSH)   No ischemia, no scar. Preserved LVEF 65%.        Physical Examination   /73 (BP Location: Right arm, Patient Position: Chair, Cuff Size: Adult Large)   Pulse 87   Wt 96.7 kg (213 lb 1.6 oz)   SpO2 100%   BMI 32.40 kg/m    Wt Readings from Last 3 Encounters:   06/04/24 96.7 kg (213 lb 1.6 oz)   05/07/24 95.3 kg (210 lb 3.2 oz)   03/27/24 92.1 kg (203 lb)     General Appearance:   Alert, well-appearing and in no acute distress.   HEENT: Atraumatic, normocephalic. MMM.   Chest/Lungs:   Respirations unlabored.  Lungs are clear to auscultation.   Cardiovascular:   Regular rate and rhythm.  S1/S2. No murmur.   "  Abdomen:  Soft, nontender, nondistended.   Extremities: No cyanosis or clubbing. No edema.    Musculoskeletal: Moves all extremities.     Skin: Warm, dry, intact.    Neurologic: Mood and affect are appropriate.  Alert and oriented to person, place, time, and situation.          Medications  Allergies   ASA 81 mg daily   Lipitor 40 mg daily   Losartan 50 mg daily     Vitamins   Prilosec   Sinemet   Gabapentin   Insulin    Allergies   Allergen Reactions     Ciprofloxacin Other (See Comments), Hives, Itching, Rash and Unknown     Other reaction(s): Other (see comments)  Oral swelling per [facility]         Dilaudid [Hydromorphone] Rash     Morphine Rash     rash     Penicillins Rash     aka ancef/ rash       Citalopram Unknown     Clindamycin Itching and Rash     Oxycodone Rash     \"HORRIBLE, SEVERE RASH MAYBE CAUSED BY OXY\"         Lab Results (Personally Reviewed)    Chemistry/lipid CBC Cardiac Enzymes/BNP/TSH/INR   Lab Results   Component Value Date    BUN 17.7 03/12/2024     03/12/2024    CO2 26 03/12/2024     Creatinine   Date Value Ref Range Status   03/12/2024 0.94 0.67 - 1.17 mg/dL Final       No results found for: \"CHOL\", \"HDL\", \"LDL\", \"CHOLHDL\"   Lab Results   Component Value Date    WBC 9.6 03/12/2024    HGB 14.4 03/12/2024    HCT 43.2 03/12/2024     (H) 03/12/2024     03/12/2024    Lab Results   Component Value Date    TSH 1.46 05/09/2024    INR 1.24 (H) 07/25/2023        The patient states understanding and is agreeable with the plan.     Sapphire Mojica PA-C  [facility]  Electrophysiology Consult Service  Pager: [phone]    I spent a total of 20 minutes face to face with Arnaldo Henderson during today's office visit. I have spent an additional 15 minutes today on chart review and documentation.                     Please do not hesitate to contact me if you have any questions/concerns.     Sincerely,     Sapphire Mojica PA-C  "

## 2024-06-04 NOTE — NURSING NOTE
Chief Complaint   Patient presents with    Follow Up     3 mo follow up post device implant  Device hcekc prior        Vitals were taken and medications reconciled.    Ricardo Benoit, EMT  10:33 AM

## 2024-06-05 LAB
MDC_IDC_LEAD_CONNECTION_STATUS: NORMAL
MDC_IDC_LEAD_CONNECTION_STATUS: NORMAL
MDC_IDC_LEAD_IMPLANT_DT: NORMAL
MDC_IDC_LEAD_IMPLANT_DT: NORMAL
MDC_IDC_LEAD_LOCATION: NORMAL
MDC_IDC_LEAD_LOCATION: NORMAL
MDC_IDC_LEAD_LOCATION_DETAIL_1: NORMAL
MDC_IDC_LEAD_LOCATION_DETAIL_1: NORMAL
MDC_IDC_LEAD_MFG: NORMAL
MDC_IDC_LEAD_MFG: NORMAL
MDC_IDC_LEAD_MODEL: NORMAL
MDC_IDC_LEAD_MODEL: NORMAL
MDC_IDC_LEAD_POLARITY_TYPE: NORMAL
MDC_IDC_LEAD_POLARITY_TYPE: NORMAL
MDC_IDC_LEAD_SERIAL: NORMAL
MDC_IDC_LEAD_SERIAL: NORMAL
MDC_IDC_LEAD_SPECIAL_FUNCTION: NORMAL
MDC_IDC_LEAD_SPECIAL_FUNCTION: NORMAL
MDC_IDC_MSMT_BATTERY_DTM: NORMAL
MDC_IDC_MSMT_BATTERY_REMAINING_LONGEVITY: 150 MO
MDC_IDC_MSMT_BATTERY_RRT_TRIGGER: 2.62
MDC_IDC_MSMT_BATTERY_STATUS: NORMAL
MDC_IDC_MSMT_BATTERY_VOLTAGE: 3.18 V
MDC_IDC_MSMT_LEADCHNL_RA_IMPEDANCE_VALUE: 342 OHM
MDC_IDC_MSMT_LEADCHNL_RA_IMPEDANCE_VALUE: 437 OHM
MDC_IDC_MSMT_LEADCHNL_RA_PACING_THRESHOLD_AMPLITUDE: 0.5 V
MDC_IDC_MSMT_LEADCHNL_RA_PACING_THRESHOLD_PULSEWIDTH: 0.4 MS
MDC_IDC_MSMT_LEADCHNL_RA_SENSING_INTR_AMPL: 4.38 MV
MDC_IDC_MSMT_LEADCHNL_RV_IMPEDANCE_VALUE: 437 OHM
MDC_IDC_MSMT_LEADCHNL_RV_IMPEDANCE_VALUE: 513 OHM
MDC_IDC_MSMT_LEADCHNL_RV_PACING_THRESHOLD_AMPLITUDE: 0.5 V
MDC_IDC_MSMT_LEADCHNL_RV_PACING_THRESHOLD_PULSEWIDTH: 0.4 MS
MDC_IDC_MSMT_LEADCHNL_RV_SENSING_INTR_AMPL: 16 MV
MDC_IDC_PG_IMPLANT_DTM: NORMAL
MDC_IDC_PG_MFG: NORMAL
MDC_IDC_PG_MODEL: NORMAL
MDC_IDC_PG_SERIAL: NORMAL
MDC_IDC_PG_TYPE: NORMAL
MDC_IDC_SESS_CLINIC_NAME: NORMAL
MDC_IDC_SESS_DTM: NORMAL
MDC_IDC_SESS_TYPE: NORMAL
MDC_IDC_SET_BRADY_AT_MODE_SWITCH_RATE: 171 {BEATS}/MIN
MDC_IDC_SET_BRADY_HYSTRATE: NORMAL
MDC_IDC_SET_BRADY_LOWRATE: 60 {BEATS}/MIN
MDC_IDC_SET_BRADY_MAX_SENSOR_RATE: 130 {BEATS}/MIN
MDC_IDC_SET_BRADY_MAX_TRACKING_RATE: 130 {BEATS}/MIN
MDC_IDC_SET_BRADY_MODE: NORMAL
MDC_IDC_SET_BRADY_PAV_DELAY_HIGH: 140 MS
MDC_IDC_SET_BRADY_PAV_DELAY_LOW: 240 MS
MDC_IDC_SET_BRADY_SAV_DELAY_HIGH: 110 MS
MDC_IDC_SET_BRADY_SAV_DELAY_LOW: 220 MS
MDC_IDC_SET_LEADCHNL_RA_PACING_AMPLITUDE: 1.5 V
MDC_IDC_SET_LEADCHNL_RA_PACING_ANODE_ELECTRODE_1: NORMAL
MDC_IDC_SET_LEADCHNL_RA_PACING_ANODE_LOCATION_1: NORMAL
MDC_IDC_SET_LEADCHNL_RA_PACING_CAPTURE_MODE: NORMAL
MDC_IDC_SET_LEADCHNL_RA_PACING_CATHODE_ELECTRODE_1: NORMAL
MDC_IDC_SET_LEADCHNL_RA_PACING_CATHODE_LOCATION_1: NORMAL
MDC_IDC_SET_LEADCHNL_RA_PACING_POLARITY: NORMAL
MDC_IDC_SET_LEADCHNL_RA_PACING_PULSEWIDTH: 0.4 MS
MDC_IDC_SET_LEADCHNL_RA_SENSING_ANODE_ELECTRODE_1: NORMAL
MDC_IDC_SET_LEADCHNL_RA_SENSING_ANODE_LOCATION_1: NORMAL
MDC_IDC_SET_LEADCHNL_RA_SENSING_CATHODE_ELECTRODE_1: NORMAL
MDC_IDC_SET_LEADCHNL_RA_SENSING_CATHODE_LOCATION_1: NORMAL
MDC_IDC_SET_LEADCHNL_RA_SENSING_POLARITY: NORMAL
MDC_IDC_SET_LEADCHNL_RA_SENSING_SENSITIVITY: 0.3 MV
MDC_IDC_SET_LEADCHNL_RV_PACING_AMPLITUDE: 2 V
MDC_IDC_SET_LEADCHNL_RV_PACING_ANODE_ELECTRODE_1: NORMAL
MDC_IDC_SET_LEADCHNL_RV_PACING_ANODE_LOCATION_1: NORMAL
MDC_IDC_SET_LEADCHNL_RV_PACING_CAPTURE_MODE: NORMAL
MDC_IDC_SET_LEADCHNL_RV_PACING_CATHODE_ELECTRODE_1: NORMAL
MDC_IDC_SET_LEADCHNL_RV_PACING_CATHODE_LOCATION_1: NORMAL
MDC_IDC_SET_LEADCHNL_RV_PACING_POLARITY: NORMAL
MDC_IDC_SET_LEADCHNL_RV_PACING_PULSEWIDTH: 0.4 MS
MDC_IDC_SET_LEADCHNL_RV_SENSING_ANODE_ELECTRODE_1: NORMAL
MDC_IDC_SET_LEADCHNL_RV_SENSING_ANODE_LOCATION_1: NORMAL
MDC_IDC_SET_LEADCHNL_RV_SENSING_CATHODE_ELECTRODE_1: NORMAL
MDC_IDC_SET_LEADCHNL_RV_SENSING_CATHODE_LOCATION_1: NORMAL
MDC_IDC_SET_LEADCHNL_RV_SENSING_POLARITY: NORMAL
MDC_IDC_SET_LEADCHNL_RV_SENSING_SENSITIVITY: 0.9 MV
MDC_IDC_SET_ZONE_DETECTION_INTERVAL: 350 MS
MDC_IDC_SET_ZONE_DETECTION_INTERVAL: 400 MS
MDC_IDC_SET_ZONE_STATUS: NORMAL
MDC_IDC_SET_ZONE_STATUS: NORMAL
MDC_IDC_SET_ZONE_TYPE: NORMAL
MDC_IDC_SET_ZONE_VENDOR_TYPE: NORMAL
MDC_IDC_STAT_AT_BURDEN_PERCENT: 0 %
MDC_IDC_STAT_AT_DTM_END: NORMAL
MDC_IDC_STAT_AT_DTM_START: NORMAL
MDC_IDC_STAT_BRADY_AP_VP_PERCENT: 9.55 %
MDC_IDC_STAT_BRADY_AP_VS_PERCENT: 0.15 %
MDC_IDC_STAT_BRADY_AS_VP_PERCENT: 88.81 %
MDC_IDC_STAT_BRADY_AS_VS_PERCENT: 1.49 %
MDC_IDC_STAT_BRADY_DTM_END: NORMAL
MDC_IDC_STAT_BRADY_DTM_START: NORMAL
MDC_IDC_STAT_BRADY_RA_PERCENT_PACED: 9.68 %
MDC_IDC_STAT_BRADY_RV_PERCENT_PACED: 98.36 %
MDC_IDC_STAT_EPISODE_RECENT_COUNT: 0
MDC_IDC_STAT_EPISODE_RECENT_COUNT_DTM_END: NORMAL
MDC_IDC_STAT_EPISODE_RECENT_COUNT_DTM_START: NORMAL
MDC_IDC_STAT_EPISODE_TOTAL_COUNT: 0
MDC_IDC_STAT_EPISODE_TOTAL_COUNT_DTM_END: NORMAL
MDC_IDC_STAT_EPISODE_TOTAL_COUNT_DTM_START: NORMAL
MDC_IDC_STAT_EPISODE_TYPE: NORMAL
MDC_IDC_STAT_TACHYTHERAPY_RECENT_DTM_END: NORMAL
MDC_IDC_STAT_TACHYTHERAPY_RECENT_DTM_START: NORMAL
MDC_IDC_STAT_TACHYTHERAPY_TOTAL_DTM_END: NORMAL
MDC_IDC_STAT_TACHYTHERAPY_TOTAL_DTM_START: NORMAL

## 2024-06-11 ENCOUNTER — OFFICE VISIT (OUTPATIENT)
Dept: NEUROLOGY | Facility: CLINIC | Age: 69
End: 2024-06-11
Payer: MEDICARE

## 2024-06-11 VITALS
HEART RATE: 79 BPM | OXYGEN SATURATION: 97 % | SYSTOLIC BLOOD PRESSURE: 156 MMHG | WEIGHT: 213.9 LBS | BODY MASS INDEX: 32.52 KG/M2 | DIASTOLIC BLOOD PRESSURE: 86 MMHG

## 2024-06-11 DIAGNOSIS — R47.1 DYSARTHRIA: ICD-10-CM

## 2024-06-11 DIAGNOSIS — G20.B1 PARKINSON'S DISEASE WITH DYSKINESIA WITHOUT FLUCTUATING MANIFESTATIONS (H): Primary | ICD-10-CM

## 2024-06-11 DIAGNOSIS — R49.0 DYSPHONIA: ICD-10-CM

## 2024-06-11 DIAGNOSIS — G47.09 OTHER INSOMNIA: ICD-10-CM

## 2024-06-11 PROCEDURE — G2211 COMPLEX E/M VISIT ADD ON: HCPCS | Performed by: NURSE PRACTITIONER

## 2024-06-11 PROCEDURE — 99417 PROLNG OP E/M EACH 15 MIN: CPT | Performed by: NURSE PRACTITIONER

## 2024-06-11 PROCEDURE — 99215 OFFICE O/P EST HI 40 MIN: CPT | Performed by: NURSE PRACTITIONER

## 2024-06-11 RX ORDER — TRAZODONE HYDROCHLORIDE 50 MG/1
25-50 TABLET, FILM COATED ORAL AT BEDTIME
Qty: 30 TABLET | Refills: 3 | Status: SHIPPED | OUTPATIENT
Start: 2024-06-11

## 2024-06-11 ASSESSMENT — UNIFIED PARKINSONS DISEASE RATING SCALE (UPDRS)
PRONATION_SUPINATION_RIGHT: (1) SLIGHT: ANY OF THE FOLLOWING: A) THE REGULAR RHYTHM IS BROKEN WITH ONE WITH ONE OR TWO INTERRUPTIONS OR HESITATIONS OF THE MOVEMENT  B) SLIGHT SLOWING  C) THE AMPLITUDE DECREMENTS NEAR THE END OF THE 10 MOVEMENTS.
RIGIDITY_NECK: (2) MILD: RIGIDITY DETECTED WITHOUT THE ACTIVATION MANEUVER. FULL RANGE OF MOTION IS EASILY ACHIEVED.
AMPLITUDE_LUE: (0) NORMAL: NO TREMOR.
SPEECH: (2) MILD: LOSS OF MODULATION, DICTION OR VOLUME, WITH A FEW WORDS UNCLEAR, BUT THE OVERALL SENTENCES EASY TO FOLLOW.
RIGIDITY_RLE: (2) MILD: RIGIDITY DETECTED WITHOUT THE ACTIVATION MANEUVER. FULL RANGE OF MOTION IS EASILY ACHIEVED.
FINGER_TAPPING_RIGHT: (0) NORMAL: NO PROBLEMS.
TOETAPPING_LEFT: (0) NORMAL: NO PROBLEMS.
LEG_AGILITY_LEFT: (0) NORMAL: NO PROBLEMS.
FINGER_TAPPING_LEFT: (1) SLIGHT: ANY OF THE FOLLOWING: A) THE REGULAR RHYTHM IS BROKEN WITH ONE WITH ONE OR TWO INTERRUPTIONS OR HESITATIONS OF THE MOVEMENT  B) SLIGHT SLOWING  C) THE AMPLITUDE DECREMENTS NEAR THE END OF THE 10 MOVEMENTS.
RIGIDITY_RUE: (2) MILD: RIGIDITY DETECTED WITHOUT THE ACTIVATION MANEUVER. FULL RANGE OF MOTION IS EASILY ACHIEVED.
HANDMOVEMENTS_RIGHT: (1) SLIGHT: ANY OF THE FOLLOWING: A) THE REGULAR RHYTHM IS BROKEN WITH ONE WITH ONE OR TWO INTERRUPTIONS OR HESITATIONS OF THE MOVEMENT  B) SLIGHT SLOWING  C) THE AMPLITUDE DECREMENTS NEAR THE END OF THE 10 MOVEMENTS.
TOTAL_SCORE: 9
RIGIDITY_LUE: (1) SLIGHT: RIGIDITY ONLY DETECTED WITH ACTIVATION MANEUVER.
AMPLITUDE_LLE: (0) NORMAL: NO TREMOR.
FREEZING_GAIT: (0) NORMAL: NO FREEZING.
LEG_AGILITY_RIGHT: (0) NORMAL: NO PROBLEMS.
PARKINSONS_MEDS: ON
AXIAL_SCORE: 11
HANDMOVEMENTS_LEFT: (1) SLIGHT: ANY OF THE FOLLOWING: A) THE REGULAR RHYTHM IS BROKEN WITH ONE WITH ONE OR TWO INTERRUPTIONS OR HESITATIONS OF THE MOVEMENT  B) SLIGHT SLOWING  C) THE AMPLITUDE DECREMENTS NEAR THE END OF THE 10 MOVEMENTS.
POSTURE: (1) SLIGHT: NOT QUITE ERECT, BUT COULD BE NORMAL FOR OLDER PERSONS.
ARISING_CHAIR: (0) NORMAL: NO PROBLEMS. ABLE TO ARISE QUICKLY WITHOUT HESITATION.
AMPLITUDE_LIP_JAW: (0) NORMAL: NO TREMOR.
TOTAL_SCORE_LEFT: 6
RIGIDITY_LLE: (1) SLIGHT: RIGIDITY ONLY DETECTED WITH ACTIVATION MANEUVER.
TOTAL_SCORE: 27
DYSKINESIAS_PRESENT: YES
FACIAL_EXPRESSION: (3) MASKED FACIES WITH LIPS PARTED SOME OF THE TIME WHEN THE MOUTH IS AT REST.
MOVEMENTS_INTERFERE_WITH_RATINGS: NO
AMPLITUDE_RUE: (2) MILD: > 1 CM BUT < 3 CM IN MAXIMAL AMPLITUDE.
POSTURAL_STABILITY: (1) SLIGHT: 3-5 STEPS, BUT RECOVERS UNAIDED.
PRONATION_SUPINATION_LEFT: (1) SLIGHT: ANY OF THE FOLLOWING: A) THE REGULAR RHYTHM IS BROKEN WITH ONE WITH ONE OR TWO INTERRUPTIONS OR HESITATIONS OF THE MOVEMENT  B) SLIGHT SLOWING  C) THE AMPLITUDE DECREMENTS NEAR THE END OF THE 10 MOVEMENTS.
SPONTANEITY_OF_MOVEMENT: (1) SLIGHT: SLIGHT GLOBAL SLOWNESS AND POVERTY OF SPONTANEOUS MOVEMENTS.
CONSTANCY_TREMOR_ATREST: (1) SLIGHT: TREMOR AT REST IS PRESENT 25% OF THE ENTIRE EXAMINATION PERIOD.
AMPLITUDE_RLE: (0) NORMAL: NO TREMOR.
GAIT: (1) SLIGHT: INDEPENDENT WALKING WITH MINOR GAIT IMPAIRMENT.
TOETAPPING_RIGHT: (0) NORMAL: NO PROBLEMS.

## 2024-06-11 ASSESSMENT — PAIN SCALES - GENERAL: PAINLEVEL: NO PAIN (0)

## 2024-06-11 NOTE — PROGRESS NOTES
ASSESSMENT:    Parkinson's Disease:      Insomnia:      Dysphonia & Dysarthria:        PLAN:    __  Today's exam was reviewed, questions addressed, and the following patient instructions provided. Unless there is an indication of swallowing issues, I recommended waiting on this.    __  Stay on the same antiparkinsonian medications: -     PD Medications 6 am 10 am 2 pm 5 pm HS/9 pm   Sinemet 25/100 mg  2 2 2 1    Sinemet 50/200 mg CR     1     __  Try taking Melatonin 3 mg or 5 mg, [employer] brand an hour before bedtime. You can increase the dose to up to 15 mg if the lower doses are not effective.     __  Try the Melatonin for a couple of weeks, and if not effective, fill the Rx for Trazodone & take 25 mg (1/2 tab) at bedtime.  If the 25 mg is not effecting (1/2 tab) take 1 tab (50 mg)    __  [facility] to coordinate your appointment. If you don't hear from a representative within 2 business days, please call [phone].     __  Virtual visit  with Dr. Armenta or myself in 3 months for a follow up.           MOVEMENT DISORDERS CLINIC           PATIENT: Arnaldo Henderson    : 1955    ELISA: 2024    REASON FOR VISIT: Parkinson's disease (PD) follow up.    HPI: Mr. Arnaldo Henderson is a 69 year old who came to the [facility] neurology clinic accompanied by his wife, Veronika, for a follow up visit.     Pt is new to me. He was initially seen by Dr. Armenta virtually on 3/26/2024 for PD management.  Pt has been seeing Dr. Resendez. It looks like he is scheduled to see Dr. Armenta on .    He is here for motor assessment and to discuss questions.      He was diagnosed with PD about 5-6 years ago. His symptoms started with Rt hand tremors. He reports that this current primary PD symptoms are gait difficulty, tremors in Rt hand (mainly when trying to fall asleep) and cognitive changes. At times, he feels like his meds wear off, there is no patter. He also  gets breakthrough tremors when nervous.     PD Medications 6 am 10 am 2 pm 5 pm HS/9 pm   Sinemet 25/100 mg  2 2 2 1    Sinemet 50/200 mg CR     1     Pt will be doing Genetic Testing tomorrow.    Wife reports that when walking, he walks fast and has festinating gait. He goes to PT at [facility] monthly.    Veronika recently was at the Caregiver's support group and wanted to ask about: -   If doing the [facility] would be beneficial, and if insurance would cover the cost.    If a baseline swallow Study is needed. At this time, pt doesn't have any swallowing issues. No coughing or chocking with eating or drinking.     Pt reports that he has difficulty falling asleep. At times he has difficulty falling asleep and can be in bed for a couple of hours. Other times, he can fall asleep easily and wakes up after 1-2 hours and has difficulty falling back to sleep. He has tired [employer] brand Melatonin 3 mg & Melatonin 10 ER without benefit.  Per chart review, his PCP had prescribed Trazodone 50 mg at HS about a year ago, which pt didn't recall and never tried. During the day, if he is not active, he falls asleep while sitting on the couch.       MEDICATIONS:   Current Outpatient Medications   Medication Sig Dispense Refill    aspirin (ASA) 81 MG EC tablet Take 81 mg by mouth daily      atorvastatin (LIPITOR) 40 MG tablet Take 1 tablet by mouth daily      BD PEN NEEDLE LINDY 2ND GEN 32G X 4 MM miscellaneous USE TO INJECT INSULIN 4 TIMES A  each 3    carbidopa-levodopa (SINEMET CR)  MG CR tablet Take 1 tablet by mouth at bedtime @9p      carbidopa-levodopa (SINEMET)  MG tablet 2@6a, 2@10a, 2@2p and 1@5p 630 tablet 3    Continuous Blood Gluc  (DEXCOM G7 ) ALIA Use to read blood sugars as per 's instructions. 1 each 0    Continuous Glucose Sensor (DEXCOM G7 SENSOR) MISC Change every 10 days. 9 each 3    dasiglucagon HCl (ZEGALOGUE) 0.6 MG/0.6ML SOAJ injection Inject 0.6 mg  Subcutaneous once as needed (use for severe hypoglycemia) 1.2 mL 1    gabapentin (NEURONTIN) 300 MG capsule Take 600 mg by mouth 3 times daily      Glucagon (BAQSIMI) 3 MG/DOSE nasal powder Spray 1 spray (3 mg) in nostril as needed (severe hypoglycemia) in the event of unconscious hypoglycemia or hypoglycemic seizure. May repeat dose if no response after 15 minutes. 2 each 1    insulin aspart (NOVOLOG FLEXPEN) 100 UNIT/ML pen Inject 5-12 Units Subcutaneous 3 times daily (with meals) Take 5 units with breakfast, 10 unit(s) with lunch and 12 unit(s) with supper 40 mL 3    insulin aspart (NOVOLOG FLEXPEN) 100 UNIT/ML pen Inject subcutaneous per sliding scale three times daily with meals:  190-239 = 1 unit  240-289 = 2 units  290-339 = 3 units  >340 = 4 units   And Inject subcutaneously at bedtime per sliding scale:  200-249 = 1 unit  250-300 = 2 units   >300 = 3 units      insulin glargine 100 UNIT/ML pen Take 14 unit(s) AM and 30 unit(s) HS, and adjust as needed upto 50 unit(s) /day 50 mL 1    lipase-protease-amylase (ZENPEP) 29704-768592-083665 units CPEP Take 1 capsule by mouth 3 times daily (with meals) 90 capsule 11    losartan (COZAAR) 50 MG tablet Take 1 tablet (50 mg) by mouth daily      magnesium oxide (MAG-OX) 400 MG tablet Take 400 mg by mouth every other day      Multiple Vitamin (MULTI-VITAMINS) TABS Take 1 tablet by mouth daily      omeprazole (PRILOSEC) 20 MG DR capsule Take 20 mg by mouth daily       No current facility-administered medications for this visit.       PHYSICAL EXAM:    VITAL SIGNS:  Blood pressure (!) 156/86, pulse 79, weight 97 kg (213 lb 14.4 oz), SpO2 97%.. Body mass index is 32.52 kg/m .    GENERAL:  Mr. Henderson is a pleasant  patient who is well-groomed, well-developed, and overweight sitting comfortably in the exam room without any distress.  Affect is appropriate.    MOVEMENT DISORDERS ASSESSMENT: MDS UPDRS III (Score range: 0-132) (Last Sinemet was at 10 am)      6/11/2024      1:00 PM   UPDRS Motor Scale   Time: 13:45   Medication On   R Brain DBS: None   L Brain DBS: None   Dyskinesia (LID) Yes   Did LID interfere No   Speech 2   Facial Expression 3   Rigidity Neck 2   Rigidity RUE 2   Rigidity LUE 1   Rigidity RLE 2   Rigidity LLE 1   Finger Taps R 0   Finger Taps L 1   Hand Mvt R 1   Hand Mvt L 1   Pron-/Supinate R 1   Pron-/Supinate L 1   Toe Tap R 0   Toe Tap L 0   Leg Agility R 0   Leg Agility L 0   Arise From Chair 0   Gait 1   Gait Freezing 0   Postural Stability 1   Posture 1   Global Spont Mvt 1   Postural Tremor RUE 1   Postural Tremor LUE 1   Kinetic Tremor RUE 0   Kinetic Tremor LUE 0   Rest Tremor RUE 2   Rest Tremor LUE 0   Rest Tremor RLE 0   Rest Tremor LLE 0   Rest Tremor Lip/Jaw 0   Rest Tremor Constancy 1   Total Right 9   Total Left 6   Axial Total 11   Total 27     Today I spent 70 minutes caring for the patient. Time was spent with reviewing records, meeting with the patient, answering questions, examining, refilling/ordering medication, and documentation.    Candida Jackson DNP, APRN  [facility]    The longitudinal plan of care for the diagnosis(es)/condition(s) as documented were addressed during this visit. Due to the added complexity in care, I will continue to support Pat in the subsequent management and with ongoing continuity of care.

## 2024-06-11 NOTE — NURSING NOTE
Return movement disorder  Ashlee Almazan CMA      BP (!) 171/97 (BP Location: Right arm, Patient Position: Sitting, Cuff Size: Adult Regular)   Pulse 79   Wt 97 kg (213 lb 14.4 oz)   SpO2 97%   BMI 32.52 kg/m

## 2024-06-11 NOTE — LETTER
2024       RE: Arnaldo Henderson  [facility] [facility] [address]     Dear Colleague,    Thank you for referring your patient, Arnaldo Henderson, to the [facility] NEUROLOGY [facility]INIC [place] at [facility]. Please see a copy of my visit note below.      ASSESSMENT:    Parkinson's Disease:      Insomnia:      Dysphonia & Dysarthria:        PLAN:    __  Today's exam was reviewed, questions addressed, and the following patient instructions provided. Unless there is an indication of swallowing issues, I recommended waiting on this.    __  Stay on the same antiparkinsonian medications: -     PD Medications 6 am 10 am 2 pm 5 pm HS/9 pm   Sinemet 25/100 mg  2 2 2 1    Sinemet 50/200 mg CR     1     __  Try taking Melatonin 3 mg or 5 mg, [employer] brand an hour before bedtime. You can increase the dose to up to 15 mg if the lower doses are not effective.     __  Try the Melatonin for a couple of weeks, and if not effective, fill the Rx for Trazodone & take 25 mg (1/2 tab) at bedtime.  If the 25 mg is not effecting (1/2 tab) take 1 tab (50 mg)    __  Referral to the [facility].  [facility] will call you to coordinate your appointment. If you don't hear from a representative within 2 business days, please call [phone].     __  Virtual visit  with Dr. Armenta or myself in 3 months for a follow up.           MOVEMENT DISORDERS CLINIC           PATIENT: Arnaldo Henderson    : 1955    ELISA: 2024    REASON FOR VISIT: Parkinson's disease (PD) follow up.    HPI: Mr. Arnaldo Henderson is a 69 year old who came to the [facility] neurology clinic accompanied by his wife, Veronika, for a follow up visit.     Pt is new to me. He was initially seen by Dr. Armenta virtually on 3/26/2024 for PD management.  Pt has been seeing Dr. Resendez. It looks like he is scheduled to see Dr. Armenta on .    He is here for motor assessment and to discuss questions.       He was diagnosed with PD about 5-6 years ago. His symptoms started with Rt hand tremors. He reports that this current primary PD symptoms are gait difficulty, tremors in Rt hand (mainly when trying to fall asleep) and cognitive changes. At times, he feels like his meds wear off, there is no patter. He also gets breakthrough tremors when nervous.     PD Medications 6 am 10 am 2 pm 5 pm HS/9 pm   Sinemet 25/100 mg  2 2 2 1    Sinemet 50/200 mg CR     1     Pt will be doing Genetic Testing tomorrow.    Wife reports that when walking, he walks fast and has festinating gait. He goes to PT at [facility] monthly.    Veronika recently was at the Caregiver's support group and wanted to ask about: -   If doing the [facility] would be beneficial, and if insurance would cover the cost.    If a baseline swallow Study is needed. At this time, pt doesn't have any swallowing issues. No coughing or chocking with eating or drinking.     Pt reports that he has difficulty falling asleep. At times he has difficulty falling asleep and can be in bed for a couple of hours. Other times, he can fall asleep easily and wakes up after 1-2 hours and has difficulty falling back to sleep. He has tired [employer] brand Melatonin 3 mg & Melatonin 10 ER without benefit.  Per chart review, his PCP had prescribed Trazodone 50 mg at [facility] about a year ago, which pt didn't recall and never tried. During the day, if he is not active, he falls asleep while sitting on the couch.       MEDICATIONS:   Current Outpatient Medications   Medication Sig Dispense Refill    aspirin (ASA) 81 MG EC tablet Take 81 mg by mouth daily      atorvastatin (LIPITOR) 40 MG tablet Take 1 tablet by mouth daily      BD PEN NEEDLE LINDY 2ND GEN 32G X 4 MM miscellaneous USE TO INJECT INSULIN 4 TIMES A  each 3    carbidopa-levodopa (SINEMET CR)  MG CR tablet Take 1 tablet by mouth at bedtime @9p      carbidopa-levodopa (SINEMET)  MG tablet 2@6a, 2@10a, 2@2p  and 1@5p 630 tablet 3    Continuous Blood Gluc  (DEXCOM G7 ) ALIA Use to read blood sugars as per 's instructions. 1 each 0    Continuous Glucose Sensor (DEXCOM G7 SENSOR) MISC Change every 10 days. 9 each 3    dasiglucagon HCl (ZEGALOGUE) 0.6 MG/0.6ML SOAJ injection Inject 0.6 mg Subcutaneous once as needed (use for severe hypoglycemia) 1.2 mL 1    gabapentin (NEURONTIN) 300 MG capsule Take 600 mg by mouth 3 times daily      Glucagon (BAQSIMI) 3 MG/DOSE nasal powder Spray 1 spray (3 mg) in nostril as needed (severe hypoglycemia) in the event of unconscious hypoglycemia or hypoglycemic seizure. May repeat dose if no response after 15 minutes. 2 each 1    insulin aspart (NOVOLOG FLEXPEN) 100 UNIT/ML pen Inject 5-12 Units Subcutaneous 3 times daily (with meals) Take 5 units with breakfast, 10 unit(s) with lunch and 12 unit(s) with supper 40 mL 3    insulin aspart (NOVOLOG FLEXPEN) 100 UNIT/ML pen Inject subcutaneous per sliding scale three times daily with meals:  190-239 = 1 unit  240-289 = 2 units  290-339 = 3 units  >340 = 4 units   And Inject subcutaneously at bedtime per sliding scale:  200-249 = 1 unit  250-300 = 2 units   >300 = 3 units      insulin glargine 100 UNIT/ML pen Take 14 unit(s) AM and 30 unit(s) HS, and adjust as needed upto 50 unit(s) /day 50 mL 1    lipase-protease-amylase (ZENPEP) 61993-715785-147575 units CPEP Take 1 capsule by mouth 3 times daily (with meals) 90 capsule 11    losartan (COZAAR) 50 MG tablet Take 1 tablet (50 mg) by mouth daily      magnesium oxide (MAG-OX) 400 MG tablet Take 400 mg by mouth every other day      Multiple Vitamin (MULTI-VITAMINS) TABS Take 1 tablet by mouth daily      omeprazole (PRILOSEC) 20 MG DR capsule Take 20 mg by mouth daily       No current facility-administered medications for this visit.       PHYSICAL EXAM:    VITAL SIGNS:  Blood pressure (!) 156/86, pulse 79, weight 97 kg (213 lb 14.4 oz), SpO2 97%.. Body mass index is 32.52  kg/m .    GENERAL:  Mr. Henderson is a pleasant  patient who is well-groomed, well-developed, and overweight sitting comfortably in the exam room without any distress.  Affect is appropriate.    MOVEMENT DISORDERS ASSESSMENT: MDS UPDRS III (Score range: 0-132) (Last Sinemet was at 10 am)      6/11/2024     1:00 PM   UPDRS Motor Scale   Time: 13:45   Medication On   R Brain DBS: None   L Brain DBS: None   Dyskinesia (LID) Yes   Did LID interfere No   Speech 2   Facial Expression 3   Rigidity Neck 2   Rigidity RUE 2   Rigidity LUE 1   Rigidity RLE 2   Rigidity LLE 1   Finger Taps R 0   Finger Taps L 1   Hand Mvt R 1   Hand Mvt L 1   Pron-/Supinate R 1   Pron-/Supinate L 1   Toe Tap R 0   Toe Tap L 0   Leg Agility R 0   Leg Agility L 0   Arise From Chair 0   Gait 1   Gait Freezing 0   Postural Stability 1   Posture 1   Global Spont Mvt 1   Postural Tremor RUE 1   Postural Tremor LUE 1   Kinetic Tremor RUE 0   Kinetic Tremor LUE 0   Rest Tremor RUE 2   Rest Tremor LUE 0   Rest Tremor RLE 0   Rest Tremor LLE 0   Rest Tremor Lip/Jaw 0   Rest Tremor Constancy 1   Total Right 9   Total Left 6   Axial Total 11   Total 27     Today I spent 70 minutes caring for the patient. Time was spent with reviewing records, meeting with the patient, answering questions, examining, refilling/ordering medication, and documentation.      The longitudinal plan of care for the diagnosis(es)/condition(s) as documented were addressed during this visit. Due to the added complexity in care, I will continue to support Pat in the subsequent management and with ongoing continuity of care.        Again, thank you for allowing me to participate in the care of your patient.      Sincerely,    KRYSTYNA Goins CNP

## 2024-06-11 NOTE — PATIENT INSTRUCTIONS
Dear Mr. Arnaldo Henderson,    Thank you for coming today.  During your visit, we have discussed the following:       __  Stay on the same antiparkinsonian medications: -     PD Medications 6 am 10 am 2 pm 5 pm HS/9 pm   Sinemet 25/100 mg  2 2 2 1    Sinemet 50/200 mg CR     1     __  Try taking Melatonin 3 mg or 5 mg, Nature Made brand an hour before bedtime. You can increase the dose to up to 15 mg if the lower doses are not effective.     __  Try the Melatonin for a couple of weeks, and if not effective, fill the Rx for Trazodone & take 25 mg (1/2 tab) at bedtime.  If the 25 mg is not effecting (1/2 tab) take 1 tab (50 mg)    __  Referral to the [facility].  [facility] will call you to coordinate your appointment. If you don't hear from a representative within 2 business days, please call [phone].     __  Virtual visit  with Dr. Armenta or myself in 3 months for a follow up.       For questions, you may send us a [employer] message or call [phone]    Fax number: [phone]    To make an appointment with one of our pharmacists, (Dr. Harrell, Pharm.D. or Dr. Delgado, PharmD), call [phone].    Candida Jackson, OSWALD, APRN  [facility] Neurology [facility]

## 2024-06-17 ENCOUNTER — MYC MEDICAL ADVICE (OUTPATIENT)
Dept: NEUROLOGY | Facility: CLINIC | Age: 69
End: 2024-06-17
Payer: MEDICARE

## 2024-06-17 DIAGNOSIS — G20.B1 PARKINSON'S DISEASE WITH DYSKINESIA WITHOUT FLUCTUATING MANIFESTATIONS (H): Primary | ICD-10-CM

## 2024-06-18 RX ORDER — CARBIDOPA AND LEVODOPA 50; 200 MG/1; MG/1
1 TABLET, EXTENDED RELEASE ORAL AT BEDTIME
Qty: 90 TABLET | Refills: 3 | Status: SHIPPED | OUTPATIENT
Start: 2024-06-18

## 2024-07-08 ENCOUNTER — LAB (OUTPATIENT)
Dept: LAB | Facility: CLINIC | Age: 69
End: 2024-07-08
Attending: INTERNAL MEDICINE
Payer: MEDICARE

## 2024-07-08 ENCOUNTER — ANCILLARY PROCEDURE (OUTPATIENT)
Dept: CT IMAGING | Facility: CLINIC | Age: 69
End: 2024-07-08
Attending: INTERNAL MEDICINE
Payer: MEDICARE

## 2024-07-08 DIAGNOSIS — C77.2 MALIGNANT NEOPLASM METASTATIC TO INTRA-ABDOMINAL LYMPH NODE (H): ICD-10-CM

## 2024-07-08 DIAGNOSIS — C7A.8 NEUROENDOCRINE CARCINOMA OF SMALL BOWEL (H): ICD-10-CM

## 2024-07-08 DIAGNOSIS — Z79.4 TYPE 2 DIABETES MELLITUS WITH HYPERGLYCEMIA, WITH LONG-TERM CURRENT USE OF INSULIN (H): ICD-10-CM

## 2024-07-08 DIAGNOSIS — E11.65 TYPE 2 DIABETES MELLITUS WITH HYPERGLYCEMIA, WITH LONG-TERM CURRENT USE OF INSULIN (H): ICD-10-CM

## 2024-07-08 LAB
ALBUMIN SERPL BCG-MCNC: 4.1 G/DL (ref 3.5–5.2)
ALP SERPL-CCNC: 121 U/L (ref 40–150)
ALT SERPL W P-5'-P-CCNC: <5 U/L (ref 0–70)
ANION GAP SERPL CALCULATED.3IONS-SCNC: 9 MMOL/L (ref 7–15)
AST SERPL W P-5'-P-CCNC: 25 U/L (ref 0–45)
BASOPHILS # BLD AUTO: ABNORMAL 10*3/UL
BASOPHILS # BLD MANUAL: 0.2 10E3/UL (ref 0–0.2)
BASOPHILS NFR BLD AUTO: ABNORMAL %
BASOPHILS NFR BLD MANUAL: 2 %
BILIRUB SERPL-MCNC: 0.8 MG/DL
BUN SERPL-MCNC: 18.7 MG/DL (ref 8–23)
CALCIUM SERPL-MCNC: 8.8 MG/DL (ref 8.8–10.2)
CHLORIDE SERPL-SCNC: 102 MMOL/L (ref 98–107)
CREAT SERPL-MCNC: 0.99 MG/DL (ref 0.67–1.17)
DEPRECATED HCO3 PLAS-SCNC: 27 MMOL/L (ref 22–29)
EGFRCR SERPLBLD CKD-EPI 2021: 82 ML/MIN/1.73M2
EOSINOPHIL # BLD AUTO: ABNORMAL 10*3/UL
EOSINOPHIL # BLD MANUAL: 0.8 10E3/UL (ref 0–0.7)
EOSINOPHIL NFR BLD AUTO: ABNORMAL %
EOSINOPHIL NFR BLD MANUAL: 8 %
ERYTHROCYTE [DISTWIDTH] IN BLOOD BY AUTOMATED COUNT: 13.5 % (ref 10–15)
GLUCOSE SERPL-MCNC: 89 MG/DL (ref 70–99)
HBA1C MFR BLD: 8.2 %
HCT VFR BLD AUTO: 39.7 % (ref 40–53)
HGB BLD-MCNC: 13.3 G/DL (ref 13.3–17.7)
IMM GRANULOCYTES # BLD: ABNORMAL 10*3/UL
IMM GRANULOCYTES NFR BLD: ABNORMAL %
LYMPHOCYTES # BLD AUTO: ABNORMAL 10*3/UL
LYMPHOCYTES # BLD MANUAL: 2.8 10E3/UL (ref 0.8–5.3)
LYMPHOCYTES NFR BLD AUTO: ABNORMAL %
LYMPHOCYTES NFR BLD MANUAL: 27 %
MCH RBC QN AUTO: 33.2 PG (ref 26.5–33)
MCHC RBC AUTO-ENTMCNC: 33.5 G/DL (ref 31.5–36.5)
MCV RBC AUTO: 99 FL (ref 78–100)
MONOCYTES # BLD AUTO: ABNORMAL 10*3/UL
MONOCYTES # BLD MANUAL: 1.6 10E3/UL (ref 0–1.3)
MONOCYTES NFR BLD AUTO: ABNORMAL %
MONOCYTES NFR BLD MANUAL: 16 %
NEUTROPHILS # BLD AUTO: ABNORMAL 10*3/UL
NEUTROPHILS # BLD MANUAL: 4.8 10E3/UL (ref 1.6–8.3)
NEUTROPHILS NFR BLD AUTO: ABNORMAL %
NEUTROPHILS NFR BLD MANUAL: 47 %
NRBC # BLD AUTO: 0 10E3/UL
NRBC BLD AUTO-RTO: 0 /100
PLAT MORPH BLD: ABNORMAL
PLATELET # BLD AUTO: 324 10E3/UL (ref 150–450)
POTASSIUM SERPL-SCNC: 4.5 MMOL/L (ref 3.4–5.3)
PROT SERPL-MCNC: 7.2 G/DL (ref 6.4–8.3)
RBC # BLD AUTO: 4.01 10E6/UL (ref 4.4–5.9)
RBC MORPH BLD: ABNORMAL
SODIUM SERPL-SCNC: 138 MMOL/L (ref 135–145)
WBC # BLD AUTO: 10.2 10E3/UL (ref 4–11)

## 2024-07-08 PROCEDURE — 85041 AUTOMATED RBC COUNT: CPT

## 2024-07-08 PROCEDURE — 85007 BL SMEAR W/DIFF WBC COUNT: CPT

## 2024-07-08 PROCEDURE — 80053 COMPREHEN METABOLIC PANEL: CPT

## 2024-07-08 PROCEDURE — 36415 COLL VENOUS BLD VENIPUNCTURE: CPT

## 2024-07-08 PROCEDURE — 86316 IMMUNOASSAY TUMOR OTHER: CPT

## 2024-07-08 PROCEDURE — 83036 HEMOGLOBIN GLYCOSYLATED A1C: CPT

## 2024-07-08 PROCEDURE — 74177 CT ABD & PELVIS W/CONTRAST: CPT | Mod: MG | Performed by: STUDENT IN AN ORGANIZED HEALTH CARE EDUCATION/TRAINING PROGRAM

## 2024-07-08 PROCEDURE — G1010 CDSM STANSON: HCPCS | Performed by: STUDENT IN AN ORGANIZED HEALTH CARE EDUCATION/TRAINING PROGRAM

## 2024-07-08 PROCEDURE — 71260 CT THORAX DX C+: CPT | Mod: MG | Performed by: STUDENT IN AN ORGANIZED HEALTH CARE EDUCATION/TRAINING PROGRAM

## 2024-07-08 RX ORDER — IOPAMIDOL 755 MG/ML
105 INJECTION, SOLUTION INTRAVASCULAR ONCE
Status: COMPLETED | OUTPATIENT
Start: 2024-07-08 | End: 2024-07-08

## 2024-07-08 RX ADMIN — IOPAMIDOL 105 ML: 755 INJECTION, SOLUTION INTRAVASCULAR at 10:03

## 2024-07-08 NOTE — DISCHARGE INSTRUCTIONS

## 2024-07-08 NOTE — NURSING NOTE
Chief Complaint   Patient presents with    Blood Draw     Labs drawn via PIV placed by Vascular Access Team in lab     Labs drawn from PIV placed by Vascular Access Team. Line flushed with saline. Vitals taken. Pt checked in for appointment(s).     Twyla Pace RN

## 2024-07-09 LAB — CGA SERPL-MCNC: 683 NG/ML

## 2024-07-11 ENCOUNTER — THERAPY VISIT (OUTPATIENT)
Dept: PHYSICAL THERAPY | Facility: REHABILITATION | Age: 69
End: 2024-07-11
Attending: NURSE PRACTITIONER
Payer: MEDICARE

## 2024-07-11 DIAGNOSIS — G20.B1 PARKINSON'S DISEASE WITH DYSKINESIA WITHOUT FLUCTUATING MANIFESTATIONS (H): ICD-10-CM

## 2024-07-11 PROCEDURE — 97161 PT EVAL LOW COMPLEX 20 MIN: CPT | Mod: GP | Performed by: PHYSICAL THERAPIST

## 2024-07-11 NOTE — PROGRESS NOTES
"PHYSICAL THERAPY EVALUATION  Type of Visit: Evaluation    Subjective       Presenting condition or subjective complaint: parkensins back ache  Diagnosis: Parkinson's Disease  Date of diagnosis: about 5-6 years ago, ~2018  Patient's Neurologist: Dr. Armenta Date of last Neurologist visit: 6/11/24 with Candida   First movement disorder symptom presentation and Date: Freezing episodes every once in a while; about 7 years ago (~2017)  Non-motor symptoms: Loss of smell (at least 10 years), Pain (low back), Early mild cognitive impairment, Anxiety, Urinary symptoms, Sleep disturbances, and Restless Leg Syndrome.   Time of last PD med: 0945 Time of next PD med: 1400. On/Off presentation/symptoms: Not really.   History of PD specific PT? Yes at [email or web address], last bout between Dec 2023-Jan 2024  Current Activity Level: Denies a typical exercise routine, stays busy with errands. Mostly sedentary  Chief Mobility Complaint: Harder to get out of some chairs   Freezing: Pt reports that he's starting to get that again; can occur when standing at a counter..he has difficulty backing up or turning away, \"my feet won't move\"      Date of onset: 06/11/24    Relevant medical history: Chest pain; Diabetes; Hearing problems; Heart problems; High blood pressure; Implanted device; Overweight; Pacemaker; Parkinson s Disease   Dates & types of surgery: by pass spline removed gallblader removed    Prior diagnostic imaging/testing results: MRI; CT scan     Prior therapy history for the same diagnosis, illness or injury:        Prior Level of Function  Transfers: Independent  Ambulation: Independent    Living Environment  Social support: With a significant other or spouse   Type of home: [facility]   Stairs to enter the home:         Ramp: No   Stairs inside the home: Yes 14 Is there a railing: Yes     Help at home: None  Equipment owned: Straight Cane; Walker; Bedrail     Patient goals for therapy: walk longer    Pain assessment: Pain denied   "   Objective     Cognitive Status Examination  Orientation: Oriented to person, place and time   Level of Consciousness: Alert  Follows Commands and Answers Questions: 100% of the time  Personal Safety and Judgement: Intact  Memory: Intact    OBSERVATION  Bradykinesia and Hypokinesia (Combining slowness, hesitancy, decreased arm swing, small amplitude and poverty of movement in general):  Mild  Dyskinesia: Yes Very mild     Posture: Normal   Rigidity: Mild    RANGE OF MOTION: LE ROM WFL  STRENGTH:  See below  SENSATION: LE Sensation WNL    BED MOBILITY: Independent    TRANSFERS: Independent    GAIT ANALYSIS: Mildly dyskinetic but demos good pace and step length; mild path deviation observed    BALANCE: See miniBEST    SPECIAL TESTS    360 degree turn (steps) 6 Clockwise  6 Counter Clockwise          With no AD    Timed Up and Go (TUG) 8.23 sec  With no AD Increased risk for falls with standard TUG >11.5secs for people with PD, >13.5secs for Community Dwelling Elders.   Cognitive TUG  8.96 sec    Task: Counting backwards by 3's from 50; 50 to 35 correctly    5 Times Sit-to-Stand (5TSTS) - sec  10.65 Increased risk for falls 5x STS >16 secs for people with PD, >15 secs for Community Dwelling Elders.  5x STS Normative Values by Age Category (Healthy Population)( Age in years (n) Mean   SD): Ages 70-79: 9.3   2.1 sec    30 sec Chair Stand: 14 reps  Comments: without UE support Age/Gender Norm: Male 70-74: 15   Mini BESTest: TOTAL SCORE (out of 28): 20 <22/28 indicates fall risk; See full MiniBEST for details.   10 Meter Walk Test (Comfortable):  6m in 5.00 sec:  1.2m/s  # of steps: 10 Age/Gender Norm (meters/second): Men in their 70s: 1.262    10 Meter Walk Test (Fast):  6m in 4.01sec: 1.49 m/s  # of steps: 9    6 Minute Walk Test (6MWT)   NT due to time constraints Age/Gender Norm: Female 70-79 yrs: 471 m/1545.28ft    Functional Gait Assessment (FGA):     <22/30 indicates fall risk in community dwelling elder, PD <18/30        COORDINATION/MOTOR PLANNING: NT  Four Square Step Test NT    Assessment & Plan   CLINICAL IMPRESSIONS  Medical Diagnosis: Parkinson's Disease    Treatment Diagnosis: Postural Instability, Impaired gait, LE weakness   Impression/Assessment: Pt is a 68 yo male who was diagnosed with Parkinson's Disease (PD) in 2018.  The pt has a previous history with PD specific skilled physical therapy in a different system with last bout at the end of last year.  The pt comes to physical therapy with the chief complaints of difficulty getting out of chairs and cars as well as some festination and freezing of gait.  Today's PT eval reveals the pt is below age/gender norms for functional LE strength and presents at an elevated fall risk from the balance assessment findings of 20/28 on the miniBEST.  Pt reports increasing frequency of freezing and festinating of gait. Patient would benefit from skilled 1:1 PT to address deficits, decrease falls risk, and optimize function due to progressive nature of disease.       Clinical Decision Making (Complexity):  Clinical Presentation: Stable/Uncomplicated  Clinical Presentation Rationale: based on medical and personal factors listed in PT evaluation  Clinical Decision Making (Complexity): Low complexity    PLAN OF CARE  Treatment Interventions:  Interventions: Gait Training, Neuromuscular Re-education, Therapeutic Activity, Therapeutic Exercise, Self-Care/Home Management    Long Term Goals     PT Goal 1  Goal Description: Pt will be independent with her HEP for ongoing symptom management in 12 weeks.  PT Goal 2  Goal Description: Pt will report freezing and festinating incidents by at least 50% in 12 weeks.  PT Goal 3  Goal Description: Pt will improve functional balance from 20/28 on the miniBEST to 25/28 on the mini BEST in 12 weeks.  PT Goal 4  Goal Description: Pt will improve LE functional strength from 14 stands to 16 stands in 12 weeks.      Frequency of Treatment:  2x/wk  Duration of Treatment: 10 visits over 12 weeks    Recommended Referrals to Other Professionals:  Speech Therapy  Education Assessment:   Learner/Method: Patient  Education Comments: POC    Risks and benefits of evaluation/treatment have been explained.   Patient/Family/caregiver agrees with Plan of Care.     Evaluation Time:           Signing Clinician: Salma Blount, PT        [facility]                                                                                   OUTPATIENT PHYSICAL THERAPY      PLAN OF TREATMENT FOR OUTPATIENT REHABILITATION   Patient's Last Name, First Name, Arnaldo Villa YOB: 1955   Provider's Name   [facility]   Medical Record No.  [id number]     Onset Date: 06/11/24  Start of Care Date: 07/11/24     Medical Diagnosis:  Parkinson's Disease      PT Treatment Diagnosis:  Postural Instability, Impaired gait, LE weakness Plan of Treatment  Frequency/Duration: 2x/wk/ 10 visits over 12 weeks    Certification date from 07/11/24 to 10/03/24         See note for plan of treatment details and functional goals     Salma Blount, PT                         I CERTIFY THE NEED FOR THESE SERVICES FURNISHED UNDER        THIS PLAN OF TREATMENT AND WHILE UNDER MY CARE     (Physician attestation of this document indicates review and certification of the therapy plan).              Referring Provider:  Alberto Jackson    Initial Assessment  See Epic Evaluation- Start of Care Date: 07/11/24

## 2024-07-15 ENCOUNTER — VIRTUAL VISIT (OUTPATIENT)
Dept: ONCOLOGY | Facility: CLINIC | Age: 69
End: 2024-07-15
Attending: INTERNAL MEDICINE
Payer: MEDICARE

## 2024-07-15 VITALS
HEIGHT: 68 IN | BODY MASS INDEX: 32.28 KG/M2 | DIASTOLIC BLOOD PRESSURE: 67 MMHG | SYSTOLIC BLOOD PRESSURE: 137 MMHG | WEIGHT: 213 LBS

## 2024-07-15 DIAGNOSIS — C7A.8 NEUROENDOCRINE CARCINOMA OF SMALL BOWEL (H): Primary | ICD-10-CM

## 2024-07-15 DIAGNOSIS — C77.2 MALIGNANT NEOPLASM METASTATIC TO INTRA-ABDOMINAL LYMPH NODE (H): ICD-10-CM

## 2024-07-15 DIAGNOSIS — Z86.0100 HISTORY OF COLONIC POLYPS: ICD-10-CM

## 2024-07-15 DIAGNOSIS — C7B.8 OTHER SECONDARY NEUROENDOCRINE TUMORS (H): ICD-10-CM

## 2024-07-15 PROBLEM — M47.816 LUMBAR SPONDYLOSIS: Status: ACTIVE | Noted: 2024-07-15

## 2024-07-15 PROCEDURE — 99214 OFFICE O/P EST MOD 30 MIN: CPT | Mod: 95 | Performed by: INTERNAL MEDICINE

## 2024-07-15 ASSESSMENT — PAIN SCALES - GENERAL: PAINLEVEL: NO PAIN (0)

## 2024-07-15 NOTE — NURSING NOTE
Is the patient currently in the state of [place]? YES    Visit mode:VIDEO    If the visit is dropped, the patient can be reconnected by: VIDEO VISIT: Send to e-mail at: [email or web address]    Will anyone else be joining the visit? NO  (If patient encounters technical issues they should call [phone])    How would you like to obtain your AVS? MyChart    Are changes needed to the allergy or medication list? No    Reason for visit: Video Visit (Follow Up )    Dasia TORRES

## 2024-07-15 NOTE — LETTER
7/15/2024      Arnaldo Henderson  [address] [address]      Dear Colleague,    Thank you for referring your patient, Arnaldo Henderson, to the [facility] CANCER CLINIC. Please see a copy of my visit note below.    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    He is 68 years old and had his cancer found incidentally while undergoing a pancreatectomy for pancreatitis.  His tumor is well-differentiated with a Ki-67 index of 10%.  He had his primary resected but has dotatate positive kiersten disease in the mesenteric nodes and reji hepatis.  He has not had any symptoms directly referable to his cancer.  He returns today for ongoing assessment of his disease progression.    He tells me that overall his condition is about the same and his biggest problem continues to be his Parkinson's.  He has been having more freezing episodes and is about to change his meds for that.  He denies any diarrhea or flushing.  His appetite and weight are stable.  He continues on with speech and physical therapy.    On exam via the video link he appears quite well.    I personally reviewed his CT scan and went over the results with him and his wife.Is kiersten disease appears the same.  Soft tissue along his hepatic artery appears perhaps slightly increased.  He has some small pulmonary nodules of uncertain significance.    His chromogranin A level is gone up slightly at 683 though with the new assay with a higher normal range.  His electrolytes and renal function are normal.  His bilirubin, albumin and liver enzymes are normal.  His blood counts are normal.    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  He currently has no symptoms clearly referable to his cancer and has minimal to no radiographic change in his disease.  At this point I do not think we need to worry about active treatment  and we will plan on reevaluating his disease status again in another 6 months.  He will let us know sooner if he develops any new symptoms.  He has history of multiple colonic polyps and a colonoscopy elsewhere and recently received a recommendation that he was due for follow-up examination.  We talked about this some and I think his overall prognosis is good enough it is reasonable to continue screening for colon cancer and we will get him set up to get that done at his convenience.  Parkinson's.  He will continue close follow-up with neurology.      Again, thank you for allowing me to participate in the care of your patient.        Sincerely,        Caio Lyn MD

## 2024-07-15 NOTE — LETTER
7/15/2024         RE: Arnaldo Henderson  [address] [address]      I am seeing Sri Henderson today in follow-up of neuroendocrine tumor of the small bowel.    He is 68 years old and had his cancer found incidentally while undergoing a pancreatectomy for pancreatitis.  His tumor is well-differentiated with a Ki-67 index of 10%.  He had his primary resected but has dotatate positive kiersten disease in the mesenteric nodes and reji hepatis.  He has not had any symptoms directly referable to his cancer.  He returns today for ongoing assessment of his disease progression.    He tells me that overall his condition is about the same and his biggest problem continues to be his Parkinson's.  He has been having more freezing episodes and is about to change his meds for that.  He denies any diarrhea or flushing.  His appetite and weight are stable.  He continues on with speech and physical therapy.    On exam via the video link he appears quite well.    I personally reviewed his CT scan and went over the results with him and his wife.Is kiersten disease appears the same.  Soft tissue along his hepatic artery appears perhaps slightly increased.  He has some small pulmonary nodules of uncertain significance.    His chromogranin A level is gone up slightly at 683 though with the new assay with a higher normal range.  His electrolytes and renal function are normal.  His bilirubin, albumin and liver enzymes are normal.  His blood counts are normal.    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  He currently has no symptoms clearly referable to his cancer and has minimal to no radiographic change in his disease.  At this point I do not think we need to worry about active treatment and we will plan on reevaluating his disease status again in another 6 months.  He will let us know sooner if he develops any new symptoms.  He has history of  multiple colonic polyps and a colonoscopy elsewhere and recently received a recommendation that he was due for follow-up examination.  We talked about this some and I think his overall prognosis is good enough it is reasonable to continue screening for colon cancer and we will get him set up to get that done at his convenience.  Parkinson's.  He will continue close follow-up with neurology.        Caio Lyn MD

## 2024-07-15 NOTE — PROGRESS NOTES
Virtual Visit Details    Type of service:  Video Visit   Video Start Time:  100  Video End Time: 130  Originating Location (pt. 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    He is 68 years old and had his cancer found incidentally while undergoing a pancreatectomy for pancreatitis.  His tumor is well-differentiated with a Ki-67 index of 10%.  He had his primary resected but has dotatate positive kiersten disease in the mesenteric nodes and reji hepatis.  He has not had any symptoms directly referable to his cancer.  He returns today for ongoing assessment of his disease progression.    He tells me that overall his condition is about the same and his biggest problem continues to be his Parkinson's.  He has been having more freezing episodes and is about to change his meds for that.  He denies any diarrhea or flushing.  His appetite and weight are stable.  He continues on with speech and physical therapy.    On exam via the video link he appears quite well.    I personally reviewed his CT scan and went over the results with him and his wife.Is kiersten disease appears the same.  Soft tissue along his hepatic artery appears perhaps slightly increased.  He has some small pulmonary nodules of uncertain significance.    His chromogranin A level is gone up slightly at 683 though with the new assay with a higher normal range.  His electrolytes and renal function are normal.  His bilirubin, albumin and liver enzymes are normal.  His blood counts are normal.    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  He currently has no symptoms clearly referable to his cancer and has minimal to no radiographic change in his disease.  At this point I do not think we need to worry about active treatment and we  will plan on reevaluating his disease status again in another 6 months.  He will let us know sooner if he develops any new symptoms.  He has history of multiple colonic polyps and a colonoscopy elsewhere and recently received a recommendation that he was due for follow-up examination.  We talked about this some and I think his overall prognosis is good enough it is reasonable to continue screening for colon cancer and we will get him set up to get that done at his convenience.  Parkinson's.  He will continue close follow-up with neurology.

## 2024-07-22 ENCOUNTER — TELEPHONE (OUTPATIENT)
Dept: GASTROENTEROLOGY | Facility: CLINIC | Age: 69
End: 2024-07-22

## 2024-07-22 ENCOUNTER — THERAPY VISIT (OUTPATIENT)
Dept: SPEECH THERAPY | Facility: REHABILITATION | Age: 69
End: 2024-07-22
Attending: NURSE PRACTITIONER
Payer: MEDICARE

## 2024-07-22 DIAGNOSIS — R49.0 DYSPHONIA: ICD-10-CM

## 2024-07-22 DIAGNOSIS — G20.A1 HYPOKINETIC PARKINSONIAN DYSPHONIA (H): Primary | ICD-10-CM

## 2024-07-22 DIAGNOSIS — R47.1 DYSARTHRIA: ICD-10-CM

## 2024-07-22 DIAGNOSIS — G20.B1 PARKINSON'S DISEASE WITH DYSKINESIA WITHOUT FLUCTUATING MANIFESTATIONS (H): ICD-10-CM

## 2024-07-22 DIAGNOSIS — R49.0 HYPOKINETIC PARKINSONIAN DYSPHONIA (H): Primary | ICD-10-CM

## 2024-07-22 PROCEDURE — 92524 BEHAVRAL QUALIT ANALYS VOICE: CPT | Mod: GN | Performed by: SPEECH-LANGUAGE PATHOLOGIST

## 2024-07-22 PROCEDURE — 92507 TX SP LANG VOICE COMM INDIV: CPT | Mod: GN | Performed by: SPEECH-LANGUAGE PATHOLOGIST

## 2024-07-22 NOTE — TELEPHONE ENCOUNTER
Writer called pt's spouse and offered 08/01 w/Dr. Moreira at  w/[facility] appt. Pt's spouse stated that appt estella not work.

## 2024-07-22 NOTE — TELEPHONE ENCOUNTER
"Pt not scheduled. Spouse stated Dr. Saldana wanted to have pt seen by Dr. Moreira. Due to exclusions and RN review pt will need to be scheduled in hospital setting with MAC sedation (per triage notes). Dr. Moreira does not have MAC appt blocks at [facility] and his [place] blocks are full. Pt stated hey will speak with Dr. Saldana about how to proceed. Writer also advised pt to discuss appropriate sedation with Dr. Saldana.       Endoscopy Scheduling Screen    Have you had a positive Covid test in the last 14 days?  No    What is your communication preference for Instructions and/or Bowel Prep?   MyChart    What insurance is in the chart?  Other:  Medicare, BCBS    Ordering/Referring Provider: Caio Lyn MD   (If ordering provider performs procedure, schedule with ordering provider unless otherwise instructed. )    BMI: Estimated body mass index is 32.39 kg/m  as calculated from the following:    Height as of 7/15/24: 1.727 m (5' 8\").    Weight as of 7/15/24: 96.6 kg (213 lb).     Sedation Ordered  moderate sedation.   If patient BMI > 50 do not schedule in [facility].    If patient BMI > 45 do not schedule at [facility].    Are you taking methadone or Suboxone?  No    Have you had difficulties, pain, or discomfort during past endoscopy procedures?  No    Are you taking any prescription medications for pain 3 or more times per week?   NO, No RN review required.    Do you have a history of malignant hyperthermia?  No    (Females) Are you currently pregnant?   No     Have you been diagnosed or told you have pulmonary hypertension?   No    Do you have an LVAD?  No    Have you been told you have moderate to severe sleep apnea?  No    Have you been told you have COPD, asthma, or any other lung disease?  No    Do you have any heart conditions?  Yes     In the past year, have you had any hospitalizations for heart related issues including cardiomyopathy, heart attack, or stent placement?  No    Do you have any implantable " "devices in your body (pacemaker, ICD)?  Pacemaker (schedule colonoscopy in Hospital Only)    Do you take nitroglycerine?  No    Have you ever had or are you waiting for an organ transplant?  No. Continue scheduling, no site restrictions.    Have you had a stroke or transient ischemic attack (TIA aka \"mini stroke\" in the last 6 months?   No    Have you been diagnosed with or been told you have cirrhosis of the liver?   No    Are you currently on dialysis?   No    Do you need assistance transferring?   No    BMI: Estimated body mass index is 32.39 kg/m  as calculated from the following:    Height as of 7/15/24: 1.727 m (5' 8\").    Weight as of 7/15/24: 96.6 kg (213 lb).     Is patients BMI > 40 and scheduling location [facility]?  No    Do you take an injectable medication for weight loss or diabetes (excluding insulin)?  No    Do you take the medication Naltrexone?  No    Do you take blood thinners?  No       Prep   Are you currently on dialysis or do you have chronic kidney disease?  No    Do you have a diagnosis of diabetes?  Yes (Golytely Prep)    Do you have a diagnosis of cystic fibrosis (CF)?  No    On a regular basis do you go 3 -5 days between bowel movements?  No    BMI > 40?  No    Preferred Pharmacy:    [facility] - [place], [place] - [address] [place] [address]  [address] [facility] [address]  Phone: [phone] Fax: [phone]    [facility]pharm[facility] #[id number] - [address] [address]  Phone: [phone] Fax: [phone]      Final Scheduling Details       Patient Reminders:   You will receive a call from a Nurse to review instructions and health history.  This assessment must be completed prior to your procedure.  Failure to complete the Nurse assessment may result in the procedure being cancelled.      On the day of your procedure, please designate an adult(s) who can drive you home stay with you for the next 24 hours. The medicines " used in the exam will make you sleepy. You will not be able to drive.      You cannot take public transportation, ride share services, or non-medical taxi service without a responsible caregiver.  Medical transport services are allowed with the requirement that a responsible caregiver will receive you at your destination.  We require that drivers and caregivers are confirmed prior to your procedure.

## 2024-07-22 NOTE — PROGRESS NOTES
SPEECH LANGUAGE PATHOLOGY EVALUATION       Fall Risk Screen:  Fall screen completed by: SLP  Have you fallen 2 or more times in the past year?: No  Have you fallen and had an injury in the past year?: No  Is patient a fall risk?: No    Subjective      Presenting condition or subjective complaint: guillermo back ache  Patient is a 69 year old male who presents to evaluation with speech/voice concerns.  Per EMR, patient with a diagnosis of Parkinson's Disease ~5-6 years prior.      On today's date, patient reports the following:    Speech/Voice:  I didn't really notice anything, but when I went to my doctor she suggested I go .   I can feel some movement in my mouth, like slurring .  Wife reports he is harder to hear/understand.    Cognitive/Linguistic:  Patient endorses mild difficulty with finding words/dates.  Manages own medications and ride set up.  Wife does appointment scheduling.     Swallowing: Patient denied concerns with swallowing at this time.    Date of onset: 06/11/24 (order date)    Relevant medical history: Chest pain; Diabetes; Hearing problems; Heart problems; High blood pressure; Implanted device; Overweight; Pacemaker; Parkinson s Disease   Dates & types of surgery: by pass spline removed gallblader removed    Prior diagnostic imaging/testing results: MRI; CT scan     Prior therapy history for the same diagnosis, illness or injury:    No    Living Environment  Social support: With a significant other or spouse   Help at home: None  Equipment owned: Straight Cane; Walker; Bedrail     Employment:    Retired- worked in manufacturing.  Hobbies/Interests:      Patient goals for therapy: walk longer    Pain assessment:  No specific pain reported during the evaluation.     Objective   SPEAKOUT! EVALUATION OF SPEECH & VOICE:    Vocal Intensity:   Conversation: Avg. vocal intensity: 75 dB  Paragraph reading: Avg. vocal intensity: 75 dB  Avg. intensity of sustained phonation on /a/: 88 dB fading to 78 dB  for 23 seconds.       Appears WFL Mild Impairment Moderate Impairment Severe Impairment Profound Impairment   Vocal Intensity X       Breath Support X       Intonation of Speech X       Articulation  X      Intelligibility X       Fluency X       Rate of Speech X       Vocal Quality X         Vocal Quality Description:   (  ) Wet vocal quality   (  ) Glottal amin   (  ) Pitch Breaks       (  ) Phonation Breaks   (  ) Vocal Tremors   (  ) Breathy   (  ) Aphonia    (  ) Harsh/Hoarse  Additional Information:        STIMULABILITY TESTING:  Using techniques patient performed the following:  Patient read phrases/sentences at a vocal intensity average of 84 dB    The following improvements were noted:   (  ) No Improvement   ( X ) Articulation  ( X ) Vocal Quality   (  ) Facial Expression    (  ) Fluency    ( X ) Intonation of Speech  (  ) Rate of Speech   (  ) Intelligibility  ( X ) Vocal Intensity   (  ) Breath Support    Assessment & Plan   CLINICAL IMPRESSIONS   Medical Diagnosis: Parkinson's disease with dyskinesia without fluctuating manifestations (H) (G20.B1)  - Primary, Dysphonia (R49.0), Dysarthria (R47.1)    Treatment Diagnosis: Hypokinetic Parkinsonian Dysphonia   Impression/Assessment: Pt is a 69 year old male to evaluate speech/voice relating to Parkinson's Disease diagnosis.  Per evaluation, patient presents with vocal intensity and breath support WNL, mildly decreased articulatory precision- improved with stimulability testing.  Education/training provided on home maintenance program.  No further therapy recommended at this time, recommend return in ~12 months or if communication/swallowing concerns arise.    PLAN OF CARE  Treatment Interventions: Speech    Prognosis to achieve stated therapy goals is good   Rehab potential is impacted by: current level of function, family/caregiver support, patient awareness of deficits, patient motivation, prior level of function    Long Term Goals:   SLP Goal 1  Goal  Identifier: HEP  Goal Description: Patient will verbalize understanding of home exercise program for voice/speech maintenance.  Rationale: To maximize functional communication within the home or community;To maximize the ability to communicate wants and needs within the home or community  Target Date: 07/29/24      Frequency of Treatment: x1 session  Duration of Treatment: 1 week     Recommended Referrals to Other Professionals: Physical Therapy  Education Assessment:   Learner/Method: Patient;Pictures/Video;Listening  Education Comments: Intent/Loud voice, Voice r/t PD diagnosis    Risks and benefits of evaluation/treatment have been explained.   Patient/Family/caregiver agrees with Plan of Care.     Evaluation Time:    Voice Minutes (62961): 25    Signing Clinician: Liset Villarreal, SLP      [facility]                                                                                   OUTPATIENT SPEECH LANGUAGE PATHOLOGY      PLAN OF TREATMENT FOR OUTPATIENT REHABILITATION   Patient's Last Name, First Name, Arnaldo Villa YOB: 1955   Provider's Name   [facility]   Medical Record No.  [id number]     Onset Date: 06/11/24 (order date) Start of Care Date: 07/22/24     Medical Diagnosis:  Parkinson's disease with dyskinesia without fluctuating manifestations (H) (G20.B1)  - Primary, Dysphonia (R49.0), Dysarthria (R47.1)      SLP Treatment Diagnosis: Hypokinetic Parkinsonian Dysphonia  Plan of Treatment  Frequency/Duration: x1 session  / 1 week     Certification date from 07/22/24   To 07/29/24          See note for plan of treatment details and functional goals     Liset Villarreal, SLP                         I CERTIFY THE NEED FOR THESE SERVICES FURNISHED UNDER        THIS PLAN OF TREATMENT AND WHILE UNDER MY CARE     (Physician attestation of this document indicates review and certification of the therapy plan).              Referring  Provider:  Alberto Jackson    Initial Assessment  See Epic Evaluation- 07/22/24

## 2024-08-05 ENCOUNTER — ANCILLARY PROCEDURE (OUTPATIENT)
Dept: CARDIOLOGY | Facility: CLINIC | Age: 69
End: 2024-08-05
Attending: INTERNAL MEDICINE
Payer: MEDICARE

## 2024-08-05 DIAGNOSIS — I44.1 HEART BLOCK AV SECOND DEGREE: ICD-10-CM

## 2024-08-05 DIAGNOSIS — Z95.0 CARDIAC PACEMAKER IN SITU: ICD-10-CM

## 2024-08-05 DIAGNOSIS — I45.5 SINUS PAUSE: ICD-10-CM

## 2024-08-07 ENCOUNTER — OFFICE VISIT (OUTPATIENT)
Dept: ENDOCRINOLOGY | Facility: CLINIC | Age: 69
End: 2024-08-07
Payer: MEDICARE

## 2024-08-07 VITALS
BODY MASS INDEX: 33.16 KG/M2 | WEIGHT: 218.1 LBS | DIASTOLIC BLOOD PRESSURE: 85 MMHG | HEART RATE: 81 BPM | SYSTOLIC BLOOD PRESSURE: 142 MMHG | OXYGEN SATURATION: 95 %

## 2024-08-07 DIAGNOSIS — E11.65 TYPE 2 DIABETES MELLITUS WITH HYPERGLYCEMIA, WITH LONG-TERM CURRENT USE OF INSULIN (H): ICD-10-CM

## 2024-08-07 DIAGNOSIS — Z79.4 TYPE 2 DIABETES MELLITUS WITH HYPERGLYCEMIA, WITH LONG-TERM CURRENT USE OF INSULIN (H): ICD-10-CM

## 2024-08-07 PROCEDURE — 99215 OFFICE O/P EST HI 40 MIN: CPT | Mod: 25 | Performed by: INTERNAL MEDICINE

## 2024-08-07 PROCEDURE — 95251 CONT GLUC MNTR ANALYSIS I&R: CPT | Performed by: INTERNAL MEDICINE

## 2024-08-07 RX ORDER — ACYCLOVIR 400 MG/1
TABLET ORAL
Qty: 9 EACH | Refills: 11 | Status: SHIPPED | OUTPATIENT
Start: 2024-08-07

## 2024-08-07 RX ORDER — INSULIN ASPART 100 [IU]/ML
6-20 INJECTION, SOLUTION INTRAVENOUS; SUBCUTANEOUS 3 TIMES DAILY PRN
Qty: 40 ML | Refills: 3 | Status: SHIPPED | OUTPATIENT
Start: 2024-08-07

## 2024-08-07 NOTE — PROGRESS NOTES
Endocrinology Clinic Visit     Arnaldo Henderson is a 69 year old male with CAD s/p CABG, 2nd degree AV block, DM, GERD, HTN, h/o chronic pancreatitis s/p distal pancreatectomy, neuroendocrine tumor and PD who is here for diabetes management.       Interval History  More stress on health.   Pt continue to have left sided abdominal discomfort comes and goes from scar tissue.  No nausea/ belly pain after eating   Discomfort both calf.  Feel better with walking, but back pain with too much walking  Pt tried melatonin to help with sleep.    Basaglar 30 unit(s) PM and 14 unit(s) AM  Novolog 6-8  with breakfast, lunch 8-10, supper 10-12 unit(s)   Pat has good days and bad days.  Glucose seems to be higher on the day with more stress  This AM had fat free yogurt, 2 pieces of toast, Pat took Novolog 6 unit(s) this AM but glucose was still high after breakfast, took 8 unit(s) at 9 am but glucose is difficult to come down. Finally started to come down just before lunch time.    CGM 9/24-8/6  [address] GMI 8.4  CV 29  TIR 32%, low 0%  Hyperglycemia after meal    Initial visit 1/10/24    Per Dr. Lyn note 11/27/2023  He is 68 years old.  His tumor was found incidentally when he was undergoing a pancreatectomy for chronic pancreatitis.    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  He has small volume mildly progressive disease within the abdomen without any clearly associated symptoms.   For now his rate of growth and very small volume disease does not warrant active intervention. We will plan on a follow-up scan in 3 to 4 months.     Per Inpatient DM team note 7/2023  Arnaldo Henderson 69 y/o with PMH chronic pancreatitis, CAD, CABG, 2nd degree AVB, Parkinson's disease and chronic pain. He is now s/p distal pancreatectomy, splenectomy, YVAN, proximal ileum resection. He is admitted to the SICU for hemodynamic monitoring.  # T2DM not at goal with hyperglycemia.  Poor control prior to  admission   # Stress induced hyperglycemia  # post pancreatic surgery  Plan:                  -Lantus 15 units                 -Novolog 1u/10 gm CHO coverage with meals and snacks                 -Continue insulin drip for now.                 -BG monitoring as per insulin drip protocol                 -hypoglycemia protocol                 -recommend diet with carb counting protocol                 -diabetes education needs will be assessed closer to discharge                 -on discharge, will recommend outpatient follow up with [facility] Endocr[facility]ology service     Per most recent endocrine clinic visit with my colleague Hayley Churchillar PAC 1/2024   TYPE 2 DIABETES MELLITUS:   Sugars are currently at goal but with excessive hypoglycemia.  Advising him to reduce his Basaglar dose to 14 units in the morning 30 in the evening nearly 10% reduction.  Also advising him to continue his breakfast dose of 4 units but decrease lunch dose to just 7 units and dinner dose to 10 units plus any correction.  Will continue current correction.   5.  Neuroendocrine tumor: See Dr. Gail mcarthur next week    I confirmed with patient and wife and they confirmed that Pat is seeing me today for diabetes.  They would like to have a doctor on the team also with our diabetes team at the .    Prior to admission, he was on metformin 2000 mg/day, no side effect  Pt and wife reported seeing doctors for diabetes once during inpatient.  Report that about 60% of his pancreas was removed  Pt follows neuroendocrine tumor with Dr. Lyn no concerns.    Patient's main concerns today are his diabetes control after steroid injection and his recent lab results.  11/21/2023  Steroid injection, glucose was high   He may need another injection in the future and is concerned.  They are wondering about high BUN, previous abnormal LFT, low hematocrit and abnormal RBC    In terms of symptoms, he said PD is bothersome  But no problem taking insulin  Feels like  hypoglycemia improve since lower insulin dose last week    Pt only has [employer] alarm set for urgent low.  Sometimes he could not feel low glucose.  He recently bought glucose tablets to help with lows    Current regimen  Basaglar 14 units AM 30 units hs  Novolog 4 units with breakfast + sliding scale insulin, lunch 7 + sliding scale insulin, supper 10 + sliding scale insulin, night sliding scale insulin    CGM 1 week data  TIR 49%, low 4%, very low 1%         REVIEW OF SYSTEMS  10 point negative except as mentioned in HPI    Past Medical/Surgical History:  Past Medical History:   Diagnosis Date    Acute pancreatitis 04/18/2022    Formatting of this note might be different from the original. Formatting of this note might be different from the original. Added automatically from request for surgery [id number] Formatting of this note might be different from the original. Added automatically from request for surgery [id number]  Formatting of this note might be different from the original. Added automatically from request for surgery     CAD (coronary artery disease)     CABG    Cholecystitis, acute 07/05/2023    Diabetes mellitus, type 2 (H) 2013    Family history of parkinsonism 02/14/2024    Gastroesophageal reflux disease     Hypercholesteremia     Hypertension     Mixed conductive and sensorineural hearing loss of both ears     Mixed hearing loss     Pancreatic duct stricture     Pancreatitis     Parkinson disease (H)     Parkinson's disease, unspecified whether dyskinesia present, unspecified whether manifestations fluctuate (H) 02/14/2024    Seborrheic dermatitis 03/22/2024    Second degree AV block     Surgical site infection 07/26/2023     Past Surgical History:   Procedure Laterality Date    CA ANESTH CABG W/PUMP      CHOLECYSTECTOMY  2022    COLONOSCOPY N/A 01/12/2023    Procedure: colonoscopy with fluroscopy;  Surgeon: Ayden Fraire MD;  Location:  OR    ENDOSCOPIC RETROGRADE CHOLANGIOPANCREATOGRAM N/A 05/18/2023     Procedure: ENDOSCOPIC RETROGRADE CHOLANGIOPANCREATOGRAPHY, WITH  CYSTDUODENOSTOMY STENTS X2 REMOVAL;  Surgeon: Cedric Saldana MD;  Location: [facility] OR    ENT SURGERY      EP PACEMAKER DEVICE & LEAD IMPLANT- RIGHT ATRIAL & RIGHT VENTRICULAR N/A 02/28/2024    Procedure: Pacemaker Device & Lead Implant - Right Atrial & Right Ventricular;  Surgeon: Jenna Hernandez MD;  [facility]    LAPAROSCOPY DIAGNOSTIC (GENERAL) N/A 02/20/2023    Procedure: LAPAROSCOPY, DIAGNOSTIC; RETRIEVAL OF MIGRATED STENT;  Surgeon: Joseluis Lima MD;  Location: [facility] OR    ORTHOPEDIC SURGERY      OTHER SURGICAL HISTORY      skin cancer ? from scrotum    PANCREATECTOMY PEUSTOW N/A 06/30/2023    Procedure: Open distal pancreactectomy with splenectomy, lysis of adhesions, resection of proximal illeum;  Surgeon: Ashvin Haider MD;  Location: [facility] OR    WI CABG, ARTERIAL, TWO  2003       Medications  Current Outpatient Medications   Medication Sig Dispense Refill    aspirin (ASA) 81 MG EC tablet Take 81 mg by mouth daily      BD PEN NEEDLE LINDY 2ND GEN 32G X 4 MM miscellaneous USE TO INJECT INSULIN 4 TIMES A  each 3    carbidopa-levodopa (SINEMET CR)  MG CR tablet Take 1 tablet by mouth at bedtime @9p 90 tablet 3    carbidopa-levodopa (SINEMET)  MG tablet 2@6a, 2@10a, 2@2p and 1@5p 630 tablet 3    Continuous Blood Gluc  (DEXCOM G7 ) ALIA Use to read blood sugars as per 's instructions. 1 each 0    Continuous Glucose Sensor (DEXCOM G7 SENSOR) MISC Change every 10 days. 9 each 11    dasiglucagon HCl (ZEGALOGUE) 0.6 MG/0.6ML SOAJ injection Inject 0.6 mg Subcutaneous once as needed (use for severe hypoglycemia) 1.2 mL 1    Glucagon (BAQSIMI) 3 MG/DOSE nasal powder Spray 1 spray (3 mg) in nostril as needed (severe hypoglycemia) in the event of unconscious hypoglycemia or hypoglycemic seizure. May repeat dose if no response after 15 minutes. (Patient not taking: Reported  on 8/8/2024) 2 each 1    insulin aspart (NOVOLOG FLEXPEN) 100 UNIT/ML pen Inject 6-20 Units subcutaneously 3 times daily as needed for high blood sugar (for meal coverage and correction scale, upto 60 units/day) Take 5 units with breakfast, 10 unit(s) with lunch and 12 unit(s) with supper 40 mL 3    insulin aspart (NOVOLOG FLEXPEN) 100 UNIT/ML pen Inject subcutaneous per sliding scale three times daily with meals:  190-239 = 1 unit  240-289 = 2 units  290-339 = 3 units  >340 = 4 units   And Inject subcutaneously at bedtime per sliding scale:  200-249 = 1 unit  250-300 = 2 units   >300 = 3 units (Patient not taking: Reported on 8/8/2024)      insulin glargine 100 UNIT/ML pen Take 14 unit(s) AM and 30 unit(s) HS, and adjust as needed upto 50 unit(s) /day 50 mL 1    lipase-protease-amylase (ZENPEP) 38931-832462-429804 units CPEP Take 1 capsule by mouth 3 times daily (with meals) 90 capsule 11    magnesium oxide (MAG-OX) 400 MG tablet Take 400 mg by mouth every other day      Multiple Vitamin (MULTI-VITAMINS) TABS Take 1 tablet by mouth daily      traZODone (DESYREL) 50 MG tablet Take 0.5-1 tablets (25-50 mg) by mouth at bedtime 30 tablet 3    atorvastatin (LIPITOR) 40 MG tablet Take 1 tablet (40 mg) by mouth daily 90 tablet 3    gabapentin (NEURONTIN) 300 MG capsule Take 2 capsules (600 mg) by mouth 3 times daily 540 capsule 3    losartan (COZAAR) 50 MG tablet Take 1 tablet (50 mg) by mouth daily 90 tablet 3    omeprazole (PRILOSEC) 20 MG DR capsule Take 1 capsule (20 mg) by mouth daily 90 capsule 3     No current facility-administered medications for this visit.       Allergies  Allergies   Allergen Reactions    Ciprofloxacin Other (See Comments), Hives, Itching, Rash and Unknown     Other reaction(s): Other (see comments)  Oral swelling per [facility]        Dilaudid [Hydromorphone] Rash    Morphine Rash     rash    Penicillins Rash     aka ancef/ rash      Citalopram Unknown    Clindamycin Itching and Rash     "Oxycodone Rash     \"HORRIBLE, SEVERE RASH MAYBE CAUSED BY OXY\"         Family History  family history includes Autism Spectrum Disorder in his grandson; Heart Disease in his son; Lung Cancer in his brother; Other - See Comments in his daughter, father, mother, sister, sister, and son; Parkinsonism (age of onset: 65) in his mother; Psychosis in his mother.    Social History  Social History     Tobacco Use    Smoking status: Former     Types: Cigarettes    Smokeless tobacco: Never    Tobacco comments:     Quit 10 years ago - quit a few times; started at age 12 or 13 and then stopped at 26 years and then stopped for 15 years got  and started smoking again   Substance Use Topics    Alcohol use: Not Currently     Comment: quit 2 years ago       Physical Exam  BP (!) 142/85   Pulse 81   Wt 98.9 kg (218 lb 1.6 oz)   SpO2 95%   BMI 33.16 kg/m    Body mass index is 33.16 kg/m .  GENERAL : Frail, no acute distress, hearing aid in place  NEURO: awake, alert, responds appropriately to questions.      DATA REVIEW  Labs/Imaging  Lab Results   Component Value Date    A1C 8.2 07/08/2024    A1C 7.2 05/09/2024    A1C 8.2 11/16/2023    A1C 9.6 06/30/2023       Latest Reference Range & Units 02/15/24 07:36   Glucose 70 - 99 mg/dL 87      Latest Reference Range & Units 02/15/24 07:36   C-Peptide 0.9 - 6.9 ng/mL <0.1 (L)   (L): Data is abnormally low    ASSESSMENT/PLAN:   Arnaldo Henderson is a 69 year old male with CAD s/p CABG, 2nd degree AV block, DM, GERD, HTN, h/o chronic pancreatitis s/p distal pancreatectomy, neuroendocrine tumor and PD who is here for diabetes management.     ## T2DM  ## H/o chronic pancreatitis  ## Partial pancreatectomy (report 60%)  ## CAD s/p CABG  ## Hypoglycemia unawareness  ## H/o Grade 2 hypoglycemia  ## Cannot tolerate metformin  goal A1c 8  CGM Time in target () >50%, and low (<70) <1%  Currently above goal, worsening glucose, ? Stress induced  Unable to use GLP1 agonist given h/o " pancreatitis  Low C-peptide, hold off SGLT2i  -- continue basaglar 14 unit(s) AM and 30 unit(s) at hs  -- continue Novolog 6-8 unit(s) with breakfast, 8-10 unit(s) with lunch and 10-12 unit(s) with supper  -- add Novolog correction scale as needed before meal  151-200 take additional 2 unit(s)  201-250  4  251-300  6  >300   8  -- Glucagon for emergency    CDE referral for routine diabetes  Follow-up 2 month with Catherine Gomez  Follow up 4 month with me    08/15/24  Hi Idalia and endocrine team,    Could you call and let the patient know that his numbers are still not at goal but slightly improved for TIR, but more hypoglycemia  (TIR 32%, low 0%--> TIR 41% and low 1%, very low <1%).  We can change supper correction scale from 2/50 >150 to 1/50 >150,  Eg glucose 151-200 take correction scale Novolog 1 unit(s), 201-250 take 2 unit(s), 251-300 take 3 unit(s) and >300 take 4 unit(s)   Keep the correction 2/50 for breakfast and lunch.    For high glucose during the day, try to take Novolog 15 mins before meal.  Could you clarify with patient for the not feeling well, does he mean, feeling low glucose?  If that's the case, hope this decrease in insulin will help.  If it's something else, I recommend patient get further evaluation and management with his primary.    I have limited access to the internet, if there's an urgent issues, please contact the covering physician or patient to get further evaluation and management in the ED.    Thank you.    Orders Placed This Encounter   Procedures    Adult Diabetes Education  Referral     40 minutes spent on the date of the encounter doing chart review, history and exam, documentation and further activities as noted above.  This time excludes time spent reviewing CGM.          Mj Reynolds MD

## 2024-08-07 NOTE — PATIENT INSTRUCTIONS
## T2DM  ## H/o chronic pancreatitis  ## H/o grade 2 hypoglycemia  Glucose is higher ? Stress induced  -- continue basaglar 14 unit(s) AM and 30 unit(s) at hs  -- continue Novolog 6-8 unit(s) with breakfast, 8-10 unit(s) with lunch and 10-12 unit(s) with supper  -- add Novolog correction scale as needed before meal  151-200 take additional 2 unit(s)  201-250  4  251-300  6  >300   8  -- Glucagon for emergency    CDE referral for routine diabetes  Follow-up 2 month with Catherine Gomez  Follow up 4 month with me

## 2024-08-08 ENCOUNTER — OFFICE VISIT (OUTPATIENT)
Dept: FAMILY MEDICINE | Facility: CLINIC | Age: 69
End: 2024-08-08
Payer: MEDICARE

## 2024-08-08 VITALS
HEART RATE: 75 BPM | DIASTOLIC BLOOD PRESSURE: 84 MMHG | RESPIRATION RATE: 16 BRPM | SYSTOLIC BLOOD PRESSURE: 140 MMHG | BODY MASS INDEX: 35.03 KG/M2 | WEIGHT: 218 LBS | HEIGHT: 66 IN | TEMPERATURE: 98.4 F | OXYGEN SATURATION: 95 %

## 2024-08-08 DIAGNOSIS — R10.84 ABDOMINAL PAIN, GENERALIZED: ICD-10-CM

## 2024-08-08 DIAGNOSIS — E78.5 HYPERLIPIDEMIA, UNSPECIFIED HYPERLIPIDEMIA TYPE: ICD-10-CM

## 2024-08-08 DIAGNOSIS — Z79.4 TYPE 2 DIABETES MELLITUS WITH HYPERGLYCEMIA, WITH LONG-TERM CURRENT USE OF INSULIN (H): ICD-10-CM

## 2024-08-08 DIAGNOSIS — M54.6 CHRONIC BILATERAL THORACIC BACK PAIN: ICD-10-CM

## 2024-08-08 DIAGNOSIS — Z95.1 HISTORY OF CORONARY ARTERY BYPASS SURGERY: ICD-10-CM

## 2024-08-08 DIAGNOSIS — E11.65 TYPE 2 DIABETES MELLITUS WITH HYPERGLYCEMIA, WITH LONG-TERM CURRENT USE OF INSULIN (H): ICD-10-CM

## 2024-08-08 DIAGNOSIS — K21.9 GASTROESOPHAGEAL REFLUX DISEASE WITHOUT ESOPHAGITIS: ICD-10-CM

## 2024-08-08 DIAGNOSIS — R60.0 BILATERAL LOWER EXTREMITY EDEMA: ICD-10-CM

## 2024-08-08 DIAGNOSIS — S22.000S COMPRESSION FRACTURE OF THORACIC VERTEBRA, UNSPECIFIED THORACIC VERTEBRAL LEVEL, SEQUELA: ICD-10-CM

## 2024-08-08 DIAGNOSIS — I10 PRIMARY HYPERTENSION: ICD-10-CM

## 2024-08-08 DIAGNOSIS — G89.29 CHRONIC BILATERAL THORACIC BACK PAIN: ICD-10-CM

## 2024-08-08 DIAGNOSIS — Z76.89 ENCOUNTER TO ESTABLISH CARE: Primary | ICD-10-CM

## 2024-08-08 PROBLEM — K86.1 CHRONIC BILIARY PANCREATITIS (H): Status: RESOLVED | Noted: 2024-01-02 | Resolved: 2024-08-08

## 2024-08-08 PROBLEM — K85.90 ACUTE PANCREATITIS: Status: RESOLVED | Noted: 2022-04-18 | Resolved: 2024-08-08

## 2024-08-08 PROBLEM — K81.0 CHOLECYSTITIS, ACUTE: Status: RESOLVED | Noted: 2023-07-05 | Resolved: 2024-08-08

## 2024-08-08 PROBLEM — T81.49XA SURGICAL SITE INFECTION: Status: RESOLVED | Noted: 2023-07-26 | Resolved: 2024-08-08

## 2024-08-08 PROBLEM — E44.1 MILD PROTEIN-CALORIE MALNUTRITION (H): Status: RESOLVED | Noted: 2022-04-15 | Resolved: 2024-08-08

## 2024-08-08 PROCEDURE — 99205 OFFICE O/P NEW HI 60 MIN: CPT | Performed by: FAMILY MEDICINE

## 2024-08-08 PROCEDURE — G2211 COMPLEX E/M VISIT ADD ON: HCPCS | Performed by: FAMILY MEDICINE

## 2024-08-08 RX ORDER — LOSARTAN POTASSIUM 50 MG/1
50 TABLET ORAL DAILY
Qty: 90 TABLET | Refills: 3 | Status: SHIPPED | OUTPATIENT
Start: 2024-08-08

## 2024-08-08 RX ORDER — GABAPENTIN 300 MG/1
600 CAPSULE ORAL 3 TIMES DAILY
Qty: 540 CAPSULE | Refills: 3 | Status: SHIPPED | OUTPATIENT
Start: 2024-08-08

## 2024-08-08 RX ORDER — ATORVASTATIN CALCIUM 40 MG/1
40 TABLET, FILM COATED ORAL DAILY
Qty: 90 TABLET | Refills: 3 | Status: SHIPPED | OUTPATIENT
Start: 2024-08-08

## 2024-08-08 NOTE — PROGRESS NOTES
Assessment & Plan     1. Encounter to establish care  Patient new to me today, primary care transferring from [facility] to [facility] to be in alignment with his other specialty providers.    2. Hyperlipidemia, unspecified hyperlipidemia type  3. History of coronary artery bypass surgery  - atorvastatin (LIPITOR) 40 MG tablet; Take 1 tablet (40 mg) by mouth daily  Dispense: 90 tablet; Refill: 3  - Lipid panel reflex to direct LDL Non-fasting; Future    Continue atorvastatin and baby aspirin, will plan for updated labs with his next lab draw.    4. Primary hypertension  - losartan (COZAAR) 50 MG tablet; Take 1 tablet (50 mg) by mouth daily  Dispense: 90 tablet; Refill: 3    Blood pressure is borderline today, home readings acceptable.  Continue losartan.  Recent normal CMP.  Will schedule nurse visit to confirm accuracy of his cuff.    5. Gastroesophageal reflux disease without esophagitis  - omeprazole (PRILOSEC) 20 MG DR capsule; Take 1 capsule (20 mg) by mouth daily  Dispense: 90 capsule; Refill: 3    History of dilation due to Schatzki's ring, hiatal hernia, at risk for GERD due to chronic aspirin use, continue omeprazole.    6. Abdominal pain, generalized  - gabapentin (NEURONTIN) 300 MG capsule; Take 2 capsules (600 mg) by mouth 3 times daily  Dispense: 540 capsule; Refill: 3    Stable on gabapentin to help manage abdominal pain, will continue current regimen.    7. Type 2 diabetes mellitus with hyperglycemia, with long-term current use of insulin (H)  - Adult Eye  Referral; Future    Following with endocrinology, would like to see the eye doctor within the system for diabetic eye exam.  Diabetic foot exam completed today and normal.    8. Bilateral lower extremity edema  - US Venous Competency Bilateral; Future    Experiencing worsening lower extremity edema over the last month.  No positional orthopnea symptoms.  Lungs are clear.  Recent normal liver and kidney labs.  Evaluate  for venous insufficiency with ultrasound.  Recommend low-salt diet, leg elevation, compression.  He would have difficulty applying compression stockings due to underlying Parkinson disease.    9. Compression fracture of thoracic vertebra, unspecified thoracic vertebral level, sequela  10. Chronic bilateral thoracic back pain  - Spine  Referral; Future      Patient endorses extensive workup for left flank symptoms.  Symptoms seem to radiate from his mid back.  Symptoms not adequately controlled with gabapentin.  Tylenol has not been helpful.  He has done physical therapy in the past which has not resolved symptoms.  Reviewed CT scan which does reveal compression fractures T11 and T12.  Recommend further evaluation for spine clinic.    52 minutes spent on date of encounter chart review, patient visit, counseling, documentation.    The longitudinal plan of care for the diagnosis(es)/condition(s) as documented were addressed during this visit. Due to the added complexity in care, I will continue to support Pat in the subsequent management and with ongoing continuity of care.     Subjective   Pat is a 69 year old, presenting for the following health issues:  Establish Care, Leg Swelling, and Recheck Medication      8/8/2024    11:07 AM   Additional Questions   Roomed by Lana BELTRE CMA   Accompanied by Wife     History of Present Illness       Reason for visit:  New physician    He eats 0-1 servings of fruits and vegetables daily.He consumes 0 sweetened beverage(s) daily.He exercises with enough effort to increase his heart rate 9 or less minutes per day.  He exercises with enough effort to increase his heart rate 3 or less days per week.   He is taking medications regularly.       Encounter to establish care  Neurology - parkinsons management  Cardiology - recent pacemaker placement, cardiologist left, scheduled with someone new   Endocrinology - type 1 diabetes management  Oncology - Dr. Lyn for  "neuroendocrine carcinoma     Is experiencing abdominal pain that radiates around, bubble sensation. Symptoms started before partial pancreatectomy, pancreatic duct stents.     Back in [place] 2020, did see some spine specialists, acknowledged DDD.   Pain starts on one side, wraps around the back. Looking for symptom relief from this \"bubble sensation\". Cancer doctor thinks unrelated.     Improves when he leans back. Symptoms are bilateral. Upper lumbar region/lower lateral flank.   Also endorses lower back pain, thinks unrelated.   NSAIDS contraindicated with CAD history. Tylenol does not help pain.  Has physical therapy scheduled.       Hyperlipidemia, unspecified hyperlipidemia type  History of coronary artery bypass surgery  The ASCVD Risk score (Tamy DESAI, et al., 2019) failed to calculate for the following reasons:    The valid total cholesterol range is 130 to 320 mg/dL     Atorvastatin 40 mg daily, baby aspirin daily.  CABG x 2 at 48 yo. Asymptomatic.   Plans for labs with next check.       Primary hypertension  BP Readings from Last 6 Encounters:   08/08/24 (!) 140/84   08/07/24 (!) 142/85   07/15/24 137/67   06/11/24 (!) 156/86   06/04/24 105/73   05/07/24 111/75     Last Comprehensive Metabolic Panel:  Lab Results   Component Value Date     07/08/2024    POTASSIUM 4.5 07/08/2024    CHLORIDE 102 07/08/2024    CO2 27 07/08/2024    ANIONGAP 9 07/08/2024    GLC 89 07/08/2024    BUN 18.7 07/08/2024    CR 0.99 07/08/2024    GFRESTIMATED 82 07/08/2024    JAKE 8.8 07/08/2024     Losartan 50 mg daily.  Denies any recent issues with low blood pressure readings after insertion of pacemaker.   Did have some issues with orthostasis previous.   Does check readings at home, majority of home readings in range.       Gastroesophageal reflux disease without esophagitis  Omeprazole 20 mg daily. Has been on for a while.   Has taken for many years, hx reflux when sleeping, woke up with chest pain symptoms.   Using ASA " "regularly. Did have prior EGD procedure to \"straighten throat\".   Hiatal hernia, hx Schatzki's ring.      Leg swelling  Moved back from [place], started to go to the [place], due to pains around pancreas, recommended to see the [facility] pain clinic. Started gabapentin around 2022. Was helpful for the pain he was experiencing.     Noticed onset of swelling about a month ago. Hasn't had issues with swelling like this in the past. Symptoms are more persistent. Not painful. Having a hard time putting on tennis shoes due to swelling.     Did start trazodone a month ago, no other recent medication changes. Hard to sleep related to parkinsons.     Is feeling a little more short of breath with activity. Spouse has noticed as well.   Denies positional orthopnea.        Review of Systems  See HPI above.       Objective    BP (!) 140/84 (BP Location: Left arm, Patient Position: Sitting, Cuff Size: Adult Regular)   Pulse 75   Temp 98.4  F (36.9  C) (Oral)   Resp 16   Ht 1.683 m (5' 6.25\")   Wt 98.9 kg (218 lb)   SpO2 95%   BMI 34.92 kg/m    Body mass index is 34.92 kg/m .  Physical Exam     GENERAL: alert and no distress  NECK: no adenopathy, no asymmetry, masses, or scars  RESP: lungs clear to auscultation - no rales, rhonchi or wheezes  CV: regular rate and rhythm, no murmurs   ABDOMEN: soft, nontender   MS: Trace lower extremity edema, pulses intact.    Diabetic foot exam: normal DP and PT pulses, no trophic changes or ulcerative lesions, and normal monofilament exam            Signed Electronically by: Amelia Johnson, DO    "

## 2024-08-08 NOTE — TELEPHONE ENCOUNTER
Phyllis Butterfield recommended the Dr. Castillo would be a good fit for this Pt. Sent a message to Dr. Castillo and Esperanza Thomas to see if it's okay to schedule the Pt on 9/24 or 10/1. Waiting for approval.

## 2024-08-09 ENCOUNTER — TELEPHONE (OUTPATIENT)
Dept: ENDOCRINOLOGY | Facility: CLINIC | Age: 69
End: 2024-08-09
Payer: MEDICARE

## 2024-08-09 NOTE — TELEPHONE ENCOUNTER
Left Voicemail (1st Attempt) for the patient to call back and schedule the following:    Appointment type: return diabetes   Provider: Patricia   Return date: 2 months on or near 10/9/24, JOSÉ purcell  Specialty phone number: [phone]  Additional appointment(s) needed: Diabetes ed, Pt has seen Idalia before can be with anyone, next avail   Additonal Notes: LVM, MyC x1   COI Notes:  CDE referral for routine diabetes  Follow-up 2 month with Catherine Richardson to use JOSÉ to meet provider request, as close as possible to timeline. You can let Pt know Providers are aware of restrictions on visits. Thank you.     Examples:   10/9 Gomez virtual csc  10/10 Gomez virtual csc  10/11 Gomez virtual csc  10/16 Gomez virtual csc    Please note that the above appointment(s) will require manual scheduling as they are marked as JOSÉ and will not appear using auto search. Do not schedule the patient if another patient has already been scheduled in the requested appointment slot.     Diann Whittaker on 8/9/2024 at 11:53 AM

## 2024-08-14 ENCOUNTER — MYC MEDICAL ADVICE (OUTPATIENT)
Dept: ENDOCRINOLOGY | Facility: CLINIC | Age: 69
End: 2024-08-14
Payer: MEDICARE

## 2024-08-15 ENCOUNTER — THERAPY VISIT (OUTPATIENT)
Dept: PHYSICAL THERAPY | Facility: REHABILITATION | Age: 69
End: 2024-08-15
Payer: MEDICARE

## 2024-08-15 DIAGNOSIS — R25.8 BRADYKINESIA: ICD-10-CM

## 2024-08-15 DIAGNOSIS — G20.B1 PARKINSON'S DISEASE WITH DYSKINESIA WITHOUT FLUCTUATING MANIFESTATIONS (H): Primary | ICD-10-CM

## 2024-08-15 DIAGNOSIS — R26.89 IMPAIRMENT OF BALANCE: ICD-10-CM

## 2024-08-15 DIAGNOSIS — R26.81 GAIT INSTABILITY: ICD-10-CM

## 2024-08-15 PROCEDURE — 97110 THERAPEUTIC EXERCISES: CPT | Mod: GP | Performed by: PHYSICAL THERAPIST

## 2024-08-15 PROCEDURE — 97112 NEUROMUSCULAR REEDUCATION: CPT | Mod: GP | Performed by: PHYSICAL THERAPIST

## 2024-08-15 NOTE — TELEPHONE ENCOUNTER
Gave Veronika a call. She thinks Pat's numbers are more erratic - more highs and lows and just wants Dr. Reynolds to take a look once she returns to clinic. Sri did have overnight lows Tuesday evening - Wednesday but not last night. I gave her the [id number] number in case Pat needs things adjusted prior to Dr. Reynolds's return.    Idalia Funez, RD, LD [facility]  Diabetes Educator

## 2024-08-15 NOTE — TELEPHONE ENCOUNTER
Sri met with Dr. Reynolds on 8/7  Insulin doses:  -- Basaglar 14 unit(s) AM and 30 unit(s) at hs  -- Novolog 6-8 unit(s) with breakfast, 8-10 unit(s) with lunch and 10-12 unit(s) with supper  --  Novolog correction scale as needed before meal  151-200 take additional 2 unit(s)  201-250                       4  251-300                       6  >300                            8

## 2024-08-15 NOTE — TELEPHONE ENCOUNTER
FUTURE VISIT INFORMATION      FUTURE VISIT INFORMATION:  Date: 12/5/24  Time: 12:00pm  Location: [facility] [place]  REFERRAL INFORMATION:  Referring provider:  Amelia Johnson DO   Referring providers clinic:  [facility]   Reason for visit/diagnosis  Type 2 diabetes mellitus with hyperglycemia, with long-term current use of insulin (H)     RECORDS REQUESTED FROM:       Clinic name Comments Records Status Imaging Status   [facility]  Ov/referral 8/8/24 [facility]    Imaging MR Brain done 2/5/24  CT Head done 2/4/24 [facility] PAC

## 2024-08-16 ENCOUNTER — ALLIED HEALTH/NURSE VISIT (OUTPATIENT)
Dept: FAMILY MEDICINE | Facility: CLINIC | Age: 69
End: 2024-08-16
Payer: MEDICARE

## 2024-08-16 VITALS — DIASTOLIC BLOOD PRESSURE: 73 MMHG | HEART RATE: 78 BPM | SYSTOLIC BLOOD PRESSURE: 120 MMHG

## 2024-08-16 DIAGNOSIS — I10 ESSENTIAL HYPERTENSION: Primary | ICD-10-CM

## 2024-08-16 PROCEDURE — 99207 PR NO CHARGE NURSE ONLY: CPT

## 2024-08-16 NOTE — PROGRESS NOTES
Follow Up Blood Pressure Check    Arnaldo Henderson is a 69 year old male recommended to follow up for blood pressure check by Amelia Johnson. Anihypertensive medications and adherence were verified: Yes.     Reason for visit:     Medication change at last visit:     Today's Vitals:   Vitals:    08/16/24 1003   BP: 125/71   BP Location: Right arm   Patient Position: Sitting   Cuff Size: Adult Regular   Pulse: 76     Patient brought his own cuff in and it read 120/73 with a pulse of 78.       Lowest blood pressure today is less than 140/90 and they deny signs or symptoms of new onset: severe headache, fatigue, confusion, vision changes, chest pain, pounding in the chest, neck, ears, irregular heartbeat, difficulty breathing, and blood in the urine.  Please inform patient of his/her blood pressure today.  If they are asymptomatic, the patient is to continue current medications.  This message will be routed to their provider, and they will be notified if a change in medication is recommended.      Gloria Jameson MA    Current Outpatient Medications   Medication Sig Dispense Refill    aspirin (ASA) 81 MG EC tablet Take 81 mg by mouth daily      atorvastatin (LIPITOR) 40 MG tablet Take 1 tablet (40 mg) by mouth daily 90 tablet 3    BD PEN NEEDLE LINDY 2ND GEN 32G X 4 MM miscellaneous USE TO INJECT INSULIN 4 TIMES A  each 3    carbidopa-levodopa (SINEMET CR)  MG CR tablet Take 1 tablet by mouth at bedtime @9p 90 tablet 3    carbidopa-levodopa (SINEMET)  MG tablet 2@6a, 2@10a, 2@2p and 1@5p 630 tablet 3    Continuous Blood Gluc  (DEXCOM G7 ) ALIA Use to read blood sugars as per 's instructions. 1 each 0    Continuous Glucose Sensor (DEXCOM G7 SENSOR) MISC Change every 10 days. 9 each 11    dasiglucagon HCl (ZEGALOGUE) 0.6 MG/0.6ML SOAJ injection Inject 0.6 mg Subcutaneous once as needed (use for severe hypoglycemia) 1.2 mL 1    gabapentin (NEURONTIN) 300 MG capsule Take 2  capsules (600 mg) by mouth 3 times daily 540 capsule 3    Glucagon (BAQSIMI) 3 MG/DOSE nasal powder Spray 1 spray (3 mg) in nostril as needed (severe hypoglycemia) in the event of unconscious hypoglycemia or hypoglycemic seizure. May repeat dose if no response after 15 minutes. 2 each 1    insulin aspart (NOVOLOG FLEXPEN) 100 UNIT/ML pen Inject 6-20 Units subcutaneously 3 times daily as needed for high blood sugar (for meal coverage and correction scale, upto 60 units/day) Take 5 units with breakfast, 10 unit(s) with lunch and 12 unit(s) with supper 40 mL 3    insulin glargine 100 UNIT/ML pen Take 14 unit(s) AM and 30 unit(s) HS, and adjust as needed upto 50 unit(s) /day 50 mL 1    lipase-protease-amylase (ZENPEP) 73279-738395-367579 units CPEP Take 1 capsule by mouth 3 times daily (with meals) 90 capsule 11    losartan (COZAAR) 50 MG tablet Take 1 tablet (50 mg) by mouth daily 90 tablet 3    magnesium oxide (MAG-OX) 400 MG tablet Take 400 mg by mouth every other day      Multiple Vitamin (MULTI-VITAMINS) TABS Take 1 tablet by mouth daily      omeprazole (PRILOSEC) 20 MG DR capsule Take 1 capsule (20 mg) by mouth daily 90 capsule 3    traZODone (DESYREL) 50 MG tablet Take 0.5-1 tablets (25-50 mg) by mouth at bedtime 30 tablet 3

## 2024-08-16 NOTE — TELEPHONE ENCOUNTER
I called and spoke to Pat this morning and gave him Dr. Reynolds's recommendation to decrease the correction scale at supper to 1/50 > 150. He is to continue using 2/50 > 150 for breakfast and lunch. He is encouraged to take his meal time insulin 15 min before eating as much as he can.  Pat said he doesn't feel well  when his blood sugars are running high. He is encouraged to stay well hydrated and trying to get the meal time insulin in 15 min before eating should help.  I asked him to update Dr. Reynolds next week with how he is doing and he agreed to this plan.    Updated Insulin doses:  -- Basaglar 14 unit(s) AM and 30 unit(s) at hs  -- Novolog 6-8 unit(s) with breakfast, 8-10 unit(s) with lunch and 10-12 unit(s) with supper  --  Novolog correction scale as needed before breakfast and lunch:  151-200 take additional 2 unit(s)  201-250                       4  251-300                       6  >300                            8  --Novolog correction scale as needed before supper:  151-200 take additional 1 unit  201-250                          2  251-300                          3  >300                               4    Take meal time insulin 15 min before eating.    Cynthia Escamilla RN, [facility]

## 2024-08-17 NOTE — PROGRESS NOTES
Patient updated by [employer] message. BP at goal. Continue current regimen.  Amelia Johnson, DO

## 2024-08-19 ENCOUNTER — MYC MEDICAL ADVICE (OUTPATIENT)
Dept: NEUROLOGY | Facility: CLINIC | Age: 69
End: 2024-08-19

## 2024-08-19 ENCOUNTER — HOSPITAL ENCOUNTER (EMERGENCY)
Facility: HOSPITAL | Age: 69
Discharge: HOME | End: 2024-08-19
Attending: EMERGENCY MEDICINE | Admitting: EMERGENCY MEDICINE
Payer: MEDICARE

## 2024-08-19 VITALS
TEMPERATURE: 98.2 F | BODY MASS INDEX: 32.74 KG/M2 | RESPIRATION RATE: 20 BRPM | OXYGEN SATURATION: 95 % | HEIGHT: 68 IN | WEIGHT: 216 LBS | SYSTOLIC BLOOD PRESSURE: 118 MMHG | DIASTOLIC BLOOD PRESSURE: 56 MMHG | HEART RATE: 77 BPM

## 2024-08-19 DIAGNOSIS — R29.898 BILATERAL ARM WEAKNESS: ICD-10-CM

## 2024-08-19 DIAGNOSIS — R29.898 BILATERAL LEG WEAKNESS: ICD-10-CM

## 2024-08-19 DIAGNOSIS — G20.B2 PARKINSON'S DISEASE WITH DYSKINESIA AND FLUCTUATING MANIFESTATIONS (H): ICD-10-CM

## 2024-08-19 LAB
ANION GAP SERPL CALCULATED.3IONS-SCNC: 10 MMOL/L (ref 7–15)
BUN SERPL-MCNC: 20.3 MG/DL (ref 8–23)
CALCIUM SERPL-MCNC: 8.9 MG/DL (ref 8.8–10.4)
CHLORIDE SERPL-SCNC: 102 MMOL/L (ref 98–107)
CREAT SERPL-MCNC: 0.97 MG/DL (ref 0.67–1.17)
EGFRCR SERPLBLD CKD-EPI 2021: 84 ML/MIN/1.73M2
ERYTHROCYTE [DISTWIDTH] IN BLOOD BY AUTOMATED COUNT: 13.5 % (ref 10–15)
GLUCOSE SERPL-MCNC: 105 MG/DL (ref 70–99)
HCO3 SERPL-SCNC: 27 MMOL/L (ref 22–29)
HCT VFR BLD AUTO: 42.5 % (ref 40–53)
HGB BLD-MCNC: 14 G/DL (ref 13.3–17.7)
MCH RBC QN AUTO: 32.9 PG (ref 26.5–33)
MCHC RBC AUTO-ENTMCNC: 32.9 G/DL (ref 31.5–36.5)
MCV RBC AUTO: 100 FL (ref 78–100)
NT-PROBNP SERPL-MCNC: 329 PG/ML (ref 0–900)
PLAT MORPH BLD: NORMAL
PLATELET # BLD AUTO: 353 10E3/UL (ref 150–450)
POTASSIUM SERPL-SCNC: 4.3 MMOL/L (ref 3.4–5.3)
RBC # BLD AUTO: 4.26 10E6/UL (ref 4.4–5.9)
RBC MORPH BLD: NORMAL
SODIUM SERPL-SCNC: 139 MMOL/L (ref 135–145)
WBC # BLD AUTO: 10.6 10E3/UL (ref 4–11)

## 2024-08-19 PROCEDURE — 83880 ASSAY OF NATRIURETIC PEPTIDE: CPT | Performed by: EMERGENCY MEDICINE

## 2024-08-19 PROCEDURE — 99284 EMERGENCY DEPT VISIT MOD MDM: CPT | Performed by: EMERGENCY MEDICINE

## 2024-08-19 PROCEDURE — 80048 BASIC METABOLIC PNL TOTAL CA: CPT | Performed by: EMERGENCY MEDICINE

## 2024-08-19 PROCEDURE — 85027 COMPLETE CBC AUTOMATED: CPT | Performed by: EMERGENCY MEDICINE

## 2024-08-19 PROCEDURE — 93005 ELECTROCARDIOGRAM TRACING: CPT | Performed by: EMERGENCY MEDICINE

## 2024-08-19 PROCEDURE — 36415 COLL VENOUS BLD VENIPUNCTURE: CPT | Performed by: EMERGENCY MEDICINE

## 2024-08-19 ASSESSMENT — COLUMBIA-SUICIDE SEVERITY RATING SCALE - C-SSRS
1. IN THE PAST MONTH, HAVE YOU WISHED YOU WERE DEAD OR WISHED YOU COULD GO TO SLEEP AND NOT WAKE UP?: NO
6. HAVE YOU EVER DONE ANYTHING, STARTED TO DO ANYTHING, OR PREPARED TO DO ANYTHING TO END YOUR LIFE?: NO
2. HAVE YOU ACTUALLY HAD ANY THOUGHTS OF KILLING YOURSELF IN THE PAST MONTH?: NO

## 2024-08-19 ASSESSMENT — ACTIVITIES OF DAILY LIVING (ADL)
ADLS_ACUITY_SCORE: 37

## 2024-08-19 NOTE — ED PROVIDER NOTES
EMERGENCY DEPARTMENT ENCOUNTER      NAME: Arnaldo Henderson  AGE: 69 year old male  YOB: 1955  MRN: [id number]  EVALUATION DATE & TIME: No admission date for patient encounter.    PCP: Amelia Johnson    ED PROVIDER: Octavio Case M.D.      Chief Complaint   Patient presents with    Fatigue         FINAL IMPRESSION:  1. Bilateral leg weakness    2. Bilateral arm weakness    3. Parkinson's disease with dyskinesia and fluctuating manifestations (H)          ED COURSE & MEDICAL DECISION MAKING:    Pertinent Labs & Imaging studies reviewed below.  All EKGs below represent my independent interpretation.   ED Course as of 08/20/24 1418   Mon Aug 19, 2024   1404 I reviewed pt's chart, and specifically his upcoming virtual visit with neurology on 9/13/24 (chart has been started with pertinent PMH).   1446 EKG here shows ventricular paced rhythm with a rate of 74.  No acute ischemic ST or T wave morphology.  Normal QTc intervals.  KY prolongation at 294 ms.  Prolonged QRS at 150 ms.  When compared to prior EKG on February 28, 2024, patient is now consistently ventricularly paced.  Impression: sinus rhythm with ventricular response.   1630 N-Terminal Pro Bnp: 329   1659 Current regimen is:  Sinemet 25/100 (carbidopa/levodopa) 2 tabs @ 6am, 2 @10a, 2@2p and 1@5p  Sinemet 50/200 (carbidopa/levodopa) at 9pm     1706 Pt reports siting a lot throughout the day, I suspect LE edema could be controlled with compression stockings or small dose of lasix   1756 I spoke to Dr. Serrano about pt's sx. Its also possible increase last dose of the day to a full dose, instead of half. He recommends calling Dr. Armenta tomorrow.   Patient is a 69-year-old gentleman with a history of Parkinson's, on Sinemet, here with weakness in both legs, both arms at in the past was only intermittent a couple times a day, but has been more persistent last 48 hours.  No change in Sinemet dosing in the last week.  Aside from some lower  extremity edema that has been present for a couple of weeks, possibly longer, he has been feeling at baseline health.  He recently started rehabilitation exercises with physical therapy.  On arrival he has generalized weakness, upper extremity resting tremor, and mild bilateral lower extremity pitting edema.  It is not unilateral, it is not erythematous or tender.  This is not consistent with DVT.    Workup showed normal BNP.    Also normal white blood cell count, temperature, heart rate, no signs of infection.    I discussed management of lower extremity edema which would include leg wraps, attempting to get up on his feet and utilize his legs more to help with venous flow.    In regards to his generalized weakness, I suspect this is due to Parkinson's, whether this is a stress response due to recent increase in exercise, or he needs to go up on his dosing, I spoke to our neurologist on-call who is hesitant to make any immediate changes given the doses he is currently on.  Patient is more than happy to call his neurologist tomorrow for more definitive medication management.    Additional ED Course Timestamps:  3:35 PM I met with the patient, obtained history, performed an initial exam, and discussed options and plan for diagnostics and treatment here in the ED.   6:00 PM I decided that the patient is ready for discharge.  6:15 PM The patient was discharged.    Medical Decision Making  Obtained supplemental history:Supplemental history obtained?: Documented in chart and Family Member/Significant Other  Reviewed external records: External records reviewed?: Documented in chart  Care impacted by chronic illness:Cancer/Chemotherapy, Diabetes, Hyperlipidemia, Hypertension, and Other: Parkinson's disease  Care significantly affected by social determinants of health:N/A  Did you consider but not order tests?: Work up considered but not performed and documented in chart, if applicable  Did you interpret images  "independently?: Independent interpretation of ECG and images noted in documentation, when applicable.  Consultation discussion with other provider: Phone conversation with consultants will be documented in the ED Course  Discharge. I recommended the patient continue their current prescription strength medication(s): sinemet. N/A.    No MIPS measures identified.    At the conclusion of the encounter I discussed the results of all of the tests and the disposition. The questions were answered. The patient or family acknowledged understanding and was agreeable with the care plan.         MEDICATIONS GIVEN IN THE EMERGENCY:  Medications - No data to display      NEW PRESCRIPTIONS STARTED AT TODAY'S ER VISIT  Discharge Medication List as of 8/19/2024  6:10 PM             =================================================================    HPI    Arnaldo Henderson is a 69 year old male who presents to this ED for evaluation of fatigue.    Per patient and patient's wife, his blood sugar has been very up and down recently. Over the last few weeks he has been getting episodes where his \"head gets heavy.\" He doesn't feel dizzy during the episodes but rather just feels \"uneasy.\" Along with the head heaviness he also gets a sensation of heaviness in his legs. These episodes typically last around an hour but yesterday (08/18/2024) he had one that continued into today (08/19/2024) with minimal relief. When sitting he often feels like his thighs are swollen. This morning his blood pressure was up.    He has had no fevers, chills, cough, leg cramping, or sore throat.    A recent doctors visit did show his kidney function is fine. In the past he has had 35% of his pancreas removed due to chronic pancreatitis which was done in June of 2023. Due to that he is on insulin. During the pancreas surgery a small mass was found which was revealed to be neuroendocrine cancer. Also in the past he was in the hospital in [place] for three to four " "months and they kept putting stents in to treat him. While in [place] he also had his gallbladder and appendix removed. He and his wife were unhappy with his care there which prompted them to come back to [place] and now they go the  of  for their care. Upon returning to [place] he had many of the stents that were placed in [place] removed. For medications he currently takes sinemet multiple times a day and he has noticed his episodes often start right around the time he is supposed to take the medication. During a visit around a month ago another sinemet was added to his daily regimen. He also takes half of a trazodone to fall asleep at night. He is on Asprin.    Chart review:  Per Chart Review, the patient used the [facility] Medical Advice on 08/19/2024 to inform Dr. Johnson of his extreme fatigue and swollen legs. This had been going on for the few weeks before Dr. Johnson was contacted. Dr. Johnson responded and wanted to have an ultrasound of his legs done. His wife planned to bring in to a [facility] because his blood pressure was high several times on the morning of 08/19/2024.       VITALS:  /56   Pulse 77   Temp 98.2  F (36.8  C)   Resp 20   Ht 1.727 m (5' 8\")   Wt 98 kg (216 lb)   SpO2 95%   BMI 32.84 kg/m      PHYSICAL EXAM    Constitutional: Comfortable appearing.  HENT: Atraumatic, mucous membranes dry, nose normal. Neck- Supple, gross ROM intact.   Eyes: Pupils mid-range, conjunctiva without injection, no discharge.   Respiratory: Clear to auscultation bilaterally, no respiratory distress, no wheezing, speaks full sentences easily. No cough.  Cardiovascular: Normal heart rate, regular rhythm, no murmurs. 1+ pitting edema to bilateral lower extremities.  GI: Soft, no tenderness to deep palpation in all quadrants, no masses.  Musculoskeletal: Moving all 4 extremities intentionally and without pain. No obvious deformity.  Skin: Warm, dry, " no rash.  Neurologic: Alert & oriented x 3, cranial nerves grossly intact. Resting tremor in both arms.  Psychiatric: Affect normal, cooperative.      I, Armand Lamar am serving as a scribe to document services personally performed by Dr. Octavio Case based on my observation and the provider's statements to me. I, Octavio Case MD attest that Armand Newton is acting in a scribe capacity, has observed my performance of the services and has documented them in accordance with my direction.    Octavio Case M.D.  Emergency Medicine  [facility] EMERGENCY DEPARTMENT  [facility] [facility] [id number] [phone]  Dept: [phone]      Octavio Case MD  08/20/24 6980

## 2024-08-19 NOTE — ED TRIAGE NOTES
"Patient with history of parkinson's, has \"spells\" for last 2 months were he has generalized weakness in upper extremities, and legs feel heavy. Normally this last about one hour, but has been continual since yesterday. No trauma. Lower extremity edema for one month, \"two drs said it didn't seem to be that bad, so they didn't give me any meds\"        "

## 2024-08-19 NOTE — DISCHARGE INSTRUCTIONS
Please call Dr. Vick's office tomorrow for recommendations.  You should be able to get recommendations on any medication adjustments that could be beneficial.    If you would like to try an extra dose of the Sinemet at 5 PM, this will be another option while you are waiting for follow-up.    Please try compression stockings or Ace wrap to your legs throughout the day, this may help with swelling.  If this is not working then check back with your primary care doctor and he may benefit from taking a dose of Lasix every once in a while to help pee out excess fluid.

## 2024-08-19 NOTE — LETTER
11/6/2023         RE: Arnaldo Henderson  [address]  [place] [address]        Dear Colleague,    Thank you for referring your patient, Arnaldo Henderson, to the [facility]. Please see a copy of my visit note below.    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  His tumor had a Ki-67 index of 10% and 1 of 4 lymph nodes were positive.  He has had a prolonged postoperative course and just finally as of today has had come pleat healing of his surgical wound.  He has ongoing significant problems with very labile glucoses.  He is doing fairly well with his bowel function now and only has a stool perhaps every other day.  He had one episode of intense flushing this past weekend which is the first time it happened to him and did not seem to have any precipitating event.  He is gained back all the weight he lost in the last 2 years.  His Parkinson symptoms are fairly well controlled but they always get worse at times of anxiety like today's clinic visit.    On exam today he appears well.  He is alert and oriented and able to participate actively in the visit.  He has a marked tremor.    I personally reviewed his CT scan over the results with him and his wife.  In the small bowel mesentery as several lymph nodes which is clearly increased in size the largest is about 14 mm have not previously been about 7 mm.  I do not see any liver metastases or other sites of disease.    His lab work is Notable for his chromogranin A level having gone from about 400 up to 600. He has normal electrolytes and renal function.  His bilirubin, albumin and liver enzymes are normal.  His blood  counts are normal.      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  We will plan on getting a dotatate PET scan to clarify that such is the case and whether there is more extensive disease and we currently recognize.  I discussed with the patient and his wife that sometimes we see accelerated growth rate after surgical procedures and this is not necessarily portend rapid regrowth of his tumor.  Assuming we do not see much more volume of disease on his PET scan I would plan on repeating CT scan in 2 to 3 months.  If he has the same rate of growth after another 3 months then we will need to consider starting active therapy with a somatostatin analog, but if we see stable disease over the next 3 months then I am going to recommend observation.  We went over symptoms that he might develop related to his tumor that he should bring to our attention as those would also push us towards starting active treatment.  We will follow-up with him once we see the results of the dotatate PET scan.      Again, thank you for allowing me to participate in the care of your patient.        Sincerely,        Caio Lyn MD   SUMAtriptan (IMITREX) 100 MG tablet; Take one at onset of migraine and repeat in 2 hrs prn headache  -     Fremanezumab-vfrm (AJOVY) 225 MG/1.5ML SOAJ; Take one q month  -     SUMAtriptan (IMITREX) 100 MG tablet; Take 1 tablet by mouth once as needed for Migraine        The Diagnosis and differential diagnostic considerations, and Rx Tx were reviewed with the patient at length.           No orders of the defined types were placed in this encounter.         I have spent greater than 50% of visit discussing and counseling of patient  for treatment and diagnostic plan review. Total time30     .      Notes: Patient is to continue all medications as directed by prescribing physicians. Continuations on today's visit are made based on the patient's report of current medications.             Dr. Gerardo Erickson  Consultation Neurology, Neurodiagnostics and Neurotherapeutics  Neuroelectrophysiology, EEG, EMG  [facility]  [address] [facility], [place] [address]  Phone: [phone] Fax [phone]

## 2024-08-20 ENCOUNTER — TELEPHONE (OUTPATIENT)
Dept: NEUROLOGY | Facility: CLINIC | Age: 69
End: 2024-08-20

## 2024-08-20 NOTE — TELEPHONE ENCOUNTER
Hospital discharge instructions:  Please call Dr. iVck's office tomorrow for recommendations. You  should be able to get recommendations on any medication adjustments  that could be beneficial.  If you would like to try an extra dose of the Sinemet at 5 PM, this will be  another option while you are waiting for follow-up.  Please try compression stockings or Ace wrap to your legs throughout  the day, this may help with swelling. If this is not working then check  back with your primary care doctor and he may benefit from taking a  dose of Lasix every once in a while to help pee out excess fluid.

## 2024-08-20 NOTE — TELEPHONE ENCOUNTER
Patient confirmed scheduled appointment:  Date: 8/23  Time: 9 am   Visit type: Return movement disorder   Provider: Kathi  Location: [facility]- in person pt aware  Testing/imaging: n/a  Additional notes: in rigoberto Chanel on 8/20/2024 at 10:12 AM

## 2024-08-20 NOTE — TELEPHONE ENCOUNTER
"Situation:  Arnaldo Henderson is a 69 year old male who receives support for Parkinson's disease. Nurse is calling to follow-up per ER recommendations.    Background:  Patient is taking: Confirmed with patient/spouse  Medications 6 am 10 am 2 pm 5 pm HS   Sinemet 25/100 mg  2 2 2 1     Sinemet 50/200 mg CR         1   Trazodone 50 mg     0.5     New or changed medications: Insulin dosing goes up and down, started trazodone over a month ago which has helped sleep    Assessment:  He will follow-up with PCP for leg swelling, will be getting an ultrasound of his legs. He has \"spells\" of legs feeling very heavy, difficulty moving them, head feels heavy, tremors and anxiety get worse during this time. Today it started at 9AM. He confirms he took his sinemet on time at 6AM. Spells usually occur about an hour before his next dose is due, has worsened over the last few weeks. He would like to see Dr. Armenta in person.    Plan/recommendation:  Will have scheduling move his appointment with Dr. Armenta up to Friday 8/23 at 9AM IN PERSON.    "

## 2024-08-21 ENCOUNTER — ANCILLARY PROCEDURE (OUTPATIENT)
Dept: VASCULAR ULTRASOUND | Facility: CLINIC | Age: 69
End: 2024-08-21
Attending: FAMILY MEDICINE
Payer: MEDICARE

## 2024-08-21 DIAGNOSIS — R60.0 BILATERAL LOWER EXTREMITY EDEMA: ICD-10-CM

## 2024-08-21 LAB
ATRIAL RATE - MUSE: 74 BPM
DIASTOLIC BLOOD PRESSURE - MUSE: NORMAL MMHG
INTERPRETATION ECG - MUSE: NORMAL
P AXIS - MUSE: 67 DEGREES
PR INTERVAL - MUSE: 294 MS
QRS DURATION - MUSE: 152 MS
QT - MUSE: 404 MS
QTC - MUSE: 448 MS
R AXIS - MUSE: -56 DEGREES
SYSTOLIC BLOOD PRESSURE - MUSE: NORMAL MMHG
T AXIS - MUSE: 113 DEGREES
VENTRICULAR RATE- MUSE: 74 BPM

## 2024-08-21 PROCEDURE — 93970 EXTREMITY STUDY: CPT

## 2024-08-21 NOTE — PROGRESS NOTES
"    Diagnosis/Summary/Recommendations:    PATIENT: Arnaldo Henderson  69 year old male     : 1955    ELISA: Aug 23, 2024       MRN: [id number]  [address]    [facility] [address]     [phone] (M)   [phone] (H)      [email or web address]     Veronika Pena spouse  [phone]     Keren balbuena daughter     Genetic testing ?   Family history of parkinsonism in mother     Parkinson's disease with dyskinesia, unspecified whether manifestations fluctuate, Ref by EVERT SAMUEL, Recs in [facility], Sched per Spouse Veronika     Seen by Jamals - notes below     He granted proxy access to his mychart.      Assessment:  (G20.A1) Parkinson's disease, unspecified whether dyskinesia present, unspecified whether manifestations fluctuate  (primary encounter diagnosis)  Family history of parkinsonism - mother      [facility]  1. Parkinson's disease, stage 2  2. Dyskinesia due to Parkinson's disease.  3. Motor fluctuations likely related to protein intake interfering with the absorption of levodopa, improved since taking the levodopa on an empty stomach.     5am up line up pills  6am takes carbidopa/levodopa and insulin  Then eats breakfast - generally he is waiting a bit - eating between 630 or 7am so waiting 30 - 60 minutes.      10 or 11a - head starts to feel numb - not light headedness and legs are heavy.   He has problems moving his feet and most likely he is wearing off/worn off  He has a lot of \"freezing\" throughout the day.   The morning is the best time.   He has freezing  He is a \"different person\" later in the day and evenings   Falls asleep early in evening - 730 and 8pm watching TV  Gets up at 9pm and/or wife wakes him up and has to help him get into bed  Has problems sleeping  Has a bed rail to help him move  He is sleeping in a separate bed as he is at various angles in the bed.      Started 2.5 years ago in [place] - Parkinson has progressed \"tremendously\"   Dr Resendez has not made " medication changes.      11am pill and takes 1 hour or 1.5 hours to get the dose to work   4p      Review of diagnosis    Parkinson disease  Duration 4 years or so.   Side onset: right  Right handed.   Symptoms - had gait problems  - lack of arm swing, shuffling   Symptoms - sleep issues were early issues as well as lack of smell  Clinical diagnosis   No specific testing     Avoidance of dopamine blockers   Not taking     Motor complication review   Wearing off specifically around 9 AM and 1PM before his 2PM dose and takes an hour for it to kick in  ?dyskinesias - not  clear if he has this but may be some facial dyskinesias per his wife.  Not clear if there is a relationship between the medication and the dyskinesias.   Freezing that may be aggravated by anxiety   Has more shuffling     Review of Impulse control disorders   Always hungry   Seen by Dr. Haider and discussed weight gain  No other ICD issues.      Review of surgical or medication options   reviewed     Gait/Balance/Falls   No falls.   Has freezing that is getting worse  Not using assistive device     Exercise/Therapy performed/offered   Physical therapy - Mondays and Thursdays  Salma camilo is the PT  Big therapy at [facility] [address]     Cognitive/Driving   He is not concerned  Wife has been a bit concerned that it is mildly progressing - left the garage door open or forgetting to turn off light  He had an amazing memory when they met but has short term memory problems   Has not had cognitive testing  - can consider this.   Driving  - very little - short distances; wife prefers that he not drive.   He is managing his own medicine and insulin, etc.   Organizes his pill containers, etc.      Mood   Anxiety is disabling, especially when rushed    QTc was okay 2/28/2024  He may have been on citalopram/celexa - details not clear in chart.   He stopped it and threw it out.   Trazodone increase has helped his anxiety     Therapist - has not worked  with a therapist  He has problems waiting with other people when they are shopping.      Denies depression     Wife Clarisa here with him today.      Profession in the past -- 7 years retired.   He worked in manufacturing things, all sorts of jobs.      Using [employer] [facility] at 1200 pm for therapy      Going to [place] and leaving Friday and will be there for one week and will be selling their place as it is too difficulty to travel. Bought one way tickets as the sale is still not final.      1st marriage  2nd marriage - clarisa - having surgery with Dr. Umaña   Had an cerebral angiogram - they want to address the 80% carotid artery stenosis.      Hallucinations/delusions   Denies   No illusions     Sleep   Tried a full pill of trazodone and gets foggy with this  Naps in front of tv  Gets up to go to bed at 9pm   Sleeps for 4 solid hours and then toss and turns for 4 hours  Get up at 4 or 5am and then lays in bed till 4 or 430 or 5a and then gets up  Sleeping in separate bed from spouse  Is angled over the bed  He has a railing.   He may move around in the bed   possible RBD/dream enactment behavior - per his wife - may be less  He sleeps alone now - sleep issue prompted a consultation.   Snoring - sometimes - recently no but not clear.   Sleep study - has not done one.      Bladder/Renal/Prostate/Gyn/Other  Urinary frequency  Nocturia 2/noc  No control issues   PSA was normal about 2 y ears ago.      GI/Constipation/GERD   Acute cholecystitis  Cholecystectomy  Malignant neuroendocrine tumor   GERD  Was put on omeprazole long ago and remains on this.   He had problems with his esophagus and had to be dilated, etc. He had ripples  This was 20 years ago, etc.   He has not had problems like this.  No coughing   Weight issue ?  Failure to thrive   Malnutrition  Lipase/protease/amylase  He has a bowel movement every 3 days - long standing  Not taking anything for his bowels per se  Encouraged to be active    "  ENDO/Lipid/DM/Bone density/Thyroid  Partial pancreatectomy  Pseudocyst pancreas     Diabetes  A1c was 8.2 on 11/6/2023  Insulin  Intolerance to metformin  Seen nutritionist for this.   Needs to have another one done.      Lipid disorder   Atorvastatin lipitor 40mg      Cardio/heart/Hyper or Hypotensive   Hypotension  pacemaker  2/28/2024  Bradycardia  Bypass surgery x2 2003  Losartan cozaar 50mg restarted after hospital  Had some swelling in his thighs and his feet that started a month ago that has reduced yesterday     Vision/Dry Eyes/Cataracts/Glaucoma/Macular   Wears glasses  Had recent visual evaluation   No clear retinopathy from diabetes     Heme/Anticoagulation/Antiplatelet/Anemia/Other  Aspirin 81mg daily   No other blood thinners.      ENT/Resp  Hearing loss   ?former smoker - tobacco - off and on   Smell perception - never been good.      Skin/Cancer/Seborrhea/other  Skin cancer of scrotum  Seborrheic dermatitis   Few spots on his scalp     Musculoskeletal/Pain/Headache  Neck pain  Back issues since \"surgery\" - like a bubble that moves around his back and is intermittent. This has gotten better     Gabapentin 300mg 2 pills 3/day  - prescribed for abdominal burning stomach pain   Was referred to pain specialist   Magnesium 400mg daily      Other:  Wearing off is worse specifically around 9 AM and 1PM before his 2PM dose and takes an hour for it to kick in  Started taking additional dose of 25/100 at 2PM  Freezing is slowly worsening  [facility] with [facility]    Anxiety improved with trazodone but still present    Tried a full pill of trazodone but gets foggy with this in the morning    Had some swelling in his thighs and his feet that started a month ago that has reduced yesterday  This is a bit worse in the left leg  DVT ultrasound and BNP negative        Medications      6a  10am 2p 5p  9p      Acetaminophen tylenol 325mg prn             Aspirin 81mg   1            Atorvastatin lipitor " "40mg   1            Carbidopa/levodopa Sinemet CR 50/200        1     CArbidopa/levodopa sinemet 25/100 2 2 2 1       Dasiglucagon zegalogue 0.6mg/0.6ml prn            Gabapentin neurontin 300mg 2 2  2       Glucagon baqsimi 3mg/dose nasal  prn             Insulin aspart novolog sliding             Insulin aspart novolog              Insulin glargine  15       30      Lipase protease amylase zenpep  3/day            Losartan cozaar 50mg 1            Magnesium oxide 400mg  qod             MVI 1             Omeprazole prilosec 20mg  1            Trazodone desyrel 50mg        1/2                                      PDgeneration results pending     Admission  Leg swelling   Other features.      Arnaldo Henderson is a 69 year old male who receives support for Parkinson's disease. Nurse is calling to follow-up per ER recommendations.  Background: Patient is taking: Confirmed with patient/spouse Medications 6 am 10 am 2 pm 5 pm HS Sinemet 25/100 mg 2 2 2 1  Sinemet 50/200 mg CR     1 Trazodone 50 mg     0.5  New or changed medications: Insulin dosing goes up and down, started trazodone over a month ago which has helped sleep  Assessment: He will follow-up with PCP for leg swelling, will be getting an ultrasound of his legs. He has \"spells\" of legs feeling very heavy, difficulty moving them, head feels heavy, tremors and anxiety get worse during this time. Today it started at 9AM. He confirms he took his sinemet on time at 6AM. Spells usually occur about an hour before his next dose is due, has worsened over the last few weeks. He would like to see Dr. Armenta in person.  Plan/recommendation: Will have scheduling move his appointment with Dr. Armenta up to Friday 8/23 at 9AM IN PERSON.           Plan:  Swelling in legs may improve with compression stockings   Sweat pants may be helpful to allow you to slip your legs into  Need ongoing monitoring of blood pressure - highs and lows     [facility] pharmacy referral for assistance with " management of Sinemet   Will increase the first tablet of the day to 2.5 tabs of Sinemet  Reach out to us in a few weeks to see how this is going  Can chew a half tab of Sinemet with carbonated water if getting severe off periods    Pharmacy ([facility]) consultation and medication management  Please call the scheduling number @ [phone] to set up an appointment with pharmacists Cheyanne Harrell, Morenita Delgado, or Brittney Barrera.     Return to see Candida in 3 months.    As ongoing complicated medical issues including leg swelling, heart issues, etc.     Labs reviewed below      Last Comprehensive Metabolic Panel:  Lab Results   Component Value Date     08/19/2024    POTASSIUM 4.3 08/19/2024    CHLORIDE 102 08/19/2024    CO2 27 08/19/2024    ANIONGAP 10 08/19/2024     (H) 08/19/2024    BUN 20.3 08/19/2024    CR 0.97 08/19/2024    GFRESTIMATED 84 08/19/2024    JAKE 8.9 08/19/2024     Lab Results   Component Value Date    WBC 10.6 08/19/2024     Lab Results   Component Value Date    RBC 4.26 08/19/2024     Lab Results   Component Value Date    HGB 14.0 08/19/2024     Lab Results   Component Value Date    HCT 42.5 08/19/2024     Lab Results   Component Value Date     08/19/2024     Lab Results   Component Value Date    MCH 32.9 08/19/2024     Lab Results   Component Value Date    MCHC 32.9 08/19/2024     Lab Results   Component Value Date    RDW 13.5 08/19/2024     Lab Results   Component Value Date     08/19/2024         PATIENT SEEN AND EXAMINED BY ME on August 23, 2024 I AGREE WITH THE FINDINGS DETAILED BY THE NEUROLOGY RESIDENT and documented in their note on August 23, 2024   PLEASE REFER TO THEIR NOTE FOR ADDITIONAL DETAILS.    Billing based on complexity       ARNULFO GOFF MD  August 23, 2024        Coding statement:   Medical Decision Making:  #  Chronic progressive medical conditions addressed  - see above --   Review and/or interpretation of unique test or documentation from a  provider outside of neurology yes   Independent historian provided additional details  yes I  Prescription drug management and review of potential side effects and/or monitoring for side effects  -- see above ---  Health impacted by social determinants of health  no    I have reviewed the note as documented above.  This accurately captures the substance of my conversation with the patient and total time spent preparing for visit, executing visit and completing visit on the day of the visit:  30 minutes.  The portion of this total time included face to face time -     The longitudinal plan of care for Arnaldo Henderson was addressed during this visit. Due to the added complexity in care, I will continue to support Arnaldo Henderson in the subsequent management of this condition(s) and with the ongoing continuity of care of this condition(s).      Juan Jose Armenta MD     ______________________________________    Last visit date and details:             ______________________________________      Patient was asked about 14 Review of systems including changes in vision (dry eyes, double vision), hearing, heart, lungs, musculoskeletal, depression, anxiety, snoring, RBD, insomnia, urinary frequency, urinary urgency, constipation, swallowing problems, hematological, ID, allergies, skin problems: seborrhea, endocrinological: thyroid, diabetes, cholesterol; balance, weight changes, and other neurological problems and these were not significant at this time except for   Patient Active Problem List   Diagnosis    Retained foreign body    Wound infection    Acute bilateral low back pain without sciatica    Adenomatous polyp of colon    Atherosclerotic heart disease of native coronary artery without angina pectoris    Cancer of skin of scrotum (H)    Cervicalgia    Gastroesophageal reflux disease without esophagitis    History of cholecystectomy    History of coronary artery bypass surgery    Hyperlipidemia, unspecified    Hypertension  "   Impaired gait    Long term (current) use of aspirin    Mixed conductive and sensorineural hearing loss    Muscle weakness (generalized)    Need for assistance with personal care    Other hydrocele    Parkinson's disease with dyskinesia, unspecified whether manifestations fluctuate (H)    Physical deconditioning    Pseudocyst of pancreas    Sensorineural hearing loss (SNHL) of right ear with unrestricted hearing of left ear    Type 2 diabetes mellitus with hyperglycemia, with long-term current use of insulin (H)    Neuroendocrine carcinoma of small bowel (H)    Failure to thrive in adult    Hyponatremia    Chronic right-sided low back pain without sciatica    Abdominal pain, generalized    Other secondary neuroendocrine tumors (H)    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)    Weakness    Second degree AV block    History of Parkinson's disease    Orthostatic hypotension    Hypotension    Bradycardia    Parkinson's disease, unspecified whether dyskinesia present, unspecified whether manifestations fluctuate (H)    Family history of parkinsonism    Cardiac pacemaker in situ    Seborrheic dermatitis    Lumbar spondylosis          Allergies   Allergen Reactions    Ciprofloxacin Other (See Comments), Hives, Itching, Rash and Unknown     Other reaction(s): Other (see comments)  Oral swelling per [facility]        Dilaudid [Hydromorphone] Rash    Morphine Rash     rash    Penicillins Rash     aka ancef/ rash      Citalopram Unknown    Clindamycin Itching and Rash    Oxycodone Rash     \"HORRIBLE, SEVERE RASH MAYBE CAUSED BY OXY\"     Past Surgical History:   Procedure Laterality Date    CA ANESTH CABG W/PUMP      CHOLECYSTECTOMY  2022    COLONOSCOPY N/A 01/12/2023    Procedure: colonoscopy with fluroscopy;  Surgeon: Ayden Fraire MD;  Location:  OR    ENDOSCOPIC RETROGRADE CHOLANGIOPANCREATOGRAM N/A 05/18/2023    Procedure: ENDOSCOPIC RETROGRADE CHOLANGIOPANCREATOGRAPHY, WITH  CYSTDUODENOSTOMY " STENTS X2 REMOVAL;  Surgeon: Cedric Saldana MD;  Location: [facility] OR    ENT SURGERY      EP PACEMAKER DEVICE & LEAD IMPLANT- RIGHT ATRIAL & RIGHT VENTRICULAR N/A 02/28/2024    Procedure: Pacemaker Device & Lead Implant - Right Atrial & Right Ventricular;  Surgeon: Jenna Hernandez MD;  Location:  [facility] CARDIAC CATH LAB    LAPAROSCOPY DIAGNOSTIC (GENERAL) N/A 02/20/2023    Procedure: LAPAROSCOPY, DIAGNOSTIC; RETRIEVAL OF MIGRATED STENT;  Surgeon: Joseluis Lima MD;  Location: [facility] OR    ORTHOPEDIC SURGERY      OTHER SURGICAL HISTORY      skin cancer ? from scrotum    PANCREATECTOMY PEUSTOW N/A 06/30/2023    Procedure: Open distal pancreactectomy with splenectomy, lysis of adhesions, resection of proximal illeum;  Surgeon: Ashvin Haider MD;  Location:  OR    FL CABG, ARTERIAL, TWO  2003     Past Medical History:   Diagnosis Date    Acute pancreatitis 04/18/2022    Formatting of this note might be different from the original. Formatting of this note might be different from the original. Added automatically from request for surgery [id number] Formatting of this note might be different from the original. Added automatically from request for surgery [id number]  Formatting of this note might be different from the original. Added automatically from request for surgery     CAD (coronary artery disease)     CABG    Cholecystitis, acute 07/05/2023    Diabetes mellitus, type 2 (H) 2013    Family history of parkinsonism 02/14/2024    Gastroesophageal reflux disease     Hypercholesteremia     Hypertension     Mixed conductive and sensorineural hearing loss of both ears     Mixed hearing loss     Pancreatic duct stricture     Pancreatitis     Parkinson disease (H)     Parkinson's disease, unspecified whether dyskinesia present, unspecified whether manifestations fluctuate (H) 02/14/2024    Seborrheic dermatitis 03/22/2024    Second degree AV block     Surgical site infection 07/26/2023     Social History      Socioeconomic History    Marital status:      Spouse name: Not on file    Number of children: Not on file    Years of education: Not on file    Highest education level: Not on file   Occupational History    Not on file   Tobacco Use    Smoking status: Former     Types: Cigarettes    Smokeless tobacco: Never    Tobacco comments:     Quit 10 years ago - quit a few times; started at age 12 or 13 and then stopped at 26 years and then stopped for 15 years got  and started smoking again   Vaping Use    Vaping status: Never Used   Substance and Sexual Activity    Alcohol use: Not Currently     Comment: quit 2 years ago    Drug use: Not Currently     Comment: used to smoke marijuana when young; rare use    Sexual activity: Not on file   Other Topics Concern    Not on file   Social History Narrative    Not on file     Social Determinants of Health     Financial Resource Strain: Low Risk  (8/8/2024)    Financial Resource Strain     Within the past 12 months, have you or your family members you live with been unable to get utilities (heat, electricity) when it was really needed?: No   Food Insecurity: Low Risk  (8/8/2024)    Food Insecurity     Within the past 12 months, did you worry that your food would run out before you got money to buy more?: No     Within the past 12 months, did the food you bought just not last and you didn t have money to get more?: No   Transportation Needs: Low Risk  (8/8/2024)    Transportation Needs     Within the past 12 months, has lack of transportation kept you from medical appointments, getting your medicines, non-medical meetings or appointments, work, or from getting things that you need?: No   Physical Activity: Insufficiently Active (8/6/2022)    Received from [facility]    Exercise Vital Sign     Days of Exercise per Week: 2 days     Minutes of Exercise per Session: 10 min   Stress: No Stress Concern Present (8/6/2022)    Received from [facility]    [employer] [place]  [employer] Occup[employer] - Occupational Stress Questionnaire     Feeling of Stress : Not at all   Social Connections: Moderately Isolated (8/6/2022)    Received from [facility]    Social Connection and Isolation Panel [NHANES]     Frequency of Communication with Friends and Family: Three times a week     Frequency of Social Gatherings with Friends and Family: Once a week     Attends Anabaptist Services: Never     Active Member of Clubs or Organizations: No     Attends Club or Organization Meetings: Never     Marital Status:    Interpersonal Safety: Not At Risk (8/6/2022)    Received from [facility]    Humiliation, Afraid, Rape, and Kick questionnaire     Fear of Current or Ex-Partner: No     Emotionally Abused: No     Physically Abused: No     Sexually Abused: No   Housing Stability: Low Risk  (8/8/2024)    Housing Stability     Do you have housing? : Yes     Are you worried about losing your housing?: No       Drug and lactation database from the [place] National Library of Medicine:  http://toxnet.nlm.nih.gov/cgi-bin/sis/htmlgen?LACT      B/P: Data Unavailable, T: Data Unavailable, P: Data Unavailable, R: Data Unavailable 0 lbs 0 oz  There were no vitals taken for this visit., There is no height or weight on file to calculate BMI.  Medications and Vitals not listed above were documented in the cart and reviewed by me.     Current Outpatient Medications   Medication Sig Dispense Refill    aspirin (ASA) 81 MG EC tablet Take 81 mg by mouth daily      atorvastatin (LIPITOR) 40 MG tablet Take 1 tablet (40 mg) by mouth daily 90 tablet 3    BD PEN NEEDLE LINDY 2ND GEN 32G X 4 MM miscellaneous USE TO INJECT INSULIN 4 TIMES A  each 3    carbidopa-levodopa (SINEMET CR)  MG CR tablet Take 1 tablet by mouth at bedtime @9p 90 tablet 3    carbidopa-levodopa (SINEMET)  MG tablet 2@6a, 2@10a, 2@2p and 1@5p 630 tablet 3    Continuous Blood Gluc  (DEXCOM G7 ) ALIA Use to read blood  sugars as per 's instructions. 1 each 0    Continuous Glucose Sensor (DEXCOM G7 SENSOR) MISC Change every 10 days. 9 each 11    dasiglucagon HCl (ZEGALOGUE) 0.6 MG/0.6ML SOAJ injection Inject 0.6 mg Subcutaneous once as needed (use for severe hypoglycemia) 1.2 mL 1    gabapentin (NEURONTIN) 300 MG capsule Take 2 capsules (600 mg) by mouth 3 times daily 540 capsule 3    Glucagon (BAQSIMI) 3 MG/DOSE nasal powder Spray 1 spray (3 mg) in nostril as needed (severe hypoglycemia) in the event of unconscious hypoglycemia or hypoglycemic seizure. May repeat dose if no response after 15 minutes. 2 each 1    insulin aspart (NOVOLOG FLEXPEN) 100 UNIT/ML pen Inject 6-20 Units subcutaneously 3 times daily as needed for high blood sugar (for meal coverage and correction scale, upto 60 units/day) Take 5 units with breakfast, 10 unit(s) with lunch and 12 unit(s) with supper 40 mL 3    insulin glargine 100 UNIT/ML pen Take 14 unit(s) AM and 30 unit(s) HS, and adjust as needed upto 50 unit(s) /day 50 mL 1    lipase-protease-amylase (ZENPEP) 76190-381816-856902 units CPEP Take 1 capsule by mouth 3 times daily (with meals) 90 capsule 11    losartan (COZAAR) 50 MG tablet Take 1 tablet (50 mg) by mouth daily 90 tablet 3    magnesium oxide (MAG-OX) 400 MG tablet Take 400 mg by mouth every other day      Multiple Vitamin (MULTI-VITAMINS) TABS Take 1 tablet by mouth daily      omeprazole (PRILOSEC) 20 MG DR capsule Take 1 capsule (20 mg) by mouth daily 90 capsule 3    traZODone (DESYREL) 50 MG tablet Take 0.5-1 tablets (25-50 mg) by mouth at bedtime 30 tablet 3       Juan Jose Armenta MD

## 2024-08-22 ENCOUNTER — THERAPY VISIT (OUTPATIENT)
Dept: PHYSICAL THERAPY | Facility: REHABILITATION | Age: 69
End: 2024-08-22
Payer: MEDICARE

## 2024-08-22 DIAGNOSIS — R25.8 BRADYKINESIA: ICD-10-CM

## 2024-08-22 DIAGNOSIS — G20.B1 PARKINSON'S DISEASE WITH DYSKINESIA WITHOUT FLUCTUATING MANIFESTATIONS (H): Primary | ICD-10-CM

## 2024-08-22 DIAGNOSIS — R26.81 GAIT INSTABILITY: ICD-10-CM

## 2024-08-22 PROCEDURE — 97112 NEUROMUSCULAR REEDUCATION: CPT | Mod: GP | Performed by: PHYSICAL THERAPIST

## 2024-08-22 PROCEDURE — 97110 THERAPEUTIC EXERCISES: CPT | Mod: GP | Performed by: PHYSICAL THERAPIST

## 2024-08-23 ENCOUNTER — OFFICE VISIT (OUTPATIENT)
Dept: NEUROLOGY | Facility: CLINIC | Age: 69
End: 2024-08-23
Payer: MEDICARE

## 2024-08-23 VITALS
HEART RATE: 87 BPM | DIASTOLIC BLOOD PRESSURE: 82 MMHG | OXYGEN SATURATION: 96 % | RESPIRATION RATE: 16 BRPM | SYSTOLIC BLOOD PRESSURE: 123 MMHG

## 2024-08-23 DIAGNOSIS — G20.B1 PARKINSON'S DISEASE WITH DYSKINESIA WITHOUT FLUCTUATING MANIFESTATIONS (H): Primary | ICD-10-CM

## 2024-08-23 PROCEDURE — 99214 OFFICE O/P EST MOD 30 MIN: CPT | Performed by: PSYCHIATRY & NEUROLOGY

## 2024-08-23 PROCEDURE — G2211 COMPLEX E/M VISIT ADD ON: HCPCS | Performed by: PSYCHIATRY & NEUROLOGY

## 2024-08-23 ASSESSMENT — PAIN SCALES - GENERAL: PAINLEVEL: NO PAIN (0)

## 2024-08-23 NOTE — PATIENT INSTRUCTIONS
"    Medications      6a  10am 2p 5p  9p      Acetaminophen tylenol 325mg prn             Aspirin 81mg   1            Atorvastatin lipitor 40mg   1            Carbidopa/levodopa Sinemet CR 50/200        1     CArbidopa/levodopa sinemet 25/100 2-2.5 2 2 1       Dasiglucagon zegalogue 0.6mg/0.6ml prn            Gabapentin neurontin 300mg 2 2  2       Glucagon baqsimi 3mg/dose nasal  prn             Insulin aspart novolog sliding             Insulin aspart novolog              Insulin glargine  15       30      Lipase protease amylase zenpep  3/day            Losartan cozaar 50mg 1            Magnesium oxide 400mg  qod             MVI 1             Omeprazole prilosec 20mg  1            Trazodone desyrel 50mg        1/2                                      PDgeneration results pending     Admission  Leg swelling   Other features.      Arnaldo Henderson is a 69 year old male who receives support for Parkinson's disease. Nurse is calling to follow-up per ER recommendations.  Background: Patient is taking: Confirmed with patient/spouse Medications 6 am 10 am 2 pm 5 pm HS Sinemet 25/100 mg 2 2 2 1  Sinemet 50/200 mg CR     1 Trazodone 50 mg     0.5  New or changed medications: Insulin dosing goes up and down, started trazodone over a month ago which has helped sleep  Assessment: He will follow-up with PCP for leg swelling, will be getting an ultrasound of his legs. He has \"spells\" of legs feeling very heavy, difficulty moving them, head feels heavy, tremors and anxiety get worse during this time. Today it started at 9AM. He confirms he took his sinemet on time at 6AM. Spells usually occur about an hour before his next dose is due, has worsened over the last few weeks. He would like to see Dr. Armenta in person.  Plan/recommendation: Will have scheduling move his appointment with Dr. Armenta up to Friday 8/23 at 9AM IN PERSON.           Plan:  Swelling in legs may improve with compression stockings   Sweat pants may be helpful to " allow you to slip your legs into  Need ongoing monitoring of blood pressure - highs and lows     [facility] pharmacy referral for assistance with management of Sinemet   Will increase the first tablet of the day to 2.5 tabs of Sinemet  Reach out to us in a few weeks to see how this is going  Can chew a half tab of Sinemet with carbonated water if getting severe off periods    Pharmacy ([facility]) consultation and medication management  Please call the scheduling number @ [phone] to set up an appointment with pharmacists Cheyanne Harrell, Morenita Delgado, or Brittney Barrera.     Return to see Candida in 3 months.    As ongoing complicated medical issues including leg swelling, heart issues, etc.     Labs reviewed below

## 2024-08-23 NOTE — LETTER
"2024       RE: Arnaldo Henderson  [address] [facility] [address]     Dear Colleague,    Thank you for referring your patient, Arnaldo Henderson, to the [facility]UR[facility] [place] at [facility]. Please see a copy of my visit note below.        Diagnosis/Summary/Recommendations:    PATIENT: Arnaldo Henderson  69 year old male     : 1955    ELISA: Aug 23, 2024       MRN: [id number]  [address]    [place] [facility] [address]     [phone] ()   [phone] ()      [email or web address]     Veronika Pena spouse  [phone]     Keren balbuena daughter     Genetic testing ?   Family history of parkinsonism in mother     Parkinson's disease with dyskinesia, unspecified whether manifestations fluctuate, Ref by [facility], Sched per Spouse Veronika     Seen by Parashos - notes below     [facility]      Assessment:  (G20.A1) Parkinson's disease, unspecified whether dyskinesia present, unspecified whether manifestations fluctuate  (primary encounter diagnosis)  Family history of parkinsonism - mother      From Parashos recent visit  1. Parkinson's disease, stage 2  2. Dyskinesia due to Parkinson's disease.  3. Motor fluctuations likely related to protein intake interfering with the absorption of levodopa, improved since taking the levodopa on an empty stomach.     5am up line up pills  6am takes carbidopa/levodopa and insulin  Then eats breakfast - generally he is waiting a bit - eating between 630 or 7am so waiting 30 - 60 minutes.      10 or 11a - head starts to feel numb - not light headedness and legs are heavy.   He has problems moving his feet and most likely he is wearing off/worn off  He has a lot of \"freezing\" throughout the day.   The morning is the best time.   He has freezing  He is a \"different person\" later in the day and evenings   Falls asleep early in evening - 730 and 8pm watching " "TV  Gets up at 9pm and/or wife wakes him up and has to help him get into bed  Has problems sleeping  Has a bed rail to help him move  He is sleeping in a separate bed as he is at various angles in the bed.      Started 2.5 years ago in [place] - Parkinson has progressed \"tremendously\"   Dr Resendez has not made medication changes.      11am pill and takes 1 hour or 1.5 hours to get the dose to work   4p      Review of diagnosis    Parkinson disease  Duration 4 years or so.   Side onset: right  Right handed.   Symptoms - had gait problems  - lack of arm swing, shuffling   Symptoms - sleep issues were early issues as well as lack of smell  Clinical diagnosis   No specific testing     Avoidance of dopamine blockers   Not taking     Motor complication review   Wearing off specifically around 9 AM and 1PM before his 2PM dose and takes an hour for it to kick in  ?dyskinesias - not  clear if he has this but may be some facial dyskinesias per his wife.  Not clear if there is a relationship between the medication and the dyskinesias.   Freezing that may be aggravated by anxiety   Has more shuffling     Review of Impulse control disorders   Always hungry   Seen by Dr. Haider and discussed weight gain  No other ICD issues.      Review of surgical or medication options   reviewed     Gait/Balance/Falls   No falls.   Has freezing that is getting worse  Not using assistive device     Exercise/Therapy performed/offered   Physical therapy - Mondays and Thursdays  Salma something is the PT  Big therapy at [facility] [address]     Cognitive/Driving   He is not concerned  Wife has been a bit concerned that it is mildly progressing - left the garage door open or forgetting to turn off light  He had an amazing memory when they met but has short term memory problems   Has not had cognitive testing  - can consider this.   Driving  - very little - short distances; wife prefers that he not drive.   He is managing his own medicine and " insulin, etc.   Organizes his pill containers, etc.      Mood   Anxiety is disabling, especially when rushed    QTc was okay 2/28/2024  He may have been on citalopram/celexa - details not clear in chart.   He stopped it and threw it out.   Trazodone increase has helped his anxiety     Therapist - has not worked with a therapist  He has problems waiting with other people when they are shopping.      Denies depression     Wife Clarisa here with him today.      Profession in the past -- 7 years retired.   He worked in manufacturing things, all sorts of jobs.      Using [employer] [facility] at 1200 pm for therapy      Going to [place] and leaving Friday and will be there for one week and will be selling their place as it is too difficulty to travel. Bought one way tickets as the sale is still not final.      1st marriage  2nd marriage - clarisa - having surgery with Dr. Umaña   Had an cerebral angiogram - they want to address the 80% carotid artery stenosis.      Hallucinations/delusions   Denies   No illusions     Sleep   Tried a full pill of trazodone and gets foggy with this  Naps in front of tv  Gets up to go to bed at 9pm   Sleeps for 4 solid hours and then toss and turns for 4 hours  Get up at 4 or 5am and then lays in bed till 4 or 430 or 5a and then gets up  Sleeping in separate bed from spouse  Is angled over the bed  He has a railing.   He may move around in the bed   possible RBD/dream enactment behavior - per his wife - may be less  He sleeps alone now - sleep issue prompted a consultation.   Snoring - sometimes - recently no but not clear.   Sleep study - has not done one.      Bladder/Renal/Prostate/Gyn/Other  Urinary frequency  Nocturia 2/noc  No control issues   PSA was normal about 2 y ears ago.      GI/Constipation/GERD   Acute cholecystitis  Cholecystectomy  Malignant neuroendocrine tumor   GERD  Was put on omeprazole long ago and remains on this.   He had problems with his esophagus and had to be dilated, etc.  "He had ripples  This was 20 years ago, etc.   He has not had problems like this.  No coughing   Weight issue ?  Failure to thrive   Malnutrition  Lipase/protease/amylase  He has a bowel movement every 3 days - long standing  Not taking anything for his bowels per se  Encouraged to be active     ENDO/Lipid/DM/Bone density/Thyroid  Partial pancreatectomy  Pseudocyst pancreas     Diabetes  A1c was 8.2 on 11/6/2023  Insulin  Intolerance to metformin  Seen nutritionist for this.   Needs to have another one done.      Lipid disorder   Atorvastatin lipitor 40mg      Cardio/heart/Hyper or Hypotensive   Hypotension  pacemaker  2/28/2024  Bradycardia  Bypass surgery x2 2003  Losartan cozaar 50mg restarted after hospital  Had some swelling in his thighs and his feet that started a month ago that has reduced yesterday     Vision/Dry Eyes/Cataracts/Glaucoma/Macular   Wears glasses  Had recent visual evaluation   No clear retinopathy from diabetes     Heme/Anticoagulation/Antiplatelet/Anemia/Other  Aspirin 81mg daily   No other blood thinners.      ENT/Resp  Hearing loss   ?former smoker - tobacco - off and on   Smell perception - never been good.      Skin/Cancer/Seborrhea/other  Skin cancer of scrotum  Seborrheic dermatitis   Few spots on his scalp     Musculoskeletal/Pain/Headache  Neck pain  Back issues since \"surgery\" - like a bubble that moves around his back and is intermittent. This has gotten better     Gabapentin 300mg 2 pills 3/day  - prescribed for abdominal burning stomach pain   Was referred to pain specialist   Magnesium 400mg daily      Other:  Wearing off is worse specifically around 9 AM and 1PM before his 2PM dose and takes an hour for it to kick in  Started taking additional dose of 25/100 at 2PM  Freezing is slowly worsening  Working with Big therapy at [facility]    Anxiety improved with trazodone but still present    Tried a full pill of trazodone but gets foggy with this in the morning    Had some " "swelling in his thighs and his feet that started a month ago that has reduced yesterday  This is a bit worse in the left leg  DVT ultrasound and BNP negative        Medications      6a  10am 2p 5p  9p      Acetaminophen tylenol 325mg prn             Aspirin 81mg   1            Atorvastatin lipitor 40mg   1            Carbidopa/levodopa Sinemet CR 50/200        1     CArbidopa/levodopa sinemet 25/100 2 2 2 1       Dasiglucagon zegalogue 0.6mg/0.6ml prn            Gabapentin neurontin 300mg 2 2  2       Glucagon baqsimi 3mg/dose nasal  prn             Insulin aspart novolog sliding             Insulin aspart novolog              Insulin glargine  15       30      Lipase protease amylase zenpep  3/day            Losartan cozaar 50mg 1            Magnesium oxide 400mg  qod             MVI 1             Omeprazole prilosec 20mg  1            Trazodone desyrel 50mg        1/2                                      PDgeneration results pending     Admission  Leg swelling   Other features.      Arnaldo Henderson is a 69 year old male who receives support for Parkinson's disease. Nurse is calling to follow-up per ER recommendations.  Background: Patient is taking: Confirmed with patient/spouse Medications 6 am 10 am 2 pm 5 pm HS Sinemet 25/100 mg 2 2 2 1  Sinemet 50/200 mg CR     1 Trazodone 50 mg     0.5  New or changed medications: Insulin dosing goes up and down, started trazodone over a month ago which has helped sleep  Assessment: He will follow-up with PCP for leg swelling, will be getting an ultrasound of his legs. He has \"spells\" of legs feeling very heavy, difficulty moving them, head feels heavy, tremors and anxiety get worse during this time. Today it started at 9AM. He confirms he took his sinemet on time at 6AM. Spells usually occur about an hour before his next dose is due, has worsened over the last few weeks. He would like to see Dr. Armenta in person.  Plan/recommendation: Will have scheduling move his " appointment with Dr. Armenta up to Friday 8/23 at 9AM IN PERSON.           Plan:  Swelling in legs may improve with compression stockings   Sweat pants may be helpful to allow you to slip your legs into  Need ongoing monitoring of blood pressure - highs and lows     [facility] pharmacy referral for assistance with management of Sinemet   Will increase the first tablet of the day to 2.5 tabs of Sinemet  Reach out to us in a few weeks to see how this is going  Can chew a half tab of Sinemet with carbonated water if getting severe off periods    Pharmacy ([facility]) consultation and medication management  Please call the scheduling number @ [phone] to set up an appointment with pharmacists Cheyanne Harrell, Morenita Delgado, or Brittney Barrera.     Return to see Candida in 3 months.    As ongoing complicated medical issues including leg swelling, heart issues, etc.     Labs reviewed below      Last Comprehensive Metabolic Panel:  Lab Results   Component Value Date     08/19/2024    POTASSIUM 4.3 08/19/2024    CHLORIDE 102 08/19/2024    CO2 27 08/19/2024    ANIONGAP 10 08/19/2024     (H) 08/19/2024    BUN 20.3 08/19/2024    CR 0.97 08/19/2024    GFRESTIMATED 84 08/19/2024    JAKE 8.9 08/19/2024     Lab Results   Component Value Date    WBC 10.6 08/19/2024     Lab Results   Component Value Date    RBC 4.26 08/19/2024     Lab Results   Component Value Date    HGB 14.0 08/19/2024     Lab Results   Component Value Date    HCT 42.5 08/19/2024     Lab Results   Component Value Date     08/19/2024     Lab Results   Component Value Date    MCH 32.9 08/19/2024     Lab Results   Component Value Date    MCHC 32.9 08/19/2024     Lab Results   Component Value Date    RDW 13.5 08/19/2024     Lab Results   Component Value Date     08/19/2024         PATIENT SEEN AND EXAMINED BY ME on August 23, 2024 I AGREE WITH THE FINDINGS DETAILED BY THE NEUROLOGY RESIDENT and documented in their note on August 23, 2024   PLEASE  REFER TO THEIR NOTE FOR ADDITIONAL DETAILS.    Billing based on complexity       JUAN JOSE GOFF MD  August 23, 2024        Coding statement:   Medical Decision Making:  #  Chronic progressive medical conditions addressed  - see above --   Review and/or interpretation of unique test or documentation from a provider outside of neurology yes   Independent historian provided additional details  yes I  Prescription drug management and review of potential side effects and/or monitoring for side effects  -- see above ---  Health impacted by social determinants of health  no    I have reviewed the note as documented above.  This accurately captures the substance of my conversation with the patient and total time spent preparing for visit, executing visit and completing visit on the day of the visit:  30 minutes.  The portion of this total time included face to face time -     The longitudinal plan of care for Arnaldo Henderson was addressed during this visit. Due to the added complexity in care, I will continue to support Arnaldo Henderson in the subsequent management of this condition(s) and with the ongoing continuity of care of this condition(s).      Juan Jose Goff MD     ______________________________________    Last visit date and details:             ______________________________________      Patient was asked about 14 Review of systems including changes in vision (dry eyes, double vision), hearing, heart, lungs, musculoskeletal, depression, anxiety, snoring, RBD, insomnia, urinary frequency, urinary urgency, constipation, swallowing problems, hematological, ID, allergies, skin problems: seborrhea, endocrinological: thyroid, diabetes, cholesterol; balance, weight changes, and other neurological problems and these were not significant at this time except for   Patient Active Problem List   Diagnosis     Retained foreign body     Wound infection     Acute bilateral low back pain without sciatica     Adenomatous polyp of colon  "    Atherosclerotic heart disease of native coronary artery without angina pectoris     Cancer of skin of scrotum (H)     Cervicalgia     Gastroesophageal reflux disease without esophagitis     History of cholecystectomy     History of coronary artery bypass surgery     Hyperlipidemia, unspecified     Hypertension     Impaired gait     Long term (current) use of aspirin     Mixed conductive and sensorineural hearing loss     Muscle weakness (generalized)     Need for assistance with personal care     Other hydrocele     Parkinson's disease with dyskinesia, unspecified whether manifestations fluctuate (H)     Physical deconditioning     Pseudocyst of pancreas     Sensorineural hearing loss (SNHL) of right ear with unrestricted hearing of left ear     Type 2 diabetes mellitus with hyperglycemia, with long-term current use of insulin (H)     Neuroendocrine carcinoma of small bowel (H)     Failure to thrive in adult     Hyponatremia     Chronic right-sided low back pain without sciatica     Abdominal pain, generalized     Other secondary neuroendocrine tumors (H)     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)     Weakness     Second degree AV block     History of Parkinson's disease     Orthostatic hypotension     Hypotension     Bradycardia     Parkinson's disease, unspecified whether dyskinesia present, unspecified whether manifestations fluctuate (H)     Family history of parkinsonism     Cardiac pacemaker in situ     Seborrheic dermatitis     Lumbar spondylosis          Allergies   Allergen Reactions     Ciprofloxacin Other (See Comments), Hives, Itching, Rash and Unknown     Other reaction(s): Other (see comments)  Oral swelling per [facility]         Dilaudid [Hydromorphone] Rash     Morphine Rash     rash     Penicillins Rash     aka ancef/ rash       Citalopram Unknown     Clindamycin Itching and Rash     Oxycodone Rash     \"HORRIBLE, SEVERE RASH MAYBE CAUSED BY OXY\"     Past Surgical " History:   Procedure Laterality Date     CA ANESTH CABG W/PUMP       CHOLECYSTECTOMY  2022     COLONOSCOPY N/A 01/12/2023    Procedure: colonoscopy with fluroscopy;  Surgeon: Ayden Fraire MD;  Location:  OR     ENDOSCOPIC RETROGRADE CHOLANGIOPANCREATOGRAM N/A 05/18/2023    Procedure: ENDOSCOPIC RETROGRADE CHOLANGIOPANCREATOGRAPHY, WITH  CYSTDUODENOSTOMY STENTS X2 REMOVAL;  Surgeon: Cedric Saldana MD;  Location: [facility] OR     ENT SURGERY       EP PACEMAKER DEVICE & LEAD IMPLANT- RIGHT ATRIAL & RIGHT VENTRICULAR N/A 02/28/2024    Procedure: Pacemaker Device & Lead Implant - Right Atrial & Right Ventricular;  Surgeon: Jenna Hernandez MD;  Location:  HEART CARDIAC CATH LAB     LAPAROSCOPY DIAGNOSTIC (GENERAL) N/A 02/20/2023    Procedure: LAPAROSCOPY, DIAGNOSTIC; RETRIEVAL OF MIGRATED STENT;  Surgeon: Joseluis Lima MD;  Location: [facility] OR     ORTHOPEDIC SURGERY       OTHER SURGICAL HISTORY      skin cancer ? from scrotum     PANCREATECTOMY PEUSTOW N/A 06/30/2023    Procedure: Open distal pancreactectomy with splenectomy, lysis of adhesions, resection of proximal illeum;  Surgeon: Ashvin Haider MD;  Location: [facility] OR     OK CABG, ARTERIAL, TWO  2003     Past Medical History:   Diagnosis Date     Acute pancreatitis 04/18/2022    Formatting of this note might be different from the original. Formatting of this note might be different from the original. Added automatically from request for surgery [id number] Formatting of this note might be different from the original. Added automatically from request for surgery [id number]  Formatting of this note might be different from the original. Added automatically from request for surgery      CAD (coronary artery disease)     CABG     Cholecystitis, acute 07/05/2023     Diabetes mellitus, type 2 (H) 2013     Family history of parkinsonism 02/14/2024     Gastroesophageal reflux disease      Hypercholesteremia      Hypertension      Mixed conductive and sensorineural  hearing loss of both ears      Mixed hearing loss      Pancreatic duct stricture      Pancreatitis      Parkinson disease (H)      Parkinson's disease, unspecified whether dyskinesia present, unspecified whether manifestations fluctuate (H) 02/14/2024     Seborrheic dermatitis 03/22/2024     Second degree AV block      Surgical site infection 07/26/2023     Social History     Socioeconomic History     Marital status:      Spouse name: Not on file     Number of children: Not on file     Years of education: Not on file     Highest education level: Not on file   Occupational History     Not on file   Tobacco Use     Smoking status: Former     Types: Cigarettes     Smokeless tobacco: Never     Tobacco comments:     Quit 10 years ago - quit a few times; started at age 12 or 13 and then stopped at 26 years and then stopped for 15 years got  and started smoking again   Vaping Use     Vaping status: Never Used   Substance and Sexual Activity     Alcohol use: Not Currently     Comment: quit 2 years ago     Drug use: Not Currently     Comment: used to smoke marijuana when young; rare use     Sexual activity: Not on file   Other Topics Concern     Not on file   Social History Narrative     Not on file     Social Determinants of Health     Financial Resource Strain: Low Risk  (8/8/2024)    Financial Resource Strain      Within the past 12 months, have you or your family members you live with been unable to get utilities (heat, electricity) when it was really needed?: No   Food Insecurity: Low Risk  (8/8/2024)    Food Insecurity      Within the past 12 months, did you worry that your food would run out before you got money to buy more?: No      Within the past 12 months, did the food you bought just not last and you didn t have money to get more?: No   Transportation Needs: Low Risk  (8/8/2024)    Transportation Needs      Within the past 12 months, has lack of transportation kept you from medical appointments,  getting your medicines, non-medical meetings or appointments, work, or from getting things that you need?: No   Physical Activity: Insufficiently Active (8/6/2022)    Received from [facility]    Exercise Vital Sign      Days of Exercise per Week: 2 days      Minutes of Exercise per Session: 10 min   Stress: No Stress Concern Present (8/6/2022)    Received from [facility]    [employer] - Occupational Stress Questionnaire      Feeling of Stress : Not at all   Social Connections: Moderately Isolated (8/6/2022)    Received from [facility]    Social Connection and Isolation Panel [NHANES]      Frequency of Communication with Friends and Family: Three times a week      Frequency of Social Gatherings with Friends and Family: Once a week      Attends Anabaptist Services: Never      Active Member of Clubs or Organizations: No      Attends Club or Organization Meetings: Never      Marital Status:    Interpersonal Safety: Not At Risk (8/6/2022)    Received from [facility]    Humiliation, Afraid, Rape, and Kick questionnaire      Fear of Current or Ex-Partner: No      Emotionally Abused: No      Physically Abused: No      Sexually Abused: No   Housing Stability: Low Risk  (8/8/2024)    Housing Stability      Do you have housing? : Yes      Are you worried about losing your housing?: No       Drug and lactation database from the [place] [employer] of Medicine:  http://toxnet.nlm.nih.gov/cgi-bin/sis/htmlgen?LACT      B/P: Data Unavailable, T: Data Unavailable, P: Data Unavailable, R: Data Unavailable 0 lbs 0 oz  There were no vitals taken for this visit., There is no height or weight on file to calculate BMI.  Medications and Vitals not listed above were documented in the cart and reviewed by me.     Current Outpatient Medications   Medication Sig Dispense Refill     aspirin (ASA) 81 MG EC tablet Take 81 mg by mouth daily       atorvastatin (LIPITOR) 40 MG tablet Take 1 tablet  (40 mg) by mouth daily 90 tablet 3     BD PEN NEEDLE LINDY 2ND GEN 32G X 4 MM miscellaneous USE TO INJECT INSULIN 4 TIMES A  each 3     carbidopa-levodopa (SINEMET CR)  MG CR tablet Take 1 tablet by mouth at bedtime @9p 90 tablet 3     carbidopa-levodopa (SINEMET)  MG tablet 2@6a, 2@10a, 2@2p and 1@5p 630 tablet 3     Continuous Blood Gluc  (DEXCOM G7 ) ALIA Use to read blood sugars as per 's instructions. 1 each 0     Continuous Glucose Sensor (DEXCOM G7 SENSOR) MISC Change every 10 days. 9 each 11     dasiglucagon HCl (ZEGALOGUE) 0.6 MG/0.6ML SOAJ injection Inject 0.6 mg Subcutaneous once as needed (use for severe hypoglycemia) 1.2 mL 1     gabapentin (NEURONTIN) 300 MG capsule Take 2 capsules (600 mg) by mouth 3 times daily 540 capsule 3     Glucagon (BAQSIMI) 3 MG/DOSE nasal powder Spray 1 spray (3 mg) in nostril as needed (severe hypoglycemia) in the event of unconscious hypoglycemia or hypoglycemic seizure. May repeat dose if no response after 15 minutes. 2 each 1     insulin aspart (NOVOLOG FLEXPEN) 100 UNIT/ML pen Inject 6-20 Units subcutaneously 3 times daily as needed for high blood sugar (for meal coverage and correction scale, upto 60 units/day) Take 5 units with breakfast, 10 unit(s) with lunch and 12 unit(s) with supper 40 mL 3     insulin glargine 100 UNIT/ML pen Take 14 unit(s) AM and 30 unit(s) HS, and adjust as needed upto 50 unit(s) /day 50 mL 1     lipase-protease-amylase (ZENPEP) 85266-316984-523487 units CPEP Take 1 capsule by mouth 3 times daily (with meals) 90 capsule 11     losartan (COZAAR) 50 MG tablet Take 1 tablet (50 mg) by mouth daily 90 tablet 3     magnesium oxide (MAG-OX) 400 MG tablet Take 400 mg by mouth every other day       Multiple Vitamin (MULTI-VITAMINS) TABS Take 1 tablet by mouth daily       omeprazole (PRILOSEC) 20 MG DR capsule Take 1 capsule (20 mg) by mouth daily 90 capsule 3     traZODone (DESYREL) 50 MG tablet Take 0.5-1  tablets (25-50 mg) by mouth at bedtime 30 tablet 3       Juan Jose Armenta MD      Again, thank you for allowing me to participate in the care of your patient.      Sincerely,    Juan Jose Armenta MD

## 2024-08-25 NOTE — RESULT ENCOUNTER NOTE
Patient updated by [employer] message with imaging results.       Dc Fierro,  Thank you for completing the ultrasound to evaluate for venous insufficiency (leaky veins) as a source of your leg swelling. This test was actually reassuring. It does not show any evidence of reflux. We had discussed a trial of  low-salt diet, leg elevation, compression. How has your swelling been since last visit?  Amelia Johnson, DO

## 2024-08-26 ENCOUNTER — THERAPY VISIT (OUTPATIENT)
Dept: PHYSICAL THERAPY | Facility: REHABILITATION | Age: 69
End: 2024-08-26
Payer: MEDICARE

## 2024-08-26 DIAGNOSIS — R26.81 GAIT INSTABILITY: ICD-10-CM

## 2024-08-26 DIAGNOSIS — R25.8 BRADYKINESIA: ICD-10-CM

## 2024-08-26 DIAGNOSIS — R26.89 IMPAIRMENT OF BALANCE: ICD-10-CM

## 2024-08-26 DIAGNOSIS — G20.B1 PARKINSON'S DISEASE WITH DYSKINESIA WITHOUT FLUCTUATING MANIFESTATIONS (H): Primary | ICD-10-CM

## 2024-08-26 PROCEDURE — 97112 NEUROMUSCULAR REEDUCATION: CPT | Mod: GP | Performed by: PHYSICAL THERAPIST

## 2024-08-26 PROCEDURE — 97110 THERAPEUTIC EXERCISES: CPT | Mod: GP | Performed by: PHYSICAL THERAPIST

## 2024-08-27 LAB
MDC_IDC_LEAD_CONNECTION_STATUS: NORMAL
MDC_IDC_LEAD_CONNECTION_STATUS: NORMAL
MDC_IDC_LEAD_IMPLANT_DT: NORMAL
MDC_IDC_LEAD_IMPLANT_DT: NORMAL
MDC_IDC_LEAD_LOCATION: NORMAL
MDC_IDC_LEAD_LOCATION: NORMAL
MDC_IDC_LEAD_LOCATION_DETAIL_1: NORMAL
MDC_IDC_LEAD_LOCATION_DETAIL_1: NORMAL
MDC_IDC_LEAD_MFG: NORMAL
MDC_IDC_LEAD_MFG: NORMAL
MDC_IDC_LEAD_MODEL: NORMAL
MDC_IDC_LEAD_MODEL: NORMAL
MDC_IDC_LEAD_POLARITY_TYPE: NORMAL
MDC_IDC_LEAD_POLARITY_TYPE: NORMAL
MDC_IDC_LEAD_SERIAL: NORMAL
MDC_IDC_LEAD_SERIAL: NORMAL
MDC_IDC_LEAD_SPECIAL_FUNCTION: NORMAL
MDC_IDC_LEAD_SPECIAL_FUNCTION: NORMAL
MDC_IDC_MSMT_BATTERY_DTM: NORMAL
MDC_IDC_MSMT_BATTERY_REMAINING_LONGEVITY: 146 MO
MDC_IDC_MSMT_BATTERY_RRT_TRIGGER: 2.62
MDC_IDC_MSMT_BATTERY_STATUS: NORMAL
MDC_IDC_MSMT_BATTERY_VOLTAGE: 3.15 V
MDC_IDC_MSMT_LEADCHNL_RA_IMPEDANCE_VALUE: 323 OHM
MDC_IDC_MSMT_LEADCHNL_RA_IMPEDANCE_VALUE: 437 OHM
MDC_IDC_MSMT_LEADCHNL_RA_PACING_THRESHOLD_AMPLITUDE: 0.5 V
MDC_IDC_MSMT_LEADCHNL_RA_PACING_THRESHOLD_PULSEWIDTH: 0.4 MS
MDC_IDC_MSMT_LEADCHNL_RA_SENSING_INTR_AMPL: 3.12 MV
MDC_IDC_MSMT_LEADCHNL_RA_SENSING_INTR_AMPL: 3.12 MV
MDC_IDC_MSMT_LEADCHNL_RV_IMPEDANCE_VALUE: 418 OHM
MDC_IDC_MSMT_LEADCHNL_RV_IMPEDANCE_VALUE: 475 OHM
MDC_IDC_MSMT_LEADCHNL_RV_PACING_THRESHOLD_AMPLITUDE: 0.5 V
MDC_IDC_MSMT_LEADCHNL_RV_PACING_THRESHOLD_PULSEWIDTH: 0.4 MS
MDC_IDC_MSMT_LEADCHNL_RV_SENSING_INTR_AMPL: 17.25 MV
MDC_IDC_MSMT_LEADCHNL_RV_SENSING_INTR_AMPL: 17.25 MV
MDC_IDC_PG_IMPLANT_DTM: NORMAL
MDC_IDC_PG_MFG: NORMAL
MDC_IDC_PG_MODEL: NORMAL
MDC_IDC_PG_SERIAL: NORMAL
MDC_IDC_PG_TYPE: NORMAL
MDC_IDC_SESS_CLINIC_NAME: NORMAL
MDC_IDC_SESS_DTM: NORMAL
MDC_IDC_SESS_TYPE: NORMAL
MDC_IDC_SET_BRADY_AT_MODE_SWITCH_RATE: 171 {BEATS}/MIN
MDC_IDC_SET_BRADY_HYSTRATE: NORMAL
MDC_IDC_SET_BRADY_LOWRATE: 60 {BEATS}/MIN
MDC_IDC_SET_BRADY_MAX_SENSOR_RATE: 130 {BEATS}/MIN
MDC_IDC_SET_BRADY_MAX_TRACKING_RATE: 130 {BEATS}/MIN
MDC_IDC_SET_BRADY_MODE: NORMAL
MDC_IDC_SET_BRADY_PAV_DELAY_HIGH: 140 MS
MDC_IDC_SET_BRADY_PAV_DELAY_LOW: 240 MS
MDC_IDC_SET_BRADY_SAV_DELAY_HIGH: 110 MS
MDC_IDC_SET_BRADY_SAV_DELAY_LOW: 220 MS
MDC_IDC_SET_LEADCHNL_RA_PACING_AMPLITUDE: 1.5 V
MDC_IDC_SET_LEADCHNL_RA_PACING_ANODE_ELECTRODE_1: NORMAL
MDC_IDC_SET_LEADCHNL_RA_PACING_ANODE_LOCATION_1: NORMAL
MDC_IDC_SET_LEADCHNL_RA_PACING_CAPTURE_MODE: NORMAL
MDC_IDC_SET_LEADCHNL_RA_PACING_CATHODE_ELECTRODE_1: NORMAL
MDC_IDC_SET_LEADCHNL_RA_PACING_CATHODE_LOCATION_1: NORMAL
MDC_IDC_SET_LEADCHNL_RA_PACING_POLARITY: NORMAL
MDC_IDC_SET_LEADCHNL_RA_PACING_PULSEWIDTH: 0.4 MS
MDC_IDC_SET_LEADCHNL_RA_SENSING_ANODE_ELECTRODE_1: NORMAL
MDC_IDC_SET_LEADCHNL_RA_SENSING_ANODE_LOCATION_1: NORMAL
MDC_IDC_SET_LEADCHNL_RA_SENSING_CATHODE_ELECTRODE_1: NORMAL
MDC_IDC_SET_LEADCHNL_RA_SENSING_CATHODE_LOCATION_1: NORMAL
MDC_IDC_SET_LEADCHNL_RA_SENSING_POLARITY: NORMAL
MDC_IDC_SET_LEADCHNL_RA_SENSING_SENSITIVITY: 0.3 MV
MDC_IDC_SET_LEADCHNL_RV_PACING_AMPLITUDE: 2 V
MDC_IDC_SET_LEADCHNL_RV_PACING_ANODE_ELECTRODE_1: NORMAL
MDC_IDC_SET_LEADCHNL_RV_PACING_ANODE_LOCATION_1: NORMAL
MDC_IDC_SET_LEADCHNL_RV_PACING_CAPTURE_MODE: NORMAL
MDC_IDC_SET_LEADCHNL_RV_PACING_CATHODE_ELECTRODE_1: NORMAL
MDC_IDC_SET_LEADCHNL_RV_PACING_CATHODE_LOCATION_1: NORMAL
MDC_IDC_SET_LEADCHNL_RV_PACING_POLARITY: NORMAL
MDC_IDC_SET_LEADCHNL_RV_PACING_PULSEWIDTH: 0.4 MS
MDC_IDC_SET_LEADCHNL_RV_SENSING_ANODE_ELECTRODE_1: NORMAL
MDC_IDC_SET_LEADCHNL_RV_SENSING_ANODE_LOCATION_1: NORMAL
MDC_IDC_SET_LEADCHNL_RV_SENSING_CATHODE_ELECTRODE_1: NORMAL
MDC_IDC_SET_LEADCHNL_RV_SENSING_CATHODE_LOCATION_1: NORMAL
MDC_IDC_SET_LEADCHNL_RV_SENSING_POLARITY: NORMAL
MDC_IDC_SET_LEADCHNL_RV_SENSING_SENSITIVITY: 0.9 MV
MDC_IDC_SET_ZONE_DETECTION_INTERVAL: 350 MS
MDC_IDC_SET_ZONE_DETECTION_INTERVAL: 400 MS
MDC_IDC_SET_ZONE_STATUS: NORMAL
MDC_IDC_SET_ZONE_STATUS: NORMAL
MDC_IDC_SET_ZONE_TYPE: NORMAL
MDC_IDC_SET_ZONE_VENDOR_TYPE: NORMAL
MDC_IDC_STAT_AT_BURDEN_PERCENT: 0 %
MDC_IDC_STAT_AT_DTM_END: NORMAL
MDC_IDC_STAT_AT_DTM_START: NORMAL
MDC_IDC_STAT_BRADY_AP_VP_PERCENT: 11.08 %
MDC_IDC_STAT_BRADY_AP_VS_PERCENT: 0.01 %
MDC_IDC_STAT_BRADY_AS_VP_PERCENT: 88.84 %
MDC_IDC_STAT_BRADY_AS_VS_PERCENT: 0.08 %
MDC_IDC_STAT_BRADY_DTM_END: NORMAL
MDC_IDC_STAT_BRADY_DTM_START: NORMAL
MDC_IDC_STAT_BRADY_RA_PERCENT_PACED: 11.08 %
MDC_IDC_STAT_BRADY_RV_PERCENT_PACED: 99.92 %
MDC_IDC_STAT_EPISODE_RECENT_COUNT: 0
MDC_IDC_STAT_EPISODE_RECENT_COUNT_DTM_END: NORMAL
MDC_IDC_STAT_EPISODE_RECENT_COUNT_DTM_START: NORMAL
MDC_IDC_STAT_EPISODE_TOTAL_COUNT: 0
MDC_IDC_STAT_EPISODE_TOTAL_COUNT_DTM_END: NORMAL
MDC_IDC_STAT_EPISODE_TOTAL_COUNT_DTM_START: NORMAL
MDC_IDC_STAT_EPISODE_TYPE: NORMAL
MDC_IDC_STAT_TACHYTHERAPY_RECENT_DTM_END: NORMAL
MDC_IDC_STAT_TACHYTHERAPY_RECENT_DTM_START: NORMAL
MDC_IDC_STAT_TACHYTHERAPY_TOTAL_DTM_END: NORMAL
MDC_IDC_STAT_TACHYTHERAPY_TOTAL_DTM_START: NORMAL

## 2024-08-29 ENCOUNTER — THERAPY VISIT (OUTPATIENT)
Dept: PHYSICAL THERAPY | Facility: REHABILITATION | Age: 69
End: 2024-08-29
Payer: MEDICARE

## 2024-08-29 ENCOUNTER — TELEPHONE (OUTPATIENT)
Dept: CARDIOLOGY | Facility: CLINIC | Age: 69
End: 2024-08-29

## 2024-08-29 DIAGNOSIS — R25.8 BRADYKINESIA: ICD-10-CM

## 2024-08-29 DIAGNOSIS — R26.81 GAIT INSTABILITY: ICD-10-CM

## 2024-08-29 DIAGNOSIS — R26.89 IMPAIRMENT OF BALANCE: ICD-10-CM

## 2024-08-29 DIAGNOSIS — G20.B1 PARKINSON'S DISEASE WITH DYSKINESIA WITHOUT FLUCTUATING MANIFESTATIONS (H): Primary | ICD-10-CM

## 2024-08-29 PROCEDURE — 97112 NEUROMUSCULAR REEDUCATION: CPT | Mod: GP | Performed by: PHYSICAL THERAPIST

## 2024-08-29 PROCEDURE — 97110 THERAPEUTIC EXERCISES: CPT | Mod: GP | Performed by: PHYSICAL THERAPIST

## 2024-08-29 NOTE — TELEPHONE ENCOUNTER
8/29 Patient's wife confirmed scheduled appointment:  Date: 12/5/2024  Time: 9:40 am  Visit type: Return EP  Provider: Hammad  Location: [facility]  Testing/imaging: device check prior   Additional notes: n/a

## 2024-09-05 ENCOUNTER — ANCILLARY PROCEDURE (OUTPATIENT)
Dept: CARDIOLOGY | Facility: CLINIC | Age: 69
End: 2024-09-05
Attending: INTERNAL MEDICINE
Payer: MEDICARE

## 2024-09-05 DIAGNOSIS — I45.5 SINUS PAUSE: ICD-10-CM

## 2024-09-05 DIAGNOSIS — Z95.0 CARDIAC PACEMAKER IN SITU: ICD-10-CM

## 2024-09-05 DIAGNOSIS — I44.1 HEART BLOCK AV SECOND DEGREE: ICD-10-CM

## 2024-09-05 PROCEDURE — 93296 REM INTERROG EVL PM/IDS: CPT

## 2024-09-05 PROCEDURE — 93294 REM INTERROG EVL PM/LDLS PM: CPT | Performed by: INTERNAL MEDICINE

## 2024-09-09 ENCOUNTER — THERAPY VISIT (OUTPATIENT)
Dept: PHYSICAL THERAPY | Facility: REHABILITATION | Age: 69
End: 2024-09-09
Payer: MEDICARE

## 2024-09-09 DIAGNOSIS — R26.81 GAIT INSTABILITY: ICD-10-CM

## 2024-09-09 DIAGNOSIS — R26.89 IMPAIRMENT OF BALANCE: ICD-10-CM

## 2024-09-09 DIAGNOSIS — G20.B1 PARKINSON'S DISEASE WITH DYSKINESIA WITHOUT FLUCTUATING MANIFESTATIONS (H): Primary | ICD-10-CM

## 2024-09-09 DIAGNOSIS — R25.8 BRADYKINESIA: ICD-10-CM

## 2024-09-09 PROCEDURE — 97110 THERAPEUTIC EXERCISES: CPT | Mod: GP | Performed by: PHYSICAL THERAPIST

## 2024-09-09 PROCEDURE — 97112 NEUROMUSCULAR REEDUCATION: CPT | Mod: GP | Performed by: PHYSICAL THERAPIST

## 2024-09-09 NOTE — PROGRESS NOTES
DISCHARGE  Reason for Discharge: Patient has met all goals.    Equipment Issued: NA    Discharge Plan: Patient to continue home program.  No further therapy recommended at this time, recommend return in ~12 months or if communication/swallowing concerns arise.     Referring Provider:  Alberto Jackson       07/22/24 0500   Appointment Info   Treating Provider Liset Villarreal M.S. CCC-SLP   Total/Authorized Visits 1   Visits Used 1/1   Medical Diagnosis Parkinson's disease with dyskinesia without fluctuating manifestations (H) (G20.B1)  - Primary, Dysphonia (R49.0), Dysarthria (R47.1)   SLP Tx Diagnosis Hypokinetic Parkinsonian Dysphonia   Quick Adds Certification   Progress Note/Certification   Start Of Care Date 07/22/24   Onset Of Illness/injury Or Date Of Surgery 06/11/24  (order date)   Therapy Frequency x1 session   Predicted Duration 1 week   Certification date from 07/22/24   Certification date to 07/29/24   KX Modifier Statement I certify the need for these services furnished under this plan of treatment and while under my care.  (Physician co-signature of this document indicates review and certification of the therapy plan)   SLP Goals   SLP Goals 1   SLP Goal 1   Goal Identifier HEP   Goal Description Patient will verbalize understanding of home exercise program for voice/speech maintenance.   Rationale To maximize functional communication within the home or community;To maximize the ability to communicate wants and needs within the home or community   Target Date 07/29/24   Treatment Interventions (SLP)   Treatment Interventions Treatment Speech/Lang/Voice   Treatment Speech/Lang/Voice   Speech/Lang/Voice 1 - Details Education provided regarding purpose/rationale of speech therapy and how it relates to diagnosis of Parkinson's Disease. Discussed automatic vs purposeful speech and how therapy techniques utilize Intent/Loud voice to improve vocal intensity. Patient was able to improve vocal intensity and  intonation of speech during structured phrase level reading tasks with use of intent given verbal cues/model by an average of 9 dB. Trained patient on home maintenance program including warm ups, sustained 'ah', glides, and reading with intent/loud voice.  Patient verbalized understanding of education/recommendations provided.   Treatment of Speech, Language, Voice Communication&/or Auditory Processing (06606) 20 Minutes   Skilled Intervention Provided written and verbal information on.;Modeled compensatory strategies;Provided feedback on performance of tasks   Patient Response/Progress Patient engaged and receptive to education/recommendations- good motivation and awareness.   Eval/Assessments   Eval/Assessments Voice   Voice Minutes (26379) 25   Education   Learner/Method Patient;Pictures/Video;Listening   Education Comments Intent/Loud voice, Voice r/t PD diagnosis   Total Session Time   Total Treatment Time (sum of timed and untimed services) 45

## 2024-09-16 ENCOUNTER — THERAPY VISIT (OUTPATIENT)
Dept: PHYSICAL THERAPY | Facility: REHABILITATION | Age: 69
End: 2024-09-16
Payer: MEDICARE

## 2024-09-16 ENCOUNTER — MYC MEDICAL ADVICE (OUTPATIENT)
Dept: NEUROLOGY | Facility: CLINIC | Age: 69
End: 2024-09-16

## 2024-09-16 DIAGNOSIS — G20.B1 PARKINSON'S DISEASE WITH DYSKINESIA WITHOUT FLUCTUATING MANIFESTATIONS (H): Primary | ICD-10-CM

## 2024-09-16 DIAGNOSIS — R26.81 GAIT INSTABILITY: ICD-10-CM

## 2024-09-16 DIAGNOSIS — R26.89 IMPAIRMENT OF BALANCE: ICD-10-CM

## 2024-09-16 DIAGNOSIS — R25.8 BRADYKINESIA: ICD-10-CM

## 2024-09-16 PROCEDURE — 97112 NEUROMUSCULAR REEDUCATION: CPT | Mod: GP | Performed by: PHYSICAL THERAPIST

## 2024-09-16 PROCEDURE — 97110 THERAPEUTIC EXERCISES: CPT | Mod: GP | Performed by: PHYSICAL THERAPIST

## 2024-09-18 ENCOUNTER — ALLIED HEALTH/NURSE VISIT (OUTPATIENT)
Dept: EDUCATION SERVICES | Facility: CLINIC | Age: 69
End: 2024-09-18
Payer: MEDICARE

## 2024-09-18 DIAGNOSIS — E11.65 TYPE 2 DIABETES MELLITUS WITH HYPERGLYCEMIA, WITH LONG-TERM CURRENT USE OF INSULIN (H): ICD-10-CM

## 2024-09-18 DIAGNOSIS — Z79.4 TYPE 2 DIABETES MELLITUS WITH HYPERGLYCEMIA, WITH LONG-TERM CURRENT USE OF INSULIN (H): ICD-10-CM

## 2024-09-18 PROCEDURE — 99207 PR NO BILLABLE SERVICE THIS VISIT: CPT | Performed by: DIETITIAN, REGISTERED

## 2024-09-18 NOTE — PATIENT INSTRUCTIONS
Read more about Omnipod 5 and look at the simulator yobany.    PLAN:  - Continue Basaglar 30 units at bedtime, continue Basaglar 14 units in the morning    - Increase to 7-9 units of Novolog at breakfast    - Increase to 9-11 units of Novolog with lunch, 10-12 units of Novolog with dinner    - Take 2 units of Novolog for snacks    --  Novolog correction scale as needed before breakfast and lunch:  151-200 take additional 2 unit(s)  201-250                       4  251-300                       6  >300                            8    --Novolog correction scale as needed before supper:  151-200 take additional 1 unit  201-250                          2  251-300                          3  >300                               4    Contact information:   To schedule a Diabetes Education appointment, call [phone]  If you have concerns, please send a [employer] message or call the clinic at [phone].    For more urgent concerns that do not require 970, please call [phone] after hours/weekends and ask to speak with the Endocrinologist on call.      Please let us know if you are having low blood sugars less than 70 or over 300 mg/dL.  Do not wait until your next appointment if this is happening.

## 2024-09-18 NOTE — PROGRESS NOTES
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  Following up with new neurologist regarding neuroendocrine tumor      Taking Medication:     Current Diabetes Management per Patient:  Taking diabetes medications? yes:     Diabetes Medication(s)       Diabetic Other       dasiglucagon HCl (ZEGALOGUE) 0.6 MG/0.6ML SOAJ injection Inject 0.6 mg Subcutaneous once as needed (use for severe hypoglycemia)     Glucagon (BAQSIMI) 3 MG/DOSE nasal powder Spray 1 spray (3 mg) in nostril as needed (severe hypoglycemia) in the event of unconscious hypoglycemia or hypoglycemic seizure. May repeat dose if no response after 15 minutes.       Insulin       insulin aspart (NOVOLOG FLEXPEN) 100 UNIT/ML pen Inject 6-20 Units subcutaneously 3 times daily as needed for high blood sugar (for meal coverage and correction scale, upto 60 units/day) Take 5 units with breakfast, 10 unit(s) with lunch and 12 unit(s) with supper     insulin glargine 100 UNIT/ML pen Take 14 unit(s) AM and 30 unit(s) HS, and adjust as needed upto 50 unit(s) /day          30 units of Basaglar at night  14 units of Basaglar in the morning    7 units of Novolog at breakfast  9 units of Novolog with lunch  10 units of Novolog with dinner    1/50/140 correction scale  1/50/200 bedtime correction scale      Monitoring  Patient glucose self monitoring as follows: continuously using a continuous glucose monitor (CGM)  BG meter: Dexcom G7  BG  results: see below:          Glucose Details  Average glucose: 202 mg/dL  Standard deviation: 66 mg/dL  GMI: 8.1%  -----------------------------  Time in Range  Very High: 24%  High: 35%  In Range: 41%  Low: 0%  Very Low: <1%    Target Range   mg/dL    -----------------------------  CGM Details  Sensor usage: 100%  Days with CGM data:       Patient's most recent   Lab Results   Component Value Date    A1C 8.2 2023    A1C 9.6 2023      Patient's A1C goal: <7.0    Activity: no regular exercise program, Parkinson's affects his ability to be active    Healthy Eating:   Patient currently eats 3 meals 1 snacks per day   Wakes up 5-6 am and will eat breakfast   Will eat lunch ~11-12pm  Dinner is around 5:30 - 6:30 pm    24 hour diet recall:  Meal Time Food and Drink   Breakfast 5 - 6 am Cheerios, 1 slice of toast, Activia yogurt, a muffin   Snack  apple   Lunch 11 - 12 pm [place] and soup   Snack  apple   Dinner 5:30 - 6:30 pm Meat + potatoes,   Snack         Says he does not eat vegetables, may eat carrots or celery. Trying to include more fiber in diet  Trying to incorporate more fruit in diet and prefers apples  Going out to eat       Problem Solving:      Patient is at risk of hypoglycemia?: YES  Hospitalizations for hyper or hypoglycemia: No    Healthy Coping and Stress Management:   Sources of stress identified by patient: My health      EDUCATION and INSTRUCTION PROVIDED AT THIS VISIT:    T2DM patient seen for follow-up at the request of Catherine Gomez PA-C for blood glucose review. He has also been seeing Dr. Reynolds. Taking 30 units of Basaglar at night, 14 units of Basaglar in the morning. For Novolo units at breakfast, 8 units at lunch, and 10 units at dinner. Using 1/50/140 correction scale at meals and 1/50/200 bedtime correction scale.   CGM reports reviewed, Time in Range is 43%, 57% highs, 0% lows. Goal for patient is Time in Target Range of 50% per Dr. Reynolds. He stopped taking  Metformin. Overall, glucose numbers are much improved, Time in Range has increased and readings over > 250 have decreased. Lows have lessened since mealtime Novolog doses were adjusted. Pattern of post-prandial highs after lunch and dinner noted, this may be due to the chaos of moving into a new home and not eating home-cooked meals as often. He and his wife will work on getting back into a regular meal routine. They are planning to head to [place] in a few weeks to sell their condo.  No adjustments made in insulin doses today.    PLAN:  - Continue Basaglar 30 units at bedtime, continue Basaglar 14 units in the morning    - Continue 5 units of Novolog at breakfast    - Continue 8 units of Novolog with lunch, 10 units of Novolog with dinner    - Correction scale 1/50/140:  For Pre-Meal  - 189 give 1 unit.   For Pre-Meal  - 239 give 2 units.   For Pre-Meal  - 289 give 3 units.   For Pre-Meal  - 339 give 4 units.   For Pre-Meal BG = or > 340 give 5 units.     - Bedtime correction scale:  At bedtime and overnight BG  > 200 AND - it has been at least 4 hours since last injection please correct any BG > 200.    At bedtime/overnight:  For  - 249 give 1 units.   For  - 299 give 2 units.   For  - 349 give 3 units.   For BG = or > 350 give 4 units      FOLLOW-UP:    With Catherine Gomez PA-C on 3/27  With Dr. Reynolds on 5/7  With Diabetes Education as needed    Time spent with patient at today's visit was 25 minutes.      Any diabetes medication dose changes were made via the [facility] Protocol and Collaborative Practice Agreement with [facility].  A copy of this encounter was provided to patient's referring provider.

## 2024-09-19 ENCOUNTER — THERAPY VISIT (OUTPATIENT)
Dept: PHYSICAL THERAPY | Facility: REHABILITATION | Age: 69
End: 2024-09-19
Payer: MEDICARE

## 2024-09-19 DIAGNOSIS — G20.B1 PARKINSON'S DISEASE WITH DYSKINESIA WITHOUT FLUCTUATING MANIFESTATIONS (H): Primary | ICD-10-CM

## 2024-09-19 PROCEDURE — 97112 NEUROMUSCULAR REEDUCATION: CPT | Mod: GP | Performed by: PHYSICAL THERAPIST

## 2024-09-19 PROCEDURE — 97110 THERAPEUTIC EXERCISES: CPT | Mod: GP | Performed by: PHYSICAL THERAPIST

## 2024-09-30 ENCOUNTER — MYC MEDICAL ADVICE (OUTPATIENT)
Dept: FAMILY MEDICINE | Facility: CLINIC | Age: 69
End: 2024-09-30
Payer: MEDICARE

## 2024-09-30 LAB
MDC_IDC_LEAD_CONNECTION_STATUS: NORMAL
MDC_IDC_LEAD_CONNECTION_STATUS: NORMAL
MDC_IDC_LEAD_IMPLANT_DT: NORMAL
MDC_IDC_LEAD_IMPLANT_DT: NORMAL
MDC_IDC_LEAD_LOCATION: NORMAL
MDC_IDC_LEAD_LOCATION: NORMAL
MDC_IDC_LEAD_LOCATION_DETAIL_1: NORMAL
MDC_IDC_LEAD_LOCATION_DETAIL_1: NORMAL
MDC_IDC_LEAD_MFG: NORMAL
MDC_IDC_LEAD_MFG: NORMAL
MDC_IDC_LEAD_MODEL: NORMAL
MDC_IDC_LEAD_MODEL: NORMAL
MDC_IDC_LEAD_POLARITY_TYPE: NORMAL
MDC_IDC_LEAD_POLARITY_TYPE: NORMAL
MDC_IDC_LEAD_SERIAL: NORMAL
MDC_IDC_LEAD_SERIAL: NORMAL
MDC_IDC_LEAD_SPECIAL_FUNCTION: NORMAL
MDC_IDC_LEAD_SPECIAL_FUNCTION: NORMAL
MDC_IDC_MSMT_BATTERY_DTM: NORMAL
MDC_IDC_MSMT_BATTERY_REMAINING_LONGEVITY: 144 MO
MDC_IDC_MSMT_BATTERY_RRT_TRIGGER: 2.62
MDC_IDC_MSMT_BATTERY_STATUS: NORMAL
MDC_IDC_MSMT_BATTERY_VOLTAGE: 3.14 V
MDC_IDC_MSMT_LEADCHNL_RA_IMPEDANCE_VALUE: 323 OHM
MDC_IDC_MSMT_LEADCHNL_RA_IMPEDANCE_VALUE: 399 OHM
MDC_IDC_MSMT_LEADCHNL_RA_PACING_THRESHOLD_AMPLITUDE: 0.62 V
MDC_IDC_MSMT_LEADCHNL_RA_PACING_THRESHOLD_PULSEWIDTH: 0.4 MS
MDC_IDC_MSMT_LEADCHNL_RA_SENSING_INTR_AMPL: 4 MV
MDC_IDC_MSMT_LEADCHNL_RA_SENSING_INTR_AMPL: 4 MV
MDC_IDC_MSMT_LEADCHNL_RV_IMPEDANCE_VALUE: 399 OHM
MDC_IDC_MSMT_LEADCHNL_RV_IMPEDANCE_VALUE: 475 OHM
MDC_IDC_MSMT_LEADCHNL_RV_PACING_THRESHOLD_AMPLITUDE: 0.62 V
MDC_IDC_MSMT_LEADCHNL_RV_PACING_THRESHOLD_PULSEWIDTH: 0.4 MS
MDC_IDC_MSMT_LEADCHNL_RV_SENSING_INTR_AMPL: 18.38 MV
MDC_IDC_MSMT_LEADCHNL_RV_SENSING_INTR_AMPL: 18.38 MV
MDC_IDC_PG_IMPLANT_DTM: NORMAL
MDC_IDC_PG_MFG: NORMAL
MDC_IDC_PG_MODEL: NORMAL
MDC_IDC_PG_SERIAL: NORMAL
MDC_IDC_PG_TYPE: NORMAL
MDC_IDC_SESS_CLINIC_NAME: NORMAL
MDC_IDC_SESS_DTM: NORMAL
MDC_IDC_SESS_TYPE: NORMAL
MDC_IDC_SET_BRADY_AT_MODE_SWITCH_RATE: 171 {BEATS}/MIN
MDC_IDC_SET_BRADY_HYSTRATE: NORMAL
MDC_IDC_SET_BRADY_LOWRATE: 60 {BEATS}/MIN
MDC_IDC_SET_BRADY_MAX_SENSOR_RATE: 130 {BEATS}/MIN
MDC_IDC_SET_BRADY_MAX_TRACKING_RATE: 130 {BEATS}/MIN
MDC_IDC_SET_BRADY_MODE: NORMAL
MDC_IDC_SET_BRADY_PAV_DELAY_HIGH: 140 MS
MDC_IDC_SET_BRADY_PAV_DELAY_LOW: 240 MS
MDC_IDC_SET_BRADY_SAV_DELAY_HIGH: 110 MS
MDC_IDC_SET_BRADY_SAV_DELAY_LOW: 220 MS
MDC_IDC_SET_LEADCHNL_RA_PACING_AMPLITUDE: 1.5 V
MDC_IDC_SET_LEADCHNL_RA_PACING_ANODE_ELECTRODE_1: NORMAL
MDC_IDC_SET_LEADCHNL_RA_PACING_ANODE_LOCATION_1: NORMAL
MDC_IDC_SET_LEADCHNL_RA_PACING_CAPTURE_MODE: NORMAL
MDC_IDC_SET_LEADCHNL_RA_PACING_CATHODE_ELECTRODE_1: NORMAL
MDC_IDC_SET_LEADCHNL_RA_PACING_CATHODE_LOCATION_1: NORMAL
MDC_IDC_SET_LEADCHNL_RA_PACING_POLARITY: NORMAL
MDC_IDC_SET_LEADCHNL_RA_PACING_PULSEWIDTH: 0.4 MS
MDC_IDC_SET_LEADCHNL_RA_SENSING_ANODE_ELECTRODE_1: NORMAL
MDC_IDC_SET_LEADCHNL_RA_SENSING_ANODE_LOCATION_1: NORMAL
MDC_IDC_SET_LEADCHNL_RA_SENSING_CATHODE_ELECTRODE_1: NORMAL
MDC_IDC_SET_LEADCHNL_RA_SENSING_CATHODE_LOCATION_1: NORMAL
MDC_IDC_SET_LEADCHNL_RA_SENSING_POLARITY: NORMAL
MDC_IDC_SET_LEADCHNL_RA_SENSING_SENSITIVITY: 0.3 MV
MDC_IDC_SET_LEADCHNL_RV_PACING_AMPLITUDE: 2 V
MDC_IDC_SET_LEADCHNL_RV_PACING_ANODE_ELECTRODE_1: NORMAL
MDC_IDC_SET_LEADCHNL_RV_PACING_ANODE_LOCATION_1: NORMAL
MDC_IDC_SET_LEADCHNL_RV_PACING_CAPTURE_MODE: NORMAL
MDC_IDC_SET_LEADCHNL_RV_PACING_CATHODE_ELECTRODE_1: NORMAL
MDC_IDC_SET_LEADCHNL_RV_PACING_CATHODE_LOCATION_1: NORMAL
MDC_IDC_SET_LEADCHNL_RV_PACING_POLARITY: NORMAL
MDC_IDC_SET_LEADCHNL_RV_PACING_PULSEWIDTH: 0.4 MS
MDC_IDC_SET_LEADCHNL_RV_SENSING_ANODE_ELECTRODE_1: NORMAL
MDC_IDC_SET_LEADCHNL_RV_SENSING_ANODE_LOCATION_1: NORMAL
MDC_IDC_SET_LEADCHNL_RV_SENSING_CATHODE_ELECTRODE_1: NORMAL
MDC_IDC_SET_LEADCHNL_RV_SENSING_CATHODE_LOCATION_1: NORMAL
MDC_IDC_SET_LEADCHNL_RV_SENSING_POLARITY: NORMAL
MDC_IDC_SET_LEADCHNL_RV_SENSING_SENSITIVITY: 0.9 MV
MDC_IDC_SET_ZONE_DETECTION_INTERVAL: 350 MS
MDC_IDC_SET_ZONE_DETECTION_INTERVAL: 400 MS
MDC_IDC_SET_ZONE_STATUS: NORMAL
MDC_IDC_SET_ZONE_STATUS: NORMAL
MDC_IDC_SET_ZONE_TYPE: NORMAL
MDC_IDC_SET_ZONE_VENDOR_TYPE: NORMAL
MDC_IDC_STAT_AT_BURDEN_PERCENT: 0 %
MDC_IDC_STAT_AT_DTM_END: NORMAL
MDC_IDC_STAT_AT_DTM_START: NORMAL
MDC_IDC_STAT_BRADY_AP_VP_PERCENT: 11.18 %
MDC_IDC_STAT_BRADY_AP_VS_PERCENT: 0.01 %
MDC_IDC_STAT_BRADY_AS_VP_PERCENT: 88.74 %
MDC_IDC_STAT_BRADY_AS_VS_PERCENT: 0.07 %
MDC_IDC_STAT_BRADY_DTM_END: NORMAL
MDC_IDC_STAT_BRADY_DTM_START: NORMAL
MDC_IDC_STAT_BRADY_RA_PERCENT_PACED: 11.19 %
MDC_IDC_STAT_BRADY_RV_PERCENT_PACED: 99.92 %
MDC_IDC_STAT_EPISODE_RECENT_COUNT: 0
MDC_IDC_STAT_EPISODE_RECENT_COUNT_DTM_END: NORMAL
MDC_IDC_STAT_EPISODE_RECENT_COUNT_DTM_START: NORMAL
MDC_IDC_STAT_EPISODE_TOTAL_COUNT: 0
MDC_IDC_STAT_EPISODE_TOTAL_COUNT_DTM_END: NORMAL
MDC_IDC_STAT_EPISODE_TOTAL_COUNT_DTM_START: NORMAL
MDC_IDC_STAT_EPISODE_TYPE: NORMAL
MDC_IDC_STAT_TACHYTHERAPY_RECENT_DTM_END: NORMAL
MDC_IDC_STAT_TACHYTHERAPY_RECENT_DTM_START: NORMAL
MDC_IDC_STAT_TACHYTHERAPY_TOTAL_DTM_END: NORMAL
MDC_IDC_STAT_TACHYTHERAPY_TOTAL_DTM_START: NORMAL

## 2024-10-01 NOTE — PROGRESS NOTES
Virtual Visit Details    Type of service:  Video Visit   Video Start Time:   Video End Time:    Originating Location (pt. Location):     Distant Location (provider location):    Platform used for Video Visit:       Outcome for 10/01/24 2:38 PM: Data uploaded on Dexcom  Bobbilynn Grossaint, VF   Outcome for 10/07/24 10:44 AM: Data obtained via Dexcom website  Yi Medley MA    Patient is showing 4/5 MNCM met. A1c not in range   Yi Medley MA

## 2024-10-03 ENCOUNTER — VIRTUAL VISIT (OUTPATIENT)
Dept: PHARMACY | Facility: CLINIC | Age: 69
End: 2024-10-03
Attending: PSYCHIATRY & NEUROLOGY
Payer: MEDICARE

## 2024-10-03 DIAGNOSIS — G20.A1 PARKINSON'S DISEASE, UNSPECIFIED WHETHER DYSKINESIA PRESENT, UNSPECIFIED WHETHER MANIFESTATIONS FLUCTUATE (H): ICD-10-CM

## 2024-10-03 RX ORDER — CARBIDOPA AND LEVODOPA 25; 100 MG/1; MG/1
TABLET ORAL
Qty: 675 TABLET | Refills: 3 | Status: SHIPPED | OUTPATIENT
Start: 2024-10-03

## 2024-10-03 NOTE — PROGRESS NOTES
Medication Therapy Management (MTM) Encounter    ASSESSMENT:                            Medication Adherence/Access: No issues identified    Parkinson's Disease:   Much improved from small morning dose increase. Continue current medication.      PLAN:                            Continue current medication.    Follow-up: Neurology 11/24  MTM 3-6 months or sooner if needed    SUBJECTIVE/OBJECTIVE:                          Sri Henderson is a 69 year old male seen for a follow-up visit. Patient was accompanied by wife, Veronika.      Reason for visit: med check.    Allergies/ADRs: Reviewed in chart  Past Medical History: Reviewed in chart  Tobacco: He reports that he has quit smoking. His smoking use included cigarettes. He has never used smokeless tobacco.  Alcohol: not currently using    Medication Adherence/Access: no issues reported    Parkinson's Disease:    Medication Dose Timing   6am 10am 2pm 5pm 9pm   Carbidopa/levodopa 25/100 2.5 2 2 1    Carbidopa/levodopa ER 50/100     1     Patient last met with neurology on 9/13/2024, at which time he made slight dose adjustment by increasing 6 AM dose from 2 tablets to 2.5 tablets.  Today, patient reported that recent dose adjustments with Dr. Armenta have been very helpful for reducing wearing off, which had been occurring quite consistently throughout the day, though usually before 10am and 2pm doses. He is still noticing some wearing off prior to 2pm dose, but only about once a week and its not very bothersome. Staying active and busy helps reduce wearing off as well.   He has been doing [employer], which has been incredibly helpful. Rides stationary bike for 20 min everyday.  No other concerns today.       ----------------      I spent 15 minutes with this patient today. A copy of the visit note was provided to the patient's provider(s).    A summary of these recommendations was sent via clinic portal.    Morenita Delgado, Vandana  Medication Therapy Management  Pharmacist  [facility]      Telemedicine Visit Details  The patient's medications can be safely assessed via a telemedicine encounter.  Type of service:  Video Conference via [employer]  Originating Location (pt. Location): Home    Distant Location (provider location):  Off-site  Start Time:  12:30pm  End Time:  12:45pm     Medication Therapy Recommendations  No medication therapy recommendations to display

## 2024-10-03 NOTE — PATIENT INSTRUCTIONS
"Recommendations from today's MTM visit:                                                    MTM (medication therapy management) is a service provided by a clinical pharmacist designed to help you get the most of out of your medicines.      Continue current medication.      Follow-up: 3-6 months or sooner if needed    It was great speaking with you today.  I value your experience and would be very thankful for your time in providing feedback in our clinic survey. In the next few days, you may receive an email or text message from [employer] with a link to a survey related to your  clinical pharmacist.\"     To schedule another MTM appointment, please call the clinic directly or you may call the MTM scheduling line at [phone].    My Clinical Pharmacist's contact information:                                                      Please feel free to contact me with any questions or concerns you have.      Morenita Delgado, PharmD  Medication Therapy Management Pharmacist  [facility]    "

## 2024-10-09 ENCOUNTER — VIRTUAL VISIT (OUTPATIENT)
Dept: ENDOCRINOLOGY | Facility: CLINIC | Age: 69
End: 2024-10-09
Payer: MEDICARE

## 2024-10-09 DIAGNOSIS — E11.65 TYPE 2 DIABETES MELLITUS WITH HYPERGLYCEMIA, WITH LONG-TERM CURRENT USE OF INSULIN (H): Primary | ICD-10-CM

## 2024-10-09 DIAGNOSIS — Z79.4 TYPE 2 DIABETES MELLITUS WITH HYPERGLYCEMIA, WITH LONG-TERM CURRENT USE OF INSULIN (H): Primary | ICD-10-CM

## 2024-10-09 PROCEDURE — 99214 OFFICE O/P EST MOD 30 MIN: CPT | Mod: 95 | Performed by: PHYSICIAN ASSISTANT

## 2024-10-09 NOTE — LETTER
10/9/2024       RE: Arnaldo Henderson  [address] [facility] [address]     Dear Colleague,    Thank you for referring your patient, Arnaldo Henderson, to the [facility] [place] at [facility]. Please see a copy of my visit note below.    Virtual Visit Details    Type of service:  Video Visit   Video Start Time:   Video End Time:    Originating Location (pt. Location):     Distant Location (provider location):    Platform used for Video Visit:       Outcome for 10/01/24 2:38 PM: Data uploaded on Dexcom  Bobbilynn Grossaint, VF   Outcome for 10/07/24 10:44 AM: Data obtained via Dexcom website  Yi Medley MA    Patient is showing 4/5 MNCM met. A1c not in range   Yi Medley MA                    Time of start: 11 AM  Time of end: 11:14 AM  Total duration of video visit: 14 minutes.  Providers location: Off-site.  Patient's location: [place].    HPI  Arnaldo Henderson is a 69 year old male with type 2 diabetes mellitus.   Video visit for diabetes follow-up today.  Patient last seen by Dr. Reynolds in Aug 2024.  Pat was seen by Idalai Funez RD/CDE in September 2024.  Patient has hx of distal pancreatectomy for chronic pancreatitis. Pt has dx of neuroendocrine tumor of small bowel seen here by Oncology staff.   Pt was dx with type 2 DM in 2013. Denies retinopathy, nephropathy and neuropathy.  Pt has hx of chronic pancreatitis with hx of gallstones, CAD- s/p CABG, Parkinson's disease and HTN.   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  For his diabetes, he is taking Basaglar 14 units subcutaneous each am and 30 units subcutaneous at hs and Novolog 7-9 units with breakfast, 9-11 units with lunch and 10-12 units with dinner with correction ( 2 unit/50 for BG > 140 ).  C-peptide low < 0.1 ng/mL on 2/15/2024.  Most recent A1C was 8.2 % on 7/8/2024..   I reviewed and scanned pt's DexcomG7 sensor download data in his note  "below.  Blood sugar are highest following meals during the day and evening.  Reminded patient to take NovoLog before eating.  On ROS today, Parkinson's unchanged per patient.  Chronic back and abd pain.  Some fatigue.  Chronic shortness of breath with exertion.  Denies blurred vision, n/v,SOB at rest, cough, fevers,chest pain, blood in stool, melena or sx of neuropathy.   Denies foot ulcers.    Diabetes Care  Retinopathy: none per patient. Seen here by Oph in 9/2023.  Patient has ophthalmology appointment scheduled for December 2024.  Nephropathy:none; urine microalbuminuria negative in May 2024. Pt taking Cozaar.  Neuropathy: none per patient.   Foot Exam: no exam today.  Taking aspirin: yes.  Lipids:LDL 33 in Oct 2022. Pt taking Lipitor.  Insulin: basal and meal time insulin with correction.      DM meds: none.  Patient did not tolerate Metformin- GI side effects.  Testing: DexcomG7 sensor.                  ROS  See under HPI.    Allergies  Allergies   Allergen Reactions     Ciprofloxacin Other (See Comments), Hives, Itching, Rash and Unknown     Other reaction(s): Other (see comments)  Oral swelling per [facility]         Dilaudid [Hydromorphone] Rash     Morphine Rash     rash     Penicillins Rash     aka ancef/ rash       Citalopram Unknown     Clindamycin Itching and Rash     Oxycodone Rash     \"HORRIBLE, SEVERE RASH MAYBE CAUSED BY OXY\"       Medications  Current Outpatient Medications   Medication Sig Dispense Refill     aspirin (ASA) 81 MG EC tablet Take 81 mg by mouth daily       atorvastatin (LIPITOR) 40 MG tablet Take 1 tablet (40 mg) by mouth daily 90 tablet 3     BD PEN NEEDLE LINDY 2ND GEN 32G X 4 MM miscellaneous USE TO INJECT INSULIN 4 TIMES A  each 3     carbidopa-levodopa (SINEMET CR)  MG CR tablet Take 1 tablet by mouth at bedtime @9p 90 tablet 3     carbidopa-levodopa (SINEMET)  MG tablet 2.5@6a, 2@10a, 2@2p and 1@5p 675 tablet 3     Continuous Blood Gluc  (DEXCOM G7 " ) ALIA Use to read blood sugars as per 's instructions. 1 each 0     Continuous Glucose Sensor (DEXCOM G7 SENSOR) MISC Change every 10 days. 9 each 11     dasiglucagon HCl (ZEGALOGUE) 0.6 MG/0.6ML SOAJ injection Inject 0.6 mg Subcutaneous once as needed (use for severe hypoglycemia) 1.2 mL 1     gabapentin (NEURONTIN) 300 MG capsule Take 2 capsules (600 mg) by mouth 3 times daily 540 capsule 3     Glucagon (BAQSIMI) 3 MG/DOSE nasal powder Spray 1 spray (3 mg) in nostril as needed (severe hypoglycemia) in the event of unconscious hypoglycemia or hypoglycemic seizure. May repeat dose if no response after 15 minutes. 2 each 1     insulin aspart (NOVOLOG FLEXPEN) 100 UNIT/ML pen Inject 6-20 Units subcutaneously 3 times daily as needed for high blood sugar (for meal coverage and correction scale, upto 60 units/day) Take 5 units with breakfast, 10 unit(s) with lunch and 12 unit(s) with supper 40 mL 3     insulin glargine 100 UNIT/ML pen Take 14 unit(s) AM and 30 unit(s) HS, and adjust as needed upto 50 unit(s) /day 50 mL 1     lipase-protease-amylase (ZENPEP) 04731-230690-577384 units CPEP Take 1 capsule by mouth 3 times daily (with meals) 90 capsule 11     losartan (COZAAR) 50 MG tablet Take 1 tablet (50 mg) by mouth daily 90 tablet 3     magnesium oxide (MAG-OX) 400 MG tablet Take 400 mg by mouth every other day       Multiple Vitamin (MULTI-VITAMINS) TABS Take 1 tablet by mouth daily       omeprazole (PRILOSEC) 20 MG DR capsule Take 1 capsule (20 mg) by mouth daily 90 capsule 3     traZODone (DESYREL) 50 MG tablet Take 0.5-1 tablets (25-50 mg) by mouth at bedtime 30 tablet 3       Family History  family history includes Autism Spectrum Disorder in his grandson; Heart Disease in his son; Lung Cancer in his brother; Other - See Comments in his daughter, father, mother, sister, sister, and son; Parkinsonism (age of onset: 65) in his mother; Psychosis in his mother.    Social History   reports that he  has quit smoking. His smoking use included cigarettes. He has never used smokeless tobacco. He reports that he does not currently use alcohol. He reports that he does not currently use drugs.     Past Medical History  Past Medical History:   Diagnosis Date     Acute pancreatitis 04/18/2022    Formatting of this note might be different from the original. Formatting of this note might be different from the original. Added automatically from request for surgery [id number] Formatting of this note might be different from the original. Added automatically from request for surgery [id number]  Formatting of this note might be different from the original. Added automatically from request for surgery      CAD (coronary artery disease)     CABG     Cholecystitis, acute 07/05/2023     Diabetes mellitus, type 2 (H) 2013     Family history of parkinsonism 02/14/2024     Gastroesophageal reflux disease      Hypercholesteremia      Hypertension      Mixed conductive and sensorineural hearing loss of both ears      Mixed hearing loss      Pancreatic duct stricture      Pancreatitis      Parkinson disease (H)      Parkinson's disease, unspecified whether dyskinesia present, unspecified whether manifestations fluctuate (H) 02/14/2024     Seborrheic dermatitis 03/22/2024     Second degree AV block      Surgical site infection 07/26/2023       Past Surgical History:   Procedure Laterality Date     CA ANESTH CABG W/PUMP       CHOLECYSTECTOMY  2022     COLONOSCOPY N/A 01/12/2023    Procedure: colonoscopy with fluroscopy;  Surgeon: Ayden Fraire MD;  Location:  OR     ENDOSCOPIC RETROGRADE CHOLANGIOPANCREATOGRAM N/A 05/18/2023    Procedure: ENDOSCOPIC RETROGRADE CHOLANGIOPANCREATOGRAPHY, WITH  CYSTDUODENOSTOMY STENTS X2 REMOVAL;  Surgeon: Cedric Saldana MD;  Location: [facility] OR     ENT SURGERY       EP PACEMAKER DEVICE & LEAD IMPLANT- RIGHT ATRIAL & RIGHT VENTRICULAR N/A 02/28/2024    Procedure: Pacemaker Device & Lead Implant - Right  Atrial & Right Ventricular;  Surgeon: Jenna Hernandez MD;  Location:  HEART CARDIAC CATH LAB     LAPAROSCOPY DIAGNOSTIC (GENERAL) N/A 02/20/2023    Procedure: LAPAROSCOPY, DIAGNOSTIC; RETRIEVAL OF MIGRATED STENT;  Surgeon: Joseluis Lima MD;  Location: [facility] OR     ORTHOPEDIC SURGERY       OTHER SURGICAL HISTORY      skin cancer ? from scrotum     PANCREATECTOMY PEUSTOW N/A 06/30/2023    Procedure: Open distal pancreactectomy with splenectomy, lysis of adhesions, resection of proximal illeum;  Surgeon: Ashvin Haider MD;  Location: [facility] OR     [place] CABG, ARTERIAL, TWO  2003       Physical Exam    No exam today.    RESULTS  Creatinine   Date Value Ref Range Status   08/19/2024 0.97 0.67 - 1.17 mg/dL Final     GFR Estimate   Date Value Ref Range Status   08/19/2024 84 >60 mL/min/1.73m2 Final     Comment:     eGFR calculated using 2021 CKD-EPI equation.     GFR, ESTIMATED POCT   Date Value Ref Range Status   11/03/2023 >60 >60 mL/min/1.73m2 Final     Hemoglobin A1C   Date Value Ref Range Status   07/08/2024 8.2 (H) <5.7 % Final     Comment:     Normal <5.7%   Prediabetes 5.7-6.4%    Diabetes 6.5% or higher     Note: Adopted from ADA consensus guidelines.     Potassium   Date Value Ref Range Status   08/19/2024 4.3 3.4 - 5.3 mmol/L Final     Potassium POCT   Date Value Ref Range Status   06/30/2023 4.7 3.5 - 5.0 mmol/L Final     ALT   Date Value Ref Range Status   07/08/2024 <5 0 - 70 U/L Final     Comment:     Reference intervals for this test were updated on 6/12/2023 to more accurately reflect our healthy population. There may be differences in the flagging of prior results with similar values performed with this method. Interpretation of those prior results can be made in the context of the updated reference intervals.       AST   Date Value Ref Range Status   07/08/2024 25 0 - 45 U/L Final     Comment:     Reference intervals for this test were updated on 6/12/2023 to more accurately reflect our healthy  population. There may be differences in the flagging of prior results with similar values performed with this method. Interpretation of those prior results can be made in the context of the updated reference intervals.     TSH   Date Value Ref Range Status   05/09/2024 1.46 0.30 - 4.20 uIU/mL Final       Triglycerides (External)   Date Value Ref Range Status   06/02/2021 74 <150 mg/dL Final         ASSESSMENT/PLAN:      TYPE 2 DIABETES MELLITUS: Hx of type 2 diabetes mellitus dx in 2013 with hx of distal pancreatectomy on 6/3/2023 for chronic pancreatitis. 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  C-peptide low <0.1 ng/mL on 2/15/2024. Pt's blood sugar values have improved.  Reminded patient to take NovoLog before eating.   Continue Basaglar 14 units subcutaneous each am and Basaglar 30 units subcutaneous at hs. Continue Novolog 7-9 units with breakfast 9 to 11 units with lunch and 10 to 12 units with dinner last correction ( 2 units/50 for BG > 140 ).  Pt did NOT tolerate Metformin.  May consider trying Jardiance in the near future.  He wears his DexcomG7 sensor full time.   Pt denies hx of diabetic retinopathy - seen by Oph in 9/2023.  His urine microalbuminuria in May 2024.  Pt taking Cozaar.  Most recent creat 0.97 with GFR 84 mL/min in 8/2024.  Pt denies sx of neuropathy- denies foot ulcers.   HTN: BP good at home. Continue current RX.  LIPIDS: LDL 33 in Oct 2022. Pt taking Lipitor.  4.   PARKINSON'S DISEASE: Not addressed.  5.   FOLLOW UP: With Dr. Reynolds in Feb 2025.  A1C and fasting lipid panel ordered today.  Follow-up with diabetes educator.      Time spent reviewing chart, labs and DexcomG7 sensor data today = 5 minutes.  Time for video visit today = 14 minutes.  Time for documentation today = 10 minutes.    Total time for visit today = 29 minutes.    Valeri Gomez PA-C      Again, thank you for allowing me to participate in the care of your patient.      Sincerely,    Valeri  Marcos Gomez PA-C

## 2024-10-10 ENCOUNTER — MYC REFILL (OUTPATIENT)
Dept: TRANSPLANT | Facility: CLINIC | Age: 69
End: 2024-10-10
Payer: MEDICARE

## 2024-10-10 ENCOUNTER — MYC REFILL (OUTPATIENT)
Dept: FAMILY MEDICINE | Facility: CLINIC | Age: 69
End: 2024-10-10
Payer: MEDICARE

## 2024-10-10 DIAGNOSIS — Z95.1 HISTORY OF CORONARY ARTERY BYPASS SURGERY: ICD-10-CM

## 2024-10-10 DIAGNOSIS — K21.9 GASTROESOPHAGEAL REFLUX DISEASE WITHOUT ESOPHAGITIS: ICD-10-CM

## 2024-10-10 DIAGNOSIS — K85.91 NECROTIZING PANCREATITIS: ICD-10-CM

## 2024-10-10 DIAGNOSIS — E78.5 HYPERLIPIDEMIA, UNSPECIFIED HYPERLIPIDEMIA TYPE: ICD-10-CM

## 2024-10-10 RX ORDER — ATORVASTATIN CALCIUM 40 MG/1
40 TABLET, FILM COATED ORAL DAILY
Qty: 90 TABLET | Refills: 3 | OUTPATIENT
Start: 2024-10-10

## 2024-10-10 NOTE — PROGRESS NOTES
Time of start: 11 AM  Time of end: 11:14 AM  Total duration of video visit: 14 minutes.  Providers location: Off-site.  Patient's location: [place].    HPI  Arnaldo Henderson is a 69 year old male with type 2 diabetes mellitus.   Video visit for diabetes follow-up today.  Patient last seen by Dr. Reynolds in Aug 2024.  Pat was seen by Idalia Funez RD/NORBERTOE in September 2024.  Patient has hx of distal pancreatectomy for chronic pancreatitis. Pt has dx of neuroendocrine tumor of small bowel seen here by Oncology staff.   Pt was dx with type 2 DM in 2013. Denies retinopathy, nephropathy and neuropathy.  Pt has hx of chronic pancreatitis with hx of gallstones, CAD- s/p CABG, Parkinson's disease and HTN.   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  For his diabetes, he is taking Basaglar 14 units subcutaneous each am and 30 units subcutaneous at hs and Novolog 7-9 units with breakfast, 9-11 units with lunch and 10-12 units with dinner with correction ( 2 unit/50 for BG > 140 ).  C-peptide low < 0.1 ng/mL on 2/15/2024.  Most recent A1C was 8.2 % on 7/8/2024..   I reviewed and scanned pt's DexcomG7 sensor download data in his note below.  Blood sugar are highest following meals during the day and evening.  Reminded patient to take NovoLog before eating.  On ROS today, Parkinson's unchanged per patient.  Chronic back and abd pain.  Some fatigue.  Chronic shortness of breath with exertion.  Denies blurred vision, n/v,SOB at rest, cough, fevers,chest pain, blood in stool, melena or sx of neuropathy.   Denies foot ulcers.    Diabetes Care  Retinopathy: none per patient. Seen here by Oph in 9/2023.  Patient has ophthalmology appointment scheduled for December 2024.  Nephropathy:none; urine microalbuminuria negative in May 2024. Pt taking Cozaar.  Neuropathy: none per patient.   Foot Exam: no exam today.  Taking aspirin: yes.  Lipids:LDL 33 in Oct 2022. Pt taking Lipitor.  Insulin: basal and meal time insulin with  "correction.      DM meds: none.  Patient did not tolerate Metformin- GI side effects.  Testing: DexcomG7 sensor.                  ROS  See under HPI.    Allergies  Allergies   Allergen Reactions    Ciprofloxacin Other (See Comments), Hives, Itching, Rash and Unknown     Other reaction(s): Other (see comments)  Oral swelling per [facility]        Dilaudid [Hydromorphone] Rash    Morphine Rash     rash    Penicillins Rash     aka ancef/ rash      Citalopram Unknown    Clindamycin Itching and Rash    Oxycodone Rash     \"HORRIBLE, SEVERE RASH MAYBE CAUSED BY OXY\"       Medications  Current Outpatient Medications   Medication Sig Dispense Refill    aspirin (ASA) 81 MG EC tablet Take 81 mg by mouth daily      atorvastatin (LIPITOR) 40 MG tablet Take 1 tablet (40 mg) by mouth daily 90 tablet 3    BD PEN NEEDLE LINDY 2ND GEN 32G X 4 MM miscellaneous USE TO INJECT INSULIN 4 TIMES A  each 3    carbidopa-levodopa (SINEMET CR)  MG CR tablet Take 1 tablet by mouth at bedtime @9p 90 tablet 3    carbidopa-levodopa (SINEMET)  MG tablet 2.5@6a, 2@10a, 2@2p and 1@5p 675 tablet 3    Continuous Blood Gluc  (DEXCOM G7 ) ALIA Use to read blood sugars as per 's instructions. 1 each 0    Continuous Glucose Sensor (DEXCOM G7 SENSOR) MISC Change every 10 days. 9 each 11    dasiglucagon HCl (ZEGALOGUE) 0.6 MG/0.6ML SOAJ injection Inject 0.6 mg Subcutaneous once as needed (use for severe hypoglycemia) 1.2 mL 1    gabapentin (NEURONTIN) 300 MG capsule Take 2 capsules (600 mg) by mouth 3 times daily 540 capsule 3    Glucagon (BAQSIMI) 3 MG/DOSE nasal powder Spray 1 spray (3 mg) in nostril as needed (severe hypoglycemia) in the event of unconscious hypoglycemia or hypoglycemic seizure. May repeat dose if no response after 15 minutes. 2 each 1    insulin aspart (NOVOLOG FLEXPEN) 100 UNIT/ML pen Inject 6-20 Units subcutaneously 3 times daily as needed for high blood sugar (for meal coverage and " correction scale, upto 60 units/day) Take 5 units with breakfast, 10 unit(s) with lunch and 12 unit(s) with supper 40 mL 3    insulin glargine 100 UNIT/ML pen Take 14 unit(s) AM and 30 unit(s) HS, and adjust as needed upto 50 unit(s) /day 50 mL 1    lipase-protease-amylase (ZENPEP) [id number] units CPEP Take 1 capsule by mouth 3 times daily (with meals) 90 capsule 11    losartan (COZAAR) 50 MG tablet Take 1 tablet (50 mg) by mouth daily 90 tablet 3    magnesium oxide (MAG-OX) 400 MG tablet Take 400 mg by mouth every other day      Multiple Vitamin (MULTI-VITAMINS) TABS Take 1 tablet by mouth daily      omeprazole (PRILOSEC) 20 MG DR capsule Take 1 capsule (20 mg) by mouth daily 90 capsule 3    traZODone (DESYREL) 50 MG tablet Take 0.5-1 tablets (25-50 mg) by mouth at bedtime 30 tablet 3       Family History  family history includes Autism Spectrum Disorder in his grandson; Heart Disease in his son; Lung Cancer in his brother; Other - See Comments in his daughter, father, mother, sister, sister, and son; Parkinsonism (age of onset: 65) in his mother; Psychosis in his mother.    Social History   reports that he has quit smoking. His smoking use included cigarettes. He has never used smokeless tobacco. He reports that he does not currently use alcohol. He reports that he does not currently use drugs.     Past Medical History  Past Medical History:   Diagnosis Date    Acute pancreatitis 04/18/2022    Formatting of this note might be different from the original. Formatting of this note might be different from the original. Added automatically from request for surgery [id number] Formatting of this note might be different from the original. Added automatically from request for surgery [id number]  Formatting of this note might be different from the original. Added automatically from request for surgery     CAD (coronary artery disease)     CABG    Cholecystitis, acute 07/05/2023    Diabetes mellitus, type 2 (H) 2013     Family history of parkinsonism 02/14/2024    Gastroesophageal reflux disease     Hypercholesteremia     Hypertension     Mixed conductive and sensorineural hearing loss of both ears     Mixed hearing loss     Pancreatic duct stricture     Pancreatitis     Parkinson disease (H)     Parkinson's disease, unspecified whether dyskinesia present, unspecified whether manifestations fluctuate (H) 02/14/2024    Seborrheic dermatitis 03/22/2024    Second degree AV block     Surgical site infection 07/26/2023       Past Surgical History:   Procedure Laterality Date    CA ANESTH CABG W/PUMP      CHOLECYSTECTOMY  2022    COLONOSCOPY N/A 01/12/2023    Procedure: colonoscopy with fluroscopy;  Surgeon: Ayden Fraire MD;  Location:  OR    ENDOSCOPIC RETROGRADE CHOLANGIOPANCREATOGRAM N/A 05/18/2023    Procedure: ENDOSCOPIC RETROGRADE CHOLANGIOPANCREATOGRAPHY, WITH  CYSTDUODENOSTOMY STENTS X2 REMOVAL;  Surgeon: Cedric Saldana MD;  Location: [facility] OR    ENT SURGERY      EP PACEMAKER DEVICE & LEAD IMPLANT- RIGHT ATRIAL & RIGHT VENTRICULAR N/A 02/28/2024    Procedure: Pacemaker Device & Lead Implant - Right Atrial & Right Ventricular;  Surgeon: Jenna Hernandez MD;  Location:  HEART CARDIAC CATH LAB    LAPAROSCOPY DIAGNOSTIC (GENERAL) N/A 02/20/2023    Procedure: LAPAROSCOPY, DIAGNOSTIC; RETRIEVAL OF MIGRATED STENT;  Surgeon: Joseluis Lima MD;  [facility]    ORTHOPEDIC SURGERY      OTHER SURGICAL HISTORY      skin cancer ? from scrotum    PANCREATECTOMY PEUSTOW N/A 06/30/2023    Procedure: Open distal pancreactectomy with splenectomy, lysis of adhesions, resection of proximal illeum;  Surgeon: Ashvin Haider MD;  [facility]    [place] CABG, ARTERIAL, TWO  2003       Physical Exam    No exam today.    RESULTS  Creatinine   Date Value Ref Range Status   08/19/2024 0.97 0.67 - 1.17 mg/dL Final     GFR Estimate   Date Value Ref Range Status   08/19/2024 84 >60 mL/min/1.73m2 Final     Comment:     eGFR calculated  using 2021 CKD-EPI equation.     GFR, ESTIMATED POCT   Date Value Ref Range Status   11/03/2023 >60 >60 mL/min/1.73m2 Final     Hemoglobin A1C   Date Value Ref Range Status   07/08/2024 8.2 (H) <5.7 % Final     Comment:     Normal <5.7%   Prediabetes 5.7-6.4%    Diabetes 6.5% or higher     Note: Adopted from ADA consensus guidelines.     Potassium   Date Value Ref Range Status   08/19/2024 4.3 3.4 - 5.3 mmol/L Final     Potassium POCT   Date Value Ref Range Status   06/30/2023 4.7 3.5 - 5.0 mmol/L Final     ALT   Date Value Ref Range Status   07/08/2024 <5 0 - 70 U/L Final     Comment:     Reference intervals for this test were updated on 6/12/2023 to more accurately reflect our healthy population. There may be differences in the flagging of prior results with similar values performed with this method. Interpretation of those prior results can be made in the context of the updated reference intervals.       AST   Date Value Ref Range Status   07/08/2024 25 0 - 45 U/L Final     Comment:     Reference intervals for this test were updated on 6/12/2023 to more accurately reflect our healthy population. There may be differences in the flagging of prior results with similar values performed with this method. Interpretation of those prior results can be made in the context of the updated reference intervals.     TSH   Date Value Ref Range Status   05/09/2024 1.46 0.30 - 4.20 uIU/mL Final       Triglycerides (External)   Date Value Ref Range Status   06/02/2021 74 <150 mg/dL Final         ASSESSMENT/PLAN:      TYPE 2 DIABETES MELLITUS: Hx of type 2 diabetes mellitus dx in 2013 with hx of distal pancreatectomy on 6/3/2023 for chronic pancreatitis. 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  C-peptide low <0.1 ng/mL on 2/15/2024. Pt's blood sugar values have improved.  Reminded patient to take NovoLog before eating.   Continue Basaglar 14 units subcutaneous each am and Basaglar 30 units subcutaneous  at hs. Continue Novolog 7-9 units with breakfast 9 to 11 units with lunch and 10 to 12 units with dinner last correction ( 2 units/50 for BG > 140 ).  Pt did NOT tolerate Metformin.  May consider trying Jardiance in the near future.  He wears his DexcomG7 sensor full time.   Pt denies hx of diabetic retinopathy - seen by Oph in 9/2023.  His urine microalbuminuria in May 2024.  Pt taking Cozaar.  Most recent creat 0.97 with GFR 84 mL/min in 8/2024.  Pt denies sx of neuropathy- denies foot ulcers.   HTN: BP good at home. Continue current RX.  LIPIDS: LDL 33 in Oct 2022. Pt taking Lipitor.  4.   PARKINSON'S DISEASE: Not addressed.  5.   FOLLOW UP: With Dr. Reynolds in Feb 2025.  A1C and fasting lipid panel ordered today.  Follow-up with diabetes educator.      Time spent reviewing chart, labs and DexcomG7 sensor data today = 5 minutes.  Time for video visit today = 14 minutes.  Time for documentation today = 10 minutes.    Total time for visit today = 29 minutes.    Valeri Gomez PA-C

## 2024-10-11 ENCOUNTER — THERAPY VISIT (OUTPATIENT)
Dept: PHYSICAL THERAPY | Facility: REHABILITATION | Age: 69
End: 2024-10-11
Payer: MEDICARE

## 2024-10-11 ENCOUNTER — TELEPHONE (OUTPATIENT)
Dept: EDUCATION SERVICES | Facility: CLINIC | Age: 69
End: 2024-10-11

## 2024-10-11 DIAGNOSIS — R26.81 GAIT INSTABILITY: ICD-10-CM

## 2024-10-11 DIAGNOSIS — G20.B1 PARKINSON'S DISEASE WITH DYSKINESIA WITHOUT FLUCTUATING MANIFESTATIONS (H): Primary | ICD-10-CM

## 2024-10-11 PROCEDURE — 97112 NEUROMUSCULAR REEDUCATION: CPT | Mod: GP | Performed by: PHYSICAL THERAPIST

## 2024-10-11 PROCEDURE — 97110 THERAPEUTIC EXERCISES: CPT | Mod: GP | Performed by: PHYSICAL THERAPIST

## 2024-10-11 RX ORDER — PANCRELIPASE LIPASE, PANCRELIPASE PROTEASE, PANCRELIPASE AMYLASE 40000; 126000; 168000 [USP'U]/1; [USP'U]/1; [USP'U]/1
1 CAPSULE, DELAYED RELEASE ORAL
Qty: 90 CAPSULE | Refills: 11 | Status: SHIPPED | OUTPATIENT
Start: 2024-10-11 | End: 2024-10-24

## 2024-10-11 NOTE — TELEPHONE ENCOUNTER
Patient Contacted for the patient to call back and schedule the following:    Appointment type: diabetes education  Provider: Idalia Funez  Return date: next avail  Specialty phone number: [phone]  Additional appointment(s) needed:   Additonal Notes: Spoke with patient but it wasn't a good time. Patient stated he will call back and schedule when he has his calendar. Offered patient call back number but he declined.    Valeri Gomez PA-C  [id number]  Please schedule follow up with Idalia Funez RD/CDE.  Thanks,  Catherine David on 10/11/2024 at 9:13 AM

## 2024-10-12 DIAGNOSIS — E11.9 TYPE 2 DIABETES MELLITUS WITHOUT COMPLICATION (H): ICD-10-CM

## 2024-10-14 ENCOUNTER — THERAPY VISIT (OUTPATIENT)
Dept: PHYSICAL THERAPY | Facility: REHABILITATION | Age: 69
End: 2024-10-14
Payer: MEDICARE

## 2024-10-14 DIAGNOSIS — M62.81 GENERALIZED MUSCLE WEAKNESS: Primary | ICD-10-CM

## 2024-10-14 DIAGNOSIS — R26.89 IMPAIRMENT OF BALANCE: ICD-10-CM

## 2024-10-14 DIAGNOSIS — G20.B1 PARKINSON'S DISEASE WITH DYSKINESIA WITHOUT FLUCTUATING MANIFESTATIONS (H): ICD-10-CM

## 2024-10-14 DIAGNOSIS — R25.8 BRADYKINESIA: ICD-10-CM

## 2024-10-14 DIAGNOSIS — R26.81 GAIT INSTABILITY: ICD-10-CM

## 2024-10-14 PROCEDURE — 97112 NEUROMUSCULAR REEDUCATION: CPT | Mod: CQ | Performed by: PHYSICAL THERAPY ASSISTANT

## 2024-10-14 PROCEDURE — 97110 THERAPEUTIC EXERCISES: CPT | Mod: CQ | Performed by: PHYSICAL THERAPY ASSISTANT

## 2024-10-15 ENCOUNTER — OFFICE VISIT (OUTPATIENT)
Dept: PHYSICAL MEDICINE AND REHAB | Facility: CLINIC | Age: 69
End: 2024-10-15
Attending: FAMILY MEDICINE
Payer: MEDICARE

## 2024-10-15 VITALS
OXYGEN SATURATION: 93 % | WEIGHT: 220 LBS | SYSTOLIC BLOOD PRESSURE: 138 MMHG | BODY MASS INDEX: 33.34 KG/M2 | HEIGHT: 68 IN | DIASTOLIC BLOOD PRESSURE: 73 MMHG | HEART RATE: 85 BPM

## 2024-10-15 DIAGNOSIS — M47.24 OSTEOARTHRITIS OF SPINE WITH RADICULOPATHY, THORACIC REGION: Primary | ICD-10-CM

## 2024-10-15 DIAGNOSIS — M54.6 CHRONIC BILATERAL THORACIC BACK PAIN: ICD-10-CM

## 2024-10-15 DIAGNOSIS — S22.000S COMPRESSION FRACTURE OF THORACIC VERTEBRA, UNSPECIFIED THORACIC VERTEBRAL LEVEL, SEQUELA: ICD-10-CM

## 2024-10-15 DIAGNOSIS — G89.29 CHRONIC BILATERAL THORACIC BACK PAIN: ICD-10-CM

## 2024-10-15 PROCEDURE — 99204 OFFICE O/P NEW MOD 45 MIN: CPT | Performed by: STUDENT IN AN ORGANIZED HEALTH CARE EDUCATION/TRAINING PROGRAM

## 2024-10-15 ASSESSMENT — PAIN SCALES - GENERAL: PAINLEVEL: MILD PAIN (2)

## 2024-10-15 NOTE — LETTER
10/15/2024      Arnaldo Henderson  [address] [facility] [address]      Dear Colleague,    Thank you for referring your patient, Arnaldo Henderson, to the [facility]. Please see a copy of my visit note below.    ASSESSMENT:  Arnaldo Henderson is a 69 year old male presents for consultation at the request of Amelia Johnson who presents today for new patient evaluation of :         Diagnoses and all orders for this visit:  Osteoarthritis of spine with radiculopathy, thoracic region  -     MR Thoracic Spine w/o & w Contrast; Future  -     MR Lumbar Spine w/o & w Contrast; Future  Compression fracture of thoracic vertebra, unspecified thoracic vertebral level, sequela  -     Spine  Referral  -     MR Thoracic Spine w/o & w Contrast; Future  -     MR Lumbar Spine w/o & w Contrast; Future  Chronic bilateral thoracic back pain  -     Spine  Referral       Patient is a 69-year-old male with 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  Symptoms worsen with bending and are relieved with extension, suggesting a possible discogenic or vertebrogenic element.  We reviewed the available imaging which includes CT abdomen that shows chronic appearing compression deformities of T11 and T12.  Given the area of involvement, it is suggestive of a lower thoracic or upper lumbar radiculopathy.  Less likely is ACNES, patient does have a history of laparotomy but the distribution of symptoms is not typical for ACNES.  We discussed these possibilities with the patient and his wife, and at this stage patient would benefit from MRI of thoracic and lumbar spine with and without contrast given previous cancer history, to better assess for possible nerve root entrapment.  We will plan to follow-up once imaging  is completed.    Patient reports gabapentin has been very helpful for the burning pain in his midline abdomen, and as he is not having any side effects he was encouraged to continue for now.    PLAN:  Reviewed spine anatomy and disease process. Discussed diagnosis and treatment options with the patient today. A shared decision making model was used. The patient's values and choices were respected. The following represents what was discussed and decided upon by the provider and the patient.    1. DIAGNOSTIC TESTS  MRI of thoracic spine and lumbar spine was ordered for further characterization of soft tissues and/or neural compression  Ordered with and without contrast given cancer history    2. PHYSICAL THERAPY  Can consider physical therapy once imaging is completed    3. MEDICATIONS:  Discussed multiple medication options today with patient. Discussed risks, side effects, and proper use of medications. Patient verbalized understanding.  No new medications ordered at this visit.    4. INTERVENTIONS:  Patient requires further imaging to assess what interventional options may be best for them.    5. OTHER REFERRALS:  No other referrals at this time.    6. FOLLOW-UP  To review imaging results once imaging is completed  Patient advised to contact our office for earlier appointment if symptoms worsening or not improving, or any side effects are noticed.    Advised patient to call the [facility] Center if symptoms worsen or you have problems controlling bladder and bowel function.   ______________________________________________________________________    SUBJECTIVE:   Arnaldo Henderson is a 69 year old male who presents today for new patient evaluation due to upper lumbar pain for about 4 years. Patient mentioned he always had low back pain but this aggravated after pancreatitis in 2021. He currently presents upper lumbar pain that radiates to the lower abdomen bilateral. Symptoms triggered and aggravated by bending forward.  He needs to take frequent breaks when doing manual tasks at home due to the pain. Pain is alleviate by resting. Walking helps. Of note, he had constant mid abdomen burning pain after acute pancreatitis in 2021 but this improved after starting gabapentin.    He had a complicated pancreatitis in [place] in 2021. Per GI, h/o endoscopic transluminal drainage of walled off necrosis in [place] with subsequent axial stent migration in the into the small bowel and left in place for a year. An attempt was made to remove the stent with single balloon enteroscopy, however, it was unsuccessful and laparoscopic surgical removal. CT AP obtained to confirm location of Axios stent showed two transpapillary DPT stents out to the tail of the pancreas with pigtail ends of the stent curling up within the pancreatic parenchyma and indenting the splenic vein. There is about a 1 cm residual fluid collection in the tail of the pancreas. Patient also endorses chronic abdominal pain, predominately in the left mid abdomen. He presents for ERCP for further evaluation.   Most recently in May 2023 he had a endoscopic retrograde cholangiopancreatography at the [facility] and two stents were removed from a cystduodenostomy site.    Severity: 8/10  Laterality: Bilateral   Radiation: To the lower abdomen  Worse with: Bending forward   Better with: Walking and rest   Numbness: no  Weakness: no    Takes gabapentin 600 mg TID to help to manage abdominal pain.    No prior back surgeries.    -Treatment to Date: currently doing PT      Oswestry (EVELYN) Questionnaire        12/12/2023    10:00 AM   OSWESTRY DISABILITY INDEX   Count 10   Sum 10   Oswestry Score (%) 20 %       Neck Disability Index:       No data to display                       -Medications:    Current Outpatient Medications   Medication Sig Dispense Refill     aspirin (ASA) 81 MG EC tablet Take 81 mg by mouth daily       atorvastatin (LIPITOR) 40 MG tablet Take 1 tablet (40 mg) by mouth daily 90  tablet 3     BD PEN NEEDLE LINDY 2ND GEN 32G X 4 MM miscellaneous USE TO INJECT INSULIN 4 TIMES A  each 3     carbidopa-levodopa (SINEMET CR)  MG CR tablet Take 1 tablet by mouth at bedtime @9p 90 tablet 3     carbidopa-levodopa (SINEMET)  MG tablet 2.5@6a, 2@10a, 2@2p and 1@5p 675 tablet 3     Continuous Blood Gluc  (DEXCOM G7 ) ALIA Use to read blood sugars as per 's instructions. 1 each 0     Continuous Glucose Sensor (DEXCOM G7 SENSOR) MISC Change every 10 days. 9 each 11     dasiglucagon HCl (ZEGALOGUE) 0.6 MG/0.6ML SOAJ injection Inject 0.6 mg Subcutaneous once as needed (use for severe hypoglycemia) 1.2 mL 1     gabapentin (NEURONTIN) 300 MG capsule Take 2 capsules (600 mg) by mouth 3 times daily 540 capsule 3     Glucagon (BAQSIMI) 3 MG/DOSE nasal powder Spray 1 spray (3 mg) in nostril as needed (severe hypoglycemia) in the event of unconscious hypoglycemia or hypoglycemic seizure. May repeat dose if no response after 15 minutes. 2 each 1     insulin aspart (NOVOLOG FLEXPEN) 100 UNIT/ML pen Inject 6-20 Units subcutaneously 3 times daily as needed for high blood sugar (for meal coverage and correction scale, upto 60 units/day) Take 5 units with breakfast, 10 unit(s) with lunch and 12 unit(s) with supper 40 mL 3     insulin glargine 100 UNIT/ML pen Take 14 unit(s) AM and 30 unit(s) HS, and adjust as needed upto 50 unit(s) /day 50 mL 1     lipase-protease-amylase (ZENPEP) 79310-443034-649798 units CPEP Take 1 capsule by mouth 3 times daily (with meals). 90 capsule 11     losartan (COZAAR) 50 MG tablet Take 1 tablet (50 mg) by mouth daily 90 tablet 3     magnesium oxide (MAG-OX) 400 MG tablet Take 400 mg by mouth every other day       Multiple Vitamin (MULTI-VITAMINS) TABS Take 1 tablet by mouth daily       omeprazole (PRILOSEC) 20 MG DR capsule Take 1 capsule (20 mg) by mouth daily 90 capsule 3     traZODone (DESYREL) 50 MG tablet Take 0.5-1 tablets (25-50 mg) by  "mouth at bedtime 30 tablet 3     No current facility-administered medications for this visit.       Allergies   Allergen Reactions     Ciprofloxacin Other (See Comments), Hives, Itching, Rash and Unknown     Other reaction(s): Other (see comments)  Oral swelling per [facility]         Dilaudid [Hydromorphone] Rash     Morphine Rash     rash     Penicillins Rash     aka ancef/ rash       Citalopram Unknown     Clindamycin Itching and Rash     Oxycodone Rash     \"HORRIBLE, SEVERE RASH MAYBE CAUSED BY OXY\"       Past Medical History:   Diagnosis Date     Acute pancreatitis 04/18/2022    Formatting of this note might be different from the original. Formatting of this note might be different from the original. Added automatically from request for surgery [id number] Formatting of this note might be different from the original. Added automatically from request for surgery [id number]  Formatting of this note might be different from the original. Added automatically from request for surgery      CAD (coronary artery disease)     CABG     Cholecystitis, acute 07/05/2023     Diabetes mellitus, type 2 (H) 2013     Family history of parkinsonism 02/14/2024     Gastroesophageal reflux disease      Hypercholesteremia      Hypertension      Mixed conductive and sensorineural hearing loss of both ears      Mixed hearing loss      Pancreatic duct stricture      Pancreatitis      Parkinson disease (H)      Parkinson's disease, unspecified whether dyskinesia present, unspecified whether manifestations fluctuate (H) 02/14/2024     Seborrheic dermatitis 03/22/2024     Second degree AV block      Surgical site infection 07/26/2023        Patient Active Problem List   Diagnosis     Retained foreign body     Wound infection     Acute bilateral low back pain without sciatica     Adenomatous polyp of colon     Atherosclerotic heart disease of native coronary artery without angina pectoris     Cancer of skin of scrotum (H)     Cervicalgia     " Gastroesophageal reflux disease without esophagitis     History of cholecystectomy     History of coronary artery bypass surgery     Hyperlipidemia, unspecified     Hypertension     Impaired gait     Long term (current) use of aspirin     Mixed conductive and sensorineural hearing loss     Muscle weakness (generalized)     Need for assistance with personal care     Other hydrocele     Parkinson's disease with dyskinesia, unspecified whether manifestations fluctuate (H)     Physical deconditioning     Pseudocyst of pancreas     Sensorineural hearing loss (SNHL) of right ear with unrestricted hearing of left ear     Type 2 diabetes mellitus with hyperglycemia, with long-term current use of insulin (H)     Neuroendocrine carcinoma of small bowel (H)     Failure to thrive in adult     Hyponatremia     Chronic right-sided low back pain without sciatica     Abdominal pain, generalized     Other secondary neuroendocrine tumors (H)     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)     Weakness     Second degree AV block     History of Parkinson's disease     Orthostatic hypotension     Hypotension     Bradycardia     Parkinson's disease, unspecified whether dyskinesia present, unspecified whether manifestations fluctuate (H)     Family history of parkinsonism     Cardiac pacemaker in situ     Seborrheic dermatitis     Lumbar spondylosis       Past Surgical History:   Procedure Laterality Date     CA ANESTH CABG W/PUMP       CHOLECYSTECTOMY  2022     COLONOSCOPY N/A 01/12/2023    Procedure: colonoscopy with fluroscopy;  Surgeon: Ayden Fraire MD;  Location:  OR     ENDOSCOPIC RETROGRADE CHOLANGIOPANCREATOGRAM N/A 05/18/2023    Procedure: ENDOSCOPIC RETROGRADE CHOLANGIOPANCREATOGRAPHY, WITH  CYSTDUODENOSTOMY STENTS X2 REMOVAL;  Surgeon: Cedric Saldana MD;  Location: [facility] OR     ENT SURGERY       EP PACEMAKER DEVICE & LEAD IMPLANT- RIGHT ATRIAL & RIGHT VENTRICULAR N/A 02/28/2024    Procedure:  "Pacemaker Device & Lead Implant - Right Atrial & Right Ventricular;  Surgeon: Jenna Hernandez MD;  Location: [facility]     LAPAROSCOPY DIAGNOSTIC (GENERAL) N/A 2023    Procedure: LAPAROSCOPY, DIAGNOSTIC; RETRIEVAL OF MIGRATED STENT;  Surgeon: Joseluis Lima MD;  Location: [facility] OR     ORTHOPEDIC SURGERY       OTHER SURGICAL HISTORY      skin cancer ? from scrotum     PANCREATECTOMY PEUSTOW N/A 2023    Procedure: Open distal pancreactectomy with splenectomy, lysis of adhesions, resection of proximal illeum;  Surgeon: Ashvin Haider MD;  Location: [facility] OR     PA CABG, ARTERIAL, TWO         Family History   Problem Relation Age of Onset     Parkinsonism Mother 65         at 75 years     Other - See Comments Mother         Polish ?AJ     Psychosis Mother         hallucinations from medications     Other - See Comments Father      Other - See Comments Sister      Other - See Comments Sister      Lung Cancer Brother         smoker -  47 years     Other - See Comments Daughter         3 kids - 2 girls and boy     Heart Disease Son         orthostatic hypotension     Other - See Comments Son         2 daughters     Autism Spectrum Disorder Grandson      Diabetes No family hx of        Reviewed past medical, surgical, and family history with patient found on new patient intake packet located in [employer]MR [employer] tab.     SOCIAL HX: Reviewed    ROS: Specifically negative for bowel/bladder dysfunction, balance changes, headache, dizziness, foot drop, fevers, chills, appetite changes, nausea/vomiting, unexplained weight loss. Otherwise 13 systems reviewed are negative. Please see the patient's intake questionnaire from today for details.    OBJECTIVE:  /73 (BP Location: Left arm, Patient Position: Sitting, Cuff Size: Adult Regular)   Pulse 85   Ht 5' 8\" (1.727 m)   Wt 220 lb (99.8 kg)   SpO2 93%   BMI 33.45 kg/m      PHYSICAL EXAMINATION:  --CONSTITUTIONAL: Vital signs as above. " No acute distress. The patient is well nourished and well groomed.  --PSYCHIATRIC: The patient is awake, alert, oriented to person, place, time and answering questions appropriately with clear speech. Appropriate mood and affect   --RESPIRATORY: Normal rhythm and effort. No abnormal accessory muscle breathing patterns noted.   --GROSS MOTOR: Easily arises from a seated position.  --SKIN: Visible midline laparotomy scar, appears well-healed.  No vesicular lesions or other rash in distribution of pain symptoms.  --LUMBAR SPINE: Inspection reveals no evidence of deformity. Range of motion is not limited in flexion, extension, lateral rotation. Mild tenderness to palpation of lumbar paraspinals, no tenderness in spinous processes.  Facet loading (López test) elicits improvement of pain, seated SLR negative  --HIPS: Full range of motion bilaterally. Negative DANIELA test.  --LOWER EXTREMITY MOTOR TESTING:  Hip flexion left 5/5, right 5/5  Knee extension left 5/5, right 5/5  Ankle dorsiflexors left 5/5, right 5/5  Ankle plantarflexors left 5/5, right 5/5   Great toe extension left 5/5, right 5/5   Knee flexion left 5/5, right 5/5  --NEUROLOGIC: Sensation to lower extremities is intact bilaterally in L2-S1 dermatomes.  Negative Merritt's bilaterally. Reflexes intact in BLE, no clonus.  --VASCULAR: Warm upper limbs bilaterally. Capillary refill in the upper extremities is less than 1 second.    RESULTS: Available medical records from [facility] and any other outside records were reviewed today.     Imaging:  Available relevant imaging was personally reviewed and interpreted today. The images were shown to the patient and the findings were explained using a spine model.    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  Unchanged compression deformities of T11 and  T12 with multilevel vacuum disc phenomena. No acute or suspicious  osseous abnormality. Stable lucent lesion of posterior left rib 11  (8/302 and 13/37), unchanged since at least 7/23/2022. Bilateral small  indirect fat-containing inguinal hernias, new on the right since prior  exam 3/12/2024. Large right hydrocele.    IMPRESSION:  1. Stable post surgical changes of bowel resection and anastomosis,  distal pancreatectomy, splenectomy, appendectomy.  2. Stable size of multiple mesenteric lymph nodes and slightly  increased size of soft tissue density along the hepatic artery that  are concerning for neoplastic etiology.  3. Stable sub-6 mm pulmonary nodules. No new or enlarging pulmonary  nodule.  4. Unchanged subcentimeter mediastinal and thoracic nodes. No new or  enlarging thoracic lymphadenopathy.      Again, thank you for allowing me to participate in the care of your patient.        Sincerely,        Jovan Fong MD

## 2024-10-15 NOTE — PROGRESS NOTES
ASSESSMENT:  Arnaldo Henderson is a 69 year old male presents for consultation at the request of Amelia Johnson who presents today for new patient evaluation of :         Diagnoses and all orders for this visit:  Osteoarthritis of spine with radiculopathy, thoracic region  -     MR Thoracic Spine w/o & w Contrast; Future  -     MR Lumbar Spine w/o & w Contrast; Future  Compression fracture of thoracic vertebra, unspecified thoracic vertebral level, sequela  -     Spine  Referral  -     MR Thoracic Spine w/o & w Contrast; Future  -     MR Lumbar Spine w/o & w Contrast; Future  Chronic bilateral thoracic back pain  -     Spine  Referral       Patient is a 69-year-old male with 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  Symptoms worsen with bending and are relieved with extension, suggesting a possible discogenic or vertebrogenic element.  We reviewed the available imaging which includes CT abdomen that shows chronic appearing compression deformities of T11 and T12.  Given the area of involvement, it is suggestive of a lower thoracic or upper lumbar radiculopathy.  Less likely is ACNES, patient does have a history of laparotomy but the distribution of symptoms is not typical for ACNES.  We discussed these possibilities with the patient and his wife, and at this stage patient would benefit from MRI of thoracic and lumbar spine with and without contrast given previous cancer history, to better assess for possible nerve root entrapment.  We will plan to follow-up once imaging is completed.    Patient reports gabapentin has been very helpful for the burning pain in his midline abdomen, and as he is not having any side effects he was encouraged to continue for now.    PLAN:  Reviewed spine anatomy and disease process.  Discussed diagnosis and treatment options with the patient today. A shared decision making model was used. The patient's values and choices were respected. The following represents what was discussed and decided upon by the provider and the patient.    1. DIAGNOSTIC TESTS  MRI of thoracic spine and lumbar spine was ordered for further characterization of soft tissues and/or neural compression  Ordered with and without contrast given cancer history    2. PHYSICAL THERAPY  Can consider physical therapy once imaging is completed    3. MEDICATIONS:  Discussed multiple medication options today with patient. Discussed risks, side effects, and proper use of medications. Patient verbalized understanding.  No new medications ordered at this visit.    4. INTERVENTIONS:  Patient requires further imaging to assess what interventional options may be best for them.    5. OTHER REFERRALS:  No other referrals at this time.    6. FOLLOW-UP  To review imaging results once imaging is completed  Patient advised to contact our office for earlier appointment if symptoms worsening or not improving, or any side effects are noticed.    Advised patient to call the Spine Center if symptoms worsen or you have problems controlling bladder and bowel function.   ______________________________________________________________________    SUBJECTIVE:   Arnaldo Henderson is a 69 year old male who presents today for new patient evaluation due to upper lumbar pain for about 4 years. Patient mentioned he always had low back pain but this aggravated after pancreatitis in 2021. He currently presents upper lumbar pain that radiates to the lower abdomen bilateral. Symptoms triggered and aggravated by bending forward. He needs to take frequent breaks when doing manual tasks at home due to the pain. Pain is alleviate by resting. Walking helps. Of note, he had constant mid abdomen burning pain after acute pancreatitis in 2021 but this improved after starting  gabapentin.    He had a complicated pancreatitis in [place] in 2021. Per GI, h/o endoscopic transluminal drainage of walled off necrosis in [place] with subsequent axial stent migration in the into the small bowel and left in place for a year. An attempt was made to remove the stent with single balloon enteroscopy, however, it was unsuccessful and laparoscopic surgical removal. CT AP obtained to confirm location of Axios stent showed two transpapillary DPT stents out to the tail of the pancreas with pigtail ends of the stent curling up within the pancreatic parenchyma and indenting the splenic vein. There is about a 1 cm residual fluid collection in the tail of the pancreas. Patient also endorses chronic abdominal pain, predominately in the left mid abdomen. He presents for ERCP for further evaluation.   Most recently in May 2023 he had a endoscopic retrograde cholangiopancreatography at the [facility] and two stents were removed from a cystduodenostomy site.    Severity: 8/10  Laterality: Bilateral   Radiation: To the lower abdomen  Worse with: Bending forward   Better with: Walking and rest   Numbness: no  Weakness: no    Takes gabapentin 600 mg TID to help to manage abdominal pain.    No prior back surgeries.    -Treatment to Date: currently doing PT      Oswestry (EVELYN) Questionnaire        12/12/2023    10:00 AM   OSWESTRY DISABILITY INDEX   Count 10   Sum 10   Oswestry Score (%) 20 %       Neck Disability Index:       No data to display                       -Medications:    Current Outpatient Medications   Medication Sig Dispense Refill    aspirin (ASA) 81 MG EC tablet Take 81 mg by mouth daily      atorvastatin (LIPITOR) 40 MG tablet Take 1 tablet (40 mg) by mouth daily 90 tablet 3    BD PEN NEEDLE LINDY 2ND GEN 32G X 4 MM miscellaneous USE TO INJECT INSULIN 4 TIMES A  each 3    carbidopa-levodopa (SINEMET CR)  MG CR tablet Take 1 tablet by mouth at bedtime @9p 90 tablet 3    carbidopa-levodopa (SINEMET)   MG tablet 2.5@6a, 2@10a, 2@2p and 1@5p 675 tablet 3    Continuous Blood Gluc  (DEXCOM G7 ) ALIA Use to read blood sugars as per 's instructions. 1 each 0    Continuous Glucose Sensor (DEXCOM G7 SENSOR) MISC Change every 10 days. 9 each 11    dasiglucagon HCl (ZEGALOGUE) 0.6 MG/0.6ML SOAJ injection Inject 0.6 mg Subcutaneous once as needed (use for severe hypoglycemia) 1.2 mL 1    gabapentin (NEURONTIN) 300 MG capsule Take 2 capsules (600 mg) by mouth 3 times daily 540 capsule 3    Glucagon (BAQSIMI) 3 MG/DOSE nasal powder Spray 1 spray (3 mg) in nostril as needed (severe hypoglycemia) in the event of unconscious hypoglycemia or hypoglycemic seizure. May repeat dose if no response after 15 minutes. 2 each 1    insulin aspart (NOVOLOG FLEXPEN) 100 UNIT/ML pen Inject 6-20 Units subcutaneously 3 times daily as needed for high blood sugar (for meal coverage and correction scale, upto 60 units/day) Take 5 units with breakfast, 10 unit(s) with lunch and 12 unit(s) with supper 40 mL 3    insulin glargine 100 UNIT/ML pen Take 14 unit(s) AM and 30 unit(s) HS, and adjust as needed upto 50 unit(s) /day 50 mL 1    lipase-protease-amylase (ZENPEP) 22861-079828-990378 units CPEP Take 1 capsule by mouth 3 times daily (with meals). 90 capsule 11    losartan (COZAAR) 50 MG tablet Take 1 tablet (50 mg) by mouth daily 90 tablet 3    magnesium oxide (MAG-OX) 400 MG tablet Take 400 mg by mouth every other day      Multiple Vitamin (MULTI-VITAMINS) TABS Take 1 tablet by mouth daily      omeprazole (PRILOSEC) 20 MG DR capsule Take 1 capsule (20 mg) by mouth daily 90 capsule 3    traZODone (DESYREL) 50 MG tablet Take 0.5-1 tablets (25-50 mg) by mouth at bedtime 30 tablet 3     No current facility-administered medications for this visit.       Allergies   Allergen Reactions    Ciprofloxacin Other (See Comments), Hives, Itching, Rash and Unknown     Other reaction(s): Other (see comments)  Oral swelling per  "[facility]        Dilaudid [Hydromorphone] Rash    Morphine Rash     rash    Penicillins Rash     aka ancef/ rash      Citalopram Unknown    Clindamycin Itching and Rash    Oxycodone Rash     \"HORRIBLE, SEVERE RASH MAYBE CAUSED BY OXY\"       Past Medical History:   Diagnosis Date    Acute pancreatitis 04/18/2022    Formatting of this note might be different from the original. Formatting of this note might be different from the original. Added automatically from request for surgery [id number] Formatting of this note might be different from the original. Added automatically from request for surgery [id number]  Formatting of this note might be different from the original. Added automatically from request for surgery     CAD (coronary artery disease)     CABG    Cholecystitis, acute 07/05/2023    Diabetes mellitus, type 2 (H) 2013    Family history of parkinsonism 02/14/2024    Gastroesophageal reflux disease     Hypercholesteremia     Hypertension     Mixed conductive and sensorineural hearing loss of both ears     Mixed hearing loss     Pancreatic duct stricture     Pancreatitis     Parkinson disease (H)     Parkinson's disease, unspecified whether dyskinesia present, unspecified whether manifestations fluctuate (H) 02/14/2024    Seborrheic dermatitis 03/22/2024    Second degree AV block     Surgical site infection 07/26/2023        Patient Active Problem List   Diagnosis    Retained foreign body    Wound infection    Acute bilateral low back pain without sciatica    Adenomatous polyp of colon    Atherosclerotic heart disease of native coronary artery without angina pectoris    Cancer of skin of scrotum (H)    Cervicalgia    Gastroesophageal reflux disease without esophagitis    History of cholecystectomy    History of coronary artery bypass surgery    Hyperlipidemia, unspecified    Hypertension    Impaired gait    Long term (current) use of aspirin    Mixed conductive and sensorineural hearing loss    Muscle weakness " (generalized)    Need for assistance with personal care    Other hydrocele    Parkinson's disease with dyskinesia, unspecified whether manifestations fluctuate (H)    Physical deconditioning    Pseudocyst of pancreas    Sensorineural hearing loss (SNHL) of right ear with unrestricted hearing of left ear    Type 2 diabetes mellitus with hyperglycemia, with long-term current use of insulin (H)    Neuroendocrine carcinoma of small bowel (H)    Failure to thrive in adult    Hyponatremia    Chronic right-sided low back pain without sciatica    Abdominal pain, generalized    Other secondary neuroendocrine tumors (H)    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)    Weakness    Second degree AV block    History of Parkinson's disease    Orthostatic hypotension    Hypotension    Bradycardia    Parkinson's disease, unspecified whether dyskinesia present, unspecified whether manifestations fluctuate (H)    Family history of parkinsonism    Cardiac pacemaker in situ    Seborrheic dermatitis    Lumbar spondylosis       Past Surgical History:   Procedure Laterality Date    CA ANESTH CABG W/PUMP      CHOLECYSTECTOMY  2022    COLONOSCOPY N/A 01/12/2023    Procedure: colonoscopy with fluroscopy;  Surgeon: Ayden Fraire MD;  Location:  OR    ENDOSCOPIC RETROGRADE CHOLANGIOPANCREATOGRAM N/A 05/18/2023    Procedure: ENDOSCOPIC RETROGRADE CHOLANGIOPANCREATOGRAPHY, WITH  CYSTDUODENOSTOMY STENTS X2 REMOVAL;  Surgeon: Cedric Saldana MD;  Location: [facility] OR    ENT SURGERY      EP PACEMAKER DEVICE & LEAD IMPLANT- RIGHT ATRIAL & RIGHT VENTRICULAR N/A 02/28/2024    Procedure: Pacemaker Device & Lead Implant - Right Atrial & Right Ventricular;  Surgeon: Jenna Hernandez MD;  Location:  [facility]    LAPAROSCOPY DIAGNOSTIC (GENERAL) N/A 02/20/2023    Procedure: LAPAROSCOPY, DIAGNOSTIC; RETRIEVAL OF MIGRATED STENT;  Surgeon: Joseluis Lima MD;  [facility]    ORTHOPEDIC SURGERY      OTHER SURGICAL  "HISTORY      skin cancer ? from scrotum    PANCREATECTOMY CIRO N/A 2023    Procedure: Open distal pancreactectomy with splenectomy, lysis of adhesions, resection of proximal illeum;  Surgeon: Ashvin Haider MD;  Location: [facility] OR    MO CABG, ARTERIAL, TWO         Family History   Problem Relation Age of Onset    Parkinsonism Mother 65         at 75 years    Other - See Comments Mother         Polish ?AJ    Psychosis Mother         hallucinations from medications    Other - See Comments Father     Other - See Comments Sister     Other - See Comments Sister     Lung Cancer Brother         smoker -  47 years    Other - See Comments Daughter         3 kids - 2 girls and boy    Heart Disease Son         orthostatic hypotension    Other - See Comments Son         2 daughters    Autism Spectrum Disorder Grandson     Diabetes No family hx of        Reviewed past medical, surgical, and family history with patient found on new patient intake packet located in EMR Media tab.     SOCIAL HX: Reviewed    ROS: Specifically negative for bowel/bladder dysfunction, balance changes, headache, dizziness, foot drop, fevers, chills, appetite changes, nausea/vomiting, unexplained weight loss. Otherwise 13 systems reviewed are negative. Please see the patient's intake questionnaire from today for details.    OBJECTIVE:  /73 (BP Location: Left arm, Patient Position: Sitting, Cuff Size: Adult Regular)   Pulse 85   Ht 5' 8\" (1.727 m)   Wt 220 lb (99.8 kg)   SpO2 93%   BMI 33.45 kg/m      PHYSICAL EXAMINATION:  --CONSTITUTIONAL: Vital signs as above. No acute distress. The patient is well nourished and well groomed.  --PSYCHIATRIC: The patient is awake, alert, oriented to person, place, time and answering questions appropriately with clear speech. Appropriate mood and affect   --RESPIRATORY: Normal rhythm and effort. No abnormal accessory muscle breathing patterns noted.   --GROSS MOTOR: Easily arises " from a seated position.  --SKIN: Visible midline laparotomy scar, appears well-healed.  No vesicular lesions or other rash in distribution of pain symptoms.  --LUMBAR SPINE: Inspection reveals no evidence of deformity. Range of motion is not limited in flexion, extension, lateral rotation. Mild tenderness to palpation of lumbar paraspinals, no tenderness in spinous processes.  Facet loading (López test) elicits improvement of pain, seated SLR negative  --HIPS: Full range of motion bilaterally. Negative DANIELA test.  --LOWER EXTREMITY MOTOR TESTING:  Hip flexion left 5/5, right 5/5  Knee extension left 5/5, right 5/5  Ankle dorsiflexors left 5/5, right 5/5  Ankle plantarflexors left 5/5, right 5/5   Great toe extension left 5/5, right 5/5   Knee flexion left 5/5, right 5/5  --NEUROLOGIC: Sensation to lower extremities is intact bilaterally in L2-S1 dermatomes.  Negative Merritt's bilaterally. Reflexes intact in BLE, no clonus.  --VASCULAR: Warm upper limbs bilaterally. Capillary refill in the upper extremities is less than 1 second.    RESULTS: Available medical records from [facility] and any other outside records were reviewed today.     Imaging:  Available relevant imaging was personally reviewed and interpreted today. The images were shown to the patient and the findings were explained using a spine model.    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 Unchanged compression deformities of T11 and  T12 with multilevel vacuum disc phenomena. No acute or suspicious  osseous abnormality. Stable lucent lesion of posterior left rib 11  (8/302 and 13/37), unchanged since at least 7/23/2022. Bilateral small  indirect fat-containing inguinal hernias, new on the right since prior  exam 3/12/2024. Large right  hydrocele.    IMPRESSION:  1. Stable post surgical changes of bowel resection and anastomosis,  distal pancreatectomy, splenectomy, appendectomy.  2. Stable size of multiple mesenteric lymph nodes and slightly  increased size of soft tissue density along the hepatic artery that  are concerning for neoplastic etiology.  3. Stable sub-6 mm pulmonary nodules. No new or enlarging pulmonary  nodule.  4. Unchanged subcentimeter mediastinal and thoracic nodes. No new or  enlarging thoracic lymphadenopathy.

## 2024-10-15 NOTE — PATIENT INSTRUCTIONS
~Spine Center Scheduling #[phone].  ~Please call our [facility] Spine Nurse Navigation #[phone] with any questions or concerns about your treatment plan, if symptoms worsen and you would like to be seen urgently, or if you have problems controlling bladder and bowel function.  ~For any future flareups or new symptoms, recommend follow-up in clinic or contact the nurse navigator line.  ~Please note that any My Chart messages may take multiple days for a response due to the high volume of patients seen in clinic.  Anything sent Friday night or after will be answered the following week when able.     Imaging has been ordered. Radiology will call you to schedule. Please call below if you do not hear from them in the next couple of days.     [facility] Radiology Scheduling    Please call [phone] to schedule your image(s) (select option #1). There are 3 different locations near, see below.   There are imaging options for other locations as well, the imaging schedulers can help with other locations within [place].     [facility]  [address] [address]    [facility] [place]  [address], [address]   [place] [address]      [address] [phone]

## 2024-10-16 ENCOUNTER — MYC MEDICAL ADVICE (OUTPATIENT)
Dept: ENDOCRINOLOGY | Facility: CLINIC | Age: 69
End: 2024-10-16

## 2024-10-16 ENCOUNTER — OFFICE VISIT (OUTPATIENT)
Dept: GASTROENTEROLOGY | Facility: CLINIC | Age: 69
End: 2024-10-16
Payer: MEDICARE

## 2024-10-16 VITALS
OXYGEN SATURATION: 98 % | HEART RATE: 82 BPM | WEIGHT: 225.7 LBS | HEIGHT: 68 IN | SYSTOLIC BLOOD PRESSURE: 99 MMHG | DIASTOLIC BLOOD PRESSURE: 57 MMHG | BODY MASS INDEX: 34.21 KG/M2

## 2024-10-16 DIAGNOSIS — E11.9 TYPE 2 DIABETES MELLITUS WITHOUT COMPLICATION (H): ICD-10-CM

## 2024-10-16 DIAGNOSIS — K85.91 NECROTIZING PANCREATITIS: Primary | ICD-10-CM

## 2024-10-16 PROCEDURE — 99215 OFFICE O/P EST HI 40 MIN: CPT | Performed by: INTERNAL MEDICINE

## 2024-10-16 RX ORDER — PEN NEEDLE, DIABETIC 32GX 5/32"
NEEDLE, DISPOSABLE MISCELLANEOUS
Qty: 400 EACH | Refills: 3 | Status: SHIPPED | OUTPATIENT
Start: 2024-10-16

## 2024-10-16 ASSESSMENT — PAIN SCALES - GENERAL: PAINLEVEL: NO PAIN (0)

## 2024-10-16 NOTE — PROGRESS NOTES
GASTROENTEROLOGY OUTPATIENT CLINIC CONSULT  DATE OF SERVICE: 10/16/2024  PROVIDER REQUESTING CONSULT: Referred Self  Reason for Consultation: follow up visit     ASSESSMENT:    69 year old male with PMHx significant for CAD s/p CABG, AV block s/p pacemaker in 2/2024, diabetes, parkinson disease and pancreatitis complicated by walled of necrosis who underwent an endoscopic transluminal drainage of walled off necrosis initially in [place] with subsequent axial stent placement which was there for one year. This was complicated by stent migration resulted in small bowel obstruction. This was unreachable for removal by endoscopic intervention therefor patient underwent laparoscopic removal of the stent by Dr. Lima. Upon further review of follow up CT scan after this he was found to have 2 very large very long double-pigtail 7 [place] plastic stents that had been placed transduodenal he around the pancreas and the inner pigtail digging into the splenic vein and not apparently draining any part of the pancreatic duct or any significant collection. Patient underwent an ERCP on 5/18/2023 with removal of those stents and performed a pancreatogram which confirmed CT and clinical suspicion that he has completely disconnected pancreatic duct. Patient continued to experience abdominal pain even after removal of the stents and underwent open distal pancreactectomy with splenectomy, lysis of adhesions, resection of proximal illeum on 6/30/2023. 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     Today the patient reports feeling well overall. Mentions that the abdominal pain he experienced prior to the surgery is resolved. He now only has chronic LLQ  pain which is intermittent and radiates to the back. He is currently seeing spine surgery and should be having an MRI of the spine for further evaluation. Additionally, in 2/2024 he had a cardiac pacemaker 2/2 complete AV block. His only concern today is scheduling a colonoscopy  and regular follow up with GI.       Patient had a colonoscopy in 2021 for screening purposes and had polyps some which were advanced adenomas. He had one polyp at the appendiceal orifice which he reports was removed in [place] during an appendectomy.       RECOMMENDATIONS:    - Schedule colonoscopy with Anaesthesia, likely MAC to be done in the hospital 3rd floor due to recent pacemaker.   - OK to follow up with Dr. Saldana clinic moving on for his GI care.    - Continue follow up with Oncology for care of the neuroendocrine tumor.     Discussed with GI staff, Dr. Saldana.     Joanna Tellez MD   PGY4- GI Fellow     Seen and examined with GI fellow, agree with findings and recommendations.  45 minutes, more than 50% counseling, present for entire visit  Doing amazingly well after herculean series of interventions here by mutliple services.   Does need colonoscopy under MAC at [facility], will arrange  Cedric Saldana MD GI Staff   ________________________________________________________________  HPI:  69 year old male with PMHx significant for CAD s/p CABG, AV block s/p pacemaker in 2/2024, diabetes, parkinson disease and pancreatitis complicated by walled of necrosis and stent migration resulting in small bowel obstruction requiring laparoscopic removal along with distal pancreatectomy and newly diagnosed NET of the small bowel who presents to clinic for follow up.     Today the patient reports feeling well overall. Mentions that the abdominal pain he experienced prior to the surgery is resolved. He now only has chronic LLQ pain which is intermittent and radiates to the back. He is currently seeing spine surgery and should be having an MRI of  the spine for further evaluation. Additionally, in 2/2024 he had a cardiac pacemaker 2/2 complete AV block. His only concern today is scheduling a colonoscopy  and regular follow up with GI.     PMHx:  Past Medical History:   Diagnosis Date    Acute pancreatitis 04/18/2022    Formatting of this note might be different from the original. Formatting of this note might be different from the original. Added automatically from request for surgery [id number] Formatting of this note might be different from the original. Added automatically from request for surgery [id number]  Formatting of this note might be different from the original. Added automatically from request for surgery     CAD (coronary artery disease)     CABG    Cholecystitis, acute 07/05/2023    Diabetes mellitus, type 2 (H) 2013    Family history of parkinsonism 02/14/2024    Gastroesophageal reflux disease     Hypercholesteremia     Hypertension     Mixed conductive and sensorineural hearing loss of both ears     Mixed hearing loss     Pancreatic duct stricture     Pancreatitis     Parkinson disease (H)     Parkinson's disease, unspecified whether dyskinesia present, unspecified whether manifestations fluctuate (H) 02/14/2024    Seborrheic dermatitis 03/22/2024    Second degree AV block     Surgical site infection 07/26/2023     Patient Active Problem List   Diagnosis    Retained foreign body    Wound infection    Acute bilateral low back pain without sciatica    Adenomatous polyp of colon    Atherosclerotic heart disease of native coronary artery without angina pectoris    Cancer of skin of scrotum (H)    Cervicalgia    Gastroesophageal reflux disease without esophagitis    History of cholecystectomy    History of coronary artery bypass surgery    Hyperlipidemia, unspecified    Hypertension    Impaired gait    Long term (current) use of aspirin    Mixed conductive and sensorineural hearing loss    Muscle weakness (generalized)    Need for assistance with  personal care    Other hydrocele    Parkinson's disease with dyskinesia, unspecified whether manifestations fluctuate (H)    Physical deconditioning    Pseudocyst of pancreas    Sensorineural hearing loss (SNHL) of right ear with unrestricted hearing of left ear    Type 2 diabetes mellitus with hyperglycemia, with long-term current use of insulin (H)    Neuroendocrine carcinoma of small bowel (H)    Failure to thrive in adult    Hyponatremia    Chronic right-sided low back pain without sciatica    Abdominal pain, generalized    Other secondary neuroendocrine tumors (H)    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)    Weakness    Second degree AV block    History of Parkinson's disease    Orthostatic hypotension    Hypotension    Bradycardia    Parkinson's disease, unspecified whether dyskinesia present, unspecified whether manifestations fluctuate (H)    Family history of parkinsonism    Cardiac pacemaker in situ    Seborrheic dermatitis    Lumbar spondylosis       PSurgHx:  Past Surgical History:   Procedure Laterality Date    CA ANESTH CABG W/PUMP      CHOLECYSTECTOMY  2022    COLONOSCOPY N/A 01/12/2023    Procedure: colonoscopy with fluroscopy;  Surgeon: Ayden Fraire MD;  Location:  OR    ENDOSCOPIC RETROGRADE CHOLANGIOPANCREATOGRAM N/A 05/18/2023    Procedure: ENDOSCOPIC RETROGRADE CHOLANGIOPANCREATOGRAPHY, WITH  CYSTDUODENOSTOMY STENTS X2 REMOVAL;  Surgeon: Cedric Saldana MD;  Location: [facility] OR    ENT SURGERY      EP PACEMAKER DEVICE & LEAD IMPLANT- RIGHT ATRIAL & RIGHT VENTRICULAR N/A 02/28/2024    Procedure: Pacemaker Device & Lead Implant - Right Atrial & Right Ventricular;  Surgeon: Jenna Hernandez MD;  Location:  HEART CARDIAC CATH LAB    LAPAROSCOPY DIAGNOSTIC (GENERAL) N/A 02/20/2023    Procedure: LAPAROSCOPY, DIAGNOSTIC; RETRIEVAL OF MIGRATED STENT;  Surgeon: Joseluis Lima MD;  Location: [facility] OR    ORTHOPEDIC SURGERY      OTHER SURGICAL HISTORY      skin cancer ? from  scrotum    PANCREATECTOMY PESATHISH N/A 06/30/2023    Procedure: Open distal pancreactectomy with splenectomy, lysis of adhesions, resection of proximal illeum;  Surgeon: Ashvin Haider MD;  Location: [facility] OR    SC CABG, ARTERIAL, TWO  2003       MEDS:  Current Outpatient Medications   Medication Sig Dispense Refill    aspirin (ASA) 81 MG EC tablet Take 81 mg by mouth daily      atorvastatin (LIPITOR) 40 MG tablet Take 1 tablet (40 mg) by mouth daily 90 tablet 3    BD PEN NEEDLE LINDY 2ND GEN 32G X 4 MM miscellaneous USE TO INJECT INSULIN 4 TIMES A  each 3    carbidopa-levodopa (SINEMET CR)  MG CR tablet Take 1 tablet by mouth at bedtime @9p 90 tablet 3    carbidopa-levodopa (SINEMET)  MG tablet 2.5@6a, 2@10a, 2@2p and 1@5p 675 tablet 3    Continuous Blood Gluc  (DEXCOM G7 ) ALIA Use to read blood sugars as per 's instructions. 1 each 0    Continuous Glucose Sensor (DEXCOM G7 SENSOR) MISC Change every 10 days. 9 each 11    dasiglucagon HCl (ZEGALOGUE) 0.6 MG/0.6ML SOAJ injection Inject 0.6 mg Subcutaneous once as needed (use for severe hypoglycemia) 1.2 mL 1    gabapentin (NEURONTIN) 300 MG capsule Take 2 capsules (600 mg) by mouth 3 times daily 540 capsule 3    Glucagon (BAQSIMI) 3 MG/DOSE nasal powder Spray 1 spray (3 mg) in nostril as needed (severe hypoglycemia) in the event of unconscious hypoglycemia or hypoglycemic seizure. May repeat dose if no response after 15 minutes. 2 each 1    insulin aspart (NOVOLOG FLEXPEN) 100 UNIT/ML pen Inject 6-20 Units subcutaneously 3 times daily as needed for high blood sugar (for meal coverage and correction scale, upto 60 units/day) Take 5 units with breakfast, 10 unit(s) with lunch and 12 unit(s) with supper 40 mL 3    insulin glargine 100 UNIT/ML pen Take 14 unit(s) AM and 30 unit(s) HS, and adjust as needed upto 50 unit(s) /day 50 mL 1    lipase-protease-amylase (ZENPEP) 64947-351661-790133 units CPEP Take 1 capsule by  "mouth 3 times daily (with meals). 90 capsule 11    losartan (COZAAR) 50 MG tablet Take 1 tablet (50 mg) by mouth daily 90 tablet 3    magnesium oxide (MAG-OX) 400 MG tablet Take 400 mg by mouth every other day      Multiple Vitamin (MULTI-VITAMINS) TABS Take 1 tablet by mouth daily      omeprazole (PRILOSEC) 20 MG DR capsule Take 1 capsule (20 mg) by mouth daily 90 capsule 3    traZODone (DESYREL) 50 MG tablet Take 0.5-1 tablets (25-50 mg) by mouth at bedtime 30 tablet 3     No current facility-administered medications for this visit.     ALLERGIES:    Allergies   Allergen Reactions    Ciprofloxacin Other (See Comments), Hives, Itching, Rash and Unknown     Other reaction(s): Other (see comments)  Oral swelling per [facility]        Dilaudid [Hydromorphone] Rash    Morphine Rash     rash    Penicillins Rash     aka ancef/ rash      Citalopram Unknown    Clindamycin Itching and Rash    Oxycodone Rash     \"HORRIBLE, SEVERE RASH MAYBE CAUSED BY OXY\"     FHx:  Family History   Problem Relation Age of Onset    Parkinsonism Mother 65         at 75 years    Other - See Comments Mother         Polish ?AJ    Psychosis Mother         hallucinations from medications    Other - See Comments Father     Other - See Comments Sister     Other - See Comments Sister     Lung Cancer Brother         smoker -  47 years    Other - See Comments Daughter         3 kids - 2 girls and boy    Heart Disease Son         orthostatic hypotension    Other - See Comments Son         2 daughters    Autism Spectrum Disorder Grandson     Diabetes No family hx of        SOCIAL Hx:  Social History     Socioeconomic History    Marital status:      Spouse name: Not on file    Number of children: Not on file    Years of education: Not on file    Highest education level: Not on file   Occupational History    Not on file   Tobacco Use    Smoking status: Former     Types: Cigarettes    Smokeless tobacco: Never    Tobacco comments:     Quit 10 " years ago - quit a few times; started at age 12 or 13 and then stopped at 26 years and then stopped for 15 years got  and started smoking again   Vaping Use    Vaping status: Never Used   Substance and Sexual Activity    Alcohol use: Not Currently     Comment: quit 2 years ago    Drug use: Not Currently     Comment: used to smoke marijuana when young; rare use    Sexual activity: Not on file   Other Topics Concern    Not on file   Social History Narrative    Not on file     Social Determinants of Health     Financial Resource Strain: Low Risk  (8/8/2024)    Financial Resource Strain     Within the past 12 months, have you or your family members you live with been unable to get utilities (heat, electricity) when it was really needed?: No   Food Insecurity: Low Risk  (8/8/2024)    Food Insecurity     Within the past 12 months, did you worry that your food would run out before you got money to buy more?: No     Within the past 12 months, did the food you bought just not last and you didn t have money to get more?: No   Transportation Needs: Low Risk  (8/8/2024)    Transportation Needs     Within the past 12 months, has lack of transportation kept you from medical appointments, getting your medicines, non-medical meetings or appointments, work, or from getting things that you need?: No   Physical Activity: Insufficiently Active (8/6/2022)    Received from [facility]    Exercise Vital Sign     Days of Exercise per Week: 2 days     Minutes of Exercise per Session: 10 min   Stress: No Stress Concern Present (8/6/2022)    Received from [facility]    [place] [employer] - Occupational Stress Questionnaire     Feeling of Stress : Not at all   Social Connections: Moderately Isolated (8/6/2022)    Received from [facility]    Social Connection and Isolation Panel [NHANES]     Frequency of Communication with Friends and Family: Three times a week     Frequency of Social Gatherings with Friends and  "Family: Once a week     Attends Hinduism Services: Never     Active Member of Clubs or Organizations: No     Attends Club or Organization Meetings: Never     Marital Status:    Interpersonal Safety: Not At Risk (8/6/2022)    Received from [facility]    Humiliation, Afraid, Rape, and Kick questionnaire     Fear of Current or Ex-Partner: No     Emotionally Abused: No     Physically Abused: No     Sexually Abused: No   Housing Stability: Low Risk  (8/8/2024)    Housing Stability     Do you have housing? : Yes     Are you worried about losing your housing?: No       ROS: A comprehensive Review of Systems was asked and answered in the negative unless specifically commented upon in the HPI    Physical Exam  BP 99/57   Pulse 82   Ht 1.727 m (5' 8\")   Wt 102.4 kg (225 lb 11.2 oz)   SpO2 98%   BMI 34.32 kg/m    Body mass index is 34.32 kg/m .  Gen: A&Ox3, NAD  HEENT: Moist mucus membranes, no scleral icterus.  Lungs: no respiratory distress  Abd: soft, non-tender, non-distended.  No guarding/rigidity/rebound.  Skin: no jaundice, no stigmata of chronic liver disease  Ext: warm, dry, no evidence of edema    LABS:  Ancillary Procedure on 09/05/2024   Component Date Value Ref Range Status    Date Time Interrogation Session 09/05/2024 20,240,905,061,450   Final    Implantable Pulse Generator Manufa* 09/05/2024 Medtronic   Final    Implantable Pulse Generator Model 09/05/2024 [id number] Marie XT  MRI   Final    Implantable Pulse Generator Serial* 09/05/2024 [id number]   Final    Type Interrogation Session 09/05/2024 Remote Scheduled   Final    Clinic Name 09/05/2024 [facility]   Final    Implantable Pulse Generator Type 09/05/2024 Pacemaker   Final    Implantable Pulse Generator Implan* 09/05/2024 20,240,228   Final    Implantable Lead  09/05/2024 Medtronic   Final    Implantable Lead Model 09/05/2024 [id number] CapSureFix Julio Cesar MRI SureScan   Final    Implantable Lead Serial Number " 09/05/2024 [id number]   Final    Implantable Lead Implant Date 09/05/2024 20,240,228   Final    Implantable Lead Polarity Type 09/05/2024 Bipolar Lead   Final    Implantable Lead Location Detail 1 09/05/2024 APPENDAGE   Final    Implantable Lead Special Function 09/05/2024 52 cm   Final    Implantable Lead Location 09/05/2024 Right Atrium   Final    Implantable Lead Connection Status 09/05/2024 Connected   Final    Implantable Lead  09/05/2024 Medtronic   Final    Implantable Lead Model 09/05/2024 5076 CapSureFix Novus MRI SureScan   Final    Implantable Lead Serial Number 09/05/2024 [id number]   Final    Implantable Lead Implant Date 09/05/2024 [id number]   Final    Implantable Lead Polarity Type 09/05/2024 Bipolar Lead   Final    Implantable Lead Location Detail 1 09/05/2024 SEPTUM   Final    Implantable Lead Special Function 09/05/2024 58 cm   Final    Implantable Lead Location 09/05/2024 Right Ventricle   Final    Implantable Lead Connection Status 09/05/2024 Connected   Final    Rodger Setting Mode (NBG Code) 09/05/2024 DDDR   Final    Rodger Setting Lower Rate Limit 09/05/2024 60  [beats]/min Final    Rodger Setting Maximum Tracking Rate 09/05/2024 130  [beats]/min Final    Rodger Setting Maximum Sensor Rate 09/05/2024 130  [beats]/min Final    Rodger Setting Hysterisis Rate 09/05/2024 DISABLED   Final    Rodger Setting JAYME Delay Low 09/05/2024 220  ms Final    Rodger Setting PAV Delay Low 09/05/2024 240  ms Final    Rodger Setting PAV Delay High 09/05/2024 140  ms Final    Rodger Setting JAYME Delay High 09/05/2024 110  ms Final    Rodger Setting AT Mode Switch Rate 09/05/2024 171  [beats]/min Final    Lead Channel Setting Sensing Polar* 09/05/2024 Bipolar   Final    Lead Channel Setting Sensing Anode* 09/05/2024 Right Atrium   Final    Lead Channel Setting Sensing Anode* 09/05/2024 Ring   Final    Lead Channel Setting Sensing Catho* 09/05/2024 Right Atrium   Final    Lead Channel Setting Sensing Catho*  09/05/2024 Tip   Final    Lead Channel Setting Sensing Sensi* 09/05/2024 0.3  mV Final    Lead Channel Setting Sensing Polar* 09/05/2024 Bipolar   Final    Lead Channel Setting Sensing Anode* 09/05/2024 Right Ventricle   Final    Lead Channel Setting Sensing Anode* 09/05/2024 Ring   Final    Lead Channel Setting Sensing Catho* 09/05/2024 Right Ventricle   Final    Lead Channel Setting Sensing Catho* 09/05/2024 Tip   Final    Lead Channel Setting Sensing Sensi* 09/05/2024 0.9  mV Final    Lead Channel Setting Pacing Polari* 09/05/2024 Bipolar   Final    Lead Channel Setting Pacing Anode * 09/05/2024 Right Atrium   Final    Lead Channel Setting Pacing Anode * 09/05/2024 Ring   Final    Lead Channel Setting Sensing Catho* 09/05/2024 Right Atrium   Final    Lead Channel Setting Sensing Catho* 09/05/2024 Tip   Final    Lead Channel Setting Pacing Pulse * 09/05/2024 0.4  ms Final    Lead Channel Setting Pacing Amplit* 09/05/2024 1.5  V Final    Lead Channel Setting Pacing Captur* 09/05/2024 Adaptive   Final    Lead Channel Setting Pacing Polari* 09/05/2024 Bipolar   Final    Lead Channel Setting Pacing Anode * 09/05/2024 Right Ventricle   Final    Lead Channel Setting Pacing Anode * 09/05/2024 Ring   Final    Lead Channel Setting Sensing Catho* 09/05/2024 Right Ventricle   Final    Lead Channel Setting Sensing Catho* 09/05/2024 Tip   Final    Lead Channel Setting Pacing Pulse * 09/05/2024 0.4  ms Final    Lead Channel Setting Pacing Amplit* 09/05/2024 2  V Final    Lead Channel Setting Pacing Captur* 09/05/2024 Adaptive   Final    Zone Setting Type Category 09/05/2024 VF   Final    Zone Setting Vendor Type Category 09/05/2024 V High Rate   Final    Zone Setting Type Category 09/05/2024 VT   Final    Zone Setting Vendor Type Category 09/05/2024 FastVT   Final    Zone Setting Type Category 09/05/2024 VT   Final    Zone Setting Vendor Type Category 09/05/2024 VT   Final    Zone Setting Type Category 09/05/2024 VT   Final     Zone Setting Vendor Type Category 09/05/2024 MonVT   Final    Zone Setting Status 09/05/2024 Monitor   Final    Zone Setting Detection Interval 09/05/2024 400  ms Final    Zone Setting Type Category 09/05/2024 ATRIAL_FIBRILLATION   Final    Zone Setting Vendor Type Category 09/05/2024 FastATAF   Final    Zone Setting Type Category 09/05/2024 AT/AF   Final    Zone Setting Status 09/05/2024 Monitor   Final    Zone Setting Detection Interval 09/05/2024 350  ms Final    Lead Channel Impedance Value 09/05/2024 399  ohm Final    Lead Channel Impedance Value 09/05/2024 323  ohm Final    Lead Channel Sensing Intrinsic Amp* 09/05/2024 4  mV Final    Lead Channel Sensing Intrinsic Amp* 09/05/2024 4  mV Final    Lead Channel Pacing Threshold Ampl* 09/05/2024 0.625  V Final    Lead Channel Pacing Threshold Puls* 09/05/2024 0.4  ms Final    Lead Channel Impedance Value 09/05/2024 475  ohm Final    Lead Channel Impedance Value 09/05/2024 399  ohm Final    Lead Channel Sensing Intrinsic Amp* 09/05/2024 18.375  mV Final    Lead Channel Sensing Intrinsic Amp* 09/05/2024 18.375  mV Final    Lead Channel Pacing Threshold Ampl* 09/05/2024 0.625  V Final    Lead Channel Pacing Threshold Puls* 09/05/2024 0.4  ms Final    Battery Date Time of Measurements 09/05/2024 20,240,905,051,502   Final    Battery Status 09/05/2024 OK   Final    Battery RRT Trigger 09/05/2024 2.625   Final    Battery Remaining Longevity 09/05/2024 144  mo Final    Battery Voltage 09/05/2024 3.14  V Final    Rodger Statistic Date Time Start 09/05/2024 20,240,805,133,851   Final    Rodger Statistic Date Time End 09/05/2024 20,240,905,061,450   Final    Rodger Statistic RA Percent Paced 09/05/2024 11.19  % Final    Rodger Statistic RV Percent Paced 09/05/2024 99.92  % Final    Rodger Statistic AP  Percent 09/05/2024 11.18  % Final    Rodger Statistic AS  Percent 09/05/2024 88.74  % Final    Rodger Statistic AP VS Percent 09/05/2024 0.01  % Final    Rodger Statistic AS VS  Percent 09/05/2024 0.07  % Final    Atrial Tachy Statistic Date Time S* 09/05/2024 20,240,805,133,851   Final    Atrial Tachy Statistic Date Time E* 09/05/2024 20,240,905,061,450   Final    Atrial Tachy Statistic AT/AF Burde* 09/05/2024 0  % Final    Therapy Statistic Recent Date Time* 09/05/2024 20,240,805,133,851   Final    Therapy Statistic Recent Date Time* 09/05/2024 20,240,905,061,450   Final    Therapy Statistic Total  Date Time* 09/05/2024 20,240,228,101,305   Final    Therapy Statistic Total  Date Time* 09/05/2024 20,240,905,061,450   Final    Episode Statistic Recent Count 09/05/2024 0   Final    Episode Statistic Type Category 09/05/2024 AT/AF   Final    Episode Statistic Recent Count 09/05/2024 0   Final    Episode Statistic Type Category 09/05/2024 Patient Activated   Final    Episode Statistic Recent Count 09/05/2024 0   Final    Episode Statistic Type Category 09/05/2024 SVT   Final    Episode Statistic Recent Count 09/05/2024 0   Final    Episode Statistic Type Category 09/05/2024 VT   Final    Episode Statistic Recent Count 09/05/2024 0   Final    Episode Statistic Type Category 09/05/2024 VT   Final    Episode Statistic Recent Date Time* 09/05/2024 20,240,805,133,851   Final    Episode Statistic Recent Date Time* 09/05/2024 20,240,905,061,450   Final    Episode Statistic Recent Date Time* 09/05/2024 05267660532707   Final    Episode Statistic Recent Date Time* 09/05/2024 07864498455723   Final    Episode Statistic Recent Date Time* 09/05/2024 78124694269769   Final    Episode Statistic Recent Date Time* 09/05/2024 01840529847959   Final    Episode Statistic Recent Date Time* 09/05/2024 20240805133851   Final    Episode Statistic Recent Date Time* 09/05/2024 31077951764526   Final    Episode Statistic Recent Date Time* 09/05/2024 87295448001729   Final    Episode Statistic Recent Date Time* 09/05/2024 96080421521535   Final    Episode Statistic Total Count 09/05/2024 0   Final    Episode Statistic  Type Category 09/05/2024 AT/AF   Final    Episode Statistic Total Count 09/05/2024 0   Final    Episode Statistic Type Category 09/05/2024 Patient Activated   Final    Episode Statistic Total Count 09/05/2024 0   Final    Episode Statistic Type Category 09/05/2024 SVT   Final    Episode Statistic Total Count 09/05/2024 0   Final    Episode Statistic Type Category 09/05/2024 VT   Final    Episode Statistic Total Count 09/05/2024 0   Final    Episode Statistic Type Category 09/05/2024 VT   Final    Episode Statistic Total Date Time * 09/05/2024 20,240,228,101,305   Final    Episode Statistic Total Date Time * 09/05/2024 20,240,905,061,450   Final    Episode Statistic Total Date Time * 09/05/2024 [id number]   Final    Episode Statistic Total Date Time * 09/05/2024 [id number]   Final    Episode Statistic Total Date Time * 09/05/2024 [id number]   Final    Episode Statistic Total Date Time * 09/05/2024 [id number]   Final    Episode Statistic Total Date Time * 09/05/2024 [id number]   Final    Episode Statistic Total Date Time * 09/05/2024 [id number]   Final    Episode Statistic Total Date Time * 09/05/2024 [id number]   Final    Episode Statistic Total Date Time * 09/05/2024 [id number]   Final

## 2024-10-16 NOTE — TELEPHONE ENCOUNTER
BD PEN NEEDLE LINDY 2ND GEN 32G X 4 MM miscellaneous   Last Written Prescription Date:  9/7/23  Last Fill Quantity: 400,   # refills: 3  Last Office Visit : 10/9/24 [facility]    Addressed in a different encounter.

## 2024-10-16 NOTE — NURSING NOTE
"Chief Complaint   Patient presents with    Follow Up       Vitals:    10/16/24 1341   BP: 99/57   Pulse: 82   SpO2: 98%   Weight: 102.4 kg (225 lb 11.2 oz)   Height: 1.727 m (5' 8\")       Body mass index is 34.32 kg/m .    Brandyn Michele MA    "

## 2024-10-16 NOTE — LETTER
10/16/2024      Arnaldo Henderson  [address] [address]      Dear Colleague,    Thank you for referring your patient, Arnaldo Henderson, to the [facility] [place]. Please see a copy of my visit note below.    GASTROENTEROLOGY OUTPATIENT CLINIC CONSULT  DATE OF SERVICE: 10/16/2024  PROVIDER REQUESTING CONSULT: Referred Self  Reason for Consultation: follow up visit     ASSESSMENT:    69 year old male with PMHx significant for CAD s/p CABG, AV block s/p pacemaker in 2/2024, diabetes, parkinson disease and pancreatitis complicated by walled of necrosis who underwent an endoscopic transluminal drainage of walled off necrosis initially in [place] with subsequent axial stent placement which was there for one year. This was complicated by stent migration resulted in small bowel obstruction. This was unreachable for removal by endoscopic intervention therefor patient underwent laparoscopic removal of the stent by Dr. Lima. Upon further review of follow up CT scan after this he was found to have 2 very large very long double-pigtail 7 [place] plastic stents that had been placed transduodenal he around the pancreas and the inner pigtail digging into the splenic vein and not apparently draining any part of the pancreatic duct or any significant collection. Patient underwent an ERCP on 5/18/2023 with removal of those stents and performed a pancreatogram which confirmed CT and clinical suspicion that he has completely disconnected pancreatic duct. Patient continued to experience abdominal pain even after removal of the stents and underwent open distal pancreactectomy with splenectomy, lysis of adhesions, resection of proximal illeum on 6/30/2023. 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     Today the patient reports feeling well overall. Mentions that the abdominal pain he experienced prior to the surgery is resolved. He now only has chronic LLQ pain which is intermittent and radiates to the back. He is currently seeing spine surgery and should be having an MRI of the spine for further evaluation. Additionally, in 2/2024 he had a cardiac pacemaker 2/2 complete AV block. His only concern today is scheduling a colonoscopy  and regular follow up with GI.       Patient had a colonoscopy in 2021 for screening purposes and had polyps some which were advanced adenomas. He had one polyp at the appendiceal orifice which he reports was removed in [place] during an appendectomy.       RECOMMENDATIONS:    - Schedule colonoscopy with Anaesthesia, likely MAC to be done in the hospital 3rd floor due to recent pacemaker.   - OK to follow up with Dr. Saldana clinic moving on for his GI care.    - Continue follow up with Oncology for care of the neuroendocrine tumor.     Discussed with GI staff, Dr. Saldana.     Joanna Tellez MD   PGY4- GI Fellow     Seen and examined with GI fellow, agree with findings and recommendations.  45 minutes, more than 50% counseling, present for entire visit  Doing amazingly well after herculean series of interventions here by mutliple services.   Does need colonoscopy under MAC at [facility], will arrange  Cedric Saldana MD GI Staff   ________________________________________________________________  HPI:  69 year old male with PMHx significant for CAD s/p CABG, AV block s/p pacemaker in 2/2024, diabetes, parkinson disease and pancreatitis complicated by walled of necrosis and stent migration resulting in small bowel obstruction requiring laparoscopic removal along with distal pancreatectomy and newly diagnosed NET of the small bowel who presents to clinic for follow up.     Today the patient  reports feeling well overall. Mentions that the abdominal pain he experienced prior to the surgery is resolved. He now only has chronic LLQ pain which is intermittent and radiates to the back. He is currently seeing spine surgery and should be having an MRI of the spine for further evaluation. Additionally, in 2/2024 he had a cardiac pacemaker 2/2 complete AV block. His only concern today is scheduling a colonoscopy  and regular follow up with GI.     PMHx:  Past Medical History:   Diagnosis Date     Acute pancreatitis 04/18/2022    Formatting of this note might be different from the original. Formatting of this note might be different from the original. Added automatically from request for surgery [id number] Formatting of this note might be different from the original. Added automatically from request for surgery [id number]  Formatting of this note might be different from the original. Added automatically from request for surgery      CAD (coronary artery disease)     CABG     Cholecystitis, acute 07/05/2023     Diabetes mellitus, type 2 (H) 2013     Family history of parkinsonism 02/14/2024     Gastroesophageal reflux disease      Hypercholesteremia      Hypertension      Mixed conductive and sensorineural hearing loss of both ears      Mixed hearing loss      Pancreatic duct stricture      Pancreatitis      Parkinson disease (H)      Parkinson's disease, unspecified whether dyskinesia present, unspecified whether manifestations fluctuate (H) 02/14/2024     Seborrheic dermatitis 03/22/2024     Second degree AV block      Surgical site infection 07/26/2023     Patient Active Problem List   Diagnosis     Retained foreign body     Wound infection     Acute bilateral low back pain without sciatica     Adenomatous polyp of colon     Atherosclerotic heart disease of native coronary artery without angina pectoris     Cancer of skin of scrotum (H)     Cervicalgia     Gastroesophageal reflux disease without esophagitis      History of cholecystectomy     History of coronary artery bypass surgery     Hyperlipidemia, unspecified     Hypertension     Impaired gait     Long term (current) use of aspirin     Mixed conductive and sensorineural hearing loss     Muscle weakness (generalized)     Need for assistance with personal care     Other hydrocele     Parkinson's disease with dyskinesia, unspecified whether manifestations fluctuate (H)     Physical deconditioning     Pseudocyst of pancreas     Sensorineural hearing loss (SNHL) of right ear with unrestricted hearing of left ear     Type 2 diabetes mellitus with hyperglycemia, with long-term current use of insulin (H)     Neuroendocrine carcinoma of small bowel (H)     Failure to thrive in adult     Hyponatremia     Chronic right-sided low back pain without sciatica     Abdominal pain, generalized     Other secondary neuroendocrine tumors (H)     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)     Weakness     Second degree AV block     History of Parkinson's disease     Orthostatic hypotension     Hypotension     Bradycardia     Parkinson's disease, unspecified whether dyskinesia present, unspecified whether manifestations fluctuate (H)     Family history of parkinsonism     Cardiac pacemaker in situ     Seborrheic dermatitis     Lumbar spondylosis       PSurgHx:  Past Surgical History:   Procedure Laterality Date     CA ANESTH CABG W/PUMP       CHOLECYSTECTOMY  2022     COLONOSCOPY N/A 01/12/2023    Procedure: colonoscopy with fluroscopy;  Surgeon: Ayden Fraire MD;  Location:  OR     ENDOSCOPIC RETROGRADE CHOLANGIOPANCREATOGRAM N/A 05/18/2023    Procedure: ENDOSCOPIC RETROGRADE CHOLANGIOPANCREATOGRAPHY, WITH  CYSTDUODENOSTOMY STENTS X2 REMOVAL;  Surgeon: Cedric Saldana MD;  Location: [facility] OR     ENT SURGERY       EP PACEMAKER DEVICE & LEAD IMPLANT- RIGHT ATRIAL & RIGHT VENTRICULAR N/A 02/28/2024    Procedure: Pacemaker Device & Lead Implant - Right Atrial &  Right Ventricular;  Surgeon: Jenna Hernandez MD;  Location:  HEART CARDIAC CATH LAB     LAPAROSCOPY DIAGNOSTIC (GENERAL) N/A 02/20/2023    Procedure: LAPAROSCOPY, DIAGNOSTIC; RETRIEVAL OF MIGRATED STENT;  Surgeon: Joseluis Lima MD;  Location: [facility] OR     ORTHOPEDIC SURGERY       OTHER SURGICAL HISTORY      skin cancer ? from scrotum     PANCREATECTOMY PEUSTOW N/A 06/30/2023    Procedure: Open distal pancreactectomy with splenectomy, lysis of adhesions, resection of proximal illeum;  Surgeon: Ashvin Haider MD;  Location: [facility] OR     OK CABG, ARTERIAL, TWO  2003       MEDS:  Current Outpatient Medications   Medication Sig Dispense Refill     aspirin (ASA) 81 MG EC tablet Take 81 mg by mouth daily       atorvastatin (LIPITOR) 40 MG tablet Take 1 tablet (40 mg) by mouth daily 90 tablet 3     BD PEN NEEDLE LINDY 2ND GEN 32G X 4 MM miscellaneous USE TO INJECT INSULIN 4 TIMES A  each 3     carbidopa-levodopa (SINEMET CR)  MG CR tablet Take 1 tablet by mouth at bedtime @9p 90 tablet 3     carbidopa-levodopa (SINEMET)  MG tablet 2.5@6a, 2@10a, 2@2p and 1@5p 675 tablet 3     Continuous Blood Gluc  (DEXCOM G7 ) ALIA Use to read blood sugars as per 's instructions. 1 each 0     Continuous Glucose Sensor (DEXCOM G7 SENSOR) MISC Change every 10 days. 9 each 11     dasiglucagon HCl (ZEGALOGUE) 0.6 MG/0.6ML SOAJ injection Inject 0.6 mg Subcutaneous once as needed (use for severe hypoglycemia) 1.2 mL 1     gabapentin (NEURONTIN) 300 MG capsule Take 2 capsules (600 mg) by mouth 3 times daily 540 capsule 3     Glucagon (BAQSIMI) 3 MG/DOSE nasal powder Spray 1 spray (3 mg) in nostril as needed (severe hypoglycemia) in the event of unconscious hypoglycemia or hypoglycemic seizure. May repeat dose if no response after 15 minutes. 2 each 1     insulin aspart (NOVOLOG FLEXPEN) 100 UNIT/ML pen Inject 6-20 Units subcutaneously 3 times daily as needed for high blood sugar (for meal  "coverage and correction scale, upto 60 units/day) Take 5 units with breakfast, 10 unit(s) with lunch and 12 unit(s) with supper 40 mL 3     insulin glargine 100 UNIT/ML pen Take 14 unit(s) AM and 30 unit(s) HS, and adjust as needed upto 50 unit(s) /day 50 mL 1     lipase-protease-amylase (ZENPEP) 97437-296261-771542 units CPEP Take 1 capsule by mouth 3 times daily (with meals). 90 capsule 11     losartan (COZAAR) 50 MG tablet Take 1 tablet (50 mg) by mouth daily 90 tablet 3     magnesium oxide (MAG-OX) 400 MG tablet Take 400 mg by mouth every other day       Multiple Vitamin (MULTI-VITAMINS) TABS Take 1 tablet by mouth daily       omeprazole (PRILOSEC) 20 MG DR capsule Take 1 capsule (20 mg) by mouth daily 90 capsule 3     traZODone (DESYREL) 50 MG tablet Take 0.5-1 tablets (25-50 mg) by mouth at bedtime 30 tablet 3     No current facility-administered medications for this visit.     ALLERGIES:    Allergies   Allergen Reactions     Ciprofloxacin Other (See Comments), Hives, Itching, Rash and Unknown     Other reaction(s): Other (see comments)  Oral swelling per [facility]         Dilaudid [Hydromorphone] Rash     Morphine Rash     rash     Penicillins Rash     aka ancef/ rash       Citalopram Unknown     Clindamycin Itching and Rash     Oxycodone Rash     \"HORRIBLE, SEVERE RASH MAYBE CAUSED BY OXY\"     FHx:  Family History   Problem Relation Age of Onset     Parkinsonism Mother 65         at 75 years     Other - See Comments Mother         Polish ?AJ     Psychosis Mother         hallucinations from medications     Other - See Comments Father      Other - See Comments Sister      Other - See Comments Sister      Lung Cancer Brother         smoker -  47 years     Other - See Comments Daughter         3 kids - 2 girls and boy     Heart Disease Son         orthostatic hypotension     Other - See Comments Son         2 daughters     Autism Spectrum Disorder Grandson      Diabetes No family hx of        SOCIAL " Hx:  Social History     Socioeconomic History     Marital status:      Spouse name: Not on file     Number of children: Not on file     Years of education: Not on file     Highest education level: Not on file   Occupational History     Not on file   Tobacco Use     Smoking status: Former     Types: Cigarettes     Smokeless tobacco: Never     Tobacco comments:     Quit 10 years ago - quit a few times; started at age 12 or 13 and then stopped at 26 years and then stopped for 15 years got  and started smoking again   Vaping Use     Vaping status: Never Used   Substance and Sexual Activity     Alcohol use: Not Currently     Comment: quit 2 years ago     Drug use: Not Currently     Comment: used to smoke marijuana when young; rare use     Sexual activity: Not on file   Other Topics Concern     Not on file   Social History Narrative     Not on file     Social Determinants of Health     Financial Resource Strain: Low Risk  (8/8/2024)    Financial Resource Strain      Within the past 12 months, have you or your family members you live with been unable to get utilities (heat, electricity) when it was really needed?: No   Food Insecurity: Low Risk  (8/8/2024)    Food Insecurity      Within the past 12 months, did you worry that your food would run out before you got money to buy more?: No      Within the past 12 months, did the food you bought just not last and you didn t have money to get more?: No   Transportation Needs: Low Risk  (8/8/2024)    Transportation Needs      Within the past 12 months, has lack of transportation kept you from medical appointments, getting your medicines, non-medical meetings or appointments, work, or from getting things that you need?: No   Physical Activity: Insufficiently Active (8/6/2022)    Received from [facility]    Exercise Vital Sign      Days of Exercise per Week: 2 days      Minutes of Exercise per Session: 10 min   Stress: No Stress Concern Present (8/6/2022)     "Received from [facility]    [place] [employer] - Occupational Stress Questionnaire      Feeling of Stress : Not at all   Social Connections: Moderately Isolated (8/6/2022)    Received from [facility]    Social Connection and Isolation Panel [NHANES]      Frequency of Communication with Friends and Family: Three times a week      Frequency of Social Gatherings with Friends and Family: Once a week      Attends Scientologist Services: Never      Active Member of Clubs or Organizations: No      Attends Club or Organization Meetings: Never      Marital Status:    Interpersonal Safety: Not At Risk (8/6/2022)    Received from [facility]    Humiliation, Afraid, Rape, and Kick questionnaire      Fear of Current or Ex-Partner: No      Emotionally Abused: No      Physically Abused: No      Sexually Abused: No   Housing Stability: Low Risk  (8/8/2024)    Housing Stability      Do you have housing? : Yes      Are you worried about losing your housing?: No       ROS: A comprehensive Review of Systems was asked and answered in the negative unless specifically commented upon in the HPI    Physical Exam  BP 99/57   Pulse 82   Ht 1.727 m (5' 8\")   Wt 102.4 kg (225 lb 11.2 oz)   SpO2 98%   BMI 34.32 kg/m    Body mass index is 34.32 kg/m .  Gen: A&Ox3, NAD  HEENT: Moist mucus membranes, no scleral icterus.  Lungs: no respiratory distress  Abd: soft, non-tender, non-distended.  No guarding/rigidity/rebound.  Skin: no jaundice, no stigmata of chronic liver disease  Ext: warm, dry, no evidence of edema    LABS:  Ancillary Procedure on 09/05/2024   Component Date Value Ref Range Status     Date Time Interrogation Session 09/05/2024 20,240,905,061,450   Final     Implantable Pulse Generator Manufa* 09/05/2024 Medtronic   Final     Implantable Pulse Generator Model 09/05/2024 [id number] Marie XT DR MIRELES   Final     Implantable Pulse Generator Serial* 09/05/2024 [id number]   Final     Type Interrogation Session " 09/05/2024 Remote Scheduled   Final     Clinic Name 09/05/2024 [facility]   Final     Implantable Pulse Generator Type 09/05/2024 Pacemaker   Final     Implantable Pulse Generator Implan* 09/05/2024 20,240,228   Final     Implantable Lead  09/05/2024 Medtronic   Final     Implantable Lead Model 09/05/2024 [id number] CapSureFix Novus MRI SureScan   Final     Implantable Lead Serial Number 09/05/2024 [id number]   Final     Implantable Lead Implant Date 09/05/2024 20,240,228   Final     Implantable Lead Polarity Type 09/05/2024 Bipolar Lead   Final     Implantable Lead Location Detail 1 09/05/2024 APPENDAGE   Final     Implantable Lead Special Function 09/05/2024 52 cm   Final     Implantable Lead Location 09/05/2024 Right Atrium   Final     Implantable Lead Connection Status 09/05/2024 Connected   Final     Implantable Lead  09/05/2024 Medtronic   Final     Implantable Lead Model 09/05/2024 5076 CapSureFix Novus MRI SureScan   Final     Implantable Lead Serial Number 09/05/2024 [id number]   Final     Implantable Lead Implant Date 09/05/2024 [id number]   Final     Implantable Lead Polarity Type 09/05/2024 Bipolar Lead   Final     Implantable Lead Location Detail 1 09/05/2024 SEPTUM   Final     Implantable Lead Special Function 09/05/2024 58 cm   Final     Implantable Lead Location 09/05/2024 Right Ventricle   Final     Implantable Lead Connection Status 09/05/2024 Connected   Final     Rodger Setting Mode (NBG Code) 09/05/2024 DDDR   Final     Rodger Setting Lower Rate Limit 09/05/2024 60  [beats]/min Final     Rodger Setting Maximum Tracking Rate 09/05/2024 130  [beats]/min Final     Rodger Setting Maximum Sensor Rate 09/05/2024 130  [beats]/min Final     Rodger Setting Hysterisis Rate 09/05/2024 DISABLED   Final     Rodger Setting JAYME Delay Low 09/05/2024 220  ms Final     Rodger Setting PAV Delay Low 09/05/2024 240  ms Final     Rodger Setting PAV Delay High 09/05/2024 140  ms Final      Rodger Setting JAYME Delay High 09/05/2024 110  ms Final     Rodger Setting AT Mode Switch Rate 09/05/2024 171  [beats]/min Final     Lead Channel Setting Sensing Polar* 09/05/2024 Bipolar   Final     Lead Channel Setting Sensing Anode* 09/05/2024 Right Atrium   Final     Lead Channel Setting Sensing Anode* 09/05/2024 Ring   Final     Lead Channel Setting Sensing Catho* 09/05/2024 Right Atrium   Final     Lead Channel Setting Sensing Catho* 09/05/2024 Tip   Final     Lead Channel Setting Sensing Sensi* 09/05/2024 0.3  mV Final     Lead Channel Setting Sensing Polar* 09/05/2024 Bipolar   Final     Lead Channel Setting Sensing Anode* 09/05/2024 Right Ventricle   Final     Lead Channel Setting Sensing Anode* 09/05/2024 Ring   Final     Lead Channel Setting Sensing Catho* 09/05/2024 Right Ventricle   Final     Lead Channel Setting Sensing Catho* 09/05/2024 Tip   Final     Lead Channel Setting Sensing Sensi* 09/05/2024 0.9  mV Final     Lead Channel Setting Pacing Polari* 09/05/2024 Bipolar   Final     Lead Channel Setting Pacing Anode * 09/05/2024 Right Atrium   Final     Lead Channel Setting Pacing Anode * 09/05/2024 Ring   Final     Lead Channel Setting Sensing Catho* 09/05/2024 Right Atrium   Final     Lead Channel Setting Sensing Catho* 09/05/2024 Tip   Final     Lead Channel Setting Pacing Pulse * 09/05/2024 0.4  ms Final     Lead Channel Setting Pacing Amplit* 09/05/2024 1.5  V Final     Lead Channel Setting Pacing Captur* 09/05/2024 Adaptive   Final     Lead Channel Setting Pacing Polari* 09/05/2024 Bipolar   Final     Lead Channel Setting Pacing Anode * 09/05/2024 Right Ventricle   Final     Lead Channel Setting Pacing Anode * 09/05/2024 Ring   Final     Lead Channel Setting Sensing Catho* 09/05/2024 Right Ventricle   Final     Lead Channel Setting Sensing Catho* 09/05/2024 Tip   Final     Lead Channel Setting Pacing Pulse * 09/05/2024 0.4  ms Final     Lead Channel Setting Pacing Amplit* 09/05/2024 2  V Final      Lead Channel Setting Pacing Captur* 09/05/2024 Adaptive   Final     Zone Setting Type Category 09/05/2024 VF   Final     Zone Setting Vendor Type Category 09/05/2024 V High Rate   Final     Zone Setting Type Category 09/05/2024 VT   Final     Zone Setting Vendor Type Category 09/05/2024 FastVT   Final     Zone Setting Type Category 09/05/2024 VT   Final     Zone Setting Vendor Type Category 09/05/2024 VT   Final     Zone Setting Type Category 09/05/2024 VT   Final     Zone Setting Vendor Type Category 09/05/2024 MonVT   Final     Zone Setting Status 09/05/2024 Monitor   Final     Zone Setting Detection Interval 09/05/2024 400  ms Final     Zone Setting Type Category 09/05/2024 ATRIAL_FIBRILLATION   Final     Zone Setting Vendor Type Category 09/05/2024 FastATAF   Final     Zone Setting Type Category 09/05/2024 AT/AF   Final     Zone Setting Status 09/05/2024 Monitor   Final     Zone Setting Detection Interval 09/05/2024 350  ms Final     Lead Channel Impedance Value 09/05/2024 399  ohm Final     Lead Channel Impedance Value 09/05/2024 323  ohm Final     Lead Channel Sensing Intrinsic Amp* 09/05/2024 4  mV Final     Lead Channel Sensing Intrinsic Amp* 09/05/2024 4  mV Final     Lead Channel Pacing Threshold Ampl* 09/05/2024 0.625  V Final     Lead Channel Pacing Threshold Puls* 09/05/2024 0.4  ms Final     Lead Channel Impedance Value 09/05/2024 475  ohm Final     Lead Channel Impedance Value 09/05/2024 399  ohm Final     Lead Channel Sensing Intrinsic Amp* 09/05/2024 18.375  mV Final     Lead Channel Sensing Intrinsic Amp* 09/05/2024 18.375  mV Final     Lead Channel Pacing Threshold Ampl* 09/05/2024 0.625  V Final     Lead Channel Pacing Threshold Puls* 09/05/2024 0.4  ms Final     Battery Date Time of Measurements 09/05/2024 20,240,905,051,502   Final     Battery Status 09/05/2024 OK   Final     Battery RRT Trigger 09/05/2024 2.625   Final     Battery Remaining Longevity 09/05/2024 144  mo Final     Battery  Voltage 09/05/2024 3.14  V Final     Rodger Statistic Date Time Start 09/05/2024 20,240,805,133,851   Final     Rodger Statistic Date Time End 09/05/2024 20,240,905,061,450   Final     Rodger Statistic RA Percent Paced 09/05/2024 11.19  % Final     Rodger Statistic RV Percent Paced 09/05/2024 99.92  % Final     Rodger Statistic AP  Percent 09/05/2024 11.18  % Final     Rodger Statistic AS  Percent 09/05/2024 88.74  % Final     Rodger Statistic AP VS Percent 09/05/2024 0.01  % Final     Rodger Statistic AS VS Percent 09/05/2024 0.07  % Final     Atrial Tachy Statistic Date Time S* 09/05/2024 20,240,805,133,851   Final     Atrial Tachy Statistic Date Time E* 09/05/2024 20,240,905,061,450   Final     Atrial Tachy Statistic AT/AF Burde* 09/05/2024 0  % Final     Therapy Statistic Recent Date Time* 09/05/2024 20,240,805,133,851   Final     Therapy Statistic Recent Date Time* 09/05/2024 20,240,905,061,450   Final     Therapy Statistic Total  Date Time* 09/05/2024 20,240,228,101,305   Final     Therapy Statistic Total  Date Time* 09/05/2024 20,240,905,061,450   Final     Episode Statistic Recent Count 09/05/2024 0   Final     Episode Statistic Type Category 09/05/2024 AT/AF   Final     Episode Statistic Recent Count 09/05/2024 0   Final     Episode Statistic Type Category 09/05/2024 Patient Activated   Final     Episode Statistic Recent Count 09/05/2024 0   Final     Episode Statistic Type Category 09/05/2024 SVT   Final     Episode Statistic Recent Count 09/05/2024 0   Final     Episode Statistic Type Category 09/05/2024 VT   Final     Episode Statistic Recent Count 09/05/2024 0   Final     Episode Statistic Type Category 09/05/2024 VT   Final     Episode Statistic Recent Date Time* 09/05/2024 20,240,805,133,851   Final     Episode Statistic Recent Date Time* 09/05/2024 20,240,905,061,450   Final     Episode Statistic Recent Date Time* 09/05/2024 27220898443943   Final     Episode Statistic Recent Date Time* 09/05/2024  [id number]   Final     Episode Statistic Recent Date Time* 09/05/2024 20240805133851   Final     Episode Statistic Recent Date Time* 09/05/2024 20240905061450   Final     Episode Statistic Recent Date Time* 09/05/2024 20240805133851   Final     Episode Statistic Recent Date Time* 09/05/2024 20240905061450   Final     Episode Statistic Recent Date Time* 09/05/2024 20240805133851   Final     Episode Statistic Recent Date Time* 09/05/2024 20240905061450   Final     Episode Statistic Total Count 09/05/2024 0   Final     Episode Statistic Type Category 09/05/2024 AT/AF   Final     Episode Statistic Total Count 09/05/2024 0   Final     Episode Statistic Type Category 09/05/2024 Patient Activated   Final     Episode Statistic Total Count 09/05/2024 0   Final     Episode Statistic Type Category 09/05/2024 SVT   Final     Episode Statistic Total Count 09/05/2024 0   Final     Episode Statistic Type Category 09/05/2024 VT   Final     Episode Statistic Total Count 09/05/2024 0   Final     Episode Statistic Type Category 09/05/2024 VT   Final     Episode Statistic Total Date Time * 09/05/2024 20,240,228,101,305   Final     Episode Statistic Total Date Time * 09/05/2024 20,240,905,061,450   Final     Episode Statistic Total Date Time * 09/05/2024 [id number]   Final     Episode Statistic Total Date Time * 09/05/2024 [id number]   Final     Episode Statistic Total Date Time * 09/05/2024 [id number]   Final     Episode Statistic Total Date Time * 09/05/2024 [id number]   Final     Episode Statistic Total Date Time * 09/05/2024 [id number]   Final     Episode Statistic Total Date Time * 09/05/2024 20240905061450   Final     Episode Statistic Total Date Time * 09/05/2024 [id number]   Final     Episode Statistic Total Date Time * 09/05/2024 [id number]   Final   Again, thank you for allowing me to participate in the care of your patient.        Sincerely,        Cedric Saldana MD

## 2024-10-16 NOTE — TELEPHONE ENCOUNTER
BD PEN NEEDLE LINDY 2ND GEN 32G X 4 MM miscellaneous   Last Written Prescription Date:  9/7/23  Last Fill Quantity: 400,   # refills: 3  Last Office Visit : 10/9/24 SHANNAN

## 2024-10-21 ENCOUNTER — MYC REFILL (OUTPATIENT)
Dept: ENDOCRINOLOGY | Facility: CLINIC | Age: 69
End: 2024-10-21
Payer: MEDICARE

## 2024-10-21 ENCOUNTER — MYC REFILL (OUTPATIENT)
Dept: NEUROLOGY | Facility: CLINIC | Age: 69
End: 2024-10-21
Payer: MEDICARE

## 2024-10-21 DIAGNOSIS — E11.65 TYPE 2 DIABETES MELLITUS WITH HYPERGLYCEMIA, WITH LONG-TERM CURRENT USE OF INSULIN (H): ICD-10-CM

## 2024-10-21 DIAGNOSIS — G47.09 OTHER INSOMNIA: ICD-10-CM

## 2024-10-21 DIAGNOSIS — Z79.4 TYPE 2 DIABETES MELLITUS WITH HYPERGLYCEMIA, WITH LONG-TERM CURRENT USE OF INSULIN (H): ICD-10-CM

## 2024-10-21 RX ORDER — TRAZODONE HYDROCHLORIDE 50 MG/1
25-50 TABLET, FILM COATED ORAL AT BEDTIME
Qty: 30 TABLET | Refills: 3 | Status: SHIPPED | OUTPATIENT
Start: 2024-10-21

## 2024-10-21 RX ORDER — ACYCLOVIR 400 MG/1
TABLET ORAL
Qty: 9 EACH | Refills: 11 | Status: CANCELLED | OUTPATIENT
Start: 2024-10-21

## 2024-10-21 NOTE — TELEPHONE ENCOUNTER
RX Authorization    Medication: traZODone (DESYREL) 50 MG tablet     Date last refill ordered: 6/11/24    Quantity ordered: 30    # refills: 3    Date of last clinic visit with ordering provider: 8/23/24 (Dr. Armenta)    Date of next clinic visit with ordering provider: 11/25/24

## 2024-10-23 NOTE — TELEPHONE ENCOUNTER
Continuous Glucose Sensor (DEXCOM G7 SENSOR) MISC: refills on   - Rx sent 8/7/2024 Disp:9 R:11  [facility] T:[phone]   - message sent to patient

## 2024-10-24 DIAGNOSIS — K85.91 NECROTIZING PANCREATITIS: ICD-10-CM

## 2024-10-24 RX ORDER — PANCRELIPASE LIPASE, PANCRELIPASE PROTEASE, PANCRELIPASE AMYLASE 40000; 126000; 168000 [USP'U]/1; [USP'U]/1; [USP'U]/1
1 CAPSULE, DELAYED RELEASE ORAL
Qty: 90 CAPSULE | Refills: 11 | Status: SHIPPED | OUTPATIENT
Start: 2024-10-24

## 2024-10-25 ENCOUNTER — TELEPHONE (OUTPATIENT)
Dept: ENDOCRINOLOGY | Facility: CLINIC | Age: 69
End: 2024-10-25
Payer: MEDICARE

## 2024-10-25 NOTE — TELEPHONE ENCOUNTER
Left Voicemail (1st Attempt) for the patient to call back and schedule the following:    Appointment type: return diabetes   Provider: Patricia   Return date: CANCEL appt on 2/11/25   Specialty phone number: [phone]  Additional appointment(s) needed: Labs at pt's convenience   Additonal Notes: LVM, MyC x1   Check Out Comments from visit on 10/9/24:   1. Please cancel appt with me on 2/11/2025.  2. Lab ordered.     Diann Whittaker on 10/25/2024 at 1:08 PM

## 2024-10-30 ENCOUNTER — THERAPY VISIT (OUTPATIENT)
Dept: PHYSICAL THERAPY | Facility: REHABILITATION | Age: 69
End: 2024-10-30
Payer: MEDICARE

## 2024-10-30 DIAGNOSIS — G20.B1 PARKINSON'S DISEASE WITH DYSKINESIA WITHOUT FLUCTUATING MANIFESTATIONS (H): ICD-10-CM

## 2024-10-30 DIAGNOSIS — R25.8 BRADYKINESIA: ICD-10-CM

## 2024-10-30 DIAGNOSIS — R26.81 GAIT INSTABILITY: ICD-10-CM

## 2024-10-30 DIAGNOSIS — R26.89 IMPAIRMENT OF BALANCE: ICD-10-CM

## 2024-10-30 DIAGNOSIS — M62.81 GENERALIZED MUSCLE WEAKNESS: ICD-10-CM

## 2024-10-30 DIAGNOSIS — G20.A1 PARKINSON'S DISEASE, UNSPECIFIED WHETHER DYSKINESIA PRESENT, UNSPECIFIED WHETHER MANIFESTATIONS FLUCTUATE (H): Primary | ICD-10-CM

## 2024-10-30 PROCEDURE — 97112 NEUROMUSCULAR REEDUCATION: CPT | Mod: GP | Performed by: PHYSICAL THERAPY ASSISTANT

## 2024-10-30 PROCEDURE — 97110 THERAPEUTIC EXERCISES: CPT | Mod: GP | Performed by: PHYSICAL THERAPY ASSISTANT

## 2024-11-01 ENCOUNTER — TELEPHONE (OUTPATIENT)
Dept: NEUROPSYCHOLOGY | Facility: CLINIC | Age: 69
End: 2024-11-01

## 2024-11-01 ENCOUNTER — LAB (OUTPATIENT)
Dept: LAB | Facility: CLINIC | Age: 69
End: 2024-11-01
Payer: MEDICARE

## 2024-11-01 DIAGNOSIS — Z79.4 TYPE 2 DIABETES MELLITUS WITH HYPERGLYCEMIA, WITH LONG-TERM CURRENT USE OF INSULIN (H): ICD-10-CM

## 2024-11-01 DIAGNOSIS — E11.65 TYPE 2 DIABETES MELLITUS WITH HYPERGLYCEMIA, WITH LONG-TERM CURRENT USE OF INSULIN (H): ICD-10-CM

## 2024-11-01 LAB
CHOLEST SERPL-MCNC: 78 MG/DL
EST. AVERAGE GLUCOSE BLD GHB EST-MCNC: 194 MG/DL
FASTING STATUS PATIENT QL REPORTED: YES
HBA1C MFR BLD: 8.4 % (ref 0–5.6)
HDLC SERPL-MCNC: 38 MG/DL
LDLC SERPL CALC-MCNC: 29 MG/DL
NONHDLC SERPL-MCNC: 40 MG/DL
TRIGL SERPL-MCNC: 54 MG/DL

## 2024-11-01 PROCEDURE — 80061 LIPID PANEL: CPT

## 2024-11-01 PROCEDURE — 36415 COLL VENOUS BLD VENIPUNCTURE: CPT

## 2024-11-01 PROCEDURE — 83036 HEMOGLOBIN GLYCOSYLATED A1C: CPT

## 2024-11-01 NOTE — TELEPHONE ENCOUNTER
Left Voicemail (1st Attempt) and Sent Mychart (1st Attempt) for the patient to call back and reschedule the following:    Schedule per protocol    Timeline: First Available  Location: [facility] NEUROPSYCHOLOGY,  NEUROPSYCHOLOGY, or [facility] NEUROLOGY  Preferred Provider: Brian, Wade, or Lulú  Visit Type: NEUROPSYCH EVAL or Neuropsych Interview Only (testing scheduled shortly after interview)  Special Instructions:   Provider not in clinic for 12/31 appointment.    Rescheduling first available.      Heidy Chanel on 11/1/2024 at 4:19 PM

## 2024-11-13 ENCOUNTER — ANCILLARY PROCEDURE (OUTPATIENT)
Dept: CARDIOLOGY | Facility: CLINIC | Age: 69
End: 2024-11-13
Attending: INTERNAL MEDICINE
Payer: MEDICARE

## 2024-11-13 DIAGNOSIS — I45.5 SINUS PAUSE: ICD-10-CM

## 2024-11-13 DIAGNOSIS — Z95.0 CARDIAC PACEMAKER IN SITU: ICD-10-CM

## 2024-11-13 DIAGNOSIS — I44.1 HEART BLOCK AV SECOND DEGREE: ICD-10-CM

## 2024-11-13 PROCEDURE — 99207 CARDIAC DEVICE CHECK - REMOTE: CPT | Performed by: INTERNAL MEDICINE

## 2024-11-14 LAB
MDC_IDC_LEAD_CONNECTION_STATUS: NORMAL
MDC_IDC_LEAD_CONNECTION_STATUS: NORMAL
MDC_IDC_LEAD_IMPLANT_DT: NORMAL
MDC_IDC_LEAD_IMPLANT_DT: NORMAL
MDC_IDC_LEAD_LOCATION: NORMAL
MDC_IDC_LEAD_LOCATION: NORMAL
MDC_IDC_LEAD_LOCATION_DETAIL_1: NORMAL
MDC_IDC_LEAD_LOCATION_DETAIL_1: NORMAL
MDC_IDC_LEAD_MFG: NORMAL
MDC_IDC_LEAD_MFG: NORMAL
MDC_IDC_LEAD_MODEL: NORMAL
MDC_IDC_LEAD_MODEL: NORMAL
MDC_IDC_LEAD_POLARITY_TYPE: NORMAL
MDC_IDC_LEAD_POLARITY_TYPE: NORMAL
MDC_IDC_LEAD_SERIAL: NORMAL
MDC_IDC_LEAD_SERIAL: NORMAL
MDC_IDC_LEAD_SPECIAL_FUNCTION: NORMAL
MDC_IDC_LEAD_SPECIAL_FUNCTION: NORMAL
MDC_IDC_MSMT_BATTERY_DTM: NORMAL
MDC_IDC_MSMT_BATTERY_REMAINING_LONGEVITY: 142 MO
MDC_IDC_MSMT_BATTERY_RRT_TRIGGER: 2.62
MDC_IDC_MSMT_BATTERY_STATUS: NORMAL
MDC_IDC_MSMT_BATTERY_VOLTAGE: 3.1 V
MDC_IDC_MSMT_LEADCHNL_RA_IMPEDANCE_VALUE: 323 OHM
MDC_IDC_MSMT_LEADCHNL_RA_IMPEDANCE_VALUE: 399 OHM
MDC_IDC_MSMT_LEADCHNL_RA_PACING_THRESHOLD_AMPLITUDE: 0.5 V
MDC_IDC_MSMT_LEADCHNL_RA_PACING_THRESHOLD_PULSEWIDTH: 0.4 MS
MDC_IDC_MSMT_LEADCHNL_RA_SENSING_INTR_AMPL: 3.88 MV
MDC_IDC_MSMT_LEADCHNL_RA_SENSING_INTR_AMPL: 3.88 MV
MDC_IDC_MSMT_LEADCHNL_RV_IMPEDANCE_VALUE: 418 OHM
MDC_IDC_MSMT_LEADCHNL_RV_IMPEDANCE_VALUE: 475 OHM
MDC_IDC_MSMT_LEADCHNL_RV_PACING_THRESHOLD_AMPLITUDE: 0.62 V
MDC_IDC_MSMT_LEADCHNL_RV_PACING_THRESHOLD_PULSEWIDTH: 0.4 MS
MDC_IDC_MSMT_LEADCHNL_RV_SENSING_INTR_AMPL: 17 MV
MDC_IDC_MSMT_LEADCHNL_RV_SENSING_INTR_AMPL: 17 MV
MDC_IDC_PG_IMPLANT_DTM: NORMAL
MDC_IDC_PG_MFG: NORMAL
MDC_IDC_PG_MODEL: NORMAL
MDC_IDC_PG_SERIAL: NORMAL
MDC_IDC_PG_TYPE: NORMAL
MDC_IDC_SESS_CLINIC_NAME: NORMAL
MDC_IDC_SESS_DTM: NORMAL
MDC_IDC_SESS_TYPE: NORMAL
MDC_IDC_SET_BRADY_AT_MODE_SWITCH_RATE: 171 {BEATS}/MIN
MDC_IDC_SET_BRADY_HYSTRATE: NORMAL
MDC_IDC_SET_BRADY_LOWRATE: 60 {BEATS}/MIN
MDC_IDC_SET_BRADY_MAX_SENSOR_RATE: 130 {BEATS}/MIN
MDC_IDC_SET_BRADY_MAX_TRACKING_RATE: 130 {BEATS}/MIN
MDC_IDC_SET_BRADY_MODE: NORMAL
MDC_IDC_SET_BRADY_PAV_DELAY_HIGH: 140 MS
MDC_IDC_SET_BRADY_PAV_DELAY_LOW: 240 MS
MDC_IDC_SET_BRADY_SAV_DELAY_HIGH: 110 MS
MDC_IDC_SET_BRADY_SAV_DELAY_LOW: 220 MS
MDC_IDC_SET_LEADCHNL_RA_PACING_AMPLITUDE: 1.5 V
MDC_IDC_SET_LEADCHNL_RA_PACING_ANODE_ELECTRODE_1: NORMAL
MDC_IDC_SET_LEADCHNL_RA_PACING_ANODE_LOCATION_1: NORMAL
MDC_IDC_SET_LEADCHNL_RA_PACING_CAPTURE_MODE: NORMAL
MDC_IDC_SET_LEADCHNL_RA_PACING_CATHODE_ELECTRODE_1: NORMAL
MDC_IDC_SET_LEADCHNL_RA_PACING_CATHODE_LOCATION_1: NORMAL
MDC_IDC_SET_LEADCHNL_RA_PACING_POLARITY: NORMAL
MDC_IDC_SET_LEADCHNL_RA_PACING_PULSEWIDTH: 0.4 MS
MDC_IDC_SET_LEADCHNL_RA_SENSING_ANODE_ELECTRODE_1: NORMAL
MDC_IDC_SET_LEADCHNL_RA_SENSING_ANODE_LOCATION_1: NORMAL
MDC_IDC_SET_LEADCHNL_RA_SENSING_CATHODE_ELECTRODE_1: NORMAL
MDC_IDC_SET_LEADCHNL_RA_SENSING_CATHODE_LOCATION_1: NORMAL
MDC_IDC_SET_LEADCHNL_RA_SENSING_POLARITY: NORMAL
MDC_IDC_SET_LEADCHNL_RA_SENSING_SENSITIVITY: 0.3 MV
MDC_IDC_SET_LEADCHNL_RV_PACING_AMPLITUDE: 2 V
MDC_IDC_SET_LEADCHNL_RV_PACING_ANODE_ELECTRODE_1: NORMAL
MDC_IDC_SET_LEADCHNL_RV_PACING_ANODE_LOCATION_1: NORMAL
MDC_IDC_SET_LEADCHNL_RV_PACING_CAPTURE_MODE: NORMAL
MDC_IDC_SET_LEADCHNL_RV_PACING_CATHODE_ELECTRODE_1: NORMAL
MDC_IDC_SET_LEADCHNL_RV_PACING_CATHODE_LOCATION_1: NORMAL
MDC_IDC_SET_LEADCHNL_RV_PACING_POLARITY: NORMAL
MDC_IDC_SET_LEADCHNL_RV_PACING_PULSEWIDTH: 0.4 MS
MDC_IDC_SET_LEADCHNL_RV_SENSING_ANODE_ELECTRODE_1: NORMAL
MDC_IDC_SET_LEADCHNL_RV_SENSING_ANODE_LOCATION_1: NORMAL
MDC_IDC_SET_LEADCHNL_RV_SENSING_CATHODE_ELECTRODE_1: NORMAL
MDC_IDC_SET_LEADCHNL_RV_SENSING_CATHODE_LOCATION_1: NORMAL
MDC_IDC_SET_LEADCHNL_RV_SENSING_POLARITY: NORMAL
MDC_IDC_SET_LEADCHNL_RV_SENSING_SENSITIVITY: 0.9 MV
MDC_IDC_SET_ZONE_DETECTION_INTERVAL: 350 MS
MDC_IDC_SET_ZONE_DETECTION_INTERVAL: 400 MS
MDC_IDC_SET_ZONE_STATUS: NORMAL
MDC_IDC_SET_ZONE_STATUS: NORMAL
MDC_IDC_SET_ZONE_TYPE: NORMAL
MDC_IDC_SET_ZONE_VENDOR_TYPE: NORMAL
MDC_IDC_STAT_AT_BURDEN_PERCENT: 0 %
MDC_IDC_STAT_AT_DTM_END: NORMAL
MDC_IDC_STAT_AT_DTM_START: NORMAL
MDC_IDC_STAT_BRADY_AP_VP_PERCENT: 6.51 %
MDC_IDC_STAT_BRADY_AP_VS_PERCENT: 0 %
MDC_IDC_STAT_BRADY_AS_VP_PERCENT: 93.39 %
MDC_IDC_STAT_BRADY_AS_VS_PERCENT: 0.1 %
MDC_IDC_STAT_BRADY_DTM_END: NORMAL
MDC_IDC_STAT_BRADY_DTM_START: NORMAL
MDC_IDC_STAT_BRADY_RA_PERCENT_PACED: 6.51 %
MDC_IDC_STAT_BRADY_RV_PERCENT_PACED: 99.9 %
MDC_IDC_STAT_EPISODE_RECENT_COUNT: 0
MDC_IDC_STAT_EPISODE_RECENT_COUNT_DTM_END: NORMAL
MDC_IDC_STAT_EPISODE_RECENT_COUNT_DTM_START: NORMAL
MDC_IDC_STAT_EPISODE_TOTAL_COUNT: 0
MDC_IDC_STAT_EPISODE_TOTAL_COUNT_DTM_END: NORMAL
MDC_IDC_STAT_EPISODE_TOTAL_COUNT_DTM_START: NORMAL
MDC_IDC_STAT_EPISODE_TYPE: NORMAL
MDC_IDC_STAT_TACHYTHERAPY_RECENT_DTM_END: NORMAL
MDC_IDC_STAT_TACHYTHERAPY_RECENT_DTM_START: NORMAL
MDC_IDC_STAT_TACHYTHERAPY_TOTAL_DTM_END: NORMAL
MDC_IDC_STAT_TACHYTHERAPY_TOTAL_DTM_START: NORMAL

## 2024-11-21 ENCOUNTER — NURSE TRIAGE (OUTPATIENT)
Dept: FAMILY MEDICINE | Facility: CLINIC | Age: 69
End: 2024-11-21
Payer: MEDICARE

## 2024-11-21 NOTE — TELEPHONE ENCOUNTER
"Nurse Triage SBAR    Is this a 2nd Level Triage? YES, LICENSED PRACTITIONER REVIEW IS REQUIRED    Situation: Dizziness and upper and lower extremity \"heaviness\"    Background: Patient has a history of parkinsons, hypertension, CAD, DM2 and has a pacemaker     Assessment: Patient experiences chronic lightheadedness/weakness, but this weak his symptoms have began last longer than usual. He typically experiences his symptoms for an hour in the morning and an hour in the afternoon. Now his symptoms last from the morning all the way through the afternoon. Symptoms include lightheadedness and bilateral lower and upper extremity \"heaviness\". He is not experiencing any other new neurological symptoms. He feels that his symptoms usually begin an hour after he takes his carbidopa-levodopa. BPs lately are 115-110/90-60. Patient has not started any new medications recently.    Protocol Recommended Disposition:   See in Office Within 2 Weeks    Recommendation: Patient has a virtual visit with neurologist on Monday 11/25. Please advise on appropriate disposition. Patient advised to call us if symptoms change or worsen.     Routed to provider    Does the patient meet one of the following criteria for ADS visit consideration? 16+ years old, with an [facility] PCP     TIP  Providers, please consider if this condition is appropriate for management at one of our Acute and Diagnostic Services sites.     If patient is a good candidate, please use dotphrase <dot>triageresponse and select Refer to ADS to document.   "

## 2024-11-21 NOTE — TELEPHONE ENCOUNTER
Reason for Disposition    Dizziness not present now, but is a chronic symptom (recurrent or ongoing AND lasting > 4 weeks)    Protocols used: Dizziness-A-OH

## 2024-11-21 NOTE — TELEPHONE ENCOUNTER
Provider Response to 2nd Level Triage Request    I have reviewed the RN documentation. My recommendation is: Suspicious symptoms are related to parkinsons, agree with neurology on Monday. Wondering if neurology would like to see patient in person rather than virtual? If abrupt change, would recommend in person evaluation at urgent care.    I have only met patient once. Complicated health history. Have asked a few times to schedule wellness visit. Please help coordinate that appointment.    Amelia Johnson, DO

## 2024-11-21 NOTE — TELEPHONE ENCOUNTER
Reason for Call:  Appointment Request    Patient requesting this type of appt:  wife for Pat    Requested provider: Amelia Johnson    Reason patient unable to be scheduled: Not within requested timeframe    When does patient want to be seen/preferred time: 1-2 days or sameday    Comments: pt has been light headed lately - wanting to get in asap and do blood work    Could we send this information to you in [facility] or would you prefer to receive a phone call?:   Patient would prefer a phone call   Okay to leave a detailed message?: Yes at Home number on file [phone]     Call taken on 11/21/2024 at 9:54 AM by Corine Olmos

## 2024-11-21 NOTE — TELEPHONE ENCOUNTER
Called and spoke with patient and patient's wife. Advise that they contact neurologist regarding symptoms. They will call us back if they have any difficulties. Also scheduled patient for wellness exam in December.

## 2024-11-26 ENCOUNTER — OFFICE VISIT (OUTPATIENT)
Dept: NEUROLOGY | Facility: CLINIC | Age: 69
End: 2024-11-26
Payer: MEDICARE

## 2024-11-26 VITALS
WEIGHT: 225.5 LBS | HEART RATE: 84 BPM | OXYGEN SATURATION: 96 % | DIASTOLIC BLOOD PRESSURE: 63 MMHG | SYSTOLIC BLOOD PRESSURE: 93 MMHG | RESPIRATION RATE: 16 BRPM | BODY MASS INDEX: 34.29 KG/M2

## 2024-11-26 DIAGNOSIS — Z71.1 CONCERN ABOUT MEMORY: ICD-10-CM

## 2024-11-26 DIAGNOSIS — G20.B1 PARKINSON'S DISEASE WITH DYSKINESIA WITHOUT FLUCTUATING MANIFESTATIONS (H): Primary | ICD-10-CM

## 2024-11-26 DIAGNOSIS — R42 LIGHTHEADEDNESS: ICD-10-CM

## 2024-11-26 ASSESSMENT — PAIN SCALES - GENERAL: PAINLEVEL_OUTOF10: NO PAIN (0)

## 2024-11-26 ASSESSMENT — MOVEMENT DISORDERS SOCIETY - UNIFIED PARKINSONS DISEASE RATING SCALE (MDS-UPDRS)
SALIVA_AND_DROOLING: (1) SLIGHT: I HAVE TOO MUCH SALIVA, BUT DO NOT DROOL.
EATING_TASKS: (0) NORMAL: NOT AT ALL (NO PROBLEMS).
GETTING_OUT_OF_BED_CAR_DEEP_CHAIR: (1) SLIGHT: I AM SLOW OR AWKWARD, BUT I USUALLY CAN DO IT ON MY FIRST TRY.
FREEZING: (1) SLIGHTLY: I BRIEFLY FREEZE, BUT I CAN EASILY START WALKING AGAIN. I DO NOT NEED HELP FROM SOMEONE ELSE OR A WALKING AID (CANE OR WALKER) BECAUSE OF FREEZING.
TREMOR: (1) SLIGHT: SHAKING OR TREMOR OCCURS BUT DOES NOT CAUSE PROBLEMS WITH ANY ACTIVITIES.
DRESSING: (1) SLIGHT: I AM SLOW, BUT I DO NOT NEED HELP.
HOBBIES_AND_OTHER_ACTIVITIES: (1) SLIGHT: I AM A BIT SLOW BUT DO THESE ACTIVITIES EASILY.
SPEECH: (0) NORMAL: NOT AT ALL (NO PROBLEMS).
CHEWING_AND_SWALLOWING: (0) NORMAL: NO PROBLEMS.
WALKING_AND_BALANCE: (0) NORMAL: NOT AT ALL (NO PROBLEMS).
HANDWRITING: (0) NORMAL: NOT AT ALL (NO PROBLEMS).
TOTAL_SCORE: 8
HYGIENE: (1) SLIGHT: I AM SLOW, BUT I DO NOT NEED ANY HELP.
TURNING_IN_BED: (1) SLIGHT: I HAVE A BIT OF TROUBLE TURNING, BUT I DO NOT NEED ANY HELP.

## 2024-11-26 NOTE — NURSING NOTE
Chief Complaint   Patient presents with    RECHECK     BP 93/63 (BP Location: Right arm, Patient Position: Sitting, Cuff Size: Adult Large)   Pulse 84   Resp 16   Wt 102.3 kg (225 lb 8 oz)   SpO2 96%   BMI 34.29 kg/m        BRY PELLETIER

## 2024-11-26 NOTE — PATIENT INSTRUCTIONS
Dear . Arnaldo Henderson,    Thank you for coming today.  During your visit, we have discussed the following:     __  Stay on the same antiparkinsonian medications: -     PD Medications 6 am 10 am 2 pm 5 pm HS/9 pm   Sinemet 25/100 mg  2.5 2 2 1    Sinemet 50/200 mg CR     1     __  See your cardiologist and see if the lightheadedness could be addressed. With Parkinson's diseased, this will be on ongoing issue.     __  Work with PT to work on balance.     __  Once we get a Neuropsychological Evaluation with Dr. Jim, we can discuss if memory medication is indicated.    __  If you need to take Sinemet in the middle of the night, okay to take 1 tab.     __ Continue using the socks that has helped the leg tightness, and feet pain.     __  See Dr. Armenta in 3 months for a follow up. You may return sooner.    __ Cancel your appointment with me on Dec 10th.        For questions, you may send us a [employer] message or call [phone]    Fax number: [phone]      Candida Jackson DNP, APRN  [facility]

## 2024-11-26 NOTE — LETTER
2024       RE: Arnaldo Henderson  [address] [address]     Dear Colleague,    Thank you for referring your patient, Arnaldo Henderson, to the [facility] NEUROLOGY CLINIC [place] at [facility]. Please see a copy of my visit note below.      ASSESSMENT:    Parkinson's Disease:      Lightheadedness:      Memory Concern:       PLAN:    __  Questions addressed and the following plan provided: -     __  Stay on the same antiparkinsonian medications: -     PD Medications 6 am 10 am 2 pm 5 pm HS/9 pm   Sinemet 25/100 mg  2.5 2 2 1    Sinemet 50/200 mg CR     1     __  See your cardiologist and see if the lightheadedness could be addressed. With Parkinson's diseased, this will be on ongoing issue.     __  Work with PT to work on balance.     __  Once we get a Neuropsychological Evaluation with Dr. Jim, we can discuss if memory medication is indicated.    __  If you need to take Sinemet in the middle of the night, okay to take 1 tab.     __ Continue using the socks that has helped the leg tightness, and feet pain.     __  See Dr. Armenta in 3 months for a follow up. You may return sooner.    __ Cancel your appointment with me on Dec 10th.             MOVEMENT DISORDERS CLINIC  [facility] LOCATION         PATIENT: Arnaldo Henderson    : 1955    ELISA: 2024    REASON FOR VISIT: Parkinson's disease (PD) follow up.    HPI: Mr. Arnaldo Henderson is a 69 year old who came to the [facility] neurology clinic accompanied by his wife, Veronika, for a follow up visit.     Pertinent PD Hx: He was diagnosed with PD about 5-6 years ago. His symptoms started with Rt hand tremors. Primary PD symptoms are gait difficulty, tremors in Rt hand (mainly when trying to fall asleep) and cognitive changes. Had seen Dr. Resendez in the past.     I saw pt in 2024. Since then, he had seen Dr. Armenta on 2024. At that visit, morirekha Carbidopa/Levodopa (CD/LD)  "dose was increased from 2 tabs to 2.5 tabs. He has also seen Dr. Harrell, Pharm.D. for Medication Therapy Management (MTM) on 10/03/2024.    Two weeks ago, he had a lot of lightheadedness.  His wife called the device company to ensure that his pacemaker was working fine. He has an appointment with cardiologist on Dec 5th. This morning, he had an episode that he felt weak, and felt head was \"numb and heavy.\"     He reports since his BP med was changed from HS to AM, and he is feels better.     His wife reports noticing shuffling issues. He had an episode of \"severe\" freezing episode. Pt reports when he freezes in the walk-in closet he backs up as he can't go forward. He has done PT.  No falls. He reports having stutter steps around dinner time.     He is wearing a socks, which has helped his hamstring muscle tightness when seating and nighttime leg restlessness. \"I don't know how it's helping, but it is.\"    Pt asked if he could take PRN Sinemet in the middle of the night to help with sleep/leg symptoms as Dr. Resendez used to okay this.     PD Medications 6 am 10 am 2 pm 5 pm HS/9 pm   Sinemet 25/100 mg  2.5 2 2 1    Sinemet 50/200 mg CR     1       He has chronic back issues. Sometimes it affects his gait.     Wife reports that he is having memory issues. Pt denied any memory issues. He has a Neuropsychological Evaluation with Dr. Jim. He also has an appointment with Dr. Renteria.     MEDICATIONS:   Current Outpatient Medications   Medication Sig Dispense Refill     aspirin (ASA) 81 MG EC tablet Take 81 mg by mouth daily       atorvastatin (LIPITOR) 40 MG tablet Take 1 tablet (40 mg) by mouth daily 90 tablet 3     BD PEN NEEDLE LINDY 2ND GEN 32G X 4 MM miscellaneous USE TO INJECT INSULIN 4 TIMES A  each 3     carbidopa-levodopa (SINEMET CR)  MG CR tablet Take 1 tablet by mouth at bedtime @9p 90 tablet 3     carbidopa-levodopa (SINEMET)  MG tablet 2.5@6a, 2@10a, 2@2p and 1@5p 675 tablet 3     " Continuous Blood Gluc  (DEXCOM G7 ) ALIA Use to read blood sugars as per 's instructions. 1 each 0     Continuous Glucose Sensor (DEXCOM G7 SENSOR) MISC Change every 10 days. 9 each 11     dasiglucagon HCl (ZEGALOGUE) 0.6 MG/0.6ML SOAJ injection Inject 0.6 mg Subcutaneous once as needed (use for severe hypoglycemia) 1.2 mL 1     gabapentin (NEURONTIN) 300 MG capsule Take 2 capsules (600 mg) by mouth 3 times daily 540 capsule 3     Glucagon (BAQSIMI) 3 MG/DOSE nasal powder Spray 1 spray (3 mg) in nostril as needed (severe hypoglycemia) in the event of unconscious hypoglycemia or hypoglycemic seizure. May repeat dose if no response after 15 minutes. 2 each 1     insulin aspart (NOVOLOG FLEXPEN) 100 UNIT/ML pen Inject 6-20 Units subcutaneously 3 times daily as needed for high blood sugar (for meal coverage and correction scale, upto 60 units/day) Take 5 units with breakfast, 10 unit(s) with lunch and 12 unit(s) with supper 40 mL 3     insulin glargine 100 UNIT/ML pen Take 14 unit(s) AM and 30 unit(s) HS, and adjust as needed upto 50 unit(s) /day 50 mL 1     lipase-protease-amylase (ZENPEP) 16253-217069-218254 units CPEP Take 1 capsule by mouth 3 times daily (with meals). 90 capsule 11     losartan (COZAAR) 50 MG tablet Take 1 tablet (50 mg) by mouth daily 90 tablet 3     magnesium oxide (MAG-OX) 400 MG tablet Take 400 mg by mouth every other day       Multiple Vitamin (MULTI-VITAMINS) TABS Take 1 tablet by mouth daily       omeprazole (PRILOSEC) 20 MG DR capsule Take 1 capsule (20 mg) by mouth daily 90 capsule 3     traZODone (DESYREL) 50 MG tablet Take 0.5-1 tablets (25-50 mg) by mouth at bedtime. 30 tablet 3     No current facility-administered medications for this visit.       PHYSICAL EXAM:    VITAL SIGNS:  Blood pressure 93/63, pulse 84, resp. rate 16, weight 102.3 kg (225 lb 8 oz), SpO2 96%.. Body mass index is 34.29 kg/m .    GENERAL:  Mr. Henderson is a pleasant  patient who is  well-groomed, well-developed, and overweight sitting comfortably in the exam room without any distress.  Affect is appropriate.    MOVEMENT DISORDERS ASSESSMENT:   Speech: Mild.  Facial Expression: Mild.  Rest Tremor: Normal.  Arising from chair - arms folded across chest: Normal.  Posture: Slight.  Gait: Slight.  Freezing of gait:  Normal.   Body Bradykinesia: Slight.    Today I spent 45 minutes caring for the patient. Time was spent with reviewing records, obtaining history, answering questions, examining,  and documentation.    Candida Jackson DNP, APRN  [facility]    The longitudinal plan of care for the diagnosis(es)/condition(s) as documented were addressed during this visit. Due to the added complexity in care, I will continue to support Pat in the subsequent management and with ongoing continuity of care.      Again, thank you for allowing me to participate in the care of your patient.      Sincerely,    KRYSTYNA Goins CNP

## 2024-11-26 NOTE — PROGRESS NOTES
ASSESSMENT:    Parkinson's Disease:      Lightheadedness:      Memory Concern:       PLAN:    __  Questions addressed and the following plan provided: -     __  Stay on the same antiparkinsonian medications: -     PD Medications 6 am 10 am 2 pm 5 pm HS/9 pm   Sinemet 25/100 mg  2.5 2 2 1    Sinemet 50/200 mg CR     1     __  See your cardiologist and see if the lightheadedness could be addressed. With Parkinson's diseased, this will be on ongoing issue.     __  Work with PT to work on balance.     __  Once we get a Neuropsychological Evaluation with Dr. Jim, we can discuss if memory medication is indicated.    __  If you need to take Sinemet in the middle of the night, okay to take 1 tab.     __ Continue using the socks that has helped the leg tightness, and feet pain.     __  See Dr. Armenta in 3 months for a follow up. You may return sooner.    __ Cancel your appointment with me on Dec 10th.             MOVEMENT DISORDERS CLINIC  [facility] LOCATION         PATIENT: Arnaldo Henderson    : 1955    ELISA: 2024    REASON FOR VISIT: Parkinson's disease (PD) follow up.    HPI: Mr. Arnaldo Henderson is a 69 year old who came to the [facility] neurology clinic accompanied by his wife, Veronika, for a follow up visit.     Pertinent PD Hx: He was diagnosed with PD about 5-6 years ago. His symptoms started with Rt hand tremors. Primary PD symptoms are gait difficulty, tremors in Rt hand (mainly when trying to fall asleep) and cognitive changes. Had seen Dr. Resendez in the past.     I saw pt in 2024. Since then, he had seen Dr. Armenta on 2024. At that visit, moring Carbidopa/Levodopa (CD/LD) dose was increased from 2 tabs to 2.5 tabs. He has also seen Dr. Harrell, Pharm.D. for Medication Therapy Management (MTM) on 10/03/2024.    Two weeks ago, he had a lot of lightheadedness.  His wife called the device company to ensure that his pacemaker was working fine. He has an appointment with cardiologist  "on Dec 5th. This morning, he had an episode that he felt weak, and felt head was \"numb and heavy.\"     He reports since his BP med was changed from HS to AM, and he is feels better.     His wife reports noticing shuffling issues. He had an episode of \"severe\" freezing episode. Pt reports when he freezes in the walk-in closet he backs up as he can't go forward. He has done PT.  No falls. He reports having stutter steps around dinner time.     He is wearing a socks, which has helped his hamstring muscle tightness when seating and nighttime leg restlessness. \"I don't know how it's helping, but it is.\"    Pt asked if he could take PRN Sinemet in the middle of the night to help with sleep/leg symptoms as Dr. Resendez used to okay this.     PD Medications 6 am 10 am 2 pm 5 pm HS/9 pm   Sinemet 25/100 mg  2.5 2 2 1    Sinemet 50/200 mg CR     1       He has chronic back issues. Sometimes it affects his gait.     Wife reports that he is having memory issues. Pt denied any memory issues. He has a Neuropsychological Evaluation with Dr. Jim. He also has an appointment with Dr. Renteria.     MEDICATIONS:   Current Outpatient Medications   Medication Sig Dispense Refill    aspirin (ASA) 81 MG EC tablet Take 81 mg by mouth daily      atorvastatin (LIPITOR) 40 MG tablet Take 1 tablet (40 mg) by mouth daily 90 tablet 3    BD PEN NEEDLE LINDY 2ND GEN 32G X 4 MM miscellaneous USE TO INJECT INSULIN 4 TIMES A  each 3    carbidopa-levodopa (SINEMET CR)  MG CR tablet Take 1 tablet by mouth at bedtime @9p 90 tablet 3    carbidopa-levodopa (SINEMET)  MG tablet 2.5@6a, 2@10a, 2@2p and 1@5p 675 tablet 3    Continuous Blood Gluc  (DEXCOM G7 ) ALIA Use to read blood sugars as per 's instructions. 1 each 0    Continuous Glucose Sensor (DEXCOM G7 SENSOR) MISC Change every 10 days. 9 each 11    dasiglucagon HCl (ZEGALOGUE) 0.6 MG/0.6ML SOAJ injection Inject 0.6 mg Subcutaneous once as needed (use " for severe hypoglycemia) 1.2 mL 1    gabapentin (NEURONTIN) 300 MG capsule Take 2 capsules (600 mg) by mouth 3 times daily 540 capsule 3    Glucagon (BAQSIMI) 3 MG/DOSE nasal powder Spray 1 spray (3 mg) in nostril as needed (severe hypoglycemia) in the event of unconscious hypoglycemia or hypoglycemic seizure. May repeat dose if no response after 15 minutes. 2 each 1    insulin aspart (NOVOLOG FLEXPEN) 100 UNIT/ML pen Inject 6-20 Units subcutaneously 3 times daily as needed for high blood sugar (for meal coverage and correction scale, upto 60 units/day) Take 5 units with breakfast, 10 unit(s) with lunch and 12 unit(s) with supper 40 mL 3    insulin glargine 100 UNIT/ML pen Take 14 unit(s) AM and 30 unit(s) HS, and adjust as needed upto 50 unit(s) /day 50 mL 1    lipase-protease-amylase (ZENPEP) 75269-621386-025114 units CPEP Take 1 capsule by mouth 3 times daily (with meals). 90 capsule 11    losartan (COZAAR) 50 MG tablet Take 1 tablet (50 mg) by mouth daily 90 tablet 3    magnesium oxide (MAG-OX) 400 MG tablet Take 400 mg by mouth every other day      Multiple Vitamin (MULTI-VITAMINS) TABS Take 1 tablet by mouth daily      omeprazole (PRILOSEC) 20 MG DR capsule Take 1 capsule (20 mg) by mouth daily 90 capsule 3    traZODone (DESYREL) 50 MG tablet Take 0.5-1 tablets (25-50 mg) by mouth at bedtime. 30 tablet 3     No current facility-administered medications for this visit.       PHYSICAL EXAM:    VITAL SIGNS:  Blood pressure 93/63, pulse 84, resp. rate 16, weight 102.3 kg (225 lb 8 oz), SpO2 96%.. Body mass index is 34.29 kg/m .    GENERAL:  Mr. Henderson is a pleasant  patient who is well-groomed, well-developed, and overweight sitting comfortably in the exam room without any distress.  Affect is appropriate.    MOVEMENT DISORDERS ASSESSMENT:   Speech: Mild.  Facial Expression: Mild.  Rest Tremor: Normal.  Arising from chair - arms folded across chest: Normal.  Posture: Slight.  Gait: Slight.  Freezing of  gait:  Normal.   Body Bradykinesia: Slight.    Today I spent 45 minutes caring for the patient. Time was spent with reviewing records, obtaining history, answering questions, examining,  and documentation.    Candida Jackson DNP, APRN  [facility]    The longitudinal plan of care for the diagnosis(es)/condition(s) as documented were addressed during this visit. Due to the added complexity in care, I will continue to support Pat in the subsequent management and with ongoing continuity of care.

## 2024-11-27 ENCOUNTER — ANCILLARY PROCEDURE (OUTPATIENT)
Dept: CARDIOLOGY | Facility: CLINIC | Age: 69
End: 2024-11-27
Attending: INTERNAL MEDICINE
Payer: MEDICARE

## 2024-11-27 ENCOUNTER — HOSPITAL ENCOUNTER (OUTPATIENT)
Dept: MRI IMAGING | Facility: CLINIC | Age: 69
Discharge: HOME | End: 2024-11-27
Attending: STUDENT IN AN ORGANIZED HEALTH CARE EDUCATION/TRAINING PROGRAM
Payer: MEDICARE

## 2024-11-27 DIAGNOSIS — Z45.02 ENCOUNTER FOR ADJUSTMENT AND MANAGEMENT OF AUTOMATIC IMPLANTABLE CARDIAC DEFIBRILLATOR: ICD-10-CM

## 2024-11-27 DIAGNOSIS — M47.24 OSTEOARTHRITIS OF SPINE WITH RADICULOPATHY, THORACIC REGION: ICD-10-CM

## 2024-11-27 DIAGNOSIS — I44.1 HEART BLOCK AV SECOND DEGREE: Primary | ICD-10-CM

## 2024-11-27 DIAGNOSIS — I44.1 HEART BLOCK AV SECOND DEGREE: ICD-10-CM

## 2024-11-27 DIAGNOSIS — S22.000S COMPRESSION FRACTURE OF THORACIC VERTEBRA, UNSPECIFIED THORACIC VERTEBRAL LEVEL, SEQUELA: ICD-10-CM

## 2024-11-27 LAB
MDC_IDC_LEAD_CONNECTION_STATUS: NORMAL
MDC_IDC_LEAD_IMPLANT_DT: NORMAL
MDC_IDC_LEAD_LOCATION: NORMAL
MDC_IDC_LEAD_LOCATION_DETAIL_1: NORMAL
MDC_IDC_LEAD_MFG: NORMAL
MDC_IDC_LEAD_MODEL: NORMAL
MDC_IDC_LEAD_POLARITY_TYPE: NORMAL
MDC_IDC_LEAD_SERIAL: NORMAL
MDC_IDC_LEAD_SPECIAL_FUNCTION: NORMAL
MDC_IDC_MSMT_BATTERY_DTM: NORMAL
MDC_IDC_MSMT_BATTERY_DTM: NORMAL
MDC_IDC_MSMT_BATTERY_REMAINING_LONGEVITY: 140 MO
MDC_IDC_MSMT_BATTERY_REMAINING_LONGEVITY: 141 MO
MDC_IDC_MSMT_BATTERY_RRT_TRIGGER: 2.62
MDC_IDC_MSMT_BATTERY_RRT_TRIGGER: 2.62
MDC_IDC_MSMT_BATTERY_STATUS: NORMAL
MDC_IDC_MSMT_BATTERY_STATUS: NORMAL
MDC_IDC_MSMT_BATTERY_VOLTAGE: 3.09 V
MDC_IDC_MSMT_BATTERY_VOLTAGE: 3.09 V
MDC_IDC_MSMT_LEADCHNL_RA_IMPEDANCE_VALUE: 323 OHM
MDC_IDC_MSMT_LEADCHNL_RA_IMPEDANCE_VALUE: 342 OHM
MDC_IDC_MSMT_LEADCHNL_RA_IMPEDANCE_VALUE: 399 OHM
MDC_IDC_MSMT_LEADCHNL_RA_IMPEDANCE_VALUE: 418 OHM
MDC_IDC_MSMT_LEADCHNL_RA_PACING_THRESHOLD_AMPLITUDE: 0.5 V
MDC_IDC_MSMT_LEADCHNL_RA_PACING_THRESHOLD_AMPLITUDE: 0.75 V
MDC_IDC_MSMT_LEADCHNL_RA_PACING_THRESHOLD_PULSEWIDTH: 0.4 MS
MDC_IDC_MSMT_LEADCHNL_RA_PACING_THRESHOLD_PULSEWIDTH: 0.4 MS
MDC_IDC_MSMT_LEADCHNL_RA_SENSING_INTR_AMPL: 2.5 MV
MDC_IDC_MSMT_LEADCHNL_RA_SENSING_INTR_AMPL: 3.12 MV
MDC_IDC_MSMT_LEADCHNL_RV_IMPEDANCE_VALUE: 418 OHM
MDC_IDC_MSMT_LEADCHNL_RV_IMPEDANCE_VALUE: 418 OHM
MDC_IDC_MSMT_LEADCHNL_RV_IMPEDANCE_VALUE: 456 OHM
MDC_IDC_MSMT_LEADCHNL_RV_IMPEDANCE_VALUE: 475 OHM
MDC_IDC_MSMT_LEADCHNL_RV_PACING_THRESHOLD_AMPLITUDE: 0.75 V
MDC_IDC_MSMT_LEADCHNL_RV_PACING_THRESHOLD_AMPLITUDE: 0.75 V
MDC_IDC_MSMT_LEADCHNL_RV_PACING_THRESHOLD_PULSEWIDTH: 0.4 MS
MDC_IDC_MSMT_LEADCHNL_RV_PACING_THRESHOLD_PULSEWIDTH: 0.4 MS
MDC_IDC_MSMT_LEADCHNL_RV_SENSING_INTR_AMPL: 18 MV
MDC_IDC_PG_IMPLANT_DTM: NORMAL
MDC_IDC_PG_IMPLANT_DTM: NORMAL
MDC_IDC_PG_MFG: NORMAL
MDC_IDC_PG_MFG: NORMAL
MDC_IDC_PG_MODEL: NORMAL
MDC_IDC_PG_MODEL: NORMAL
MDC_IDC_PG_SERIAL: NORMAL
MDC_IDC_PG_SERIAL: NORMAL
MDC_IDC_PG_TYPE: NORMAL
MDC_IDC_PG_TYPE: NORMAL
MDC_IDC_SESS_CLINIC_NAME: NORMAL
MDC_IDC_SESS_CLINIC_NAME: NORMAL
MDC_IDC_SESS_DTM: NORMAL
MDC_IDC_SESS_DTM: NORMAL
MDC_IDC_SESS_TYPE: NORMAL
MDC_IDC_SESS_TYPE: NORMAL
MDC_IDC_SET_BRADY_AT_MODE_SWITCH_RATE: 171 {BEATS}/MIN
MDC_IDC_SET_BRADY_AT_MODE_SWITCH_RATE: 171 {BEATS}/MIN
MDC_IDC_SET_BRADY_HYSTRATE: NORMAL
MDC_IDC_SET_BRADY_HYSTRATE: NORMAL
MDC_IDC_SET_BRADY_LOWRATE: 60 {BEATS}/MIN
MDC_IDC_SET_BRADY_LOWRATE: 60 {BEATS}/MIN
MDC_IDC_SET_BRADY_MAX_SENSOR_RATE: 130 {BEATS}/MIN
MDC_IDC_SET_BRADY_MAX_SENSOR_RATE: 130 {BEATS}/MIN
MDC_IDC_SET_BRADY_MAX_TRACKING_RATE: 130 {BEATS}/MIN
MDC_IDC_SET_BRADY_MAX_TRACKING_RATE: 130 {BEATS}/MIN
MDC_IDC_SET_BRADY_MODE: NORMAL
MDC_IDC_SET_BRADY_MODE: NORMAL
MDC_IDC_SET_BRADY_PAV_DELAY_HIGH: 140 MS
MDC_IDC_SET_BRADY_PAV_DELAY_HIGH: 140 MS
MDC_IDC_SET_BRADY_PAV_DELAY_LOW: 240 MS
MDC_IDC_SET_BRADY_PAV_DELAY_LOW: 240 MS
MDC_IDC_SET_BRADY_SAV_DELAY_HIGH: 110 MS
MDC_IDC_SET_BRADY_SAV_DELAY_HIGH: 110 MS
MDC_IDC_SET_BRADY_SAV_DELAY_LOW: 220 MS
MDC_IDC_SET_BRADY_SAV_DELAY_LOW: 220 MS
MDC_IDC_SET_LEADCHNL_RA_PACING_AMPLITUDE: 1.5 V
MDC_IDC_SET_LEADCHNL_RA_PACING_AMPLITUDE: 1.5 V
MDC_IDC_SET_LEADCHNL_RA_PACING_ANODE_ELECTRODE_1: NORMAL
MDC_IDC_SET_LEADCHNL_RA_PACING_ANODE_ELECTRODE_1: NORMAL
MDC_IDC_SET_LEADCHNL_RA_PACING_ANODE_LOCATION_1: NORMAL
MDC_IDC_SET_LEADCHNL_RA_PACING_ANODE_LOCATION_1: NORMAL
MDC_IDC_SET_LEADCHNL_RA_PACING_CAPTURE_MODE: NORMAL
MDC_IDC_SET_LEADCHNL_RA_PACING_CAPTURE_MODE: NORMAL
MDC_IDC_SET_LEADCHNL_RA_PACING_CATHODE_ELECTRODE_1: NORMAL
MDC_IDC_SET_LEADCHNL_RA_PACING_CATHODE_ELECTRODE_1: NORMAL
MDC_IDC_SET_LEADCHNL_RA_PACING_CATHODE_LOCATION_1: NORMAL
MDC_IDC_SET_LEADCHNL_RA_PACING_CATHODE_LOCATION_1: NORMAL
MDC_IDC_SET_LEADCHNL_RA_PACING_POLARITY: NORMAL
MDC_IDC_SET_LEADCHNL_RA_PACING_POLARITY: NORMAL
MDC_IDC_SET_LEADCHNL_RA_PACING_PULSEWIDTH: 0.4 MS
MDC_IDC_SET_LEADCHNL_RA_PACING_PULSEWIDTH: 0.4 MS
MDC_IDC_SET_LEADCHNL_RA_SENSING_ANODE_ELECTRODE_1: NORMAL
MDC_IDC_SET_LEADCHNL_RA_SENSING_ANODE_ELECTRODE_1: NORMAL
MDC_IDC_SET_LEADCHNL_RA_SENSING_ANODE_LOCATION_1: NORMAL
MDC_IDC_SET_LEADCHNL_RA_SENSING_ANODE_LOCATION_1: NORMAL
MDC_IDC_SET_LEADCHNL_RA_SENSING_CATHODE_ELECTRODE_1: NORMAL
MDC_IDC_SET_LEADCHNL_RA_SENSING_CATHODE_ELECTRODE_1: NORMAL
MDC_IDC_SET_LEADCHNL_RA_SENSING_CATHODE_LOCATION_1: NORMAL
MDC_IDC_SET_LEADCHNL_RA_SENSING_CATHODE_LOCATION_1: NORMAL
MDC_IDC_SET_LEADCHNL_RA_SENSING_POLARITY: NORMAL
MDC_IDC_SET_LEADCHNL_RA_SENSING_POLARITY: NORMAL
MDC_IDC_SET_LEADCHNL_RA_SENSING_SENSITIVITY: 0.3 MV
MDC_IDC_SET_LEADCHNL_RA_SENSING_SENSITIVITY: 0.3 MV
MDC_IDC_SET_LEADCHNL_RV_PACING_AMPLITUDE: 2 V
MDC_IDC_SET_LEADCHNL_RV_PACING_AMPLITUDE: 2 V
MDC_IDC_SET_LEADCHNL_RV_PACING_ANODE_ELECTRODE_1: NORMAL
MDC_IDC_SET_LEADCHNL_RV_PACING_ANODE_ELECTRODE_1: NORMAL
MDC_IDC_SET_LEADCHNL_RV_PACING_ANODE_LOCATION_1: NORMAL
MDC_IDC_SET_LEADCHNL_RV_PACING_ANODE_LOCATION_1: NORMAL
MDC_IDC_SET_LEADCHNL_RV_PACING_CAPTURE_MODE: NORMAL
MDC_IDC_SET_LEADCHNL_RV_PACING_CAPTURE_MODE: NORMAL
MDC_IDC_SET_LEADCHNL_RV_PACING_CATHODE_ELECTRODE_1: NORMAL
MDC_IDC_SET_LEADCHNL_RV_PACING_CATHODE_ELECTRODE_1: NORMAL
MDC_IDC_SET_LEADCHNL_RV_PACING_CATHODE_LOCATION_1: NORMAL
MDC_IDC_SET_LEADCHNL_RV_PACING_CATHODE_LOCATION_1: NORMAL
MDC_IDC_SET_LEADCHNL_RV_PACING_POLARITY: NORMAL
MDC_IDC_SET_LEADCHNL_RV_PACING_POLARITY: NORMAL
MDC_IDC_SET_LEADCHNL_RV_PACING_PULSEWIDTH: 0.4 MS
MDC_IDC_SET_LEADCHNL_RV_PACING_PULSEWIDTH: 0.4 MS
MDC_IDC_SET_LEADCHNL_RV_SENSING_ANODE_ELECTRODE_1: NORMAL
MDC_IDC_SET_LEADCHNL_RV_SENSING_ANODE_ELECTRODE_1: NORMAL
MDC_IDC_SET_LEADCHNL_RV_SENSING_ANODE_LOCATION_1: NORMAL
MDC_IDC_SET_LEADCHNL_RV_SENSING_ANODE_LOCATION_1: NORMAL
MDC_IDC_SET_LEADCHNL_RV_SENSING_CATHODE_ELECTRODE_1: NORMAL
MDC_IDC_SET_LEADCHNL_RV_SENSING_CATHODE_ELECTRODE_1: NORMAL
MDC_IDC_SET_LEADCHNL_RV_SENSING_CATHODE_LOCATION_1: NORMAL
MDC_IDC_SET_LEADCHNL_RV_SENSING_CATHODE_LOCATION_1: NORMAL
MDC_IDC_SET_LEADCHNL_RV_SENSING_POLARITY: NORMAL
MDC_IDC_SET_LEADCHNL_RV_SENSING_POLARITY: NORMAL
MDC_IDC_SET_LEADCHNL_RV_SENSING_SENSITIVITY: 0.9 MV
MDC_IDC_SET_LEADCHNL_RV_SENSING_SENSITIVITY: 0.9 MV
MDC_IDC_SET_ZONE_DETECTION_INTERVAL: 350 MS
MDC_IDC_SET_ZONE_DETECTION_INTERVAL: 350 MS
MDC_IDC_SET_ZONE_DETECTION_INTERVAL: 400 MS
MDC_IDC_SET_ZONE_DETECTION_INTERVAL: 400 MS
MDC_IDC_SET_ZONE_STATUS: NORMAL
MDC_IDC_SET_ZONE_TYPE: NORMAL
MDC_IDC_SET_ZONE_VENDOR_TYPE: NORMAL
MDC_IDC_STAT_AT_BURDEN_PERCENT: 0 %
MDC_IDC_STAT_AT_BURDEN_PERCENT: 0 %
MDC_IDC_STAT_AT_DTM_END: NORMAL
MDC_IDC_STAT_AT_DTM_END: NORMAL
MDC_IDC_STAT_AT_DTM_START: NORMAL
MDC_IDC_STAT_AT_DTM_START: NORMAL
MDC_IDC_STAT_BRADY_AP_VP_PERCENT: 8.91 %
MDC_IDC_STAT_BRADY_AP_VP_PERCENT: 8.91 %
MDC_IDC_STAT_BRADY_AP_VS_PERCENT: 0 %
MDC_IDC_STAT_BRADY_AP_VS_PERCENT: 0 %
MDC_IDC_STAT_BRADY_AS_VP_PERCENT: 91 %
MDC_IDC_STAT_BRADY_AS_VP_PERCENT: 91 %
MDC_IDC_STAT_BRADY_AS_VS_PERCENT: 0.08 %
MDC_IDC_STAT_BRADY_AS_VS_PERCENT: 0.08 %
MDC_IDC_STAT_BRADY_DTM_END: NORMAL
MDC_IDC_STAT_BRADY_DTM_END: NORMAL
MDC_IDC_STAT_BRADY_DTM_START: NORMAL
MDC_IDC_STAT_BRADY_DTM_START: NORMAL
MDC_IDC_STAT_BRADY_RA_PERCENT_PACED: 8.91 %
MDC_IDC_STAT_BRADY_RA_PERCENT_PACED: 8.91 %
MDC_IDC_STAT_BRADY_RV_PERCENT_PACED: 99.91 %
MDC_IDC_STAT_BRADY_RV_PERCENT_PACED: 99.91 %
MDC_IDC_STAT_EPISODE_RECENT_COUNT: 0
MDC_IDC_STAT_EPISODE_RECENT_COUNT_DTM_END: NORMAL
MDC_IDC_STAT_EPISODE_RECENT_COUNT_DTM_START: NORMAL
MDC_IDC_STAT_EPISODE_TOTAL_COUNT: 0
MDC_IDC_STAT_EPISODE_TOTAL_COUNT_DTM_END: NORMAL
MDC_IDC_STAT_EPISODE_TOTAL_COUNT_DTM_START: NORMAL
MDC_IDC_STAT_EPISODE_TYPE: NORMAL
MDC_IDC_STAT_TACHYTHERAPY_RECENT_DTM_END: NORMAL
MDC_IDC_STAT_TACHYTHERAPY_RECENT_DTM_END: NORMAL
MDC_IDC_STAT_TACHYTHERAPY_RECENT_DTM_START: NORMAL
MDC_IDC_STAT_TACHYTHERAPY_RECENT_DTM_START: NORMAL
MDC_IDC_STAT_TACHYTHERAPY_TOTAL_DTM_END: NORMAL
MDC_IDC_STAT_TACHYTHERAPY_TOTAL_DTM_END: NORMAL
MDC_IDC_STAT_TACHYTHERAPY_TOTAL_DTM_START: NORMAL
MDC_IDC_STAT_TACHYTHERAPY_TOTAL_DTM_START: NORMAL

## 2024-11-27 PROCEDURE — 93286 PERI-PX EVAL PM/LDLS PM IP: CPT

## 2024-11-27 PROCEDURE — 72158 MRI LUMBAR SPINE W/O & W/DYE: CPT | Mod: ME

## 2024-11-27 PROCEDURE — 255N000002 HC RX 255 OP 636: Performed by: STUDENT IN AN ORGANIZED HEALTH CARE EDUCATION/TRAINING PROGRAM

## 2024-11-27 PROCEDURE — A9585 GADOBUTROL INJECTION: HCPCS | Performed by: STUDENT IN AN ORGANIZED HEALTH CARE EDUCATION/TRAINING PROGRAM

## 2024-11-27 PROCEDURE — 72157 MRI CHEST SPINE W/O & W/DYE: CPT | Mod: MG

## 2024-11-27 PROCEDURE — 93286 PERI-PX EVAL PM/LDLS PM IP: CPT | Mod: 26 | Performed by: INTERNAL MEDICINE

## 2024-11-27 RX ORDER — GADOBUTROL 604.72 MG/ML
10 INJECTION INTRAVENOUS ONCE
Status: COMPLETED | OUTPATIENT
Start: 2024-11-27 | End: 2024-11-27

## 2024-11-27 RX ADMIN — GADOBUTROL 10 ML: 604.72 INJECTION INTRAVENOUS at 15:02

## 2024-12-02 ENCOUNTER — TELEPHONE (OUTPATIENT)
Dept: OPHTHALMOLOGY | Facility: CLINIC | Age: 69
End: 2024-12-02
Payer: MEDICARE

## 2024-12-02 ENCOUNTER — TELEPHONE (OUTPATIENT)
Dept: PHYSICAL MEDICINE AND REHAB | Facility: CLINIC | Age: 69
End: 2024-12-02
Payer: MEDICARE

## 2024-12-02 NOTE — TELEPHONE ENCOUNTER
----- Message from Gina Quentin sent at 12/2/2024 12:29 PM CST -----  Please call this patient let him know that I reviewed his MRI scans since Dr. Fong is out of the office.  This shows stable lower thoracic compression deformities.  There are no new fractures.  There is wear and tear to some cartilage disks and arthritis in the joints causing mild to moderate narrowing around nerves at several levels.  Patient should follow-up with Dr. Fong to review the results and discuss treatment options.

## 2024-12-02 NOTE — TELEPHONE ENCOUNTER
Call placed to patient with provider's results and recommendations.  Spoke with pt's spouse, Veronika, who is primary # on file and is on consent to communicate.   She stated understanding. Transferred to scheduling to make appt.

## 2024-12-04 NOTE — PROGRESS NOTES
ELECTROPHYSIOLOGY CLINIC VISIT    Assessment/Recommendations   Assessment/Plan:    Arnaldo Henderson is a 69 year old male with past medical history significant for chronic pancreatitis s/p distal pancreatectomy, type II DM, CAD s/p CABGx2 (2003), parkinson's disease and neuroendocrine tumor.     Presents to establish care with new cardiology provider and to discuss new/progressive BRIDGES. He has gained ~ 20 lb in last 6 months and admits to being less active 2/2 to parkinsons and weight gain. We dicussed that his BRIDGES may be due to weight gain/deconditioning, but given hx of CAD as well as high V pacing, we will proceed with CTA, echo, and update labs. We discussed referral to sleep medicine for suspected sleep apnea, however, Mr. Henderson declined and would prefer to proceed with other testing first.     Paroxysmal complete AV block s/p PPM  Underwent dual chamber PPM 2/28/24. Device check today with normal device function, stable lead trends, AP 6%,  99.9%, intrinsic rhythm 2:1 AVB. Previously reprogrammed to DDDR d/t high .    - Echo d/t high  percentages and reports of new SOB   - Continue routine device follow up     CAD s/p CABGx2 2003   Stable. BP well controlled. LDL at goal. Continue ASA 81 mg daily and lipitor 40 mg daily.    - CTA coronary angiogram     Follow up with general cardiology in ~ 6 weeks.  Follow up in 1 year with an echo prior.      History of Present Illness/Subjective    Mr. Arnaldo Henderson is a 69 year old male who comes in today for EP follow-up s/p PPM.    Patient also has history of AV Wenckebach noted during ERCP in May 2023. He was seen by EP at this time, he was asymptomatic, no pacemaker was indicated. Patient saw Dr Dahl in follow up on 1/16/24, ziopatch ordered due to prolonged [place] interval and history of AV Wenckebach. Patient was then admitted to [facility] from 2/4-2/7/2024 for weakness and fogginess, found to have orthostatic hypotension which he has had issues  "with from autonomic dysfunction due to Parkinson's. Cardiology was consulted for AV Wenckebach on telemetry with no symptoms or changes in BP, no pacemaker was recommended. Follow up zio monitor was worn from 1/16-1/30/2024, average HR 79 bpm, frequent episodes of AV Wenckebach. He had one 10.5 second occurrence of AVB with no escape on 1/24/24 at 4:42 pm and 3.4 seconds on 1/27/24 at 1:50 am. Symptom activations were associated to sinus with and without AVB. Dr Hernandez discussed results and PPM implant with patient and his wife. They were agreeable to proceeding, he underwent dual chamber PPM 2/28/24.    Today, Mr. Henderson reports progressive BRIDGES over the last 6 months. He has also gained ~ 20 lbs in this time. He admits to being less active, which he thinks is in part due to his Parkinsons, as well as chronic lower back pain. He has 15 steps in his home and is still able to do stairs easily and without SOB. He finds that he has to walk slower and if walking longer distances he will have to take several breaks, due to both BRIDGES and back pain. Denies any chest pain or pressure. No palpitations. Denies feeling lightheaded, dizzy, no syncope or near syncope. Does have LE edema, but says he's always had this. Denies orthopnea or PND. He recently had an MRI and says that following the test the tech asked him if he has sleep apnea. He has never been evaluated. Wife endorses \"funny\" and loud breathing at night.     Device interrogation shows normal device function, stable lead trends, AP 6%,  99.9%.     Cardiac Medications: ASA 81 mg daily, atorvastatin 40 mg daily, losartan 50 mg daily     I have reviewed and updated the patient's Past Medical History, Social History, Family History and Medication List.     Cardiographics (Personally Reviewed) :   Echo: 2/05/2024   Interpretation Summary  1.Left ventricular size, wall motion and function are normal. The ejection  fraction is > 65%. LV might suggest dehydration.  2.Mildly " "decreased right ventricular systolic function  3.No hemodynamically significant valvular abnormalities on 2D or color flow  imaging.  There is no comparison study available.    PET (November 2020 OSH)   No ischemia, no scar. Preserved LVEF 65%.        Physical Examination   There were no vitals taken for this visit.  Wt Readings from Last 3 Encounters:   11/26/24 102.3 kg (225 lb 8 oz)   10/16/24 102.4 kg (225 lb 11.2 oz)   10/15/24 99.8 kg (220 lb)   /81 (BP Location: Right arm, Patient Position: Chair, Cuff Size: Adult Large)   Pulse 82   Wt 102.9 kg (226 lb 12.8 oz)   SpO2 96%   BMI 34.48 kg/m     General Appearance:   Alert, well-appearing and in no acute distress.   HEENT: Atraumatic, normocephalic. MMM.   Chest/Lungs:   Respirations unlabored.  Lungs are clear to auscultation.   Cardiovascular:   Regular rate and rhythm.  S1/S2. No murmur.    Abdomen:  Soft, nontender, nondistended.   Extremities: No cyanosis or clubbing. Trace BLE edema, lipedema    Musculoskeletal: Moves all extremities.     Skin: Warm, dry, intact.    Neurologic: Mood and affect are appropriate.  Alert and oriented to person, place, time, and situation.          Medications  Allergies   ASA 81 mg daily   Lipitor 40 mg daily   Losartan 50 mg daily     Vitamins   Prilosec   Sinemet   Gabapentin   Insulin    Allergies   Allergen Reactions    Ciprofloxacin Other (See Comments), Hives, Itching, Rash and Unknown     Other reaction(s): Other (see comments)  Oral swelling per [facility]        Dilaudid [Hydromorphone] Rash    Morphine Rash     rash    Penicillins Rash     aka ancef/ rash      Citalopram Unknown    Clindamycin Itching and Rash    Oxycodone Rash     \"HORRIBLE, SEVERE RASH MAYBE CAUSED BY OXY\"         Lab Results (Personally Reviewed)    Chemistry/lipid CBC Cardiac Enzymes/BNP/TSH/INR   Lab Results   Component Value Date    BUN 20.3 08/19/2024     08/19/2024    CO2 27 08/19/2024     Creatinine   Date Value Ref Range " Status   08/19/2024 0.97 0.67 - 1.17 mg/dL Final       Lab Results   Component Value Date    CHOL 78 11/01/2024    HDL 38 (L) 11/01/2024    LDL 29 11/01/2024      Lab Results   Component Value Date    WBC 10.6 08/19/2024    HGB 14.0 08/19/2024    HCT 42.5 08/19/2024     08/19/2024     08/19/2024    Lab Results   Component Value Date    TSH 1.46 05/09/2024    INR 1.24 (H) 07/25/2023        The patient states understanding and is agreeable with the plan.     Rafaela Cueva DNP, NP-C  Cardiac Electrophysiology   12/5/2024      Total time spent on patient visit, reviewing notes, imaging, labs, orders, and completing necessary documentation: 45 minutes.

## 2024-12-05 ENCOUNTER — OFFICE VISIT (OUTPATIENT)
Dept: CARDIOLOGY | Facility: CLINIC | Age: 69
End: 2024-12-05
Attending: INTERNAL MEDICINE
Payer: MEDICARE

## 2024-12-05 ENCOUNTER — LAB (OUTPATIENT)
Dept: LAB | Facility: CLINIC | Age: 69
End: 2024-12-05
Payer: MEDICARE

## 2024-12-05 ENCOUNTER — PRE VISIT (OUTPATIENT)
Dept: OPHTHALMOLOGY | Facility: CLINIC | Age: 69
End: 2024-12-05

## 2024-12-05 ENCOUNTER — OFFICE VISIT (OUTPATIENT)
Dept: OPHTHALMOLOGY | Facility: CLINIC | Age: 69
End: 2024-12-05
Attending: FAMILY MEDICINE
Payer: MEDICARE

## 2024-12-05 VITALS
BODY MASS INDEX: 34.48 KG/M2 | HEART RATE: 82 BPM | WEIGHT: 226.8 LBS | DIASTOLIC BLOOD PRESSURE: 81 MMHG | SYSTOLIC BLOOD PRESSURE: 122 MMHG | OXYGEN SATURATION: 96 %

## 2024-12-05 DIAGNOSIS — Z95.1 HISTORY OF CORONARY ARTERY BYPASS SURGERY: ICD-10-CM

## 2024-12-05 DIAGNOSIS — H25.813 MIXED TYPE AGE-RELATED CATARACT, BOTH EYES: ICD-10-CM

## 2024-12-05 DIAGNOSIS — H52.00 HYPERMETROPIA, UNSPECIFIED LATERALITY: ICD-10-CM

## 2024-12-05 DIAGNOSIS — Z95.0 CARDIAC PACEMAKER IN SITU: Primary | ICD-10-CM

## 2024-12-05 DIAGNOSIS — I25.10 CORONARY ARTERY DISEASE INVOLVING NATIVE CORONARY ARTERY OF NATIVE HEART WITHOUT ANGINA PECTORIS: ICD-10-CM

## 2024-12-05 DIAGNOSIS — Z95.0 CARDIAC PACEMAKER IN SITU: ICD-10-CM

## 2024-12-05 DIAGNOSIS — R06.09 DOE (DYSPNEA ON EXERTION): ICD-10-CM

## 2024-12-05 DIAGNOSIS — I45.5 SINUS PAUSE: ICD-10-CM

## 2024-12-05 DIAGNOSIS — I44.1 HEART BLOCK AV SECOND DEGREE: ICD-10-CM

## 2024-12-05 DIAGNOSIS — I44.2 HEART BLOCK AV THIRD DEGREE (H): ICD-10-CM

## 2024-12-05 DIAGNOSIS — Z79.4 TYPE 2 DIABETES MELLITUS WITH HYPERGLYCEMIA, WITH LONG-TERM CURRENT USE OF INSULIN (H): ICD-10-CM

## 2024-12-05 DIAGNOSIS — E11.9 TYPE 2 DIABETES MELLITUS WITHOUT OPHTHALMIC MANIFESTATIONS (H): Primary | ICD-10-CM

## 2024-12-05 DIAGNOSIS — E11.65 TYPE 2 DIABETES MELLITUS WITH HYPERGLYCEMIA, WITH LONG-TERM CURRENT USE OF INSULIN (H): ICD-10-CM

## 2024-12-05 DIAGNOSIS — H52.4 PRESBYOPIA OF BOTH EYES: ICD-10-CM

## 2024-12-05 DIAGNOSIS — H11.041 STATIONARY PERIPHERAL PTERYGIUM OF RIGHT EYE: ICD-10-CM

## 2024-12-05 DIAGNOSIS — E78.5 HYPERLIPIDEMIA, UNSPECIFIED HYPERLIPIDEMIA TYPE: ICD-10-CM

## 2024-12-05 LAB
ALBUMIN SERPL BCG-MCNC: 3.8 G/DL (ref 3.5–5.2)
ALP SERPL-CCNC: 106 U/L (ref 40–150)
ALT SERPL W P-5'-P-CCNC: <5 U/L (ref 0–70)
ANION GAP SERPL CALCULATED.3IONS-SCNC: 8 MMOL/L (ref 7–15)
AST SERPL W P-5'-P-CCNC: 23 U/L (ref 0–45)
BILIRUB SERPL-MCNC: 0.6 MG/DL
BUN SERPL-MCNC: 25 MG/DL (ref 8–23)
CALCIUM SERPL-MCNC: 8.9 MG/DL (ref 8.8–10.4)
CHLORIDE SERPL-SCNC: 101 MMOL/L (ref 98–107)
CHOLEST SERPL-MCNC: 76 MG/DL
CREAT SERPL-MCNC: 1.02 MG/DL (ref 0.67–1.17)
EGFRCR SERPLBLD CKD-EPI 2021: 79 ML/MIN/1.73M2
ERYTHROCYTE [DISTWIDTH] IN BLOOD BY AUTOMATED COUNT: 14 % (ref 10–15)
FASTING STATUS PATIENT QL REPORTED: NO
FASTING STATUS PATIENT QL REPORTED: NO
GLUCOSE SERPL-MCNC: 106 MG/DL (ref 70–99)
HCO3 SERPL-SCNC: 27 MMOL/L (ref 22–29)
HCT VFR BLD AUTO: 38.8 % (ref 40–53)
HDLC SERPL-MCNC: 39 MG/DL
HGB BLD-MCNC: 12.7 G/DL (ref 13.3–17.7)
LDLC SERPL CALC-MCNC: 23 MG/DL
MCH RBC QN AUTO: 32.6 PG (ref 26.5–33)
MCHC RBC AUTO-ENTMCNC: 32.7 G/DL (ref 31.5–36.5)
MCV RBC AUTO: 100 FL (ref 78–100)
MDC_IDC_LEAD_CONNECTION_STATUS: NORMAL
MDC_IDC_LEAD_CONNECTION_STATUS: NORMAL
MDC_IDC_LEAD_IMPLANT_DT: NORMAL
MDC_IDC_LEAD_IMPLANT_DT: NORMAL
MDC_IDC_LEAD_LOCATION: NORMAL
MDC_IDC_LEAD_LOCATION: NORMAL
MDC_IDC_LEAD_LOCATION_DETAIL_1: NORMAL
MDC_IDC_LEAD_LOCATION_DETAIL_1: NORMAL
MDC_IDC_LEAD_MFG: NORMAL
MDC_IDC_LEAD_MFG: NORMAL
MDC_IDC_LEAD_MODEL: NORMAL
MDC_IDC_LEAD_MODEL: NORMAL
MDC_IDC_LEAD_POLARITY_TYPE: NORMAL
MDC_IDC_LEAD_POLARITY_TYPE: NORMAL
MDC_IDC_LEAD_SERIAL: NORMAL
MDC_IDC_LEAD_SERIAL: NORMAL
MDC_IDC_LEAD_SPECIAL_FUNCTION: NORMAL
MDC_IDC_LEAD_SPECIAL_FUNCTION: NORMAL
MDC_IDC_MSMT_BATTERY_DTM: NORMAL
MDC_IDC_MSMT_BATTERY_REMAINING_LONGEVITY: 141 MO
MDC_IDC_MSMT_BATTERY_RRT_TRIGGER: 2.62
MDC_IDC_MSMT_BATTERY_STATUS: NORMAL
MDC_IDC_MSMT_BATTERY_VOLTAGE: 3.08 V
MDC_IDC_MSMT_LEADCHNL_RA_IMPEDANCE_VALUE: 323 OHM
MDC_IDC_MSMT_LEADCHNL_RA_IMPEDANCE_VALUE: 399 OHM
MDC_IDC_MSMT_LEADCHNL_RA_PACING_THRESHOLD_AMPLITUDE: 0.75 V
MDC_IDC_MSMT_LEADCHNL_RA_PACING_THRESHOLD_PULSEWIDTH: 0.4 MS
MDC_IDC_MSMT_LEADCHNL_RA_SENSING_INTR_AMPL: 3.75 MV
MDC_IDC_MSMT_LEADCHNL_RV_IMPEDANCE_VALUE: 418 OHM
MDC_IDC_MSMT_LEADCHNL_RV_IMPEDANCE_VALUE: 475 OHM
MDC_IDC_MSMT_LEADCHNL_RV_PACING_THRESHOLD_AMPLITUDE: 0.5 V
MDC_IDC_MSMT_LEADCHNL_RV_PACING_THRESHOLD_PULSEWIDTH: 0.4 MS
MDC_IDC_PG_IMPLANT_DTM: NORMAL
MDC_IDC_PG_MFG: NORMAL
MDC_IDC_PG_MODEL: NORMAL
MDC_IDC_PG_SERIAL: NORMAL
MDC_IDC_PG_TYPE: NORMAL
MDC_IDC_SESS_CLINIC_NAME: NORMAL
MDC_IDC_SESS_DTM: NORMAL
MDC_IDC_SESS_TYPE: NORMAL
MDC_IDC_SET_BRADY_AT_MODE_SWITCH_RATE: 171 {BEATS}/MIN
MDC_IDC_SET_BRADY_HYSTRATE: NORMAL
MDC_IDC_SET_BRADY_LOWRATE: 60 {BEATS}/MIN
MDC_IDC_SET_BRADY_MAX_SENSOR_RATE: 130 {BEATS}/MIN
MDC_IDC_SET_BRADY_MAX_TRACKING_RATE: 130 {BEATS}/MIN
MDC_IDC_SET_BRADY_MODE: NORMAL
MDC_IDC_SET_BRADY_PAV_DELAY_HIGH: 140 MS
MDC_IDC_SET_BRADY_PAV_DELAY_LOW: 240 MS
MDC_IDC_SET_BRADY_SAV_DELAY_HIGH: 110 MS
MDC_IDC_SET_BRADY_SAV_DELAY_LOW: 220 MS
MDC_IDC_SET_LEADCHNL_RA_PACING_AMPLITUDE: 1.5 V
MDC_IDC_SET_LEADCHNL_RA_PACING_ANODE_ELECTRODE_1: NORMAL
MDC_IDC_SET_LEADCHNL_RA_PACING_ANODE_LOCATION_1: NORMAL
MDC_IDC_SET_LEADCHNL_RA_PACING_CAPTURE_MODE: NORMAL
MDC_IDC_SET_LEADCHNL_RA_PACING_CATHODE_ELECTRODE_1: NORMAL
MDC_IDC_SET_LEADCHNL_RA_PACING_CATHODE_LOCATION_1: NORMAL
MDC_IDC_SET_LEADCHNL_RA_PACING_POLARITY: NORMAL
MDC_IDC_SET_LEADCHNL_RA_PACING_PULSEWIDTH: 0.4 MS
MDC_IDC_SET_LEADCHNL_RA_SENSING_ANODE_ELECTRODE_1: NORMAL
MDC_IDC_SET_LEADCHNL_RA_SENSING_ANODE_LOCATION_1: NORMAL
MDC_IDC_SET_LEADCHNL_RA_SENSING_CATHODE_ELECTRODE_1: NORMAL
MDC_IDC_SET_LEADCHNL_RA_SENSING_CATHODE_LOCATION_1: NORMAL
MDC_IDC_SET_LEADCHNL_RA_SENSING_POLARITY: NORMAL
MDC_IDC_SET_LEADCHNL_RA_SENSING_SENSITIVITY: 0.3 MV
MDC_IDC_SET_LEADCHNL_RV_PACING_AMPLITUDE: 2 V
MDC_IDC_SET_LEADCHNL_RV_PACING_ANODE_ELECTRODE_1: NORMAL
MDC_IDC_SET_LEADCHNL_RV_PACING_ANODE_LOCATION_1: NORMAL
MDC_IDC_SET_LEADCHNL_RV_PACING_CAPTURE_MODE: NORMAL
MDC_IDC_SET_LEADCHNL_RV_PACING_CATHODE_ELECTRODE_1: NORMAL
MDC_IDC_SET_LEADCHNL_RV_PACING_CATHODE_LOCATION_1: NORMAL
MDC_IDC_SET_LEADCHNL_RV_PACING_POLARITY: NORMAL
MDC_IDC_SET_LEADCHNL_RV_PACING_PULSEWIDTH: 0.4 MS
MDC_IDC_SET_LEADCHNL_RV_SENSING_ANODE_ELECTRODE_1: NORMAL
MDC_IDC_SET_LEADCHNL_RV_SENSING_ANODE_LOCATION_1: NORMAL
MDC_IDC_SET_LEADCHNL_RV_SENSING_CATHODE_ELECTRODE_1: NORMAL
MDC_IDC_SET_LEADCHNL_RV_SENSING_CATHODE_LOCATION_1: NORMAL
MDC_IDC_SET_LEADCHNL_RV_SENSING_POLARITY: NORMAL
MDC_IDC_SET_LEADCHNL_RV_SENSING_SENSITIVITY: 0.9 MV
MDC_IDC_SET_ZONE_DETECTION_INTERVAL: 350 MS
MDC_IDC_SET_ZONE_DETECTION_INTERVAL: 400 MS
MDC_IDC_SET_ZONE_STATUS: NORMAL
MDC_IDC_SET_ZONE_STATUS: NORMAL
MDC_IDC_SET_ZONE_TYPE: NORMAL
MDC_IDC_SET_ZONE_VENDOR_TYPE: NORMAL
MDC_IDC_STAT_AT_BURDEN_PERCENT: 0 %
MDC_IDC_STAT_AT_DTM_END: NORMAL
MDC_IDC_STAT_AT_DTM_START: NORMAL
MDC_IDC_STAT_BRADY_AP_VP_PERCENT: 5.95 %
MDC_IDC_STAT_BRADY_AP_VS_PERCENT: 0 %
MDC_IDC_STAT_BRADY_AS_VP_PERCENT: 93.96 %
MDC_IDC_STAT_BRADY_AS_VS_PERCENT: 0.09 %
MDC_IDC_STAT_BRADY_DTM_END: NORMAL
MDC_IDC_STAT_BRADY_DTM_START: NORMAL
MDC_IDC_STAT_BRADY_RA_PERCENT_PACED: 5.96 %
MDC_IDC_STAT_BRADY_RV_PERCENT_PACED: 99.91 %
MDC_IDC_STAT_EPISODE_RECENT_COUNT: 0
MDC_IDC_STAT_EPISODE_RECENT_COUNT_DTM_END: NORMAL
MDC_IDC_STAT_EPISODE_RECENT_COUNT_DTM_START: NORMAL
MDC_IDC_STAT_EPISODE_TOTAL_COUNT: 0
MDC_IDC_STAT_EPISODE_TOTAL_COUNT_DTM_END: NORMAL
MDC_IDC_STAT_EPISODE_TOTAL_COUNT_DTM_START: NORMAL
MDC_IDC_STAT_EPISODE_TYPE: NORMAL
MDC_IDC_STAT_TACHYTHERAPY_RECENT_DTM_END: NORMAL
MDC_IDC_STAT_TACHYTHERAPY_RECENT_DTM_START: NORMAL
MDC_IDC_STAT_TACHYTHERAPY_TOTAL_DTM_END: NORMAL
MDC_IDC_STAT_TACHYTHERAPY_TOTAL_DTM_START: NORMAL
NONHDLC SERPL-MCNC: 37 MG/DL
NT-PROBNP SERPL-MCNC: 168 PG/ML (ref 0–900)
PLATELET # BLD AUTO: 290 10E3/UL (ref 150–450)
POTASSIUM SERPL-SCNC: 4.3 MMOL/L (ref 3.4–5.3)
PROT SERPL-MCNC: 6.9 G/DL (ref 6.4–8.3)
RBC # BLD AUTO: 3.9 10E6/UL (ref 4.4–5.9)
SODIUM SERPL-SCNC: 136 MMOL/L (ref 135–145)
TRIGL SERPL-MCNC: 71 MG/DL
WBC # BLD AUTO: 10.1 10E3/UL (ref 4–11)

## 2024-12-05 PROCEDURE — 80053 COMPREHEN METABOLIC PANEL: CPT | Performed by: PATHOLOGY

## 2024-12-05 PROCEDURE — G0463 HOSPITAL OUTPT CLINIC VISIT: HCPCS | Performed by: NURSE PRACTITIONER

## 2024-12-05 PROCEDURE — 80061 LIPID PANEL: CPT | Performed by: PATHOLOGY

## 2024-12-05 PROCEDURE — 85027 COMPLETE CBC AUTOMATED: CPT | Performed by: PATHOLOGY

## 2024-12-05 PROCEDURE — 36415 COLL VENOUS BLD VENIPUNCTURE: CPT | Performed by: PATHOLOGY

## 2024-12-05 PROCEDURE — 93280 PM DEVICE PROGR EVAL DUAL: CPT | Performed by: INTERNAL MEDICINE

## 2024-12-05 PROCEDURE — 83880 ASSAY OF NATRIURETIC PEPTIDE: CPT | Performed by: PATHOLOGY

## 2024-12-05 ASSESSMENT — TONOMETRY
IOP_METHOD: TONOPEN
OD_IOP_MMHG: 13
OS_IOP_MMHG: 15

## 2024-12-05 ASSESSMENT — VISUAL ACUITY
OS_CC+: -3+2
CORRECTION_TYPE: GLASSES
OD_CC: 20/20
OD_CC+: -3
OS_CC: 20/25
METHOD: SNELLEN - LINEAR

## 2024-12-05 ASSESSMENT — PAIN SCALES - GENERAL: PAINLEVEL_OUTOF10: NO PAIN (0)

## 2024-12-05 ASSESSMENT — REFRACTION_MANIFEST
OD_AXIS: 016
OS_AXIS: 164
OS_ADD: +2.75
OD_ADD: +2.75
OS_CYLINDER: +0.75
OD_CYLINDER: +1.25
OD_SPHERE: +0.75
OS_SPHERE: +1.75

## 2024-12-05 ASSESSMENT — REFRACTION_WEARINGRX
OS_SPHERE: +1.00
OD_CYLINDER: +1.25
OD_SPHERE: +1.00
OS_ADD: +2.75
OS_AXIS: 172
OS_CYLINDER: +1.00
OD_ADD: +2.75
OD_AXIS: 011
SPECS_TYPE: PAL

## 2024-12-05 ASSESSMENT — CUP TO DISC RATIO
OS_RATIO: 0.35
OD_RATIO: 0.4

## 2024-12-05 ASSESSMENT — CONF VISUAL FIELD
OD_INFERIOR_NASAL_RESTRICTION: 0
OS_INFERIOR_TEMPORAL_RESTRICTION: 0
OD_NORMAL: 1
OD_SUPERIOR_TEMPORAL_RESTRICTION: 0
OS_SUPERIOR_NASAL_RESTRICTION: 0
OS_INFERIOR_NASAL_RESTRICTION: 0
OS_SUPERIOR_TEMPORAL_RESTRICTION: 0
OD_INFERIOR_TEMPORAL_RESTRICTION: 0
OS_NORMAL: 1
OD_SUPERIOR_NASAL_RESTRICTION: 0
METHOD: COUNTING FINGERS

## 2024-12-05 ASSESSMENT — EXTERNAL EXAM - RIGHT EYE: OD_EXAM: NORMAL

## 2024-12-05 ASSESSMENT — EXTERNAL EXAM - LEFT EYE: OS_EXAM: NORMAL

## 2024-12-05 ASSESSMENT — SLIT LAMP EXAM - LIDS
COMMENTS: TR MGD
COMMENTS: TR MGD

## 2024-12-05 NOTE — PATIENT INSTRUCTIONS
You were seen in the Electrophysiology Clinic today by: Rafaela Cueva NP    Plan:   Medication Changes: No medication changes today    Labs/Tests Needed:  ECHO  CT coronary angiogram   Labs: CMP, CBC, BNP    Follow up Visit:   Return for EP (pacemaker visit) in 6 months with Rafaela Cueva NP  Return for general cardiology in ~ 6 weeks with Rafaela Cueva NP    Device Follow up: every 3 months      If you have further questions, please utilize [employer] to contact us.     Your Care Team:    EP Cardiology   Telephone Number     Nurse Line  KAI Coleman RN Dani Proehl, RN   [phone]     For scheduling appointments:   Jorge   [phone]   For procedure scheduling:    Yolis Ferris     [phone]   For the Device Clinic (Pacemakers, ICDs, Loop Recorders)    During business hours: [phone]  After business hours:   [phone]- select option 4 and ask for job code [id number].       On-call cardiologist for after hours or on weekends:   [phone], option #4, and ask to speak to the on-call cardiologist.     Cardiovascular Clinic:   [address] [place] [address]. [place] [address]      As always, Thank you for trusting us with your health care needs!

## 2024-12-05 NOTE — PROGRESS NOTES
HPI  Arnaldo Henderson is a 69 year old here for comprehensive eye exam for diabetes mellitus.  Has good vision both eyes with current glasses.  Denies eye pain or flashes/floaters.  Does not have any irritation or redness.       PMH: diabetes mellitus 2  Past Medical History:   Diagnosis Date    Acute pancreatitis 04/18/2022    Formatting of this note might be different from the original. Formatting of this note might be different from the original. Added automatically from request for surgery [id number] Formatting of this note might be different from the original. Added automatically from request for surgery [id number]  Formatting of this note might be different from the original. Added automatically from request for surgery     CAD (coronary artery disease)     CABG    Cholecystitis, acute 07/05/2023    Diabetes mellitus, type 2 (H) 2013    Family history of parkinsonism 02/14/2024    Gastroesophageal reflux disease     Hypercholesteremia     Hypertension     Mixed conductive and sensorineural hearing loss of both ears     Mixed hearing loss     Pancreatic duct stricture     Pancreatitis     Parkinson disease (H)     Parkinson's disease, unspecified whether dyskinesia present, unspecified whether manifestations fluctuate (H) 02/14/2024    Seborrheic dermatitis 03/22/2024    Second degree AV block     Surgical site infection 07/26/2023       POH: Glasses for hyperopia /presbyopia, cataracts, no surgery, no trauma  Oc Meds: none  FH: Denies any glaucoma, age related macular degeneration, or other known eye diseases         Assessment & Plan     (E11.9) Type 2 diabetes mellitus without ophthalmic manifestations (H) - Both Eyes  (primary encounter diagnosis)  (E11.65,  Z79.4) Type 2 diabetes mellitus with hyperglycemia, with long-term current use of insulin (H) - Both Eyes  Comment: Diabetes Mellitus 2 w/o retinopathy, A1c above  Plan:   Discussed the importance of tight blood glucose, blood pressure, and cholesterol   control in the prevention of diabetic retinopathy. Recommend yearly dilated eye exam. Letter to PCP sent.    (H24.561) Mixed type age-related cataract, both eyes - Both Eyes  Comment: visually significant but acceptable vision for activities of daily living   Plan: new prescription for glasses given, update as needed, monitor yearly    (H52.00) Hypermetropia, unspecified laterality - Both Eyes/(H52.4) Presbyopia OU - Both Eyes  Comment: mild changes   Plan: Refraction done and prescription for glasses given, optional update    (H11.041) Stationary peripheral pterygium of right eye  Comment: asymptomatic   Patient reports a lot of outdoor time  Plan: Advised artificial tears 4 times daily as needed for lubrication and sun protection with sunglasses when outdoors    -----------------------------------------------------------------------------------       Patient disposition:   Return in about 1 year (around 12/5/2025) for Comprehensive Exam- dm and cataract. and patient to call sooner as needed.      Complete documentation of historical and exam elements from today's encounter can be found in the full encounter summary report (not reduplicated in this progress note). I personally obtained the chief complaint(s) and history of present illness.  I have confirmed and edited as necessary the CC, HPI, PMH/PSH, social history, FMH, ROS, and exam/neuro findings as obtained by the technician or others. I have examined this patient myself and I personally viewed the image(s) and studies listed above and the documentation reflects my findings and interpretation.       Carline Dueñas MD

## 2024-12-05 NOTE — NURSING NOTE
Chief Complaints and History of Present Illnesses   Patient presents with    Diabetic Eye Exam     Here for Diabetic eye exam     Chief Complaint(s) and History of Present Illness(es)       Diabetic Eye Exam              Diabetes Type: Type 2    Treatments tried: no treatments    Pain scale: 0/10    Comments: Here for Diabetic eye exam              Comments    No vision changes since last visit.   Denies eye pain, redness or irritation.   No new concerns.  Ocular Meds: None    DM2  LBS : 116 at 11:51am  Lab Results       Component                Value               Date                       A1C                      8.4                 11/01/2024                 A1C                      8.2                 07/08/2024                 A1C                      7.2                 05/09/2024                 A1C                      8.2                 11/16/2023                 A1C                      9.6                 06/30/2023               Gt Ho 11:52 AM December 5, 2024

## 2024-12-05 NOTE — LETTER
12/5/2024      RE: Arnaldo Henderson  [address] [address]       Dear Colleague,    Thank you for the opportunity to participate in the care of your patient, Arnaldo Henderson, at the [facility] [place] at [facility]. Please see a copy of my visit note below.        ELECTROPHYSIOLOGY CLINIC VISIT    Assessment/Recommendations   Assessment/Plan:    Arnaldo Henderson is a 69 year old male with past medical history significant for chronic pancreatitis s/p distal pancreatectomy, type II DM, CAD s/p CABGx2 (2003), parkinson's disease and neuroendocrine tumor.     Presents to establish care with new cardiology provider and to discuss new/progressive BRIDGES. He has gained ~ 20 lb in last 6 months and admits to being less active 2/2 to parkinsons and weight gain. We dicussed that his BRIDGES may be due to weight gain/deconditioning, but given hx of CAD as well as high V pacing, we will proceed with CTA, echo, and update labs. We discussed referral to sleep medicine for suspected sleep apnea, however, Mr. Henderson declined and would prefer to proceed with other testing first.     Paroxysmal complete AV block s/p PPM  Underwent dual chamber PPM 2/28/24. Device check today with normal device function, stable lead trends, AP 6%,  99.9%, intrinsic rhythm 2:1 AVB. Previously reprogrammed to DDDR d/t high .    - Echo d/t high  percentages and reports of new SOB   - Continue routine device follow up     CAD s/p CABGx2 2003   Stable. BP well controlled. LDL at goal. Continue ASA 81 mg daily and lipitor 40 mg daily.    - CTA coronary angiogram     Follow up with general cardiology in ~ 6 weeks.  Follow up in 1 year with an echo prior.      History of Present Illness/Subjective    Mr. Arnaldo Henderson is a 69 year old male who comes in today for EP follow-up s/p PPM.    Patient also has history of AV Wenckebach noted during ERCP in May 2023. He was seen by EP at  "this time, he was asymptomatic, no pacemaker was indicated. Patient saw Dr Dahl in follow up on 1/16/24, ziopatch ordered due to prolonged [place] interval and history of AV Wenckebach. Patient was then admitted to [facility] from 2/4-2/7/2024 for weakness and fogginess, found to have orthostatic hypotension which he has had issues with from autonomic dysfunction due to Parkinson's. Cardiology was consulted for AV Wenckebach on telemetry with no symptoms or changes in BP, no pacemaker was recommended. Follow up zio monitor was worn from 1/16-1/30/2024, average HR 79 bpm, frequent episodes of AV Wenckebach. He had one 10.5 second occurrence of AVB with no escape on 1/24/24 at 4:42 pm and 3.4 seconds on 1/27/24 at 1:50 am. Symptom activations were associated to sinus with and without AVB. Dr Hernandez discussed results and PPM implant with patient and his wife. They were agreeable to proceeding, he underwent dual chamber PPM 2/28/24.    Today, Mr. Henderson reports progressive BRIDGES over the last 6 months. He has also gained ~ 20 lbs in this time. He admits to being less active, which he thinks is in part due to his Parkinsons, as well as chronic lower back pain. He has 15 steps in his home and is still able to do stairs easily and without SOB. He finds that he has to walk slower and if walking longer distances he will have to take several breaks, due to both BRIDGES and back pain. Denies any chest pain or pressure. No palpitations. Denies feeling lightheaded, dizzy, no syncope or near syncope. Does have LE edema, but says he's always had this. Denies orthopnea or PND. He recently had an MRI and says that following the test the tech asked him if he has sleep apnea. He has never been evaluated. Wife endorses \"funny\" and loud breathing at night.     Device interrogation shows normal device function, stable lead trends, AP 6%,  99.9%.     Cardiac Medications: ASA 81 mg daily, atorvastatin 40 mg daily, losartan 50 mg daily     I " have reviewed and updated the patient's Past Medical History, Social History, Family History and Medication List.     Cardiographics (Personally Reviewed) :   Echo: 2/05/2024   Interpretation Summary  1.Left ventricular size, wall motion and function are normal. The ejection  fraction is > 65%. LV might suggest dehydration.  2.Mildly decreased right ventricular systolic function  3.No hemodynamically significant valvular abnormalities on 2D or color flow  imaging.  There is no comparison study available.    PET (November 2020 OSH)   No ischemia, no scar. Preserved LVEF 65%.        Physical Examination   There were no vitals taken for this visit.  Wt Readings from Last 3 Encounters:   11/26/24 102.3 kg (225 lb 8 oz)   10/16/24 102.4 kg (225 lb 11.2 oz)   10/15/24 99.8 kg (220 lb)   /81 (BP Location: Right arm, Patient Position: Chair, Cuff Size: Adult Large)   Pulse 82   Wt 102.9 kg (226 lb 12.8 oz)   SpO2 96%   BMI 34.48 kg/m     General Appearance:   Alert, well-appearing and in no acute distress.   HEENT: Atraumatic, normocephalic. MMM.   Chest/Lungs:   Respirations unlabored.  Lungs are clear to auscultation.   Cardiovascular:   Regular rate and rhythm.  S1/S2. No murmur.    Abdomen:  Soft, nontender, nondistended.   Extremities: No cyanosis or clubbing. Trace BLE edema, lipedema    Musculoskeletal: Moves all extremities.     Skin: Warm, dry, intact.    Neurologic: Mood and affect are appropriate.  Alert and oriented to person, place, time, and situation.          Medications  Allergies   ASA 81 mg daily   Lipitor 40 mg daily   Losartan 50 mg daily     Vitamins   Prilosec   Sinemet   Gabapentin   Insulin    Allergies   Allergen Reactions     Ciprofloxacin Other (See Comments), Hives, Itching, Rash and Unknown     Other reaction(s): Other (see comments)  Oral swelling per [facility]         Dilaudid [Hydromorphone] Rash     Morphine Rash     rash     Penicillins Rash     aka ancef/ rash       Citalopram  "Unknown     Clindamycin Itching and Rash     Oxycodone Rash     \"HORRIBLE, SEVERE RASH MAYBE CAUSED BY OXY\"         Lab Results (Personally Reviewed)    Chemistry/lipid CBC Cardiac Enzymes/BNP/TSH/INR   Lab Results   Component Value Date    BUN 20.3 08/19/2024     08/19/2024    CO2 27 08/19/2024     Creatinine   Date Value Ref Range Status   08/19/2024 0.97 0.67 - 1.17 mg/dL Final       Lab Results   Component Value Date    CHOL 78 11/01/2024    HDL 38 (L) 11/01/2024    LDL 29 11/01/2024      Lab Results   Component Value Date    WBC 10.6 08/19/2024    HGB 14.0 08/19/2024    HCT 42.5 08/19/2024     08/19/2024     08/19/2024    Lab Results   Component Value Date    TSH 1.46 05/09/2024    INR 1.24 (H) 07/25/2023        The patient states understanding and is agreeable with the plan.     Rafaela Cueva DNP, NP-C  Cardiac Electrophysiology   12/5/2024      Total time spent on patient visit, reviewing notes, imaging, labs, orders, and completing necessary documentation: 45 minutes.             Please do not hesitate to contact me if you have any questions/concerns.     Sincerely,     KRYSTYNA Whitten CNP  "

## 2024-12-05 NOTE — NURSING NOTE
Chief Complaint   Patient presents with    Follow Up     Return EP to establish care with new provider hx AVB s/p PPM       Vitals were take, medications reconciled     Nakul Portillo, EMT    9:31 AM

## 2024-12-06 ENCOUNTER — TELEPHONE (OUTPATIENT)
Dept: PHYSICAL MEDICINE AND REHAB | Facility: CLINIC | Age: 69
End: 2024-12-06

## 2024-12-06 NOTE — TELEPHONE ENCOUNTER
Pt was seen today at the [facility] and it was recommended patient proceed with an L2-L3 interlaminar epidural steroid injection.     Per anticoagulation guidelines, pt would need to HOLD his ASA 81mg for 6 days prior to the procedure, and to resume 24 hours post procedure.     Pt states he has been on this medication for years following heart attack and cardiac bypass. Will need OK from cardiology team for patient to hold aspirin. Pt reports cardiology provider is Rafaela GREENWOOD CNP.     Please advise if this is OK. If OK received the patient will then be contacted to review holding instructions.     Thank you!

## 2024-12-07 ENCOUNTER — TELEPHONE (OUTPATIENT)
Dept: NEUROLOGY | Facility: CLINIC | Age: 69
End: 2024-12-07
Payer: MEDICARE

## 2024-12-07 NOTE — TELEPHONE ENCOUNTER
"Neurology cross cover  8:35 am    I returned a call to the patient and his wife. He is followed in the clinic by Dr. Armenta for idiopathic Parkinson disease complicated by multiple medical comorbidities including diabetes mellitus and neuroendocrine carcinoma.    The patient's wife reports that is having increasing difficulty moving his legs over the past few days, particularly in the evening. The patient describes a \"sense of heaviness\" in his legs; yesterday evening he had to have help from his wife to get out of a chair. He was having enough trouble getting around that his wife was considering taking him to the ER. This morning he is doing better and able to ambulate independently with his walker.     He did have MRI scans of the thoracic and lumbar spine within the past 10 days that showed no ominous structural abnormality.    Given the waxing and waning nature of his symptoms I think it is most likely that this is due to motor fluctuations of PD.     His current carbidopa-levodopa regimen is  mg immediate release, 2.5 tabs at 6 am, 2 tabs at 10 am, 2 tabs at 2 pm, and 1 tab at 5 pm as well as  mg extended release at bedtime.    I suggested that they could try taking 2 tabs at 5 pm over the weekend and see if this works better for him. They should contact the clinic on Monday for further instructions.    I also instructed them to go to the ED if they do not feel that he can ambulate safely with assistance at any point or if they cannot manage ADLs at home.  "

## 2024-12-09 ENCOUNTER — MYC MEDICAL ADVICE (OUTPATIENT)
Dept: PHYSICAL MEDICINE AND REHAB | Facility: CLINIC | Age: 69
End: 2024-12-09

## 2024-12-09 ENCOUNTER — THERAPY VISIT (OUTPATIENT)
Dept: PHYSICAL THERAPY | Facility: REHABILITATION | Age: 69
End: 2024-12-09
Payer: MEDICARE

## 2024-12-09 DIAGNOSIS — G89.29 CHRONIC BILATERAL THORACIC BACK PAIN: Primary | ICD-10-CM

## 2024-12-09 DIAGNOSIS — M47.24 OSTEOARTHRITIS OF SPINE WITH RADICULOPATHY, THORACIC REGION: ICD-10-CM

## 2024-12-09 DIAGNOSIS — M54.6 CHRONIC BILATERAL THORACIC BACK PAIN: Primary | ICD-10-CM

## 2024-12-09 DIAGNOSIS — M48.062 SPINAL STENOSIS OF LUMBAR REGION WITH NEUROGENIC CLAUDICATION: ICD-10-CM

## 2024-12-09 PROCEDURE — 97110 THERAPEUTIC EXERCISES: CPT | Mod: GP

## 2024-12-09 PROCEDURE — 97161 PT EVAL LOW COMPLEX 20 MIN: CPT | Mod: GP

## 2024-12-09 NOTE — TELEPHONE ENCOUNTER
Please contact patient and schedule phone (not video) visit, looks like I have an opening tomorrow morning 9am or next Friday afternoon in procedure slots which are OK to use for this.

## 2024-12-09 NOTE — PROGRESS NOTES
"PHYSICAL THERAPY EVALUATION  Type of Visit: Evaluation    Subjective         Presenting condition or subjective complaint: (Patient-Rptd) Back pain  Date of onset: 12/06/24    Relevant medical history:     Dates & types of surgery:      Prior diagnostic imaging/testing results: (Patient-Rptd) MRI; CT scan; X-ray     Prior therapy history for the same diagnosis, illness or injury: (Patient-Rptd) No      Living Environment  Social support: (Patient-Rptd) With a significant other or spouse   Type of home: (Patient-Rptd) [facility]   Stairs to enter the home: (Patient-Rptd) Yes (Patient-Rptd) 15 Is there a railing: (Patient-Rptd) Yes     Ramp: (Patient-Rptd) No   Stairs inside the home: (Patient-Rptd) Yes (Patient-Rptd) 15 Is there a railing: (Patient-Rptd) Yes     Help at home: (Patient-Rptd) None  Equipment owned: (Patient-Rptd) Walker with wheels; Bedrail; Grab bars     Employment: (Patient-Rptd) Not Applicable    Hobbies/Interests: (Patient-Rptd) [employer]    Patient goals for therapy: (Patient-Rptd) Stand at a counter     Pt reports that his low back hurts 10/10 at worst when standing at a counter for longer than 7-8 minutes which is very bothersome when trying to cook, needing to take breaks frequently. Pt reports he \"feels pretty good\" today. Prefers extension over bending forward. May try a cortisone injection but provider needs to confirm if he is eligible due to diabetes. 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 Pt reports he typically sleeps on his R side and has trouble turning over in bed, though he did recently get a new, firmer " mattress.     Objective   LUMBAR SPINE EVALUATION  PAIN: pt reports 10/10 at worst particularly with standing at the counter; right now tolerates 7-8 minutes max before he needs to take a break.   INTEGUMENTARY (edema, incisions): WNL   POSTURE:   Decreased lumbar lordosis  GAIT:   Weightbearing Status: n/a   Assistive Device(s): no   Gait Deviations: slight forward lean, minimal dyskinesia, decreased arm swing and mild path deviation, however good pace   BALANCE/PROPRIOCEPTION: pt demonstrates minor imbalance during today's assessment/treatment (calf raises, STS)  ROM:   Lumbar: The patient demonstrates limited lumbar flexion to 30 degrees and extension to 10 degrees, indicating hypomobility  Hip ROM: significant limitation IR/ER bilaterally L>R  PELVIC/SI SCREEN: NT   STRENGTH:  4/5 hip flexors, 3+/5 B hip abductors  MYOTOMES: WNL   NEURAL TENSION: Slump: + L>R  FLEXIBILITY: HS, gastrocs, QL, paraspinals hypomobile   LUMBAR/HIP Special Tests:   LUMBAR RADICULOPATHY  straight leg raise -  slump test +  Responds well to long-axis distraction  MYOFASCIAL PAIN SYNDROME  trigger point assessment -   SI JOINT DYSFUNCTION -- not today  DANIELA test  Gillet test  FUNCTIONAL TESTS: Double Leg Squat: Anterior knee translation, Knee valgus, Hip internal rotation, and Improper use of glutes/hips   - Bed mobility: much difficulty turning over; The patient utilizes an anterior (or abdominal) movement to sit up, engaging the abdominal muscles to lift the torso forward from a supine position. This method involves a forward flexion of the trunk, where the patient bends at the waist, using the core muscles to raise themselves directly into a seated position. This contrasts with the rolling technique, where the patient would turn onto their side first and then push themselves up into a sitting posture.  PALPATION: denies tenderness to palpation   SPINAL SEGMENTAL CONCLUSIONS: Joint mobility assessment revealed hypomobility at L1-L2,  L4-L5, L5-S1       Assessment & Plan   CLINICAL IMPRESSIONS  Medical Diagnosis: Chronic bilateral thoracic back pain (M54.6, G89.29)  - Primary    Osteoarthritis of spine with radiculopathy, thoracic region (M47.24)    Spinal stenosis of lumbar region with neurogenic claudication (M48.062)    Treatment Diagnosis: Chronic bilateral thoracic back pain (M54.6, G89.29)  - Primary    Osteoarthritis of spine with radiculopathy, thoracic region (M47.24)    Spinal stenosis of lumbar region with neurogenic claudication (M48.062)   Impression/Assessment: Patient is a 69 year old - year old male with thoracic and lumbar pain with neurogenic claudication .  The following significant findings have been identified: Pain, Decreased ROM/flexibility, Decreased joint mobility, Decreased strength, Impaired balance, Decreased proprioception, Impaired sensation, Impaired gait, Impaired muscle performance, Decreased activity tolerance, and Impaired posture. These impairments interfere with their ability to perform self care tasks, recreational activities, household mobility, and community mobility as compared to previous level of function. Pt is appropriate for skilled PT intervention.       Clinical Decision Making (Complexity):  Clinical Presentation: Stable/Uncomplicated  Clinical Presentation Rationale: based on medical and personal factors listed in PT evaluation  Clinical Decision Making (Complexity): Low complexity    PLAN OF CARE  Treatment Interventions:  Modalities: Biofeedback, Dry Needling, E-stim, Mechanical Traction, Ultrasound  Interventions: Gait Training, Manual Therapy, Neuromuscular Re-education, Therapeutic Activity, Therapeutic Exercise, Self-Care/Home Management      Long Term Goals     PT Goal 1  Goal Identifier: HEP  Goal Description: Pt will be independent with HEP for self-management of symptoms  Rationale: to maximize safety and independence within the home  Goal Progress: initial  Target Date: 01/06/25  PT  Goal 2  Goal Identifier: lumbar stability  Goal Description: Enhance lumbar stability by performing a series of functional movements (e.g., squats, deadlifts) with correct biomechanics in 90% of attempts over the next 6 weeks  Rationale: to maximize safety and independence with performance of ADLs and functional tasks  Goal Progress: initial  Target Date: 01/20/25  PT Goal 3  Goal Identifier: flexibility  Goal Description: Improve overall flexibility by achieving a 20% increase in range of motion in the lumbar spine and hips within 12 weeks  Rationale: to maximize safety and independence with performance of ADLs and functional tasks  Goal Progress: initial  Target Date: 03/03/25  PT Goal 4  Goal Identifier: Standing tolerance  Goal Description: Pt will report <5/10 pain at worst and will tolerate standing for >12 minutes in order to return to cooking within a less painful range.  Rationale: to maximize safety and independence with performance of ADLs and functional tasks  Goal Progress: w+12      Frequency of Treatment: 1x/week  Duration of Treatment: 90 days    Education Assessment:   Learner/Method: Patient    Risks and benefits of evaluation/treatment have been explained.   Patient/Family/caregiver agrees with Plan of Care.     Evaluation Time:     PT Eval, Low Complexity Minutes (50065): 12     Signing Clinician: Ranjana Darby, PT        [facility]                                                                                   OUTPATIENT PHYSICAL THERAPY      PLAN OF TREATMENT FOR OUTPATIENT REHABILITATION   Patient's Last Name, First Name, Arnaldo Villa YOB: 1955   Provider's Name   [facility]   Medical Record No.  [id number]     Onset Date: 12/06/24  Start of Care Date: 12/09/24     Medical Diagnosis:  Chronic bilateral thoracic back pain (M54.6, G89.29)  - Primary    Osteoarthritis of spine with radiculopathy,  thoracic region (M47.24)    Spinal stenosis of lumbar region with neurogenic claudication (M48.062)      PT Treatment Diagnosis:  Chronic bilateral thoracic back pain (M54.6, G89.29)  - Primary    Osteoarthritis of spine with radiculopathy, thoracic region (M47.24)    Spinal stenosis of lumbar region with neurogenic claudication (M48.062) Plan of Treatment  Frequency/Duration: 1x/week/ 90 days    Certification date from 12/09/24 to 03/08/25         See note for plan of treatment details and functional goals     Ranjana Darby, PT                         I CERTIFY THE NEED FOR THESE SERVICES FURNISHED UNDER        THIS PLAN OF TREATMENT AND WHILE UNDER MY CARE     (Physician attestation of this document indicates review and certification of the therapy plan).              Referring Provider:  Alberto Jackson    Initial Assessment  See Epic Evaluation- Start of Care Date: 12/09/24

## 2024-12-09 NOTE — TELEPHONE ENCOUNTER
Date: December 9, 2024    Provider: Dr. Ajit Cruz DO     Provider/Other:     Reason for out-going call: FOLLOW-UP      Detailed message: Spoke with wife - clarisa about scheduling telephone follow up per message below - clarisa stated she will call back when she's not driving to schedule for patient.           Number provider for patient:  [facility]: [phone]                 DISPLAY PLAN FREE TEXT DISPLAY PLAN FREE TEXT DISPLAY PLAN FREE TEXT DISPLAY PLAN FREE TEXT DISPLAY PLAN FREE TEXT DISPLAY PLAN FREE TEXT

## 2024-12-12 ENCOUNTER — TELEPHONE (OUTPATIENT)
Dept: NEUROPSYCHOLOGY | Facility: CLINIC | Age: 69
End: 2024-12-12
Payer: MEDICARE

## 2024-12-12 NOTE — TELEPHONE ENCOUNTER
Patient confirmed scheduled appointment:  Date: 12/31/2024  Time: 12:30pm  Visit type: Neuropsych Eval  Provider: Hrenán  Location: virtual  Testing/imaging: NA  Additional notes: Changed to virtual per Hernán Brown on 12/12/2024 at 3:34 PM

## 2024-12-17 ENCOUNTER — TELEPHONE (OUTPATIENT)
Dept: EDUCATION SERVICES | Facility: CLINIC | Age: 69
End: 2024-12-17
Payer: MEDICARE

## 2024-12-17 DIAGNOSIS — Z79.4 TYPE 2 DIABETES MELLITUS WITH HYPERGLYCEMIA, WITH LONG-TERM CURRENT USE OF INSULIN (H): ICD-10-CM

## 2024-12-17 DIAGNOSIS — E11.65 TYPE 2 DIABETES MELLITUS WITH HYPERGLYCEMIA, WITH LONG-TERM CURRENT USE OF INSULIN (H): ICD-10-CM

## 2024-12-17 RX ORDER — INSULIN GLARGINE 100 [IU]/ML
INJECTION, SOLUTION SUBCUTANEOUS
COMMUNITY
Start: 2024-12-17

## 2024-12-17 NOTE — Clinical Note
DEON Fierro was having frequent low sugars and so I reduced his evening long acting insulin from 30 to 20 units.  His sugars are looking better. Thanks, Keren Mendez RDN, LD, [facility] Dietitian and Diabetes  - Endocrinology [facility] [place]

## 2024-12-17 NOTE — TELEPHONE ENCOUNTER
Diabetes Self-Management Education & Support Follow Up    Pat contacted today for follow up related to Type 2 diabetes        Purpose of today's visit:  1 week follow up after reducing evening long acting insulin from 30 to 20 units because of frequent low sugars.      Subjective/Objective:    Reported mealtime insulin:  Mealtime insulin:  6-8 units plus 2u / 50 mg/dl and 150 mg/dl.  Will also take correction insulin at bedtime.    Long actin unit(s) in the morning and 20 units at night.     Eats 3 meals plus evening snack (popcorn or energy bar).  Tries to avoid carbs at bedtime snack.  Does not take insulin before snack but will take a correction if bedtime sugars are low.  Bedtime 9-10 pm.          Saturday night:  late night meal, pizza.    Assessment/Plan:    Sugars improved.  No longer having low sugars.  High sugars likely due to a higher carb meal and not taking enough mealtime insulin.  Recommend adding an additional 2 units of mealtime insulin for a pizza meal.  No further changes recommended at this time.    Follow UP as needed.      Keren Mendez RDN, ACE, [facility]  Dietitian and Diabetes  - Endocrinology  [facility] [place]      Any diabetes medication dose changes were made via the [facility]DE Protocol and Collaborative Practice Agreement. A copy of this encounter was provided to the patient's referring provider.

## 2024-12-18 ENCOUNTER — OFFICE VISIT (OUTPATIENT)
Dept: FAMILY MEDICINE | Facility: CLINIC | Age: 69
End: 2024-12-18
Payer: MEDICARE

## 2024-12-18 VITALS
HEART RATE: 73 BPM | RESPIRATION RATE: 16 BRPM | HEIGHT: 68 IN | DIASTOLIC BLOOD PRESSURE: 73 MMHG | WEIGHT: 222 LBS | BODY MASS INDEX: 33.65 KG/M2 | TEMPERATURE: 98 F | OXYGEN SATURATION: 97 % | SYSTOLIC BLOOD PRESSURE: 130 MMHG

## 2024-12-18 DIAGNOSIS — D53.9 MACROCYTIC ANEMIA: ICD-10-CM

## 2024-12-18 DIAGNOSIS — E11.65 TYPE 2 DIABETES MELLITUS WITH HYPERGLYCEMIA, WITH LONG-TERM CURRENT USE OF INSULIN (H): ICD-10-CM

## 2024-12-18 DIAGNOSIS — Z12.5 SCREENING FOR PROSTATE CANCER: ICD-10-CM

## 2024-12-18 DIAGNOSIS — I10 PRIMARY HYPERTENSION: ICD-10-CM

## 2024-12-18 DIAGNOSIS — R93.7 ABNORMAL FINDINGS ON DIAGNOSTIC IMAGING OF SPINE: ICD-10-CM

## 2024-12-18 DIAGNOSIS — Z79.4 TYPE 2 DIABETES MELLITUS WITH HYPERGLYCEMIA, WITH LONG-TERM CURRENT USE OF INSULIN (H): ICD-10-CM

## 2024-12-18 DIAGNOSIS — Z11.59 ENCOUNTER FOR HEPATITIS C SCREENING TEST FOR LOW RISK PATIENT: ICD-10-CM

## 2024-12-18 DIAGNOSIS — Z13.820 SCREENING FOR OSTEOPOROSIS: ICD-10-CM

## 2024-12-18 DIAGNOSIS — M48.55XA COLLAPSED VERTEBRA, NOT ELSEWHERE CLASSIFIED, THORACOLUMBAR REGION, INITIAL ENCOUNTER FOR FRACTURE (H): ICD-10-CM

## 2024-12-18 DIAGNOSIS — Z00.00 ENCOUNTER FOR MEDICARE ANNUAL WELLNESS EXAM: Primary | ICD-10-CM

## 2024-12-18 LAB
MDC_IDC_LEAD_CONNECTION_STATUS: NORMAL
MDC_IDC_LEAD_CONNECTION_STATUS: NORMAL
MDC_IDC_LEAD_IMPLANT_DT: NORMAL
MDC_IDC_LEAD_IMPLANT_DT: NORMAL
MDC_IDC_LEAD_LOCATION: NORMAL
MDC_IDC_LEAD_LOCATION: NORMAL
MDC_IDC_LEAD_LOCATION_DETAIL_1: NORMAL
MDC_IDC_LEAD_LOCATION_DETAIL_1: NORMAL
MDC_IDC_LEAD_MFG: NORMAL
MDC_IDC_LEAD_MFG: NORMAL
MDC_IDC_LEAD_MODEL: NORMAL
MDC_IDC_LEAD_MODEL: NORMAL
MDC_IDC_LEAD_POLARITY_TYPE: NORMAL
MDC_IDC_LEAD_POLARITY_TYPE: NORMAL
MDC_IDC_LEAD_SERIAL: NORMAL
MDC_IDC_LEAD_SERIAL: NORMAL
MDC_IDC_LEAD_SPECIAL_FUNCTION: NORMAL
MDC_IDC_LEAD_SPECIAL_FUNCTION: NORMAL
MDC_IDC_MSMT_BATTERY_DTM: NORMAL
MDC_IDC_MSMT_BATTERY_REMAINING_LONGEVITY: 141 MO
MDC_IDC_MSMT_BATTERY_RRT_TRIGGER: 2.62
MDC_IDC_MSMT_BATTERY_STATUS: NORMAL
MDC_IDC_MSMT_BATTERY_VOLTAGE: 3.08 V
MDC_IDC_MSMT_LEADCHNL_RA_IMPEDANCE_VALUE: 323 OHM
MDC_IDC_MSMT_LEADCHNL_RA_IMPEDANCE_VALUE: 399 OHM
MDC_IDC_MSMT_LEADCHNL_RA_PACING_THRESHOLD_AMPLITUDE: 0.75 V
MDC_IDC_MSMT_LEADCHNL_RA_PACING_THRESHOLD_PULSEWIDTH: 0.4 MS
MDC_IDC_MSMT_LEADCHNL_RA_SENSING_INTR_AMPL: 3.75 MV
MDC_IDC_MSMT_LEADCHNL_RV_IMPEDANCE_VALUE: 418 OHM
MDC_IDC_MSMT_LEADCHNL_RV_IMPEDANCE_VALUE: 475 OHM
MDC_IDC_MSMT_LEADCHNL_RV_PACING_THRESHOLD_AMPLITUDE: 0.5 V
MDC_IDC_MSMT_LEADCHNL_RV_PACING_THRESHOLD_PULSEWIDTH: 0.4 MS
MDC_IDC_PG_IMPLANT_DTM: NORMAL
MDC_IDC_PG_MFG: NORMAL
MDC_IDC_PG_MODEL: NORMAL
MDC_IDC_PG_SERIAL: NORMAL
MDC_IDC_PG_TYPE: NORMAL
MDC_IDC_SESS_CLINIC_NAME: NORMAL
MDC_IDC_SESS_DTM: NORMAL
MDC_IDC_SESS_TYPE: NORMAL
MDC_IDC_SET_BRADY_AT_MODE_SWITCH_RATE: 171 {BEATS}/MIN
MDC_IDC_SET_BRADY_HYSTRATE: NORMAL
MDC_IDC_SET_BRADY_LOWRATE: 60 {BEATS}/MIN
MDC_IDC_SET_BRADY_MAX_SENSOR_RATE: 130 {BEATS}/MIN
MDC_IDC_SET_BRADY_MAX_TRACKING_RATE: 130 {BEATS}/MIN
MDC_IDC_SET_BRADY_MODE: NORMAL
MDC_IDC_SET_BRADY_PAV_DELAY_HIGH: 140 MS
MDC_IDC_SET_BRADY_PAV_DELAY_LOW: 240 MS
MDC_IDC_SET_BRADY_SAV_DELAY_HIGH: 110 MS
MDC_IDC_SET_BRADY_SAV_DELAY_LOW: 220 MS
MDC_IDC_SET_LEADCHNL_RA_PACING_AMPLITUDE: 1.5 V
MDC_IDC_SET_LEADCHNL_RA_PACING_ANODE_ELECTRODE_1: NORMAL
MDC_IDC_SET_LEADCHNL_RA_PACING_ANODE_LOCATION_1: NORMAL
MDC_IDC_SET_LEADCHNL_RA_PACING_CAPTURE_MODE: NORMAL
MDC_IDC_SET_LEADCHNL_RA_PACING_CATHODE_ELECTRODE_1: NORMAL
MDC_IDC_SET_LEADCHNL_RA_PACING_CATHODE_LOCATION_1: NORMAL
MDC_IDC_SET_LEADCHNL_RA_PACING_POLARITY: NORMAL
MDC_IDC_SET_LEADCHNL_RA_PACING_PULSEWIDTH: 0.4 MS
MDC_IDC_SET_LEADCHNL_RA_SENSING_ANODE_ELECTRODE_1: NORMAL
MDC_IDC_SET_LEADCHNL_RA_SENSING_ANODE_LOCATION_1: NORMAL
MDC_IDC_SET_LEADCHNL_RA_SENSING_CATHODE_ELECTRODE_1: NORMAL
MDC_IDC_SET_LEADCHNL_RA_SENSING_CATHODE_LOCATION_1: NORMAL
MDC_IDC_SET_LEADCHNL_RA_SENSING_POLARITY: NORMAL
MDC_IDC_SET_LEADCHNL_RA_SENSING_SENSITIVITY: 0.3 MV
MDC_IDC_SET_LEADCHNL_RV_PACING_AMPLITUDE: 2 V
MDC_IDC_SET_LEADCHNL_RV_PACING_ANODE_ELECTRODE_1: NORMAL
MDC_IDC_SET_LEADCHNL_RV_PACING_ANODE_LOCATION_1: NORMAL
MDC_IDC_SET_LEADCHNL_RV_PACING_CAPTURE_MODE: NORMAL
MDC_IDC_SET_LEADCHNL_RV_PACING_CATHODE_ELECTRODE_1: NORMAL
MDC_IDC_SET_LEADCHNL_RV_PACING_CATHODE_LOCATION_1: NORMAL
MDC_IDC_SET_LEADCHNL_RV_PACING_POLARITY: NORMAL
MDC_IDC_SET_LEADCHNL_RV_PACING_PULSEWIDTH: 0.4 MS
MDC_IDC_SET_LEADCHNL_RV_SENSING_ANODE_ELECTRODE_1: NORMAL
MDC_IDC_SET_LEADCHNL_RV_SENSING_ANODE_LOCATION_1: NORMAL
MDC_IDC_SET_LEADCHNL_RV_SENSING_CATHODE_ELECTRODE_1: NORMAL
MDC_IDC_SET_LEADCHNL_RV_SENSING_CATHODE_LOCATION_1: NORMAL
MDC_IDC_SET_LEADCHNL_RV_SENSING_POLARITY: NORMAL
MDC_IDC_SET_LEADCHNL_RV_SENSING_SENSITIVITY: 0.9 MV
MDC_IDC_SET_ZONE_DETECTION_INTERVAL: 350 MS
MDC_IDC_SET_ZONE_DETECTION_INTERVAL: 400 MS
MDC_IDC_SET_ZONE_STATUS: NORMAL
MDC_IDC_SET_ZONE_STATUS: NORMAL
MDC_IDC_SET_ZONE_TYPE: NORMAL
MDC_IDC_SET_ZONE_VENDOR_TYPE: NORMAL
MDC_IDC_STAT_AT_BURDEN_PERCENT: 0 %
MDC_IDC_STAT_AT_DTM_END: NORMAL
MDC_IDC_STAT_AT_DTM_START: NORMAL
MDC_IDC_STAT_BRADY_AP_VP_PERCENT: 5.95 %
MDC_IDC_STAT_BRADY_AP_VS_PERCENT: 0 %
MDC_IDC_STAT_BRADY_AS_VP_PERCENT: 93.96 %
MDC_IDC_STAT_BRADY_AS_VS_PERCENT: 0.09 %
MDC_IDC_STAT_BRADY_DTM_END: NORMAL
MDC_IDC_STAT_BRADY_DTM_START: NORMAL
MDC_IDC_STAT_BRADY_RA_PERCENT_PACED: 5.96 %
MDC_IDC_STAT_BRADY_RV_PERCENT_PACED: 99.91 %
MDC_IDC_STAT_EPISODE_RECENT_COUNT: 0
MDC_IDC_STAT_EPISODE_RECENT_COUNT_DTM_END: NORMAL
MDC_IDC_STAT_EPISODE_RECENT_COUNT_DTM_START: NORMAL
MDC_IDC_STAT_EPISODE_TOTAL_COUNT: 0
MDC_IDC_STAT_EPISODE_TOTAL_COUNT_DTM_END: NORMAL
MDC_IDC_STAT_EPISODE_TOTAL_COUNT_DTM_START: NORMAL
MDC_IDC_STAT_EPISODE_TYPE: NORMAL
MDC_IDC_STAT_TACHYTHERAPY_RECENT_DTM_END: NORMAL
MDC_IDC_STAT_TACHYTHERAPY_RECENT_DTM_START: NORMAL
MDC_IDC_STAT_TACHYTHERAPY_TOTAL_DTM_END: NORMAL
MDC_IDC_STAT_TACHYTHERAPY_TOTAL_DTM_START: NORMAL

## 2024-12-18 PROCEDURE — 99213 OFFICE O/P EST LOW 20 MIN: CPT | Mod: 25 | Performed by: FAMILY MEDICINE

## 2024-12-18 PROCEDURE — G0438 PPPS, INITIAL VISIT: HCPCS | Performed by: FAMILY MEDICINE

## 2024-12-18 SDOH — HEALTH STABILITY: PHYSICAL HEALTH: ON AVERAGE, HOW MANY DAYS PER WEEK DO YOU ENGAGE IN MODERATE TO STRENUOUS EXERCISE (LIKE A BRISK WALK)?: 1 DAY

## 2024-12-18 ASSESSMENT — SOCIAL DETERMINANTS OF HEALTH (SDOH): HOW OFTEN DO YOU GET TOGETHER WITH FRIENDS OR RELATIVES?: ONCE A WEEK

## 2024-12-18 NOTE — PATIENT INSTRUCTIONS
Patient Education   Preventive Care Advice   This is general advice given by our system to help you stay healthy. However, your care team may have specific advice just for you. Please talk to your care team about your preventive care needs.  Nutrition  Eat 5 or more servings of fruits and vegetables each day.  Try wheat bread, brown rice and whole grain pasta (instead of white bread, rice, and pasta).  Get enough calcium and vitamin D. Check the label on foods and aim for 100% of the RDA (recommended daily allowance).  Lifestyle  Exercise at least 150 minutes each week  (30 minutes a day, 5 days a week).  Do muscle strengthening activities 2 days a week. These help control your weight and prevent disease.  No smoking.  Wear sunscreen to prevent skin cancer.  Have a dental exam and cleaning every 6 months.  Yearly exams  See your health care team every year to talk about:  Any changes in your health.  Any medicines your care team has prescribed.  Preventive care, family planning, and ways to prevent chronic diseases.  Shots (vaccines)   HPV shots (up to age 26), if you've never had them before.  Hepatitis B shots (up to age 59), if you've never had them before.  COVID-19 shot: Get this shot when it's due.  Flu shot: Get a flu shot every year.  Tetanus shot: Get a tetanus shot every 10 years.  Pneumococcal, hepatitis A, and RSV shots: Ask your care team if you need these based on your risk.  Shingles shot (for age 50 and up)  General health tests  Diabetes screening:  Starting at age 35, Get screened for diabetes at least every 3 years.  If you are younger than age 35, ask your care team if you should be screened for diabetes.  Cholesterol test: At age 39, start having a cholesterol test every 5 years, or more often if advised.  Bone density scan (DEXA): At age 50, ask your care team if you should have this scan for osteoporosis (brittle bones).  Hepatitis C: Get tested at least once in your life.  STIs (sexually  transmitted infections)  Before age 24: Ask your care team if you should be screened for STIs.  After age 24: Get screened for STIs if you're at risk. You are at risk for STIs (including HIV) if:  You are sexually active with more than one person.  You don't use condoms every time.  You or a partner was diagnosed with a sexually transmitted infection.  If you are at risk for HIV, ask about PrEP medicine to prevent HIV.  Get tested for HIV at least once in your life, whether you are at risk for HIV or not.  Cancer screening tests  Cervical cancer screening: If you have a cervix, begin getting regular cervical cancer screening tests starting at age 21.  Breast cancer scan (mammogram): If you've ever had breasts, begin having regular mammograms starting at age 40. This is a scan to check for breast cancer.  Colon cancer screening: It is important to start screening for colon cancer at age 45.  Have a colonoscopy test every 10 years (or more often if you're at risk) Or, ask your provider about stool tests like a FIT test every year or Cologuard test every 3 years.  To learn more about your testing options, visit:   .  For help making a decision, visit:   https://bit.ly/xc63341.  Prostate cancer screening test: If you have a prostate, ask your care team if a prostate cancer screening test (PSA) at age 55 is right for you.  Lung cancer screening: If you are a current or former smoker ages 50 to 80, ask your care team if ongoing lung cancer screenings are right for you.  For informational purposes only. Not to replace the advice of your health care provider. Copyright   2023 [facility]. All rights reserved. Clinically reviewed by the [facility]. [employer] [id number] - REV 01/24.  Preventing Falls: Care Instructions  Injuries and health problems such as trouble walking or poor eyesight can increase your risk of falling. So can some medicines. But there are things you can do to help  "prevent falls. You can exercise to get stronger. You can also arrange your home to make it safer.    Talk to your doctor about the medicines you take. Ask if any of them increase the risk of falls and whether they can be changed or stopped.   Try to exercise regularly. It can help improve your strength and balance. This can help lower your risk of falling.         Practice fall safety and prevention.   Wear low-heeled shoes that fit well and give your feet good support. Talk to your doctor if you have foot problems that make this hard.  Carry a cellphone or wear a medical alert device that you can use to call for help.  Use stepladders instead of chairs to reach high objects. Don't climb if you're at risk for falls. Ask for help, if needed.  Wear the correct eyeglasses, if you need them.        Make your home safer.   Remove rugs, cords, clutter, and furniture from walkways.  Keep your house well lit. Use night-lights in hallways and bathrooms.  Install and use sturdy handrails on stairways.  Wear nonskid footwear, even inside. Don't walk barefoot or in socks without shoes.        Be safe outside.   Use handrails, curb cuts, and ramps whenever possible.  Keep your hands free by using a shoulder bag or backpack.  Try to walk in well-lit areas. Watch out for uneven ground, changes in pavement, and debris.  Be careful in the winter. Walk on the grass or gravel when sidewalks are slippery. Use de-icer on steps and walkways. Add non-slip devices to shoes.    Put grab bars and nonskid mats in your shower or tub and near the toilet. Try to use a shower chair or bath bench when bathing.   Get into a tub or shower by putting in your weaker leg first. Get out with your strong side first. Have a phone or medical alert device in the bathroom with you.   Where can you learn more?  Go to https://www.[employer].net/patiented  Enter G117 in the search box to learn more about \"Preventing Falls: Care Instructions.\"  Current as of: " July 31, 2024  Content Version: 14.3    2024 [email or web address].   Care instructions adapted under license by your healthcare professional. If you have questions about a medical condition or this instruction, always ask your healthcare professional. [email or web address] disclaims any warranty or liability for your use of this information.    Learning About Stress  What is stress?     Stress is your body's response to a hard situation. Your body can have a physical, emotional, or mental response. Stress is a fact of life for most people, and it affects everyone differently. What causes stress for you may not be stressful for someone else.  A lot of things can cause stress. You may feel stress when you go on a job interview, take a test, or run a race. This kind of short-term stress is normal and even useful. It can help you if you need to work hard or react quickly. For example, stress can help you finish an important job on time.  Long-term stress is caused by ongoing stressful situations or events. Examples of long-term stress include long-term health problems, ongoing problems at work, or conflicts in your family. Long-term stress can harm your health.  How does stress affect your health?  When you are stressed, your body responds as though you are in danger. It makes hormones that speed up your heart, make you breathe faster, and give you a burst of energy. This is called the fight-or-flight stress response. If the stress is over quickly, your body goes back to normal and no harm is done.  But if stress happens too often or lasts too long, it can have bad effects. Long-term stress can make you more likely to get sick, and it can make symptoms of some diseases worse. If you tense up when you are stressed, you may develop neck, shoulder, or low back pain. Stress is linked to high blood pressure and heart disease.  Stress also harms your emotional health. It can make you donohue, tense, or depressed. Your  relationships may suffer, and you may not do well at work or school.  What can you do to manage stress?  You can try these things to help manage stress:   Do something active. Exercise or activity can help reduce stress. Walking is a great way to get started. Even everyday activities such as housecleaning or yard work can help.  Try yoga or jose a chi. These techniques combine exercise and meditation. You may need some training at first to learn them.  Do something you enjoy. For example, listen to music or go to a movie. Practice your hobby or do volunteer work.  Meditate. This can help you relax, because you are not worrying about what happened before or what may happen in the future.  Do guided imagery. Imagine yourself in any setting that helps you feel calm. You can use online videos, books, or a teacher to guide you.  Do breathing exercises. For example:  From a standing position, bend forward from the waist with your knees slightly bent. Let your arms dangle close to the floor.  Breathe in slowly and deeply as you return to a standing position. Roll up slowly and lift your head last.  Hold your breath for just a few seconds in the standing position.  Breathe out slowly and bend forward from the waist.  Let your feelings out. Talk, laugh, cry, and express anger when you need to. Talking with supportive friends or family, a counselor, or a corina leader about your feelings is a healthy way to relieve stress. Avoid discussing your feelings with people who make you feel worse.  Write. It may help to write about things that are bothering you. This helps you find out how much stress you feel and what is causing it. When you know this, you can find better ways to cope.  What can you do to prevent stress?  You might try some of these things to help prevent stress:  Manage your time. This helps you find time to do the things you want and need to do.  Get enough sleep. Your body recovers from the stresses of the day while  "you are sleeping.  Get support. Your family, friends, and community can make a difference in how you experience stress.  Limit your news feed. Avoid or limit time on social media or news that may make you feel stressed.  Do something active. Exercise or activity can help reduce stress. Walking is a great way to get started.  Where can you learn more?  Go to https://www.[employer].net/patiented  Enter N032 in the search box to learn more about \"Learning About Stress.\"  Current as of: October 24, 2023  Content Version: 14.3    2024 [employer].   Care instructions adapted under license by your healthcare professional. If you have questions about a medical condition or this instruction, always ask your healthcare professional. [employer] disclaims any warranty or liability for your use of this information.    Substance Use Disorder: Care Instructions  Overview     You can improve your life and health by stopping your use of alcohol or drugs. When you don't drink or use drugs, you may feel and sleep better. You may get along better with your family, friends, and coworkers. There are medicines and programs that can help with substance use disorder.  How can you care for yourself at home?  Here are some ways to help you stay sober and prevent relapse.  If you have been given medicine to help keep you sober or reduce your cravings, be sure to take it exactly as prescribed.  Talk to your doctor about programs that can help you stop using drugs or drinking alcohol.  Do not keep alcohol or drugs in your home.  Plan ahead. Think about what you'll say if other people ask you to drink or use drugs. Try not to spend time with people who drink or use drugs.  Use the time and money spent on drinking or drugs to do something that's important to you.  Preventing a relapse  Have a plan to deal with relapse. Learn to recognize changes in your thinking that lead you to drink or use drugs. Get help before you start " to drink or use drugs again.  Try to stay away from situations, friends, or places that may lead you to drink or use drugs.  If you feel the need to drink alcohol or use drugs again, seek help right away. Call a trusted friend or family member. Some people get support from organizations such as Narcotics Anonymous or [employer] or from treatment facilities.  If you relapse, get help as soon as you can. Some people make a plan with another person that outlines what they want that person to do for them if they relapse. The plan usually includes how to handle the relapse and who to notify in case of relapse.  Don't give up. Remember that a relapse doesn't mean that you have failed. Use the experience to learn the triggers that lead you to drink or use drugs. Then quit again. Recovery is a lifelong process. Many people have several relapses before they are able to quit for good.  Follow-up care is a key part of your treatment and safety. Be sure to make and go to all appointments, and call your doctor if you are having problems. It's also a good idea to know your test results and keep a list of the medicines you take.  When should you call for help?   Call 911  anytime you think you may need emergency care. For example, call if you or someone else:    Has overdosed or has withdrawal signs. Be sure to tell the emergency workers that you are or someone else is using or trying to quit using drugs. Overdose or withdrawal signs may include:  Losing consciousness.  Seizure.  Seeing or hearing things that aren't there (hallucinations).     Is thinking or talking about suicide or harming others.   Where to get help 24 hours a day, 7 days a week   If you or someone you know talks about suicide, self-harm, a mental health crisis, a substance use crisis, or any other kind of emotional distress, get help right away. You can:    Call the Suicide and Crisis Lifeline at 684.     Call 2-609-866-TALK (1-136.460.7159).     Text HOME  "to 916619 to access the Crisis Text Line.   Consider saving these numbers in your phone.  Go to [employer] for more information or to chat online.  Call your doctor now or seek immediate medical care if:    You are having withdrawal symptoms. These may include nausea or vomiting, sweating, shakiness, and anxiety.   Watch closely for changes in your health, and be sure to contact your doctor if:    You have a relapse.     You need more help or support to stop.   Where can you learn more?  Go to https://www.[employer].net/patiented  Enter H573 in the search box to learn more about \"Substance Use Disorder: Care Instructions.\"  Current as of: November 15, 2023  Content Version: 14.3    2024 [employer].   Care instructions adapted under license by your healthcare professional. If you have questions about a medical condition or this instruction, always ask your healthcare professional. [employer] disclaims any warranty or liability for your use of this information.       "

## 2024-12-18 NOTE — PROGRESS NOTES
"Preventive Care Visit  [facility]  Amelia Johnson DO, Family Medicine  Dec 18, 2024      Assessment & Plan     1. Encounter for Medicare annual wellness exam (Primary)  - Adult Dermatology  Referral; Future    Reviewed health history and health maintenance recommendations.  Recommend formal dermatology skin check as he reports history of skin cancer.  Up-to-date routine vaccines.  Colonoscopy is scheduled, though not until May is needs to be done at the hospital.      2. Type 2 diabetes mellitus with hyperglycemia, with long-term current use of insulin (H)  A1c above goal, adjusting insulin with endocrinology.    3. Primary hypertension  Blood pressure at goal, continue current regimen.    4. Screening for prostate cancer  - PSA, screen; Future    5. Encounter for hepatitis C screening test for low risk patient  - Hepatitis C Screen Reflex to HCV RNA Quant and Genotype; Future    6. Macrocytic anemia  - Vitamin B12; Future    Macrocytic anemia, rule out B12 deficiency.    7. Screening for osteoporosis  8. Abnormal findings on diagnostic imaging of spine  9. Collapsed vertebra, not elsewhere classified, thoracolumbar region, initial encounter for fracture (H)  - DX Bone Density; Future      Recommend screening for osteoporosis with DEXA scan given compression deformity of T12 on imaging.      Patient has been advised of split billing requirements and indicates understanding: Yes        BMI  Estimated body mass index is 33.75 kg/m  as calculated from the following:    Height as of this encounter: 1.727 m (5' 8\").    Weight as of this encounter: 100.7 kg (222 lb).   Weight management plan: Discussed healthy diet and exercise guidelines    Counseling  Appropriate preventive services were addressed with this patient via screening, questionnaire, or discussion as appropriate for fall prevention, nutrition, physical activity, Tobacco-use cessation, social engagement, weight loss and " cognition.  Checklist reviewing preventive services available has been given to the patient.  Reviewed patient's diet, addressing concerns and/or questions.   He is at risk for lack of exercise and has been provided with information to increase physical activity for the benefit of his well-being.   The patient was instructed to see the dentist every 6 months.   He is at risk for psychosocial distress and has been provided with information to reduce risk.         Subjective   Pat is a 69 year old, presenting for the following:  Physical (Thigh going numb when he sits (wife thinks it has to do with his parkinson but could be something))        12/18/2024    10:59 AM   Additional Questions   Roomed by Ozzie SIMMS MA         Via the Health Maintenance questionnaire, the patient has reported the following services have been completed -Colonscopy: Absolute Antibody 2022-01-01, this information has been sent to the abstraction team.    HPI    Encounter for Medicare annual wellness exam (Primary)  - Hep C: will screen today  - Covid: up to date, declines    - Colon: Care Everywhere - 9/13/21 - repeat 3 years. Scheduled at the [facility] in May. Due to medical comorbidities, needed completed at a particular location.     Recent eye exam 2 weeks ago. Ophthalmology.   Recent dental visit.  Using hearing aids, due for follow up.     Denies any skin changes today.    Type 2 diabetes mellitus with hyperglycemia, with long-term current use of insulin (H)  Hemoglobin A1C   Date Value Ref Range Status   11/01/2024 8.4 (H) 0.0 - 5.6 % Final     Comment:     Normal <5.7%   Prediabetes 5.7-6.4%    Diabetes 6.5% or higher     Note: Adopted from ADA consensus guidelines.     Working with endocrinology - glucagon PRN, CGM, novolog, glargine.   Atorvastatin 40mg, ASA 81mg daily    Adjusting insulin with endocrinology.   Has been having some lows at night.   CGM estimates under 8.0.     Primary hypertension  BP Readings from Last 6 Encounters:    12/18/24 130/73   12/06/24 (!) 142/70   12/05/24 122/81   11/26/24 93/63   10/16/24 99/57   10/15/24 138/73     Last Comprehensive Metabolic Panel:  Lab Results   Component Value Date     12/05/2024    POTASSIUM 4.3 12/05/2024    CHLORIDE 101 12/05/2024    CO2 27 12/05/2024    ANIONGAP 8 12/05/2024     (H) 12/05/2024    BUN 25.0 (H) 12/05/2024    CR 1.02 12/05/2024    GFRESTIMATED 79 12/05/2024    JAKE 8.9 12/05/2024     Losartan 50mg daily    Readings at home have been in range.       Health Care Directive  Patient does not have a Health Care Directive: Patient states has Advance Directive and will bring in a copy to clinic.          12/18/2024   General Health   How would you rate your overall physical health? (!) FAIR   Feel stress (tense, anxious, or unable to sleep) To some extent      (!) STRESS CONCERN      12/18/2024   Nutrition   Diet: Diabetic            12/18/2024   Exercise   Days per week of moderate/strenous exercise 1 day      (!) EXERCISE CONCERN      12/18/2024   Social Factors   Frequency of gathering with friends or relatives Once a week   Worry food won't last until get money to buy more No   Food not last or not have enough money for food? No   Do you have housing? (Housing is defined as stable permanent housing and does not include staying ouside in a car, in a tent, in an abandoned building, in an overnight shelter, or couch-surfing.) Yes   Are you worried about losing your housing? No   Lack of transportation? No   Unable to get utilities (heat,electricity)? No            12/18/2024   Fall Risk   Fallen 2 or more times in the past year? No    Trouble with walking or balance? Yes    Gait Speed Test (Document in seconds) 3.16       Patient-reported          12/18/2024   Activities of Daily Living- Home Safety   Needs help with the following daily activites None of the above   Safety concerns in the home None of the above            12/18/2024   Dental   Dentist two times every  year? (!) NO            12/18/2024   Hearing Screening   Hearing concerns? None of the above            12/18/2024   Driving Risk Screening   Patient/family members have concerns about driving No            12/18/2024   General Alertness/Fatigue Screening   Have you been more tired than usual lately? No            12/18/2024   Urinary Incontinence Screening   Bothered by leaking urine in past 6 months No            12/18/2024   TB Screening   Were you born outside of the [place]? No              Today's PHQ-2 Score:       10/9/2024    10:43 AM   PHQ-2 ( 1999 [employer])   Q1: Little interest or pleasure in doing things 0   Q2: Feeling down, depressed or hopeless 0   PHQ-2 Score 0         12/18/2024   Substance Use   Alcohol more than 3/day or more than 7/wk No   Do you have a current opioid prescription? No   How severe/bad is pain from 1 to 10? 5/10   Do you use any other substances recreationally? (!) CANNABIS PRODUCTS        Social History     Tobacco Use    Smoking status: Former     Types: Cigarettes    Smokeless tobacco: Never    Tobacco comments:     Quit 10 years ago - quit a few times; started at age 12 or 13 and then stopped at 26 years and then stopped for 15 years got  and started smoking again   Vaping Use    Vaping status: Never Used   Substance Use Topics    Alcohol use: Not Currently     Comment: quit 2 years ago    Drug use: Not Currently     Comment: used to smoke marijuana when young; rare use       ASCVD Risk   The ASCVD Risk score (Tamy DESAI, et al., 2019) failed to calculate for the following reasons:    The valid total cholesterol range is 130 to 320 mg/dL            Reviewed and updated as needed this visit by Provider   Tobacco  Allergies  Meds  Problems  Med Hx  Surg Hx  Fam Hx              Current providers sharing in care for this patient include:  Patient Care Team:  Amelia Johnson DO as PCP - General (Family Medicine)  Cedric Saldana MD as MD  (Gastroenterology)  Keke Dyer MD Kohlenberg, Jacob D, MD as MD (Endocrinology, Diabetes, and Metabolism)  Cedric Saldana MD as Assigned Gastroenterology Provider  Ayden Fraire MD as MD (Gastroenterology)  Telma Rangel MD Scheidecker, Emily as Nurse Coordinator (Surgery)  Ashvin Haider MD as Assigned Surgical Provider  Caio Lyn MD as MD (Hematology & Oncology)  Tawana Hinkle RN as Specialty Care Coordinator (Hematology & Oncology)  Romana Briggs RN (Inactive) as Diabetes Educator  Willian Thornton OD (Optometry)  Valeri Gomez PA-C as Physician Assistant (Endocrinology, Diabetes, and Metabolism)  Caio Lyn MD as Assigned Cancer Care Provider  Zonia Mayfield AuD as Audiologist (Audiology)  Idalia Funez RD, LD as Registered Dietitian  Juan Jose Armenta MD as MD (Neurology)  Yin Olvera, PhD LP as Psychologist (Neuropsychology)  Alberto Jackson APRN CNP as Nurse Practitioner (Neurology)  Morenita Delgado, PharmD as Pharmacist (Pharmacist)  Mj Reynolds MD as Assigned Endocrinology Provider  Morenita Delgado, PharmD as Assigned MTM Pharmacist  Sapphire Mojica PA-C as Assigned Heart and Vascular Provider  Carline Dueñas MD as MD (Ophthalmology)  Amelia Johnson DO as Assigned PCP  Juan Jose Armenta MD as Assigned Neuroscience Provider    The following health maintenance items are reviewed in Epic and correct as of today:  Health Maintenance   Topic Date Due    ANNUAL REVIEW OF HM ORDERS  Never done    HEPATITIS C SCREENING  Never done    MEDICARE ANNUAL WELLNESS VISIT  05/11/2019    COLORECTAL CANCER SCREENING  09/13/2024    COVID-19 Vaccine (7 - 2024-25 season) 11/20/2024    A1C  02/01/2025    MICROALBUMIN  05/09/2025    LUNG CANCER SCREENING  07/08/2025    DIABETIC FOOT EXAM  08/08/2025    BMP  12/05/2025    LIPID  12/05/2025    EYE EXAM  12/05/2025    FALL RISK ASSESSMENT  12/18/2025    ADVANCE CARE  "PLANNING  02/20/2028    DTAP/TDAP/TD IMMUNIZATION (4 - Td or Tdap) 06/30/2031    PHQ-2 (once per calendar year)  Completed    INFLUENZA VACCINE  Completed    Pneumococcal Vaccine: 65+ Years  Completed    ZOSTER IMMUNIZATION  Completed    RSV VACCINE  Completed    AORTIC ANEURYSM SCREENING (SYSTEM ASSIGNED)  Completed    HPV IMMUNIZATION  Aged Out    MENINGITIS IMMUNIZATION  Aged Out    RSV MONOCLONAL ANTIBODY  Aged Out         Review of Systems  Constitutional, HEENT, cardiovascular, pulmonary, GI, , musculoskeletal, neuro, skin, endocrine and psych systems are negative, except as otherwise noted.     Objective    Exam  /73   Pulse 73   Temp 98  F (36.7  C) (Oral)   Resp 16   Ht 1.727 m (5' 8\")   Wt 100.7 kg (222 lb)   SpO2 97%   BMI 33.75 kg/m     Estimated body mass index is 33.75 kg/m  as calculated from the following:    Height as of this encounter: 1.727 m (5' 8\").    Weight as of this encounter: 100.7 kg (222 lb).    Physical Exam    GENERAL: alert and no distress  EYES: Eyes grossly normal to inspection, PERRL and conjunctivae and sclerae normal  HENT: ear canals and TM's normal, nose and mouth without ulcers or lesions  NECK: no adenopathy, no asymmetry, masses, or scars  RESP: lungs clear to auscultation - no rales, rhonchi or wheezes  CV: regular rate and rhythm, no murmurs   ABDOMEN: soft, nontender, no hepatosplenomegaly, no masses and bowel sounds normal  MS: no gross musculoskeletal defects noted, no edema  SKIN: no suspicious lesions or rashes  NEURO: Normal strength and tone, mentation intact and speech normal  PSYCH: mentation appears normal, affect normal/bright        12/18/2024   Mini Cog   Clock Draw Score 2 Normal   3 Item Recall 2 objects recalled   Mini Cog Total Score 4                 Signed Electronically by: Amelia Johnson,     "

## 2024-12-19 ENCOUNTER — PATIENT OUTREACH (OUTPATIENT)
Dept: CARE COORDINATION | Facility: CLINIC | Age: 69
End: 2024-12-19

## 2024-12-19 ENCOUNTER — THERAPY VISIT (OUTPATIENT)
Dept: PHYSICAL THERAPY | Facility: REHABILITATION | Age: 69
End: 2024-12-19
Payer: MEDICARE

## 2024-12-19 DIAGNOSIS — M47.24 OSTEOARTHRITIS OF SPINE WITH RADICULOPATHY, THORACIC REGION: ICD-10-CM

## 2024-12-19 DIAGNOSIS — G89.29 CHRONIC BILATERAL THORACIC BACK PAIN: Primary | ICD-10-CM

## 2024-12-19 DIAGNOSIS — M54.6 CHRONIC BILATERAL THORACIC BACK PAIN: Primary | ICD-10-CM

## 2024-12-19 DIAGNOSIS — M48.062 SPINAL STENOSIS OF LUMBAR REGION WITH NEUROGENIC CLAUDICATION: ICD-10-CM

## 2024-12-23 ENCOUNTER — HOSPITAL ENCOUNTER (OUTPATIENT)
Dept: CARDIOLOGY | Facility: CLINIC | Age: 69
Discharge: HOME | End: 2024-12-23
Attending: NURSE PRACTITIONER | Admitting: NURSE PRACTITIONER
Payer: MEDICARE

## 2024-12-23 ENCOUNTER — TELEPHONE (OUTPATIENT)
Dept: FAMILY MEDICINE | Facility: CLINIC | Age: 69
End: 2024-12-23

## 2024-12-23 DIAGNOSIS — I44.1 HEART BLOCK AV SECOND DEGREE: ICD-10-CM

## 2024-12-23 DIAGNOSIS — Z95.0 CARDIAC PACEMAKER IN SITU: ICD-10-CM

## 2024-12-23 DIAGNOSIS — I25.10 CORONARY ARTERY DISEASE INVOLVING NATIVE CORONARY ARTERY OF NATIVE HEART WITHOUT ANGINA PECTORIS: ICD-10-CM

## 2024-12-23 DIAGNOSIS — I44.2 HEART BLOCK AV THIRD DEGREE (H): ICD-10-CM

## 2024-12-23 DIAGNOSIS — R06.09 DOE (DYSPNEA ON EXERTION): ICD-10-CM

## 2024-12-23 LAB — LVEF ECHO: NORMAL

## 2024-12-23 PROCEDURE — 93306 TTE W/DOPPLER COMPLETE: CPT

## 2024-12-23 NOTE — TELEPHONE ENCOUNTER
Called patient to relay provider message below. Patient and spouse verbalize understanding.    KAI Mcintyre, Amelia Johnson DO  [facility]  Please update Pat/family that Dr. Lyn does not feel trending amylase is necessary unless Pat is experiencing symptoms consistent with pancreatitis.  Amelia Johnson, DO

## 2024-12-24 NOTE — PROGRESS NOTES
Arnaldo Henderson is a 69 year old year old male is being evaluated via a billable video visit.      If the video visit is dropped, the invitation should be resent by: Text to cell phone: [phone]    Will anyone else be joining your video visit? No    Video-Visit Details    Type of service:  Video Visit    Originating Location (pt. Location): Home    Distant Location (provider location):  Off-site    Platform used for Video Visit: [facility]    Neuropsychologist has received verbal consent for a Video Visit from the patient? Yes. Empirical studies have demonstrated that video/hybrid formats are appropriate and effective means for delivering neuropsychological services to the patient. Interview [facility] [place] and formal testing was conducted by the psychometrist in [place] [facility]    Video Start Time (time video started): 12:29 PM    Video End Time (time video stopped): 12:40 PM    RE: Arnaldo Henderson  MR#: [id number]  : 1955  DOS: Dec 31, 2024      NEUROPSYCHOLOGICAL CONSULTATION  This is a medical document intended for communication with the referring provider. It is written in medical language and may contain abbreviations or verbiage that are unfamiliar. It may appear blunt or direct. This report and the results within are not intended to be interpreted in isolation without consultation with your medical provider.     REASON FOR REFERRAL:  Arnaldo Henderson is a 69 year old male with 12 years of formal education. The patient was referred for a neuropsychological evaluation by Juan Jose Armenta MD, to assess his cognitive and emotional functioning secondary to memory difficulties. The evaluation was requested in order to document his current level of functioning, assist with the differential diagnosis and provide appropriate recommendations to the patient, family and treatment team. The patient was informed that the evaluation included multiple  measures of performance and symptom validity, and he was encouraged to provide his best effort at all times. The nature of the neuropsychological evaluation was also discussed, including limits of confidentiality (for suspected child or elder abuse, potential homicide or suicide, and court orders). The patient was also informed that the report would be placed on the electronic medical records system. The patient was also given an opportunity to ask questions. The patient indicated that he understood the information and consented to participate in the evaluation.     PROCEDURE: Review of records and clinical interview; embedded performance validity measures; Mental Status-Orientation; Word Reading subtest of Wide Range Achievement Test-4; Matrix Reasoning, Similarities, Vocabulary, and Digit Span subtests of the Wechsler Adult Intelligence Scale-IV; Oral Trail Making Test; Cole Verbal Learning Test-Revised; [place] Naming Test; Clock Drawing Test; Test of Sustained Attention & Tracking; Story Memory and Line Orientation from the Repeatable Battery for the Assessment of Neuropsychological Status (RBANS); Controlled Oral Word Association Test; Animal Naming; Complex Ideational Material; Geriatric Depression Scale; and Geriatric Anxiety Scale     RELEVANT BACKGROUND INFORMATION AND SUPPORTING DOCUMENTATION  The patient was interviewed and tested via virtual platform for this neuropsychological evaluation. Postdoctoral fellow, Dr. Gale, also attended. Information in this section comes from the patient and review of his electronic medical record.     History of Presenting Problem(s)  Onset and Course: Mr. Henderson described an insidious onset of cognitive symptoms within the past few months. He described the course of these cognitive complaints as stable.  Memory: Endorsed declines in short-term memory and indicated that he forgets recent conversations; denied instances of him repeating  "himself  Attention/Concentration: He reported longstanding attentional issues dating back to childhood. He indicated difficulties attending to curriculum/coursework beginning in elementary school and often was reprimanded for his distractibility.   Processing Speed: Denied ongoing concerns.  Language: Endorsed word-finding challenges; denied paraphasias or difficulties with language comprehension; described handwriting as improved (previously was getting smaller but has improved with practice).   Visuospatial Abilities: Denied ongoing concerns.  Executive Functioning: Denied ongoing concerns.    Functional Status  ADLs: Independent, with no noted concerns    Household chores/Cooking: Independent, with no noted concerns   Finances: Manages joint accounts in collaboration with his wife; manages sole accounts independently, with no noted concerns  Medications: Manages independently with no noted concerns  Driving: Continues to drive on a limited basis (local destinations only); denied recent traffic violations, accidents, or other safety concerns    Health Behaviors   Sleep: He reported averaging 6-8 hours of cumulative sleep nightly. However, he reported difficulties with sleep maintenance. He endorsed snoring but denied history of gasping arousals. He indicated history of parasomnias, beginning 5 years ago, which have reportedly subsided since starting carbidopa-levodopa 4 years ago.   Appetite/Weight: He described increased appetite and preference for carbohydrate-heavy foods over the past 6 months. Mr. Henderson stated that he has gained 20 pounds within the past year.   Pain: He endorsed chronic back pain, which he rated 8/10, on average. He is currently in physical therapy (PT) for back pain. He indicated plans to resume PT for his Parkinson's Disease following completion of PT for back pain.     Psychiatric History  Mr. Henderson described his current mood as \"good.  He reported longstanding history of anxiety but " denied depressed mood. He denied history of panic attacks. He has not been previously engaged in any mental health treatment (psychology or psychiatry services).   He denied behavioral or personality changes. He denied compulsive behaviors.   He denied a history of trauma/abuse, perceptual disturbances (e.g., hallucinations), or disordered thought.  Suicidality/Homicidal ideation: Denied any current or historical suicidal or homicidal ideation, plan, or intent; denied history of suicide attempts   Psychiatric treatment: Denied history of psychiatric hospitalizations    Substance Use History  Alcohol: Denied alcohol use in the past 3 years; estimated drinking 6-12 beers each weekend prior to his Parkinson's Disease diagnosis   Nicotine: Former tobacco smoker; started smoking at age 12; quit 20 years ago  Cannabis: Currently smokes marijuana once per week   Illicit Substances: Denied current or historical use   Problematic Substance Use: Denied history of problematic substance use or chemical dependency treatment    Developmental, Educational, & Occupational History  Gestational: Full-term   complications: None reported  Developmental milestones: Met at expected ages  Native Language: English  Education: Denied history of learning disability, repeating a grade, or requiring special education; described himself as an  average  student; graduated high school; completed [employer] vocational school program  Occupation: Worked as a , , and in sales positions; retired 7 years ago; denied difficulties meeting work expectations prior to his [employer]     Psychosocial History  Born and raised in [place], [place]  Marital:  to current spouse, Veronika, for 20 years; twice   Children: 2 children, 2 grandchildren  Housing: Lives in [facility] with spouse     Family History  The patient reported a family history of Parkinson's Disease in his mother.     Patient's Active Problem List (per  EMR)  Patient Active Problem List   Diagnosis    Retained foreign body    Wound infection    Acute bilateral low back pain without sciatica    Adenomatous polyp of colon    Atherosclerotic heart disease of native coronary artery without angina pectoris    Cancer of skin of scrotum (H)    Cervicalgia    Gastroesophageal reflux disease without esophagitis    History of cholecystectomy    History of coronary artery bypass surgery    Hyperlipidemia, unspecified    Hypertension    Impaired gait    Long term (current) use of aspirin    Mixed conductive and sensorineural hearing loss    Muscle weakness (generalized)    Need for assistance with personal care    Other hydrocele    Parkinson's disease with dyskinesia, unspecified whether manifestations fluctuate (H)    Physical deconditioning    Pseudocyst of pancreas    Sensorineural hearing loss (SNHL) of right ear with unrestricted hearing of left ear    Type 2 diabetes mellitus with hyperglycemia, with long-term current use of insulin (H) A1C: 8.4 as of 11/1/2024    Neuroendocrine carcinoma of small bowel (H)    Failure to thrive in adult    Hyponatremia    Chronic right-sided low back pain without sciatica    Abdominal pain, generalized    Other secondary neuroendocrine tumors (H)    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 Henderson reported onset of Parkinson's symptoms and  "Parkinson's Disease diagnosis approximately 4 years ago. He reported right-handed tremor and dyskinesia. He also described bilateral rigidity in his thighs over the past month, although he believes this might be related to ongoing back problems. He described shuffled gait, primarily at night, but otherwise denied problems with balance or falling.  He denied history of stroke, seizure, head injury with loss of consciousness, myocardial infarction, brain tumor, or brain infection.   Vision: Wears prescription bifocal eyeglasses; denied changes to vision; underwent eye evaluation 1 months ago  Hearing: Wears bilateral hearing aids; denied changes to hearing; underwent hearing evaluation approximately 1 year ago  He reported numbness in his feet bilaterally.     Neurodiagnostic Findings (per EMR)  Brain MRI w/ and w/o contrast (2/5/2024): \"IMPRESSION: 1. No acute intracranial process. 2. Generalized brain atrophy and presumed microvascular ischemic changes as detailed above.    Head CT w/o contrast (2/4/2024):  IMPRESSION: 1.  No acute intracranial injury, hemorrhage, mass, or CT evidence of recent ischemia.      Current Medications (per EMR)  Mr. Henderson has a current medication list which includes the following prescription(s): aspirin, atorvastatin, carbidopa-levodopa, carbidopa-levodopa, zegalogue, gabapentin, glucagon, insulin glargine, zenpep, losartan, magnesium oxide, multi-vitamins, omeprazole, and trazodone.    BEHAVIORAL OBSERVATIONS  Vision: Wore prescription bifocals; adequate for testing purposes  Hearing: Wore bilateral hearing aids; adequate for testing purposes   Attentive and focused while undergoing testing  Appearance: Well-groomed, casually dressed  Gait/Posture: Gait could not be observed due to virtual nature of testing   Sensorimotor: Dyskinesia observed   Behavior: Cooperative, pleasant, no behavioral abnormalities noted  Speech: Fluent, articulate, normal rate, prosody, and volume; no " conversational word finding difficulty  Thought process: Logical, linear, and goal-directed  Thought content: Logical, appropriate   Affect: Broad, responsive, consistent with reported mood; good eye contact  Mood: Euthymic  Insight and Judgment: Intact  Approach to testing: Efficient, deliberate; good frustration tolerance on cognitively demanding tasks  Rapport: Easily established and maintained    Task engagement: Good    RESULTS  See separate abstract for list of neuropsychological measures and test scores. Descriptive ranges are based on American Academy of Clinical Neuropsychology (2020) consensus guidelines, or test manuals where appropriate.    Performance Validity: Performance on neuropsychological tests is dependent upon a number of factors, including sufficient engagement and motivation, to reliably establish an examinee's level of cognitive functioning.  Based upon observations made throughout the evaluation, Mr. Henderson did not appear to deliberately perform in a suboptimal manner and demonstrated good frustration tolerance on cognitively challenging tasks. Performances on embedded measures of cognitive performance validity were in the valid range. Overall, test results are believed to accurately represent the patient's current neurocognitive status.     Orientation: He was oriented to place, time, and personal information. He was able to name the current [place] President and 2/5 of the most recent past [place] Presidents.    Pre-morbid Ability: Psychometric estimate of the patient's premorbid intellectual functioning based on single word reading ability was in the average range, broadly consistent with estimates based on reported educational and vocational histories.     Attention/Processing Speed: Immediate auditory attention and working memory tasks were in the average range. Performances were average but error-prone across a variety of brief verbal tasks requiring executive management of attention and  concentration. Speeded verbal sequencing performance was average. Verbal fluency tasks with a processing speed component were in the average range.     Learning and Memory:  Initial encoding of a word-list over multiple learning trials was low average. Recall of the list after a delay was below average. Recognition of the word-list was below average. Initial encoding of contextualized verbal information in the form of a short story was average, and delayed recall was low average.     Language: Letter-based verbal fluency was average. Semantic verbal fluency was average. Confrontation naming was average. Vocabulary knowledge was average. Comprehension of phrases and short stories was average.    Visuospatial Processing: Performance on a spatial perception task requiring judgement of angled lines was average. Spontaneous production and copy of a clock were notable for mild errors in the spatial arrangement of clock hand numbers.     Executive Functions: Verbal abstract reasoning performance was average. Visual reasoning through pattern identification was average. Speeded oral complex sequencing and cognitive flexibility was high average. His drawing of a clock evidenced no executive-based errors.     Psychological and Behavioral: Assessment of the patient's emotional functioning was completed utilizing the clinical interview and self-report measures of depression and anxiety. On self-report questionnaires, he endorsed minimal depressive and anxiety symptoms, broadly consistent with his reports during clinical interview.     SUMMARY AND DIAGNOSIS: The following opinions and recommendations are based on the interview, behavioral observations, and formal testing data. In summary, results of the current neuropsychological evaluation were broadly commensurate with Mr. Henderson's estimated cognitive baseline. He demonstrated mild inefficiencies on encoding and retrieval on verbal learning/memory tasks, and he was error-prone  on a single measure of executive attention. The remainder of his cognitive abilities, including visuospatial skills, verbal processing speed, language, and cognitive flexibility were within expected limits. He endorsed minimal levels of depression and anxiety symptoms on self-report measures. Functionally, he remains independent with basic and instrumental activities of daily living. He does not meet criteria for mild cognitive impairment (MCI) or dementia at this time. The subtle variability observed on memory and complex attention measures may be related to his history of Parkinson's disease or may be suggestive of very early stages of a neurologically-based process. However, this pattern of cognitive performances may also be related to multiple non-neurological factors, including sleep disruption, chronic pain, and cannabis use.     RECOMMENDATIONS:  It is recommended that the patient stay cognitively, socially, and physically active (as able and in consultation with his medical providers), eat a healthy diet, and continue working closely with his treatment team to monitor and manage his chronic health conditions to promote cognitive health and reduce risk of further cognitive decline.  The patient should continue management of his chronic conditions (e.g., hypertension and diabetes) through regular follow-up with his care team. The patient is encouraged to consult with a Certified Diabetes Educator for education and support related to the management of diabetes and vascular risk factors.  The patient is encouraged to continue monitoring his mood. Should he notice an increase in anxiety symptoms, a referral for psychotherapeutic or pharmacological intervention may be considered.   Given his reported sleep difficulties, these sleep hygiene strategies from the National Sleep Foundation may be helpful: https://www.thensf.org/sleep-tips/. Should sleeping difficulties persist, referral for cognitive behavioral therapy  for insomnia (CBTi) may be of benefit.   He reported weekly cannabis use. Cannabis can lead to acute difficulties with attention/working memory, processing speed, and executive functioning. He may consider limiting his use of cannabis products if these cognitive difficulties become increasingly bothersome.  The following strategies are recommended to optimize daily cognitive functioning:  Use external aids to increase structure in daily life. Ongoing use of compensatory strategies such as notes, lists, and a centralized calendar are supported. Tools such as smart phones with alarms and reminders are helpful in bringing structure to daily activities.   Plan short activities with frequent breaks.  Allot extra time to complete tasks to account for attention difficulties.  Pair an activity that is difficulty to remember with a commonly completed activity to increase likelihood of its completion.  Process information that needs to be retained both visually (e.g., write it down) and verbally (e.g., repeat it aloud).  Place visual cues in highly visible locations to remember important tasks/information.  Repeat and/or provide context to information that you need to remember (e.g., make up a story or mnemonic device).  Eliminate distraction. Eliminating environmental distracters can facilitate attention and memory performance. For example, turning off music or the television while having a conversation, so that all of your attention is focused on the task at hand.  Information regarding local resources related to Parkinson's disease can be found here:  American Parkinson's Disease Association - [place] Chapter  www.apdaparkinson.org/community/[place]/local-resources-support/     Parkinson's Foundation  www.parkinson.org[place]    For information regarding free fitness classes, as well as support groups for people with parkinsonism, contact Brielle Echevarria at [email or web address].    A referral for a  neuropsychological re-evaluation in approximately one year is recommended to document any changes in cognitive functioning, clarify the diagnosis and etiology, and provide further recommendations and prognosis to the patient, family, and treatment team.     The results were discussed with the patient on 1/2/2025 via phone, and his questions were answered. He was encouraged to follow up with his treating healthcare providers to facilitate these recommendations noted in the report.     Thank you for referring this pleasant individual. If you have any further questions, please feel free to contact us.        Niraj Gale, Ph.D.   Neuropsychology Fellow       Gary Jim, PhD, ABPP, LP   Professor and Licensed Psychologist [id number]    Board Certified Clinical Neuropsychologist     All billing noted here is for professional services provided by the psychologist and psychometrist.       Time Spent:      Minutes Date Code Units   Total Time-Neuropsychologist Professional 221 12/31/24 73131 1     12/31/24 39570 3   Neuropsychologist Admin/scoring 0 12/31/24 45924 0     12/31/24 98141 0   Diagnostic Interview 11 12/31/24 10210 1   Psychometrician Time-Test Administration/Score 122 12/31/24 78317 1     12/31/24 98914 3   Diagnosis R41.3 F41.9 G20.B1

## 2024-12-31 ENCOUNTER — VIRTUAL VISIT (OUTPATIENT)
Dept: NEUROPSYCHOLOGY | Facility: CLINIC | Age: 69
End: 2024-12-31
Attending: PSYCHIATRY & NEUROLOGY
Payer: MEDICARE

## 2024-12-31 DIAGNOSIS — G20.B1 PARKINSON'S DISEASE WITH DYSKINESIA, UNSPECIFIED WHETHER MANIFESTATIONS FLUCTUATE (H): ICD-10-CM

## 2024-12-31 DIAGNOSIS — F41.9 ANXIETY: ICD-10-CM

## 2024-12-31 DIAGNOSIS — R41.3 MEMORY LOSS: Primary | ICD-10-CM

## 2024-12-31 PROCEDURE — 90791 PSYCH DIAGNOSTIC EVALUATION: CPT | Mod: 95 | Performed by: PSYCHOLOGIST

## 2024-12-31 PROCEDURE — 96132 NRPSYC TST EVAL PHYS/QHP 1ST: CPT | Performed by: PSYCHOLOGIST

## 2024-12-31 PROCEDURE — 96138 PSYCL/NRPSYC TECH 1ST: CPT | Performed by: PSYCHOLOGIST

## 2024-12-31 PROCEDURE — 96133 NRPSYC TST EVAL PHYS/QHP EA: CPT | Performed by: PSYCHOLOGIST

## 2024-12-31 PROCEDURE — 96139 PSYCL/NRPSYC TST TECH EA: CPT | Performed by: PSYCHOLOGIST

## 2024-12-31 NOTE — PROGRESS NOTES
Pt was seen for neuropsychological evaluation at the request of Dr. Juan Jose Armenta for the purposes of diagnostic clarification and treatment planning. 122 minutes of video test administration and scoring were provided by this writer. Please see Dr. Gary Jim's report for a full interpretation of the findings.    Esau Roberts MA, CSP

## 2024-12-31 NOTE — PROGRESS NOTES
Name  Arnaldo Henderson  [address]       MRN  [id number]  Provider DW  & MG         [birth year]   [facility]       Age  69   Station OP       Sex  Male   Visit Type Video       Education  12   [facility]        Handedness Right                        ORIENTATION     CLOCK DRAWING      Time  0    Command  BORDERLINE    Personal Information    Copy  BORDERLINE    Place            Presidents  3 /6   ORAL TRAILS              Raw z Errors   WRAT-4 READING     Trails A  7 -0.01 0   Raw  56    Trails B  30 0.64 1   SS  92           %ile  30    TSAT       Grade Equivalence 10.8      Raw z          Total Time  96 -0.29346    WAIS-IV      Total Errors  5 -1.08      Raw SS RDS         Digit Span  22 9 7  RBANS       Vocabulary  30 8     Raw z ss/%ile   Matrix Reasoning 9 8   Line Orientation 16 -0.14 26-50   Similarities  26 11   Story Immediate 17 -0.11 11   Est. VCI   98   Story Delay  6 -1.36 7                [place] NAMING TEST     HVLT       Raw 51       Raw T    SS 8 %ile 0   Trial 1  5     T 48 MAS 0   Trial 2  7     Total Stim. Correct 0    Trial 3  7     Total Phon. Correct 4    Learning  2           Total Recall  19 41    COWAT      Delayed Recall 5 36    Form FAS     Percent Retention 71 42    Raw 35     True Positives 9     SS 9     False Positives 1     T 49 MAS 0   Discrimination Index 8 36    %ile 0 z 0          ANIMAL FLUENCY     GDS       Raw 16     Total  5     SS 8 z 0   Interpretation MINIMAL     T 47                  GAS       COMPLEX IDEATIONAL MATERIAL     Raw Average Score    Raw  11    Somatic 7 0.78     SS  9    Cognitive 2 0.25     T  46    Affective 1 0.13           Total 10 0.40

## 2024-12-31 NOTE — LETTER
2024      RE: Arnaldo Henderson  [address] [facility] [address]   Arnaldo Henderson is a 69 year old year old male is being evaluated via a billable video visit.      If the video visit is dropped, the invitation should be resent by: Text to cell phone: [phone]    Will anyone else be joining your video visit? No    Video-Visit Details    Type of service:  Video Visit    Originating Location (pt. Location): Home    Distant Location (provider location):  Off-site    Platform used for Video Visit: Bennett    Neuropsychologist has received verbal consent for a Video Visit from the patient? Yes. Empirical studies have demonstrated that video/hybrid formats are appropriate and effective means for delivering neuropsychological services to the patient. Interview [facility] platform [facility] [place] and formal testing was conducted by the psychometrist in person [place] [facility]    Video Start Time (time video started): 12:29 PM    Video End Time (time video stopped): 12:40 PM    RE: Arnaldo Henderson  MR#: [id number]  : 1955  DOS: Dec 31, 2024      NEUROPSYCHOLOGICAL CONSULTATION  This is a medical document intended for communication with the referring provider. It is written in medical language and may contain abbreviations or verbiage that are unfamiliar. It may appear blunt or direct. This report and the results within are not intended to be interpreted in isolation without consultation with your medical provider.     REASON FOR REFERRAL:  Arnaldo Henderson is a 69 year old male with 12 years of formal education. The patient was referred for a neuropsychological evaluation by Juan Jose Armenta MD, to assess his cognitive and emotional functioning secondary to memory difficulties. The evaluation was requested in order to document his current level of functioning, assist with the differential diagnosis and provide appropriate recommendations to the patient, family and  treatment team. The patient was informed that the evaluation included multiple measures of performance and symptom validity, and he was encouraged to provide his best effort at all times. The nature of the neuropsychological evaluation was also discussed, including limits of confidentiality (for suspected child or elder abuse, potential homicide or suicide, and court orders). The patient was also informed that the report would be placed on the electronic medical records system. The patient was also given an opportunity to ask questions. The patient indicated that he understood the information and consented to participate in the evaluation.     PROCEDURE: Review of records and clinical interview; embedded performance validity measures; Mental Status-Orientation; Word Reading subtest of Wide Range Achievement Test-4; Matrix Reasoning, Similarities, Vocabulary, and Digit Span subtests of the Wechsler Adult Intelligence Scale-IV; Oral Trail Making Test; Cole Verbal Learning Test-Revised; [place] Naming Test; Clock Drawing Test; Test of Sustained Attention & Tracking; Story Memory and Line Orientation from the Repeatable Battery for the Assessment of Neuropsychological Status (RBANS); Controlled Oral Word Association Test; Animal Naming; Complex Ideational Material; Geriatric Depression Scale; and Geriatric Anxiety Scale     RELEVANT BACKGROUND INFORMATION AND SUPPORTING DOCUMENTATION  The patient was interviewed and tested via virtual platform for this neuropsychological evaluation. Postdoctoral fellow, Dr. Gale, also attended. Information in this section comes from the patient and review of his electronic medical record.     History of Presenting Problem(s)  Onset and Course: Mr. Henderson described an insidious onset of cognitive symptoms within the past few months. He described the course of these cognitive complaints as stable.  Memory: Endorsed declines in short-term memory and indicated that he forgets recent  "conversations; denied instances of him repeating himself  Attention/Concentration: He reported longstanding attentional issues dating back to childhood. He indicated difficulties attending to curriculum/coursework beginning in elementary school and often was reprimanded for his distractibility.   Processing Speed: Denied ongoing concerns.  Language: Endorsed word-finding challenges; denied paraphasias or difficulties with language comprehension; described handwriting as improved (previously was getting smaller but has improved with practice).   Visuospatial Abilities: Denied ongoing concerns.  Executive Functioning: Denied ongoing concerns.    Functional Status  ADLs: Independent, with no noted concerns    Household chores/Cooking: Independent, with no noted concerns   Finances: Manages joint accounts in collaboration with his wife; manages sole accounts independently, with no noted concerns  Medications: Manages independently with no noted concerns  Driving: Continues to drive on a limited basis (local destinations only); denied recent traffic violations, accidents, or other safety concerns    Health Behaviors   Sleep: He reported averaging 6-8 hours of cumulative sleep nightly. However, he reported difficulties with sleep maintenance. He endorsed snoring but denied history of gasping arousals. He indicated history of parasomnias, beginning 5 years ago, which have reportedly subsided since starting carbidopa-levodopa 4 years ago.   Appetite/Weight: He described increased appetite and preference for carbohydrate-heavy foods over the past 6 months. Mr. Henderson stated that he has gained 20 pounds within the past year.   Pain: He endorsed chronic back pain, which he rated 8/10, on average. He is currently in physical therapy (PT) for back pain. He indicated plans to resume PT for his Parkinson's Disease following completion of PT for back pain.     Psychiatric History  Mr. Henderson described his current mood as \"good.  He " reported longstanding history of anxiety but denied depressed mood. He denied history of panic attacks. He has not been previously engaged in any mental health treatment (psychology or psychiatry services).   He denied behavioral or personality changes. He denied compulsive behaviors.   He denied a history of trauma/abuse, perceptual disturbances (e.g., hallucinations), or disordered thought.  Suicidality/Homicidal ideation: Denied any current or historical suicidal or homicidal ideation, plan, or intent; denied history of suicide attempts   Psychiatric treatment: Denied history of psychiatric hospitalizations    Substance Use History  Alcohol: Denied alcohol use in the past 3 years; estimated drinking 6-12 beers each weekend prior to his Parkinson's Disease diagnosis   Nicotine: Former tobacco smoker; started smoking at age 12; quit 20 years ago  Cannabis: Currently smokes marijuana once per week   Illicit Substances: Denied current or historical use   Problematic Substance Use: Denied history of problematic substance use or chemical dependency treatment    Developmental, Educational, & Occupational History  Gestational: Full-term   complications: None reported  Developmental milestones: Met at expected ages  Native Language: English  Education: Denied history of learning disability, repeating a grade, or requiring special education; described himself as an  average  student; graduated high school; completed [employer] vocational school program  Occupation: Worked as a , , and in sales positions; retired 7 years ago; denied difficulties meeting 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     Patient's Active Problem List (per EMR)  Patient Active Problem List   Diagnosis    Retained foreign body    Wound infection    Acute bilateral low back pain without sciatica    Adenomatous polyp of colon    Atherosclerotic heart disease of native coronary artery without angina pectoris    Cancer of skin of scrotum (H)    Cervicalgia    Gastroesophageal reflux disease without esophagitis    History of cholecystectomy    History of coronary artery bypass surgery    Hyperlipidemia, unspecified    Hypertension    Impaired gait    Long term (current) use of aspirin    Mixed conductive and sensorineural hearing loss    Muscle weakness (generalized)    Need for assistance with personal care    Other hydrocele    Parkinson's disease with dyskinesia, unspecified whether manifestations fluctuate (H)    Physical deconditioning    Pseudocyst of pancreas    Sensorineural hearing loss (SNHL) of right ear with unrestricted hearing of left ear    Type 2 diabetes mellitus with hyperglycemia, with long-term current use of insulin (H) A1C: 8.4 as of 11/1/2024    Neuroendocrine carcinoma of small bowel (H)    Failure to thrive in adult    Hyponatremia    Chronic right-sided low back pain without sciatica    Abdominal pain, generalized    Other secondary neuroendocrine tumors (H)    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 Henderson  "reported onset of Parkinson's symptoms and Parkinson's Disease diagnosis approximately 4 years ago. He reported right-handed tremor and dyskinesia. He also described bilateral rigidity in his thighs over the past month, although he believes this might be related to ongoing back problems. He described shuffled gait, primarily at night, but otherwise denied problems with balance or falling.  He denied history of stroke, seizure, head injury with loss of consciousness, myocardial infarction, brain tumor, or brain infection.   Vision: Wears prescription bifocal eyeglasses; denied changes to vision; underwent eye evaluation 1 months ago  Hearing: Wears bilateral hearing aids; denied changes to hearing; underwent hearing evaluation approximately 1 year ago  He reported numbness in his feet bilaterally.     Neurodiagnostic Findings (per EMR)  Brain MRI w/ and w/o contrast (2/5/2024): \"IMPRESSION: 1. No acute intracranial process. 2. Generalized brain atrophy and presumed microvascular ischemic changes as detailed above.    Head CT w/o contrast (2/4/2024):  IMPRESSION: 1.  No acute intracranial injury, hemorrhage, mass, or CT evidence of recent ischemia.      Current Medications (per EMR)  Mr. Henderson has a current medication list which includes the following prescription(s): aspirin, atorvastatin, carbidopa-levodopa, carbidopa-levodopa, zegalogue, gabapentin, glucagon, insulin glargine, zenpep, losartan, magnesium oxide, multi-vitamins, omeprazole, and trazodone.    BEHAVIORAL OBSERVATIONS  Vision: Wore prescription bifocals; adequate for testing purposes  Hearing: Wore bilateral hearing aids; adequate for testing purposes   Attentive and focused while undergoing testing  Appearance: Well-groomed, casually dressed  Gait/Posture: Gait could not be observed due to virtual nature of testing   Sensorimotor: Dyskinesia observed   Behavior: Cooperative, pleasant, no behavioral abnormalities noted  Speech: Fluent, articulate, " normal rate, prosody, and volume; no conversational word finding difficulty  Thought process: Logical, linear, and goal-directed  Thought content: Logical, appropriate   Affect: Broad, responsive, consistent with reported mood; good eye contact  Mood: Euthymic  Insight and Judgment: Intact  Approach to testing: Efficient, deliberate; good frustration tolerance on cognitively demanding tasks  Rapport: Easily established and maintained    Task engagement: Good    RESULTS  See separate abstract for list of neuropsychological measures and test scores. Descriptive ranges are based on American Academy of Clinical Neuropsychology (2020) consensus guidelines, or test manuals where appropriate.    Performance Validity: Performance on neuropsychological tests is dependent upon a number of factors, including sufficient engagement and motivation, to reliably establish an examinee's level of cognitive functioning.  Based upon observations made throughout the evaluation, Mr. Henderson did not appear to deliberately perform in a suboptimal manner and demonstrated good frustration tolerance on cognitively challenging tasks. Performances on embedded measures of cognitive performance validity were in the valid range. Overall, test results are believed to accurately represent the patient's current neurocognitive status.     Orientation: He was oriented to place, time, and personal information. He was able to name the current [place] President and 2/5 of the most recent past [place] Presidents.    Pre-morbid Ability: Psychometric estimate of the patient's premorbid intellectual functioning based on single word reading ability was in the average range, broadly consistent with estimates based on reported educational and vocational histories.     Attention/Processing Speed: Immediate auditory attention and working memory tasks were in the average range. Performances were average but error-prone across a variety of brief verbal tasks requiring  executive management of attention and concentration. Speeded verbal sequencing performance was average. Verbal fluency tasks with a processing speed component were in the average range.     Learning and Memory:  Initial encoding of a word-list over multiple learning trials was low average. Recall of the list after a delay was below average. Recognition of the word-list was below average. Initial encoding of contextualized verbal information in the form of a short story was average, and delayed recall was low average.     Language: Letter-based verbal fluency was average. Semantic verbal fluency was average. Confrontation naming was average. Vocabulary knowledge was average. Comprehension of phrases and short stories was average.    Visuospatial Processing: Performance on a spatial perception task requiring judgement of angled lines was average. Spontaneous production and copy of a clock were notable for mild errors in the spatial arrangement of clock hand numbers.     Executive Functions: Verbal abstract reasoning performance was average. Visual reasoning through pattern identification was average. Speeded oral complex sequencing and cognitive flexibility was high average. His drawing of a clock evidenced no executive-based errors.     Psychological and Behavioral: Assessment of the patient's emotional functioning was completed utilizing the clinical interview and self-report measures of depression and anxiety. On self-report questionnaires, he endorsed minimal depressive and anxiety symptoms, broadly consistent with his reports during clinical interview.     SUMMARY AND DIAGNOSIS: The following opinions and recommendations are based on the interview, behavioral observations, and formal testing data. In summary, results of the current neuropsychological evaluation were broadly commensurate with Mr. Henderson's estimated cognitive baseline. He demonstrated mild inefficiencies on encoding and retrieval on verbal  learning/memory tasks, and he was error-prone on a single measure of executive attention. The remainder of his cognitive abilities, including visuospatial skills, verbal processing speed, language, and cognitive flexibility were within expected limits. He endorsed minimal levels of depression and anxiety symptoms on self-report measures. Functionally, he remains independent with basic and instrumental activities of daily living. He does not meet criteria for mild cognitive impairment (MCI) or dementia at this time. The subtle variability observed on memory and complex attention measures may be related to his history of Parkinson's disease or may be suggestive of very early stages of a neurologically-based process. However, this pattern of cognitive performances may also be related to multiple non-neurological factors, including sleep disruption, chronic pain, and cannabis use.     RECOMMENDATIONS:  It is recommended that the patient stay cognitively, socially, and physically active (as able and in consultation with his medical providers), eat a healthy diet, and continue working closely with his treatment team to monitor and manage his chronic health conditions to promote cognitive health and reduce risk of further cognitive decline.  The patient should continue management of his chronic conditions (e.g., hypertension and diabetes) through regular follow-up with his care team. The patient is encouraged to consult with a Certified Diabetes Educator for education and support related to the management of diabetes and vascular risk factors.  The patient is encouraged to continue monitoring his mood. Should he notice an increase in anxiety symptoms, a referral for psychotherapeutic or pharmacological intervention may be considered.   Given his reported sleep difficulties, these sleep hygiene strategies from the National Sleep Foundation may be helpful: https://www.thensf.org/sleep-tips/. Should sleeping difficulties  persist, referral for cognitive behavioral therapy for insomnia (CBTi) may be of benefit.   He reported weekly cannabis use. Cannabis can lead to acute difficulties with attention/working memory, processing speed, and executive functioning. He may consider limiting his use of cannabis products if these cognitive difficulties become increasingly bothersome.  The following strategies are recommended to optimize daily cognitive functioning:  Use external aids to increase structure in daily life. Ongoing use of compensatory strategies such as notes, lists, and a centralized calendar are supported. Tools such as smart phones with alarms and reminders are helpful in bringing structure to daily activities.   Plan short activities with frequent breaks.  Allot extra time to complete tasks to account for attention difficulties.  Pair an activity that is difficulty to remember with a commonly completed activity to increase likelihood of its completion.  Process information that needs to be retained both visually (e.g., write it down) and verbally (e.g., repeat it aloud).  Place visual cues in highly visible locations to remember important tasks/information.  Repeat and/or provide context to information that you need to remember (e.g., make up a story or mnemonic device).  Eliminate distraction. Eliminating environmental distracters can facilitate attention and memory performance. For example, turning off music or the television while having a conversation, so that all of your attention is focused on the task at hand.  Information regarding local resources related to Parkinson's disease can be found here:  American Parkinson's Disease Association - [place] Chapter  www.apdaparkinson.org/community/[place]/local-resources-support/     Parkinson's Foundation  www.parkinson.org[place]    For information regarding free fitness classes, as well as support groups for people with parkinsonism, contact Brielle Echevarria at  [email or web address].    A referral for a neuropsychological re-evaluation in approximately one year is recommended to document any changes in cognitive functioning, clarify the diagnosis and etiology, and provide further recommendations and prognosis to the patient, family, and treatment team.     The results were discussed with the patient on 1/2/2025 via phone, and his questions were answered. He was encouraged to follow up with his treating healthcare providers to facilitate these recommendations noted in the report.     Thank you for referring this pleasant individual. If you have any further questions, please feel free to contact us.        Niraj Gale, Ph.D.   Neuropsychology Fellow       Gary Jim, PhD, ABPP, LP   Professor and Licensed Psychologist [id number]    Board Certified Clinical Neuropsychologist     All billing noted here is for professional services provided by the psychologist and psychometrist.       Time Spent:      Minutes Date Code Units   Total Time-Neuropsychologist Professional 221 12/31/24 62209 1     12/31/24 99199 3   Neuropsychologist Admin/scoring 0 12/31/24 60506 0     12/31/24 64836 0   Diagnostic Interview 11 12/31/24 80781 1   Psychometrician Time-Test Administration/Score 122 12/31/24 15943 1     12/31/24 18478 3   Diagnosis R41.3 F41.9 G20.B1

## 2025-01-02 ENCOUNTER — THERAPY VISIT (OUTPATIENT)
Dept: PHYSICAL THERAPY | Facility: REHABILITATION | Age: 70
End: 2025-01-02
Payer: MEDICARE

## 2025-01-02 ENCOUNTER — TELEPHONE (OUTPATIENT)
Dept: CARDIOLOGY | Facility: CLINIC | Age: 70
End: 2025-01-02

## 2025-01-02 DIAGNOSIS — M54.6 CHRONIC BILATERAL THORACIC BACK PAIN: Primary | ICD-10-CM

## 2025-01-02 DIAGNOSIS — M47.24 OSTEOARTHRITIS OF SPINE WITH RADICULOPATHY, THORACIC REGION: ICD-10-CM

## 2025-01-02 DIAGNOSIS — G89.29 CHRONIC BILATERAL THORACIC BACK PAIN: Primary | ICD-10-CM

## 2025-01-02 DIAGNOSIS — M48.062 SPINAL STENOSIS OF LUMBAR REGION WITH NEUROGENIC CLAUDICATION: ICD-10-CM

## 2025-01-02 NOTE — TELEPHONE ENCOUNTER
Left Voicemail (1st Attempt) and Sent Mychart (1st Attempt) for the patient to call back and schedule the following:    Appointment type: RTN CARDIO  Provider: DEBRA  Return date: 2/21/2025  Specialty phone number: [phone] OPT 1  Additional appointment(s) needed: DEVICE CHECK  Additonal Notes: RESCHEDULE PT. OKAY TO USE CORE SLOTS. SCHEDULE DEVICE CHECK PRIOR.

## 2025-01-03 ASSESSMENT — ANXIETY QUESTIONNAIRES
8. IF YOU CHECKED OFF ANY PROBLEMS, HOW DIFFICULT HAVE THESE MADE IT FOR YOU TO DO YOUR WORK, TAKE CARE OF THINGS AT HOME, OR GET ALONG WITH OTHER PEOPLE?: NOT DIFFICULT AT ALL
4. TROUBLE RELAXING: NOT AT ALL
7. FEELING AFRAID AS IF SOMETHING AWFUL MIGHT HAPPEN: NOT AT ALL
7. FEELING AFRAID AS IF SOMETHING AWFUL MIGHT HAPPEN: NOT AT ALL
6. BECOMING EASILY ANNOYED OR IRRITABLE: NOT AT ALL
5. BEING SO RESTLESS THAT IT IS HARD TO SIT STILL: NOT AT ALL
GAD7 TOTAL SCORE: 0
GAD7 TOTAL SCORE: 0
2. NOT BEING ABLE TO STOP OR CONTROL WORRYING: NOT AT ALL
3. WORRYING TOO MUCH ABOUT DIFFERENT THINGS: NOT AT ALL
GAD7 TOTAL SCORE: 0
1. FEELING NERVOUS, ANXIOUS, OR ON EDGE: NOT AT ALL
IF YOU CHECKED OFF ANY PROBLEMS ON THIS QUESTIONNAIRE, HOW DIFFICULT HAVE THESE PROBLEMS MADE IT FOR YOU TO DO YOUR WORK, TAKE CARE OF THINGS AT HOME, OR GET ALONG WITH OTHER PEOPLE: NOT DIFFICULT AT ALL

## 2025-01-05 ASSESSMENT — PATIENT HEALTH QUESTIONNAIRE - PHQ9
SUM OF ALL RESPONSES TO PHQ QUESTIONS 1-9: 3
10. IF YOU CHECKED OFF ANY PROBLEMS, HOW DIFFICULT HAVE THESE PROBLEMS MADE IT FOR YOU TO DO YOUR WORK, TAKE CARE OF THINGS AT HOME, OR GET ALONG WITH OTHER PEOPLE: NOT DIFFICULT AT ALL
SUM OF ALL RESPONSES TO PHQ QUESTIONS 1-9: 3

## 2025-01-06 ENCOUNTER — VIRTUAL VISIT (OUTPATIENT)
Dept: PSYCHOLOGY | Facility: CLINIC | Age: 70
End: 2025-01-06
Attending: PSYCHIATRY & NEUROLOGY
Payer: MEDICARE

## 2025-01-06 DIAGNOSIS — F43.29 ADJUSTMENT DISORDER WITH OTHER SYMPTOM: Primary | ICD-10-CM

## 2025-01-06 NOTE — PROGRESS NOTES
"    Health Psychology                      Department of Medicine                     [facility] Mail Code [address] [place] [address]              Whitney Renteria, Ph.D., L.P [phone]  Isabelle West, Ph.D., L.P. [phone]  Wale Jett, Ph.D. [phone]  Phyllis Mercedes, Ph.D., A.B.P.P., L.P. [phone]  Jay Bales, Ph.D., A.B.P.P., L.P. [phone]         Samantha Watters, Ph.D., L.P. [phone]   Marlee Marrufo, Ph.D., A.B.P.P., L.P. [phone]    [facility] [place]  3rd Floor  [address]   [address]    Health Psychology Follow-Up Note    Telemental health (video) visit due to mitigation of COVID-19 pandemic, confirmed with patient. The patient has been notified of following:    \"This video visit will be conducted via a call between you and your physician/provider. We have found that certain health care needs can be provided without the need for an in-person physical exam.  This service lets us provide the care you need with a video conversation.  If a prescription is necessary we can send it directly to your pharmacy.  If lab work is needed we can place an order for that and you can then stop by our lab to have the test done at a later time. If during the course of the call the physician/provider feels a video visit is not appropriate, you will not be charged for this service.\"    Patient has given verbal consent for Video visit? YES    Reason that telemedicine is appropriate: deemed appropriate for this session  Patient has given verbal consent for video visit: Yes  Mode of transmission: Secure real time interactive audio and visual telecommunication system via [email or web address]  Location of originating and distant sites:  Originating site (patient location): patient's home in [place]  Distant site (provider site): home office of provider  He prefers " virtual sessions due to numerous other medical appointments, prefers not to drive.    Start time: 2:58  End time: 3:38    SUBJECTIVE  Patient has agreed to receiving telehealth services and was provided written information regarding telehealth/video sessions. First session to review confidentiality and related limits.   Did not complete DA today as referral was placed quite some time ago and wanted to review nature of Health Psychology with patient to ensure an appropriate fit. He has a number of medical conditions, including chronic back pain, that he is managing that cause stress, has been struggling to control his HbA1c, and is having insomnia. He described that the heightened anxiety he was experiencing when the referral for Health Psychology was placed has improved significantly.     OBJECTIVE  Appearance/Behavior/Orientation: Patient was casually groomed and dressed. Oriented to person, plan, time and situation.   Cooperation/Reliability: Patient was open and cooperative throughout the session.    Speech/Language: Speech was clear, coherent, and of normal rate, rhythm and volume.   Thought Process: Clear, linear  Mood/Affect: Mood euthymic; affect mood congruent  Insight/Motivation: Good/good    ASSESSMENT  Patient may benefit from further sessions with Health Psychology to improve management of Diabetes II, stress management, chronic pain, and insomnia.        1/5/2025     4:12 PM   PHQ   PHQ-9 Total Score 3    Q9: Thoughts of better off dead/self-harm past 2 weeks Not at all       Patient-reported          1/3/2025    12:20 PM   ANETA-7 SCORE   Total Score 0 (minimal anxiety)   Total Score 0        Patient-reported      DIAGNOSIS  Adjustment disorder with other symptom [F43.29]     PLAN/GOALS    1/21 2:00 to completed DA (offered next week but patient declined due to numerous other medical appointments)  RTC for continued psychotherapy.   1/6/25- let him know that this clinician will be on medical leave at end  of January and that my colleague, Dr. Samantha Watters, will reach out to him to schedule sessions during my absence. Sent Dr. Watters a message via epic.    Whitney Renteria PhD,    Clinical Psychologist

## 2025-01-08 ENCOUNTER — RADIOLOGY INJECTION OFFICE VISIT (OUTPATIENT)
Dept: PHYSICAL MEDICINE AND REHAB | Facility: CLINIC | Age: 70
End: 2025-01-08
Attending: STUDENT IN AN ORGANIZED HEALTH CARE EDUCATION/TRAINING PROGRAM
Payer: MEDICARE

## 2025-01-08 VITALS
WEIGHT: 220 LBS | SYSTOLIC BLOOD PRESSURE: 84 MMHG | RESPIRATION RATE: 16 BRPM | HEART RATE: 88 BPM | OXYGEN SATURATION: 93 % | DIASTOLIC BLOOD PRESSURE: 52 MMHG | TEMPERATURE: 97.6 F | HEIGHT: 68 IN | BODY MASS INDEX: 33.34 KG/M2

## 2025-01-08 DIAGNOSIS — Z79.4 TYPE 2 DIABETES MELLITUS WITH HYPERGLYCEMIA, WITH LONG-TERM CURRENT USE OF INSULIN (H): Primary | ICD-10-CM

## 2025-01-08 DIAGNOSIS — E11.65 TYPE 2 DIABETES MELLITUS WITH HYPERGLYCEMIA, WITH LONG-TERM CURRENT USE OF INSULIN (H): Primary | ICD-10-CM

## 2025-01-08 DIAGNOSIS — M54.16 LUMBAR RADICULOPATHY: ICD-10-CM

## 2025-01-08 LAB — GLUCOSE SERPL-MCNC: 116 MG/DL (ref 70–99)

## 2025-01-08 RX ORDER — DEXAMETHASONE SODIUM PHOSPHATE 10 MG/ML
INJECTION, SOLUTION INTRAMUSCULAR; INTRAVENOUS
Status: COMPLETED | OUTPATIENT
Start: 2025-01-08 | End: 2025-01-08

## 2025-01-08 RX ORDER — LIDOCAINE HYDROCHLORIDE 10 MG/ML
INJECTION, SOLUTION EPIDURAL; INFILTRATION; INTRACAUDAL; PERINEURAL
Status: COMPLETED | OUTPATIENT
Start: 2025-01-08 | End: 2025-01-08

## 2025-01-08 RX ADMIN — DEXAMETHASONE SODIUM PHOSPHATE 10 MG: 10 INJECTION, SOLUTION INTRAMUSCULAR; INTRAVENOUS at 14:50

## 2025-01-08 RX ADMIN — LIDOCAINE HYDROCHLORIDE 2 ML: 10 INJECTION, SOLUTION EPIDURAL; INFILTRATION; INTRACAUDAL; PERINEURAL at 14:50

## 2025-01-08 ASSESSMENT — PAIN SCALES - GENERAL
PAINLEVEL_OUTOF10: MILD PAIN (2)
PAINLEVEL_OUTOF10: NO PAIN (0)

## 2025-01-08 NOTE — PATIENT INSTRUCTIONS
*Please resume taking the aspirin (as prescribed) that you've been holding starting on Thursday, 1/9/25, now that your injection has been completed.    *Your blood pressure was low today (88/52).  Dr. Fong has recommended that you contact your primary doctor to make them aware and discuss any possible medication adjustments that may be recommended going forward.  He advises you to take and record daily blood pressure readings at home.    Follow-up visit with Dr. Fong in 2-4 weeks to discuss injection outcome and determine care plan going forward.    Please be advised that your blood glucose levels may be increased for the next 5-7 days as a result of the steroid you received with your injection today.  It is recommended that you monitor your blood glucose levels closely over the next week and direct any additional questions regarding your blood glucose management to your primary doctor.       DISCHARGE INSTRUCTIONS    During office hours (8:00 a.m.- 4:00 p.m.) questions or concerns may be answered  by calling [facility] Center Navigation Nurses at  [phone].  Messages received after hours will be returned the following business day.      In the case of an emergency, please dial 911 or seek assistance at the nearest Emergency Room/Urgent Care facility.     All Patients:    You may experience an increase in your symptoms for the first 2 days (It may take anywhere between 2 days- 2 weeks for the steroid to have maximum effect).    You may use ice on the injection site, as frequently as 20 minutes each hour if needed.    You may take your pain medicine.    You may continue taking your regular medication after your injection. If you have had a Medial Branch Block you may resume pain medication once your pain diary is completed.    You may shower. No swimming, tub bath or hot tub for 48 hours.  You may remove your bandaid/bandage as soon as you are home.    You may resume light activities, as tolerated.    Resume your  usual diet as tolerated.    If you were told to hold any blood thinning medications you may resume taking them 24 hours after your procedure as prescribed.    It is strongly advised that you do not drive for 1-3 hours post injection.    If you have had oral sedation:  Do not drive for 8 hours post injection.      If you have had IV sedation:  Do not drive for 24 hours post injection.  Do not operate hazardous machinery or make important personal/business decisions for 24 hours.      POSSIBLE STEROID SIDE EFFECTS (If steroid/cortisone was used for your procedure)    -If you experience these symptoms, it should only last for a short period    Swelling of the legs              Skin redness (flushing)     Mouth (oral) irritation   Blood sugar (glucose) levels            Sweats                    Mood changes  Headache  Sleeplessness  Weakened immune system for up to 14 days, which could increase the risk of kenna the COVID-19 virus and/or experiencing more severe symptoms of the disease, if exposed.  Decreased effectiveness of the flu vaccine if given within 2 weeks of the steroid.         POSSIBLE PROCEDURE SIDE EFFECTS  -Call the Spine Center if you are concerned  Increased Pain           Increased numbness/tingling      Nausea/Vomiting          Bruising/bleeding at site      Redness or swelling                                              Difficulty walking      Weakness           Fever greater than 100.5    *In the event of a severe headache after an epidural steroid injection that is relieved by lying down, please call the [facility] to speak with a clinical staff member*

## 2025-01-08 NOTE — PROGRESS NOTES
Patient's blood pressure was consistently low during his appointment at the Spine Center today- ranging from 88/52 at the time of arrival to 84/52 at the time of discharge.  He denied any signs/symptoms of hypotension during his appointment today.    The patient was advised to monitor his blood pressure at home with daily checks and inform his PMD of his findings to see if any additional monitoring or medication adjustments were recommended.    Dr. Fong was made aware of the blood pressure findings and the lumbar steroid injection was completed as scheduled.    He verbalized understanding of the plan.

## 2025-01-09 ENCOUNTER — TELEPHONE (OUTPATIENT)
Dept: FAMILY MEDICINE | Facility: CLINIC | Age: 70
End: 2025-01-09

## 2025-01-09 ENCOUNTER — THERAPY VISIT (OUTPATIENT)
Dept: PHYSICAL THERAPY | Facility: REHABILITATION | Age: 70
End: 2025-01-09
Payer: MEDICARE

## 2025-01-09 DIAGNOSIS — M48.062 SPINAL STENOSIS OF LUMBAR REGION WITH NEUROGENIC CLAUDICATION: ICD-10-CM

## 2025-01-09 DIAGNOSIS — G89.29 CHRONIC BILATERAL THORACIC BACK PAIN: Primary | ICD-10-CM

## 2025-01-09 DIAGNOSIS — M47.24 OSTEOARTHRITIS OF SPINE WITH RADICULOPATHY, THORACIC REGION: ICD-10-CM

## 2025-01-09 DIAGNOSIS — M54.6 CHRONIC BILATERAL THORACIC BACK PAIN: Primary | ICD-10-CM

## 2025-01-09 NOTE — TELEPHONE ENCOUNTER
----- Message   From Amelia Johnson sent at 1/8/2025  3:31 PM CST -----  Regarding: FW: Asymptomatic hypotension in spine clinic today  Please triage. This is a discrepancy from last several readings documented at visit with me in December. May need to schedule appointment with any available provider for BP follow up.    BP Readings   01/08/25 : (!) 84/52  12/18/24 : 130/73  12/06/24 : (!) 142/70  12/05/24 : 122/81    Amelia Johnson, DO      ----- Message -----  From: Jovan Fong MD Sent: 1/8/2025   3:21 PM CST  To: Amelia Johnson DO  Subject: Asymptomatic hypotension in spine clinic tod#    Good afternoon Dr. Johnson,    This is Jovan Fong, I'm one of the spine physicians over at the [facility]. We just saw Mr. Henderson a little earlier today for a spine injection and he was noted to have BP in the high 80s/50s with manual reads today by our nursing team. Patient repeatedly denied any symptoms and did great throughout the procedure. He stated his BP measurements at home have been in a similar range, but we encouraged him to reach out to you just to see if an evaluation is warranted. I also wanted to reach out to you for the same reason.    Thanks!    Jovan Cameron

## 2025-01-09 NOTE — TELEPHONE ENCOUNTER
Called patient's listed phone number and ended up speaking with patient's wife (consent to communicate on file).     Wife states that patient's BP was 84/52 yesterday during his visit at the spine clinic.  Patient was asymptomatic at that time.  This morning patient's blood pressure was 134/77.  Wife states that they have not been consistent about checking patient's blood pressure the past month.  They plan to take his blood pressure once a day for the next 2 weeks and will send Dr. Johnson a report on 1/23.  They will notify us sooner if blood pressure drops below 90/60 or if patient develops any symptoms of lightheadedness, dizziness or weakness.

## 2025-01-13 ENCOUNTER — TELEPHONE (OUTPATIENT)
Dept: ENDOCRINOLOGY | Facility: CLINIC | Age: 70
End: 2025-01-13
Payer: MEDICARE

## 2025-01-13 NOTE — TELEPHONE ENCOUNTER
This message is to inform you that we are in need of Medicare compliant prescriptions for Dexcom G7 Sensors.     Prescription must be written by: Valeri Gomez.  Must include full supply name: Dexcom G7 Sensor  Quantity: 9  Refills: 1  SIG: Change every 10 days    As a reminder, Medicare requires patients to be seen every 3 months for insulin supplies and every 6 months for CGMs. The provider who sees the patient must be the provider on the prescription, must be written on the day of or after office visit date and office visit must include notes about diabetes and insulin regimen. The Diabetes Care Services team at [facility] may request new prescriptions before renewal dates of the prescription is filled over the allowable amount. Writing the order to match what is above will help ensure we will only need to request every 3 or 6 months.    Thank you!    [facility]  Diabetes Care Services Team  [address] [place] [address]  Provider Phone: [phone]  Patient Line: [phone]  Fax: [phone]  E-mail: [email or web address]

## 2025-01-14 ENCOUNTER — THERAPY VISIT (OUTPATIENT)
Dept: PHYSICAL THERAPY | Facility: REHABILITATION | Age: 70
End: 2025-01-14
Payer: MEDICARE

## 2025-01-14 ENCOUNTER — HOSPITAL ENCOUNTER (OUTPATIENT)
Dept: BONE DENSITY | Facility: HOSPITAL | Age: 70
Discharge: HOME | End: 2025-01-14
Attending: FAMILY MEDICINE
Payer: MEDICARE

## 2025-01-14 DIAGNOSIS — R93.7 ABNORMAL FINDINGS ON DIAGNOSTIC IMAGING OF SPINE: ICD-10-CM

## 2025-01-14 DIAGNOSIS — Z13.820 SCREENING FOR OSTEOPOROSIS: ICD-10-CM

## 2025-01-14 DIAGNOSIS — M48.55XA COLLAPSED VERTEBRA, NOT ELSEWHERE CLASSIFIED, THORACOLUMBAR REGION, INITIAL ENCOUNTER FOR FRACTURE (H): ICD-10-CM

## 2025-01-14 DIAGNOSIS — M47.24 OSTEOARTHRITIS OF SPINE WITH RADICULOPATHY, THORACIC REGION: ICD-10-CM

## 2025-01-14 DIAGNOSIS — M48.062 SPINAL STENOSIS OF LUMBAR REGION WITH NEUROGENIC CLAUDICATION: ICD-10-CM

## 2025-01-14 DIAGNOSIS — G89.29 CHRONIC BILATERAL THORACIC BACK PAIN: Primary | ICD-10-CM

## 2025-01-14 DIAGNOSIS — M54.6 CHRONIC BILATERAL THORACIC BACK PAIN: Primary | ICD-10-CM

## 2025-01-14 PROCEDURE — 97140 MANUAL THERAPY 1/> REGIONS: CPT | Mod: GP

## 2025-01-14 PROCEDURE — 77080 DXA BONE DENSITY AXIAL: CPT

## 2025-01-14 PROCEDURE — 97112 NEUROMUSCULAR REEDUCATION: CPT | Mod: GP

## 2025-01-14 PROCEDURE — 97110 THERAPEUTIC EXERCISES: CPT | Mod: GP

## 2025-01-15 ENCOUNTER — LAB (OUTPATIENT)
Dept: LAB | Facility: CLINIC | Age: 70
End: 2025-01-15
Payer: MEDICARE

## 2025-01-15 ENCOUNTER — ANCILLARY PROCEDURE (OUTPATIENT)
Dept: CT IMAGING | Facility: CLINIC | Age: 70
End: 2025-01-15
Attending: INTERNAL MEDICINE
Payer: MEDICARE

## 2025-01-15 DIAGNOSIS — C77.2 MALIGNANT NEOPLASM METASTATIC TO INTRA-ABDOMINAL LYMPH NODE (H): ICD-10-CM

## 2025-01-15 DIAGNOSIS — C7A.8 NEUROENDOCRINE CARCINOMA OF SMALL BOWEL (H): ICD-10-CM

## 2025-01-15 DIAGNOSIS — C7B.8 OTHER SECONDARY NEUROENDOCRINE TUMORS (H): ICD-10-CM

## 2025-01-15 DIAGNOSIS — Z11.59 ENCOUNTER FOR HEPATITIS C SCREENING TEST FOR LOW RISK PATIENT: ICD-10-CM

## 2025-01-15 DIAGNOSIS — D53.9 MACROCYTIC ANEMIA: ICD-10-CM

## 2025-01-15 DIAGNOSIS — I25.10 CORONARY ARTERY DISEASE INVOLVING NATIVE CORONARY ARTERY OF NATIVE HEART WITHOUT ANGINA PECTORIS: ICD-10-CM

## 2025-01-15 DIAGNOSIS — Z12.5 SCREENING FOR PROSTATE CANCER: ICD-10-CM

## 2025-01-15 LAB
ALBUMIN SERPL BCG-MCNC: 4.1 G/DL (ref 3.5–5.2)
ALP SERPL-CCNC: 127 U/L (ref 40–150)
ALT SERPL W P-5'-P-CCNC: 6 U/L (ref 0–70)
ANION GAP SERPL CALCULATED.3IONS-SCNC: 9 MMOL/L (ref 7–15)
AST SERPL W P-5'-P-CCNC: 30 U/L (ref 0–45)
BASOPHILS # BLD AUTO: 0.1 10E3/UL (ref 0–0.2)
BASOPHILS NFR BLD AUTO: 1 %
BILIRUB SERPL-MCNC: 1.1 MG/DL
BUN SERPL-MCNC: 23.2 MG/DL (ref 8–23)
CALCIUM SERPL-MCNC: 8.9 MG/DL (ref 8.8–10.4)
CHLORIDE SERPL-SCNC: 100 MMOL/L (ref 98–107)
CREAT SERPL-MCNC: 0.92 MG/DL (ref 0.67–1.17)
EGFRCR SERPLBLD CKD-EPI 2021: 89 ML/MIN/1.73M2
EOSINOPHIL # BLD AUTO: 0.3 10E3/UL (ref 0–0.7)
EOSINOPHIL NFR BLD AUTO: 3 %
ERYTHROCYTE [DISTWIDTH] IN BLOOD BY AUTOMATED COUNT: 14 % (ref 10–15)
GLUCOSE SERPL-MCNC: 269 MG/DL (ref 70–99)
HCO3 SERPL-SCNC: 26 MMOL/L (ref 22–29)
HCT VFR BLD AUTO: 41.4 % (ref 40–53)
HCV AB SERPL QL IA: NONREACTIVE
HGB BLD-MCNC: 13.7 G/DL (ref 13.3–17.7)
IMM GRANULOCYTES # BLD: 0 10E3/UL
IMM GRANULOCYTES NFR BLD: 0 %
LYMPHOCYTES # BLD AUTO: 2 10E3/UL (ref 0.8–5.3)
LYMPHOCYTES NFR BLD AUTO: 19 %
MCH RBC QN AUTO: 32.7 PG (ref 26.5–33)
MCHC RBC AUTO-ENTMCNC: 33.1 G/DL (ref 31.5–36.5)
MCV RBC AUTO: 99 FL (ref 78–100)
MONOCYTES # BLD AUTO: 1.2 10E3/UL (ref 0–1.3)
MONOCYTES NFR BLD AUTO: 11 %
NEUTROPHILS # BLD AUTO: 6.8 10E3/UL (ref 1.6–8.3)
NEUTROPHILS NFR BLD AUTO: 65 %
NRBC # BLD AUTO: 0 10E3/UL
NRBC BLD AUTO-RTO: 0 /100
PLATELET # BLD AUTO: 291 10E3/UL (ref 150–450)
POTASSIUM SERPL-SCNC: 4.8 MMOL/L (ref 3.4–5.3)
PROT SERPL-MCNC: 7.5 G/DL (ref 6.4–8.3)
PSA SERPL DL<=0.01 NG/ML-MCNC: 1.35 NG/ML (ref 0–6.5)
RBC # BLD AUTO: 4.19 10E6/UL (ref 4.4–5.9)
SODIUM SERPL-SCNC: 135 MMOL/L (ref 135–145)
VIT B12 SERPL-MCNC: 771 PG/ML (ref 232–1245)
WBC # BLD AUTO: 10.4 10E3/UL (ref 4–11)

## 2025-01-15 PROCEDURE — 85025 COMPLETE CBC W/AUTO DIFF WBC: CPT | Performed by: PATHOLOGY

## 2025-01-15 PROCEDURE — 86803 HEPATITIS C AB TEST: CPT | Performed by: FAMILY MEDICINE

## 2025-01-15 PROCEDURE — G0103 PSA SCREENING: HCPCS | Performed by: PATHOLOGY

## 2025-01-15 PROCEDURE — 80053 COMPREHEN METABOLIC PANEL: CPT | Performed by: PATHOLOGY

## 2025-01-15 PROCEDURE — 36415 COLL VENOUS BLD VENIPUNCTURE: CPT | Performed by: PATHOLOGY

## 2025-01-15 PROCEDURE — 82607 VITAMIN B-12: CPT | Performed by: FAMILY MEDICINE

## 2025-01-15 PROCEDURE — 86316 IMMUNOASSAY TUMOR OTHER: CPT | Mod: 90 | Performed by: PATHOLOGY

## 2025-01-15 PROCEDURE — 99000 SPECIMEN HANDLING OFFICE-LAB: CPT | Performed by: PATHOLOGY

## 2025-01-15 RX ORDER — IOPAMIDOL 755 MG/ML
108 INJECTION, SOLUTION INTRAVASCULAR ONCE
Status: COMPLETED | OUTPATIENT
Start: 2025-01-15 | End: 2025-01-15

## 2025-01-15 RX ADMIN — IOPAMIDOL 108 ML: 755 INJECTION, SOLUTION INTRAVASCULAR at 11:33

## 2025-01-15 NOTE — DISCHARGE INSTRUCTIONS

## 2025-01-16 ENCOUNTER — VIRTUAL VISIT (OUTPATIENT)
Dept: ONCOLOGY | Facility: CLINIC | Age: 70
End: 2025-01-16
Attending: INTERNAL MEDICINE
Payer: MEDICARE

## 2025-01-16 VITALS
WEIGHT: 220 LBS | DIASTOLIC BLOOD PRESSURE: 73 MMHG | HEIGHT: 68 IN | SYSTOLIC BLOOD PRESSURE: 128 MMHG | BODY MASS INDEX: 33.34 KG/M2

## 2025-01-16 DIAGNOSIS — C7A.8 NEUROENDOCRINE CARCINOMA OF SMALL BOWEL (H): Primary | ICD-10-CM

## 2025-01-16 DIAGNOSIS — Z79.4 TYPE 2 DIABETES MELLITUS WITH HYPERGLYCEMIA, WITH LONG-TERM CURRENT USE OF INSULIN (H): ICD-10-CM

## 2025-01-16 DIAGNOSIS — C7B.8 OTHER SECONDARY NEUROENDOCRINE TUMORS (H): ICD-10-CM

## 2025-01-16 DIAGNOSIS — R10.84 ABDOMINAL PAIN, GENERALIZED: ICD-10-CM

## 2025-01-16 DIAGNOSIS — E11.65 TYPE 2 DIABETES MELLITUS WITH HYPERGLYCEMIA, WITH LONG-TERM CURRENT USE OF INSULIN (H): ICD-10-CM

## 2025-01-16 DIAGNOSIS — C77.2 MALIGNANT NEOPLASM METASTATIC TO INTRA-ABDOMINAL LYMPH NODE (H): ICD-10-CM

## 2025-01-16 DIAGNOSIS — G20.A1 PARKINSON'S DISEASE, UNSPECIFIED WHETHER DYSKINESIA PRESENT, UNSPECIFIED WHETHER MANIFESTATIONS FLUCTUATE (H): ICD-10-CM

## 2025-01-16 ASSESSMENT — PAIN SCALES - GENERAL: PAINLEVEL_OUTOF10: MODERATE PAIN (4)

## 2025-01-16 NOTE — LETTER
1/16/2025      Arnaldo Henderson  [address] [address]      Dear Colleague,    Thank you for referring your patient, Arnaldo Henderson, to the [facility]. Please see a copy of my visit note below.    Virtual Visit Details    Type of service:  Video Visit   Video Start Time:  1000  Video End Time: 1030    Originating Location (pt. Location): Home  Distant Location (provider location):  Off-site  Platform used for Video Visit: Destinee Henderson returns today in follow-up of neuroendocrine tumor of the small bowel.    He is 69 years old and his cancer was found incidentally while undergoing a pancreatectomy for pancreatitis.  His tumor is well-differentiated with a Ki-67 index of 10%.  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  He has not ever had any symptoms that we could directly attribute to his cancer.  He returns today for ongoing assessment of his disease progression.    He tells me his overall condition seems somewhat worse over the past couple of weeks with worsened control of his blood sugars.  Some of that may be due to having gotten a recent steroid injection for his chronic low back pain but that was only a week ago and his sense of malaise seem to probably started before that.  He has been having some episodes of lightheadedness and has had some evaluation by his other physicians for that and it is unclear how much that is related to his Parkinson's meds versus issues with his blood sugar control.  His appetite remains quite good and if anything he has gained a few pounds.  He very rarely will get what he calls a bubble of pain in the mid abdomen usually starting on the right and then migrating to the left and going away after an hour or 2.  This usually occurs in the evening and he cannot identify any precipitating or ameliorating factors.  He has had no problems with diarrhea.  He notes no  flushing or wheezing.  He is having no fevers chills or sweats.    I personally reviewed his CT scan and went over all the findings in great detail with him and his wife.  His kiersten disease is probably slightly worse although the differences are only a millimeter or 2.  I do not see any new sites of disease anywhere.  He has some fat stranding around his celiac which think is not related to his cancer but probably related to his prior surgery and pancreatitis.  He has some tiny lung nodules that are probably not of any clinical significance.    His electrolytes and renal function are normal.  His bilirubin, albumin and liver enzymes are normal.  His nonfasting glucose was 269.  His blood counts are normal.    Assessment/plan:  1.  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  He has stable to very slightly progressive disease.  He does not have any symptoms that I would attribute to his cancer although it is possible but I think unlikely that his sense of malaise is related to that.  I recommended that we follow-up with repeat evaluation with a CT scan in about 6 months.  If he continues to have minimal growth of his disease we may cut back on the frequency of our evaluations after that.  2.  Uncontrolled diabetes  3.  Parkinson's disease    His wife had an extensive list of questions about all of his specific findings on the scan his various minor laboratory abnormalities, and whether any of these things might represent some other kind of cancer or other disease process that we should be worried about.  I spent a long time answering all their questions and reassuring them at present there was not anything else obvious going on that would require attention.  I think much of his symptomology is related to both his diabetes and Parkinson's disease.  We discussed again the rationale for observation rather than active treatment of his cancer for now, and the fact that it may  be many years before we need to do anything about it given its current behavior.  I reviewed again with them the symptoms that might indicate progression of his cancer that they should bring to our attention-both the hormone related symptoms such as diarrhea and flushing and the less specific symptoms of unexplained progressive pain or weight loss.    Total time by me on the date of service inclusive of the above visit with review of multiple imaging studies and lab studies, ordering, and documentation was approximately 45 minutes.      Again, thank you for allowing me to participate in the care of your patient.        Sincerely,        Caio Lyn MD    Electronically signed

## 2025-01-16 NOTE — PROGRESS NOTES
Virtual Visit Details    Type of service:  Video Visit   Video Start Time:  1000  Video End Time: 1030    Originating Location (pt. Location): Home  Distant Location (provider location):  Off-site  Platform used for Video Visit: Destinee Henderson returns today in follow-up of neuroendocrine tumor of the small bowel.    He is 69 years old and his cancer was found incidentally while undergoing a pancreatectomy for pancreatitis.  His tumor is well-differentiated with a Ki-67 index of 10%.  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  He has not ever had any symptoms that we could directly attribute to his cancer.  He returns today for ongoing assessment of his disease progression.    He tells me his overall condition seems somewhat worse over the past couple of weeks with worsened control of his blood sugars.  Some of that may be due to having gotten a recent steroid injection for his chronic low back pain but that was only a week ago and his sense of malaise seem to probably started before that.  He has been having some episodes of lightheadedness and has had some evaluation by his other physicians for that and it is unclear how much that is related to his Parkinson's meds versus issues with his blood sugar control.  His appetite remains quite good and if anything he has gained a few pounds.  He very rarely will get what he calls a bubble of pain in the mid abdomen usually starting on the right and then migrating to the left and going away after an hour or 2.  This usually occurs in the evening and he cannot identify any precipitating or ameliorating factors.  He has had no problems with diarrhea.  He notes no flushing or wheezing.  He is having no fevers chills or sweats.    I personally reviewed his CT scan and went over all the findings in great detail with him and his wife.  His kiersten disease is probably slightly worse although the differences  are only a millimeter or 2.  I do not see any new sites of disease anywhere.  He has some fat stranding around his celiac which think is not related to his cancer but probably related to his prior surgery and pancreatitis.  He has some tiny lung nodules that are probably not of any clinical significance.    His electrolytes and renal function are normal.  His bilirubin, albumin and liver enzymes are normal.  His nonfasting glucose was 269.  His blood counts are normal.    Assessment/plan:  1.  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  He has stable to very slightly progressive disease.  He does not have any symptoms that I would attribute to his cancer although it is possible but I think unlikely that his sense of malaise is related to that.  I recommended that we follow-up with repeat evaluation with a CT scan in about 6 months.  If he continues to have minimal growth of his disease we may cut back on the frequency of our evaluations after that.  2.  Uncontrolled diabetes  3.  Parkinson's disease    His wife had an extensive list of questions about all of his specific findings on the scan his various minor laboratory abnormalities, and whether any of these things might represent some other kind of cancer or other disease process that we should be worried about.  I spent a long time answering all their questions and reassuring them at present there was not anything else obvious going on that would require attention.  I think much of his symptomology is related to both his diabetes and Parkinson's disease.  We discussed again the rationale for observation rather than active treatment of his cancer for now, and the fact that it may be many years before we need to do anything about it given its current behavior.  I reviewed again with them the symptoms that might indicate progression of his cancer that they should bring to our attention-both the hormone related symptoms  such as diarrhea and flushing and the less specific symptoms of unexplained progressive pain or weight loss.    Total time by me on the date of service inclusive of the above visit with review of multiple imaging studies and lab studies, ordering, and documentation was approximately 45 minutes.

## 2025-01-16 NOTE — NURSING NOTE
Current patient location: [address] [place] [address]    Is the patient currently in the state of [place]? YES    Visit mode: VIDEO    If the visit is dropped, the patient can be reconnected by: [phone]    Will anyone else be joining the visit? NO  (If patient encounters technical issues they should call [phone]    Are changes needed to the allergy or medication list? Pt stated no changes to allergies and Pt stated no med changes    Are refills needed on medications prescribed by this physician? NO    Rooming Documentation:  Pt unavailable for distress screening, wife assisted with check-in.    Reason for visit: RECHBALDOMERO SINGHF

## 2025-01-16 NOTE — LETTER
1/16/2025         RE: Arnaldo Henderson  [address] [facility] [address]      Virtual Visit Details    Type of service:  Video Visit   Video Start Time:  1000  Video End Time: 1030    Originating Location (pt. Location): Home  Distant Location (provider location):  Off-site  Platform used for Video Visit: Destinee Henderson returns today in follow-up of neuroendocrine tumor of the small bowel.    He is 69 years old and his cancer was found incidentally while undergoing a pancreatectomy for pancreatitis.  His tumor is well-differentiated with a Ki-67 index of 10%.  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  He has not ever had any symptoms that we could directly attribute to his cancer.  He returns today for ongoing assessment of his disease progression.    He tells me his overall condition seems somewhat worse over the past couple of weeks with worsened control of his blood sugars.  Some of that may be due to having gotten a recent steroid injection for his chronic low back pain but that was only a week ago and his sense of malaise seem to probably started before that.  He has been having some episodes of lightheadedness and has had some evaluation by his other physicians for that and it is unclear how much that is related to his Parkinson's meds versus issues with his blood sugar control.  His appetite remains quite good and if anything he has gained a few pounds.  He very rarely will get what he calls a bubble of pain in the mid abdomen usually starting on the right and then migrating to the left and going away after an hour or 2.  This usually occurs in the evening and he cannot identify any precipitating or ameliorating factors.  He has had no problems with diarrhea.  He notes no flushing or wheezing.  He is having no fevers chills or sweats.    I personally reviewed his CT scan and went over all the findings in great detail with him  and his wife.  His kiersten disease is probably slightly worse although the differences are only a millimeter or 2.  I do not see any new sites of disease anywhere.  He has some fat stranding around his celiac which think is not related to his cancer but probably related to his prior surgery and pancreatitis.  He has some tiny lung nodules that are probably not of any clinical significance.    His electrolytes and renal function are normal.  His bilirubin, albumin and liver enzymes are normal.  His nonfasting glucose was 269.  His blood counts are normal.    Assessment/plan:  1.  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  He has stable to very slightly progressive disease.  He does not have any symptoms that I would attribute to his cancer although it is possible but I think unlikely that his sense of malaise is related to that.  I recommended that we follow-up with repeat evaluation with a CT scan in about 6 months.  If he continues to have minimal growth of his disease we may cut back on the frequency of our evaluations after that.  2.  Uncontrolled diabetes  3.  Parkinson's disease    His wife had an extensive list of questions about all of his specific findings on the scan his various minor laboratory abnormalities, and whether any of these things might represent some other kind of cancer or other disease process that we should be worried about.  I spent a long time answering all their questions and reassuring them at present there was not anything else obvious going on that would require attention.  I think much of his symptomology is related to both his diabetes and Parkinson's disease.  We discussed again the rationale for observation rather than active treatment of his cancer for now, and the fact that it may be many years before we need to do anything about it given its current behavior.  I reviewed again with them the symptoms that might indicate progression of  his cancer that they should bring to our attention-both the hormone related symptoms such as diarrhea and flushing and the less specific symptoms of unexplained progressive pain or weight loss.    Total time by me on the date of service inclusive of the above visit with review of multiple imaging studies and lab studies, ordering, and documentation was approximately 45 minutes.        Caio Lyn MD

## 2025-01-17 PROBLEM — M85.89 OSTEOPENIA OF MULTIPLE SITES: Status: ACTIVE | Noted: 2025-01-17

## 2025-01-17 LAB — CGA SERPL-MCNC: 698 NG/ML

## 2025-01-20 ENCOUNTER — TELEPHONE (OUTPATIENT)
Dept: ENDOCRINOLOGY | Facility: CLINIC | Age: 70
End: 2025-01-20

## 2025-01-20 ENCOUNTER — HOSPITAL ENCOUNTER (EMERGENCY)
Facility: HOSPITAL | Age: 70
Discharge: HOME | End: 2025-01-20
Admitting: PHYSICIAN ASSISTANT
Payer: MEDICARE

## 2025-01-20 VITALS
DIASTOLIC BLOOD PRESSURE: 81 MMHG | RESPIRATION RATE: 22 BRPM | HEART RATE: 83 BPM | OXYGEN SATURATION: 97 % | SYSTOLIC BLOOD PRESSURE: 144 MMHG | TEMPERATURE: 97.7 F

## 2025-01-20 DIAGNOSIS — Z79.4 TYPE 2 DIABETES MELLITUS WITH HYPERGLYCEMIA, WITH LONG-TERM CURRENT USE OF INSULIN (H): Primary | ICD-10-CM

## 2025-01-20 DIAGNOSIS — R20.0 NUMBNESS: ICD-10-CM

## 2025-01-20 DIAGNOSIS — Z79.4 TYPE 2 DIABETES MELLITUS WITH HYPERGLYCEMIA, WITH LONG-TERM CURRENT USE OF INSULIN (H): ICD-10-CM

## 2025-01-20 DIAGNOSIS — E11.65 TYPE 2 DIABETES MELLITUS WITH HYPERGLYCEMIA, WITH LONG-TERM CURRENT USE OF INSULIN (H): Primary | ICD-10-CM

## 2025-01-20 DIAGNOSIS — E11.65 TYPE 2 DIABETES MELLITUS WITH HYPERGLYCEMIA, WITH LONG-TERM CURRENT USE OF INSULIN (H): ICD-10-CM

## 2025-01-20 LAB
ALBUMIN SERPL BCG-MCNC: 3.8 G/DL (ref 3.5–5.2)
ALP SERPL-CCNC: 105 U/L (ref 40–150)
ALT SERPL W P-5'-P-CCNC: 6 U/L (ref 0–70)
ANION GAP SERPL CALCULATED.3IONS-SCNC: 7 MMOL/L (ref 7–15)
AST SERPL W P-5'-P-CCNC: 20 U/L (ref 0–45)
BASOPHILS # BLD AUTO: 0.1 10E3/UL (ref 0–0.2)
BASOPHILS NFR BLD AUTO: 1 %
BILIRUB DIRECT SERPL-MCNC: 0.26 MG/DL (ref 0–0.3)
BILIRUB SERPL-MCNC: 0.7 MG/DL
BUN SERPL-MCNC: 21.8 MG/DL (ref 8–23)
CALCIUM SERPL-MCNC: 9 MG/DL (ref 8.8–10.4)
CHLORIDE SERPL-SCNC: 103 MMOL/L (ref 98–107)
CREAT SERPL-MCNC: 0.98 MG/DL (ref 0.67–1.17)
EGFRCR SERPLBLD CKD-EPI 2021: 83 ML/MIN/1.73M2
EOSINOPHIL # BLD AUTO: 0.2 10E3/UL (ref 0–0.7)
EOSINOPHIL NFR BLD AUTO: 2 %
ERYTHROCYTE [DISTWIDTH] IN BLOOD BY AUTOMATED COUNT: 14.1 % (ref 10–15)
GLUCOSE BLDC GLUCOMTR-MCNC: 82 MG/DL (ref 70–99)
GLUCOSE SERPL-MCNC: 186 MG/DL (ref 70–99)
HCO3 SERPL-SCNC: 26 MMOL/L (ref 22–29)
HCT VFR BLD AUTO: 41.5 % (ref 40–53)
HGB BLD-MCNC: 13.6 G/DL (ref 13.3–17.7)
IMM GRANULOCYTES # BLD: 0 10E3/UL
IMM GRANULOCYTES NFR BLD: 0 %
LYMPHOCYTES # BLD AUTO: 1.9 10E3/UL (ref 0.8–5.3)
LYMPHOCYTES NFR BLD AUTO: 21 %
MAGNESIUM SERPL-MCNC: 1.9 MG/DL (ref 1.7–2.3)
MCH RBC QN AUTO: 32.6 PG (ref 26.5–33)
MCHC RBC AUTO-ENTMCNC: 32.8 G/DL (ref 31.5–36.5)
MCV RBC AUTO: 100 FL (ref 78–100)
MONOCYTES # BLD AUTO: 1.5 10E3/UL (ref 0–1.3)
MONOCYTES NFR BLD AUTO: 17 %
NEUTROPHILS # BLD AUTO: 5.3 10E3/UL (ref 1.6–8.3)
NEUTROPHILS NFR BLD AUTO: 59 %
NRBC # BLD AUTO: 0 10E3/UL
NRBC BLD AUTO-RTO: 0 /100
PLAT MORPH BLD: NORMAL
PLATELET # BLD AUTO: 315 10E3/UL (ref 150–450)
POTASSIUM SERPL-SCNC: 4.3 MMOL/L (ref 3.4–5.3)
PROT SERPL-MCNC: 6.9 G/DL (ref 6.4–8.3)
RBC # BLD AUTO: 4.17 10E6/UL (ref 4.4–5.9)
RBC MORPH BLD: NORMAL
SODIUM SERPL-SCNC: 136 MMOL/L (ref 135–145)
TROPONIN T SERPL HS-MCNC: 6 NG/L
TSH SERPL DL<=0.005 MIU/L-ACNC: 2.44 UIU/ML (ref 0.3–4.2)
WBC # BLD AUTO: 9.1 10E3/UL (ref 4–11)

## 2025-01-20 PROCEDURE — 84443 ASSAY THYROID STIM HORMONE: CPT | Performed by: PHYSICIAN ASSISTANT

## 2025-01-20 PROCEDURE — 83735 ASSAY OF MAGNESIUM: CPT | Performed by: PHYSICIAN ASSISTANT

## 2025-01-20 PROCEDURE — 82962 GLUCOSE BLOOD TEST: CPT

## 2025-01-20 PROCEDURE — 80048 BASIC METABOLIC PNL TOTAL CA: CPT | Performed by: PHYSICIAN ASSISTANT

## 2025-01-20 PROCEDURE — 82248 BILIRUBIN DIRECT: CPT | Performed by: PHYSICIAN ASSISTANT

## 2025-01-20 PROCEDURE — 85025 COMPLETE CBC W/AUTO DIFF WBC: CPT | Performed by: PHYSICIAN ASSISTANT

## 2025-01-20 PROCEDURE — 99283 EMERGENCY DEPT VISIT LOW MDM: CPT | Performed by: PHYSICIAN ASSISTANT

## 2025-01-20 PROCEDURE — 36415 COLL VENOUS BLD VENIPUNCTURE: CPT | Performed by: PHYSICIAN ASSISTANT

## 2025-01-20 PROCEDURE — 84484 ASSAY OF TROPONIN QUANT: CPT | Performed by: PHYSICIAN ASSISTANT

## 2025-01-20 RX ORDER — ACYCLOVIR 400 MG/1
TABLET ORAL
Qty: 9 EACH | Refills: 1 | Status: SHIPPED | OUTPATIENT
Start: 2025-01-20

## 2025-01-20 RX ORDER — ACYCLOVIR 400 MG/1
TABLET ORAL
Qty: 9 EACH | Refills: 1 | Status: SHIPPED | OUTPATIENT
Start: 2025-01-20 | End: 2025-01-20

## 2025-01-20 ASSESSMENT — ACTIVITIES OF DAILY LIVING (ADL)
ADLS_ACUITY_SCORE: 56
ADLS_ACUITY_SCORE: 59

## 2025-01-20 ASSESSMENT — COLUMBIA-SUICIDE SEVERITY RATING SCALE - C-SSRS
1. IN THE PAST MONTH, HAVE YOU WISHED YOU WERE DEAD OR WISHED YOU COULD GO TO SLEEP AND NOT WAKE UP?: NO
2. HAVE YOU ACTUALLY HAD ANY THOUGHTS OF KILLING YOURSELF IN THE PAST MONTH?: NO
6. HAVE YOU EVER DONE ANYTHING, STARTED TO DO ANYTHING, OR PREPARED TO DO ANYTHING TO END YOUR LIFE?: NO

## 2025-01-20 NOTE — ED PROVIDER NOTES
"EMERGENCY DEPARTMENT ENCOUNTER      NAME: Arnaldo Henderson  AGE: 69 year old male  YOB: 1955  MRN: [id number]  EVALUATION DATE & TIME: No admission date for patient encounter.    PCP: Amelia Johnson    ED PROVIDER: Wally Whelan PA-C      Chief Complaint   Patient presents with    Numbness     FINAL IMPRESSION:  1. Numbness        ED COURSE & MEDICAL DECISION MAKING:    Pertinent Labs & Imaging studies reviewed. (See chart for details)  2:04 PM I met the patient and performed my initial interview and exam.   4:21 PM patient updated on laboratory results here, plan for discharge at this time.    69 year old male presents to the Emergency Department for evaluation of numbness.     ED Course as of 01/20/25 1622   Mon Jan 20, 2025   1427 Patient is a 69-year-old male, past medical history of neuroendocrine tumor, Parkinson's, diabetes, anxiety, generalized weakness, hypertension, pacemaker, presents emergency department for evaluation of intermittent sensation of \"compression and numbness\" between his neck and shoulders.  Patient reports that this comes on randomly throughout the day, has been having a slightly more frequently recently.  He has recently seen his cardiologist, as well as his oncologist and mentioned this to them and reportedly was not given any specific suggestions.  Wife here reports that he has not had \"specialized testing\" however was not able to elicit exactly what kind of specialized testing she is referring to.  Review of his chart shows that he has had recent laboratory studies, recent echocardiogram.  Patient denies any chest pain or shortness of breath here.  He does have noted shuffling gait from Parkinson's.  Otherwise neurologically appears to be at his baseline without significant abnormality here.  Discussed with him that I am not exactly sure what specific testing would be offered here given the duration of symptoms, and is relatively nonspecific complaints and " exam is relatively at his baseline here.  Discussed that we can obtain some basic labs, though he may need to follow-up with his neurologist as an outpatient for further discussion.  At this point, I do not think there is indication for emergent CT imaging of the head as this been ongoing for quite some time, there is no recent trauma, and he neurologically appears to be at his baseline.  Will obtain basic laboratory studies here including CBC, BMP, hepatic function, TSH, magnesium, for evaluation of possible electrolyte abnormality.  He is agreeable to this plan.    At the end of discussion with him, he reports that he feels this may be related to anxiety, certainly think this is possible given intermittent nature of symptoms.   1615 Laboratory studies here do not show acute abnormalities, CBC here is normal, magnesium normal, TSH normal, hepatic function and BMP here is additionally normal.   1621     Patient updated on laboratory results here in the emergency department, no evidence of acute abnormalities, troponin is well within normal limits, no indication for delta given the duration of symptoms.  Symptoms been ongoing for at least a couple of days intermittently.  Otherwise electrolytes are normal and CBC and BMP here are normal.  Unclear etiology for the patient's intermittent symptoms though question whether there is some component of anxiety here.  Patient be discharged at this time.       Medical Decision Making  Obtained supplemental history:Supplemental history obtained?: No  Reviewed external records: External records reviewed?: Outpatient Record: Outpatient oncology visit on 01/16/2025, therapy visit on 01/14/2025  Care impacted by chronic illness:Other: Diabetes, cervicalgia, hypertension, hyperlipidemia, Parkinson's disease, deconditioning, neuroendocrine tumor, hyponatremia, generalized weakness  Did you consider but not order tests?: Work up considered but not performed and documented in chart, if  "applicable  Did you interpret images independently?: Independent interpretation of ECG and images noted in documentation, when applicable.  Consultation discussion with other provider:Did you involve another provider (consultant, , pharmacy, etc.)?: No  Discharge. No recommendations on prescription strength medication(s). N/A.    MIPS: Not Applicable    At the conclusion of the encounter I discussed the results of all of the tests and the disposition. The questions were answered. The patient or family acknowledged understanding and was agreeable with the care plan.       0 minutes of critical care time     MEDICATIONS GIVEN IN THE EMERGENCY:  Medications - No data to display    NEW PRESCRIPTIONS STARTED AT TODAY'S ER VISIT  New Prescriptions    No medications on file          =================================================================    HPI    Patient information was obtained from: Patient     Use of : N/A        Arnaldo Henderson is a 69 year old male with a pertinent history of abdominal pain, neuroendocrine tumor, weakness, Parkinson's, hypotension, bradycardia, back pain who presents to this ED for evaluation of symptoms of \"numbness, compression between his head and the top of his neck\".  Patient reports that has been ongoing for multiple months, had more frequent episodes over the last day.  Because of the more frequent episodes, he elected to present to the emergency department.  Recently saw his cardiologist as well as his oncologist.  Reports that he has had multiple recent medication changes.  Additionally reports that this could be due to anxiety.    Per oncology visit on 01/16/2024: Assessment/plan:  1.  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  He has stable to very slightly progressive disease.  He does not have any symptoms that I would attribute to his cancer although it is possible but I think unlikely that his sense of malaise is " related to that.  I recommended that we follow-up with repeat evaluation with a CT scan in about 6 months.  If he continues to have minimal growth of his disease we may cut back on the frequency of our evaluations after that.  2.  Uncontrolled diabetes  3.  Parkinson's disease     His wife had an extensive list of questions about all of his specific findings on the scan his various minor laboratory abnormalities, and whether any of these things might represent some other kind of cancer or other disease process that we should be worried about.  I spent a long time answering all their questions and reassuring them at present there was not anything else obvious going on that would require attention.  I think much of his symptomology is related to both his diabetes and Parkinson's disease.  We discussed again the rationale for observation rather than active treatment of his cancer for now, and the fact that it may be many years before we need to do anything about it given its current behavior.  I reviewed again with them the symptoms that might indicate progression of his cancer that they should bring to our attention-both the hormone related symptoms such as diarrhea and flushing and the less specific symptoms of unexplained progressive pain or weight loss    PAST MEDICAL HISTORY:  Past Medical History:   Diagnosis Date    Acute pancreatitis 04/18/2022    Formatting of this note might be different from the original. Formatting of this note might be different from the original. Added automatically from request for surgery [id number] Formatting of this note might be different from the original. Added automatically from request for surgery [id number]  Formatting of this note might be different from the original. Added automatically from request for surgery     CAD (coronary artery disease)     CABG    Cholecystitis, acute 07/05/2023    Diabetes mellitus, type 2 (H) 2013    Family history of parkinsonism 02/14/2024     Gastroesophageal reflux disease     Hypercholesteremia     Hypertension     Mixed conductive and sensorineural hearing loss of both ears     Mixed hearing loss     Pancreatic duct stricture     Pancreatitis     Parkinson disease (H)     Parkinson's disease, unspecified whether dyskinesia present, unspecified whether manifestations fluctuate (H) 02/14/2024    Seborrheic dermatitis 03/22/2024    Second degree AV block     Surgical site infection 07/26/2023       PAST SURGICAL HISTORY:  Past Surgical History:   Procedure Laterality Date    CA ANESTH CABG W/PUMP      CHOLECYSTECTOMY  2022    COLONOSCOPY N/A 01/12/2023    Procedure: colonoscopy with fluroscopy;  Surgeon: Ayden Fraire MD;  Location:  OR    ENDOSCOPIC RETROGRADE CHOLANGIOPANCREATOGRAM N/A 05/18/2023    Procedure: ENDOSCOPIC RETROGRADE CHOLANGIOPANCREATOGRAPHY, WITH  CYSTDUODENOSTOMY STENTS X2 REMOVAL;  Surgeon: Cedric Saldana MD;  Location: [facility] OR    ENT SURGERY      EP PACEMAKER DEVICE & LEAD IMPLANT- RIGHT ATRIAL & RIGHT VENTRICULAR N/A 02/28/2024    Procedure: Pacemaker Device & Lead Implant - Right Atrial & Right Ventricular;  Surgeon: Jenna Hernandez MD;  Location:  HEART CARDIAC CATH LAB    LAPAROSCOPY DIAGNOSTIC (GENERAL) N/A 02/20/2023    Procedure: LAPAROSCOPY, DIAGNOSTIC; RETRIEVAL OF MIGRATED STENT;  Surgeon: Joseluis Lima MD;  Location: [facility] OR    ORTHOPEDIC SURGERY      OTHER SURGICAL HISTORY      skin cancer ? from scrotum    PANCREATECTOMY PEUSTOW N/A 06/30/2023    Procedure: Open distal pancreactectomy with splenectomy, lysis of adhesions, resection of proximal illeum;  Surgeon: Ashvin Haider MD;  Location: [facility] OR    MN CABG, ARTERIAL, TWO  2003           CURRENT MEDICATIONS:    aspirin (ASA) 81 MG EC tablet  atorvastatin (LIPITOR) 40 MG tablet  BD PEN NEEDLE LINDY 2ND GEN 32G X 4 MM miscellaneous  carbidopa-levodopa (SINEMET CR)  MG CR tablet  carbidopa-levodopa (SINEMET)  MG tablet  Continuous Glucose  "Sensor (DEXCOM G7 SENSOR) MISC  dasiglucagon HCl (ZEGALOGUE) 0.6 MG/0.6ML SOAJ injection  gabapentin (NEURONTIN) 300 MG capsule  insulin aspart (NOVOLOG FLEXPEN) 100 UNIT/ML pen  insulin glargine 100 UNIT/ML pen  lipase-protease-amylase (ZENPEP) 93611-208901-439568 units CPEP  losartan (COZAAR) 50 MG tablet  magnesium oxide (MAG-OX) 400 MG tablet  Multiple Vitamin (MULTI-VITAMINS) TABS  omeprazole (PRILOSEC) 20 MG DR capsule  traZODone (DESYREL) 50 MG tablet         ALLERGIES:  Allergies   Allergen Reactions    Ciprofloxacin Other (See Comments), Hives, Itching, Rash and Unknown     Other reaction(s): Other (see comments)  Oral swelling per [facility]        Dilaudid [Hydromorphone] Rash    Morphine Rash     rash    Penicillins Rash     aka ancef/ rash      Citalopram Unknown    Clindamycin Itching and Rash    Oxycodone Rash     \"HORRIBLE, SEVERE RASH MAYBE CAUSED BY OXY\"       FAMILY HISTORY:  Family History   Problem Relation Age of Onset    Parkinsonism Mother 65         at 75 years    Other - See Comments Mother         Polish ?AJ    Psychosis Mother         hallucinations from medications    Other - See Comments Father     Other - See Comments Sister     Other - See Comments Sister     Lung Cancer Brother         smoker -  47 years    Other - See Comments Daughter         3 kids - 2 girls and boy    Heart Disease Son         orthostatic hypotension    Other - See Comments Son         2 daughters    Autism Spectrum Disorder Grandson     Diabetes No family hx of        SOCIAL HISTORY:   Social History     Socioeconomic History    Marital status:      Spouse name: None    Number of children: None    Years of education: None    Highest education level: None   Tobacco Use    Smoking status: Former     Types: Cigarettes    Smokeless tobacco: Never    Tobacco comments:     Quit 10 years ago - quit a few times; started at age 12 or 13 and then stopped at 26 years and then stopped for 15 years got "  and started smoking again   Vaping Use    Vaping status: Never Used   Substance and Sexual Activity    Alcohol use: Not Currently     Comment: quit 2 years ago    Drug use: Not Currently     Comment: used to smoke marijuana when young; rare use     Social Drivers of Health     Financial Resource Strain: Low Risk  (12/18/2024)    Financial Resource Strain     Within the past 12 months, have you or your family members you live with been unable to get utilities (heat, electricity) when it was really needed?: No   Food Insecurity: Low Risk  (12/18/2024)    Food Insecurity     Within the past 12 months, did you worry that your food would run out before you got money to buy more?: No     Within the past 12 months, did the food you bought just not last and you didn t have money to get more?: No   Transportation Needs: Low Risk  (12/18/2024)    Transportation Needs     Within the past 12 months, has lack of transportation kept you from medical appointments, getting your medicines, non-medical meetings or appointments, work, or from getting things that you need?: No   Physical Activity: Unknown (12/18/2024)    Exercise Vital Sign     Days of Exercise per Week: 1 day   Stress: Stress Concern Present (12/18/2024)    [place] [employer] - Occupational Stress Questionnaire     Feeling of Stress : To some extent   Social Connections: Unknown (12/18/2024)    Social Connection and Isolation Panel [NHANES]     Frequency of Social Gatherings with Friends and Family: Once a week   Interpersonal Safety: Not At Risk (8/6/2022)    Received from [facility]    Humiliation, Afraid, Rape, and Kick questionnaire     Fear of Current or Ex-Partner: No     Emotionally Abused: No     Physically Abused: No     Sexually Abused: No   Housing Stability: Low Risk  (12/18/2024)    Housing Stability     Do you have housing? : Yes     Are you worried about losing your housing?: No       VITALS:  BP (!) 144/81   Pulse 83    Temp 97.7  F (36.5  C) (Oral)   Resp 22   SpO2 97%     PHYSICAL EXAM    Physical Exam  Vitals and nursing note reviewed.   Constitutional:       General: He is not in acute distress.     Appearance: Normal appearance. He is normal weight. He is not toxic-appearing or diaphoretic.   HENT:      Right Ear: External ear normal.      Left Ear: External ear normal.   Eyes:      Conjunctiva/sclera: Conjunctivae normal.   Cardiovascular:      Rate and Rhythm: Normal rate.      Pulses: Normal pulses.      Comments: Paced rhythm  Pulmonary:      Effort: Pulmonary effort is normal.   Abdominal:      General: Abdomen is flat.      Palpations: Abdomen is soft.      Tenderness: There is no abdominal tenderness. There is no right CVA tenderness, left CVA tenderness, guarding or rebound.   Musculoskeletal:      Right lower leg: No edema.      Left lower leg: No edema.   Skin:     Findings: No erythema or rash.   Neurological:      Mental Status: He is alert. Mental status is at baseline.      Sensory: No sensory deficit.      Motor: No weakness.      Comments: Patient with notable gait here from Parkinson's, otherwise appears to be at his baseline.           LAB:  All pertinent labs reviewed and interpreted.  Labs Ordered and Resulted from Time of ED Arrival to Time of ED Departure   BASIC METABOLIC PANEL - Abnormal       Result Value    Sodium 136      Potassium 4.3      Chloride 103      Carbon Dioxide (CO2) 26      Anion Gap 7      Urea Nitrogen 21.8      Creatinine 0.98      GFR Estimate 83      Calcium 9.0      Glucose 186 (*)    CBC WITH PLATELETS AND DIFFERENTIAL - Abnormal    WBC Count 9.1      RBC Count 4.17 (*)     Hemoglobin 13.6      Hematocrit 41.5            MCH 32.6      MCHC 32.8      RDW 14.1      Platelet Count       GLUCOSE BY METER - Normal    GLUCOSE BY METER POCT 82     MAGNESIUM - Normal    Magnesium 1.9     TSH WITH FREE T4 REFLEX - Normal    TSH 2.44     TROPONIN T, HIGH SENSITIVITY - Normal     Troponin T, High Sensitivity 6     HEPATIC FUNCTION PANEL - Normal    Protein Total 6.9      Albumin 3.8      Bilirubin Total 0.7      Alkaline Phosphatase 105      AST 20      ALT 6      Bilirubin Direct 0.26     RBC AND PLATELET MORPHOLOGY       RADIOLOGY:  Reviewed all pertinent imaging. Please see official radiology report.  No orders to display       PROCEDURES:   None.     Wally Whelan PA-C  Emergency Medicine  [facility] EMERGENCY DEPARTMENT  [facility] [facility] [id number] [phone]  Dept: [phone]       Wally Whelan PA-C  01/20/25 1749

## 2025-01-20 NOTE — DISCHARGE INSTRUCTIONS
You were seen here in the emergency department for evaluation of episodes of intermittent numbness, this could be a medication reaction, I do not think this is anything neurologic here.  Your labs here do not show significant electrolyte abnormalities or other acute abnormalities.  I would recommend follow-up with your primary care doctor, as well as potentially your neurologist.    Your exam here is reassuring and your laboratory studies do not show significant abnormalities here.  Unclear exactly what is causing your symptoms.

## 2025-01-20 NOTE — ED TRIAGE NOTES
"Patient arrives by private car for evaluation of intermittent times of  \"feeling numb\"  states his head and legs feel numb, but has tension in neck and shoulders.  History of Parkinsons and Neuroendocrine cancer in small bowel.         "

## 2025-01-20 NOTE — TELEPHONE ENCOUNTER
[facility] for Dexcom G7 Sensors, it was sent as the  on accident.     Chacorta Lai, PharmD  [facility]

## 2025-01-21 ENCOUNTER — OFFICE VISIT (OUTPATIENT)
Dept: NEUROLOGY | Facility: CLINIC | Age: 70
End: 2025-01-21
Payer: MEDICARE

## 2025-01-21 VITALS
DIASTOLIC BLOOD PRESSURE: 66 MMHG | RESPIRATION RATE: 16 BRPM | OXYGEN SATURATION: 94 % | SYSTOLIC BLOOD PRESSURE: 94 MMHG | HEART RATE: 81 BPM

## 2025-01-21 DIAGNOSIS — G20.B1 PARKINSON'S DISEASE WITH DYSKINESIA WITHOUT FLUCTUATING MANIFESTATIONS (H): Primary | ICD-10-CM

## 2025-01-21 ASSESSMENT — PAIN SCALES - GENERAL: PAINLEVEL_OUTOF10: NO PAIN (0)

## 2025-01-21 NOTE — PATIENT INSTRUCTIONS
The episodes of brain fog, stiffness and heavy feeling could be due to multiple things. Anxiety seems to be a large component of these episodes so working on anxiety with a therapist is important. Please also discuss starting a medication to treat anxiety with Morenita Delgado when you meet with her shortly.     Please continue to pay attention to these episodes and how they correspond with your Parkinson's disease medications. Wearing off symptoms can manifest in many different ways. We can consider making adjustments in the future but will prioritize addressing anxiety first.

## 2025-01-21 NOTE — PROGRESS NOTES
"    Diagnosis/Summary/Recommendations:    PATIENT: Arnaldo Henderson  69 year old male     : 1955    ELISA: 2025       MRN: [id number]  [address]    [facility] [address]     [phone] (M)   [phone] (H)      [email or web address]     Veronika Pena spouse  [phone]     Keren balbuena daughter     Genetic testing ?   Family history of parkinsonism in mother     Parkinson's disease with dyskinesia, unspecified whether manifestations fluctuate, Ref by EVERT SAMUEL, Recs in [facility], Sched per Spouse Veronika     Seen by Jamals - notes below     He granted proxy access to his mychart.      Assessment:  (G20.A1) Parkinson's disease, unspecified whether dyskinesia present, unspecified whether manifestations fluctuate  (primary encounter diagnosis)  Family history of parkinsonism - mother      [facility]  1. Parkinson's disease, stage 2  2. Dyskinesia due to Parkinson's disease.  3. Motor fluctuations likely related to protein intake interfering with the absorption of levodopa, improved since taking the levodopa on an empty stomach.     5am up line up pills  6am takes carbidopa/levodopa and insulin  Then eats breakfast - generally he is waiting a bit - eating between 630 or 7am so waiting 30 - 60 minutes.      10 or 11a - head starts to feel numb - not light headedness and legs are heavy.   He has problems moving his feet and most likely he is wearing off/worn off  He has a lot of \"freezing\" throughout the day.   The morning is the best time.   He has freezing  He is a \"different person\" later in the day and evenings   Falls asleep early in evening - 730 and 8pm watching TV  Gets up at 9pm and/or wife wakes him up and has to help him get into bed  Has problems sleeping  Has a bed rail to help him move  He is sleeping in a separate bed as he is at various angles in the bed.      Started 2.5 years ago in [place] - Parkinson has progressed \"tremendously\"   Dr Resendez has not made " medication changes.      11am pill and takes 1 hour or 1.5 hours to get the dose to work   4p      Review of diagnosis    Parkinson disease  Duration 4 years or so.   Side onset: right  Right handed.   Symptoms - had gait problems  - lack of arm swing, shuffling   Symptoms - sleep issues were early issues as well as lack of smell  Clinical diagnosis   No specific testing     Avoidance of dopamine blockers   Not taking     Motor complication review   Wearing off   ?dyskinesias - not  clear if he has this but may be some facial dyskinesias per his wife.  Not clear if there is a relationship between the medication and the dyskinesias.   Freezing that may be aggravated by anxiety   Has more shuffling     Review of Impulse control disorders   Always hungry   Seen by Dr. Haider and discussed weight gain  No other ICD issues.      Review of surgical or medication options   reviewed     Gait/Balance/Falls   No falls.   Has freezing  Not using assistive device     Exercise/Therapy performed/offered   Physical therapy - after visit today  Abundio Barbosa PT ?  [facility]      Cognitive/Driving   He is not concerned  Wife has been a bit concerned - left the garage door open   He had an amazing memory when they met but has short term memory problems  Has not had cognitive testing  - can consider this.   Driving  - very little - short distances; wife prefers that he not drive.   He is managing his own medicine and insulin, etc.   Organizes his pill containers, etc.      Mood   Anxiety is disabling   QTc was okay 2/28/2024  He may have been on citalopram/celexa - details not clear in chart.   He stopped it and threw it out.      Therapist - has not worked with a therapist  He has problems waiting with other people when they are shopping.      Denies depression     Wife Veronika on the call today.      Profession in the past -- 7 years retired.   He worked in manufacturing things, all sorts of jobs.       Using [employer] mobility at 1200 pm for therapy      Going to [place] and leaving Friday and will be there for one week and will be selling their place as it is too difficulty to travel. Bought one way tickets as the sale is still not final.      1st marriage  2nd marriage - clarisa - having surgery with Dr. Umaña   Had an cerebral angiogram - they want to address the 80% carotid artery stenosis.      Hallucinations/delusions   Denies   No illusions     Sleep   Naps in front of tv  Gets up to go to bed at 9pm   Sleeps for 4 solid hours and then toss and turns for 4 hours  Get up at 4 or 5am and then lays in bed till 4 or 430 or 5a and then gets up  Sleeping in separate bed from spouse  Is angled over the bed  He has a railing.   He may move around in the bed   possible RBD/dream enactment behavior - per his wife - may be less  He sleeps alone now - sleep issue prompted a consultation.   Snoring - sometimes - recently no but not clear.   Sleep study - has not done one.      Bladder/Renal/Prostate/Gyn/Other   Urinary frequency  Nocturia 2/noc  No control issues   PSA was normal about 2 y ears ago.      GI/Constipation/GERD   Acute cholecystitis  Cholecystectomy  Malignant neuroendocrine tumor   GERD  Was put on omeprazole long ago and remains on this.   He had problems with his esophagus and had to be dilated, etc. He had ripples  This was 20 years ago, etc.   He has not had problems like this.  No coughing   Weight issue ?  Failure to thrive   Malnutrition  Lipase/protease/amylase  He has a bowel movement every 3 days - long standing  Not taking anything for his bowels per se  Encouraged to be active     ENDO/Lipid/DM/Bone density/Thyroid  Partial pancreatectomy  Pseudocyst pancreas     Diabetes  A1c was 8.2 on 11/6/2023  Insulin  Intolerance to metformin  Seen nutritionist for this.   Needs to have another one done.      Lipid disorder   Atorvastatin lipitor 40mg      Cardio/heart/Hyper or Hypotensive  "  Hypotension  pacemaker  2/28/2024  Bradycardia  Bypass surgery x2 2003  Losartan cozaar 50mg restarted after hospital     Vision/Dry Eyes/Cataracts/Glaucoma/Macular   Wears glasses  Had recent visual evaluation   No clear retinopathy from diabetes     Heme/Anticoagulation/Antiplatelet/Anemia/Other  Aspirin 81mg daily   No other blood thinners.      ENT/Resp  Hearing loss   ?former smoker - tobacco - off and on   Smell perception - never been good.      Skin/Cancer/Seborrhea/other  Skin cancer of scrotum  Seborrheic dermatitis   Few spots on his scalp     Musculoskeletal/Pain/Headache  Neck pain  Back issues since \"surgery\" - like a bubble that moves around his back and is intermittent.      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      His tumor is well-differentiated with a Ki-67 index of 10%. He has not had any symptoms referable to his cancer and at our last evaluation had only small volume kiersten disease in the mesentery.      S/p pacemaker  Ongoing blood pressure issues - highs and lows   Losartan cozaar 50mg was restarted and had been on 25mg in hospital  They are working with pcp and possibly cardiology  157 systolic 5pm   Only checking blood pressure in the evening.   He does not check morning blood pressures  QTc was 402ms on ECG on 2/28/2024  Need ongoing monitoring of blood pressure - highs and lows      Parkinson genetics study with family history of parkinson in mother   And son has orthostatic hypotension     Pharmacy (MT[facility]) consultation and medication management  Please call the scheduling number @ [phone] to set up an appointment with pharmacists Cheyanne Harrell or Morenita Delgado.      Due to wearing off he would probably benefit from a shortening in the dose interval changing from 5 hour to 4 hour interval or " "so.      Sinemet 25/100 (carbidopa/levodopa) 2 tabs @ 6am, 2 @10a, 2@2p and 1@5p  Sinemet 50/200 (carbidopa/levodopa) at 9pm     Neuropsychological evaluation - baseline cognitive evaluation  Will take time to get scheduled.      Return to see Candida Jackson NP in 3 months.      Anxiety has increased and impacts social life  May have been citalopram celexa in the past  Has not seen a therapist and wife has been encouraging this.   He may be open to seeing someone  Has not started on anxiety medication recently.      [facility]     PDgeneration results pending     Admission  Leg swelling   Other features.      Arnaldo Henderson is a 69 year old male who receives support for Parkinson's disease. Nurse is calling to follow-up per ER recommendations.  Background: Patient is taking: Confirmed with patient/spouse Medications 6 am 10 am 2 pm 5 pm HS Sinemet 25/100 mg 2 2 2 1  Sinemet 50/200 mg CR     1 Trazodone 50 mg     0.5  New or changed medications: Insulin dosing goes up and down, started trazodone over a month ago which has helped sleep  Assessment: He will follow-up with PCP for leg swelling, will be getting an ultrasound of his legs. He has \"spells\" of legs feeling very heavy, difficulty moving them, head feels heavy, tremors and anxiety get worse during this time. Today it started at 9AM. He confirms he took his sinemet on time at 6AM. Spells usually occur about an hour before his next dose is due, has worsened over the last few weeks. He would like to see Dr. Armenta in person.  Plan/recommendation: Will have scheduling move his appointment with Dr. Armenta up to Friday 8/23 at 9AM IN PERSON.          Parkinson's Disease:       Lightheadedness:       Memory Concern:        Medications      6a  10am 2p 5p  9p      Acetaminophen tylenol 325mg prn             Aspirin 81mg   1            Atorvastatin lipitor 40mg   1            Carbidopa/levodopa Sinemet CR 50/200        1     CArbidopa/levodopa sinemet " 25/100 2.5 2 2 1       Dasiglucagon zegalogue 0.6mg/0.6ml prn            Gabapentin neurontin 300mg 2 2  2       Glucagon baqsimi 3mg/dose nasal  prn             Insulin aspart novolog sliding             Insulin aspart novolog              Insulin glargine  15       30      Lipase protease amylase zenpep  3/day            Losartan cozaar 50mg 1            Magnesium oxide 400mg  qod             MVI 1             Omeprazole prilosec 20mg  1            Trazodone desyrel 50mg        1                                      Hyperventilating slightly upon return to room. He states this was triggered by talking about difficult childhood memory with his wife.     Grade 2 rigidity in BUE. Good finger taps, hand open close, pronation supination.     Plan:    He has a blood pressure diary. Lowest documented is 76/53. Highest 164/85.    He has been having episodes where his head feels foggy (initially describes as numb but not numb). His arms will feel heavy and stiff. They can move ok but his joints feel different. He will also get SOB. He also feels a sensation of pressure and heaviness in his thighs. Shuffling gait gets worse when this is occurring.   These episodes used to occur infrequently. Over the past three days, he has had several of these episodes per day. He presented to the ED for evaluation.v Per ED documentation, labs at the time of this evaluation were unremarkable.     He woke up this morning around 5am. He took his CD/LD at 5:50am along with a probiotic and coffee. He went back to bed and slept until 9am. He woke up, made another cup of coffee and some toast and another episode started suddenly. He had an episode at 9:30am and at 1pm yesterday.      These episodes always occur after 10am or after 2pm. They don't follow his CD/LD doses at a consistent time. He has some symptoms in the evening but they tend to milder. Episodes typically occur when he is sitting. They tend to last just over an hour. Yesterday his  episode lasted four hours. No relationship to eating that he has noticed.    MRI Brain obtained Feb 2024 for episodes of generalized weakness and brain fog.   IMPRESSION:  HEAD MRI:   1.  No acute intracranial process.  2.  Generalized brain atrophy and presumed microvascular ischemic changes as detailed above.     CERVICAL SPINE MRI:  1.  Multilevel degenerative spondylolisthesis of the cervical spine, as above, with up to moderate central spinal canal stenosis at the levels of C3-C4, C4-C5 and C5-C6.  2.  No abnormal spinal cord signal or intramedullary/leptomeningeal enhancement.    No associated headache, loss of consciousness or abnormal movements with these episodes. He can walk when they occur despite his legs feeling heavy but tends to stutter step more. No palpitations, chest pain, pressure associated with these episodes. Recent echo was normal.     He gets very anxious when these episodes occur. His wife wonders if this exacerbates things. Outside these events he usually feels like himself. He feels like if he can catch these episodes early and work through it, he can shorten he episode. Yesterday he was stutter stepping terribly in [employer] so he sat down and then tensed up.     He and his wife don't believe that anxiety is triggering these episodes but believe that the anxiety definitely perpetuates them.     He is going to be establishing care with a therapist shortly.     He has checked his blood glucose and blood pressure when these episodes are occurring. Both have been normal.     Denies lightheadedness with position changes. He states he has been checked for orthostatic hypotension a couple of times - he had not had this.     Last night he needed his wife's assistance to get of the toilet. He attributes this to having used another bathroom in his house that he is not used to. He seemed to get tense as a result.     He had tried taking 2.5 tabs of Carbidopa/Levodopa with his 10am dose. He didn't  notice a difference in leg heaviness.     He denies fever, chills, cough, congestion, dysuria or increased urinary frequency.     The just met with his oncologist last week for neuroendocrine tumor. They were told that his blood work was fine.     He has been having more swelling his legs. This is much more marked than before.   He's going to be getting a CTA coronary angiogram next week for screening.     No falls.     Working PT on balance. This referral was placed by his spine doctor. He keeps up with PD PT exercises. He is planning to return to physical therapy targeting PD in March.     No swallowing issues.     They will be following up with Morenita Delgado PharmD on 1/24/25. They are wondering if these episodes might be medication side effect.     No dysphagia, constipation or hallucinations.     Sleep is not good. Last night he slept for an hour and then tossed and turned the rest of the night until 6am. Slept well until 9am. If he doesn't get up and lay down right, he has to get up and then enter bed again. Can happen once or twenty times. He is not sure what gets him up overnight.     Assessment:   Mr. Henderson reports episodes of brain fog, stiffness, limb heaviness that can be associated with shortness of breath and feeling tense. Work up to date has been reassuring including MRI brain from Feb 2024 for these symptoms and recent laboratory and cardiac work up. From his and wife's description, anxiety seems to be at least a perpetuating if not initiating factor. It's difficult to entirely rule out wearing off or Parkinson's disease fluctuations but there is not clear correlation with PD medications. Since blood pressure has been normal during these episodes and they tend to occur when he is sitting, we are less suspicious of orthostatic hypotension contributing.     Advised starting by addressing anxiety symptoms with upcoming therapy appointment as well as with initiation of medication to address anxiety  (likely selective serotonin reuptake inhibitor to start). He will be meeting with Morenita Delgado PharmD, at the end of this week and discuss options.     Plan:     - Advised discussing starting medication to address anxiety symptoms with Morenita Delgado PharmD, on 1/24/25   - Would consider Zoloft, Lexapro or Celexa  - Appointment with psychologist scheduled on 1/24/25  - Recommended deep, slow breathing when he notices hyperventilation   - Consider adjustments to CD/LD schedule in the future, will prioritize addressing anxiety symptoms first   - Advised continuing to monitor relationship between medication timing and episodes    Mr. Henderson was seen and discussed with movement disorder attending, Dr. Armenta.     Viki Escoto MD  Movement Disorders Fellow    Time Spent: 50 minutes spent on the date of the encounter doing chart review, history and exam, documentation and further activities as noted above    PATIENT SEEN AND EXAMINED BY ME on January 21, 2025 I AGREE WITH THE FINDINGS DETAILED BY THE NEUROLOGY RESIDENT and documented in their note on January 21, 2025   PLEASE REFER TO THEIR NOTE FOR ADDITIONAL DETAILS.       ARNULFO ARMENTA MD  January 21, 2025        Coding statement:   Medical Decision Making:  #  Chronic progressive medical conditions addressed  - see above --   Review and/or interpretation of unique test or documentation from a provider outside of neurology yes =- above   Independent historian provided additional details  yes spouse I  Prescription drug management and review of potential side effects and/or monitoring for side effects  -- see above ---  Health impacted by social determinants of health      I have reviewed the note as documented above.  This accurately captures the substance of my conversation with the patient and total time spent preparing for visit, executing visit and completing visit on the day of the visit:  50 minutes.  The portion of this total time included face to face time      The longitudinal plan of care for Arnaldo Henderson was addressed during this visit. Due to the added complexity in care, I will continue to support Arnaldo Henderson in the subsequent management of this condition(s) and with the ongoing continuity of care of this condition(s).      Juan Jose Armenta MD     ______________________________________    Last visit date and details:             ______________________________________      Patient was asked about 14 Review of systems including changes in vision (dry eyes, double vision), hearing, heart, lungs, musculoskeletal, depression, anxiety, snoring, RBD, insomnia, urinary frequency, urinary urgency, constipation, swallowing problems, hematological, ID, allergies, skin problems: seborrhea, endocrinological: thyroid, diabetes, cholesterol; balance, weight changes, and other neurological problems and these were not significant at this time except for   Patient Active Problem List   Diagnosis    Retained foreign body    Wound infection    Acute bilateral low back pain without sciatica    Adenomatous polyp of colon    Atherosclerotic heart disease of native coronary artery without angina pectoris    Cancer of skin of scrotum (H)    Cervicalgia    Gastroesophageal reflux disease without esophagitis    History of cholecystectomy    History of coronary artery bypass surgery    Hyperlipidemia, unspecified    Hypertension    Impaired gait    Long term (current) use of aspirin    Mixed conductive and sensorineural hearing loss    Muscle weakness (generalized)    Need for assistance with personal care    Other hydrocele    Parkinson's disease with dyskinesia, unspecified whether manifestations fluctuate (H)    Physical deconditioning    Pseudocyst of pancreas    Sensorineural hearing loss (SNHL) of right ear with unrestricted hearing of left ear    Type 2 diabetes mellitus with hyperglycemia, with long-term current use of insulin (H)    Neuroendocrine carcinoma of small bowel (H)     "Failure to thrive in adult    Hyponatremia    Chronic right-sided low back pain without sciatica    Abdominal pain, generalized    Other secondary neuroendocrine tumors (H)    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)    Weakness    Second degree AV block    History of Parkinson's disease    Orthostatic hypotension    Hypotension    Bradycardia    Parkinson's disease, unspecified whether dyskinesia present, unspecified whether manifestations fluctuate (H)    Family history of parkinsonism    Cardiac pacemaker in situ    Seborrheic dermatitis    Lumbar spondylosis    Chronic bilateral thoracic back pain    Osteoarthritis of spine with radiculopathy, thoracic region    Spinal stenosis of lumbar region with neurogenic claudication    Osteopenia of multiple sites          Allergies   Allergen Reactions    Ciprofloxacin Other (See Comments), Hives, Itching, Rash and Unknown     Other reaction(s): Other (see comments)  Oral swelling per [facility]        Dilaudid [Hydromorphone] Rash    Morphine Rash     rash    Penicillins Rash     aka ancef/ rash      Citalopram Unknown    Clindamycin Itching and Rash    Oxycodone Rash     \"HORRIBLE, SEVERE RASH MAYBE CAUSED BY OXY\"     Past Surgical History:   Procedure Laterality Date    CA ANESTH CABG W/PUMP      CHOLECYSTECTOMY  2022    COLONOSCOPY N/A 01/12/2023    Procedure: colonoscopy with fluroscopy;  Surgeon: Ayden Fraire MD;  Location:  OR    ENDOSCOPIC RETROGRADE CHOLANGIOPANCREATOGRAM N/A 05/18/2023    Procedure: ENDOSCOPIC RETROGRADE CHOLANGIOPANCREATOGRAPHY, WITH  CYSTDUODENOSTOMY STENTS X2 REMOVAL;  Surgeon: Cedric Saldana MD;  Location:  OR    ENT SURGERY      EP PACEMAKER DEVICE & LEAD IMPLANT- RIGHT ATRIAL & RIGHT VENTRICULAR N/A 02/28/2024    Procedure: Pacemaker Device & Lead Implant - Right Atrial & Right Ventricular;  Surgeon: Jenna Hernandez MD;  Location:  HEART CARDIAC CATH LAB    LAPAROSCOPY DIAGNOSTIC (GENERAL) N/A " 02/20/2023    Procedure: LAPAROSCOPY, DIAGNOSTIC; RETRIEVAL OF MIGRATED STENT;  Surgeon: Joseluis Lima MD;  Location: [facility] OR    ORTHOPEDIC SURGERY      OTHER SURGICAL HISTORY      skin cancer ? from scrotum    PANCREATECTOMY PEUSTOW N/A 06/30/2023    Procedure: Open distal pancreactectomy with splenectomy, lysis of adhesions, resection of proximal illeum;  Surgeon: Ashvin Haider MD;  Location: [facility] OR    HI CABG, ARTERIAL, TWO  2003     Past Medical History:   Diagnosis Date    Acute pancreatitis 04/18/2022    Formatting of this note might be different from the original. Formatting of this note might be different from the original. Added automatically from request for surgery [id number] Formatting of this note might be different from the original. Added automatically from request for surgery [id number]  Formatting of this note might be different from the original. Added automatically from request for surgery     CAD (coronary artery disease)     CABG    Cholecystitis, acute 07/05/2023    Diabetes mellitus, type 2 (H) 2013    Family history of parkinsonism 02/14/2024    Gastroesophageal reflux disease     Hypercholesteremia     Hypertension     Mixed conductive and sensorineural hearing loss of both ears     Mixed hearing loss     Pancreatic duct stricture     Pancreatitis     Parkinson disease (H)     Parkinson's disease, unspecified whether dyskinesia present, unspecified whether manifestations fluctuate (H) 02/14/2024    Seborrheic dermatitis 03/22/2024    Second degree AV block     Surgical site infection 07/26/2023     Social History     Socioeconomic History    Marital status:      Spouse name: Not on file    Number of children: Not on file    Years of education: Not on file    Highest education level: Not on file   Occupational History    Not on file   Tobacco Use    Smoking status: Former     Types: Cigarettes    Smokeless tobacco: Never    Tobacco comments:     Quit 10 years ago - quit a few  times; started at age 12 or 13 and then stopped at 26 years and then stopped for 15 years got  and started smoking again   Vaping Use    Vaping status: Never Used   Substance and Sexual Activity    Alcohol use: Not Currently     Comment: quit 2 years ago    Drug use: Not Currently     Comment: used to smoke marijuana when young; rare use    Sexual activity: Not on file   Other Topics Concern    Not on file   Social History Narrative    Not on file     Social Drivers of Health     Financial Resource Strain: Low Risk  (12/18/2024)    Financial Resource Strain     Within the past 12 months, have you or your family members you live with been unable to get utilities (heat, electricity) when it was really needed?: No   Food Insecurity: Low Risk  (12/18/2024)    Food Insecurity     Within the past 12 months, did you worry that your food would run out before you got money to buy more?: No     Within the past 12 months, did the food you bought just not last and you didn t have money to get more?: No   Transportation Needs: Low Risk  (12/18/2024)    Transportation Needs     Within the past 12 months, has lack of transportation kept you from medical appointments, getting your medicines, non-medical meetings or appointments, work, or from getting things that you need?: No   Physical Activity: Unknown (12/18/2024)    Exercise Vital Sign     Days of Exercise per Week: 1 day     Minutes of Exercise per Session: Not on file   Stress: Stress Concern Present (12/18/2024)    [employer] [place] [employer] - Occupational Stress Questionnaire     Feeling of Stress : To some extent   Social Connections: Unknown (12/18/2024)    Social Connection and Isolation Panel [NHANES]     Frequency of Communication with Friends and Family: Not on file     Frequency of Social Gatherings with Friends and Family: Once a week     Attends Buddhism Services: Not on file     Active Member of Clubs or Organizations: Not on file      Attends Club or Organization Meetings: Not on file     Marital Status: Not on file   Interpersonal Safety: Not At Risk (8/6/2022)    Received from [facility]    Humiliation, Afraid, Rape, and Kick questionnaire     Fear of Current or Ex-Partner: No     Emotionally Abused: No     Physically Abused: No     Sexually Abused: No   Housing Stability: Low Risk  (12/18/2024)    Housing Stability     Do you have housing? : Yes     Are you worried about losing your housing?: No       Drug and lactation database from the [place] [employer]:  http://toxnet.nlm.nih.gov/cgi-bin/sis/htmlgen?LACT      B/P: Data Unavailable, T: Data Unavailable, P: Data Unavailable, R: Data Unavailable 0 lbs 0 oz  There were no vitals taken for this visit., There is no height or weight on file to calculate BMI.  Medications and Vitals not listed above were documented in the cart and reviewed by me.     Current Outpatient Medications   Medication Sig Dispense Refill    aspirin (ASA) 81 MG EC tablet Take 81 mg by mouth daily      atorvastatin (LIPITOR) 40 MG tablet Take 1 tablet (40 mg) by mouth daily 90 tablet 3    BD PEN NEEDLE LINDY 2ND GEN 32G X 4 MM miscellaneous USE TO INJECT INSULIN 4 TIMES A  each 3    carbidopa-levodopa (SINEMET CR)  MG CR tablet Take 1 tablet by mouth at bedtime @9p 90 tablet 3    carbidopa-levodopa (SINEMET)  MG tablet 2.5@6a, 2@10a, 2@2p and 1@5p 675 tablet 3    Continuous Glucose Sensor (DEXCOM G7 SENSOR) MISC Change every 10 days. 9 each 1    dasiglucagon HCl (ZEGALOGUE) 0.6 MG/0.6ML SOAJ injection Inject 0.6 mg Subcutaneous once as needed (use for severe hypoglycemia) 1.2 mL 1    gabapentin (NEURONTIN) 300 MG capsule Take 2 capsules (600 mg) by mouth 3 times daily 540 capsule 3    insulin aspart (NOVOLOG FLEXPEN) 100 UNIT/ML pen Inject 6-20 Units subcutaneously 3 times daily as needed for high blood sugar (for meal coverage and correction scale, upto 60 units/day) Take 5 units  with breakfast, 10 unit(s) with lunch and 12 unit(s) with supper 40 mL 3    insulin glargine 100 UNIT/ML pen Take 14 unit(s) AM and 20 unit(s) HS, and adjust as needed upto 50 unit(s) /day      lipase-protease-amylase (ZENPEP) 04127-440941-642882 units CPEP Take 1 capsule by mouth 3 times daily (with meals). 90 capsule 11    losartan (COZAAR) 50 MG tablet Take 1 tablet (50 mg) by mouth daily 90 tablet 3    magnesium oxide (MAG-OX) 400 MG tablet Take 400 mg by mouth every other day      Multiple Vitamin (MULTI-VITAMINS) TABS Take 1 tablet by mouth daily      omeprazole (PRILOSEC) 20 MG DR capsule Take 1 capsule (20 mg) by mouth daily 90 capsule 3    traZODone (DESYREL) 50 MG tablet Take 0.5-1 tablets (25-50 mg) by mouth at bedtime. 30 tablet 3         Juan Jose Armenta MD

## 2025-01-21 NOTE — LETTER
"2025       RE: Arnaldo Henderson  [address] [facility] [address]     Dear Colleague,    Thank you for referring your patient, Arnaldo Henderson, to the [facility] NEUROLOGY CLINIC [place] at [facility]. Please see a copy of my visit note below.        Diagnosis/Summary/Recommendations:    PATIENT: Arnaldo Henderson  69 year old male     : 1955    ELISA: 2025       MRN: [id number]  [address]    [place] [facility] [address]     [phone] ()   [phone] ()      [email or web address]     Veronika Pena spouse  [phone]     Keren balbuena daughter     Genetic testing ?   Family history of parkinsonism in mother     Parkinson's disease with dyskinesia, unspecified whether manifestations fluctuate, Ref by EVERT SAMUEL Recs in [facility], Sched per Spouse Veronika     Seen by Parashos - notes below     [facility]      Assessment:  (G20.A1) Parkinson's disease, unspecified whether dyskinesia present, unspecified whether manifestations fluctuate  (primary encounter diagnosis)  Family history of parkinsonism - mother      From Parashos recent visit  1. Parkinson's disease, stage 2  2. Dyskinesia due to Parkinson's disease.  3. Motor fluctuations likely related to protein intake interfering with the absorption of levodopa, improved since taking the levodopa on an empty stomach.     5am up line up pills  6am takes carbidopa/levodopa and insulin  Then eats breakfast - generally he is waiting a bit - eating between 630 or 7am so waiting 30 - 60 minutes.      10 or 11a - head starts to feel numb - not light headedness and legs are heavy.   He has problems moving his feet and most likely he is wearing off/worn off  He has a lot of \"freezing\" throughout the day.   The morning is the best time.   He has freezing  He is a \"different person\" later in the day and evenings   Falls asleep early in evening - 730 and 8pm watching " "TV  Gets up at 9pm and/or wife wakes him up and has to help him get into bed  Has problems sleeping  Has a bed rail to help him move  He is sleeping in a separate bed as he is at various angles in the bed.      Started 2.5 years ago in [place] - Parkinson has progressed \"tremendously\"   Dr Resendez has not made medication changes.      11am pill and takes 1 hour or 1.5 hours to get the dose to work   4p      Review of diagnosis    Parkinson disease  Duration 4 years or so.   Side onset: right  Right handed.   Symptoms - had gait problems  - lack of arm swing, shuffling   Symptoms - sleep issues were early issues as well as lack of smell  Clinical diagnosis   No specific testing     Avoidance of dopamine blockers   Not taking     Motor complication review   Wearing off   ?dyskinesias - not  clear if he has this but may be some facial dyskinesias per his wife.  Not clear if there is a relationship between the medication and the dyskinesias.   Freezing that may be aggravated by anxiety   Has more shuffling     Review of Impulse control disorders   Always hungry   Seen by Dr. Haider and discussed weight gain  No other ICD issues.      Review of surgical or medication options   reviewed     Gait/Balance/Falls   No falls.   Has freezing  Not using assistive device     Exercise/Therapy performed/offered   Physical therapy - after visit today  Abundio Barbosa PT ?  [facility]      Cognitive/Driving   He is not concerned  Wife has been a bit concerned - left the garage door open   He had an amazing memory when they met but has short term memory problems  Has not had cognitive testing  - can consider this.   Driving  - very little - short distances; wife prefers that he not drive.   He is managing his own medicine and insulin, etc.   Organizes his pill containers, etc.      Mood   Anxiety is disabling   QTc was okay 2/28/2024  He may have been on citalopram/celexa - details not clear in chart.   He " stopped it and threw it out.      Therapist - has not worked with a therapist  He has problems waiting with other people when they are shopping.      Denies depression     Wife Clarisa on the call today.      Profession in the past -- 7 years retired.   He worked in manufacturing things, all sorts of jobs.      Using [employer] at 1200 pm for therapy      Going to [place] and leaving Friday and will be there for one week and will be selling their place as it is too difficulty to travel. Bought one way tickets as the sale is still not final.      1st marriage  2nd marriage - clarisa - having surgery with Dr. Umaña   Had an cerebral angiogram - they want to address the 80% carotid artery stenosis.      Hallucinations/delusions   Denies   No illusions     Sleep   Naps in front of tv  Gets up to go to bed at 9pm   Sleeps for 4 solid hours and then toss and turns for 4 hours  Get up at 4 or 5am and then lays in bed till 4 or 430 or 5a and then gets up  Sleeping in separate bed from spouse  Is angled over the bed  He has a railing.   He may move around in the bed   possible RBD/dream enactment behavior - per his wife - may be less  He sleeps alone now - sleep issue prompted a consultation.   Snoring - sometimes - recently no but not clear.   Sleep study - has not done one.      Bladder/Renal/Prostate/Gyn/Other   Urinary frequency  Nocturia 2/noc  No control issues   PSA was normal about 2 y ears ago.      GI/Constipation/GERD   Acute cholecystitis  Cholecystectomy  Malignant neuroendocrine tumor   GERD  Was put on omeprazole long ago and remains on this.   He had problems with his esophagus and had to be dilated, etc. He had ripples  This was 20 years ago, etc.   He has not had problems like this.  No coughing   Weight issue ?  Failure to thrive   Malnutrition  Lipase/protease/amylase  He has a bowel movement every 3 days - long standing  Not taking anything for his bowels per se  Encouraged to be active    "  ENDO/Lipid/DM/Bone density/Thyroid  Partial pancreatectomy  Pseudocyst pancreas     Diabetes  A1c was 8.2 on 11/6/2023  Insulin  Intolerance to metformin  Seen nutritionist for this.   Needs to have another one done.      Lipid disorder   Atorvastatin lipitor 40mg      Cardio/heart/Hyper or Hypotensive   Hypotension  pacemaker  2/28/2024  Bradycardia  Bypass surgery x2 2003  Losartan cozaar 50mg restarted after hospital     Vision/Dry Eyes/Cataracts/Glaucoma/Macular   Wears glasses  Had recent visual evaluation   No clear retinopathy from diabetes     Heme/Anticoagulation/Antiplatelet/Anemia/Other  Aspirin 81mg daily   No other blood thinners.      ENT/Resp  Hearing loss   ?former smoker - tobacco - off and on   Smell perception - never been good.      Skin/Cancer/Seborrhea/other  Skin cancer of scrotum  Seborrheic dermatitis   Few spots on his scalp     Musculoskeletal/Pain/Headache  Neck pain  Back issues since \"surgery\" - like a bubble that moves around his back and is intermittent.      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      His tumor is well-differentiated with a Ki-67 index of 10%. He has not had any symptoms referable to his cancer and at our last evaluation had only small volume kiersten disease in the mesentery.      S/p pacemaker  Ongoing blood pressure issues - highs and lows   Losartan cozaar 50mg was restarted and had been on 25mg in hospital  They are working with pcp and possibly cardiology  157 systolic 5pm   Only checking blood pressure in the evening.   He does not check morning blood pressures  QTc was 402ms on ECG on 2/28/2024  Need ongoing monitoring of blood pressure - highs and lows      Parkinson genetics study with family history of parkinson in mother   And son has orthostatic hypotension     Pharmacy " "(MTM) consultation and medication management  Please call the scheduling number @ [phone] to set up an appointment with pharmacists Cheyanne Harrell or Morenita Delgado.      Due to wearing off he would probably benefit from a shortening in the dose interval changing from 5 hour to 4 hour interval or so.      Sinemet 25/100 (carbidopa/levodopa) 2 tabs @ 6am, 2 @10a, 2@2p and 1@5p  Sinemet 50/200 (carbidopa/levodopa) at 9pm     Neuropsychological evaluation - baseline cognitive evaluation  Will take time to get scheduled.      Return to see Candida Jackson NP in 3 months.      Anxiety has increased and impacts social life  May have been citalopram celexa in the past  Has not seen a therapist and wife has been encouraging this.   He may be open to seeing someone  Has not started on anxiety medication recently.      [facility]     PDgeneration results pending     Admission  Leg swelling   Other features.      Arnaldo Henderson is a 69 year old male who receives support for Parkinson's disease. Nurse is calling to follow-up per ER recommendations.  Background: Patient is taking: Confirmed with patient/spouse Medications 6 am 10 am 2 pm 5 pm HS Sinemet 25/100 mg 2 2 2 1  Sinemet 50/200 mg CR     1 Trazodone 50 mg     0.5  New or changed medications: Insulin dosing goes up and down, started trazodone over a month ago which has helped sleep  Assessment: He will follow-up with PCP for leg swelling, will be getting an ultrasound of his legs. He has \"spells\" of legs feeling very heavy, difficulty moving them, head feels heavy, tremors and anxiety get worse during this time. Today it started at 9AM. He confirms he took his sinemet on time at 6AM. Spells usually occur about an hour before his next dose is due, has worsened over the last few weeks. He would like to see Dr. Armenta in person.  Plan/recommendation: Will have scheduling move his appointment with Dr. Armenta up to Friday 8/23 at 9AM IN PERSON.      "     Parkinson's Disease:       Lightheadedness:       Memory Concern:        Medications      6a  10am 2p 5p  9p      Acetaminophen tylenol 325mg prn             Aspirin 81mg   1            Atorvastatin lipitor 40mg   1            Carbidopa/levodopa Sinemet CR 50/200        1     CArbidopa/levodopa sinemet 25/100 2.5 2 2 1       Dasiglucagon zegalogue 0.6mg/0.6ml prn            Gabapentin neurontin 300mg 2 2  2       Glucagon baqsimi 3mg/dose nasal  prn             Insulin aspart novolog sliding             Insulin aspart novolog              Insulin glargine  15       30      Lipase protease amylase zenpep  3/day            Losartan cozaar 50mg 1            Magnesium oxide 400mg  qod             MVI 1             Omeprazole prilosec 20mg  1            Trazodone desyrel 50mg        1                                      Hyperventilating slightly upon return to room. He states this was triggered by talking about difficult childhood memory with his wife.     Grade 2 rigidity in BUE. Good finger taps, hand open close, pronation supination.     Plan:    He has a blood pressure diary. Lowest documented is 76/53. Highest 164/85.    He has been having episodes where his head feels foggy (initially describes as numb but not numb). His arms will feel heavy and stiff. They can move ok but his joints feel different. He will also get SOB. He also feels a sensation of pressure and heaviness in his thighs. Shuffling gait gets worse when this is occurring.   These episodes used to occur infrequently. Over the past three days, he has had several of these episodes per day. He presented to the ED for evaluation.v Per ED documentation, labs at the time of this evaluation were unremarkable.     He woke up this morning around 5am. He took his CD/LD at 5:50am along with a probiotic and coffee. He went back to bed and slept until 9am. He woke up, made another cup of coffee and some toast and another episode started suddenly. He had an  episode at 9:30am and at 1pm yesterday.      These episodes always occur after 10am or after 2pm. They don't follow his CD/LD doses at a consistent time. He has some symptoms in the evening but they tend to milder. Episodes typically occur when he is sitting. They tend to last just over an hour. Yesterday his episode lasted four hours. No relationship to eating that he has noticed.    MRI Brain obtained Feb 2024 for episodes of generalized weakness and brain fog.   IMPRESSION:  HEAD MRI:   1.  No acute intracranial process.  2.  Generalized brain atrophy and presumed microvascular ischemic changes as detailed above.     CERVICAL SPINE MRI:  1.  Multilevel degenerative spondylolisthesis of the cervical spine, as above, with up to moderate central spinal canal stenosis at the levels of C3-C4, C4-C5 and C5-C6.  2.  No abnormal spinal cord signal or intramedullary/leptomeningeal enhancement.    No associated headache, loss of consciousness or abnormal movements with these episodes. He can walk when they occur despite his legs feeling heavy but tends to stutter step more. No palpitations, chest pain, pressure associated with these episodes. Recent echo was normal.     He gets very anxious when these episodes occur. His wife wonders if this exacerbates things. Outside these events he usually feels like himself. He feels like if he can catch these episodes early and work through it, he can shorten he episode. Yesterday he was stutter stepping terribly in [facility] so he sat down and then tensed up.     He and his wife don't believe that anxiety is triggering these episodes but believe that the anxiety definitely perpetuates them.     He is going to be establishing care with a therapist shortly.     He has checked his blood glucose and blood pressure when these episodes are occurring. Both have been normal.     Denies lightheadedness with position changes. He states he has been checked for orthostatic hypotension a couple of  times - he had not had this.     Last night he needed his wife's assistance to get of the toilet. He attributes this to having used another bathroom in his house that he is not used to. He seemed to get tense as a result.     He had tried taking 2.5 tabs of Carbidopa/Levodopa with his 10am dose. He didn't notice a difference in leg heaviness.     He denies fever, chills, cough, congestion, dysuria or increased urinary frequency.     The just met with his oncologist last week for neuroendocrine tumor. They were told that his blood work was fine.     He has been having more swelling his legs. This is much more marked than before.   He's going to be getting a CTA coronary angiogram next week for screening.     No falls.     Working PT on balance. This referral was placed by his spine doctor. He keeps up with PD PT exercises. He is planning to return to physical therapy targeting PD in March.     No swallowing issues.     They will be following up with Morenita Delgado PharmD on 1/24/25. They are wondering if these episodes might be medication side effect.     No dysphagia, constipation or hallucinations.     Sleep is not good. Last night he slept for an hour and then tossed and turned the rest of the night until 6am. Slept well until 9am. If he doesn't get up and lay down right, he has to get up and then enter bed again. Can happen once or twenty times. He is not sure what gets him up overnight.     Assessment:   Mr. Henderson reports episodes of brain fog, stiffness, limb heaviness that can be associated with shortness of breath and feeling tense. Work up to date has been reassuring including MRI brain from Feb 2024 for these symptoms and recent laboratory and cardiac work up. From his and wife's description, anxiety seems to be at least a perpetuating if not initiating factor. It's difficult to entirely rule out wearing off or Parkinson's disease fluctuations but there is not clear correlation with PD medications. Since  blood pressure has been normal during these episodes and they tend to occur when he is sitting, we are less suspicious of orthostatic hypotension contributing.     Advised starting by addressing anxiety symptoms with upcoming therapy appointment as well as with initiation of medication to address anxiety (likely selective serotonin reuptake inhibitor to start). He will be meeting with Morenita Delgado PharmD, at the end of this week and discuss options.     Plan:     - Advised discussing starting medication to address anxiety symptoms with Morenita Delgado PharmD, on 1/24/25   - Would consider Zoloft, Lexapro or Celexa  - Appointment with psychologist scheduled on 1/24/25  - Recommended deep, slow breathing when he notices hyperventilation   - Consider adjustments to CD/LD schedule in the future, will prioritize addressing anxiety symptoms first   - Advised continuing to monitor relationship between medication timing and episodes    Mr. Henderson was seen and discussed with movement disorder attending, Dr. Armenta.     Viki Escoto MD  Movement Disorders Fellow    Time Spent: 50 minutes spent on the date of the encounter doing chart review, history and exam, documentation and further activities as noted above    PATIENT SEEN AND EXAMINED BY ME on January 21, 2025 I AGREE WITH THE FINDINGS DETAILED BY THE NEUROLOGY RESIDENT and documented in their note on January 21, 2025   PLEASE REFER TO THEIR NOTE FOR ADDITIONAL DETAILS.       ARNULFO ARMENTA MD  January 21, 2025        Coding statement:   Medical Decision Making:  #  Chronic progressive medical conditions addressed  - see above --   Review and/or interpretation of unique test or documentation from a provider outside of neurology yes =- above   Independent historian provided additional details  yes spouse I  Prescription drug management and review of potential side effects and/or monitoring for side effects  -- see above ---  Health impacted by social determinants of  health      I have reviewed the note as documented above.  This accurately captures the substance of my conversation with the patient and total time spent preparing for visit, executing visit and completing visit on the day of the visit:  50 minutes.  The portion of this total time included face to face time     The longitudinal plan of care for Arnaldo Henderson was addressed during this visit. Due to the added complexity in care, I will continue to support Arnaldo Henderson in the subsequent management of this condition(s) and with the ongoing continuity of care of this condition(s).      Juan Jose Armenta MD     ______________________________________    Last visit date and details:             ______________________________________      Patient was asked about 14 Review of systems including changes in vision (dry eyes, double vision), hearing, heart, lungs, musculoskeletal, depression, anxiety, snoring, RBD, insomnia, urinary frequency, urinary urgency, constipation, swallowing problems, hematological, ID, allergies, skin problems: seborrhea, endocrinological: thyroid, diabetes, cholesterol; balance, weight changes, and other neurological problems and these were not significant at this time except for   Patient Active Problem List   Diagnosis     Retained foreign body     Wound infection     Acute bilateral low back pain without sciatica     Adenomatous polyp of colon     Atherosclerotic heart disease of native coronary artery without angina pectoris     Cancer of skin of scrotum (H)     Cervicalgia     Gastroesophageal reflux disease without esophagitis     History of cholecystectomy     History of coronary artery bypass surgery     Hyperlipidemia, unspecified     Hypertension     Impaired gait     Long term (current) use of aspirin     Mixed conductive and sensorineural hearing loss     Muscle weakness (generalized)     Need for assistance with personal care     Other hydrocele     Parkinson's disease with dyskinesia,  "unspecified whether manifestations fluctuate (H)     Physical deconditioning     Pseudocyst of pancreas     Sensorineural hearing loss (SNHL) of right ear with unrestricted hearing of left ear     Type 2 diabetes mellitus with hyperglycemia, with long-term current use of insulin (H)     Neuroendocrine carcinoma of small bowel (H)     Failure to thrive in adult     Hyponatremia     Chronic right-sided low back pain without sciatica     Abdominal pain, generalized     Other secondary neuroendocrine tumors (H)     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)     Weakness     Second degree AV block     History of Parkinson's disease     Orthostatic hypotension     Hypotension     Bradycardia     Parkinson's disease, unspecified whether dyskinesia present, unspecified whether manifestations fluctuate (H)     Family history of parkinsonism     Cardiac pacemaker in situ     Seborrheic dermatitis     Lumbar spondylosis     Chronic bilateral thoracic back pain     Osteoarthritis of spine with radiculopathy, thoracic region     Spinal stenosis of lumbar region with neurogenic claudication     Osteopenia of multiple sites          Allergies   Allergen Reactions     Ciprofloxacin Other (See Comments), Hives, Itching, Rash and Unknown     Other reaction(s): Other (see comments)  Oral swelling per [facility]         Dilaudid [Hydromorphone] Rash     Morphine Rash     rash     Penicillins Rash     aka ancef/ rash       Citalopram Unknown     Clindamycin Itching and Rash     Oxycodone Rash     \"HORRIBLE, SEVERE RASH MAYBE CAUSED BY OXY\"     Past Surgical History:   Procedure Laterality Date     CA ANESTH CABG W/PUMP       CHOLECYSTECTOMY  2022     COLONOSCOPY N/A 01/12/2023    Procedure: colonoscopy with fluroscopy;  Surgeon: Ayden Fraire MD;  Location:  OR     ENDOSCOPIC RETROGRADE CHOLANGIOPANCREATOGRAM N/A 05/18/2023    Procedure: ENDOSCOPIC RETROGRADE CHOLANGIOPANCREATOGRAPHY, WITH  CYSTDUODENOSTOMY " STENTS X2 REMOVAL;  Surgeon: Cedric Saldana MD;  Location: [facility] OR     ENT SURGERY       EP PACEMAKER DEVICE & LEAD IMPLANT- RIGHT ATRIAL & RIGHT VENTRICULAR N/A 02/28/2024    Procedure: Pacemaker Device & Lead Implant - Right Atrial & Right Ventricular;  Surgeon: Jenna Hernandez MD;  Location:  HEART CARDIAC CATH LAB     LAPAROSCOPY DIAGNOSTIC (GENERAL) N/A 02/20/2023    Procedure: LAPAROSCOPY, DIAGNOSTIC; RETRIEVAL OF MIGRATED STENT;  Surgeon: Joseluis Lima MD;  Location: [facility] OR     ORTHOPEDIC SURGERY       OTHER SURGICAL HISTORY      skin cancer ? from scrotum     PANCREATECTOMY PEUSTOW N/A 06/30/2023    Procedure: Open distal pancreactectomy with splenectomy, lysis of adhesions, resection of proximal illeum;  Surgeon: Ashvin Haider MD;  Location: [facility] OR     NE CABG, ARTERIAL, TWO  2003     Past Medical History:   Diagnosis Date     Acute pancreatitis 04/18/2022    Formatting of this note might be different from the original. Formatting of this note might be different from the original. Added automatically from request for surgery [id number] Formatting of this note might be different from the original. Added automatically from request for surgery [id number]  Formatting of this note might be different from the original. Added automatically from request for surgery      CAD (coronary artery disease)     CABG     Cholecystitis, acute 07/05/2023     Diabetes mellitus, type 2 (H) 2013     Family history of parkinsonism 02/14/2024     Gastroesophageal reflux disease      Hypercholesteremia      Hypertension      Mixed conductive and sensorineural hearing loss of both ears      Mixed hearing loss      Pancreatic duct stricture      Pancreatitis      Parkinson disease (H)      Parkinson's disease, unspecified whether dyskinesia present, unspecified whether manifestations fluctuate (H) 02/14/2024     Seborrheic dermatitis 03/22/2024     Second degree AV block      Surgical site infection 07/26/2023     Social  History     Socioeconomic History     Marital status:      Spouse name: Not on file     Number of children: Not on file     Years of education: Not on file     Highest education level: Not on file   Occupational History     Not on file   Tobacco Use     Smoking status: Former     Types: Cigarettes     Smokeless tobacco: Never     Tobacco comments:     Quit 10 years ago - quit a few times; started at age 12 or 13 and then stopped at 26 years and then stopped for 15 years got  and started smoking again   Vaping Use     Vaping status: Never Used   Substance and Sexual Activity     Alcohol use: Not Currently     Comment: quit 2 years ago     Drug use: Not Currently     Comment: used to smoke marijuana when young; rare use     Sexual activity: Not on file   Other Topics Concern     Not on file   Social History Narrative     Not on file     Social Drivers of Health     Financial Resource Strain: Low Risk  (12/18/2024)    Financial Resource Strain      Within the past 12 months, have you or your family members you live with been unable to get utilities (heat, electricity) when it was really needed?: No   Food Insecurity: Low Risk  (12/18/2024)    Food Insecurity      Within the past 12 months, did you worry that your food would run out before you got money to buy more?: No      Within the past 12 months, did the food you bought just not last and you didn t have money to get more?: No   Transportation Needs: Low Risk  (12/18/2024)    Transportation Needs      Within the past 12 months, has lack of transportation kept you from medical appointments, getting your medicines, non-medical meetings or appointments, work, or from getting things that you need?: No   Physical Activity: Unknown (12/18/2024)    Exercise Vital Sign      Days of Exercise per Week: 1 day      Minutes of Exercise per Session: Not on file   Stress: Stress Concern Present (12/18/2024)    [employer] [employer] - Occupational  Stress Questionnaire      Feeling of Stress : To some extent   Social Connections: Unknown (12/18/2024)    Social Connection and Isolation Panel [NHANES]      Frequency of Communication with Friends and Family: Not on file      Frequency of Social Gatherings with Friends and Family: Once a week      Attends Scientology Services: Not on file      Active Member of Clubs or Organizations: Not on file      Attends Club or Organization Meetings: Not on file      Marital Status: Not on file   Interpersonal Safety: Not At Risk (8/6/2022)    Received from [facility]    Humiliation, Afraid, Rape, and Kick questionnaire      Fear of Current or Ex-Partner: No      Emotionally Abused: No      Physically Abused: No      Sexually Abused: No   Housing Stability: Low Risk  (12/18/2024)    Housing Stability      Do you have housing? : Yes      Are you worried about losing your housing?: No       Drug and lactation database from the [place] [employer] of Medicine:  http://toxnet.nlm.nih.gov/cgi-bin/sis/htmlgen?LACT      B/P: Data Unavailable, T: Data Unavailable, P: Data Unavailable, R: Data Unavailable 0 lbs 0 oz  There were no vitals taken for this visit., There is no height or weight on file to calculate BMI.  Medications and Vitals not listed above were documented in the cart and reviewed by me.     Current Outpatient Medications   Medication Sig Dispense Refill     aspirin (ASA) 81 MG EC tablet Take 81 mg by mouth daily       atorvastatin (LIPITOR) 40 MG tablet Take 1 tablet (40 mg) by mouth daily 90 tablet 3     BD PEN NEEDLE LINDY 2ND GEN 32G X 4 MM miscellaneous USE TO INJECT INSULIN 4 TIMES A  each 3     carbidopa-levodopa (SINEMET CR)  MG CR tablet Take 1 tablet by mouth at bedtime @9p 90 tablet 3     carbidopa-levodopa (SINEMET)  MG tablet 2.5@6a, 2@10a, 2@2p and 1@5p 675 tablet 3     Continuous Glucose Sensor (DEXCOM G7 SENSOR) MISC Change every 10 days. 9 each 1     dasiglucagon HCl  (ZEGALOGUE) 0.6 MG/0.6ML SOAJ injection Inject 0.6 mg Subcutaneous once as needed (use for severe hypoglycemia) 1.2 mL 1     gabapentin (NEURONTIN) 300 MG capsule Take 2 capsules (600 mg) by mouth 3 times daily 540 capsule 3     insulin aspart (NOVOLOG FLEXPEN) 100 UNIT/ML pen Inject 6-20 Units subcutaneously 3 times daily as needed for high blood sugar (for meal coverage and correction scale, upto 60 units/day) Take 5 units with breakfast, 10 unit(s) with lunch and 12 unit(s) with supper 40 mL 3     insulin glargine 100 UNIT/ML pen Take 14 unit(s) AM and 20 unit(s) HS, and adjust as needed upto 50 unit(s) /day       lipase-protease-amylase (ZENPEP) 51030-746251-833050 units CPEP Take 1 capsule by mouth 3 times daily (with meals). 90 capsule 11     losartan (COZAAR) 50 MG tablet Take 1 tablet (50 mg) by mouth daily 90 tablet 3     magnesium oxide (MAG-OX) 400 MG tablet Take 400 mg by mouth every other day       Multiple Vitamin (MULTI-VITAMINS) TABS Take 1 tablet by mouth daily       omeprazole (PRILOSEC) 20 MG DR capsule Take 1 capsule (20 mg) by mouth daily 90 capsule 3     traZODone (DESYREL) 50 MG tablet Take 0.5-1 tablets (25-50 mg) by mouth at bedtime. 30 tablet 3         Juan Jose Armenta MD      Again, thank you for allowing me to participate in the care of your patient.      Sincerely,    Juan Jose Armenta MD

## 2025-01-21 NOTE — NURSING NOTE
Chief Complaint   Patient presents with    RECHECK     BP 94/66 (BP Location: Right arm, Patient Position: Sitting, Cuff Size: Adult Large)   Pulse 81   Resp 16   SpO2 94%     BRY LIYAH

## 2025-01-22 ENCOUNTER — MYC MEDICAL ADVICE (OUTPATIENT)
Dept: FAMILY MEDICINE | Facility: CLINIC | Age: 70
End: 2025-01-22
Payer: MEDICARE

## 2025-01-22 NOTE — TELEPHONE ENCOUNTER
Can you triage patient?  If he truly is hypotensive, I think he needs to be seen ASAP.  I believe this is the second time they have reported low blood pressures.  If they go in, they should bring their home cuff with for comparison.  Amelia Johnson, DO

## 2025-01-22 NOTE — TELEPHONE ENCOUNTER
Spoke with patient Patient asymptomatic at time of call. Denies lightheadness but head will feel numb when he is symptomatic. Blood pressure at 1030 was back to 139/70. Patient has meeting tomorrow with MTM. Huddled with provider and was in agreement to monitor symptoms and blood pressures and follow up with MTM. Will let patient know what red flag symptoms to watch for and to continue to report readings.     Yola Thomas RN

## 2025-01-22 NOTE — TELEPHONE ENCOUNTER
Entered patient reported vitals.   Please advise.     Todays readings were 66/49 and 71/49

## 2025-01-23 ENCOUNTER — THERAPY VISIT (OUTPATIENT)
Dept: PHYSICAL THERAPY | Facility: REHABILITATION | Age: 70
End: 2025-01-23
Payer: MEDICARE

## 2025-01-23 DIAGNOSIS — M48.062 SPINAL STENOSIS OF LUMBAR REGION WITH NEUROGENIC CLAUDICATION: ICD-10-CM

## 2025-01-23 DIAGNOSIS — G89.29 CHRONIC BILATERAL THORACIC BACK PAIN: Primary | ICD-10-CM

## 2025-01-23 DIAGNOSIS — M47.24 OSTEOARTHRITIS OF SPINE WITH RADICULOPATHY, THORACIC REGION: ICD-10-CM

## 2025-01-23 DIAGNOSIS — M54.6 CHRONIC BILATERAL THORACIC BACK PAIN: Primary | ICD-10-CM

## 2025-01-24 ENCOUNTER — VIRTUAL VISIT (OUTPATIENT)
Dept: PSYCHOLOGY | Facility: CLINIC | Age: 70
End: 2025-01-24
Payer: MEDICARE

## 2025-01-24 DIAGNOSIS — F43.29 ADJUSTMENT DISORDER WITH OTHER SYMPTOM: Primary | ICD-10-CM

## 2025-01-24 PROCEDURE — 90837 PSYTX W PT 60 MINUTES: CPT | Mod: 95 | Performed by: PSYCHOLOGIST

## 2025-01-24 ASSESSMENT — ANXIETY QUESTIONNAIRES
3. WORRYING TOO MUCH ABOUT DIFFERENT THINGS: NOT AT ALL
5. BEING SO RESTLESS THAT IT IS HARD TO SIT STILL: NOT AT ALL
GAD7 TOTAL SCORE: 2
IF YOU CHECKED OFF ANY PROBLEMS ON THIS QUESTIONNAIRE, HOW DIFFICULT HAVE THESE PROBLEMS MADE IT FOR YOU TO DO YOUR WORK, TAKE CARE OF THINGS AT HOME, OR GET ALONG WITH OTHER PEOPLE: SOMEWHAT DIFFICULT
4. TROUBLE RELAXING: SEVERAL DAYS
6. BECOMING EASILY ANNOYED OR IRRITABLE: NOT AT ALL
1. FEELING NERVOUS, ANXIOUS, OR ON EDGE: SEVERAL DAYS
8. IF YOU CHECKED OFF ANY PROBLEMS, HOW DIFFICULT HAVE THESE MADE IT FOR YOU TO DO YOUR WORK, TAKE CARE OF THINGS AT HOME, OR GET ALONG WITH OTHER PEOPLE?: SOMEWHAT DIFFICULT
GAD7 TOTAL SCORE: 2
2. NOT BEING ABLE TO STOP OR CONTROL WORRYING: NOT AT ALL
7. FEELING AFRAID AS IF SOMETHING AWFUL MIGHT HAPPEN: NOT AT ALL
GAD7 TOTAL SCORE: 2
7. FEELING AFRAID AS IF SOMETHING AWFUL MIGHT HAPPEN: NOT AT ALL

## 2025-01-28 ENCOUNTER — THERAPY VISIT (OUTPATIENT)
Dept: PHYSICAL THERAPY | Facility: REHABILITATION | Age: 70
End: 2025-01-28
Payer: MEDICARE

## 2025-01-28 ENCOUNTER — OFFICE VISIT (OUTPATIENT)
Dept: PHYSICAL MEDICINE AND REHAB | Facility: CLINIC | Age: 70
End: 2025-01-28
Payer: MEDICARE

## 2025-01-28 VITALS
OXYGEN SATURATION: 96 % | SYSTOLIC BLOOD PRESSURE: 97 MMHG | HEART RATE: 82 BPM | BODY MASS INDEX: 30.42 KG/M2 | WEIGHT: 200.7 LBS | DIASTOLIC BLOOD PRESSURE: 52 MMHG | HEIGHT: 68 IN

## 2025-01-28 DIAGNOSIS — M54.16 LUMBAR RADICULOPATHY: Primary | ICD-10-CM

## 2025-01-28 DIAGNOSIS — M47.24 OSTEOARTHRITIS OF SPINE WITH RADICULOPATHY, THORACIC REGION: ICD-10-CM

## 2025-01-28 DIAGNOSIS — M54.6 CHRONIC BILATERAL THORACIC BACK PAIN: ICD-10-CM

## 2025-01-28 DIAGNOSIS — G89.29 CHRONIC BILATERAL THORACIC BACK PAIN: ICD-10-CM

## 2025-01-28 DIAGNOSIS — M48.062 SPINAL STENOSIS OF LUMBAR REGION WITH NEUROGENIC CLAUDICATION: ICD-10-CM

## 2025-01-28 DIAGNOSIS — M54.6 CHRONIC BILATERAL THORACIC BACK PAIN: Primary | ICD-10-CM

## 2025-01-28 DIAGNOSIS — G89.29 CHRONIC BILATERAL THORACIC BACK PAIN: Primary | ICD-10-CM

## 2025-01-28 PROCEDURE — 97110 THERAPEUTIC EXERCISES: CPT | Mod: GP

## 2025-01-28 PROCEDURE — 99213 OFFICE O/P EST LOW 20 MIN: CPT | Performed by: STUDENT IN AN ORGANIZED HEALTH CARE EDUCATION/TRAINING PROGRAM

## 2025-01-28 PROCEDURE — 97140 MANUAL THERAPY 1/> REGIONS: CPT | Mod: GP

## 2025-01-28 ASSESSMENT — PAIN SCALES - GENERAL: PAINLEVEL_OUTOF10: NO PAIN (0)

## 2025-01-28 NOTE — PROGRESS NOTES
ASSESSMENT:  Arnaldo Henderson is a 69 year old male presents for follow-up of :         Diagnoses and all orders for this visit:  Lumbar radiculopathy  Spinal stenosis of lumbar region with neurogenic claudication  Chronic bilateral thoracic back pain    Patient is presenting for follow-up after L2-L3 IL DEB performed on 825 with 75 to 80% improvement reported between combination of injection and PT.  Patient advised of the usual duration of benefit with injection, in which we usually see 50% improvement for 3 to 6 months.  Patient's extent of improvement is very promising and I do suspect the intermittent flank soreness that he is having is more muscular in nature and it may continue to improve with further therapy.  Patient encouraged to continue with his PT and home exercises and he may follow-up with us as needed in the future if the low back and radicular symptoms recur.    Patient noted again to have borderline low blood pressure during today's visit but he is without any symptoms such as dizziness, lightheadedness, blurry vision, or unsteady gait.  Patient notes he will be following up with his PCP in a couple of weeks to make some adjustments to blood pressure medications.    Patient reports gabapentin has been very helpful for the burning pain in his midline abdomen, and as he is not having any side effects he was encouraged to continue for now.    PLAN:  Reviewed spine anatomy and disease process. Discussed diagnosis and treatment options with the patient today. A shared decision making model was used. The patient's values and choices were respected. The following represents what was discussed and decided upon by the provider and the patient.    1. DIAGNOSTIC TESTS  No new imaging orders at this time.    2. PHYSICAL THERAPY  Physical therapy is ongoing for back pain, will be followed by Parkinson's PT    3. MEDICATIONS:  Discussed multiple medication options today with patient. Discussed risks, side  effects, and proper use of medications. Patient verbalized understanding.  No new medications ordered at this visit.    4. INTERVENTIONS:  Interventional treatments are not indicated for patient's presentation.  Patient is doing well after previous injection    5. OTHER REFERRALS:  No other referrals at this time.    6. FOLLOW-UP  As needed.    Advised patient to call the [facility] if symptoms worsen or you have problems controlling bladder and bowel function.   ______________________________________________________________________    SUBJECTIVE:   Patient is presenting for follow-up of his mid to low back pain.  He presents with his wife who helps provide collateral information.    Patient reports at least 75 to 80% improvement of his low back pain and radicular symptoms into the hip/thigh following L2-L3 IL DEB on 1/8/2025.  Patient is continuing to work with physical therapy as well which he does feel has made a significant improvement.  No new low back or radicular symptoms, no new numbness or weakness, nor any bladder or bowel incontinence.  He is continuing to have some soreness in the flank more on the right side, but he feels this is muscular in nature and it is not truly painful.    Patient notes that he has more Parkinson's PT coming up in the spring but plans to finish his low back PT first and may wish to do more PT for the low back in the future.    Takes gabapentin 600 mg TID to help to manage abdominal pain.    No prior back surgeries.    -Treatment to Date: currently doing PT, rest as above      Oswestry (EVELYN) Questionnaire        12/7/2024    10:53 AM   OSWESTRY DISABILITY INDEX   Count 10    Sum 17    Oswestry Score (%) 34 %        Patient-reported       Neck Disability Index:       No data to display                       -Medications:    Current Outpatient Medications   Medication Sig Dispense Refill    aspirin (ASA) 81 MG EC tablet Take 81 mg by mouth daily      atorvastatin (LIPITOR) 40 MG  tablet Take 1 tablet (40 mg) by mouth daily 90 tablet 3    BD PEN NEEDLE LINDY 2ND GEN 32G X 4 MM miscellaneous USE TO INJECT INSULIN 4 TIMES A  each 3    carbidopa-levodopa (SINEMET CR)  MG CR tablet Take 1 tablet by mouth at bedtime @9p 90 tablet 3    carbidopa-levodopa (SINEMET)  MG tablet 2.5@6a, 2@10a, 2@2p and 1@5p 675 tablet 3    Continuous Glucose Sensor (DEXCOM G7 SENSOR) MISC Change every 10 days. 9 each 1    dasiglucagon HCl (ZEGALOGUE) 0.6 MG/0.6ML SOAJ injection Inject 0.6 mg Subcutaneous once as needed (use for severe hypoglycemia) 1.2 mL 1    gabapentin (NEURONTIN) 300 MG capsule Take 2 capsules (600 mg) by mouth 3 times daily 540 capsule 3    insulin aspart (NOVOLOG FLEXPEN) 100 UNIT/ML pen Inject 6-20 Units subcutaneously 3 times daily as needed for high blood sugar (for meal coverage and correction scale, upto 60 units/day) Take 5 units with breakfast, 10 unit(s) with lunch and 12 unit(s) with supper 40 mL 3    insulin glargine 100 UNIT/ML pen Take 14 unit(s) AM and 20 unit(s) HS, and adjust as needed upto 50 unit(s) /day      lipase-protease-amylase (ZENPEP) [id number] units CPEP Take 1 capsule by mouth 3 times daily (with meals). 90 capsule 11    losartan (COZAAR) 50 MG tablet Take 1 tablet (50 mg) by mouth daily 90 tablet 3    magnesium oxide (MAG-OX) 400 MG tablet Take 400 mg by mouth every other day      Multiple Vitamin (MULTI-VITAMINS) TABS Take 1 tablet by mouth daily      omeprazole (PRILOSEC) 20 MG DR capsule Take 1 capsule (20 mg) by mouth daily 90 capsule 3    sertraline (ZOLOFT) 25 MG tablet Take 1 tablet (25 mg) by mouth daily. 30 tablet 3    traZODone (DESYREL) 50 MG tablet Take 0.5-1 tablets (25-50 mg) by mouth at bedtime. 30 tablet 3     No current facility-administered medications for this visit.       Allergies   Allergen Reactions    Ciprofloxacin Other (See Comments), Hives, Itching, Rash and Unknown     Other reaction(s): Other (see comments)  Oral  "swelling per [facility]        Dilaudid [Hydromorphone] Rash    Morphine Rash     rash    Penicillins Rash     aka ancef/ rash      Clindamycin Itching and Rash    Oxycodone Rash     \"HORRIBLE, SEVERE RASH MAYBE CAUSED BY OXY\"       Past Medical History:   Diagnosis Date    Acute pancreatitis 04/18/2022    Formatting of this note might be different from the original. Formatting of this note might be different from the original. Added automatically from request for surgery [id number] Formatting of this note might be different from the original. Added automatically from request for surgery [id number]  Formatting of this note might be different from the original. Added automatically from request for surgery     CAD (coronary artery disease)     CABG    Cholecystitis, acute 07/05/2023    Diabetes mellitus, type 2 (H) 2013    Family history of parkinsonism 02/14/2024    Gastroesophageal reflux disease     Hypercholesteremia     Hypertension     Mixed conductive and sensorineural hearing loss of both ears     Mixed hearing loss     Pancreatic duct stricture     Pancreatitis     Parkinson disease (H)     Parkinson's disease, unspecified whether dyskinesia present, unspecified whether manifestations fluctuate (H) 02/14/2024    Seborrheic dermatitis 03/22/2024    Second degree AV block     Surgical site infection 07/26/2023        Patient Active Problem List   Diagnosis    Retained foreign body    Wound infection    Acute bilateral low back pain without sciatica    Adenomatous polyp of colon    Atherosclerotic heart disease of native coronary artery without angina pectoris    Cancer of skin of scrotum (H)    Cervicalgia    Gastroesophageal reflux disease without esophagitis    History of cholecystectomy    History of coronary artery bypass surgery    Hyperlipidemia, unspecified    Hypertension    Impaired gait    Long term (current) use of aspirin    Mixed conductive and sensorineural hearing loss    Muscle weakness (generalized) "    Need for assistance with personal care    Other hydrocele    Parkinson's disease with dyskinesia, unspecified whether manifestations fluctuate (H)    Physical deconditioning    Pseudocyst of pancreas    Sensorineural hearing loss (SNHL) of right ear with unrestricted hearing of left ear    Type 2 diabetes mellitus with hyperglycemia, with long-term current use of insulin (H)    Neuroendocrine carcinoma of small bowel (H)    Failure to thrive in adult    Hyponatremia    Chronic right-sided low back pain without sciatica    Abdominal pain, generalized    Other secondary neuroendocrine tumors (H)    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)    Weakness    Second degree AV block    History of Parkinson's disease    Orthostatic hypotension    Hypotension    Bradycardia    Parkinson's disease, unspecified whether dyskinesia present, unspecified whether manifestations fluctuate (H)    Family history of parkinsonism    Cardiac pacemaker in situ    Seborrheic dermatitis    Lumbar spondylosis    Chronic bilateral thoracic back pain    Osteoarthritis of spine with radiculopathy, thoracic region    Spinal stenosis of lumbar region with neurogenic claudication    Osteopenia of multiple sites       Past Surgical History:   Procedure Laterality Date    CA ANESTH CABG W/PUMP      CHOLECYSTECTOMY  2022    COLONOSCOPY N/A 01/12/2023    Procedure: colonoscopy with fluroscopy;  Surgeon: Ayden Fraire MD;  Location:  OR    ENDOSCOPIC RETROGRADE CHOLANGIOPANCREATOGRAM N/A 05/18/2023    Procedure: ENDOSCOPIC RETROGRADE CHOLANGIOPANCREATOGRAPHY, WITH  CYSTDUODENOSTOMY STENTS X2 REMOVAL;  Surgeon: Cedric Saldana MD;  Location:  OR    ENT SURGERY      EP PACEMAKER DEVICE & LEAD IMPLANT- RIGHT ATRIAL & RIGHT VENTRICULAR N/A 02/28/2024    Procedure: Pacemaker Device & Lead Implant - Right Atrial & Right Ventricular;  Surgeon: Jenna Hernandez MD;  Location:  [facility]    LAPAROSCOPY DIAGNOSTIC  "(GENERAL) N/A 2023    Procedure: LAPAROSCOPY, DIAGNOSTIC; RETRIEVAL OF MIGRATED STENT;  Surgeon: Joseluis Lima MD;  Location: [facility] OR    ORTHOPEDIC SURGERY      OTHER SURGICAL HISTORY      skin cancer ? from scrotum    PANCREATECTOMY [place]OW N/A 2023    Procedure: Open distal pancreactectomy with splenectomy, lysis of adhesions, resection of proximal illeum;  Surgeon: Ashvin Haider MD;  Location: [facility] OR    CO CABG, ARTERIAL, TWO         Family History   Problem Relation Age of Onset    Parkinsonism Mother 65         at 75 years    Other - See Comments Mother         Polish ?AJ    Psychosis Mother         hallucinations from medications    Other - See Comments Father     Other - See Comments Sister     Other - See Comments Sister     Lung Cancer Brother         smoker -  47 years    Other - See Comments Daughter         3 kids - 2 girls and boy    Heart Disease Son         orthostatic hypotension    Other - See Comments Son         2 daughters    Autism Spectrum Disorder Grandson     Diabetes No family hx of        Reviewed past medical, surgical, and family history with patient found on new patient intake packet located in [employer]MR [employer] tab.     SOCIAL HX: Reviewed    ROS: Specifically negative for bowel/bladder dysfunction, balance changes, headache, dizziness, foot drop, fevers, chills, appetite changes, nausea/vomiting, unexplained weight loss. Otherwise 13 systems reviewed are negative. Please see the patient's intake questionnaire from today for details.    OBJECTIVE:  BP 97/52 (BP Location: Left arm, Patient Position: Sitting, Cuff Size: Adult Regular)   Pulse 82   Ht 5' 8\" (1.727 m)   Wt 200 lb 11.2 oz (91 kg)   SpO2 96%   BMI 30.52 kg/m      PHYSICAL EXAMINATION: Previous exam noted below, changes made as needed  --CONSTITUTIONAL: Vital signs as above. No acute distress. The patient is well nourished and well groomed.  --PSYCHIATRIC: The patient is awake, alert, " oriented to person, place, time and answering questions appropriately with clear speech. Appropriate mood and affect   --RESPIRATORY: Normal rhythm and effort. No abnormal accessory muscle breathing patterns noted.   --GROSS MOTOR: Easily arises from a seated position.  --SKIN: Visible midline laparotomy scar, appears well-healed.  No vesicular lesions or other rash in distribution of pain symptoms.  --LUMBAR SPINE: Inspection reveals no evidence of deformity. Range of motion is not limited in flexion, extension, lateral rotation. Mild tenderness to palpation of lumbar paraspinals, no tenderness in spinous processes.  Facet loading (López test) elicits improvement of pain, seated SLR negative  --HIPS: Full range of motion bilaterally. Negative DANIELA test.  --LOWER EXTREMITY MOTOR TESTING:  Hip flexion left 5/5, right 5/5  Knee extension left 5/5, right 5/5  Ankle dorsiflexors left 5/5, right 5/5  Ankle plantarflexors left 5/5, right 5/5   Great toe extension left 5/5, right 5/5   Knee flexion left 5/5, right 5/5  --NEUROLOGIC: Sensation to lower extremities is intact bilaterally in L2-S1 dermatomes.  Negative Merritt's bilaterally. Reflexes intact in BLE, no clonus.  --VASCULAR: Warm upper limbs bilaterally. Capillary refill in the upper extremities is less than 1 second.    RESULTS: Available medical records from [facility] and any other outside records were reviewed today.     Imaging:  Available relevant imaging was personally reviewed and interpreted today. The images were shown to the patient and the findings were explained using a spine model.    11/27/2024 MRI thoracic and lumbar spine:    Impression:   1. Similar endplate deformities of the lower thoracic spine, greatest  at T12 where there is 50% loss of height anteriorly.  2. Lumbar spine degeneration with mild to moderate spinal canal  narrowing at L1-2 and mild bilateral neural foraminal narrowing at  L2-L4.    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 Unchanged compression deformities of T11 and  T12 with multilevel vacuum disc phenomena. No acute or suspicious  osseous abnormality. Stable lucent lesion of posterior left rib 11  (8/302 and 13/37), unchanged since at least 7/23/2022. Bilateral small  indirect fat-containing inguinal hernias, new on the right since prior  exam 3/12/2024. Large right hydrocele.    IMPRESSION:  1. Stable post surgical changes of bowel resection and anastomosis,  distal pancreatectomy, splenectomy, appendectomy.  2. Stable size of multiple mesenteric lymph nodes and slightly  increased size of soft tissue density along the hepatic artery that  are concerning for neoplastic etiology.  3. Stable sub-6 mm pulmonary nodules. No new or enlarging pulmonary  nodule.  4. Unchanged subcentimeter mediastinal and thoracic nodes. No new or  enlarging thoracic lymphadenopathy.

## 2025-01-28 NOTE — LETTER
1/28/2025      Arnaldo Henderson  [address] [facility] [address]      Dear Colleague,    Thank you for referring your patient, Arnaldo Henderson, to the [facility]. Please see a copy of my visit note below.    ASSESSMENT:  Arnaldo Henderson is a 69 year old male presents for follow-up of :         Diagnoses and all orders for this visit:  Lumbar radiculopathy  Spinal stenosis of lumbar region with neurogenic claudication  Chronic bilateral thoracic back pain    Patient is presenting for follow-up after L2-L3 IL DEB performed on 825 with 75 to 80% improvement reported between combination of injection and PT.  Patient advised of the usual duration of benefit with injection, in which we usually see 50% improvement for 3 to 6 months.  Patient's extent of improvement is very promising and I do suspect the intermittent flank soreness that he is having is more muscular in nature and it may continue to improve with further therapy.  Patient encouraged to continue with his PT and home exercises and he may follow-up with us as needed in the future if the low back and radicular symptoms recur.    Patient noted again to have borderline low blood pressure during today's visit but he is without any symptoms such as dizziness, lightheadedness, blurry vision, or unsteady gait.  Patient notes he will be following up with his PCP in a couple of weeks to make some adjustments to blood pressure medications.    Patient reports gabapentin has been very helpful for the burning pain in his midline abdomen, and as he is not having any side effects he was encouraged to continue for now.    PLAN:  Reviewed spine anatomy and disease process. Discussed diagnosis and treatment options with the patient today. A shared decision making model was used. The patient's values and choices were respected. The following represents what was discussed and decided upon by the provider and the patient.    1. DIAGNOSTIC  TESTS  No new imaging orders at this time.    2. PHYSICAL THERAPY  Physical therapy is ongoing for back pain, will be followed by Parkinson's PT    3. MEDICATIONS:  Discussed multiple medication options today with patient. Discussed risks, side effects, and proper use of medications. Patient verbalized understanding.  No new medications ordered at this visit.    4. INTERVENTIONS:  Interventional treatments are not indicated for patient's presentation.  Patient is doing well after previous injection    5. OTHER REFERRALS:  No other referrals at this time.    6. FOLLOW-UP  As needed.    Advised patient to call the [facility] if symptoms worsen or you have problems controlling bladder and bowel function.   ______________________________________________________________________    SUBJECTIVE:   Patient is presenting for follow-up of his mid to low back pain.  He presents with his wife who helps provide collateral information.    Patient reports at least 75 to 80% improvement of his low back pain and radicular symptoms into the hip/thigh following L2-L3 IL DEB on 1/8/2025.  Patient is continuing to work with physical therapy as well which he does feel has made a significant improvement.  No new low back or radicular symptoms, no new numbness or weakness, nor any bladder or bowel incontinence.  He is continuing to have some soreness in the flank more on the right side, but he feels this is muscular in nature and it is not truly painful.    Patient notes that he has more Parkinson's PT coming up in the spring but plans to finish his low back PT first and may wish to do more PT for the low back in the future.    Takes gabapentin 600 mg TID to help to manage abdominal pain.    No prior back surgeries.    -Treatment to Date: currently doing PT, rest as above      Oswestry (EVELYN) Questionnaire        12/7/2024    10:53 AM   OSWESTRY DISABILITY INDEX   Count 10    Sum 17    Oswestry Score (%) 34 %        Patient-reported        Neck Disability Index:       No data to display                       -Medications:    Current Outpatient Medications   Medication Sig Dispense Refill     aspirin (ASA) 81 MG EC tablet Take 81 mg by mouth daily       atorvastatin (LIPITOR) 40 MG tablet Take 1 tablet (40 mg) by mouth daily 90 tablet 3     BD PEN NEEDLE LINDY 2ND GEN 32G X 4 MM miscellaneous USE TO INJECT INSULIN 4 TIMES A  each 3     carbidopa-levodopa (SINEMET CR)  MG CR tablet Take 1 tablet by mouth at bedtime @9p 90 tablet 3     carbidopa-levodopa (SINEMET)  MG tablet 2.5@6a, 2@10a, 2@2p and 1@5p 675 tablet 3     Continuous Glucose Sensor (DEXCOM G7 SENSOR) MISC Change every 10 days. 9 each 1     dasiglucagon HCl (ZEGALOGUE) 0.6 MG/0.6ML SOAJ injection Inject 0.6 mg Subcutaneous once as needed (use for severe hypoglycemia) 1.2 mL 1     gabapentin (NEURONTIN) 300 MG capsule Take 2 capsules (600 mg) by mouth 3 times daily 540 capsule 3     insulin aspart (NOVOLOG FLEXPEN) 100 UNIT/ML pen Inject 6-20 Units subcutaneously 3 times daily as needed for high blood sugar (for meal coverage and correction scale, upto 60 units/day) Take 5 units with breakfast, 10 unit(s) with lunch and 12 unit(s) with supper 40 mL 3     insulin glargine 100 UNIT/ML pen Take 14 unit(s) AM and 20 unit(s) HS, and adjust as needed upto 50 unit(s) /day       lipase-protease-amylase (ZENPEP) 17893-887586-855098 units CPEP Take 1 capsule by mouth 3 times daily (with meals). 90 capsule 11     losartan (COZAAR) 50 MG tablet Take 1 tablet (50 mg) by mouth daily 90 tablet 3     magnesium oxide (MAG-OX) 400 MG tablet Take 400 mg by mouth every other day       Multiple Vitamin (MULTI-VITAMINS) TABS Take 1 tablet by mouth daily       omeprazole (PRILOSEC) 20 MG DR capsule Take 1 capsule (20 mg) by mouth daily 90 capsule 3     sertraline (ZOLOFT) 25 MG tablet Take 1 tablet (25 mg) by mouth daily. 30 tablet 3     traZODone (DESYREL) 50 MG tablet Take 0.5-1 tablets  "(25-50 mg) by mouth at bedtime. 30 tablet 3     No current facility-administered medications for this visit.       Allergies   Allergen Reactions     Ciprofloxacin Other (See Comments), Hives, Itching, Rash and Unknown     Other reaction(s): Other (see comments)  Oral swelling per [facility]         Dilaudid [Hydromorphone] Rash     Morphine Rash     rash     Penicillins Rash     aka ancef/ rash       Clindamycin Itching and Rash     Oxycodone Rash     \"HORRIBLE, SEVERE RASH MAYBE CAUSED BY OXY\"       Past Medical History:   Diagnosis Date     Acute pancreatitis 04/18/2022    Formatting of this note might be different from the original. Formatting of this note might be different from the original. Added automatically from request for surgery [id number] Formatting of this note might be different from the original. Added automatically from request for surgery [id number]  Formatting of this note might be different from the original. Added automatically from request for surgery      CAD (coronary artery disease)     CABG     Cholecystitis, acute 07/05/2023     Diabetes mellitus, type 2 (H) 2013     Family history of parkinsonism 02/14/2024     Gastroesophageal reflux disease      Hypercholesteremia      Hypertension      Mixed conductive and sensorineural hearing loss of both ears      Mixed hearing loss      Pancreatic duct stricture      Pancreatitis      Parkinson disease (H)      Parkinson's disease, unspecified whether dyskinesia present, unspecified whether manifestations fluctuate (H) 02/14/2024     Seborrheic dermatitis 03/22/2024     Second degree AV block      Surgical site infection 07/26/2023        Patient Active Problem List   Diagnosis     Retained foreign body     Wound infection     Acute bilateral low back pain without sciatica     Adenomatous polyp of colon     Atherosclerotic heart disease of native coronary artery without angina pectoris     Cancer of skin of scrotum (H)     Cervicalgia     " Gastroesophageal reflux disease without esophagitis     History of cholecystectomy     History of coronary artery bypass surgery     Hyperlipidemia, unspecified     Hypertension     Impaired gait     Long term (current) use of aspirin     Mixed conductive and sensorineural hearing loss     Muscle weakness (generalized)     Need for assistance with personal care     Other hydrocele     Parkinson's disease with dyskinesia, unspecified whether manifestations fluctuate (H)     Physical deconditioning     Pseudocyst of pancreas     Sensorineural hearing loss (SNHL) of right ear with unrestricted hearing of left ear     Type 2 diabetes mellitus with hyperglycemia, with long-term current use of insulin (H)     Neuroendocrine carcinoma of small bowel (H)     Failure to thrive in adult     Hyponatremia     Chronic right-sided low back pain without sciatica     Abdominal pain, generalized     Other secondary neuroendocrine tumors (H)     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)     Weakness     Second degree AV block     History of Parkinson's disease     Orthostatic hypotension     Hypotension     Bradycardia     Parkinson's disease, unspecified whether dyskinesia present, unspecified whether manifestations fluctuate (H)     Family history of parkinsonism     Cardiac pacemaker in situ     Seborrheic dermatitis     Lumbar spondylosis     Chronic bilateral thoracic back pain     Osteoarthritis of spine with radiculopathy, thoracic region     Spinal stenosis of lumbar region with neurogenic claudication     Osteopenia of multiple sites       Past Surgical History:   Procedure Laterality Date     CA ANESTH CABG W/PUMP       CHOLECYSTECTOMY  2022     COLONOSCOPY N/A 01/12/2023    Procedure: colonoscopy with fluroscopy;  Surgeon: Ayden Fraire MD;  Location:  OR     ENDOSCOPIC RETROGRADE CHOLANGIOPANCREATOGRAM N/A 05/18/2023    Procedure: ENDOSCOPIC RETROGRADE CHOLANGIOPANCREATOGRAPHY, WITH   CYSTDUODENOSTOMY STENTS X2 REMOVAL;  Surgeon: Cedric Saldana MD;  Location: [facility] OR     ENT SURGERY       EP PACEMAKER DEVICE & LEAD IMPLANT- RIGHT ATRIAL & RIGHT VENTRICULAR N/A 2024    Procedure: Pacemaker Device & Lead Implant - Right Atrial & Right Ventricular;  Surgeon: Jenna Hernandez MD;  Location:  [facility]ARD[facility] CATH LAB     LAPAROSCOPY DIAGNOSTIC (GENERAL) N/A 2023    Procedure: LAPAROSCOPY, DIAGNOSTIC; RETRIEVAL OF MIGRATED STENT;  Surgeon: Joseluis Lima MD;  Location: [facility] OR     ORTHOPEDIC SURGERY       OTHER SURGICAL HISTORY      skin cancer ? from scrotum     PANCREATECTOMY PEUSTOW N/A 2023    Procedure: Open distal pancreactectomy with splenectomy, lysis of adhesions, resection of proximal illeum;  Surgeon: Ashvin Haider MD;  Location: [facility] OR     VA CABG, ARTERIAL, TWO         Family History   Problem Relation Age of Onset     Parkinsonism Mother 65         at 75 years     Other - See Comments Mother         Polish ?AJ     Psychosis Mother         hallucinations from medications     Other - See Comments Father      Other - See Comments Sister      Other - See Comments Sister      Lung Cancer Brother         smoker -  47 years     Other - See Comments Daughter         3 kids - 2 girls and boy     Heart Disease Son         orthostatic hypotension     Other - See Comments Son         2 daughters     Autism Spectrum Disorder Grandson      Diabetes No family hx of        Reviewed past medical, surgical, and family history with patient found on new patient intake packet located in EMR Media tab.     SOCIAL HX: Reviewed    ROS: Specifically negative for bowel/bladder dysfunction, balance changes, headache, dizziness, foot drop, fevers, chills, appetite changes, nausea/vomiting, unexplained weight loss. Otherwise 13 systems reviewed are negative. Please see the patient's intake questionnaire from today for details.    OBJECTIVE:  BP 97/52 (BP Location: Left arm,  "Patient Position: Sitting, Cuff Size: Adult Regular)   Pulse 82   Ht 5' 8\" (1.727 m)   Wt 200 lb 11.2 oz (91 kg)   SpO2 96%   BMI 30.52 kg/m      PHYSICAL EXAMINATION: Previous exam noted below, changes made as needed  --CONSTITUTIONAL: Vital signs as above. No acute distress. The patient is well nourished and well groomed.  --PSYCHIATRIC: The patient is awake, alert, oriented to person, place, time and answering questions appropriately with clear speech. Appropriate mood and affect   --RESPIRATORY: Normal rhythm and effort. No abnormal accessory muscle breathing patterns noted.   --GROSS MOTOR: Easily arises from a seated position.  --SKIN: Visible midline laparotomy scar, appears well-healed.  No vesicular lesions or other rash in distribution of pain symptoms.  --LUMBAR SPINE: Inspection reveals no evidence of deformity. Range of motion is not limited in flexion, extension, lateral rotation. Mild tenderness to palpation of lumbar paraspinals, no tenderness in spinous processes.  Facet loading (López test) elicits improvement of pain, seated SLR negative  --HIPS: Full range of motion bilaterally. Negative DANIELA test.  --LOWER EXTREMITY MOTOR TESTING:  Hip flexion left 5/5, right 5/5  Knee extension left 5/5, right 5/5  Ankle dorsiflexors left 5/5, right 5/5  Ankle plantarflexors left 5/5, right 5/5   Great toe extension left 5/5, right 5/5   Knee flexion left 5/5, right 5/5  --NEUROLOGIC: Sensation to lower extremities is intact bilaterally in L2-S1 dermatomes.  Negative Merritt's bilaterally. Reflexes intact in BLE, no clonus.  --VASCULAR: Warm upper limbs bilaterally. Capillary refill in the upper extremities is less than 1 second.    RESULTS: Available medical records from [facility] and any other outside records were reviewed today.     Imaging:  Available relevant imaging was personally reviewed and interpreted today. The images were shown to the patient and the findings were explained using a " spine model.    11/27/2024 MRI thoracic and lumbar spine:    Impression:   1. Similar endplate deformities of the lower thoracic spine, greatest  at T12 where there is 50% loss of height anteriorly.  2. Lumbar spine degeneration with mild to moderate spinal canal  narrowing at L1-2 and mild bilateral neural foraminal narrowing at  L2-L4.    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 Unchanged compression deformities of T11 and  T12 with multilevel vacuum disc phenomena. No acute or suspicious  osseous abnormality. Stable lucent lesion of posterior left rib 11  (8/302 and 13/37), unchanged since at least 7/23/2022. Bilateral small  indirect fat-containing inguinal hernias, new on the right since prior  exam 3/12/2024. Large right hydrocele.    IMPRESSION:  1. Stable post surgical changes of bowel resection and anastomosis,  distal pancreatectomy, splenectomy, appendectomy.  2. Stable size of multiple mesenteric lymph nodes and slightly  increased size of soft tissue density along the hepatic artery that  are concerning for neoplastic etiology.  3. Stable sub-6 mm pulmonary nodules. No new or enlarging pulmonary  nodule.  4. Unchanged subcentimeter mediastinal and thoracic nodes. No new or  enlarging thoracic lymphadenopathy.      Again, thank you for allowing me to participate in the care of your patient.        Sincerely,        Jovan Fong MD    Electronically signed

## 2025-01-29 ENCOUNTER — ANCILLARY PROCEDURE (OUTPATIENT)
Dept: CARDIOLOGY | Facility: CLINIC | Age: 70
End: 2025-01-29
Attending: INTERNAL MEDICINE
Payer: MEDICARE

## 2025-01-29 ENCOUNTER — HOSPITAL ENCOUNTER (OUTPATIENT)
Dept: CT IMAGING | Facility: CLINIC | Age: 70
Discharge: HOME | End: 2025-01-29
Attending: NURSE PRACTITIONER
Payer: MEDICARE

## 2025-01-29 VITALS — DIASTOLIC BLOOD PRESSURE: 71 MMHG | HEART RATE: 60 BPM | SYSTOLIC BLOOD PRESSURE: 141 MMHG

## 2025-01-29 DIAGNOSIS — Z45.02 ENCOUNTER FOR ADJUSTMENT AND MANAGEMENT OF AUTOMATIC IMPLANTABLE CARDIAC DEFIBRILLATOR: ICD-10-CM

## 2025-01-29 DIAGNOSIS — I44.2 HEART BLOCK AV THIRD DEGREE (H): ICD-10-CM

## 2025-01-29 DIAGNOSIS — R53.1 WEAKNESS: Primary | ICD-10-CM

## 2025-01-29 DIAGNOSIS — I25.10 CORONARY ARTERY DISEASE INVOLVING NATIVE CORONARY ARTERY OF NATIVE HEART WITHOUT ANGINA PECTORIS: ICD-10-CM

## 2025-01-29 DIAGNOSIS — I44.1 HEART BLOCK AV SECOND DEGREE: Primary | ICD-10-CM

## 2025-01-29 DIAGNOSIS — R06.09 DOE (DYSPNEA ON EXERTION): ICD-10-CM

## 2025-01-29 PROCEDURE — 75574 CT ANGIO HRT W/3D IMAGE: CPT

## 2025-01-29 PROCEDURE — 93286 PERI-PX EVAL PM/LDLS PM IP: CPT

## 2025-01-29 PROCEDURE — 999N000128 HC STATISTIC PERIPHERAL IV START W/O US GUIDANCE

## 2025-01-29 PROCEDURE — 250N000011 HC RX IP 250 OP 636: Performed by: INTERNAL MEDICINE

## 2025-01-29 PROCEDURE — 75574 CT ANGIO HRT W/3D IMAGE: CPT | Mod: 26 | Performed by: INTERNAL MEDICINE

## 2025-01-29 PROCEDURE — 250N000013 HC RX MED GY IP 250 OP 250 PS 637: Performed by: INTERNAL MEDICINE

## 2025-01-29 PROCEDURE — 93286 PERI-PX EVAL PM/LDLS PM IP: CPT | Mod: 26 | Performed by: INTERNAL MEDICINE

## 2025-01-29 RX ORDER — IVABRADINE 5 MG/1
5-15 TABLET, FILM COATED ORAL
Status: COMPLETED | OUTPATIENT
Start: 2025-01-29 | End: 2025-01-29

## 2025-01-29 RX ORDER — ONDANSETRON 2 MG/ML
4 INJECTION INTRAMUSCULAR; INTRAVENOUS
Status: DISCONTINUED | OUTPATIENT
Start: 2025-01-29 | End: 2025-01-30 | Stop reason: HOSPADM

## 2025-01-29 RX ORDER — IOPAMIDOL 755 MG/ML
120 INJECTION, SOLUTION INTRAVASCULAR ONCE
Status: COMPLETED | OUTPATIENT
Start: 2025-01-29 | End: 2025-01-29

## 2025-01-29 RX ORDER — NITROGLYCERIN 0.4 MG/1
.4-.8 TABLET SUBLINGUAL
Status: DISCONTINUED | OUTPATIENT
Start: 2025-01-29 | End: 2025-01-30 | Stop reason: HOSPADM

## 2025-01-29 RX ORDER — IOPAMIDOL 755 MG/ML
110 INJECTION, SOLUTION INTRAVASCULAR ONCE
Qty: 110 ML | Refills: 0 | Status: SHIPPED | OUTPATIENT
Start: 2025-01-29 | End: 2025-01-29

## 2025-01-29 RX ORDER — LIDOCAINE 40 MG/G
CREAM TOPICAL
Status: DISCONTINUED | OUTPATIENT
Start: 2025-01-29 | End: 2025-01-30 | Stop reason: HOSPADM

## 2025-01-29 RX ORDER — METOPROLOL TARTRATE 25 MG/1
25-100 TABLET, FILM COATED ORAL
Status: COMPLETED | OUTPATIENT
Start: 2025-01-29 | End: 2025-01-29

## 2025-01-29 RX ORDER — METOPROLOL TARTRATE 1 MG/ML
5-20 INJECTION, SOLUTION INTRAVENOUS
Status: DISCONTINUED | OUTPATIENT
Start: 2025-01-29 | End: 2025-01-30 | Stop reason: HOSPADM

## 2025-01-29 RX ADMIN — METOPROLOL TARTRATE 100 MG: 100 TABLET, FILM COATED ORAL at 12:16

## 2025-01-29 RX ADMIN — IOPAMIDOL 120 ML: 755 INJECTION, SOLUTION INTRAVENOUS at 13:57

## 2025-01-29 RX ADMIN — NITROGLYCERIN 0.8 MG: 0.4 TABLET SUBLINGUAL at 13:57

## 2025-01-29 RX ADMIN — IVABRADINE 15 MG: 5 TABLET, FILM COATED ORAL at 12:16

## 2025-01-29 NOTE — PROGRESS NOTES
Pt arrived for Coronary CT angiogram. Test, meds and side effects reviewed with pt. Resting HR 80 bpm. Given 100 mg PO Metoprolol + 15 mg PO Ivabradine per verbal order. Administered 0.8 mg SL nitro on CTA table per order. CTA completed. Patient tolerated procedure well and denies symptoms of allergic reaction. Post monitoring completed and VSS. D/C instructions reviewed with pt whom verbalized understanding of need to increase PO fluids today. D/C to gold waiting room accompanied by staff.

## 2025-01-30 DIAGNOSIS — G47.09 OTHER INSOMNIA: ICD-10-CM

## 2025-01-30 LAB
MDC_IDC_LEAD_CONNECTION_STATUS: NORMAL
MDC_IDC_LEAD_IMPLANT_DT: NORMAL
MDC_IDC_LEAD_LOCATION: NORMAL
MDC_IDC_LEAD_LOCATION_DETAIL_1: NORMAL
MDC_IDC_LEAD_MFG: NORMAL
MDC_IDC_LEAD_MODEL: NORMAL
MDC_IDC_LEAD_POLARITY_TYPE: NORMAL
MDC_IDC_LEAD_SERIAL: NORMAL
MDC_IDC_LEAD_SPECIAL_FUNCTION: NORMAL
MDC_IDC_MSMT_BATTERY_DTM: NORMAL
MDC_IDC_MSMT_BATTERY_DTM: NORMAL
MDC_IDC_MSMT_BATTERY_REMAINING_LONGEVITY: 138 MO
MDC_IDC_MSMT_BATTERY_REMAINING_LONGEVITY: 139 MO
MDC_IDC_MSMT_BATTERY_RRT_TRIGGER: 2.62
MDC_IDC_MSMT_BATTERY_RRT_TRIGGER: 2.62
MDC_IDC_MSMT_BATTERY_STATUS: NORMAL
MDC_IDC_MSMT_BATTERY_STATUS: NORMAL
MDC_IDC_MSMT_BATTERY_VOLTAGE: 3.06 V
MDC_IDC_MSMT_BATTERY_VOLTAGE: 3.06 V
MDC_IDC_MSMT_LEADCHNL_RA_IMPEDANCE_VALUE: 323 OHM
MDC_IDC_MSMT_LEADCHNL_RA_IMPEDANCE_VALUE: 323 OHM
MDC_IDC_MSMT_LEADCHNL_RA_IMPEDANCE_VALUE: 399 OHM
MDC_IDC_MSMT_LEADCHNL_RA_IMPEDANCE_VALUE: 399 OHM
MDC_IDC_MSMT_LEADCHNL_RA_PACING_THRESHOLD_AMPLITUDE: 0.5 V
MDC_IDC_MSMT_LEADCHNL_RA_PACING_THRESHOLD_AMPLITUDE: 0.5 V
MDC_IDC_MSMT_LEADCHNL_RA_PACING_THRESHOLD_PULSEWIDTH: 0.4 MS
MDC_IDC_MSMT_LEADCHNL_RA_PACING_THRESHOLD_PULSEWIDTH: 0.4 MS
MDC_IDC_MSMT_LEADCHNL_RA_SENSING_INTR_AMPL: 3 MV
MDC_IDC_MSMT_LEADCHNL_RA_SENSING_INTR_AMPL: 3.12 MV
MDC_IDC_MSMT_LEADCHNL_RV_IMPEDANCE_VALUE: 399 OHM
MDC_IDC_MSMT_LEADCHNL_RV_IMPEDANCE_VALUE: 418 OHM
MDC_IDC_MSMT_LEADCHNL_RV_IMPEDANCE_VALUE: 456 OHM
MDC_IDC_MSMT_LEADCHNL_RV_IMPEDANCE_VALUE: 475 OHM
MDC_IDC_MSMT_LEADCHNL_RV_PACING_THRESHOLD_AMPLITUDE: 0.62 V
MDC_IDC_MSMT_LEADCHNL_RV_PACING_THRESHOLD_AMPLITUDE: 0.75 V
MDC_IDC_MSMT_LEADCHNL_RV_PACING_THRESHOLD_PULSEWIDTH: 0.4 MS
MDC_IDC_MSMT_LEADCHNL_RV_PACING_THRESHOLD_PULSEWIDTH: 0.4 MS
MDC_IDC_MSMT_LEADCHNL_RV_SENSING_INTR_AMPL: 16 MV
MDC_IDC_PG_IMPLANT_DTM: NORMAL
MDC_IDC_PG_IMPLANT_DTM: NORMAL
MDC_IDC_PG_MFG: NORMAL
MDC_IDC_PG_MFG: NORMAL
MDC_IDC_PG_MODEL: NORMAL
MDC_IDC_PG_MODEL: NORMAL
MDC_IDC_PG_SERIAL: NORMAL
MDC_IDC_PG_SERIAL: NORMAL
MDC_IDC_PG_TYPE: NORMAL
MDC_IDC_PG_TYPE: NORMAL
MDC_IDC_SESS_CLINIC_NAME: NORMAL
MDC_IDC_SESS_CLINIC_NAME: NORMAL
MDC_IDC_SESS_DTM: NORMAL
MDC_IDC_SESS_DTM: NORMAL
MDC_IDC_SESS_TYPE: NORMAL
MDC_IDC_SESS_TYPE: NORMAL
MDC_IDC_SET_BRADY_AT_MODE_SWITCH_RATE: 171 {BEATS}/MIN
MDC_IDC_SET_BRADY_AT_MODE_SWITCH_RATE: 171 {BEATS}/MIN
MDC_IDC_SET_BRADY_HYSTRATE: NORMAL
MDC_IDC_SET_BRADY_HYSTRATE: NORMAL
MDC_IDC_SET_BRADY_LOWRATE: 50 {BEATS}/MIN
MDC_IDC_SET_BRADY_LOWRATE: 60 {BEATS}/MIN
MDC_IDC_SET_BRADY_MAX_SENSOR_RATE: 130 {BEATS}/MIN
MDC_IDC_SET_BRADY_MAX_SENSOR_RATE: 130 {BEATS}/MIN
MDC_IDC_SET_BRADY_MAX_TRACKING_RATE: 130 {BEATS}/MIN
MDC_IDC_SET_BRADY_MAX_TRACKING_RATE: 130 {BEATS}/MIN
MDC_IDC_SET_BRADY_MODE: NORMAL
MDC_IDC_SET_BRADY_MODE: NORMAL
MDC_IDC_SET_BRADY_PAV_DELAY_HIGH: 140 MS
MDC_IDC_SET_BRADY_PAV_DELAY_HIGH: 140 MS
MDC_IDC_SET_BRADY_PAV_DELAY_LOW: 240 MS
MDC_IDC_SET_BRADY_PAV_DELAY_LOW: 240 MS
MDC_IDC_SET_BRADY_SAV_DELAY_HIGH: 110 MS
MDC_IDC_SET_BRADY_SAV_DELAY_HIGH: 110 MS
MDC_IDC_SET_BRADY_SAV_DELAY_LOW: 220 MS
MDC_IDC_SET_BRADY_SAV_DELAY_LOW: 220 MS
MDC_IDC_SET_LEADCHNL_RA_PACING_AMPLITUDE: 1.5 V
MDC_IDC_SET_LEADCHNL_RA_PACING_AMPLITUDE: 1.5 V
MDC_IDC_SET_LEADCHNL_RA_PACING_ANODE_ELECTRODE_1: NORMAL
MDC_IDC_SET_LEADCHNL_RA_PACING_ANODE_ELECTRODE_1: NORMAL
MDC_IDC_SET_LEADCHNL_RA_PACING_ANODE_LOCATION_1: NORMAL
MDC_IDC_SET_LEADCHNL_RA_PACING_ANODE_LOCATION_1: NORMAL
MDC_IDC_SET_LEADCHNL_RA_PACING_CAPTURE_MODE: NORMAL
MDC_IDC_SET_LEADCHNL_RA_PACING_CAPTURE_MODE: NORMAL
MDC_IDC_SET_LEADCHNL_RA_PACING_CATHODE_ELECTRODE_1: NORMAL
MDC_IDC_SET_LEADCHNL_RA_PACING_CATHODE_ELECTRODE_1: NORMAL
MDC_IDC_SET_LEADCHNL_RA_PACING_CATHODE_LOCATION_1: NORMAL
MDC_IDC_SET_LEADCHNL_RA_PACING_CATHODE_LOCATION_1: NORMAL
MDC_IDC_SET_LEADCHNL_RA_PACING_POLARITY: NORMAL
MDC_IDC_SET_LEADCHNL_RA_PACING_POLARITY: NORMAL
MDC_IDC_SET_LEADCHNL_RA_PACING_PULSEWIDTH: 0.4 MS
MDC_IDC_SET_LEADCHNL_RA_PACING_PULSEWIDTH: 0.4 MS
MDC_IDC_SET_LEADCHNL_RA_SENSING_ANODE_ELECTRODE_1: NORMAL
MDC_IDC_SET_LEADCHNL_RA_SENSING_ANODE_ELECTRODE_1: NORMAL
MDC_IDC_SET_LEADCHNL_RA_SENSING_ANODE_LOCATION_1: NORMAL
MDC_IDC_SET_LEADCHNL_RA_SENSING_ANODE_LOCATION_1: NORMAL
MDC_IDC_SET_LEADCHNL_RA_SENSING_CATHODE_ELECTRODE_1: NORMAL
MDC_IDC_SET_LEADCHNL_RA_SENSING_CATHODE_ELECTRODE_1: NORMAL
MDC_IDC_SET_LEADCHNL_RA_SENSING_CATHODE_LOCATION_1: NORMAL
MDC_IDC_SET_LEADCHNL_RA_SENSING_CATHODE_LOCATION_1: NORMAL
MDC_IDC_SET_LEADCHNL_RA_SENSING_POLARITY: NORMAL
MDC_IDC_SET_LEADCHNL_RA_SENSING_POLARITY: NORMAL
MDC_IDC_SET_LEADCHNL_RA_SENSING_SENSITIVITY: 0.3 MV
MDC_IDC_SET_LEADCHNL_RA_SENSING_SENSITIVITY: 0.3 MV
MDC_IDC_SET_LEADCHNL_RV_PACING_AMPLITUDE: 2 V
MDC_IDC_SET_LEADCHNL_RV_PACING_AMPLITUDE: 2 V
MDC_IDC_SET_LEADCHNL_RV_PACING_ANODE_ELECTRODE_1: NORMAL
MDC_IDC_SET_LEADCHNL_RV_PACING_ANODE_ELECTRODE_1: NORMAL
MDC_IDC_SET_LEADCHNL_RV_PACING_ANODE_LOCATION_1: NORMAL
MDC_IDC_SET_LEADCHNL_RV_PACING_ANODE_LOCATION_1: NORMAL
MDC_IDC_SET_LEADCHNL_RV_PACING_CAPTURE_MODE: NORMAL
MDC_IDC_SET_LEADCHNL_RV_PACING_CAPTURE_MODE: NORMAL
MDC_IDC_SET_LEADCHNL_RV_PACING_CATHODE_ELECTRODE_1: NORMAL
MDC_IDC_SET_LEADCHNL_RV_PACING_CATHODE_ELECTRODE_1: NORMAL
MDC_IDC_SET_LEADCHNL_RV_PACING_CATHODE_LOCATION_1: NORMAL
MDC_IDC_SET_LEADCHNL_RV_PACING_CATHODE_LOCATION_1: NORMAL
MDC_IDC_SET_LEADCHNL_RV_PACING_POLARITY: NORMAL
MDC_IDC_SET_LEADCHNL_RV_PACING_POLARITY: NORMAL
MDC_IDC_SET_LEADCHNL_RV_PACING_PULSEWIDTH: 0.4 MS
MDC_IDC_SET_LEADCHNL_RV_PACING_PULSEWIDTH: 0.4 MS
MDC_IDC_SET_LEADCHNL_RV_SENSING_ANODE_ELECTRODE_1: NORMAL
MDC_IDC_SET_LEADCHNL_RV_SENSING_ANODE_ELECTRODE_1: NORMAL
MDC_IDC_SET_LEADCHNL_RV_SENSING_ANODE_LOCATION_1: NORMAL
MDC_IDC_SET_LEADCHNL_RV_SENSING_ANODE_LOCATION_1: NORMAL
MDC_IDC_SET_LEADCHNL_RV_SENSING_CATHODE_ELECTRODE_1: NORMAL
MDC_IDC_SET_LEADCHNL_RV_SENSING_CATHODE_ELECTRODE_1: NORMAL
MDC_IDC_SET_LEADCHNL_RV_SENSING_CATHODE_LOCATION_1: NORMAL
MDC_IDC_SET_LEADCHNL_RV_SENSING_CATHODE_LOCATION_1: NORMAL
MDC_IDC_SET_LEADCHNL_RV_SENSING_POLARITY: NORMAL
MDC_IDC_SET_LEADCHNL_RV_SENSING_POLARITY: NORMAL
MDC_IDC_SET_LEADCHNL_RV_SENSING_SENSITIVITY: 0.9 MV
MDC_IDC_SET_LEADCHNL_RV_SENSING_SENSITIVITY: 0.9 MV
MDC_IDC_SET_ZONE_DETECTION_INTERVAL: 350 MS
MDC_IDC_SET_ZONE_DETECTION_INTERVAL: 350 MS
MDC_IDC_SET_ZONE_DETECTION_INTERVAL: 400 MS
MDC_IDC_SET_ZONE_DETECTION_INTERVAL: 400 MS
MDC_IDC_SET_ZONE_STATUS: NORMAL
MDC_IDC_SET_ZONE_TYPE: NORMAL
MDC_IDC_SET_ZONE_VENDOR_TYPE: NORMAL
MDC_IDC_STAT_AT_BURDEN_PERCENT: 0 %
MDC_IDC_STAT_AT_BURDEN_PERCENT: 0 %
MDC_IDC_STAT_AT_DTM_END: NORMAL
MDC_IDC_STAT_AT_DTM_END: NORMAL
MDC_IDC_STAT_AT_DTM_START: NORMAL
MDC_IDC_STAT_AT_DTM_START: NORMAL
MDC_IDC_STAT_BRADY_AP_VP_PERCENT: 6.18 %
MDC_IDC_STAT_BRADY_AP_VP_PERCENT: 6.18 %
MDC_IDC_STAT_BRADY_AP_VS_PERCENT: 0 %
MDC_IDC_STAT_BRADY_AP_VS_PERCENT: 0 %
MDC_IDC_STAT_BRADY_AS_VP_PERCENT: 93.77 %
MDC_IDC_STAT_BRADY_AS_VP_PERCENT: 93.78 %
MDC_IDC_STAT_BRADY_AS_VS_PERCENT: 0.04 %
MDC_IDC_STAT_BRADY_AS_VS_PERCENT: 0.04 %
MDC_IDC_STAT_BRADY_DTM_END: NORMAL
MDC_IDC_STAT_BRADY_DTM_END: NORMAL
MDC_IDC_STAT_BRADY_DTM_START: NORMAL
MDC_IDC_STAT_BRADY_DTM_START: NORMAL
MDC_IDC_STAT_BRADY_RA_PERCENT_PACED: 6.18 %
MDC_IDC_STAT_BRADY_RA_PERCENT_PACED: 6.18 %
MDC_IDC_STAT_BRADY_RV_PERCENT_PACED: 99.96 %
MDC_IDC_STAT_BRADY_RV_PERCENT_PACED: 99.96 %
MDC_IDC_STAT_EPISODE_RECENT_COUNT: 0
MDC_IDC_STAT_EPISODE_RECENT_COUNT_DTM_END: NORMAL
MDC_IDC_STAT_EPISODE_RECENT_COUNT_DTM_START: NORMAL
MDC_IDC_STAT_EPISODE_TOTAL_COUNT: 0
MDC_IDC_STAT_EPISODE_TOTAL_COUNT_DTM_END: NORMAL
MDC_IDC_STAT_EPISODE_TOTAL_COUNT_DTM_START: NORMAL
MDC_IDC_STAT_EPISODE_TYPE: NORMAL
MDC_IDC_STAT_TACHYTHERAPY_RECENT_DTM_END: NORMAL
MDC_IDC_STAT_TACHYTHERAPY_RECENT_DTM_END: NORMAL
MDC_IDC_STAT_TACHYTHERAPY_RECENT_DTM_START: NORMAL
MDC_IDC_STAT_TACHYTHERAPY_RECENT_DTM_START: NORMAL
MDC_IDC_STAT_TACHYTHERAPY_TOTAL_DTM_END: NORMAL
MDC_IDC_STAT_TACHYTHERAPY_TOTAL_DTM_END: NORMAL
MDC_IDC_STAT_TACHYTHERAPY_TOTAL_DTM_START: NORMAL
MDC_IDC_STAT_TACHYTHERAPY_TOTAL_DTM_START: NORMAL

## 2025-01-30 RX ORDER — TRAZODONE HYDROCHLORIDE 50 MG/1
TABLET, FILM COATED ORAL
Qty: 90 TABLET | Refills: 1 | Status: SHIPPED | OUTPATIENT
Start: 2025-01-30

## 2025-01-30 NOTE — CONFIDENTIAL NOTE
"Health Psychology - Follow up Visit  Confidential Summary*     The author of this note documented a reason for not sharing it with the patient.      REFERRAL SOURCE:  self     Visit conducted via video using [employer].  Patient was at her home and provider at her home.    The patient has been notified of following:   \"This VIDEO visit will be conducted via a call between you and your physician/provider. We have found that certain health care needs can be provided without the need for an in-person physical exam.   Video visits are billed at different rates depending on your insurance coverage.  Please reach out to your insurance provider with any questions. If during the course of the call the physician/provider feels a video visit is not appropriate, you will not be charged for this service.\"     Patient has given verbal consent for Video/PHONE visit? Yes     CHIEF COMPLAINT/REASON FOR VISIT     Subjective:   Patient began with report that she was informed that she would have to have her surgery rescheduled because her health was not stable.  She described testing negative for Flu, COVID and RSV but that she continued to have a cough and a chest XRay revealed that possibility of new pneumonia.  She reported that her provider prescribed prednisone and doxycycline and that she is beginning to feel better.  She reported that she ended up seeing a new primary when her primary was not available and that  requested that she continue with this provider but she was eventually able to schedule with her existing primary.      As patient indicated in the last session that she was interested in continuing weight loss, we discussed her ongoing efforts.  She reported that she waxes and wanes in her weight loss efforts and that her weight corresponds.  She was encouraged to discuss the possibility of weight loss medications with her primary at her upcoming visit, at the end of February.  She reported that she is attempting to lose " weight via decreasing portion sizes, decreasing sugar intake and desserts and that she is limiting her bread intake to one piece and discontinuing sugared soda.  Congratulated these efforts and pointed out that they are likely to make a difference and that consistency is key.  Reviewed what a serving size looks like and encouraged her to consider using a self monitoring form to document dietary intake.      For the first time, patient discussed her son, Juan Jose.     Objective:  Patient was on time for today s session.  She appeared friendly, alert and oriented.  Mood was euthymic, with appropriate range of affect, including occasional tearfulness. Patient endorsed suicidal ideation and denied suicidal plan or intent.         Assessment:  The patient has been diagnosed with ataxia.  In addition, she has multiple additional medical challenges and is using a walker.  She lives alone and is largely isolated.  She described a lifelong history of depression with trials on multiple medications with little relief.  She is coping with worsening symptoms and feelings of hopelessness surrounding her ataxia as she has had no indication that she will be able to improve these symptoms.  She is grieving loss of functioning and is navigating the many changes that are necessary to allow her to remain independent in a world where technology is required to navigate most issues.  She continues to believe that she has a brain tumor, currently undiagnosed.    Her mood is much improved with support.  She acknowledges a number of friends and has taken efforts to decrease weight.       Plan:  CBT and supportive therapy in context of medical diagnosis and surrounding concerns.  Support provided through grieving process.  Will provide patient with problem solving assistance including weight loss.  See in one week.      Treatment plan reviewed.     Time In: 11:00  Time Out: 12:00     Diagnosis:  Axis I Adjustment disorder with mixed emotions of  anxiety and depression, MDD, moderate, recurrent; psychological and behavioral factors associated with medical (vertigo and ataxia)   Axis II Deferred   Axis III please see medical records for details   [place] [facility] Psychosocial and Environmental Stressors: Health & family          Samantha Watters, PhD, LP

## 2025-01-30 NOTE — TELEPHONE ENCOUNTER
RX Authorization    Medication: traZODone (DESYREL) 50 MG tablet     Date last refill ordered: 10/21/24    Quantity ordered: 30    # refills: 3    Date of last clinic visit with ordering provider: 1/21/25    Date of next clinic visit with ordering provider: 4/25/25

## 2025-02-10 ENCOUNTER — THERAPY VISIT (OUTPATIENT)
Dept: PHYSICAL THERAPY | Facility: REHABILITATION | Age: 70
End: 2025-02-10
Payer: MEDICARE

## 2025-02-10 DIAGNOSIS — M48.062 SPINAL STENOSIS OF LUMBAR REGION WITH NEUROGENIC CLAUDICATION: ICD-10-CM

## 2025-02-10 DIAGNOSIS — G89.29 CHRONIC BILATERAL THORACIC BACK PAIN: Primary | ICD-10-CM

## 2025-02-10 DIAGNOSIS — M54.6 CHRONIC BILATERAL THORACIC BACK PAIN: Primary | ICD-10-CM

## 2025-02-10 DIAGNOSIS — M47.24 OSTEOARTHRITIS OF SPINE WITH RADICULOPATHY, THORACIC REGION: ICD-10-CM

## 2025-02-10 PROCEDURE — 97110 THERAPEUTIC EXERCISES: CPT | Mod: GP

## 2025-02-12 ENCOUNTER — HOSPITAL ENCOUNTER (OUTPATIENT)
Facility: CLINIC | Age: 70
End: 2025-02-12
Attending: INTERNAL MEDICINE | Admitting: INTERNAL MEDICINE
Payer: MEDICARE

## 2025-02-12 DIAGNOSIS — R06.00 DYSPNEA: ICD-10-CM

## 2025-02-12 DIAGNOSIS — I25.10 CORONARY ARTERY DISEASE INVOLVING NATIVE CORONARY ARTERY OF NATIVE HEART WITHOUT ANGINA PECTORIS: Primary | ICD-10-CM

## 2025-02-12 DIAGNOSIS — I25.10 CORONARY ARTERY DISEASE INVOLVING NATIVE CORONARY ARTERY OF NATIVE HEART WITHOUT ANGINA PECTORIS: ICD-10-CM

## 2025-02-12 RX ORDER — POTASSIUM CHLORIDE 1500 MG/1
40 TABLET, EXTENDED RELEASE ORAL
OUTPATIENT
Start: 2025-02-12

## 2025-02-12 RX ORDER — SODIUM CHLORIDE 9 MG/ML
INJECTION, SOLUTION INTRAVENOUS CONTINUOUS
OUTPATIENT
Start: 2025-02-12

## 2025-02-12 RX ORDER — ASPIRIN 81 MG/1
243 TABLET, CHEWABLE ORAL ONCE
OUTPATIENT
Start: 2025-02-12

## 2025-02-12 RX ORDER — ASPIRIN 325 MG
325 TABLET ORAL ONCE
OUTPATIENT
Start: 2025-02-12 | End: 2025-02-12

## 2025-02-12 RX ORDER — POTASSIUM CHLORIDE 1500 MG/1
20 TABLET, EXTENDED RELEASE ORAL
OUTPATIENT
Start: 2025-02-12

## 2025-02-12 RX ORDER — LIDOCAINE 40 MG/G
CREAM TOPICAL
OUTPATIENT
Start: 2025-02-12

## 2025-02-12 NOTE — PROGRESS NOTES
Discussed results of CTA with pt over the phone with pt's wife. She is in agreement with proceeding with angiogram. Will order and have  contact pt.     Benjamin Dillard RN   Cardiology Nurse Coordinator

## 2025-02-13 ENCOUNTER — OFFICE VISIT (OUTPATIENT)
Dept: FAMILY MEDICINE | Facility: CLINIC | Age: 70
End: 2025-02-13
Payer: MEDICARE

## 2025-02-13 VITALS
TEMPERATURE: 98 F | HEART RATE: 88 BPM | RESPIRATION RATE: 16 BRPM | SYSTOLIC BLOOD PRESSURE: 176 MMHG | WEIGHT: 218 LBS | BODY MASS INDEX: 33.04 KG/M2 | OXYGEN SATURATION: 94 % | DIASTOLIC BLOOD PRESSURE: 97 MMHG | HEIGHT: 68 IN

## 2025-02-13 DIAGNOSIS — I10 PRIMARY HYPERTENSION: ICD-10-CM

## 2025-02-13 DIAGNOSIS — Z79.4 TYPE 2 DIABETES MELLITUS WITH HYPERGLYCEMIA, WITH LONG-TERM CURRENT USE OF INSULIN (H): Primary | ICD-10-CM

## 2025-02-13 DIAGNOSIS — E66.811 CLASS 1 OBESITY WITH SERIOUS COMORBIDITY AND BODY MASS INDEX (BMI) OF 33.0 TO 33.9 IN ADULT, UNSPECIFIED OBESITY TYPE: ICD-10-CM

## 2025-02-13 DIAGNOSIS — R09.89 OTHER SPECIFIED SYMPTOMS AND SIGNS INVOLVING THE CIRCULATORY AND RESPIRATORY SYSTEMS: ICD-10-CM

## 2025-02-13 DIAGNOSIS — G20.B1 PARKINSON'S DISEASE WITH DYSKINESIA WITHOUT FLUCTUATING MANIFESTATIONS (H): Primary | ICD-10-CM

## 2025-02-13 DIAGNOSIS — I95.1 ORTHOSTATIC HYPOTENSION: ICD-10-CM

## 2025-02-13 DIAGNOSIS — I44.2 HEART BLOCK AV THIRD DEGREE (H): ICD-10-CM

## 2025-02-13 DIAGNOSIS — K21.9 GASTROESOPHAGEAL REFLUX DISEASE WITHOUT ESOPHAGITIS: ICD-10-CM

## 2025-02-13 DIAGNOSIS — R10.84 ABDOMINAL PAIN, GENERALIZED: ICD-10-CM

## 2025-02-13 DIAGNOSIS — E11.65 TYPE 2 DIABETES MELLITUS WITH HYPERGLYCEMIA, WITH LONG-TERM CURRENT USE OF INSULIN (H): Primary | ICD-10-CM

## 2025-02-13 DIAGNOSIS — R25.2 LEG CRAMPS: ICD-10-CM

## 2025-02-13 NOTE — PATIENT INSTRUCTIONS
Lets try to wean off omeprazole. Okay to take every other day for a few weeks. If symptoms are still well controlled, we can further reduce.  Gabapentin plan: Lets try to reduce the dose as tolerated. We can increase the dose if lower dose not effective. Lets adjust weekly.  Take 2 pills in the morning / 1 pill in the afternoon / 2 pills at night  Take 1 pill in the morning / 1 pill in the afternoon / 2 pills at night  Take 1 pill in the morning / 1 pill in the afternoon / 1 pill at night  Take 1 pill in the morning / 1 pill in the afternoon  Take 1 pill at bedtime

## 2025-02-13 NOTE — PROGRESS NOTES
Assessment & Plan     1. Type 2 diabetes mellitus with hyperglycemia, with long-term current use of insulin (H) (Primary)  Pat is following with endocrinology and has an appointment coming up.  [facility] score gap to address today.  Managed on insulins.    2. Heart block AV third degree (H)  [facility] score gap to address today.  Pat has a dual-chamber pacemaker in place.  Following with cardiology.    3. Primary hypertension  Blood pressure is a concern today.  Blood pressure in clinic is elevated, though I am hesitant to increase his blood pressure medications because he quite frequently is also experiencing symptomatic low readings such as 76/53 on Wednesday, January 15, 98/64 on Monday, January 20, 94/66 on Tuesday, January 21, 71/47 on Wednesday, January 22, 9 5/60 on Saturday, January 25.     I suspect he has labile blood pressures related to his Parkinson disease.  Blood pressure quite elevated in clinic though he has recently called in with several symptomatic low blood pressures.  I have reached out to the cardiology and neurology team to collaborate on blood pressure management.    4. Leg cramps  5. Other specified symptoms and signs involving the circulatory and respiratory systems  - US ARI Doppler No Exercise; Future    Pat is experiencing more pain in the back of his legs.  I suspect this is related to his lumbar radiculopathy.  He is concerned about possibility of peripheral arterial disease.  This is on the differential diagnosis, particularly given his known coronary artery disease.  However, I less suspect a significant arterial blockage as his feet are warm, color is normal, capillary refill is normal, pulses intact.  We will proceed with ARI for further evaluation of blood flow to lower extremities.    6. Class 1 obesity with serious comorbidity and body mass index (BMI) of 33.0 to 33.9 in adult, unspecified obesity type  - Vitamin D deficiency screening; Future    Request screening for vitamin D.   Continue working on healthy habits, nutrient dense diet.    7. Gastroesophageal reflux disease without esophagitis  Patient met with Pharm.D. who recommended weaning this medication.  It sounds like he has been on a PPI for years and has never attempted to wean.  Denying any breakthrough symptoms.  We reviewed his last EGD which did not show any dysplasia in the esophagus or irritation/abnormal lesions in the stomach.  Recommend reducing dose of omeprazole to every other day for a few weeks.  If tolerating this well, we can further reduce to twice weekly or discontinue.  If symptoms recur, we will resume PPI.  He is at increased risk with daily aspirin use.    8. Abdominal pain, generalized  Patient also states Pharm.D. recommended weaning or discontinuation of gabapentin.  This was started related to abdominal pain.  Provided a weaning schedule.  If we reach a dose that is no longer controlling symptoms, we will back up to previous dose.  If able to wean off completely, we will reduce polypharmacy and medication burden.        MED REC REQUIRED  Post Medication Reconciliation Status:  Discharge medications reconciled and changed, see notes/orders    62 minutes spent on date of encounter with chart review, patient visit, counseling, documentation, and coordination of care.    The longitudinal plan of care for the diagnosis(es)/condition(s) as documented were addressed during this visit. Due to the added complexity in care, I will continue to support Pat in the subsequent management and with ongoing continuity of care.       Subjective   Pat is a 69 year old, presenting for the following health issues:  Hospital F/U (Follow up from 1/20)      2/13/2025    10:59 AM   Additional Questions   Roomed by Ozzie SIMMS MA     [facility] Follow-up Visit:    Hospital/Nursing Home/ Rehab Facility: [facility]  Date of Admission: 1/20  Date of Discharge: 1/20  Reason(s) for Admission: numbness  Was  the patient in the ICU or did the patient experience delirium during hospitalization?  No  Do you have any other stressors you would like to discuss with your provider? No    Problems taking medications regularly:  None  Medication changes since discharge: None  Problems adhering to non-medication therapy:  None    Summary of hospitalization:  [facility] discharge summary reviewed  Diagnostic Tests/Treatments reviewed.  Follow up needed: none  Other Healthcare Providers Involved in Patient s Care:         None  Update since discharge: improved.         Plan of care communicated with patient           NUMBNESS:   Sri was evaluated in the emergency department on January 20 for report of numbness and compression between his head and the top of his neck.  Symptoms have been present for a few months, more frequent episodes over the last day.  Proceeded to the emergency department due to change in symptoms.    He had a normal comprehensive metabolic panel except for elevated glucose, normal troponin.    Sri had an MRI cervical spine without and with contrast on February 5, 2024.  HEAD MRI:   1.  No acute intracranial process.  2.  Generalized brain atrophy and presumed microvascular ischemic changes as detailed above.     CERVICAL SPINE MRI:  1.  Multilevel degenerative spondylolisthesis of the cervical spine, as above, with up to moderate central spinal canal stenosis at the levels of C3-C4, C4-C5 and C5-C6.  2.  No abnormal spinal cord signal or intramedullary/leptomeningeal enhancement.    Has spells, clenches his traps, feels like his head feels heavy and feels different.  Had an episode before coming. Meds due around 10am. Neurology doesn't think it is his medicine. Thinks maybe anxiety.     States he had an episode at [facility]. Feels like anxiety takes over, snowballs, stutter stepping, cramp in thigh.     Sensation of everything getting heavy. If sitting in a chair and needs to put his arms up, they  "feel heavy.        DIABETES: follow up endocrinology, appt scheduled next week.    Hemoglobin A1C   Date Value Ref Range Status   11/01/2024 8.4 (H) 0.0 - 5.6 % Final     Comment:     Normal <5.7%   Prediabetes 5.7-6.4%    Diabetes 6.5% or higher     Note: Adopted from ADA consensus guidelines.     Atorvastatin 40 mg daily, NovoLog, glargine.       BLOOD PRESSURE:   BP Readings from Last 6 Encounters:   02/13/25 (!) 176/97   01/29/25 (!) 141/71   01/28/25 97/52   01/21/25 94/66   01/20/25 (!) 144/81   01/16/25 128/73     Last Comprehensive Metabolic Panel:  Lab Results   Component Value Date     01/20/2025    POTASSIUM 4.3 01/20/2025    CHLORIDE 103 01/20/2025    CO2 26 01/20/2025    ANIONGAP 7 01/20/2025     (H) 01/20/2025    BUN 21.8 01/20/2025    CR 0.98 01/20/2025    GFRESTIMATED 83 01/20/2025    JAKE 9.0 01/20/2025     Losartan 50 mg daily.       HEART BLOCK:   s/p dual chamber PPM 2/28/24.       PPI: Has been on for 20 years, has never tried to wean off      GABAPENTIN: PharmD recommended weaning off.   Started before pancreas removed due to pain.         Review of Systems  See HPI above.         Objective    BP (!) 176/97   Pulse 88   Temp 98  F (36.7  C) (Oral)   Resp 16   Ht 1.727 m (5' 8\")   Wt 98.9 kg (218 lb)   SpO2 94%   BMI 33.15 kg/m    Body mass index is 33.15 kg/m .  Physical Exam   GENERAL: alert and no distress  EYES: Eyes grossly normal to inspection, PERRL and conjunctivae and sclerae normal  HENT: ear canals and TM's normal, nose and mouth without ulcers or lesions  NECK: no adenopathy, no asymmetry, masses, or scars  RESP: lungs clear to auscultation - no rales, rhonchi or wheezes  CV: regular rate and rhythm, no murmurs.   ABDOMEN: soft, nontender, no hepatosplenomegaly, no masses and bowel sounds normal  EXTREMITIES: Pulses intact bilaterally, normal capillary refill.  Minimal edema.  Color normal, temperature normal.  PSYCH: mentation appears normal, affect " normal/bright            Signed Electronically by: Amelia Johnson, DO

## 2025-02-16 ENCOUNTER — HEALTH MAINTENANCE LETTER (OUTPATIENT)
Age: 70
End: 2025-02-16

## 2025-02-17 ENCOUNTER — TELEPHONE (OUTPATIENT)
Dept: FAMILY MEDICINE | Facility: CLINIC | Age: 70
End: 2025-02-17

## 2025-02-17 ENCOUNTER — THERAPY VISIT (OUTPATIENT)
Dept: PHYSICAL THERAPY | Facility: REHABILITATION | Age: 70
End: 2025-02-17
Payer: MEDICARE

## 2025-02-17 DIAGNOSIS — M54.6 CHRONIC BILATERAL THORACIC BACK PAIN: Primary | ICD-10-CM

## 2025-02-17 DIAGNOSIS — M48.062 SPINAL STENOSIS OF LUMBAR REGION WITH NEUROGENIC CLAUDICATION: ICD-10-CM

## 2025-02-17 DIAGNOSIS — M47.24 OSTEOARTHRITIS OF SPINE WITH RADICULOPATHY, THORACIC REGION: ICD-10-CM

## 2025-02-17 DIAGNOSIS — G89.29 CHRONIC BILATERAL THORACIC BACK PAIN: Primary | ICD-10-CM

## 2025-02-17 PROCEDURE — 97140 MANUAL THERAPY 1/> REGIONS: CPT | Mod: GP

## 2025-02-17 PROCEDURE — 97110 THERAPEUTIC EXERCISES: CPT | Mod: GP

## 2025-02-17 NOTE — TELEPHONE ENCOUNTER
"----- Message from Amelia Johnson sent at 2/14/2025  1:20 PM CST -----  Please connect with pharmacy and see if they have brought home cuff in to confirm accuracy.  May be worth setting up a nurse blood pressure visit to confirm home cuff matches clinic readings.   Amelia Johnson DO  ----- Message -----  From: Juan Jose Armenta MD  Sent: 2/13/2025   8:22 PM CST  To: Khadijah Sen MD; #    Dr. Sen is an autonomic specialist - not sure if she will have her lab open in march or April but may be reasonable to have Pat see her ( I placed a referral). Pat also had seen MTM Dr. Delgado last April and good to have another visit to aid in implementation and will see her on 2/27 and myself in April. He last saw Candida Jackson a few months ago.   Cardiology clinic may be helpful if the cardiologist is interested in management of BP which often YoYos through the day.     These are tough management issues and hopefully the family has an validated home blood pressure cuff to help in management with regular readings.     Will see if things can get scheduled.    Juan Jose  ----- Message -----  From: Amelia Johnson DO  Sent: 2/13/2025   4:40 PM CST  To: Juan Jose Armenta MD; KRYSTYNA Gloria CNP,    I saw Pat today in follow up and we discussed that he is experiencing quite labile blood pressures.  I suspect this is related to autonomic dysfunction with his Parkinson's disease.  Family has reached out frequently with concerns for symptomatic low blood pressures.  In clinic today, his blood pressure is actually quite elevated.    He is currently taking losartan 50 mg daily for blood pressure control.    I am reaching out wondering if there is a recommended strategy to help better control his blood pressures?  Would you recommend reducing his losartan dose and adding in a \"as needed\" dose if his blood pressures meet a certain parameter?  Is it better to have an as needed dose of midodrine due to his " Parkinson's? Is there a strategy generally implemented for these patients in the neurology or cardiology clinic?    I am happy to refer back for further evaluation or place an E-consult.  Rafaela, is this something you would potentially address at his upcoming appointment later this month?    Appreciate your expertise and collaboration.    Amelia Johnson, DO

## 2025-02-17 NOTE — TELEPHONE ENCOUNTER
First Attempt: I sent a my chart message to the patient to please contact our office to schedule an RN only visit for a BP check.

## 2025-02-19 ENCOUNTER — LAB (OUTPATIENT)
Dept: LAB | Facility: CLINIC | Age: 70
End: 2025-02-19
Payer: MEDICARE

## 2025-02-19 ENCOUNTER — OFFICE VISIT (OUTPATIENT)
Dept: ENDOCRINOLOGY | Facility: CLINIC | Age: 70
End: 2025-02-19
Payer: MEDICARE

## 2025-02-19 VITALS
WEIGHT: 219.9 LBS | DIASTOLIC BLOOD PRESSURE: 52 MMHG | OXYGEN SATURATION: 94 % | HEART RATE: 88 BPM | SYSTOLIC BLOOD PRESSURE: 84 MMHG | BODY MASS INDEX: 33.44 KG/M2

## 2025-02-19 DIAGNOSIS — E11.65 TYPE 2 DIABETES MELLITUS WITH HYPERGLYCEMIA, WITH LONG-TERM CURRENT USE OF INSULIN (H): ICD-10-CM

## 2025-02-19 DIAGNOSIS — Z79.4 TYPE 2 DIABETES MELLITUS WITH HYPERGLYCEMIA, WITH LONG-TERM CURRENT USE OF INSULIN (H): ICD-10-CM

## 2025-02-19 LAB
EST. AVERAGE GLUCOSE BLD GHB EST-MCNC: 192 MG/DL
HBA1C MFR BLD: 8.3 %

## 2025-02-19 PROCEDURE — 83036 HEMOGLOBIN GLYCOSYLATED A1C: CPT

## 2025-02-19 PROCEDURE — 95251 CONT GLUC MNTR ANALYSIS I&R: CPT | Performed by: INTERNAL MEDICINE

## 2025-02-19 PROCEDURE — 36415 COLL VENOUS BLD VENIPUNCTURE: CPT

## 2025-02-19 PROCEDURE — 99214 OFFICE O/P EST MOD 30 MIN: CPT | Mod: 25 | Performed by: INTERNAL MEDICINE

## 2025-02-19 RX ORDER — INSULIN GLARGINE-YFGN 100 [IU]/ML
INJECTION, SOLUTION SUBCUTANEOUS
Qty: 50 ML | Refills: 2 | Status: SHIPPED | OUTPATIENT
Start: 2025-02-19 | End: 2025-08-18

## 2025-02-19 RX ORDER — ACYCLOVIR 400 MG/1
TABLET ORAL
Qty: 9 EACH | Refills: 11 | Status: SHIPPED | OUTPATIENT
Start: 2025-02-19

## 2025-02-19 ASSESSMENT — ANXIETY QUESTIONNAIRES
GAD7 TOTAL SCORE: 5
IF YOU CHECKED OFF ANY PROBLEMS ON THIS QUESTIONNAIRE, HOW DIFFICULT HAVE THESE PROBLEMS MADE IT FOR YOU TO DO YOUR WORK, TAKE CARE OF THINGS AT HOME, OR GET ALONG WITH OTHER PEOPLE: SOMEWHAT DIFFICULT
3. WORRYING TOO MUCH ABOUT DIFFERENT THINGS: SEVERAL DAYS
7. FEELING AFRAID AS IF SOMETHING AWFUL MIGHT HAPPEN: NOT AT ALL
GAD7 TOTAL SCORE: 5
8. IF YOU CHECKED OFF ANY PROBLEMS, HOW DIFFICULT HAVE THESE MADE IT FOR YOU TO DO YOUR WORK, TAKE CARE OF THINGS AT HOME, OR GET ALONG WITH OTHER PEOPLE?: SOMEWHAT DIFFICULT
2. NOT BEING ABLE TO STOP OR CONTROL WORRYING: SEVERAL DAYS
6. BECOMING EASILY ANNOYED OR IRRITABLE: NOT AT ALL
GAD7 TOTAL SCORE: 5
7. FEELING AFRAID AS IF SOMETHING AWFUL MIGHT HAPPEN: NOT AT ALL
5. BEING SO RESTLESS THAT IT IS HARD TO SIT STILL: SEVERAL DAYS
1. FEELING NERVOUS, ANXIOUS, OR ON EDGE: SEVERAL DAYS
4. TROUBLE RELAXING: SEVERAL DAYS

## 2025-02-19 NOTE — PROGRESS NOTES
Endocrinology Clinic Visit     Arnaldo Henderson is a 69 year old male with CAD s/p CABG, 2nd degree AV block, DM, GERD, HTN, h/o chronic pancreatitis s/p distal pancreatectomy, neuroendocrine tumor and PD who is here for diabetes management. Pt is here with wife.      Interval History  Insurance no longer cover Novolog but cover aspart. Basaglar not cover but Glargine-YFGN.  Basaglar 20 unit(s) PM and 14 unit(s) AM  Novolog 6-8  with breakfast, lunch 8-10, supper 10-12 unit(s)   Eating is fine    Pt eats wrong food eg, chips, crackles  Trying more apples    CGM 2/6-2/19  Ave [address] GMI 7.9  CV 34  TIR 47%, very low <1%  Hyperglycemia after meal    Initial visit 1/10/24    Per Dr. Lyn note 11/27/2023  He is 68 years old.  His tumor was found incidentally when he was undergoing a pancreatectomy for chronic pancreatitis.    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  He has small volume mildly progressive disease within the abdomen without any clearly associated symptoms.   For now his rate of growth and very small volume disease does not warrant active intervention. We will plan on a follow-up scan in 3 to 4 months.     Per Inpatient DM team note 7/2023  Arnaldo Henderson 67 y/o with PMH chronic pancreatitis, CAD, CABG, 2nd degree AVB, Parkinson's disease and chronic pain. He is now s/p distal pancreatectomy, splenectomy, YVAN, proximal ileum resection. He is admitted to the SICU for hemodynamic monitoring.  # T2DM not at goal with hyperglycemia.  Poor control prior to admission   # Stress induced hyperglycemia  # post pancreatic surgery  Plan:                  -Lantus 15 units                 -Novolog 1u/10 gm CHO coverage with meals and snacks                 -Continue insulin drip for now.                 -BG monitoring as per insulin drip protocol                 -hypoglycemia protocol                 -recommend diet with carb counting protocol                 -diabetes  education needs will be assessed closer to discharge                 -on discharge, will recommend outpatient follow up with [facility] Endocrinology service     Per most recent endocrine clinic visit with my colleague Hayley Alatorre PAC 1/2024   TYPE 2 DIABETES MELLITUS:   Sugars are currently at goal but with excessive hypoglycemia.  Advising him to reduce his Basaglar dose to 14 units in the morning 30 in the evening nearly 10% reduction.  Also advising him to continue his breakfast dose of 4 units but decrease lunch dose to just 7 units and dinner dose to 10 units plus any correction.  Will continue current correction.   5.  Neuroendocrine tumor: See Dr. Gail mcarthur next week    I confirmed with patient and wife and they confirmed that Pat is seeing me today for diabetes.  They would like to have a doctor on the team also with our diabetes team at the .    Prior to admission, he was on metformin 2000 mg/day, no side effect  Pt and wife reported seeing doctors for diabetes once during inpatient.  Report that about 60% of his pancreas was removed  Pt follows neuroendocrine tumor with Dr. Lyn no concerns.    Patient's main concerns today are his diabetes control after steroid injection and his recent lab results.  11/21/2023  Steroid injection, glucose was high   He may need another injection in the future and is concerned.  They are wondering about high BUN, previous abnormal LFT, low hematocrit and abnormal RBC    In terms of symptoms, he said PD is bothersome  But no problem taking insulin  Feels like hypoglycemia improve since lower insulin dose last week    Pt only has [employer] alarm set for urgent low.  Sometimes he could not feel low glucose.  He recently bought glucose tablets to help with lows    Current regimen  Basaglar 14 units AM 30 units hs  Novolog 4 units with breakfast + sliding scale insulin, lunch 7 + sliding scale insulin, supper 10 + sliding scale insulin, night sliding scale insulin    CGM 1  week data  TIR 49%, low 4%, very low 1%         REVIEW OF SYSTEMS  10 point negative except as mentioned in HPI    Past Medical/Surgical History:  Past Medical History:   Diagnosis Date    Acute pancreatitis 04/18/2022    Formatting of this note might be different from the original. Formatting of this note might be different from the original. Added automatically from request for surgery [id number] Formatting of this note might be different from the original. Added automatically from request for surgery [id number]  Formatting of this note might be different from the original. Added automatically from request for surgery     CAD (coronary artery disease)     CABG    Cholecystitis, acute 07/05/2023    Diabetes mellitus, type 2 (H) 2013    Family history of parkinsonism 02/14/2024    Gastroesophageal reflux disease     Hypercholesteremia     Hypertension     Mixed conductive and sensorineural hearing loss of both ears     Mixed hearing loss     Pancreatic duct stricture     Pancreatitis     Parkinson disease (H)     Parkinson's disease, unspecified whether dyskinesia present, unspecified whether manifestations fluctuate (H) 02/14/2024    Seborrheic dermatitis 03/22/2024    Second degree AV block     Surgical site infection 07/26/2023     Past Surgical History:   Procedure Laterality Date    CA ANESTH CABG W/PUMP      CHOLECYSTECTOMY  2022    COLONOSCOPY N/A 01/12/2023    Procedure: colonoscopy with fluroscopy;  Surgeon: Ayden Fraire MD;  Location:  OR    ENDOSCOPIC RETROGRADE CHOLANGIOPANCREATOGRAM N/A 05/18/2023    Procedure: ENDOSCOPIC RETROGRADE CHOLANGIOPANCREATOGRAPHY, WITH  CYSTDUODENOSTOMY STENTS X2 REMOVAL;  Surgeon: Cedric Saldana MD;  Location:  OR    ENT SURGERY      EP PACEMAKER DEVICE & LEAD IMPLANT- RIGHT ATRIAL & RIGHT VENTRICULAR N/A 02/28/2024    Procedure: Pacemaker Device & Lead Implant - Right Atrial & Right Ventricular;  Surgeon: Jenna Hernandez MD;  Location:  HEART CARDIAC CATH LAB     LAPAROSCOPY DIAGNOSTIC (GENERAL) N/A 02/20/2023    Procedure: LAPAROSCOPY, DIAGNOSTIC; RETRIEVAL OF MIGRATED STENT;  Surgeon: Joseluis Lima MD;  Location: [facility] OR    ORTHOPEDIC SURGERY      OTHER SURGICAL HISTORY      skin cancer ? from scrotum    PANCREATECTOMY PEUSTOW N/A 06/30/2023    Procedure: Open distal pancreactectomy with splenectomy, lysis of adhesions, resection of proximal illeum;  Surgeon: Ashvin Haider MD;  Location: [facility] OR    AK CABG, ARTERIAL, TWO  2003       Medications  Current Outpatient Medications   Medication Sig Dispense Refill    aspirin (ASA) 81 MG EC tablet Take 81 mg by mouth daily      atorvastatin (LIPITOR) 40 MG tablet Take 1 tablet (40 mg) by mouth daily 90 tablet 3    BD PEN NEEDLE LINDY 2ND GEN 32G X 4 MM miscellaneous USE TO INJECT INSULIN 4 TIMES A  each 3    carbidopa-levodopa (SINEMET CR)  MG CR tablet Take 1 tablet by mouth at bedtime @9p 90 tablet 3    carbidopa-levodopa (SINEMET)  MG tablet 2.5@6a, 2@10a, 2@2p and 1@5p 675 tablet 3    Continuous Glucose Sensor (DEXCOM G7 SENSOR) MISC Change every 10 days. 9 each 11    dasiglucagon HCl (ZEGALOGUE) 0.6 MG/0.6ML SOAJ injection Inject 0.6 mg Subcutaneous once as needed (use for severe hypoglycemia) 1.2 mL 1    gabapentin (NEURONTIN) 300 MG capsule Take 2 capsules (600 mg) by mouth 3 times daily 540 capsule 3    insulin aspart (NOVOLOG PEN) 100 UNIT/ML pen Inject 20 Units subcutaneously 3 times daily as needed for high blood sugar (take 3 times/day as needed). 60 mL 2    Insulin Glargine-yfgn 100 UNIT/ML SOPN Inject 14 Units subcutaneously every morning AND 20 Units at bedtime. 50 mL 2    lipase-protease-amylase (ZENPEP) 25418-479770-265037 units CPEP Take 1 capsule by mouth 3 times daily (with meals). 90 capsule 11    losartan (COZAAR) 50 MG tablet Take 1 tablet (50 mg) by mouth daily 90 tablet 3    magnesium oxide (MAG-OX) 400 MG tablet Take 400 mg by mouth every other day      Multiple Vitamin  "(MULTI-VITAMINS) TABS Take 1 tablet by mouth daily      omeprazole (PRILOSEC) 20 MG DR capsule Take 1 capsule (20 mg) by mouth daily 90 capsule 3    sertraline (ZOLOFT) 25 MG tablet Take 1 tablet (25 mg) by mouth daily. 30 tablet 3    traZODone (DESYREL) 50 MG tablet TAKE 0.5-1 TABLETS BY MOUTH AT BEDTIME. 90 tablet 1     No current facility-administered medications for this visit.       Allergies  Allergies   Allergen Reactions    Ciprofloxacin Other (See Comments), Hives, Itching, Rash and Unknown     Other reaction(s): Other (see comments)  Oral swelling per [facility]        Dilaudid [Hydromorphone] Rash    Morphine Rash     rash    Penicillins Rash     aka ancef/ rash      Clindamycin Itching and Rash    Oxycodone Rash     \"HORRIBLE, SEVERE RASH MAYBE CAUSED BY OXY\"         Family History  family history includes Autism Spectrum Disorder in his grandson; Heart Disease in his son; Lung Cancer in his brother; Other - See Comments in his daughter, father, mother, sister, sister, and son; Parkinsonism (age of onset: 65) in his mother; Psychosis in his mother.    Social History  Social History     Tobacco Use    Smoking status: Former     Types: Cigarettes    Smokeless tobacco: Never    Tobacco comments:     Quit 10 years ago - quit a few times; started at age 12 or 13 and then stopped at 26 years and then stopped for 15 years got  and started smoking again   Substance Use Topics    Alcohol use: Not Currently     Comment: quit 2 years ago       Physical Exam  BP (!) 84/52 (BP Location: Left arm)   Pulse 88   Wt 99.7 kg (219 lb 14.4 oz)   SpO2 94%   BMI 33.44 kg/m    Body mass index is 33.44 kg/m .  GENERAL : Frail, no acute distress, hearing aid in place  NEURO: awake, alert, responds appropriately to questions.    Foot exam 02/19/25 no ulcer, normal monofilament test    DATA REVIEW  Labs/Imaging  Lab Results   Component Value Date    A1C 8.4 11/01/2024    A1C 8.2 07/08/2024    A1C 7.2 05/09/2024    A1C " 8.2 11/16/2023    A1C 9.6 06/30/2023        Latest Reference Range & Units 02/15/24 07:36   Glucose 70 - 99 mg/dL 87      Latest Reference Range & Units 02/15/24 07:36   C-Peptide 0.9 - 6.9 ng/mL <0.1 (L)   (L): Data is abnormally low    ASSESSMENT/PLAN:   Arnaldo Henderson is a 69 year old male with CAD s/p CABG, 2nd degree AV block, DM, GERD, HTN, h/o chronic pancreatitis s/p distal pancreatectomy, neuroendocrine tumor and PD who is here for diabetes management.     ## T2DM  ## H/o chronic pancreatitis  ## Partial pancreatectomy (report 60%)  ## CAD s/p CABG  ## Hypoglycemia unawareness  ## H/o Grade 2 hypoglycemia  ## Cannot tolerate metformin  goal A1c 8  CGM Time in target () >50%, and low (<70) <1%  Unable to use GLP1 agonist given h/o pancreatitis  Low C-peptide, hold off SGLT2i  Glycemic control improve and approaching goal  -- continue Semglee 14 unit(s) AM and 20 unit(s) at hs  -- continue Aspart 6-8 unit(s) with breakfast, 8-10 unit(s) with lunch and 10-12 unit(s) with supper  -- and Aspart correction scale as needed before meal  151-200 take additional 2 unit(s)  201-250  4  251-300  6  >300   8  -- Glucagon for emergency  -- switch chips to nuts for snacks  -- CDE for Omnipod discussion      Follow-up 3 month with Catherine Gomez PA-C  Follow up 6 month with me    Orders Placed This Encounter   Procedures    Hemoglobin A1c    Hemoglobin A1c    Adult Diabetes Education  Referral     30 minutes spent on the date of the encounter doing chart review, history and exam, documentation and further activities as noted above.  This time excludes time spent reviewing CGM.          Mj Reynolds MD

## 2025-02-19 NOTE — LETTER
2/19/2025      Arnaldo Henderson  [address] [address]      Dear Colleague,    Thank you for referring your patient, Arnaldo Henderson, to the [facility] [place]. Please see a copy of my visit note below.    Endocrinology Clinic Visit     Arnaldo Henderson is a 69 year old male with CAD s/p CABG, 2nd degree AV block, DM, GERD, HTN, h/o chronic pancreatitis s/p distal pancreatectomy, neuroendocrine tumor and PD who is here for diabetes management. Pt is here with wife.      Interval History  Insurance no longer cover Novolog but cover aspart. Basaglar not cover but Glargine-YFGN.  Basaglar 20 unit(s) PM and 14 unit(s) AM  Novolog 6-8  with breakfast, lunch 8-10, supper 10-12 unit(s)   Eating is fine    Pt eats wrong food eg, chips, crackles  Trying more apples    CGM 2/6-2/19  [address]ve [address] GMI 7.9  CV 34  TIR 47%, very low <1%  Hyperglycemia after meal    Initial visit 1/10/24    Per Dr. Lyn note 11/27/2023  He is 68 years old.  His tumor was found incidentally when he was undergoing a pancreatectomy for chronic pancreatitis.    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  He has small volume mildly progressive disease within the abdomen without any clearly associated symptoms.   For now his rate of growth and very small volume disease does not warrant active intervention. We will plan on a follow-up scan in 3 to 4 months.     Per Inpatient DM team note 7/2023  Arnaldo Henderson 69 y/o with PMH chronic pancreatitis, CAD, CABG, 2nd degree AVB, Parkinson's disease and chronic pain. He is now s/p distal pancreatectomy, splenectomy, YVAN, proximal ileum resection. He is admitted to the SICU for hemodynamic monitoring.  # T2DM not at goal with hyperglycemia.  Poor control prior to admission   # Stress induced hyperglycemia  # post pancreatic surgery  Plan:                  -Lantus 15 units                 -Novolog 1u/10 gm CHO coverage with meals and  snacks                 -Continue insulin drip for now.                 -BG monitoring as per insulin drip protocol                 -hypoglycemia protocol                 -recommend diet with carb counting protocol                 -diabetes education needs will be assessed closer to discharge                 -on discharge, will recommend outpatient follow up with [facility]     Per most recent endocrine clinic visit with my colleague Hayley Alatorre PAC 1/2024   TYPE 2 DIABETES MELLITUS:   Sugars are currently at goal but with excessive hypoglycemia.  Advising him to reduce his Basaglar dose to 14 units in the morning 30 in the evening nearly 10% reduction.  Also advising him to continue his breakfast dose of 4 units but decrease lunch dose to just 7 units and dinner dose to 10 units plus any correction.  Will continue current correction.   5.  Neuroendocrine tumor: See Dr. Gail mcarthur next week    I confirmed with patient and wife and they confirmed that Pat is seeing me today for diabetes.  They would like to have a doctor on the team also with our diabetes team at the .    Prior to admission, he was on metformin 2000 mg/day, no side effect  Pt and wife reported seeing doctors for diabetes once during inpatient.  Report that about 60% of his pancreas was removed  Pt follows neuroendocrine tumor with Dr. Lyn no concerns.    Patient's main concerns today are his diabetes control after steroid injection and his recent lab results.  11/21/2023  Steroid injection, glucose was high   He may need another injection in the future and is concerned.  They are wondering about high BUN, previous abnormal LFT, low hematocrit and abnormal RBC    In terms of symptoms, he said PD is bothersome  But no problem taking insulin  Feels like hypoglycemia improve since lower insulin dose last week    Pt only has [employer] alarm set for urgent low.  Sometimes he could not feel low glucose.  He recently bought glucose  tablets to help with lows    Current regimen  Basaglar 14 units AM 30 units hs  Novolog 4 units with breakfast + sliding scale insulin, lunch 7 + sliding scale insulin, supper 10 + sliding scale insulin, night sliding scale insulin    CGM 1 week data  TIR 49%, low 4%, very low 1%         REVIEW OF SYSTEMS  10 point negative except as mentioned in HPI    Past Medical/Surgical History:  Past Medical History:   Diagnosis Date     Acute pancreatitis 04/18/2022    Formatting of this note might be different from the original. Formatting of this note might be different from the original. Added automatically from request for surgery [id number] Formatting of this note might be different from the original. Added automatically from request for surgery [id number]  Formatting of this note might be different from the original. Added automatically from request for surgery      CAD (coronary artery disease)     CABG     Cholecystitis, acute 07/05/2023     Diabetes mellitus, type 2 (H) 2013     Family history of parkinsonism 02/14/2024     Gastroesophageal reflux disease      Hypercholesteremia      Hypertension      Mixed conductive and sensorineural hearing loss of both ears      Mixed hearing loss      Pancreatic duct stricture      Pancreatitis      Parkinson disease (H)      Parkinson's disease, unspecified whether dyskinesia present, unspecified whether manifestations fluctuate (H) 02/14/2024     Seborrheic dermatitis 03/22/2024     Second degree AV block      Surgical site infection 07/26/2023     Past Surgical History:   Procedure Laterality Date     CA ANESTH CABG W/PUMP       CHOLECYSTECTOMY  2022     COLONOSCOPY N/A 01/12/2023    Procedure: colonoscopy with fluroscopy;  Surgeon: Ayden Fraire MD;  Location:  OR     ENDOSCOPIC RETROGRADE CHOLANGIOPANCREATOGRAM N/A 05/18/2023    Procedure: ENDOSCOPIC RETROGRADE CHOLANGIOPANCREATOGRAPHY, WITH  CYSTDUODENOSTOMY STENTS X2 REMOVAL;  Surgeon: Cedric Saldana MD;  Location:  [facility] OR     ENT SURGERY       EP PACEMAKER DEVICE & LEAD IMPLANT- RIGHT ATRIAL & RIGHT VENTRICULAR N/A 02/28/2024    Procedure: Pacemaker Device & Lead Implant - Right Atrial & Right Ventricular;  Surgeon: Jenna Hernandez MD;  Location:  [facility]     LAPAROSCOPY DIAGNOSTIC (GENERAL) N/A 02/20/2023    Procedure: LAPAROSCOPY, DIAGNOSTIC; RETRIEVAL OF MIGRATED STENT;  Surgeon: Joseluis Lima MD;  Location: [facility] OR     ORTHOPEDIC SURGERY       OTHER SURGICAL HISTORY      skin cancer ? from scrotum     PANCREATECTOMY PEUSTOW N/A 06/30/2023    Procedure: Open distal pancreactectomy with splenectomy, lysis of adhesions, resection of proximal illeum;  Surgeon: Ashvin Haider MD;  Location: [facility] OR     AR CABG, ARTERIAL, TWO  2003       Medications  Current Outpatient Medications   Medication Sig Dispense Refill     aspirin (ASA) 81 MG EC tablet Take 81 mg by mouth daily       atorvastatin (LIPITOR) 40 MG tablet Take 1 tablet (40 mg) by mouth daily 90 tablet 3     BD PEN NEEDLE LINDY 2ND GEN 32G X 4 MM miscellaneous USE TO INJECT INSULIN 4 TIMES A  each 3     carbidopa-levodopa (SINEMET CR)  MG CR tablet Take 1 tablet by mouth at bedtime @9p 90 tablet 3     carbidopa-levodopa (SINEMET)  MG tablet 2.5@6a, 2@10a, 2@2p and 1@5p 675 tablet 3     Continuous Glucose Sensor (DEXCOM G7 SENSOR) MISC Change every 10 days. 9 each 11     dasiglucagon HCl (ZEGALOGUE) 0.6 MG/0.6ML SOAJ injection Inject 0.6 mg Subcutaneous once as needed (use for severe hypoglycemia) 1.2 mL 1     gabapentin (NEURONTIN) 300 MG capsule Take 2 capsules (600 mg) by mouth 3 times daily 540 capsule 3     insulin aspart (NOVOLOG PEN) 100 UNIT/ML pen Inject 20 Units subcutaneously 3 times daily as needed for high blood sugar (take 3 times/day as needed). 60 mL 2     Insulin Glargine-yfgn 100 UNIT/ML SOPN Inject 14 Units subcutaneously every morning AND 20 Units at bedtime. 50 mL 2     lipase-protease-amylase (ZENPEP)  "[id number] units CPEP Take 1 capsule by mouth 3 times daily (with meals). 90 capsule 11     losartan (COZAAR) 50 MG tablet Take 1 tablet (50 mg) by mouth daily 90 tablet 3     magnesium oxide (MAG-OX) 400 MG tablet Take 400 mg by mouth every other day       Multiple Vitamin (MULTI-VITAMINS) TABS Take 1 tablet by mouth daily       omeprazole (PRILOSEC) 20 MG DR capsule Take 1 capsule (20 mg) by mouth daily 90 capsule 3     sertraline (ZOLOFT) 25 MG tablet Take 1 tablet (25 mg) by mouth daily. 30 tablet 3     traZODone (DESYREL) 50 MG tablet TAKE 0.5-1 TABLETS BY MOUTH AT BEDTIME. 90 tablet 1     No current facility-administered medications for this visit.       Allergies  Allergies   Allergen Reactions     Ciprofloxacin Other (See Comments), Hives, Itching, Rash and Unknown     Other reaction(s): Other (see comments)  Oral swelling per [facility]         Dilaudid [Hydromorphone] Rash     Morphine Rash     rash     Penicillins Rash     aka ancef/ rash       Clindamycin Itching and Rash     Oxycodone Rash     \"HORRIBLE, SEVERE RASH MAYBE CAUSED BY OXY\"         Family History  family history includes Autism Spectrum Disorder in his grandson; Heart Disease in his son; Lung Cancer in his brother; Other - See Comments in his daughter, father, mother, sister, sister, and son; Parkinsonism (age of onset: 65) in his mother; Psychosis in his mother.    Social History  Social History     Tobacco Use     Smoking status: Former     Types: Cigarettes     Smokeless tobacco: Never     Tobacco comments:     Quit 10 years ago - quit a few times; started at age 12 or 13 and then stopped at 26 years and then stopped for 15 years got  and started smoking again   Substance Use Topics     Alcohol use: Not Currently     Comment: quit 2 years ago       Physical Exam  BP (!) 84/52 (BP Location: Left arm)   Pulse 88   Wt 99.7 kg (219 lb 14.4 oz)   SpO2 94%   BMI 33.44 kg/m    Body mass index is 33.44 kg/m .  GENERAL : " Frail, no acute distress, hearing aid in place  NEURO: awake, alert, responds appropriately to questions.    Foot exam 02/19/25 no ulcer, normal monofilament test    DATA REVIEW  Labs/Imaging  Lab Results   Component Value Date    A1C 8.4 11/01/2024    A1C 8.2 07/08/2024    A1C 7.2 05/09/2024    A1C 8.2 11/16/2023    A1C 9.6 06/30/2023        Latest Reference Range & Units 02/15/24 07:36   Glucose 70 - 99 mg/dL 87      Latest Reference Range & Units 02/15/24 07:36   C-Peptide 0.9 - 6.9 ng/mL <0.1 (L)   (L): Data is abnormally low    ASSESSMENT/PLAN:   Arnaldo Henderson is a 69 year old male with CAD s/p CABG, 2nd degree AV block, DM, GERD, HTN, h/o chronic pancreatitis s/p distal pancreatectomy, neuroendocrine tumor and PD who is here for diabetes management.     ## T2DM  ## H/o chronic pancreatitis  ## Partial pancreatectomy (report 60%)  ## CAD s/p CABG  ## Hypoglycemia unawareness  ## H/o Grade 2 hypoglycemia  ## Cannot tolerate metformin  goal A1c 8  CGM Time in target () >50%, and low (<70) <1%  Unable to use GLP1 agonist given h/o pancreatitis  Low C-peptide, hold off SGLT2i  Glycemic control improve and approaching goal  -- continue Semglee 14 unit(s) AM and 20 unit(s) at hs  -- continue Aspart 6-8 unit(s) with breakfast, 8-10 unit(s) with lunch and 10-12 unit(s) with supper  -- and Aspart correction scale as needed before meal  151-200 take additional 2 unit(s)  201-250  4  251-300  6  >300   8  -- Glucagon for emergency  -- switch chips to nuts for snacks  -- CDE for Omnipod discussion      Follow-up 3 month with Catherine Gomez PA-C  Follow up 6 month with me    Orders Placed This Encounter   Procedures     Hemoglobin A1c     Hemoglobin A1c     Adult Diabetes Education  Referral     30 minutes spent on the date of the encounter doing chart review, history and exam, documentation and further activities as noted above.  This time excludes time spent reviewing CGM.          Mj Reynolds  MD                  Again, thank you for allowing me to participate in the care of your patient.        Sincerely,        Mj Reynolds MD    Electronically signed

## 2025-02-19 NOTE — PATIENT INSTRUCTIONS
## T2DM  goal A1c 8  CGM Time in target () >50%, and low (<70) <1%  Unable to use GLP1 agonist given h/o pancreatitis  Low C-peptide, hold off SGLT2i  Glycemic control improve and approaching goal  -- continue Semglee 14 unit(s) AM and 20 unit(s) at hs  -- continue Aspart 6-8 unit(s) with breakfast, 8-10 unit(s) with lunch and 10-12 unit(s) with supper  -- and Aspart correction scale as needed before meal  151-200 take additional 2 unit(s)  201-250  4  251-300  6  >300   8  -- Glucagon for emergency  -- switch chips to nuts for snacks  -- CDE for Omnipod discussion      Follow-up 3 month with Catherine Gomez  Follow up 6 month with me

## 2025-02-20 ENCOUNTER — VIRTUAL VISIT (OUTPATIENT)
Dept: PSYCHOLOGY | Facility: CLINIC | Age: 70
End: 2025-02-20
Payer: MEDICARE

## 2025-02-20 DIAGNOSIS — F43.23 ADJUSTMENT DISORDER WITH MIXED ANXIETY AND DEPRESSED MOOD: Primary | ICD-10-CM

## 2025-02-20 DIAGNOSIS — G20.B1 PARKINSON'S DISEASE WITH DYSKINESIA, UNSPECIFIED WHETHER MANIFESTATIONS FLUCTUATE (H): ICD-10-CM

## 2025-02-20 PROCEDURE — 90837 PSYTX W PT 60 MINUTES: CPT | Mod: 95 | Performed by: PSYCHOLOGIST

## 2025-02-23 NOTE — CONFIDENTIAL NOTE
"Health Psychology - Follow up Visit  Confidential Summary*     The author of this note documented a reason for not sharing it with the patient.      REFERRAL SOURCE:  Whitney Renteria, PhD (provider who will be out on medical leave)     Visit conducted via video using [employer].  Patient was at his home and provider at her home.      The patient has been notified of following:   \"This VIDEO visit will be conducted via a call between you and your physician/provider. We have found that certain health care needs can be provided without the need for an in-person physical exam.   Video visits are billed at different rates depending on your insurance coverage.  Please reach out to your insurance provider with any questions. If during the course of the call the physician/provider feels a video visit is not appropriate, you will not be charged for this service.\"     Patient has given verbal consent for Video/PHONE visit? Yes     CHIEF COMPLAINT/REASON FOR VISIT     Subjective:   Patient began with report that he and wife had a nice vacation in [place].  He denied feelings of grief and loss associated with their decision to no longer reside there following his diagnosis with PD and progression of chronic illness with increased need for medical intervention.  He reported that he and wife are planning to visit every 6 months and that they are enjoying ongoing friendships there.  He reported that he misses music in his life and that he is planning to increase attendance to live music events once weather warms.      We discussed his goals for treatment and he reported that he would like to address ongoing anxiety. He described symptoms likely associated with panic attacks.  He also reported ongoing challenges with sleep onset and maintenance.  Discussed sleep hygiene and suggested that he might discontinue long naps during the day and avoid taking his sleeping medication closer to bedtime.  Right now, he reported that he takes all " night time medication at 9 and then proceeds to fall asleep on couch prior to bed time at 1030.  He then wakes up to go to bed and is no longer able to fall asleep.  He reported that he is waking up approximately 3 times per night to void and that he will have difficulty falling back to sleep on half these occasions. Will communicate with BRIGITTE Edward pharmacist.      He reported that anxiety is also associated with his grandson who is diagnosed with autism.  He lives with his daughter in [place] and patient worries about him and wishes that they lived closer.  Discussed his upcoming visit and the possibility on regular zoom sessions.       Objective:  Patient was on time for today s session.  She appeared friendly, alert and oriented.  Mood was euthymic, with appropriate range of affect, including occasional tearfulness. Patient endorsed suicidal ideation and denied suicidal plan or intent.         Assessment:  The patient is coping with multiple medical problems and is in the process of learning to adjust to the changes these pose to his well being.  He may be experiencing episodes of panic attacks.  Will continue to assess.      Plan:  CBT and supportive therapy in context of medical diagnosis and surrounding concerns.  See in one week.      Treatment plan reviewed.     Time In: 10:00  Time Out: 11:00     Diagnosis:  Axis I Adjustment disorder with mixed emotions of anxiety and depression, psychological and behavioral factors associated with medical    Axis II Deferred   Axis III please see medical records for details   [place] IV Psychosocial and Environmental Stressors: Health & family          Samantha Watters, PhD, LP

## 2025-02-24 ENCOUNTER — ALLIED HEALTH/NURSE VISIT (OUTPATIENT)
Dept: FAMILY MEDICINE | Facility: CLINIC | Age: 70
End: 2025-02-24
Payer: MEDICARE

## 2025-02-24 ENCOUNTER — THERAPY VISIT (OUTPATIENT)
Dept: PHYSICAL THERAPY | Facility: REHABILITATION | Age: 70
End: 2025-02-24
Payer: MEDICARE

## 2025-02-24 VITALS — SYSTOLIC BLOOD PRESSURE: 127 MMHG | HEART RATE: 80 BPM | DIASTOLIC BLOOD PRESSURE: 67 MMHG

## 2025-02-24 DIAGNOSIS — M54.6 CHRONIC BILATERAL THORACIC BACK PAIN: Primary | ICD-10-CM

## 2025-02-24 DIAGNOSIS — Z01.30 BLOOD PRESSURE CHECK: Primary | ICD-10-CM

## 2025-02-24 DIAGNOSIS — G89.29 CHRONIC BILATERAL THORACIC BACK PAIN: Primary | ICD-10-CM

## 2025-02-24 DIAGNOSIS — M48.062 SPINAL STENOSIS OF LUMBAR REGION WITH NEUROGENIC CLAUDICATION: ICD-10-CM

## 2025-02-24 DIAGNOSIS — M47.24 OSTEOARTHRITIS OF SPINE WITH RADICULOPATHY, THORACIC REGION: ICD-10-CM

## 2025-02-24 PROCEDURE — 97110 THERAPEUTIC EXERCISES: CPT | Mod: GP

## 2025-02-24 PROCEDURE — 99207 PR NO CHARGE NURSE ONLY: CPT

## 2025-02-24 NOTE — PROGRESS NOTES
DISCHARGE  Reason for Discharge: Patient has met all goals.    Equipment Issued: n/a    Discharge Plan: Patient to continue home program.    Referring Provider:  FABIOLA ARDON      02/24/25 0500   Appointment Info   Signing clinician's name / credentials Ranjana Darby DPT   Total/Authorized Visits [employer]   Visits Used 10   Medical Diagnosis Chronic bilateral thoracic back pain (M54.6, G89.29)  - Primary    Osteoarthritis of spine with radiculopathy, thoracic region (M47.24)    Spinal stenosis of lumbar region with neurogenic claudication (M48.062)   PT Tx Diagnosis Chronic bilateral thoracic back pain (M54.6, G89.29)  - Primary    Osteoarthritis of spine with radiculopathy, thoracic region (M47.24)    Spinal stenosis of lumbar region with neurogenic claudication (M48.062)   Other pertinent information relies on transportation for getting to/from the clinic; difficulty getting into supine position at home; did recently get a new, firmer mattress   Quick Adds Certification   Progress Note/Certification   Start of Care Date 12/09/24   Onset of illness/injury or Date of Surgery 12/06/24   Therapy Frequency 1x/week   Predicted Duration 90 days   Certification date from 12/09/24   Certification date to 03/08/25   Progress Note Due Date 03/08/25   Progress Note Completed Date 12/09/24   GOALS   PT Goals 2;3;4   PT Goal 1   Goal Identifier HEP   Goal Description Pt will be independent with HEP for self-management of symptoms   Rationale to maximize safety and independence within the home   Goal Progress goal met   Target Date 01/06/25   Date Met 01/14/25   PT Goal 2   Goal Identifier lumbar stability   Goal Description Enhance lumbar stability by performing a series of functional movements (e.g., squats, deadlifts) with correct biomechanics in 90% of attempts over the next 6 weeks   Rationale to maximize safety and independence with performance of ADLs and functional tasks   Goal Progress cueing at times still  "required for core engagement however succeeds in >90% of exercises   Target Date 02/28/25   Date Met 02/17/25   PT Goal 3   Goal Identifier flexibility   Goal Description Improve overall flexibility by achieving a 20% increase in range of motion in the lumbar spine and hips within 12 weeks   Rationale to maximize safety and independence with performance of ADLs and functional tasks   Goal Progress goal met   Target Date 03/03/25   Date Met 02/24/25   PT Goal 4   Goal Identifier Standing tolerance   Goal Description Pt will report <5/10 pain at worst and will tolerate standing for >12 minutes in order to return to cooking within a less painful range.   Rationale to maximize safety and independence with performance of ADLs and functional tasks   Goal Progress not quite met though does report he tolerates longer bouts of standing/walking   Target Date 03/25/25   Subjective Report   Subjective Report No pain today. Pt reports the pain level has been better with prolonged standing but it doesn't come on as soon, he's able to sit down before it gets to a 10/10 now, though continues to be at 6-7/10. Has been able to walk his dog for further distances now. Has been having some abdominal pain again as he did before, now that he's been weaning off the Gabapentin. He went to 2 pills less this weekend and \"I could tell there were some issues - but we'll see how that goes.\" GI/bowel movements have all been okay, denies saddle anesthesia. Pt reports his ROM/flexibility has improved \"I was able to cut my toenails today.\"   Objective Measures   Objective Measures Objective Measure 1;Objective Measure 2   Objective Measure 1   Objective Measure Pt requiring rest breaks every 10 min, improvement from previous sessions requiring every 3-5 min. 2/24/25   Objective Measure 2   Objective Measure 5x STS 10 sec today 2/24/25   Details 5x STS 11.5 sec today 1/14/25   Treatment Interventions (PT)   Interventions Therapeutic " "Procedure/Exercise;Manual Therapy;Neuromuscular Re-education   Therapeutic Procedure/Exercise   Therapeutic Procedures: strength, endurance, ROM, flexibility minutes (02576) 35   Therapeutic Procedures Ther Proc 2;Ther Proc 3;Ther Proc 4   Ther Proc 1 NuStep for warm-up and subjective hx taking; ARMS 9.5 SEAT 11   Ther Proc 1 - Details level 6 X 6 min   Ther Proc 2 Palloff press horizontal rotations cues for leading with trunk pt tolerating but reports challenge   Ther Proc 2 - Details NT   Ther Proc 3 Seated HS curl cues for slow and control   Ther Proc 3 - Details reviewed, added to HEP -- pt reports he has a medium resistance band at home   Ther Proc 4 Education   Ther Proc 4 - Details reviewed using lumbar support   PTRx Ther Proc 1 Supine Lumbar Hip Roll   PTRx Ther Proc 1 - Details x20 pt demonstrates appropriately   PTRx Ther Proc 2 Slump Tensioners   PTRx Ther Proc 2 - Details reviewed, pt still feels this one.   PTRx Ther Proc 3 Trunk Rotation Stretch   PTRx Ther Proc 3 - Details reviewed x5 s holds   PTRx Ther Proc 4 Standing Extension   PTRx Ther Proc 4 - Details reviewed x10s holds   PTRx Ther Proc 5 Standing Gastroc Stretch   PTRx Ther Proc 5 - Details X30s each side with wedge, and standing hip flexor stretch x30s hold   PTRx Ther Proc 6 Cone Taps in Single Leg Stance with Chair   PTRx Ther Proc 6 - Details with foot stool (8\") x20 x2   PTRx Ther Proc 7 Bridge with Ball Squeezes   PTRx Ther Proc 7 - Details reviewed, pt has HS cramp, paused for HS stretching ; x15   PTRx Ther Proc 8 Short Arc Knee Extension   PTRx Ther Proc 8 - Details reviewed, did not add to HEP pt reports feels to easy   Skilled Intervention reviewed full HEP and discussed progressions for at home   Patient Response/Progress demonstrates appropriately, very minimal cueing required   Neuromuscular Re-education   Neuromuscular Re-education Neuro Re-ed 2;Neuro Re-ed 3;Neuro Re-ed 4   Skilled Intervention biomechanics   Patient " "Response/Progress tolerating   Manual Therapy   Manual Therapy: Mobilization, MFR, MLD, friction massage minutes (50326) 5   Manual Therapy Manual Therapy 2;Manual Therapy 3   Manual Therapy 1 Gentle long-axis lumbar/hip distraction with mobilization belt; pt in supine   Manual Therapy 1 - Details both sides   Manual Therapy 2 MFR paraspinals, QL, grade I PA mobilizations to L2-3 (\"feels good but hurts a little\" at L3-4)   Manual Therapy 2 - Details NT   Manual Therapy 3 Long-assisted hip flexor stretch   Manual Therapy 3 - Details NT   Skilled Intervention for pain reduction and joint mobility   Patient Response/Progress reports pain relief s/p tx   Education   Learner/Method Patient   Plan   Home program PTRX - just printouts for now   Plan for next session discharged   Comments   Comments Sri has shown good improvements in strength, flexibility, balance, pain levels and activity tolerance. Pat has been tolerating longer bouts of exercise on the NuStep and with HEP. He has been able to increase repetitions and resistance with exercise. He reports he can now walk his dog for longer distances. He has noticed improvements in flexibility with ADLs such as now being able to cut his toenails. He requires only SBA for performing toe taps in the // bars with no LOB. Pt will continue to progress at home with HEP. Pt will return for his Parkinson's [employer] therapy.   Total Session Time   Timed Code Treatment Minutes 40   Total Treatment Time (sum of timed and untimed services) 40     "

## 2025-02-24 NOTE — PROGRESS NOTES
Follow Up Blood Pressure Check    Arnaldo Henderson is a 69 year old male recommended to follow up for blood pressure check by Amelia Johnson. Anihypertensive medications and adherence were verified: Yes.   -losartan 50 mg every day      Reason for visit: Labile BP readings reported at 2/13 OV, check home BP machine against clinic machine. (Home machine reading is the 136/73 shown below)    Medication change at last visit: None    Today's Vitals:   Vitals:    02/24/25 1140 02/24/25 1157 02/24/25 1158   BP: (!) 146/73 136/73 127/67   BP Location: Right arm Left arm Left arm   Patient Position: Chair Chair Chair   Cuff Size: Adult Regular Adult Regular Adult Regular   Pulse: 80 81 80       Home blood pressure readings brought in today:   Home BP reading brought in on a separate sheet of paper, placed in PCP's in box for review.     Lowest blood pressure today is less than 140/90 and they deny signs or symptoms of new onset: severe headache, fatigue, confusion, vision changes, chest pain, pounding in the chest, neck, ears, irregular heartbeat, difficulty breathing, and blood in the urine.  Please inform patient of his/her blood pressure today.  If they are asymptomatic, the patient is to continue current medications.  This message will be routed to their provider, and they will be notified if a change in medication is recommended.      Amisha Simons RN    Current Outpatient Medications   Medication Sig Dispense Refill    aspirin (ASA) 81 MG EC tablet Take 81 mg by mouth daily      atorvastatin (LIPITOR) 40 MG tablet Take 1 tablet (40 mg) by mouth daily 90 tablet 3    BD PEN NEEDLE LINDY 2ND GEN 32G X 4 MM miscellaneous USE TO INJECT INSULIN 4 TIMES A  each 3    carbidopa-levodopa (SINEMET CR)  MG CR tablet Take 1 tablet by mouth at bedtime @9p 90 tablet 3    carbidopa-levodopa (SINEMET)  MG tablet 2.5@6a, 2@10a, 2@2p and 1@5p 675 tablet 3    Continuous Glucose Sensor (DEXCOM G7 SENSOR) MISC  Change every 10 days. 9 each 11    dasiglucagon HCl (ZEGALOGUE) 0.6 MG/0.6ML SOAJ injection Inject 0.6 mg Subcutaneous once as needed (use for severe hypoglycemia) 1.2 mL 1    gabapentin (NEURONTIN) 300 MG capsule Take 2 capsules (600 mg) by mouth 3 times daily 540 capsule 3    insulin aspart (NOVOLOG PEN) 100 UNIT/ML pen Inject 20 Units subcutaneously 3 times daily as needed for high blood sugar (take 3 times/day as needed). 60 mL 2    Insulin Glargine-yfgn 100 UNIT/ML SOPN Inject 14 Units subcutaneously every morning AND 20 Units at bedtime. 50 mL 2    lipase-protease-amylase (ZENPEP) 57001-679587-256854 units CPEP Take 1 capsule by mouth 3 times daily (with meals). 90 capsule 11    losartan (COZAAR) 50 MG tablet Take 1 tablet (50 mg) by mouth daily 90 tablet 3    magnesium oxide (MAG-OX) 400 MG tablet Take 400 mg by mouth every other day      Multiple Vitamin (MULTI-VITAMINS) TABS Take 1 tablet by mouth daily      omeprazole (PRILOSEC) 20 MG DR capsule Take 1 capsule (20 mg) by mouth daily 90 capsule 3    sertraline (ZOLOFT) 25 MG tablet Take 1 tablet (25 mg) by mouth daily. 30 tablet 3    traZODone (DESYREL) 50 MG tablet TAKE 0.5-1 TABLETS BY MOUTH AT BEDTIME. 90 tablet 1

## 2025-02-25 NOTE — PROGRESS NOTES
Blood pressure reasonable in clinic.  Home reported readings again show quite labile readings.  Patient is scheduled to meet with MTM.  Amelia Johnson, DO

## 2025-02-27 ENCOUNTER — ANCILLARY PROCEDURE (OUTPATIENT)
Dept: CARDIOLOGY | Facility: CLINIC | Age: 70
End: 2025-02-27
Attending: INTERNAL MEDICINE
Payer: MEDICARE

## 2025-02-27 ENCOUNTER — OFFICE VISIT (OUTPATIENT)
Dept: CARDIOLOGY | Facility: CLINIC | Age: 70
End: 2025-02-27
Attending: NURSE PRACTITIONER
Payer: MEDICARE

## 2025-02-27 VITALS
SYSTOLIC BLOOD PRESSURE: 150 MMHG | HEART RATE: 76 BPM | WEIGHT: 216.6 LBS | BODY MASS INDEX: 32.93 KG/M2 | DIASTOLIC BLOOD PRESSURE: 70 MMHG | OXYGEN SATURATION: 95 %

## 2025-02-27 DIAGNOSIS — I95.1 ORTHOSTATIC HYPOTENSION: Primary | ICD-10-CM

## 2025-02-27 DIAGNOSIS — G90.9 AUTONOMIC DYSFUNCTION: ICD-10-CM

## 2025-02-27 DIAGNOSIS — I45.5 SINUS PAUSE: ICD-10-CM

## 2025-02-27 DIAGNOSIS — I10 BENIGN ESSENTIAL HYPERTENSION: ICD-10-CM

## 2025-02-27 DIAGNOSIS — G20.B2 PARKINSON'S DISEASE WITH DYSKINESIA AND FLUCTUATING MANIFESTATIONS (H): ICD-10-CM

## 2025-02-27 DIAGNOSIS — I44.1 HEART BLOCK AV SECOND DEGREE: ICD-10-CM

## 2025-02-27 DIAGNOSIS — Z95.0 CARDIAC PACEMAKER IN SITU: ICD-10-CM

## 2025-02-27 LAB
MDC_IDC_LEAD_CONNECTION_STATUS: NORMAL
MDC_IDC_LEAD_CONNECTION_STATUS: NORMAL
MDC_IDC_LEAD_IMPLANT_DT: NORMAL
MDC_IDC_LEAD_IMPLANT_DT: NORMAL
MDC_IDC_LEAD_LOCATION: NORMAL
MDC_IDC_LEAD_LOCATION: NORMAL
MDC_IDC_LEAD_LOCATION_DETAIL_1: NORMAL
MDC_IDC_LEAD_LOCATION_DETAIL_1: NORMAL
MDC_IDC_LEAD_MFG: NORMAL
MDC_IDC_LEAD_MFG: NORMAL
MDC_IDC_LEAD_MODEL: NORMAL
MDC_IDC_LEAD_MODEL: NORMAL
MDC_IDC_LEAD_POLARITY_TYPE: NORMAL
MDC_IDC_LEAD_POLARITY_TYPE: NORMAL
MDC_IDC_LEAD_SERIAL: NORMAL
MDC_IDC_LEAD_SERIAL: NORMAL
MDC_IDC_LEAD_SPECIAL_FUNCTION: NORMAL
MDC_IDC_LEAD_SPECIAL_FUNCTION: NORMAL
MDC_IDC_MSMT_BATTERY_DTM: NORMAL
MDC_IDC_MSMT_BATTERY_REMAINING_LONGEVITY: 138 MO
MDC_IDC_MSMT_BATTERY_RRT_TRIGGER: 2.62
MDC_IDC_MSMT_BATTERY_STATUS: NORMAL
MDC_IDC_MSMT_BATTERY_VOLTAGE: 3.05 V
MDC_IDC_MSMT_LEADCHNL_RA_IMPEDANCE_VALUE: 342 OHM
MDC_IDC_MSMT_LEADCHNL_RA_IMPEDANCE_VALUE: 399 OHM
MDC_IDC_MSMT_LEADCHNL_RA_PACING_THRESHOLD_AMPLITUDE: 0.62 V
MDC_IDC_MSMT_LEADCHNL_RA_PACING_THRESHOLD_PULSEWIDTH: 0.4 MS
MDC_IDC_MSMT_LEADCHNL_RA_SENSING_INTR_AMPL: 3.12 MV
MDC_IDC_MSMT_LEADCHNL_RA_SENSING_INTR_AMPL: 3.75 MV
MDC_IDC_MSMT_LEADCHNL_RV_IMPEDANCE_VALUE: 418 OHM
MDC_IDC_MSMT_LEADCHNL_RV_IMPEDANCE_VALUE: 475 OHM
MDC_IDC_MSMT_LEADCHNL_RV_PACING_THRESHOLD_AMPLITUDE: 0.75 V
MDC_IDC_MSMT_LEADCHNL_RV_PACING_THRESHOLD_PULSEWIDTH: 0.4 MS
MDC_IDC_MSMT_LEADCHNL_RV_SENSING_INTR_AMPL: 18.62 MV
MDC_IDC_MSMT_LEADCHNL_RV_SENSING_INTR_AMPL: 22.75 MV
MDC_IDC_PG_IMPLANT_DTM: NORMAL
MDC_IDC_PG_MFG: NORMAL
MDC_IDC_PG_MODEL: NORMAL
MDC_IDC_PG_SERIAL: NORMAL
MDC_IDC_PG_TYPE: NORMAL
MDC_IDC_SESS_CLINIC_NAME: NORMAL
MDC_IDC_SESS_DTM: NORMAL
MDC_IDC_SESS_TYPE: NORMAL
MDC_IDC_SET_BRADY_AT_MODE_SWITCH_RATE: 171 {BEATS}/MIN
MDC_IDC_SET_BRADY_HYSTRATE: NORMAL
MDC_IDC_SET_BRADY_LOWRATE: 60 {BEATS}/MIN
MDC_IDC_SET_BRADY_MAX_SENSOR_RATE: 130 {BEATS}/MIN
MDC_IDC_SET_BRADY_MAX_TRACKING_RATE: 130 {BEATS}/MIN
MDC_IDC_SET_BRADY_MODE: NORMAL
MDC_IDC_SET_BRADY_PAV_DELAY_HIGH: 140 MS
MDC_IDC_SET_BRADY_PAV_DELAY_LOW: 240 MS
MDC_IDC_SET_BRADY_SAV_DELAY_HIGH: 110 MS
MDC_IDC_SET_BRADY_SAV_DELAY_LOW: 220 MS
MDC_IDC_SET_LEADCHNL_RA_PACING_AMPLITUDE: 1.5 V
MDC_IDC_SET_LEADCHNL_RA_PACING_ANODE_ELECTRODE_1: NORMAL
MDC_IDC_SET_LEADCHNL_RA_PACING_ANODE_LOCATION_1: NORMAL
MDC_IDC_SET_LEADCHNL_RA_PACING_CAPTURE_MODE: NORMAL
MDC_IDC_SET_LEADCHNL_RA_PACING_CATHODE_ELECTRODE_1: NORMAL
MDC_IDC_SET_LEADCHNL_RA_PACING_CATHODE_LOCATION_1: NORMAL
MDC_IDC_SET_LEADCHNL_RA_PACING_POLARITY: NORMAL
MDC_IDC_SET_LEADCHNL_RA_PACING_PULSEWIDTH: 0.4 MS
MDC_IDC_SET_LEADCHNL_RA_SENSING_ANODE_ELECTRODE_1: NORMAL
MDC_IDC_SET_LEADCHNL_RA_SENSING_ANODE_LOCATION_1: NORMAL
MDC_IDC_SET_LEADCHNL_RA_SENSING_CATHODE_ELECTRODE_1: NORMAL
MDC_IDC_SET_LEADCHNL_RA_SENSING_CATHODE_LOCATION_1: NORMAL
MDC_IDC_SET_LEADCHNL_RA_SENSING_POLARITY: NORMAL
MDC_IDC_SET_LEADCHNL_RA_SENSING_SENSITIVITY: 0.3 MV
MDC_IDC_SET_LEADCHNL_RV_PACING_AMPLITUDE: 2 V
MDC_IDC_SET_LEADCHNL_RV_PACING_ANODE_ELECTRODE_1: NORMAL
MDC_IDC_SET_LEADCHNL_RV_PACING_ANODE_LOCATION_1: NORMAL
MDC_IDC_SET_LEADCHNL_RV_PACING_CAPTURE_MODE: NORMAL
MDC_IDC_SET_LEADCHNL_RV_PACING_CATHODE_ELECTRODE_1: NORMAL
MDC_IDC_SET_LEADCHNL_RV_PACING_CATHODE_LOCATION_1: NORMAL
MDC_IDC_SET_LEADCHNL_RV_PACING_POLARITY: NORMAL
MDC_IDC_SET_LEADCHNL_RV_PACING_PULSEWIDTH: 0.4 MS
MDC_IDC_SET_LEADCHNL_RV_SENSING_ANODE_ELECTRODE_1: NORMAL
MDC_IDC_SET_LEADCHNL_RV_SENSING_ANODE_LOCATION_1: NORMAL
MDC_IDC_SET_LEADCHNL_RV_SENSING_CATHODE_ELECTRODE_1: NORMAL
MDC_IDC_SET_LEADCHNL_RV_SENSING_CATHODE_LOCATION_1: NORMAL
MDC_IDC_SET_LEADCHNL_RV_SENSING_POLARITY: NORMAL
MDC_IDC_SET_LEADCHNL_RV_SENSING_SENSITIVITY: 0.9 MV
MDC_IDC_SET_ZONE_DETECTION_INTERVAL: 350 MS
MDC_IDC_SET_ZONE_DETECTION_INTERVAL: 400 MS
MDC_IDC_SET_ZONE_STATUS: NORMAL
MDC_IDC_SET_ZONE_STATUS: NORMAL
MDC_IDC_SET_ZONE_TYPE: NORMAL
MDC_IDC_SET_ZONE_VENDOR_TYPE: NORMAL
MDC_IDC_STAT_AT_BURDEN_PERCENT: 0 %
MDC_IDC_STAT_AT_DTM_END: NORMAL
MDC_IDC_STAT_AT_DTM_START: NORMAL
MDC_IDC_STAT_BRADY_AP_VP_PERCENT: 10.48 %
MDC_IDC_STAT_BRADY_AP_VS_PERCENT: 0 %
MDC_IDC_STAT_BRADY_AS_VP_PERCENT: 89.44 %
MDC_IDC_STAT_BRADY_AS_VS_PERCENT: 0.07 %
MDC_IDC_STAT_BRADY_DTM_END: NORMAL
MDC_IDC_STAT_BRADY_DTM_START: NORMAL
MDC_IDC_STAT_BRADY_RA_PERCENT_PACED: 10.48 %
MDC_IDC_STAT_BRADY_RV_PERCENT_PACED: 99.93 %
MDC_IDC_STAT_EPISODE_RECENT_COUNT: 0
MDC_IDC_STAT_EPISODE_RECENT_COUNT_DTM_END: NORMAL
MDC_IDC_STAT_EPISODE_RECENT_COUNT_DTM_START: NORMAL
MDC_IDC_STAT_EPISODE_TOTAL_COUNT: 0
MDC_IDC_STAT_EPISODE_TOTAL_COUNT_DTM_END: NORMAL
MDC_IDC_STAT_EPISODE_TOTAL_COUNT_DTM_START: NORMAL
MDC_IDC_STAT_EPISODE_TYPE: NORMAL
MDC_IDC_STAT_TACHYTHERAPY_RECENT_DTM_END: NORMAL
MDC_IDC_STAT_TACHYTHERAPY_RECENT_DTM_START: NORMAL
MDC_IDC_STAT_TACHYTHERAPY_TOTAL_DTM_END: NORMAL
MDC_IDC_STAT_TACHYTHERAPY_TOTAL_DTM_START: NORMAL

## 2025-02-27 PROCEDURE — G0463 HOSPITAL OUTPT CLINIC VISIT: HCPCS | Performed by: NURSE PRACTITIONER

## 2025-02-27 PROCEDURE — 93280 PM DEVICE PROGR EVAL DUAL: CPT | Performed by: INTERNAL MEDICINE

## 2025-02-27 ASSESSMENT — PAIN SCALES - GENERAL: PAINLEVEL_OUTOF10: NO PAIN (0)

## 2025-02-27 NOTE — PROGRESS NOTES
ELECTROPHYSIOLOGY CLINIC VISIT    Assessment/Recommendations   Assessment/Plan:    Arnaldo Henderson is a 69 year old male with past medical history significant for chronic pancreatitis s/p distal pancreatectomy, type II DM, CAD s/p CABGx2 (2003), parkinson's disease and neuroendocrine tumor.     Presents to establish care with new cardiology provider and to discuss new/progressive BRIDGES. He has gained ~ 20 lb in last 6 months and admits to being less active 2/2 to parkinsons and weight gain. We dicussed that his BRIDGES may be due to weight gain/deconditioning, but given hx of CAD as well as high V pacing, we will proceed with CTA, echo, and update labs. We discussed referral to sleep medicine for suspected sleep apnea, however, Mr. Henderson declined and would prefer to proceed with other testing first.     Paroxysmal complete AV block s/p PPM  Underwent dual chamber PPM 2/28/24. Device check today with normal device function, stable lead trends, AP 6%,  99.9%, intrinsic rhythm 2:1 AVB. Previously reprogrammed to DDDR d/t high .    - Echo with preserved EF, despite high   - Continue routine device follow up   - annual echo    CAD s/p CABGx2 2003   Stable. BP well controlled. LDL at goal. Continue ASA 81 mg daily and lipitor 40 mg daily.  LDL 23.   CTA coronary angiogram 1/2025 showed widely patent LIMA-LAD graft, 100% occluded SVG-DIAG graft, moderate native LCx stenosis. Mr. Henderson underwent CABG in 2003. Given that grafts are > 20 yrs old and he has preserved EF, flat troponins by ED visit on 1/20/25, do not recommend coronary angiogram.     Parkinson's Disease  Autonomic Dysfunction   Labile BPs with suspicion of autonomic dysfunction related to Parkinson's. He has episodes of brain fog, stiffness, limb heaviness that can be associated with shortness of breath and feeling tense. Work up to date has been reassuring including MRI brain from Feb 2024 for these symptoms and recent laboratory and cardiac work up.  From his and wife's description, anxiety seems to be at least a perpetuating if not initiating factor. Since blood pressure has been normal during these episodes and they tend to occur when he is sitting, we are less suspicious of orthostatic hypotension contributing. He follows with Dr. Armenta with neurology who referred him to Dr. Sen, an autonomic specialist, who he will see in the spring.     In an effort to help manage labile BPs and orthostatic episodes we discussed both nonpharmacologic and pharmacologic changes we could make to Mr. Henderson's regimen.   - Recommend adequate hydration, aiming for minimally 60 oz water daily and increased salt/salty snack with low BP  - Compression stockings- prescription provided   - Plan to monitor BP BID (7am/7pm, and PRN when feeling symptomatic) for 2 weeks and then send log along with record of symptoms via [facility].   - Pending BP check, will determine medication changes including but not limited to dose/frequency adjustment of losartan (consider losartan 25 mg BID), and possibly the addition of midodrine 2.5 mg 2-3 x daily PRN for SBP < 100.       Follow up with cardiology SH[facility]A in 3 months   Follow up with EP in 1 year with an echo prior.      History of Present Illness/Subjective    Mr. Arnaldo Henderson is a 69 year old male who comes in today for EP follow-up s/p PPM.    Patient also has history of AV Wenckebach noted during ERCP in May 2023. He was seen by EP at this time, he was asymptomatic, no pacemaker was indicated. Patient saw Dr Dahl in follow up on 1/16/24, ziopatch ordered due to prolonged OK interval and history of AV Wenckebach. Patient was then admitted to [facility] from 2/4-2/7/2024 for weakness and fogginess, found to have orthostatic hypotension which he has had issues with from autonomic dysfunction due to Parkinson's. Cardiology was consulted for AV Wenckebach on telemetry with no symptoms or changes in BP, no pacemaker was recommended.  "Follow up zio monitor was worn from 1/16-1/30/2024, average HR 79 bpm, frequent episodes of AV Wenckebach. He had one 10.5 second occurrence of AVB with no escape on 1/24/24 at 4:42 pm and 3.4 seconds on 1/27/24 at 1:50 am. Symptom activations were associated to sinus with and without AVB. Dr Hernandez discussed results and PPM implant with patient and his wife. They were agreeable to proceeding, he underwent dual chamber PPM 2/28/24.    EP Visit 12/5/2024: Mr. Henderson reports progressive BRIDGES over the last 6 months. He has also gained ~ 20 lbs in this time. He admits to being less active, which he thinks is in part due to his Parkinsons, as well as chronic lower back pain. He has 15 steps in his home and is still able to do stairs easily and without SOB. He finds that he has to walk slower and if walking longer distances he will have to take several breaks, due to both BRIDGES and back pain. Denies any chest pain or pressure. No palpitations. Denies feeling lightheaded, dizzy, no syncope or near syncope. Does have LE edema, but says he's always had this. Denies orthopnea or PND. He recently had an MRI and says that following the test the tech asked him if he has sleep apnea. He has never been evaluated. Wife endorses \"funny\" and loud breathing at night.     Today, Mr. Henderson reports feeling okay. At present he is feeling a little foggy. He thinks this symptom might be due to the combination of his zoloft and cardipoa-levodopa he takes in the morning, though he does not feel like this every morning. Still having other episodes of brain fog, stiffness, limb heaviness that can be associated with shortness of breath and feeling tense. Work up to date has been reassuring including MRI brain from Feb 2024 for these symptoms and recent laboratory and cardiac work up. From his and wife's description, anxiety seems to be at least a perpetuating if not initiating factor. Since blood pressure has been normal during these episodes and " they tend to occur when he is sitting, we are less suspicious of orthostatic hypotension contributing.     Sri recently had his home BP cuff validated in his PCP's office. He monitors his BP once in the morning, says on average it seems to be running high, ~ 150s/70s. Also aware that he will have low readings with SBPs 80s-90s. Notes that he does not feel particularly bad with low readings.     He underwent coronary CTA in January 2025 which showed widely patent LIMA-LAD graft, 100% occluded SVG-DIAG graft, moderate native LCx stenosis. Mr. Henderson underwent CABG in 2003. Given that grafts are > 20 yrs old and he has preserved EF and recent troponin flat, did not recommend coronary angiogram.      Device interrogation shows normal device function, stable lead trends, AP 10%,  99.9%.     Cardiac Medications: ASA 81 mg daily, atorvastatin 40 mg daily, losartan 50 mg daily     I have reviewed and updated the patient's Past Medical History, Social History, Family History and Medication List.     Cardiographics (Personally Reviewed) :   Echo 2/05/2024   Interpretation Summary  1.Left ventricular size, wall motion and function are normal. The ejection  fraction is > 65%. LV might suggest dehydration.  2.Mildly decreased right ventricular systolic function  3.No hemodynamically significant valvular abnormalities on 2D or color flow  imaging.  There is no comparison study available.    Echo 12/5/2024  Interpretation Summary  Left ventricular size, wall motion and function are normal. The ejection  fraction is 60-65%. Abnormal septal motion consistent with left bundle branch  block is present.  Right ventricular function, chamber size, wall motion, and thickness are  normal.  Mild mitral insufficiency is present.  The inferior vena cava was normal in size with preserved respiratory  variability. No pericardial effusion is present.     There is no prior study for direct comparison.    PET (November 2020 [facility])   No ischemia,  "no scar. Preserved LVEF 65%.     Coronary CTA 1/29/2025  IMPRESSION:  1.  Known severe native LAD coronary disease status post 2-vessel CABG  (LIMA-LAD SVG-DIAG.)  2.  Widely patent LIMA-LAD graft.   3.  100% occluded SVG-DIAG graft.  4.  Moderate native LCx stenosis.  5.  Please review the separate Radiology report for incidental  noncardiac findings.       Physical Examination     Wt Readings from Last 3 Encounters:   02/19/25 99.7 kg (219 lb 14.4 oz)   02/13/25 98.9 kg (218 lb)   01/28/25 91 kg (200 lb 11.2 oz)     BP (!) 150/70   Pulse 76   Wt 98.2 kg (216 lb 9.6 oz)   SpO2 95%   BMI 32.93 kg/m     General Appearance:   Alert, well-appearing and in no acute distress.   HEENT: Atraumatic, normocephalic. MMM.   Chest/Lungs:   Respirations unlabored.  Lungs are clear to auscultation.   Cardiovascular:   Regular rate and rhythm.  S1/S2. No murmur.    Abdomen:  Soft, nontender, nondistended.   Extremities: No cyanosis or clubbing. Trace BLE edema, lipedema    Musculoskeletal: Moves all extremities.     Skin: Warm, dry, intact.    Neurologic: Mood and affect are appropriate.  Alert and oriented to person, place, time, and situation.          Medications  Allergies   ASA 81 mg daily   Lipitor 40 mg daily   Losartan 50 mg daily     Vitamins   Prilosec   Sinemet   Gabapentin   Insulin    Allergies   Allergen Reactions    Ciprofloxacin Other (See Comments), Hives, Itching, Rash and Unknown     Other reaction(s): Other (see comments)  Oral swelling per [facility]        Dilaudid [Hydromorphone] Rash    Morphine Rash     rash    Penicillins Rash     aka ancef/ rash      Clindamycin Itching and Rash    Oxycodone Rash     \"HORRIBLE, SEVERE RASH MAYBE CAUSED BY OXY\"         Lab Results (Personally Reviewed)    Chemistry/lipid CBC Cardiac Enzymes/BNP/TSH/INR   Lab Results   Component Value Date    BUN 21.8 01/20/2025     01/20/2025    CO2 26 01/20/2025     Creatinine   Date Value Ref Range Status   01/20/2025 0.98 " 0.67 - 1.17 mg/dL Final       Lab Results   Component Value Date    CHOL 76 12/05/2024    HDL 39 (L) 12/05/2024    LDL 23 12/05/2024      Lab Results   Component Value Date    WBC 9.1 01/20/2025    HGB 13.6 01/20/2025    HCT 41.5 01/20/2025     01/20/2025     01/20/2025    Lab Results   Component Value Date    TSH 2.44 01/20/2025    INR 1.24 (H) 07/25/2023        The patient states understanding and is agreeable with the plan.     Rafaela Cueva DNP, NP-C  Cardiac Electrophysiology   2/27/2025      Total time spent on patient visit, reviewing notes, imaging, labs, orders, and completing necessary documentation: 45 minutes.

## 2025-02-27 NOTE — NURSING NOTE
Chief Complaint   Patient presents with    Follow Up     RETURN EP     Vitals were taken and medications reconciled.    Ricardo Benoit, EMT  11:35 AM

## 2025-02-27 NOTE — LETTER
2/27/2025      RE: Arnaldo Henderson  [address] [facility] [address]       Dear Colleague,    Thank you for the opportunity to participate in the care of your patient, Arnaldo Henderson, at the [facility] [place] at [facility]. Please see a copy of my visit note below.        ELECTROPHYSIOLOGY CLINIC VISIT    Assessment/Recommendations   Assessment/Plan:    Arnaldo Henderson is a 69 year old male with past medical history significant for chronic pancreatitis s/p distal pancreatectomy, type II DM, CAD s/p CABGx2 (2003), parkinson's disease and neuroendocrine tumor.     Presents to establish care with new cardiology provider and to discuss new/progressive BRIDGES. He has gained ~ 20 lb in last 6 months and admits to being less active 2/2 to parkinsons and weight gain. We dicussed that his BRIDGES may be due to weight gain/deconditioning, but given hx of CAD as well as high V pacing, we will proceed with CTA, echo, and update labs. We discussed referral to sleep medicine for suspected sleep apnea, however, Mr. Henderson declined and would prefer to proceed with other testing first.     Paroxysmal complete AV block s/p PPM  Underwent dual chamber PPM 2/28/24. Device check today with normal device function, stable lead trends, AP 6%,  99.9%, intrinsic rhythm 2:1 AVB. Previously reprogrammed to DDDR d/t high .    - Echo with preserved EF, despite high   - Continue routine device follow up   - annual echo    CAD s/p CABGx2 2003   Stable. BP well controlled. LDL at goal. Continue ASA 81 mg daily and lipitor 40 mg daily.  LDL 23.   CTA coronary angiogram 1/2025 showed widely patent LIMA-LAD graft, 100% occluded SVG-DIAG graft, moderate native LCx stenosis. Mr. Henderson underwent CABG in 2003. Given that grafts are > 20 yrs old and he has preserved EF, flat troponins by ED visit on 1/20/25, do not recommend coronary angiogram.     Parkinson's Disease  Autonomic  Dysfunction   Labile BPs with suspicion of autonomic dysfunction related to Parkinson's. He has episodes of brain fog, stiffness, limb heaviness that can be associated with shortness of breath and feeling tense. Work up to date has been reassuring including MRI brain from Feb 2024 for these symptoms and recent laboratory and cardiac work up. From his and wife's description, anxiety seems to be at least a perpetuating if not initiating factor. Since blood pressure has been normal during these episodes and they tend to occur when he is sitting, we are less suspicious of orthostatic hypotension contributing. He follows with Dr. Armenta with neurology who referred him to Dr. Sen, an autonomic specialist, who he will see in the spring.     In an effort to help manage labile BPs and orthostatic episodes we discussed both nonpharmacologic and pharmacologic changes we could make to Mr. Henderson's regimen.   - Recommend adequate hydration, aiming for minimally 60 oz water daily and increased salt/salty snack with low BP  - Compression stockings- prescription provided   - Plan to monitor BP BID (7am/7pm, and PRN when feeling symptomatic) for 2 weeks and then send log along with record of symptoms via [employer].   - Pending BP check, will determine medication changes including but not limited to dose/frequency adjustment of losartan (consider losartan 25 mg BID), and possibly the addition of midodrine 2.5 mg 2-3 x daily PRN for SBP < 100.       Follow up with cardiology SHALA in 3 months   Follow up with EP in 1 year with an echo prior.      History of Present Illness/Subjective    Mr. Arnaldo Henderson is a 69 year old male who comes in today for EP follow-up s/p PPM.    Patient also has history of AV Wenckebach noted during ERCP in May 2023. He was seen by EP at this time, he was asymptomatic, no pacemaker was indicated. Patient saw Dr Dahl in follow up on 1/16/24, ziopatch ordered due to prolonged OK interval and history of  "AV Wenckebach. Patient was then admitted to  from 2/4-2/7/2024 for weakness and fogginess, found to have orthostatic hypotension which he has had issues with from autonomic dysfunction due to Parkinson's. Cardiology was consulted for AV Wenckebach on telemetry with no symptoms or changes in BP, no pacemaker was recommended. Follow up zio monitor was worn from 1/16-1/30/2024, average HR 79 bpm, frequent episodes of AV Wenckebach. He had one 10.5 second occurrence of AVB with no escape on 1/24/24 at 4:42 pm and 3.4 seconds on 1/27/24 at 1:50 am. Symptom activations were associated to sinus with and without AVB. Dr Hernandez discussed results and PPM implant with patient and his wife. They were agreeable to proceeding, he underwent dual chamber PPM 2/28/24.    EP Visit 12/5/2024: Mr. Henderson reports progressive BRIDGES over the last 6 months. He has also gained ~ 20 lbs in this time. He admits to being less active, which he thinks is in part due to his Parkinsons, as well as chronic lower back pain. He has 15 steps in his home and is still able to do stairs easily and without SOB. He finds that he has to walk slower and if walking longer distances he will have to take several breaks, due to both BRIDGES and back pain. Denies any chest pain or pressure. No palpitations. Denies feeling lightheaded, dizzy, no syncope or near syncope. Does have LE edema, but says he's always had this. Denies orthopnea or PND. He recently had an MRI and says that following the test the tech asked him if he has sleep apnea. He has never been evaluated. Wife endorses \"funny\" and loud breathing at night.     Today, Mr. Henderson reports feeling okay. At present he is feeling a little foggy. He thinks this symptom might be due to the combination of his zoloft and cardipoa-levodopa he takes in the morning, though he does not feel like this every morning. Still having other episodes of brain fog, stiffness, limb heaviness that can be associated " with shortness of breath and feeling tense. Work up to date has been reassuring including MRI brain from Feb 2024 for these symptoms and recent laboratory and cardiac work up. From his and wife's description, anxiety seems to be at least a perpetuating if not initiating factor. Since blood pressure has been normal during these episodes and they tend to occur when he is sitting, we are less suspicious of orthostatic hypotension contributing.     Sri recently had his home BP cuff validated in his PCP's office. He monitors his BP once in the morning, says on average it seems to be running high, ~ 150s/70s. Also aware that he will have low readings with SBPs 80s-90s. Notes that he does not feel particularly bad with low readings.     He underwent coronary CTA in January 2025 which showed widely patent LIMA-LAD graft, 100% occluded SVG-DIAG graft, moderate native LCx stenosis. Mr. Henderson underwent CABG in 2003. Given that grafts are > 20 yrs old and he has preserved EF and recent troponin flat, did not recommend coronary angiogram.      Device interrogation shows normal device function, stable lead trends, AP 10%,  99.9%.     Cardiac Medications: ASA 81 mg daily, atorvastatin 40 mg daily, losartan 50 mg daily     I have reviewed and updated the patient's Past Medical History, Social History, Family History and Medication List.     Cardiographics (Personally Reviewed) :   Echo 2/05/2024   Interpretation Summary  1.Left ventricular size, wall motion and function are normal. The ejection  fraction is > 65%. LV might suggest dehydration.  2.Mildly decreased right ventricular systolic function  3.No hemodynamically significant valvular abnormalities on 2D or color flow  imaging.  There is no comparison study available.    Echo 12/5/2024  Interpretation Summary  Left ventricular size, wall motion and function are normal. The ejection  fraction is 60-65%. Abnormal septal motion consistent with left bundle branch  block is  present.  Right ventricular function, chamber size, wall motion, and thickness are  normal.  Mild mitral insufficiency is present.  The inferior vena cava was normal in size with preserved respiratory  variability. No pericardial effusion is present.     There is no prior study for direct comparison.    PET (November 2020 O[facility])   No ischemia, no scar. Preserved LVEF 65%.     Coronary CTA 1/29/2025  IMPRESSION:  1.  Known severe native LAD coronary disease status post 2-vessel CABG  (LIMA-LAD SVG-DIAG.)  2.  Widely patent LIMA-LAD graft.   3.  100% occluded SVG-DIAG graft.  4.  Moderate native LCx stenosis.  5.  Please review the separate Radiology report for incidental  noncardiac findings.       Physical Examination     Wt Readings from Last 3 Encounters:   02/19/25 99.7 kg (219 lb 14.4 oz)   02/13/25 98.9 kg (218 lb)   01/28/25 91 kg (200 lb 11.2 oz)     BP (!) 150/70   Pulse 76   Wt 98.2 kg (216 lb 9.6 oz)   SpO2 95%   BMI 32.93 kg/m     General Appearance:   Alert, well-appearing and in no acute distress.   HEENT: Atraumatic, normocephalic. MMM.   Chest/Lungs:   Respirations unlabored.  Lungs are clear to auscultation.   Cardiovascular:   Regular rate and rhythm.  S1/S2. No murmur.    Abdomen:  Soft, nontender, nondistended.   Extremities: No cyanosis or clubbing. Trace BLE edema, lipedema    Musculoskeletal: Moves all extremities.     Skin: Warm, dry, intact.    Neurologic: Mood and affect are appropriate.  Alert and oriented to person, place, time, and situation.          Medications  Allergies   ASA 81 mg daily   Lipitor 40 mg daily   Losartan 50 mg daily     Vitamins   Prilosec   Sinemet   Gabapentin   Insulin    Allergies   Allergen Reactions     Ciprofloxacin Other (See Comments), Hives, Itching, Rash and Unknown     Other reaction(s): Other (see comments)  Oral swelling per [facility]         Dilaudid [Hydromorphone] Rash     Morphine Rash     rash     Penicillins Rash     aka ancef/ rash        "Clindamycin Itching and Rash     Oxycodone Rash     \"HORRIBLE, SEVERE RASH MAYBE CAUSED BY OXY\"         Lab Results (Personally Reviewed)    Chemistry/lipid CBC Cardiac Enzymes/BNP/TSH/INR   Lab Results   Component Value Date    BUN 21.8 01/20/2025     01/20/2025    CO2 26 01/20/2025     Creatinine   Date Value Ref Range Status   01/20/2025 0.98 0.67 - 1.17 mg/dL Final       Lab Results   Component Value Date    CHOL 76 12/05/2024    HDL 39 (L) 12/05/2024    LDL 23 12/05/2024      Lab Results   Component Value Date    WBC 9.1 01/20/2025    HGB 13.6 01/20/2025    HCT 41.5 01/20/2025     01/20/2025     01/20/2025    Lab Results   Component Value Date    TSH 2.44 01/20/2025    INR 1.24 (H) 07/25/2023        The patient states understanding and is agreeable with the plan.     Rafaela Cueva DNP, NP-C  Cardiac Electrophysiology   2/27/2025      Total time spent on patient visit, reviewing notes, imaging, labs, orders, and completing necessary documentation: 45 minutes.             Please do not hesitate to contact me if you have any questions/concerns.     Sincerely,     KRYSTYNA Whitten CNP  "

## 2025-02-27 NOTE — PATIENT INSTRUCTIONS
You were seen in the Electrophysiology Clinic today by: Rafaela Cueva NP    Plan:   Medication Changes:   No medication changes today  Depending on your BP log, we will determine medication changes including a possible reduction in losartan and or addition of midodrine.     Follow up Visit:   EP visit annually  General cardiology in 3 months     Device Follow up: routine device checks every 3 months    Further Instructions:   Be sure to hydrate and drink enough water/fluids, minimally 60 oz/day   Wear compression socks- prescription provided   If BP is low, eat a salty snack   Please monitor blood pressures twice daily, 7AM and 7PM and as needed if not feeling well. Please keep a log of your readings, along with a brief description of how you're feeling with blood pressure readings. Please send me your log of readings in 2 weeks.       If you have further questions, please utilize [employer] to contact us.     Your Care Team:    EP Cardiology   Telephone Number     Nurse Line  KAI Coleman, KAI Dillard RN   [phone]     For scheduling appointments:   Jorge   [phone]   For procedure scheduling:    Yolis Ferris     [phone]   For the Device Clinic (Pacemakers, ICDs, Loop Recorders)    During business hours: [phone]  After business hours:   [phone]- select option 4 and ask for job code [id number].       On-call cardiologist for after hours or on weekends:   [phone], option #4, and ask to speak to the on-call cardiologist.     Cardiovascular Clinic:   [address] [place] [address]. [place] [address]      As always, Thank you for trusting us with your health care needs!

## 2025-03-03 ENCOUNTER — THERAPY VISIT (OUTPATIENT)
Dept: PHYSICAL THERAPY | Facility: REHABILITATION | Age: 70
End: 2025-03-03
Payer: MEDICARE

## 2025-03-03 DIAGNOSIS — G20.B1 PARKINSON'S DISEASE WITH DYSKINESIA WITHOUT FLUCTUATING MANIFESTATIONS (H): Primary | ICD-10-CM

## 2025-03-03 NOTE — PROGRESS NOTES
"PHYSICAL THERAPY EVALUATION  Type of Visit: Evaluation    Subjective   Diagnosis: Parkinson's Disease   Date of diagnosis: ~2018  Patient's Neurologist: Dr. Armenta Date of last Neurologist visit: 01/30/2025   First movement disorder symptom presentation and Date: Freezing episodes every once in a while (~2017)   Non-motor symptoms: Loss of smell, Visual impairments, Pain, Early mild cognitive impairment, Dementia, Depression, Anxiety, Orthostatic hypotension, Urinary symptoms, Constipation, Sleep disturbances, Fatigue and Restless Leg Syndrome.   Time of last PD med: 10:10am Time of next PD med: 2:00p. On/Off presentation/symptoms: yes, feels weak. \"I just feel off\"  History of PD specific PT? Yes, last bout 07/24-10/24  Current Activity Level: walks the dog, does some PD and back exercises from PT 1x/day, has a seated pedal bike randomly throughout the day 5 mins at a time. (2-5x/day)  Chief Mobility Complaint: in between times with medication, \"stutter stepping.\"   Functional Limitations: Pt reports difficulty or slowness with the following:  Dressing: Putting on pants, socks, or shoes  Handwriting/Typing: Slowed or difficulty with typing or texting  Carrying/Retrieving/Manipulating Items: Carrying items while walking (examples: shopping bag, laundry basket), Reaching or fastening your seatbelt  Walking: Slowed or shuffling walking, Difficulty starting to walk or feeling like your feet are \"stuck\" to the ground, Difficulty stepping backwards, Difficulty turning, Difficulty walking through doorways or stepping over obstacles \"happens every once in a while\"  Freezing: yes, a couple of times        Presenting condition or subjective complaint:    Date of onset: 03/03/25    Relevant medical history:     Dates & types of surgery:      Prior diagnostic imaging/testing results:       Prior therapy history for the same diagnosis, illness or injury:        Prior Level of Function  Transfers: Independent  Ambulation: " Independent    Living Environment  Social support:     Type of home:     Stairs to enter the home:         Ramp:     Stairs inside the home:         Help at home:    Equipment owned:       Employment:      Hobbies/Interests:      Patient goals for therapy:      Pain assessment: Pain denied     Objective     Cognitive Status Examination  Orientation: Oriented to person, place and time   Level of Consciousness: Alert  Follows Commands and Answers Questions: 100% of the time  Personal Safety and Judgement: Intact  Memory: Intact    OBSERVATION  Bradykinesia and Hypokinesia (Combining slowness, hesitancy, decreased arm swing, small amplitude and poverty of movement in general):  Moderate   Dyskinesia: No    Posture: Abnormal   Rigidity: yes  Tremors: yes, R UE    RANGE OF MOTION:  no deficits observed during functional mobility  SENSATION:  pt denies any sensation changes  TRANSFERS: WFL    GAIT ANALYSIS: slowed pace, decreased teodora     BALANCE: impaired    SPECIAL TESTS    360 degree turn (steps) 15 Clockwise  12 Counter Clockwise          With no AD    Timed Up and Go (TUG) 8.75s sec  With no AD Increased risk for falls with standard TUG >11.5secs for people with PD, >13.5secs for Community Dwelling Elders.   Cognitive TUG  10.03s sec    Task: counting backwards by 3 from 50; 50 to 41 correctly, said 32 instead of 38 but corrected mistake     5 Times Sit-to-Stand (5TSTS) - sec  11 Increased risk for falls 5x STS >16 secs for people with PD, >15 secs for Community Dwelling Elders.  5x STS Normative Values by Age Category (Healthy Population)( Age in years (n) Mean   SD): Ages 70-79: 9.3   2.1 sec    30 sec Chair Stand: 12.5 reps  Comments: good control; pt reports feeling tired out by this today Age/Gender Norm: Male 70-74: 15   Mini BESTest:   <22/28 indicates fall risk; See full MiniBEST for details.   10 Meter Walk Test (Comfortable):  5.94s; 1.01m/s  # of steps: 12 Age/Gender Norm (meters/second): Men in their  "70s: 1.262    10 Meter Walk Test (Fast):  4.28s ; 1.401m/s  # of steps: 11    6 Minute Walk Test (6MWT)   NT     Comments about turns:  Age/Gender Norm: Male 70-79 yrs: 527 m/1729ft   Functional Gait Assessment (FGA):     <22/30 indicates fall risk in community dwelling elder, PD <18/30   Kaye Balance Scale (BBS):   < 45 /56 indicates an increased risk for falls   4 Square Step Test seconds (best of 2 trials) >15 seconds indicates fall risk        Assessment & Plan   CLINICAL IMPRESSIONS  Medical Diagnosis: G20.B1 (ICD-10-CM) - Parkinson's disease with dyskinesia without fluctuating manifestations (H)    Treatment Diagnosis: impaired gait, impaired balance, postural imbalance   Impression/Assessment: Patient is a 69 year old male with complaints of shuffling gait and general weakness related to his dx of PD. He was dx in 2018 and has had PD specific PT in the past, last bout ended in 10/30/24. Due to pt glucose levels being 327 and pt feeling \"off\" due to delayed PD meds, evaluation was limited. Plan to assess miniBEST at future visit when pt glucose is more stable. The following significant findings have been identified: Decreased strength, Impaired balance, Impaired gait, Impaired muscle performance, and Impaired posture. These impairments interfere with their ability to perform self care tasks, recreational activities, household chores, household mobility, and community mobility as compared to previous level of function. Per evaluation, pt has decreased LE endurance as demonstrated by 12.5 stands on 30s STS, a decreased comfortable gait speed of 1.01m/s, required 15 steps for  and 12 for CCW and a decrease of 1.28s with TUG cog. Pt would benefit from skilled PT to address impairments, decreases falls risk, and improve functional mobility to reach pt goals.     Clinical Decision Making (Complexity):  Clinical Presentation: Stable/Uncomplicated  Clinical Presentation Rationale: based on medical and personal " factors listed in PT evaluation  Clinical Decision Making (Complexity): Low complexity    PLAN OF CARE  Treatment Interventions:  Interventions: Gait Training, Manual Therapy, Neuromuscular Re-education, Therapeutic Activity, Therapeutic Exercise, Self-Care/Home Management    Long Term Goals     PT Goal 1  Goal Identifier: HEP  Goal Description: Patient will demonstrate at least 50% compliance in their HEP to support progress towards functional and patient specific goals  Target Date: 05/19/25  PT Goal 2  Goal Identifier: MiniBest  Goal Description: pt will increase minibest score by 5 points to indicae decreased falls risk and improved mobility  Target Date: 05/19/25  PT Goal 3  Goal Identifier: 30s STS  Goal Description: pt gabriele improve 30s STS to 15 stands in order to show imprived LE endurance and to match age and gender norms  Target Date: 05/19/25  PT Goal 4  Goal Identifier: Gait speed  Goal Description: pt will improe gait speed to 1.262m/s in order to match age and gender norms to indicate improve movility  Target Date: 05/19/25      Frequency of Treatment: 1-2x/week  Duration of Treatment: 12    Recommended Referrals to Other Professionals:  none  Education Assessment:   Learner/Method: Patient    Risks and benefits of evaluation/treatment have been explained.   Patient/Family/caregiver agrees with Plan of Care.     Evaluation Time:     PT Eval, Low Complexity Minutes (60132): 35     Signing Clinician: Salma Blount, PT        [facility]                                                                                   OUTPATIENT PHYSICAL THERAPY      PLAN OF TREATMENT FOR OUTPATIENT REHABILITATION   Patient's Last Name, First Name, Arnaldo Villa YOB: 1955   Provider's Name   [facility]   Medical Record No.  [id number]     Onset Date: 03/03/25  Start of Care Date: 03/03/25     Medical Diagnosis:  G20.B1 (ICD-10-CM) -  Parkinson's disease with dyskinesia without fluctuating manifestations (H)      PT Treatment Diagnosis:  impaired gait, impaired balance, postural imbalance Plan of Treatment  Frequency/Duration: 1-2x/week/ 12    Certification date from 03/03/25 to 06/01/25         See note for plan of treatment details and functional goals     Salma Blount, PT                         I CERTIFY THE NEED FOR THESE SERVICES FURNISHED UNDER        THIS PLAN OF TREATMENT AND WHILE UNDER MY CARE     (Physician attestation of this document indicates review and certification of the therapy plan).              Referring Provider:  Alberto Jackson    Initial Assessment  See Epic Evaluation- Start of Care Date: 03/03/25

## 2025-03-04 ENCOUNTER — ANCILLARY PROCEDURE (OUTPATIENT)
Dept: VASCULAR ULTRASOUND | Facility: CLINIC | Age: 70
End: 2025-03-04
Attending: FAMILY MEDICINE
Payer: MEDICARE

## 2025-03-04 DIAGNOSIS — R25.2 LEG CRAMPS: ICD-10-CM

## 2025-03-04 DIAGNOSIS — R09.89 OTHER SPECIFIED SYMPTOMS AND SIGNS INVOLVING THE CIRCULATORY AND RESPIRATORY SYSTEMS: ICD-10-CM

## 2025-03-04 PROCEDURE — 93923 UPR/LXTR ART STDY 3+ LVLS: CPT

## 2025-03-04 PROCEDURE — 93925 LOWER EXTREMITY STUDY: CPT

## 2025-03-04 NOTE — RESULT ENCOUNTER NOTE
Patient updated by [employer] message with imaging results.       Dc Fierro,  Thank you for completing the studies of your legs.   They show adequate blood flow to the feet. No significant narrowing (stenosis) of the arteries.   Amelia Johnson, DO

## 2025-03-04 NOTE — RESULT ENCOUNTER NOTE
Patient updated by [employer] message with imaging results.       Dc Fierro,  Thank you for completing the studies of your legs.   They show adequate blood flow to the feet. No significant narrowing (stenosis) of the arteries.   Amelia Johnson, DO

## 2025-03-06 ENCOUNTER — THERAPY VISIT (OUTPATIENT)
Dept: PHYSICAL THERAPY | Facility: REHABILITATION | Age: 70
End: 2025-03-06
Payer: MEDICARE

## 2025-03-06 DIAGNOSIS — G20.B1 PARKINSON'S DISEASE WITH DYSKINESIA WITHOUT FLUCTUATING MANIFESTATIONS (H): Primary | ICD-10-CM

## 2025-03-10 ENCOUNTER — TELEPHONE (OUTPATIENT)
Dept: CARDIOLOGY | Facility: CLINIC | Age: 70
End: 2025-03-10
Payer: MEDICARE

## 2025-03-10 NOTE — TELEPHONE ENCOUNTER
[facility] Call Center    Phone Message    May a detailed message be left on voicemail: yes     Reason for Call: Other: Spouse states that when Pat had his echo 12/2024, Rafaela told her that they did not have to schedule the echo in June again. She would like to confirm this information. Please call her back to discuss.       Action Taken: Other: cardiology    Travel Screening: Not Applicable  Thank you!  [facility]       Date of Service:

## 2025-03-10 NOTE — TELEPHONE ENCOUNTER
Called back, no answer. Left VM confirming no echo needed in June.    Benjamin Dillard, RN   Cardiology Nurse Coordinator

## 2025-03-11 ENCOUNTER — THERAPY VISIT (OUTPATIENT)
Dept: PHYSICAL THERAPY | Facility: REHABILITATION | Age: 70
End: 2025-03-11
Payer: MEDICARE

## 2025-03-11 DIAGNOSIS — R26.81 GAIT INSTABILITY: ICD-10-CM

## 2025-03-11 DIAGNOSIS — R26.89 IMPAIRMENT OF BALANCE: ICD-10-CM

## 2025-03-11 DIAGNOSIS — M62.81 GENERALIZED MUSCLE WEAKNESS: ICD-10-CM

## 2025-03-11 DIAGNOSIS — R25.8 BRADYKINESIA: ICD-10-CM

## 2025-03-11 DIAGNOSIS — G20.B1 PARKINSON'S DISEASE WITH DYSKINESIA WITHOUT FLUCTUATING MANIFESTATIONS (H): Primary | ICD-10-CM

## 2025-03-13 ENCOUNTER — THERAPY VISIT (OUTPATIENT)
Dept: PHYSICAL THERAPY | Facility: REHABILITATION | Age: 70
End: 2025-03-13
Payer: MEDICARE

## 2025-03-13 DIAGNOSIS — R26.89 IMPAIRMENT OF BALANCE: ICD-10-CM

## 2025-03-13 DIAGNOSIS — M62.81 GENERALIZED MUSCLE WEAKNESS: ICD-10-CM

## 2025-03-13 DIAGNOSIS — R26.81 GAIT INSTABILITY: Primary | ICD-10-CM

## 2025-03-13 DIAGNOSIS — G20.B1 PARKINSON'S DISEASE WITH DYSKINESIA WITHOUT FLUCTUATING MANIFESTATIONS (H): ICD-10-CM

## 2025-03-13 DIAGNOSIS — R25.8 BRADYKINESIA: ICD-10-CM

## 2025-03-16 ENCOUNTER — TELEPHONE (OUTPATIENT)
Dept: NEUROLOGY | Facility: CLINIC | Age: 70
End: 2025-03-16
Payer: MEDICARE

## 2025-03-16 DIAGNOSIS — G20.B1 PARKINSON'S DISEASE WITH DYSKINESIA WITHOUT FLUCTUATING MANIFESTATIONS (H): Primary | ICD-10-CM

## 2025-03-16 NOTE — TELEPHONE ENCOUNTER
[facility] - PT ordered.    Candida Jackson DNP, APRN  [facility]    ----- Message -----  From: Ranjana Darby, PT  Sent: 2/24/2025  12:36 PM CDT  To: KRYSTYNA Benson CNP  Subject: Big therapy                                      Dc,     I am seeing Pat for low back pain. He is discharging today from therapy with me and is all set up to begin [facility] PT on March 3 with our PTs who work with the Parkinson's population. I believe he has 10+ appointments set up. However, I am not seeing an order attached to these appointments. He has benefited from [facility] in the past and feels he would benefit from the program again. If you agree, would you be willing to send another order for [facility] PT? Please let me know your thoughts!    Appreciate you!  Ranjana

## 2025-03-18 ENCOUNTER — THERAPY VISIT (OUTPATIENT)
Dept: PHYSICAL THERAPY | Facility: REHABILITATION | Age: 70
End: 2025-03-18
Payer: MEDICARE

## 2025-03-18 DIAGNOSIS — R26.81 GAIT INSTABILITY: Primary | ICD-10-CM

## 2025-03-18 DIAGNOSIS — R26.89 IMPAIRMENT OF BALANCE: ICD-10-CM

## 2025-03-18 DIAGNOSIS — M62.81 GENERALIZED MUSCLE WEAKNESS: ICD-10-CM

## 2025-03-18 DIAGNOSIS — R25.8 BRADYKINESIA: ICD-10-CM

## 2025-03-18 DIAGNOSIS — G20.B1 PARKINSON'S DISEASE WITH DYSKINESIA WITHOUT FLUCTUATING MANIFESTATIONS (H): ICD-10-CM

## 2025-03-18 PROCEDURE — 97110 THERAPEUTIC EXERCISES: CPT | Mod: GP | Performed by: PHYSICAL THERAPIST

## 2025-03-18 PROCEDURE — 97112 NEUROMUSCULAR REEDUCATION: CPT | Mod: GP | Performed by: PHYSICAL THERAPIST

## 2025-03-20 ENCOUNTER — THERAPY VISIT (OUTPATIENT)
Dept: PHYSICAL THERAPY | Facility: REHABILITATION | Age: 70
End: 2025-03-20
Payer: MEDICARE

## 2025-03-20 DIAGNOSIS — R26.81 GAIT INSTABILITY: Primary | ICD-10-CM

## 2025-03-20 DIAGNOSIS — R25.8 BRADYKINESIA: ICD-10-CM

## 2025-03-20 DIAGNOSIS — M62.81 GENERALIZED MUSCLE WEAKNESS: ICD-10-CM

## 2025-03-20 DIAGNOSIS — R26.89 IMPAIRMENT OF BALANCE: ICD-10-CM

## 2025-03-20 DIAGNOSIS — G20.B1 PARKINSON'S DISEASE WITH DYSKINESIA WITHOUT FLUCTUATING MANIFESTATIONS (H): ICD-10-CM

## 2025-03-21 ENCOUNTER — VIRTUAL VISIT (OUTPATIENT)
Dept: PSYCHOLOGY | Facility: CLINIC | Age: 70
End: 2025-03-21
Payer: MEDICARE

## 2025-03-21 DIAGNOSIS — F43.23 ADJUSTMENT DISORDER WITH MIXED ANXIETY AND DEPRESSED MOOD: Primary | ICD-10-CM

## 2025-03-21 DIAGNOSIS — G20.B1 PARKINSON'S DISEASE WITH DYSKINESIA, UNSPECIFIED WHETHER MANIFESTATIONS FLUCTUATE (H): ICD-10-CM

## 2025-03-21 PROCEDURE — 90837 PSYTX W PT 60 MINUTES: CPT | Mod: 95 | Performed by: PSYCHOLOGIST

## 2025-03-21 ASSESSMENT — ANXIETY QUESTIONNAIRES
GAD7 TOTAL SCORE: 0
3. WORRYING TOO MUCH ABOUT DIFFERENT THINGS: NOT AT ALL
7. FEELING AFRAID AS IF SOMETHING AWFUL MIGHT HAPPEN: NOT AT ALL
6. BECOMING EASILY ANNOYED OR IRRITABLE: NOT AT ALL
7. FEELING AFRAID AS IF SOMETHING AWFUL MIGHT HAPPEN: NOT AT ALL
4. TROUBLE RELAXING: NOT AT ALL
2. NOT BEING ABLE TO STOP OR CONTROL WORRYING: NOT AT ALL
GAD7 TOTAL SCORE: 0
1. FEELING NERVOUS, ANXIOUS, OR ON EDGE: NOT AT ALL
GAD7 TOTAL SCORE: 0
5. BEING SO RESTLESS THAT IT IS HARD TO SIT STILL: NOT AT ALL

## 2025-03-23 NOTE — CONFIDENTIAL NOTE
"Health Psychology - Follow up Visit  Confidential Summary*     The author of this note documented a reason for not sharing it with the patient.      REFERRAL SOURCE:  Whitney Renteria, PhD (provider who will be out on medical leave)     Visit conducted via video using [employer].  Patient was at his home and provider at her home.      The patient has been notified of following:   \"This VIDEO visit will be conducted via a call between you and your physician/provider. We have found that certain health care needs can be provided without the need for an in-person physical exam.   Video visits are billed at different rates depending on your insurance coverage.  Please reach out to your insurance provider with any questions. If during the course of the call the physician/provider feels a video visit is not appropriate, you will not be charged for this service.\"     Patient has given verbal consent for Video/PHONE visit? Yes     CHIEF COMPLAINT/REASON FOR VISIT  Patient would like to decrease anxiety.     Subjective:   Patient began with report that his recurrent panic attacks have discontinued with the reported prescription of zoloft.  However, patient described observing that he continues to experience anxiety associated with \"worry\" about what might happen in the future and regrets surrounding what he did not do in the past.  Discussed the fallacy of regret and encouraged him to consider that things may have been worse had he not made the decisions he did.  Introduced cognitive therapy, changing his catastrophizing and overestimating the likelihood of events.      Objective:  Patient was on time for today s session.  She appeared friendly, alert and oriented.  Mood was euthymic, with appropriate range of affect, including occasional tearfulness. Patient endorsed suicidal ideation and denied suicidal plan or intent.         Assessment:  The patient is coping with multiple medical problems and is in the process of learning to " adjust to the changes these pose to his well being.  He may have experienced episodes of panic in the past which have resolved with medication.  He is currently describing worry and regret.  Will treat for ANETA.      Plan:  CBT and supportive therapy in context of medical diagnosis and surrounding concerns.  See in one week.      Treatment plan reviewed.     Time In: 2:00  Time Out: 3:00     Diagnosis:  Axis I Adjustment disorder with mixed emotions of anxiety and depression, psychological and behavioral factors associated with medical; ANETA; panic disorder   Axis II Deferred   Axis III please see medical records for details   [place] IV Psychosocial and Environmental Stressors: Health & family          Samantah Watters, PhD, LP

## 2025-03-24 ENCOUNTER — THERAPY VISIT (OUTPATIENT)
Dept: PHYSICAL THERAPY | Facility: REHABILITATION | Age: 70
End: 2025-03-24
Payer: MEDICARE

## 2025-03-24 DIAGNOSIS — R25.8 BRADYKINESIA: ICD-10-CM

## 2025-03-24 DIAGNOSIS — G20.B1 PARKINSON'S DISEASE WITH DYSKINESIA WITHOUT FLUCTUATING MANIFESTATIONS (H): ICD-10-CM

## 2025-03-24 DIAGNOSIS — M62.81 GENERALIZED MUSCLE WEAKNESS: ICD-10-CM

## 2025-03-24 DIAGNOSIS — R26.89 IMPAIRMENT OF BALANCE: ICD-10-CM

## 2025-03-24 DIAGNOSIS — R26.81 GAIT INSTABILITY: Primary | ICD-10-CM

## 2025-03-24 PROCEDURE — 97110 THERAPEUTIC EXERCISES: CPT | Mod: GP | Performed by: PHYSICAL THERAPIST

## 2025-03-24 PROCEDURE — 97112 NEUROMUSCULAR REEDUCATION: CPT | Mod: GP | Performed by: PHYSICAL THERAPIST

## 2025-03-27 ENCOUNTER — THERAPY VISIT (OUTPATIENT)
Dept: PHYSICAL THERAPY | Facility: REHABILITATION | Age: 70
End: 2025-03-27
Payer: MEDICARE

## 2025-03-27 ENCOUNTER — TELEPHONE (OUTPATIENT)
Dept: PHYSICAL MEDICINE AND REHAB | Facility: CLINIC | Age: 70
End: 2025-03-27

## 2025-03-27 DIAGNOSIS — M54.16 LUMBAR RADICULOPATHY: Primary | ICD-10-CM

## 2025-03-27 DIAGNOSIS — R26.89 IMPAIRMENT OF BALANCE: ICD-10-CM

## 2025-03-27 DIAGNOSIS — R25.8 BRADYKINESIA: ICD-10-CM

## 2025-03-27 DIAGNOSIS — M62.81 GENERALIZED MUSCLE WEAKNESS: ICD-10-CM

## 2025-03-27 DIAGNOSIS — R26.81 GAIT INSTABILITY: Primary | ICD-10-CM

## 2025-03-27 DIAGNOSIS — G20.B1 PARKINSON'S DISEASE WITH DYSKINESIA WITHOUT FLUCTUATING MANIFESTATIONS (H): ICD-10-CM

## 2025-03-27 NOTE — TELEPHONE ENCOUNTER
Patient Returning Call    Reason for call:  Spouse called , requesting for call back regarding spine injection    Information relayed to patient:  n/a    Patient has additional questions:  Yes    What are your questions/concerns:  Should I get another injection or not?    Who does the patient want to speak with:  RN    Is an  needed?:  No      Could we send this information to you in [facility] or would you prefer to receive a phone call?:   Patient would prefer a phone call   Okay to leave a detailed message?: No at Other phone number:  [phone] Pat

## 2025-03-27 NOTE — TELEPHONE ENCOUNTER
"PSP:  Dr. Fong  Last clinic visit:  1/28/25 OV  Reason for call: Next steps  Clinical information:  Call placed to pt's wife, Veronika, who is on CTC. She states pt received 75-80% relief of his symptoms from the L2-L3 ILESI he had on 1/8/25. She states at this time the injection has fully worn off. They did not feel the injection lasted as long as they would've hoped so they inquire if Dr. Fong has any next step recommendations.   \"We just wanted to see if we could do the next step instead of waiting weeks for an office visit, and then weeks again for another injection if that's recommended\". Pt also has been continuing with physical therapy.   Advice given to patient: Informed her that Dr. Fong is out of clinic, returning Monday. They are okay with waiting for his return to clinic for a response.   Provider to address: Please advise      "

## 2025-03-31 ENCOUNTER — THERAPY VISIT (OUTPATIENT)
Dept: PHYSICAL THERAPY | Facility: REHABILITATION | Age: 70
End: 2025-03-31
Payer: MEDICARE

## 2025-03-31 DIAGNOSIS — M62.81 GENERALIZED MUSCLE WEAKNESS: ICD-10-CM

## 2025-03-31 DIAGNOSIS — R26.89 IMPAIRMENT OF BALANCE: ICD-10-CM

## 2025-03-31 DIAGNOSIS — R26.81 GAIT INSTABILITY: Primary | ICD-10-CM

## 2025-03-31 DIAGNOSIS — R25.8 BRADYKINESIA: ICD-10-CM

## 2025-03-31 DIAGNOSIS — G20.B1 PARKINSON'S DISEASE WITH DYSKINESIA WITHOUT FLUCTUATING MANIFESTATIONS (H): ICD-10-CM

## 2025-03-31 PROCEDURE — 97112 NEUROMUSCULAR REEDUCATION: CPT | Mod: GP | Performed by: PHYSICAL THERAPIST

## 2025-03-31 PROCEDURE — 97110 THERAPEUTIC EXERCISES: CPT | Mod: GP | Performed by: PHYSICAL THERAPIST

## 2025-03-31 NOTE — TELEPHONE ENCOUNTER
"\"Almost 3 months, but yes we would have wanted a full 3-6 months ideally.    Please advise patient we can try targeting the same L2-L3 level via different approach (bilateral L2-L3 TFESI) as this can sometimes give an augmented benefit but this is not a guarantee. Alternatively, as the first injection did provide some benefit it does suggest the L2-L3 is a major pain generator so we could place a referral to Neurosurgery for consideration of surgical treatment.    If patient wishes to discuss the options in more detail we would recommend a follow-up visit.\"    Called and spoke with pt's wife, Veronika, who is on CTC. They would like to proceed with the injections. Order placed. Injection requirements reviewed. Pt is diabetic and is aware his BG to be 300 or less the day of procedure. Transferred pt to scheduling to make appt.     "

## 2025-04-01 ENCOUNTER — TELEPHONE (OUTPATIENT)
Dept: CARDIOLOGY | Facility: CLINIC | Age: 70
End: 2025-04-01

## 2025-04-01 NOTE — TELEPHONE ENCOUNTER
Patient confirmed scheduled appointment:  Date: 10/02/25  Time: 9AM   Visit type: RTN CARDIO   Provider: DEBRA   Location: [facility]   Testing/imaging: DEVICE CHECK   Additional notes: N/A

## 2025-04-02 ENCOUNTER — TELEPHONE (OUTPATIENT)
Dept: CARDIOLOGY | Facility: CLINIC | Age: 70
End: 2025-04-02
Payer: MEDICARE

## 2025-04-02 NOTE — TELEPHONE ENCOUNTER
Patient Contacted for the patient to call back and schedule the following:    Appointment type: RTN CARDIO  Provider: LIDIA  Return date: 6/10/2025  Specialty phone number: [phone] OPT   Additional appointment(s) needed: REMOTE DEVICE CHECK  Additonal Notes: DEBRA 10/2 RESCHEDULE

## 2025-04-03 ENCOUNTER — THERAPY VISIT (OUTPATIENT)
Dept: PHYSICAL THERAPY | Facility: REHABILITATION | Age: 70
End: 2025-04-03
Payer: MEDICARE

## 2025-04-03 DIAGNOSIS — R26.89 IMPAIRMENT OF BALANCE: ICD-10-CM

## 2025-04-03 DIAGNOSIS — R26.81 GAIT INSTABILITY: Primary | ICD-10-CM

## 2025-04-03 DIAGNOSIS — R25.8 BRADYKINESIA: ICD-10-CM

## 2025-04-03 DIAGNOSIS — M62.81 GENERALIZED MUSCLE WEAKNESS: ICD-10-CM

## 2025-04-03 DIAGNOSIS — G20.B1 PARKINSON'S DISEASE WITH DYSKINESIA WITHOUT FLUCTUATING MANIFESTATIONS (H): ICD-10-CM

## 2025-04-07 ENCOUNTER — THERAPY VISIT (OUTPATIENT)
Dept: PHYSICAL THERAPY | Facility: REHABILITATION | Age: 70
End: 2025-04-07
Payer: MEDICARE

## 2025-04-07 DIAGNOSIS — R26.81 GAIT INSTABILITY: Primary | ICD-10-CM

## 2025-04-07 DIAGNOSIS — G20.B1 PARKINSON'S DISEASE WITH DYSKINESIA WITHOUT FLUCTUATING MANIFESTATIONS (H): ICD-10-CM

## 2025-04-07 DIAGNOSIS — R25.8 BRADYKINESIA: ICD-10-CM

## 2025-04-07 DIAGNOSIS — M62.81 GENERALIZED MUSCLE WEAKNESS: ICD-10-CM

## 2025-04-07 DIAGNOSIS — R26.89 IMPAIRMENT OF BALANCE: ICD-10-CM

## 2025-04-07 PROCEDURE — 97112 NEUROMUSCULAR REEDUCATION: CPT | Mod: GP | Performed by: PHYSICAL THERAPIST

## 2025-04-07 PROCEDURE — 97110 THERAPEUTIC EXERCISES: CPT | Mod: GP | Performed by: PHYSICAL THERAPIST

## 2025-04-10 ENCOUNTER — THERAPY VISIT (OUTPATIENT)
Dept: PHYSICAL THERAPY | Facility: REHABILITATION | Age: 70
End: 2025-04-10
Payer: MEDICARE

## 2025-04-10 ENCOUNTER — MYC MEDICAL ADVICE (OUTPATIENT)
Dept: GASTROENTEROLOGY | Facility: CLINIC | Age: 70
End: 2025-04-10

## 2025-04-10 ENCOUNTER — TELEPHONE (OUTPATIENT)
Dept: GASTROENTEROLOGY | Facility: CLINIC | Age: 70
End: 2025-04-10

## 2025-04-10 DIAGNOSIS — R25.8 BRADYKINESIA: ICD-10-CM

## 2025-04-10 DIAGNOSIS — Z86.0100 HISTORY OF COLONIC POLYPS: Primary | ICD-10-CM

## 2025-04-10 DIAGNOSIS — G20.B1 PARKINSON'S DISEASE WITH DYSKINESIA WITHOUT FLUCTUATING MANIFESTATIONS (H): ICD-10-CM

## 2025-04-10 DIAGNOSIS — R26.81 GAIT INSTABILITY: Primary | ICD-10-CM

## 2025-04-10 DIAGNOSIS — R26.89 IMPAIRMENT OF BALANCE: ICD-10-CM

## 2025-04-10 RX ORDER — BISACODYL 5 MG/1
TABLET, DELAYED RELEASE ORAL
Qty: 4 TABLET | Refills: 0 | Status: SHIPPED | OUTPATIENT
Start: 2025-04-10

## 2025-04-10 NOTE — TELEPHONE ENCOUNTER
Bowel Prep Review:  Disclaimer: No call was made to the patient.     Standard Golytely bowel prep. Bowel prep sent to    [id number] - [place] [id number]    Recommended due to diabetes.   Instructions were sent via [employer].       Zonia Saxena LPN  Endoscopy Procedure Pre Assessment

## 2025-04-11 ENCOUNTER — MYC MEDICAL ADVICE (OUTPATIENT)
Dept: CARDIOLOGY | Facility: CLINIC | Age: 70
End: 2025-04-11
Payer: MEDICARE

## 2025-04-11 ENCOUNTER — VIRTUAL VISIT (OUTPATIENT)
Dept: PSYCHOLOGY | Facility: CLINIC | Age: 70
End: 2025-04-11
Payer: MEDICARE

## 2025-04-11 DIAGNOSIS — F43.23 ADJUSTMENT DISORDER WITH MIXED ANXIETY AND DEPRESSED MOOD: Primary | ICD-10-CM

## 2025-04-11 DIAGNOSIS — G20.B1 PARKINSON'S DISEASE WITH DYSKINESIA, UNSPECIFIED WHETHER MANIFESTATIONS FLUCTUATE (H): ICD-10-CM

## 2025-04-11 PROCEDURE — 90837 PSYTX W PT 60 MINUTES: CPT | Mod: 95 | Performed by: PSYCHOLOGIST

## 2025-04-11 ASSESSMENT — ANXIETY QUESTIONNAIRES
5. BEING SO RESTLESS THAT IT IS HARD TO SIT STILL: NOT AT ALL
7. FEELING AFRAID AS IF SOMETHING AWFUL MIGHT HAPPEN: NOT AT ALL
7. FEELING AFRAID AS IF SOMETHING AWFUL MIGHT HAPPEN: NOT AT ALL
2. NOT BEING ABLE TO STOP OR CONTROL WORRYING: SEVERAL DAYS
4. TROUBLE RELAXING: NOT AT ALL
GAD7 TOTAL SCORE: 1
GAD7 TOTAL SCORE: 1
3. WORRYING TOO MUCH ABOUT DIFFERENT THINGS: NOT AT ALL
6. BECOMING EASILY ANNOYED OR IRRITABLE: NOT AT ALL
GAD7 TOTAL SCORE: 1
1. FEELING NERVOUS, ANXIOUS, OR ON EDGE: NOT AT ALL

## 2025-04-14 ENCOUNTER — THERAPY VISIT (OUTPATIENT)
Dept: PHYSICAL THERAPY | Facility: REHABILITATION | Age: 70
End: 2025-04-14
Payer: MEDICARE

## 2025-04-14 DIAGNOSIS — R26.89 IMPAIRMENT OF BALANCE: ICD-10-CM

## 2025-04-14 DIAGNOSIS — M62.81 GENERALIZED MUSCLE WEAKNESS: ICD-10-CM

## 2025-04-14 DIAGNOSIS — G20.B1 PARKINSON'S DISEASE WITH DYSKINESIA WITHOUT FLUCTUATING MANIFESTATIONS (H): ICD-10-CM

## 2025-04-14 DIAGNOSIS — R26.81 GAIT INSTABILITY: Primary | ICD-10-CM

## 2025-04-14 DIAGNOSIS — R25.8 BRADYKINESIA: ICD-10-CM

## 2025-04-14 PROCEDURE — 97112 NEUROMUSCULAR REEDUCATION: CPT | Mod: GP | Performed by: PHYSICAL THERAPIST

## 2025-04-14 PROCEDURE — 97110 THERAPEUTIC EXERCISES: CPT | Mod: GP | Performed by: PHYSICAL THERAPIST

## 2025-04-14 NOTE — TELEPHONE ENCOUNTER
Called patient to help reschedule Dilma appointment on 6/10 in [place] to [place] with [place] [facility]. No answer- did leave message letting him know Rafaela's first available is going to be in October. Call back number given [phone].    Solo Montilla   [facility] [place]- Cardiology   [address] [place] [id number]

## 2025-04-15 PROBLEM — Z90.89 ACQUIRED ABSENCE OF ORGAN: Status: ACTIVE | Noted: 2022-04-15

## 2025-04-15 PROBLEM — K85.00 IDIOPATHIC ACUTE PANCREATITIS: Status: ACTIVE | Noted: 2022-04-18

## 2025-04-15 PROBLEM — I25.10 ATHEROSCLEROSIS OF CORONARY ARTERY WITHOUT ANGINA PECTORIS: Status: ACTIVE | Noted: 2022-04-15

## 2025-04-15 PROBLEM — Z79.4 LONG TERM CURRENT USE OF INSULIN (H): Status: ACTIVE | Noted: 2022-04-15

## 2025-04-15 PROBLEM — M54.2 NECK PAIN: Status: ACTIVE | Noted: 2022-04-15

## 2025-04-15 PROBLEM — E78.5 HYPERLIPIDEMIA: Status: ACTIVE | Noted: 2022-04-15

## 2025-04-15 PROBLEM — E11.9 TYPE 2 DIABETES MELLITUS WITHOUT COMPLICATION (H): Status: ACTIVE | Noted: 2022-04-15

## 2025-04-15 PROBLEM — Z79.82 LONG TERM CURRENT USE OF ASPIRIN: Status: ACTIVE | Noted: 2022-04-15

## 2025-04-15 PROBLEM — I10 ESSENTIAL HYPERTENSION: Status: ACTIVE | Noted: 2022-04-15

## 2025-04-15 PROBLEM — Z79.01 LONG TERM CURRENT USE OF ANTICOAGULANT THERAPY: Status: ACTIVE | Noted: 2022-04-15

## 2025-04-15 PROBLEM — K92.9 DIGESTIVE SYSTEM DISORDER: Status: ACTIVE | Noted: 2022-04-15

## 2025-04-15 PROBLEM — M62.81 MUSCLE WEAKNESS: Status: ACTIVE | Noted: 2022-04-15

## 2025-04-15 PROBLEM — E44.1 MILD PROTEIN-CALORIE MALNUTRITION (WEIGHT FOR AGE 75-89% OF STANDARD): Status: ACTIVE | Noted: 2022-04-15

## 2025-04-15 NOTE — CONFIDENTIAL NOTE
"Health Psychology - Follow up Visit  Confidential Summary*     The author of this note documented a reason for not sharing it with the patient.      REFERRAL SOURCE:  Whitney Renteria, PhD (provider who will be out on medical leave)     Visit conducted via video using XDC.  Patient was at his home and provider at her home.      The patient has been notified of following:   \"This VIDEO visit will be conducted via a call between you and your physician/provider. We have found that certain health care needs can be provided without the need for an in-person physical exam.   Video visits are billed at different rates depending on your insurance coverage.  Please reach out to your insurance provider with any questions. If during the course of the call the physician/provider feels a video visit is not appropriate, you will not be charged for this service.\"     Patient has given verbal consent for Video/PHONE visit? Yes     CHIEF COMPLAINT/REASON FOR VISIT  Patient would like to decrease anxiety.     Subjective:   Patient and provider began with review of his ongoing use of antidepressant medication to address anxiety.  He continues to describe positive impact of medication and that he has not had a \"panic attack\" since beginning medication.  Discussed the importance of his understanding the physiology behind anxiety so that he might decrease any fear he has developed toward the normal but uncomfortable manifestation of anxiety.  Discussed the importance of accurate appraisal of threat and that he had begun to experience anxiety surrounding elevated heart rate.  Reviewed each system and how it responds when SNS arousal.      Encouraged patient to also describe his adjustment to stress management without use of alcohol.  He reported that alcohol was a likely coping tool used by his father and that he believes that several of his siblings have also engaged in alcohol abuse.  He described his awareness that living life without " alcohol and cigarettes will allow him to live a longer life with fewer medical complications.      Discussed his ongoing perception that he would like to visit his siblings and children/grandchildren but he is concerned about his ability to travel.  Encouraged to continue to live his best life and to make ongoing adaptations to PD.      Objective:  Patient was on time for today s session.  She appeared friendly, alert and oriented.  Mood was euthymic, with appropriate range of affect, including occasional tearfulness. Patient endorsed suicidal ideation and denied suicidal plan or intent.         Assessment:  The patient is coping with multiple medical problems and is in the process of learning to adjust to the changes these pose to his well being.  He may have experienced episodes of panic in the past which have resolved with medication.  He is currently describing worry and regret.  Will treat for ANETA.      Plan:  CBT and supportive therapy in context of medical diagnosis and surrounding concerns.  See in one week.      Treatment plan reviewed.     Time In: 2:00  Time Out: 3:00     Diagnosis:  Axis I Adjustment disorder with mixed emotions of anxiety and depression, psychological and behavioral factors associated with medical; ANETA; panic disorder   Axis II Deferred   Axis III please see medical records for details   [place] IV Psychosocial and Environmental Stressors: Health & family          Samantha Watters, PhD, LP

## 2025-04-15 NOTE — PROGRESS NOTES
"    Diagnosis/Summary/Recommendations:    PATIENT: Arnaldo Henderson  69 year old male     : 1955    ELISA: 2025       MRN: [id number]  [address]    [facility] [address]     [phone] (M)   [phone] (H)      [email or web address]     Veronika Pena spouse  [phone]     Keren balbuena daughter     Genetic testing ?   Family history of parkinsonism in mother     Parkinson's disease with dyskinesia, unspecified whether manifestations fluctuate, Ref by EVERT SAMUEL, Recs in [facility], Sched per Spouse Veronika     Seen by Jamals - notes below     He granted proxy access to his mychart.      Assessment:  (G20.A1) Parkinson's disease, unspecified whether dyskinesia present, unspecified whether manifestations fluctuate  (primary encounter diagnosis)  Family history of parkinsonism - mother      [facility]  1. Parkinson's disease, stage 2  2. Dyskinesia due to Parkinson's disease.  3. Motor fluctuations likely related to protein intake interfering with the absorption of levodopa, improved since taking the levodopa on an empty stomach.     5am up line up pills  6am takes carbidopa/levodopa and insulin  Then eats breakfast - generally he is waiting a bit - eating between 630 or 7am so waiting 30 - 60 minutes.      10 or 11a - head starts to feel numb - not light headedness and legs are heavy.   He has problems moving his feet and most likely he is wearing off/worn off  He has a lot of \"freezing\" throughout the day.   The morning is the best time.   He has freezing  He is a \"different person\" later in the day and evenings   Falls asleep early in evening - 730 and 8pm watching TV  Gets up at 9pm and/or wife wakes him up and has to help him get into bed  Has problems sleeping  Has a bed rail to help him move  He is sleeping in a separate bed as he is at various angles in the bed.      Started 2.5 years ago in [place] - Parkinson has progressed \"tremendously\"   Dr Resendez has not made " medication changes.      11am pill and takes 1 hour or 1.5 hours to get the dose to work   4p      Review of diagnosis    Parkinson disease  Duration 4 years or so.   Side onset: right  Right handed.   Symptoms - had gait problems  - lack of arm swing, shuffling   Symptoms - sleep issues were early issues as well as lack of smell  Clinical diagnosis   No specific testing     Avoidance of dopamine blockers   Not taking     Motor complication review   Wearing off   ?dyskinesias - not  clear if he has this but may be some facial dyskinesias per his wife.  Not clear if there is a relationship between the medication and the dyskinesias.   Freezing that may be aggravated by anxiety   Has more shuffling     Review of Impulse control disorders   Always hungry   Seen by Dr. Haider and discussed weight gain  No other ICD issues.      Review of surgical or medication options   reviewed     Gait/Balance/Falls   No falls.   Has freezing  Not using assistive device     Exercise/Therapy performed/offered   Physical therapy - after visit today  Abundio Barbosa PT ?  [facility] ?[facility]      Cognitive/Driving   He is not concerned  Wife has been a bit concerned - left the garage door open   He had an amazing memory when they met but has short term memory problems  Has not had cognitive testing  - can consider this.   Driving  - very little - short distances; wife prefers that he not drive.   He is managing his own medicine and insulin, etc.   Organizes his pill containers, etc.      Mood   Anxiety is disabling   QTc was okay 2/28/2024  He may have been on citalopram/celexa - details not clear in chart.   He stopped it and threw it out.      Therapist - has not worked with a therapist  He has problems waiting with other people when they are shopping.      Denies depression     Wife Veronika on the call today.      Profession in the past -- 7 years retired.   He worked in manufacturing things, all sorts of jobs.       Using [employer] mobility at 1200 pm for therapy      Going to [place] and leaving Friday and will be there for one week and will be selling their place as it is too difficulty to travel. Bought one way tickets as the sale is still not final.      1st marriage  2nd marriage - clarisa - having surgery with Dr. Umaña   Had an cerebral angiogram - they want to address the 80% carotid artery stenosis.      Hallucinations/delusions   Denies   No illusions     Sleep   Naps in front of tv  Gets up to go to bed at 9pm   Sleeps for 4 solid hours and then toss and turns for 4 hours  Get up at 4 or 5am and then lays in bed till 4 or 430 or 5a and then gets up  Sleeping in separate bed from spouse  Is angled over the bed  He has a railing.   He may move around in the bed   possible RBD/dream enactment behavior - per his wife - may be less  He sleeps alone now - sleep issue prompted a consultation.   Snoring - sometimes - recently no but not clear.   Sleep study - has not done one.      Bladder/Renal/Prostate/Gyn/Other   Urinary frequency  Nocturia 2/noc  No control issues   PSA was normal about 2 y ears ago.      GI/Constipation/GERD   Acute cholecystitis  Cholecystectomy  Malignant neuroendocrine tumor   GERD  Was put on omeprazole long ago and remains on this.   He had problems with his esophagus and had to be dilated, etc. He had ripples  This was 20 years ago, etc.   He has not had problems like this.  No coughing   Weight issue ?  Failure to thrive   Malnutrition  Lipase/protease/amylase  He has a bowel movement every 3 days - long standing  Not taking anything for his bowels per se  Encouraged to be active     ENDO/Lipid/DM/Bone density/Thyroid  Partial pancreatectomy  Pseudocyst pancreas     Diabetes  A1c was 8.2 on 11/6/2023  Insulin  Intolerance to metformin  Seen nutritionist for this.   Needs to have another one done.      Lipid disorder   Atorvastatin lipitor 40mg      Cardio/heart/Hyper or Hypotensive  "  Hypotension  pacemaker  2/28/2024  Bradycardia  Bypass surgery x2 2003  Losartan cozaar 50mg restarted after hospital     Vision/Dry Eyes/Cataracts/Glaucoma/Macular   Wears glasses  Had recent visual evaluation   No clear retinopathy from diabetes     Heme/Anticoagulation/Antiplatelet/Anemia/Other  Aspirin 81mg daily   No other blood thinners.      ENT/Resp  Hearing loss   ?former smoker - tobacco - off and on   Smell perception - never been good.      Skin/Cancer/Seborrhea/other  Skin cancer of scrotum  Seborrheic dermatitis   Few spots on his scalp     Musculoskeletal/Pain/Headache  Neck pain  Back issues since \"surgery\" - like a bubble that moves around his back and is intermittent.      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      His tumor is well-differentiated with a Ki-67 index of 10%. He has not had any symptoms referable to his cancer and at our last evaluation had only small volume kiersten disease in the mesentery.      S/p pacemaker  Ongoing blood pressure issues - highs and lows   Losartan cozaar 50mg was restarted and had been on 25mg in hospital  They are working with pcp and possibly cardiology  157 systolic 5pm   Only checking blood pressure in the evening.   He does not check morning blood pressures  QTc was 402ms on ECG on 2/28/2024  Need ongoing monitoring of blood pressure - highs and lows      Parkinson genetics study with family history of parkinson in mother   And son has orthostatic hypotension     Pharmacy (MTM) consultation and medication management  Please call the scheduling number @ [phone] to set up an appointment with pharmacists Cheyanne Harrell or Morenita Delgado.      Due to wearing off he would probably benefit from a shortening in the dose interval changing from 5 hour to 4 hour interval or " "so.      Sinemet 25/100 (carbidopa/levodopa) 2 tabs @ 6am, 2 @10a, 2@2p and 1@5p  Sinemet 50/200 (carbidopa/levodopa) at 9pm     Neuropsychological evaluation - baseline cognitive evaluation  Will take time to get scheduled.      Return to see Candida Jackson NP in 3 months.      Anxiety has increased and impacts social life  May have been citalopram celexa in the past  Has not seen a therapist and wife has been encouraging this.   He may be open to seeing someone  Has not started on anxiety medication recently.      [facility]     PDgeneration results pending     Admission  Leg swelling   Other features.      Arnaldo Henderson is a 69 year old male who receives support for Parkinson's disease. Nurse is calling to follow-up per ER recommendations.  Background: Patient is taking: Confirmed with patient/spouse Medications 6 am 10 am 2 pm 5 pm HS Sinemet 25/100 mg 2 2 2 1  Sinemet 50/200 mg CR     1 Trazodone 50 mg     0.5  New or changed medications: Insulin dosing goes up and down, started trazodone over a month ago which has helped sleep  Assessment: He will follow-up with PCP for leg swelling, will be getting an ultrasound of his legs. He has \"spells\" of legs feeling very heavy, difficulty moving them, head feels heavy, tremors and anxiety get worse during this time. Today it started at 9AM. He confirms he took his sinemet on time at 6AM. Spells usually occur about an hour before his next dose is due, has worsened over the last few weeks. He would like to see Dr. Armenta in person.  Plan/recommendation: Will have scheduling move his appointment with Dr. Armenta up to Friday 8/23 at 9AM IN PERSON.          Parkinson's Disease:       Lightheadedness:       Memory Concern:      Hyperventilating slightly upon return to room. He states this was triggered by talking about difficult childhood memory with his wife.      Grade 2 rigidity in BUE. Good finger taps, hand open close, pronation supination.     He has a " blood pressure diary. Lowest documented is 76/53. Highest 164/85.     He has been having episodes where his head feels foggy (initially describes as numb but not numb). His arms will feel heavy and stiff. They can move ok but his joints feel different. He will also get SOB. He also feels a sensation of pressure and heaviness in his thighs. Shuffling gait gets worse when this is occurring.   These episodes used to occur infrequently. Over the past three days, he has had several of these episodes per day. He presented to the ED for evaluation.v Per ED documentation, labs at the time of this evaluation were unremarkable.      He woke up this morning around 5am. He took his CD/LD at 5:50am along with a probiotic and coffee. He went back to bed and slept until 9am. He woke up, made another cup of coffee and some toast and another episode started suddenly. He had an episode at 9:30am and at 1pm yesterday.       These episodes always occur after 10am or after 2pm. They don't follow his CD/LD doses at a consistent time. He has some symptoms in the evening but they tend to milder. Episodes typically occur when he is sitting. They tend to last just over an hour. Yesterday his episode lasted four hours. No relationship to eating that he has noticed.     MRI Brain obtained Feb 2024 for episodes of generalized weakness and brain fog.   IMPRESSION:  HEAD MRI:   1.  No acute intracranial process.  2.  Generalized brain atrophy and presumed microvascular ischemic changes as detailed above.     CERVICAL SPINE MRI:  1.  Multilevel degenerative spondylolisthesis of the cervical spine, as above, with up to moderate central spinal canal stenosis at the levels of C3-C4, C4-C5 and C5-C6.  2.  No abnormal spinal cord signal or intramedullary/leptomeningeal enhancement.     No associated headache, loss of consciousness or abnormal movements with these episodes. He can walk when they occur despite his legs feeling heavy but tends to stutter  step more. No palpitations, chest pain, pressure associated with these episodes. Recent echo was normal.      He gets very anxious when these episodes occur. His wife wonders if this exacerbates things. Outside these events he usually feels like himself. He feels like if he can catch these episodes early and work through it, he can shorten he episode. Yesterday he was stutter stepping terribly in [employer] so he sat down and then tensed up.      He and his wife don't believe that anxiety is triggering these episodes but believe that the anxiety definitely perpetuates them.      He is going to be establishing care with a therapist shortly.      He has checked his blood glucose and blood pressure when these episodes are occurring. Both have been normal.      Denies lightheadedness with position changes. He states he has been checked for orthostatic hypotension a couple of times - he had not had this.      Last night he needed his wife's assistance to get of the toilet. He attributes this to having used another bathroom in his house that he is not used to. He seemed to get tense as a result.      He had tried taking 2.5 tabs of Carbidopa/Levodopa with his 10am dose. He didn't notice a difference in leg heaviness.      He denies fever, chills, cough, congestion, dysuria or increased urinary frequency.      The just met with his oncologist last week for neuroendocrine tumor. They were told that his blood work was fine.      He has been having more swelling his legs. This is much more marked than before.   He's going to be getting a CTA coronary angiogram next week for screening.      No falls.      Working PT on balance. This referral was placed by his spine doctor. He keeps up with PD PT exercises. He is planning to return to physical therapy targeting PD in March.      No swallowing issues.      They will be following up with Morenita Delgado PharmD on 1/24/25. They are wondering if these episodes might be medication side  effect.      No dysphagia, constipation or hallucinations.      Sleep is not good. Last night he slept for an hour and then tossed and turned the rest of the night until 6am. Slept well until 9am. If he doesn't get up and lay down right, he has to get up and then enter bed again. Can happen once or twenty times. He is not sure what gets him up overnight.      Mr. Henderson reports episodes of brain fog, stiffness, limb heaviness that can be associated with shortness of breath and feeling tense. Work up to date has been reassuring including MRI brain from Feb 2024 for these symptoms and recent laboratory and cardiac work up. From his and wife's description, anxiety seems to be at least a perpetuating if not initiating factor. It's difficult to entirely rule out wearing off or Parkinson's disease fluctuations but there is not clear correlation with PD medications. Since blood pressure has been normal during these episodes and they tend to occur when he is sitting, we are less suspicious of orthostatic hypotension contributing.      Advised starting by addressing anxiety symptoms with upcoming therapy appointment as well as with initiation of medication to address anxiety (likely selective serotonin reuptake inhibitor to start). He will be meeting with Morenita Delgado PharmD, at the end of this week and discuss options.        - Advised discussing starting medication to address anxiety symptoms with Morenita Delgado PharmD, on 1/24/25              - Would consider Zoloft, Lexapro or Celexa  - Appointment with psychologist scheduled on 1/24/25  - Recommended deep, slow breathing when he notices hyperventilation   - Consider adjustments to CD/LD schedule in the future, will prioritize addressing anxiety symptoms first              - Advised continuing to monitor relationship between medication timing and episodes     Medications      6a  10am 2p 5p  9p      Acetaminophen tylenol 325mg prn              Aspirin 81mg   1              Atorvastatin lipitor 40mg   1             Carbidopa/levodopa Sinemet CR 50/200         1     CArbidopa/levodopa sinemet 25/100 2.5 2 2 1       Dasiglucagon zegalogue 0.6mg/0.6ml prn             Gabapentin neurontin 300mg 2 2   2       Glucagon baqsimi 3mg/dose nasal  prn              Insulin aspart novolog sliding              Insulin aspart novolog               Insulin glargine  15        30      Lipase protease amylase zenpep  3/day             Losartan cozaar 50mg 1             Magnesium oxide 400mg  qod              Midodrine proamatine 2.5mg 1        MVI 1              Omeprazole prilosec 20mg  1             Sertraline zoloft 25mg     1    Trazodone desyrel 50mg         1                                            Plan:    No swallowing issues. Has some drooling at night    No dysphagia, constipation   He has a colonoscopy next week    Bladder - none    Partial pancreatectomy  Pseudocyst pancreas     Diabetes  A1c was 8.2 on 11/6/2023  7.6 most recently  Lab Results   Component Value Date    A1C 8.3 02/19/2025    A1C 8.4 11/01/2024    A1C 8.2 07/08/2024    A1C 7.2 05/09/2024    A1C 8.2 11/16/2023     Insulin  Intolerance to metformin  Seen nutritionist for this.      Lipid disorder   Atorvastatin lipitor 40mg      Not driving much - no problems.     No falls.   He has not had clear wearing off.   Therapy has been very helpful  Therapy has been helpful  He is not very active in the winter  Was good to be in [place]   He has [employer] mobility.    [place] from [id number]  Has some cramping of his left hand  He had problems with playing drums and has to open the left hand with his right hand or think about opening it up.  He has problems most commonly in the early morning before he gets up.     He got a cortisone injection on Tuesday for his back and he has not yet had relief but it is too early    He has been on midodrine/proamatine 2.5mg started a few weeks ago  Cardiology and pcp recommended starting  "this.   BP (!) 84/55 (BP Location: Right arm, Patient Position: Sitting, Cuff Size: Adult Regular)   Pulse 78   Ht 1.718 m (5' 7.64\")   Wt 95 kg (209 lb 8 oz)   SpO2 95%   BMI 32.20 kg/m    Losartan cozaar 50mg  Midodrine proamatine 2.5mg     CT angiogram 1/29/2025  1.  Known severe native LAD coronary disease status post 2-vessel CABG  (LIMA-LAD SVG-DIAG.)  2.  Widely patent LIMA-LAD graft.   3.  100% occluded SVG-DIAG graft.  4.  Moderate native LCx stenosis.  5.  Please review the separate Radiology report for incidental  noncardiac findings.    Doppler 3/4/2025  1. RIGHT LOWER EXTREMITY: ARI is Non-diagnostic indicating vessel calcification. Toe Pressures are Mildly abnormal but adequate for wound healing with toe pressures of 66 mmHg.     2. LEFT LOWER EXTREMITY: ARI is Non-diagnostic indicating vessel calcification. Toe Pressures are Mildly abnormal but adequate for wound healing with toe pressures of 59 mmHg.    Sleep is better.   He slept till 730am     He has followup of his neuroendocrine cancer in July 2025    He is taking an anxiety medication. He is taking 25mg at night - at 9 or 10p  Went to [place] in February and did well.     He is scheduled for ANS testing but he has not had instructions about medications.   Med information needed about    1. Losartan  ? 24 hr  2. Midodrine ??  3. Sertraline ? 48 h  4. Trazodone ? 48h     Patients undergoing the Adult Autonomic Reflex Screen with Dr. Sen will need to hold the following medications:    Hold for 48 hours prior to test:  Anticholinergics  Antidepressants  Antihistamine  Sympathomimetic (e.g. stimulants)  muscle relaxants     Hold for 24 hours prior to test:  Alpha blockers  Beta blockers  Vasodilators  Antihypertensives  Diuretics  Opioids      Fasting instructions:   Patients should abstain from food or nicotine for at least 4 hours prior to test   Patients should abstain from caffeine or alcohol for at least 12 hours prior to " test    Other instructions:  Patients should not use antiperspirants prior to test (can interfere with sweat measurement)  Patients should wear comfortable loose fitting clothes (shorts or pants that can be pulled up past the knee & a non-restrictive short sleeve shirt is recommended)  Patients should report to the 5th floor of the [facility] for Autonomic testing. The check in desk is just as you exit the elevators.    Clinic and Surgery Center   [address] in [place], [place].       In summary  Anxiety is better with medication  He has a scheduled ANS testing and will instructions  Last 1/21/2025 9/2025 MTM visit with Morenita Delgado  Return to see Candida in 3-6 months.      Future Appointments 4/25/2025 - 10/22/2025        Date Visit Type Length Department Provider     4/25/2025  9:30 AM RETURN MOVEMENT DISORDER 30 min [facility] Juan Jose Armenta MD    Location Instructions:     Due to road [place] [address], travel times to this location may be longer than usual. Please plan for extra travel time and check the [place] [employer] [address] [place] website for delay, closure, and detour information.  The [facility] [place] ([facility]) is in a dense urban area with multiple transportation and parking options. You may wish to review options for  service and self-parking in more detail on the [facility] s website at [email or web address].              4/28/2025 10:15 AM PT BIG AND LOUD TREATMENT 45 min [facility]YS[facility] THER[facility] Salma Blount PT    Location Instructions:     Our clinic is located at:  [facility]&nbsp; [facility]  [address] [place] [address], [address] [place] [address]  How to find our clinic: We are behind [place] [facility].&nbsp; Check in is ahead to the right through the clinic entrance.   Parking: Parking is available in the lot adjacent to the clinic.  Questions or to reschedule: Contact our clinic: [phone].                4/28/2025 12:00 PM AUTONOMIC TESTING 60 min [facility] Khadijah Sen MD    Location Instructions:     Due to road [place] on [address], travel times to this location may be longer than usual. Please plan for extra travel time and check the [place] [employer] [address] [place] website for delay, closure, and detour information.  The [facility] [place] ([facility]) is in a dense urban area with multiple transportation and parking options. You may wish to review options for  service and self-parking in more detail on the [facility] s website at [email or web address].  For your EMG test, please proceed to the 3rd floor for check-in and testing. If you are here for Autonomic testing, proceed to the 5th floor for check-in and testing.              5/5/2025 11:00 AM PT BIG AND LOUD TREATMENT 45 min [facility] PHYSICAL THERAP[facility] Salma Blount PT    Location Instructions:     Our clinic is located at:  [facility]&nbs; [place] [address]  How to find our clinic: We are behind [place] [facility].&nbsp; Check in is ahead to the right through the clinic entrance.   Parking: Parking is available in the lot adjacent to the clinic.  Questions or to reschedule: Contact our clinic: [phone].               5/12/2025 10:15 AM PT BIG AND LOUD TREATMENT 45 min [facility] PHYSICAL THERAPY Salma Blount PT    Location Instructions:     Our clinic is located at:  [facility]&nbsp; [place] [id number]  How to find our clinic: We are behind [place] [facility].&nbsp; Check in is ahead to the right through the clinic entrance.   Parking: Parking is available in the lot adjacent to the clinic.  Questions or to reschedule: Contact our clinic: [phone].               5/19/2025  2:00 PM RETURN MED SPINE 20 min [facility]  Jovan Fong MD    Location Instructions:     We are located at [address]  [place] [address]              5/29/2025 12:00 AM CARDIAC DEVICE CHECK - REMOTE 30 min  CARDIAC SERVICES  ICD REMOTE    Location Instructions:     Due to road [place] on [address], travel times to this location may be longer than usual. Please plan for extra travel time and check the [place] Department of Transportation [address] project website for delay, closure, and detour information.  [facility] ([facility]) is in a dense urban area with multiple transportation and parking options. You may wish to review options for  service and self-parking in more detail on the [facility] s website at [email or web address].  Please proceed to the third floor at the [facility].  This appointment is in a hospital-based location. Before your visit, you may want to check with your insurance company for coverage and referral options, including cost differences between services provided in different clinic settings. For more information visit this link on the [employer] Website: [email or web address]              6/10/2025  9:40 AM RETURN CARDIOLOGY 40 min [facility] Dilma Zee APRN CNP    Location Instructions:     [facility] has 2 buildings on its campus. Please visit us at [address] [place] [address] and enter the building with the numbers [id number] on it.               6/18/2025 11:00 AM OFFICE VISIT 20 min [facility]/OB Amelia Johnson DO    Location Instructions:     [facility] is located at [address] [place] [address] E. in [place]. This is just over a mile west of the Highway  exit off of [address]. If traveling east on [address] , turn south on [address] to access the parking lot; if traveling west, turn south on [address], then east on [address].               6/24/2025  1:00 PM RETURN DIABETES 30 min JYOTSNA COON  DIABETES Valeri Gomez PA-C    Location Instructions:     Due to road construction on [address], travel times to this location may be longer than usual. Please plan for extra travel time and check the [facility] [address] project website for delay, closure, and detour information.  The [facility] ([facility]) is in a dense urban area with multiple transportation and parking options. You may wish to review options for  service and self-parking in more detail on the [facility] website at [email or web address].              7/8/2025 10:30 AM LAB 15 min [facility] LABORATORY UC LAB    Location Instructions:     Due to road construction on [address], travel times to this location may be longer than usual. Please plan for extra travel time and check the [facility] [address] [place] website for delay, closure, and detour information.  The [facility] ([facility]) is in a dense urban area with multiple transportation and parking options. You may wish to review options for  service and self-parking in more detail on the [facility] website at [email or web address].              7/8/2025 11:20 AM CT CHEST/ABDOMEN/PELVIS W 20 min [facility] CT UCSCCT1    Location Instructions:     Due to road construction on [address], travel times to this location may be longer than usual. Please plan for extra travel time and check the [facility] [address] [place] website for delay, closure, and detour information.  The [facility] ([facility]) is in a dense urban area with multiple transportation and parking options. You may wish to review options for  service and self-parking in more detail on the [facility] website at [email or web address].              7/14/2025  1:00 PM RETURN CCSL 45 min UC ONCOLOGY ADULT Dayanna Das, APRN CNP    Location Instructions:     Due to road construction on [address], travel times to this location may be longer than  usual. Please plan for extra travel time and check the [facility] [employer] project website for delay, closure, and detour information.  The [facility] ([facility]) is in a dense urban area with multiple transportation and parking options. You may wish to review options for  service and self-parking in more detail on the [facility] website at [email or web address]/[facility].  This appointment is in a hospital-based location. Before your visit, you may want to check with your insurance company for coverage and referral options, including cost differences between services provided in different clinic settings. For more information visit this link on the [facility] Website: [email or web address]              7/23/2025  1:15 PM NEW DERMATOLOGY 15 min [place] DERMATOLOGY Дмитрий Willard MD    Location Instructions:     The medical center is located at [address] (between [address] and [address]  in [place], four miles north of [place]).              9/2/2025 12:00 AM CARDIAC DEVICE CHECK - REMOTE 30 min  CARDIAC SERVICES  ICD REMOTE    Location Instructions:     Due to road construction on -, travel times to this location may be longer than usual. Please plan for extra travel time and check the [facility] [employer] [facility] project website for delay, closure, and detour information.  The [facility] ([facility]) is in a dense urban area with multiple transportation and parking options. You may wish to review options for  service and self-parking in more detail on the [facility] website at [email or web address]/[facility].  Please proceed to the third floor at the [facility].  This appointment is in a hospital-based location. Before your visit, you may want to check with your insurance company for coverage and referral options, including cost differences between services provided in different clinic settings. For more information visit this link on the  [employer] Website: [email or web address]              9/4/2025 11:00 AM MTM RETURN 30 min [facility] NEURO Morenita Delgado, PharmD    Location Instructions:     Due to road construction on [address], travel times to this location may be longer than usual. Please plan for extra travel time and check the [place] Department of Transportation [address] project website for delay, closure, and detour information.  The [facility] ([facility]) is in a dense urban area with multiple transportation and parking options. You may wish to review options for  service and self-parking in more detail on the [facility] s website at [email or web address].  This appointment is in a hospital-based location. Before your visit, you may want to check with your insurance company for coverage and referral options, including cost differences between services provided in different clinic settings. For more information visit this link on the [employer] Website: [email or web address]                             Coding statement:   Medical Decision Making:  #  Chronic progressive medical conditions addressed  - see above --   Review and/or interpretation of unique test or documentation from a provider outside of neurology yes   Independent historian provided additional details  yes I  Prescription drug management and review of potential side effects and/or monitoring for side effects  -- see above ---  Health impacted by social determinants of health  no    I have reviewed the note as documented above.  This accurately captures the substance of my conversation with the patient and total time spent preparing for visit, executing visit and completing visit on the day of the visit:  30 minutes.  The portion of this total time included face to face time     The longitudinal plan of care for Arnaldo Henderson was addressed during this visit. Due to the added complexity in care, I will continue to support Arnaldo Henderson in the  subsequent management of this condition(s) and with the ongoing continuity of care of this condition(s).      Juan Jose Armenta MD     ______________________________________    Last visit date and details:             ______________________________________      Patient was asked about 14 Review of systems including changes in vision (dry eyes, double vision), hearing, heart, lungs, musculoskeletal, depression, anxiety, snoring, RBD, insomnia, urinary frequency, urinary urgency, constipation, swallowing problems, hematological, ID, allergies, skin problems: seborrhea, endocrinological: thyroid, diabetes, cholesterol; balance, weight changes, and other neurological problems and these were not significant at this time except for   Patient Active Problem List   Diagnosis    Retained foreign body    Wound infection    Acute bilateral low back pain without sciatica    Adenomatous polyp of colon    Atherosclerotic heart disease of native coronary artery without angina pectoris    Cancer of skin of scrotum (H)    Cervicalgia    Gastroesophageal reflux disease without esophagitis    History of cholecystectomy    History of coronary artery bypass surgery    Hyperlipidemia, unspecified    Hypertension    Gait instability    Long term (current) use of aspirin    Mixed conductive and sensorineural hearing loss    Generalized muscle weakness    Need for assistance with personal care    Other hydrocele    Parkinson's disease with dyskinesia, unspecified whether manifestations fluctuate (H)    Physical deconditioning    Pseudocyst of pancreas    Sensorineural hearing loss (SNHL) of right ear with unrestricted hearing of left ear    Type 2 diabetes mellitus with hyperglycemia, with long-term current use of insulin (H)    Neuroendocrine carcinoma of small bowel (H)    Failure to thrive in adult    Hyponatremia    Chronic right-sided low back pain without sciatica    Abdominal pain, generalized    Other secondary neuroendocrine tumors (H)     "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)    Weakness    Second degree AV block    History of Parkinson's disease    Orthostatic hypotension    Hypotension    Bradycardia    Parkinson's disease with dyskinesia without fluctuating manifestations (H)    Family history of parkinsonism    Cardiac pacemaker in situ    Seborrheic dermatitis    Lumbar spondylosis    Chronic bilateral thoracic back pain    Osteoarthritis of spine with radiculopathy, thoracic region    Spinal stenosis of lumbar region with neurogenic claudication    Osteopenia of multiple sites    Heart block AV third degree (H)    Bradykinesia    Impairment of balance    Acquired absence of organ    Digestive system disorder    Long term current use of anticoagulant therapy    Long term current use of insulin (H)    Mild protein-calorie malnutrition (weight for age 75-89% of standard)    Type 2 diabetes mellitus without complication (H)    Idiopathic acute pancreatitis    Atherosclerosis of coronary artery without angina pectoris    Neck pain    Muscle weakness    Hyperlipidemia    Essential hypertension    Long term current use of aspirin          Allergies   Allergen Reactions    Ciprofloxacin Other (See Comments), Hives, Itching, Rash and Unknown     Other reaction(s): Other (see comments)  Oral swelling per [facility]        Dilaudid [Hydromorphone] Rash    Morphine Rash     rash    Penicillins Rash     aka ancef/ rash      Clindamycin Itching and Rash    Oxycodone Rash     \"HORRIBLE, SEVERE RASH MAYBE CAUSED BY OXY\"     Past Surgical History:   Procedure Laterality Date    CA ANESTH CABG W/PUMP      CHOLECYSTECTOMY  2022    COLONOSCOPY N/A 01/12/2023    Procedure: colonoscopy with fluroscopy;  Surgeon: Ayden Fraire MD;  Location:  OR    ENDOSCOPIC RETROGRADE CHOLANGIOPANCREATOGRAM N/A 05/18/2023    Procedure: ENDOSCOPIC RETROGRADE CHOLANGIOPANCREATOGRAPHY, WITH  CYSTDUODENOSTOMY STENTS X2 REMOVAL;  Surgeon: Cedric Saldana " MD Arian;  Location: [facility] OR    ENT SURGERY      EP PACEMAKER DEVICE & LEAD IMPLANT- RIGHT ATRIAL & RIGHT VENTRICULAR N/A 02/28/2024    Procedure: Pacemaker Device & Lead Implant - Right Atrial & Right Ventricular;  Surgeon: Jenna Hernandez MD;  Location:  HEART CARDIAC CATH LAB    LAPAROSCOPY DIAGNOSTIC (GENERAL) N/A 02/20/2023    Procedure: LAPAROSCOPY, DIAGNOSTIC; RETRIEVAL OF MIGRATED STENT;  Surgeon: Joseluis Lima MD;  Location: [facility] OR    ORTHOPEDIC SURGERY      OTHER SURGICAL HISTORY      skin cancer ? from scrotum    PANCREATECTOMY PEUSTOW N/A 06/30/2023    Procedure: Open distal pancreactectomy with splenectomy, lysis of adhesions, resection of proximal illeum;  Surgeon: Ashvin Haider MD;  Location:  OR    LA CABG, ARTERIAL, TWO  2003     Past Medical History:   Diagnosis Date    Acute pancreatitis 04/18/2022    Formatting of this note might be different from the original. Formatting of this note might be different from the original. Added automatically from request for surgery [id number] Formatting of this note might be different from the original. Added automatically from request for surgery [id number]  Formatting of this note might be different from the original. Added automatically from request for surgery     CAD (coronary artery disease)     CABG    Cholecystitis, acute 07/05/2023    Diabetes mellitus, type 2 (H) 2013    Family history of parkinsonism 02/14/2024    Gastroesophageal reflux disease     Hypercholesteremia     Hypertension     Mixed conductive and sensorineural hearing loss of both ears     Mixed hearing loss     Pancreatic duct stricture     Pancreatitis     Parkinson disease (H)     Parkinson's disease, unspecified whether dyskinesia present, unspecified whether manifestations fluctuate (H) 02/14/2024    Seborrheic dermatitis 03/22/2024    Second degree AV block     Surgical site infection 07/26/2023     Social History     Socioeconomic History    Marital status:       Spouse name: Not on file    Number of children: Not on file    Years of education: Not on file    Highest education level: Not on file   Occupational History    Not on file   Tobacco Use    Smoking status: Former     Types: Cigarettes    Smokeless tobacco: Never    Tobacco comments:     Quit 10 years ago - quit a few times; started at age 12 or 13 and then stopped at 26 years and then stopped for 15 years got  and started smoking again   Vaping Use    Vaping status: Never Used   Substance and Sexual Activity    Alcohol use: Not Currently     Comment: quit 2 years ago    Drug use: Not Currently     Comment: used to smoke marijuana when young; rare use    Sexual activity: Not on file   Other Topics Concern    Not on file   Social History Narrative    Not on file     Social Drivers of Health     Financial Resource Strain: Low Risk  (12/18/2024)    Financial Resource Strain     Within the past 12 months, have you or your family members you live with been unable to get utilities (heat, electricity) when it was really needed?: No   Food Insecurity: Low Risk  (12/18/2024)    Food Insecurity     Within the past 12 months, did you worry that your food would run out before you got money to buy more?: No     Within the past 12 months, did the food you bought just not last and you didn t have money to get more?: No   Transportation Needs: Low Risk  (12/18/2024)    Transportation Needs     Within the past 12 months, has lack of transportation kept you from medical appointments, getting your medicines, non-medical meetings or appointments, work, or from getting things that you need?: No   Physical Activity: Unknown (12/18/2024)    Exercise Vital Sign     Days of Exercise per Week: 1 day     Minutes of Exercise per Session: Not on file   Stress: Stress Concern Present (12/18/2024)    [place] of [employer] - Occupational Stress Questionnaire     Feeling of Stress : To some extent   Social Connections:  Unknown (12/18/2024)    Social Connection and Isolation Panel [NHANES]     Frequency of Communication with Friends and Family: Not on file     Frequency of Social Gatherings with Friends and Family: Once a week     Attends Sabianist Services: Not on file     Active Member of Clubs or Organizations: Not on file     Attends Club or Organization Meetings: Not on file     Marital Status: Not on file   Interpersonal Safety: Not At Risk (8/6/2022)    Received from [facility]    Humiliation, Afraid, Rape, and Kick questionnaire     Fear of Current or Ex-Partner: No     Emotionally Abused: No     Physically Abused: No     Sexually Abused: No   Housing Stability: Low Risk  (12/18/2024)    Housing Stability     Do you have housing? : Yes     Are you worried about losing your housing?: No       Drug and lactation database from the [place] National Library of Medicine:  http://toxnet.nlm.nih.gov/cgi-bin/sis/htmlgen?LACT      B/P: Data Unavailable, T: Data Unavailable, P: Data Unavailable, R: Data Unavailable 0 lbs 0 oz  There were no vitals taken for this visit., There is no height or weight on file to calculate BMI.  Medications and Vitals not listed above were documented in the cart and reviewed by me.     Current Outpatient Medications   Medication Sig Dispense Refill    aspirin (ASA) 81 MG EC tablet Take 81 mg by mouth daily      atorvastatin (LIPITOR) 40 MG tablet Take 1 tablet (40 mg) by mouth daily 90 tablet 3    BD PEN NEEDLE LINDY 2ND GEN 32G X 4 MM miscellaneous USE TO INJECT INSULIN 4 TIMES A  each 3    bisacodyl (DULCOLAX) 5 MG EC tablet Take 2 tablets at 3 pm the day before your procedure. If your procedure is before 11 am, take 2 additional tablets at 11 pm. If your procedure is after 11 am, take 2 additional tablets at 6 am. For additional instructions refer to your colonoscopy prep instructions. 4 tablet 0    carbidopa-levodopa (SINEMET CR)  MG CR tablet Take 1 tablet by mouth at bedtime @9p  90 tablet 3    carbidopa-levodopa (SINEMET)  MG tablet 2.5@6a, 2@10a, 2@2p and 1@5p 675 tablet 3    Continuous Glucose Sensor (DEXCOM G7 SENSOR) MISC Change every 10 days. 9 each 11    dasiglucagon HCl (ZEGALOGUE) 0.6 MG/0.6ML SOAJ injection Inject 0.6 mg Subcutaneous once as needed (use for severe hypoglycemia) 1.2 mL 1    gabapentin (NEURONTIN) 300 MG capsule Take 2 capsules (600 mg) by mouth 3 times daily 540 capsule 3    insulin aspart (NOVOLOG PEN) 100 UNIT/ML pen Inject 20 Units subcutaneously 3 times daily as needed for high blood sugar (take 3 times/day as needed). 60 mL 2    Insulin Glargine-yfgn 100 UNIT/ML SOPN Inject 14 Units subcutaneously every morning AND 20 Units at bedtime. 50 mL 2    lipase-protease-amylase (ZENPEP) 50882-144509-844434 units CPEP Take 1 capsule by mouth 3 times daily (with meals). 90 capsule 11    losartan (COZAAR) 50 MG tablet Take 1 tablet (50 mg) by mouth daily 90 tablet 3    magnesium oxide (MAG-OX) 400 MG tablet Take 400 mg by mouth every other day      midodrine (PROAMATINE) 2.5 MG tablet Take 1 tablet (2.5 mg) by mouth daily. 90 tablet 1    Multiple Vitamin (MULTI-VITAMINS) TABS Take 1 tablet by mouth daily      omeprazole (PRILOSEC) 20 MG DR capsule Take 1 capsule (20 mg) by mouth daily 90 capsule 3    polyethylene glycol (GOLYTELY) 236 g suspension The night before the exam at 6 pm drink an 8-ounce glass every 15 minutes until the jug is half empty. If you arrive before 11 AM: Drink the other half of the Golytely jug at 11 PM night before procedure. If you arrive after 11 AM: Drink the other half of the Golytely jug at 6 AM day of procedure. For additional instructions refer to your colonoscopy prep instructions. 4000 mL 0    sertraline (ZOLOFT) 25 MG tablet Take 1 tablet (25 mg) by mouth daily. 30 tablet 3    traZODone (DESYREL) 50 MG tablet TAKE 0.5-1 TABLETS BY MOUTH AT BEDTIME. 90 tablet 1         Juan Jose Armenta MD

## 2025-04-16 ENCOUNTER — TELEPHONE (OUTPATIENT)
Dept: GASTROENTEROLOGY | Facility: CLINIC | Age: 70
End: 2025-04-16
Payer: MEDICARE

## 2025-04-16 NOTE — TELEPHONE ENCOUNTER
Pre visit planning completed.      Procedure details:    Patient scheduled for Colonoscopy on 5.1.25.     Arrival time: 1100. Procedure time 1230    Facility location: [facility]; [address], [address], [place] [address]. Check in location: Main entrance at registration desk.  *Disclaimer: Drivers are to check in with patient and stay on campus during procedure.     Sedation type: MAC    Pre op exam needed? No.    Indication for procedure: screening      Chart review:     Electronic implanted devices? Yes: Pacemaker    Recent diagnosis of diverticulitis within the last 6 weeks? No      Medication review:    Diabetic? Yes. Insulin: Consult with managing provider.     Anticoagulants? No    Weight loss medication/injectable? No.    Other medication HOLDING recommendations:  N/A      Prep for procedure:     Bowel prep recommendation: Standard Golytely. Bowel prep sent to      [id number] - [place] [id number]    Due to: diabetes    Procedure information and instructions sent via trevin Mora RN  Endoscopy Procedure Pre Assessment   [phone] option 3

## 2025-04-17 ENCOUNTER — THERAPY VISIT (OUTPATIENT)
Dept: PHYSICAL THERAPY | Facility: REHABILITATION | Age: 70
End: 2025-04-17
Payer: MEDICARE

## 2025-04-17 DIAGNOSIS — G20.B1 PARKINSON'S DISEASE WITH DYSKINESIA WITHOUT FLUCTUATING MANIFESTATIONS (H): ICD-10-CM

## 2025-04-17 DIAGNOSIS — M62.81 GENERALIZED MUSCLE WEAKNESS: ICD-10-CM

## 2025-04-17 DIAGNOSIS — R26.81 GAIT INSTABILITY: Primary | ICD-10-CM

## 2025-04-17 DIAGNOSIS — R26.89 IMPAIRMENT OF BALANCE: ICD-10-CM

## 2025-04-17 DIAGNOSIS — R25.8 BRADYKINESIA: ICD-10-CM

## 2025-04-17 NOTE — TELEPHONE ENCOUNTER
Attempted to contact patient in order to complete pre assessment questions.     No answer. Left message to return call to [phone] option 3.    Callback communication sent via [employer].    Zonia Saxena LPN

## 2025-04-17 NOTE — TELEPHONE ENCOUNTER
Patient's wife, Veronika, (on C2C), returning pre assessment call.     Veronika expresses concern regarding patient having endoscopy procedures with his history of pancreatitis.     Writer advised Veronika reach out to ordering provider, Dr. Lyn, to inquire about the risks/benefits of the colonoscopy. All questions addressed.    Veronika verbalized understanding and in agreement with plan, and states will call back after talking with care team.     Claudia Mora RN  Endoscopy Procedure Pre Assessment RN  [phone] option 3

## 2025-04-21 ENCOUNTER — THERAPY VISIT (OUTPATIENT)
Dept: PHYSICAL THERAPY | Facility: REHABILITATION | Age: 70
End: 2025-04-21
Payer: MEDICARE

## 2025-04-21 ENCOUNTER — PATIENT OUTREACH (OUTPATIENT)
Dept: GASTROENTEROLOGY | Facility: CLINIC | Age: 70
End: 2025-04-21

## 2025-04-21 DIAGNOSIS — R25.8 BRADYKINESIA: ICD-10-CM

## 2025-04-21 DIAGNOSIS — R26.89 IMPAIRMENT OF BALANCE: ICD-10-CM

## 2025-04-21 DIAGNOSIS — G20.B1 PARKINSON'S DISEASE WITH DYSKINESIA WITHOUT FLUCTUATING MANIFESTATIONS (H): ICD-10-CM

## 2025-04-21 DIAGNOSIS — M62.81 GENERALIZED MUSCLE WEAKNESS: ICD-10-CM

## 2025-04-21 DIAGNOSIS — R26.81 GAIT INSTABILITY: Primary | ICD-10-CM

## 2025-04-21 PROCEDURE — 97110 THERAPEUTIC EXERCISES: CPT | Mod: GP | Performed by: PHYSICAL THERAPIST

## 2025-04-21 PROCEDURE — 97112 NEUROMUSCULAR REEDUCATION: CPT | Mod: GP | Performed by: PHYSICAL THERAPIST

## 2025-04-21 NOTE — TELEPHONE ENCOUNTER
Talked to wife, reassured of plan with Dr Moreira, Wife ok with plan as scheduled with Dr Moreira.  ML

## 2025-04-21 NOTE — TELEPHONE ENCOUNTER
Called wife to discuss plan for follow up EGD with patient. He's scheduled correctly with Dr Moreira, but want clinic consult with him first, he doesn't have possibility for this. Called patients wife to discuss.     Left message    ML

## 2025-04-22 ENCOUNTER — RADIOLOGY INJECTION OFFICE VISIT (OUTPATIENT)
Dept: PHYSICAL MEDICINE AND REHAB | Facility: CLINIC | Age: 70
End: 2025-04-22
Attending: STUDENT IN AN ORGANIZED HEALTH CARE EDUCATION/TRAINING PROGRAM
Payer: MEDICARE

## 2025-04-22 VITALS
HEART RATE: 78 BPM | BODY MASS INDEX: 31.83 KG/M2 | HEIGHT: 68 IN | WEIGHT: 210 LBS | TEMPERATURE: 98.2 F | DIASTOLIC BLOOD PRESSURE: 78 MMHG | OXYGEN SATURATION: 93 % | SYSTOLIC BLOOD PRESSURE: 146 MMHG | RESPIRATION RATE: 16 BRPM

## 2025-04-22 DIAGNOSIS — E11.65 TYPE 2 DIABETES MELLITUS WITH HYPERGLYCEMIA, WITH LONG-TERM CURRENT USE OF INSULIN (H): Primary | ICD-10-CM

## 2025-04-22 DIAGNOSIS — M54.16 LUMBAR RADICULOPATHY: ICD-10-CM

## 2025-04-22 DIAGNOSIS — Z79.4 TYPE 2 DIABETES MELLITUS WITH HYPERGLYCEMIA, WITH LONG-TERM CURRENT USE OF INSULIN (H): Primary | ICD-10-CM

## 2025-04-22 LAB — GLUCOSE SERPL-MCNC: 197 MG/DL (ref 70–99)

## 2025-04-22 PROCEDURE — 64483 NJX AA&/STRD TFRM EPI L/S 1: CPT | Mod: 50 | Performed by: STUDENT IN AN ORGANIZED HEALTH CARE EDUCATION/TRAINING PROGRAM

## 2025-04-22 PROCEDURE — 82962 GLUCOSE BLOOD TEST: CPT | Performed by: STUDENT IN AN ORGANIZED HEALTH CARE EDUCATION/TRAINING PROGRAM

## 2025-04-22 RX ORDER — LIDOCAINE HYDROCHLORIDE 10 MG/ML
INJECTION, SOLUTION EPIDURAL; INFILTRATION; INTRACAUDAL; PERINEURAL
Status: COMPLETED | OUTPATIENT
Start: 2025-04-22 | End: 2025-04-22

## 2025-04-22 RX ORDER — BUPIVACAINE HYDROCHLORIDE 2.5 MG/ML
INJECTION, SOLUTION EPIDURAL; INFILTRATION; INTRACAUDAL; PERINEURAL
Status: COMPLETED | OUTPATIENT
Start: 2025-04-22 | End: 2025-04-22

## 2025-04-22 RX ORDER — DEXAMETHASONE SODIUM PHOSPHATE 10 MG/ML
INJECTION, SOLUTION INTRAMUSCULAR; INTRAVENOUS
Status: COMPLETED | OUTPATIENT
Start: 2025-04-22 | End: 2025-04-22

## 2025-04-22 RX ADMIN — BUPIVACAINE HYDROCHLORIDE 2 ML: 2.5 INJECTION, SOLUTION EPIDURAL; INFILTRATION; INTRACAUDAL; PERINEURAL at 14:23

## 2025-04-22 RX ADMIN — DEXAMETHASONE SODIUM PHOSPHATE 10 MG: 10 INJECTION, SOLUTION INTRAMUSCULAR; INTRAVENOUS at 14:23

## 2025-04-22 RX ADMIN — LIDOCAINE HYDROCHLORIDE 2 ML: 10 INJECTION, SOLUTION EPIDURAL; INFILTRATION; INTRACAUDAL; PERINEURAL at 14:22

## 2025-04-22 ASSESSMENT — PAIN SCALES - GENERAL
PAINLEVEL_OUTOF10: MODERATE PAIN (4)
PAINLEVEL_OUTOF10: NO PAIN (0)

## 2025-04-22 NOTE — PATIENT INSTRUCTIONS
Follow-up visit with Dr. Fong in 2-4 weeks to discuss injection outcome and determine care plan going forward.    Please be advised that your blood glucose levels may be increased for the next 5-7 days as a result of the steroid you received with your injection today.  It is recommended that you monitor your blood glucose levels closely over the next week and direct any additional questions regarding your blood glucose management to your primary doctor.    Learning About Lumbar Epidural Steroid Injections  What is a lumbar epidural steroid injection?     A lumbar epidural injection is a shot into the epidural space--the area in your back around the spinal cord. The shot may help reduce pain, tingling, or numbness in your back, buttock, or leg. The shot may have a steroid to reduce pain and swelling and a local anesthetic to numb nerves.  How is a lumbar epidural steroid injection done?  The doctor may use an imaging test before or during your injection. This can be an MRI, a CT scan, or an X-ray. These tests can show where your nerve problems are.  After finding the right spot, the doctor may inject a numbing medicine into the skin where you will get the steroid injection. Then the needle for the steroid is put into the numbed area. You may feel some pressure. You could feel some stinging or burning during the injection.  How long does an epidural steroid injection take?  The procedure will take 5 to 15 minutes. You will go home about an hour later.  What can you expect after a lumbar epidural steroid injection?  If your injection had local anesthetic and a steroid, your legs may feel heavy or numb right after. You will probably be able to walk. But you may need to be extra careful. Take care not to lose your balance, and be sure to follow your doctor's instructions.  If your injection contained local anesthetic, you may feel better right away. But this pain relief will last only a few hours. Your pain will  probably return. This is because the steroids have not started working yet. Before the steroids start to work, your back may be sore for a few days.  These injections don't always work. When they do, it takes 1 to 5 days. This pain relief can last for several days to a few months or longer.  You may want to do less than normal for a few days. But you may also be able to return to your daily routine.  Some people are dizzy or feel sick to their stomach after getting this injection. These symptoms usually don't last very long.  If your pain is better, you may be able to keep doing your normal activities or physical therapy. But try not to overdo it, even if your back pain has improved a lot. If your pain is only a little better or if it comes back, your doctor may recommend another injection in a few weeks. If your pain has not changed, talk to your doctor about other treatment choices.  Follow-up care is a key part of your treatment and safety. Be sure to make and go to all appointments, and call your doctor if you are having problems. It's also a good idea to know your test results and keep a list of the medicines you take.       DISCHARGE INSTRUCTIONS    During office hours (8:00 a.m.- 4:00 p.m.) questions or concerns may be answered  by calling [facility] Nurses at  [phone].  Messages received after hours will be returned the following business day.      In the case of an emergency, please dial 911 or seek assistance at the nearest Emergency Room/Urgent Care facility.     All Patients:    You may experience an increase in your symptoms for the first 2 days (It may take anywhere between 2 days - 2 weeks for the steroid to have maximum effect).    You may use ice on the injection site, as frequently as 20 minutes each hour if needed.    You may take your pain medicine.    You may continue taking your regular medication after your injection. If you have had a medial branch block you may resume pain  medication once your pain diary is completed.    You may shower. No swimming, tub bath, or hot tub for 48 hours.  You may remove your bandaid/bandage as soon as you are home.    You may resume light activities, as tolerated.    Resume your usual diet as tolerated.    If you were told to hold any blood thinning medications you may resume taking them 24 hours after your procedure as prescribed.    It is strongly advised that you do not drive for 1-3 hours post injection.    If you have had oral sedation:  Do not drive for 8 hours post injection.        POSSIBLE STEROID SIDE EFFECTS (If steroid/cortisone was used for your procedure    Swelling of the legs              Skin redness (flushing)     Mouth (oral) irritation   Increased blood sugar (glucose) levels            Sweats                    Mood changes  Headache  Sleeplessness  Weakened immune system for up to 14 days, which could increase the risk of kenna the COVID-19 virus and/or experiencing more severe symptoms of the disease, if exposed.  Decreased effectiveness of vaccines if given within 2 weeks of the steroid.    -If you experience these symptoms, it should only last for a short period         POSSIBLE PROCEDURE SIDE EFFECTS    Increased Pain           Increased numbness/tingling      Nausea/Vomiting          Bruising/bleeding at site      Redness or swelling                                              Difficulty walking      Weakness           Fever greater than 100.5    -Call the [facility] if you are concerned    *In the event of a severe headache after an epidural steroid injection that is relieved by lying down, please call the [facility] to speak with a clinical staff member*

## 2025-04-25 ENCOUNTER — OFFICE VISIT (OUTPATIENT)
Dept: NEUROLOGY | Facility: CLINIC | Age: 70
End: 2025-04-25
Payer: MEDICARE

## 2025-04-25 VITALS
SYSTOLIC BLOOD PRESSURE: 84 MMHG | BODY MASS INDEX: 31.75 KG/M2 | HEIGHT: 68 IN | WEIGHT: 209.5 LBS | HEART RATE: 78 BPM | OXYGEN SATURATION: 95 % | DIASTOLIC BLOOD PRESSURE: 55 MMHG

## 2025-04-25 DIAGNOSIS — G24.1 DYSTONIA, TORSION, FRAGMENTS OF: ICD-10-CM

## 2025-04-25 DIAGNOSIS — F41.1 GAD (GENERALIZED ANXIETY DISORDER): ICD-10-CM

## 2025-04-25 DIAGNOSIS — G20.A1 PARKINSON'S DISEASE, UNSPECIFIED WHETHER DYSKINESIA PRESENT, UNSPECIFIED WHETHER MANIFESTATIONS FLUCTUATE (H): Primary | ICD-10-CM

## 2025-04-25 RX ORDER — SERTRALINE HYDROCHLORIDE 25 MG/1
25 TABLET, FILM COATED ORAL DAILY
Qty: 90 TABLET | Refills: 3 | Status: SHIPPED | OUTPATIENT
Start: 2025-04-25

## 2025-04-25 ASSESSMENT — PAIN SCALES - GENERAL: PAINLEVEL_OUTOF10: MODERATE PAIN (4)

## 2025-04-25 NOTE — PATIENT INSTRUCTIONS
Medications      6a  10am 2p 5p  9p      Acetaminophen tylenol 325mg prn              Aspirin 81mg   1             Atorvastatin lipitor 40mg   1             Carbidopa/levodopa Sinemet CR 50/200         1     CArbidopa/levodopa sinemet 25/100 2.5 2 2 1       Dasiglucagon zegalogue 0.6mg/0.6ml prn             Gabapentin neurontin 300mg 2 2   2       Glucagon baqsimi 3mg/dose nasal  prn              Insulin aspart novolog sliding              Insulin aspart novolog               Insulin glargine  15        30      Lipase protease amylase zenpep  3/day             Losartan cozaar 50mg 1             Magnesium oxide 400mg  qod              Midodrine proamatine 2.5mg 1        MVI 1              Omeprazole prilosec 20mg  1             Sertraline zoloft 25mg     1    Trazodone desyrel 50mg         1                                            Plan:    No swallowing issues. Has some drooling at night    No dysphagia, constipation   He has a colonoscopy next week    Bladder - none    Partial pancreatectomy  Pseudocyst pancreas     Diabetes  A1c was 8.2 on 11/6/2023  7.6 most recently  Lab Results   Component Value Date    A1C 8.3 02/19/2025    A1C 8.4 11/01/2024    A1C 8.2 07/08/2024    A1C 7.2 05/09/2024    A1C 8.2 11/16/2023     Insulin  Intolerance to metformin  Seen nutritionist for this.      Lipid disorder   Atorvastatin lipitor 40mg      Not driving much - no problems.     No falls.   He has not had clear wearing off.   Therapy has been very helpful  Therapy has been helpful  He is not very active in the winter  Was good to be in [place]   He has metro mobility.    Drove down from [place]  Has some cramping of his left hand  He had problems with playing drums and has to open the left hand with his right hand or think about opening it up.  He has problems most commonly in the early morning before he gets up.     He got a cortisone injection on Tuesday for his back and he has not yet had relief but it is too  "early    He has been on midodrine/proamatine 2.5mg started a few weeks ago  Cardiology and pcp recommended starting this.   BP (!) 84/55 (BP Location: Right arm, Patient Position: Sitting, Cuff Size: Adult Regular)   Pulse 78   Ht 1.718 m (5' 7.64\")   Wt 95 kg (209 lb 8 oz)   SpO2 95%   BMI 32.20 kg/m    Losartan cozaar 50mg  Midodrine proamatine 2.5mg     CT angiogram 1/29/2025  1.  Known severe native LAD coronary disease status post 2-vessel CABG  (LIMA-LAD SVG-DIAG.)  2.  Widely patent LIMA-LAD graft.   3.  100% occluded SVG-DIAG graft.  4.  Moderate native LCx stenosis.  5.  Please review the separate Radiology report for incidental  noncardiac findings.    Doppler 3/4/2025  1. RIGHT LOWER EXTREMITY: ARI is Non-diagnostic indicating vessel calcification. Toe Pressures are Mildly abnormal but adequate for wound healing with toe pressures of 66 mmHg.     2. LEFT LOWER EXTREMITY: ARI is Non-diagnostic indicating vessel calcification. Toe Pressures are Mildly abnormal but adequate for wound healing with toe pressures of 59 mmHg.    Sleep is better.   He slept till 730am     He has followup of his neuroendocrine cancer in July 2025    He is taking an anxiety medication. He is taking 25mg at night - at 9 or 10p  Went to [place] in February and did well.     He is scheduled for ANS testing but he has not had instructions about medications.   Med information needed about    1. Losartan  ? 24 hr  2. Midodrine ??  3. Sertraline ? 48 h  4. Trazodone ? 48h     Patients undergoing the Adult Autonomic Reflex Screen with Dr. Sen will need to hold the following medications:    Hold for 48 hours prior to test:  Anticholinergics  Antidepressants  Antihistamine  Sympathomimetic (e.g. stimulants)  muscle relaxants     Hold for 24 hours prior to test:  Alpha blockers  Beta blockers  Vasodilators  Antihypertensives  Diuretics  Opioids      Fasting instructions:   Patients should abstain from food or nicotine for at " least 4 hours prior to test   Patients should abstain from caffeine or alcohol for at least 12 hours prior to test    Other instructions:  Patients should not use antiperspirants prior to test (can interfere with sweat measurement)  Patients should wear comfortable loose fitting clothes (shorts or pants that can be pulled up past the knee & a non-restrictive short sleeve shirt is recommended)  Patients [place] 5th [place] [facility] [place] The check in desk is just as you exit the elevators.    [facility]   [address] [place] [address] in [place], [place].       In summary  Anxiety is better with medication  He has a scheduled ANS testing and will instructions  Last 1/21/2025 9/2025 [facility] visit with Morenita Delgado  Return to see Candida in 3-6 months.

## 2025-04-25 NOTE — NURSING NOTE
Chief Complaint   Patient presents with    Parkinson    RECHECK     3-4 month follow up      Vitals were taken and medications were reconciled.   Kamari Harman, EMT  9:06 AM

## 2025-04-25 NOTE — LETTER
"2025       RE: Arnaldo Henderson  [address] [facility] [address]     Dear Colleague,    Thank you for referring your patient, Arnaldo Henderson, to the [facility] NEUROLOGY CLINIC [place] at [facility]. Please see a copy of my visit note below.        Diagnosis/Summary/Recommendations:    PATIENT: Arnaldo Henderson  69 year old male     : 1955    ELISA: 2025       MRN: [id number]  [address]    [place] [facility] [address]     [phone] ()   [phone] ()      [email or web address]     Veronika Pena spouse  [phone]     Keren balbuena daughter     Genetic testing ?   Family history of parkinsonism in mother     Parkinson's disease with dyskinesia, unspecified whether manifestations fluctuate, Ref by EVERT SAMUEL Recs in [facility], Sched per Spouse Veronika     Seen by Parashos - notes below     [facility]      Assessment:  (G20.A1) Parkinson's disease, unspecified whether dyskinesia present, unspecified whether manifestations fluctuate  (primary encounter diagnosis)  Family history of parkinsonism - mother      From Parashos recent visit  1. Parkinson's disease, stage 2  2. Dyskinesia due to Parkinson's disease.  3. Motor fluctuations likely related to protein intake interfering with the absorption of levodopa, improved since taking the levodopa on an empty stomach.     5am up line up pills  6am takes carbidopa/levodopa and insulin  Then eats breakfast - generally he is waiting a bit - eating between 630 or 7am so waiting 30 - 60 minutes.      10 or 11a - head starts to feel numb - not light headedness and legs are heavy.   He has problems moving his feet and most likely he is wearing off/worn off  He has a lot of \"freezing\" throughout the day.   The morning is the best time.   He has freezing  He is a \"different person\" later in the day and evenings   Falls asleep early in evening - 730 and 8pm watching " "TV  Gets up at 9pm and/or wife wakes him up and has to help him get into bed  Has problems sleeping  Has a bed rail to help him move  He is sleeping in a separate bed as he is at various angles in the bed.      Started 2.5 years ago in [place] - Parkinson has progressed \"tremendously\"   Dr Resendez has not made medication changes.      11am pill and takes 1 hour or 1.5 hours to get the dose to work   4p      Review of diagnosis    Parkinson disease  Duration 4 years or so.   Side onset: right  Right handed.   Symptoms - had gait problems  - lack of arm swing, shuffling   Symptoms - sleep issues were early issues as well as lack of smell  Clinical diagnosis   No specific testing     Avoidance of dopamine blockers   Not taking     Motor complication review   Wearing off   ?dyskinesias - not  clear if he has this but may be some facial dyskinesias per his wife.  Not clear if there is a relationship between the medication and the dyskinesias.   Freezing that may be aggravated by anxiety   Has more shuffling     Review of Impulse control disorders   Always hungry   Seen by Dr. Haider and discussed weight gain  No other ICD issues.      Review of surgical or medication options   reviewed     Gait/Balance/Falls   No falls.   Has freezing  Not using assistive device     Exercise/Therapy performed/offered   Physical therapy - after visit today  Abundio Barbosa PT ?  Big [facility] ?[facility]      Cognitive/Driving   He is not concerned  Wife has been a bit concerned - left the garage door open   He had an amazing memory when they met but has short term memory problems  Has not had cognitive testing  - can consider this.   Driving  - very little - short distances; wife prefers that he not drive.   He is managing his own medicine and insulin, etc.   Organizes his pill containers, etc.      Mood   Anxiety is disabling   QTc was okay 2/28/2024  He may have been on citalopram/celexa - details not clear in chart.   He " stopped it and threw it out.      Therapist - has not worked with a therapist  He has problems waiting with other people when they are shopping.      Denies depression     Wife Clarisa on the call today.      Profession in the past -- 7 years retired.   He worked in manufacturing things, all sorts of jobs.      Using metro mobility at 1200 pm for therapy      Going to [place] and leaving Friday and will be there for one week and will be selling their place as it is too difficulty to travel. Bought one way tickets as the sale is still not final.      1st marriage  2nd marriage - clarisa - having surgery with Dr. Umaña   Had an cerebral angiogram - they want to address the 80% carotid artery stenosis.      Hallucinations/delusions   Denies   No illusions     Sleep   Naps in front of tv  Gets up to go to bed at 9pm   Sleeps for 4 solid hours and then toss and turns for 4 hours  Get up at 4 or 5am and then lays in bed till 4 or 430 or 5a and then gets up  Sleeping in separate bed from spouse  Is angled over the bed  He has a railing.   He may move around in the bed   possible RBD/dream enactment behavior - per his wife - may be less  He sleeps alone now - sleep issue prompted a consultation.   Snoring - sometimes - recently no but not clear.   Sleep study - has not done one.      Bladder/Renal/Prostate/Gyn/Other   Urinary frequency  Nocturia 2/noc  No control issues   PSA was normal about 2 y ears ago.      GI/Constipation/GERD   Acute cholecystitis  Cholecystectomy  Malignant neuroendocrine tumor   GERD  Was put on omeprazole long ago and remains on this.   He had problems with his esophagus and had to be dilated, etc. He had ripples  This was 20 years ago, etc.   He has not had problems like this.  No coughing   Weight issue ?  Failure to thrive   Malnutrition  Lipase/protease/amylase  He has a bowel movement every 3 days - long standing  Not taking anything for his bowels per se  Encouraged to be active    "  ENDO/Lipid/DM/Bone density/Thyroid  Partial pancreatectomy  Pseudocyst pancreas     Diabetes  A1c was 8.2 on 11/6/2023  Insulin  Intolerance to metformin  Seen nutritionist for this.   Needs to have another one done.      Lipid disorder   Atorvastatin lipitor 40mg      Cardio/heart/Hyper or Hypotensive   Hypotension  pacemaker  2/28/2024  Bradycardia  Bypass surgery x2 2003  Losartan cozaar 50mg restarted after hospital     Vision/Dry Eyes/Cataracts/Glaucoma/Macular   Wears glasses  Had recent visual evaluation   No clear retinopathy from diabetes     Heme/Anticoagulation/Antiplatelet/Anemia/Other  Aspirin 81mg daily   No other blood thinners.      ENT/Resp  Hearing loss   ?former smoker - tobacco - off and on   Smell perception - never been good.      Skin/Cancer/Seborrhea/other  Skin cancer of scrotum  Seborrheic dermatitis   Few spots on his scalp     Musculoskeletal/Pain/Headache  Neck pain  Back issues since \"surgery\" - like a bubble that moves around his back and is intermittent.      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      His tumor is well-differentiated with a Ki-67 index of 10%. He has not had any symptoms referable to his cancer and at our last evaluation had only small volume kiersten disease in the mesentery.      S/p pacemaker  Ongoing blood pressure issues - highs and lows   Losartan cozaar 50mg was restarted and had been on 25mg in hospital  They are working with pcp and possibly cardiology  157 systolic 5pm   Only checking blood pressure in the evening.   He does not check morning blood pressures  QTc was 402ms on ECG on 2/28/2024  Need ongoing monitoring of blood pressure - highs and lows      Parkinson genetics study with family history of parkinson in mother   And son has orthostatic hypotension     Pharmacy " "(MTM) consultation and medication management  Please call the scheduling number @ [phone] to set up an appointment with pharmacists Cheyanne Harrell or Morenita Delgado.      Due to wearing off he would probably benefit from a shortening in the dose interval changing from 5 hour to 4 hour interval or so.      Sinemet 25/100 (carbidopa/levodopa) 2 tabs @ 6am, 2 @10a, 2@2p and 1@5p  Sinemet 50/200 (carbidopa/levodopa) at 9pm     Neuropsychological evaluation - baseline cognitive evaluation  Will take time to get scheduled.      Return to see Candida Jackson NP in 3 months.      Anxiety has increased and impacts social life  May have been citalopram celexa in the past  Has not seen a therapist and wife has been encouraging this.   He may be open to seeing someone  Has not started on anxiety medication recently.      [facility]     PDgeneration results pending     Admission  Leg swelling   Other features.      Arnaldo Henderson is a 69 year old male who receives support for Parkinson's disease. Nurse is calling to follow-up per ER recommendations.  Background: Patient is taking: Confirmed with patient/spouse Medications 6 am 10 am 2 pm 5 pm HS Sinemet 25/100 mg 2 2 2 1  Sinemet 50/200 mg CR     1 Trazodone 50 mg     0.5  New or changed medications: Insulin dosing goes up and down, started trazodone over a month ago which has helped sleep  Assessment: He will follow-up with PCP for leg swelling, will be getting an ultrasound of his legs. He has \"spells\" of legs feeling very heavy, difficulty moving them, head feels heavy, tremors and anxiety get worse during this time. Today it started at 9AM. He confirms he took his sinemet on time at 6AM. Spells usually occur about an hour before his next dose is due, has worsened over the last few weeks. He would like to see Dr. Armenta in person.  Plan/recommendation: Will have scheduling move his appointment with Dr. Armenta up to Friday 8/23 at 9AM IN PERSON.      "     Parkinson's Disease:       Lightheadedness:       Memory Concern:      Hyperventilating slightly upon return to room. He states this was triggered by talking about difficult childhood memory with his wife.      Grade 2 rigidity in BUE. Good finger taps, hand open close, pronation supination.     He has a blood pressure diary. Lowest documented is 76/53. Highest 164/85.     He has been having episodes where his head feels foggy (initially describes as numb but not numb). His arms will feel heavy and stiff. They can move ok but his joints feel different. He will also get SOB. He also feels a sensation of pressure and heaviness in his thighs. Shuffling gait gets worse when this is occurring.   These episodes used to occur infrequently. Over the past three days, he has had several of these episodes per day. He presented to the ED for evaluation.v Per ED documentation, labs at the time of this evaluation were unremarkable.      He woke up this morning around 5am. He took his CD/LD at 5:50am along with a probiotic and coffee. He went back to bed and slept until 9am. He woke up, made another cup of coffee and some toast and another episode started suddenly. He had an episode at 9:30am and at 1pm yesterday.       These episodes always occur after 10am or after 2pm. They don't follow his CD/LD doses at a consistent time. He has some symptoms in the evening but they tend to milder. Episodes typically occur when he is sitting. They tend to last just over an hour. Yesterday his episode lasted four hours. No relationship to eating that he has noticed.     MRI Brain obtained Feb 2024 for episodes of generalized weakness and brain fog.   IMPRESSION:  HEAD MRI:   1.  No acute intracranial process.  2.  Generalized brain atrophy and presumed microvascular ischemic changes as detailed above.     CERVICAL SPINE MRI:  1.  Multilevel degenerative spondylolisthesis of the cervical spine, as above, with up to moderate central spinal  canal stenosis at the levels of C3-C4, C4-C5 and C5-C6.  2.  No abnormal spinal cord signal or intramedullary/leptomeningeal enhancement.     No associated headache, loss of consciousness or abnormal movements with these episodes. He can walk when they occur despite his legs feeling heavy but tends to stutter step more. No palpitations, chest pain, pressure associated with these episodes. Recent echo was normal.      He gets very anxious when these episodes occur. His wife wonders if this exacerbates things. Outside these events he usually feels like himself. He feels like if he can catch these episodes early and work through it, he can shorten he episode. Yesterday he was stutter stepping terribly in [facility] so he sat down and then tensed up.      He and his wife don't believe that anxiety is triggering these episodes but believe that the anxiety definitely perpetuates them.      He is going to be establishing care with a therapist shortly.      He has checked his blood glucose and blood pressure when these episodes are occurring. Both have been normal.      Denies lightheadedness with position changes. He states he has been checked for orthostatic hypotension a couple of times - he had not had this.      Last night he needed his wife's assistance to get of the toilet. He attributes this to having used another bathroom in his house that he is not used to. He seemed to get tense as a result.      He had tried taking 2.5 tabs of Carbidopa/Levodopa with his 10am dose. He didn't notice a difference in leg heaviness.      He denies fever, chills, cough, congestion, dysuria or increased urinary frequency.      The just met with his oncologist last week for neuroendocrine tumor. They were told that his blood work was fine.      He has been having more swelling his legs. This is much more marked than before.   He's going to be getting a CTA coronary angiogram next week for screening.      No falls.      Working PT on  balance. This referral was placed by his spine doctor. He keeps up with PD PT exercises. He is planning to return to physical therapy targeting PD in March.      No swallowing issues.      They will be following up with Morenita Delgado PharmD on 1/24/25. They are wondering if these episodes might be medication side effect.      No dysphagia, constipation or hallucinations.      Sleep is not good. Last night he slept for an hour and then tossed and turned the rest of the night until 6am. Slept well until 9am. If he doesn't get up and lay down right, he has to get up and then enter bed again. Can happen once or twenty times. He is not sure what gets him up overnight.      Mr. Henderson reports episodes of brain fog, stiffness, limb heaviness that can be associated with shortness of breath and feeling tense. Work up to date has been reassuring including MRI brain from Feb 2024 for these symptoms and recent laboratory and cardiac work up. From his and wife's description, anxiety seems to be at least a perpetuating if not initiating factor. It's difficult to entirely rule out wearing off or Parkinson's disease fluctuations but there is not clear correlation with PD medications. Since blood pressure has been normal during these episodes and they tend to occur when he is sitting, we are less suspicious of orthostatic hypotension contributing.      Advised starting by addressing anxiety symptoms with upcoming therapy appointment as well as with initiation of medication to address anxiety (likely selective serotonin reuptake inhibitor to start). He will be meeting with Morenita Delgado PharmD, at the end of this week and discuss options.        - Advised discussing starting medication to address anxiety symptoms with Morenita Delgado PharmD, on 1/24/25              - Would consider Zoloft, Lexapro or Celexa  - Appointment with psychologist scheduled on 1/24/25  - Recommended deep, slow breathing when he notices  hyperventilation   - Consider adjustments to CD/LD schedule in the future, will prioritize addressing anxiety symptoms first              - Advised continuing to monitor relationship between medication timing and episodes     Medications      6a  10am 2p 5p  9p      Acetaminophen tylenol 325mg prn              Aspirin 81mg   1             Atorvastatin lipitor 40mg   1             Carbidopa/levodopa Sinemet CR 50/200         1     CArbidopa/levodopa sinemet 25/100 2.5 2 2 1       Dasiglucagon zegalogue 0.6mg/0.6ml prn             Gabapentin neurontin 300mg 2 2   2       Glucagon baqsimi 3mg/dose nasal  prn              Insulin aspart novolog sliding              Insulin aspart novolog               Insulin glargine  15        30      Lipase protease amylase zenpep  3/day             Losartan cozaar 50mg 1             Magnesium oxide 400mg  qod              Midodrine proamatine 2.5mg 1        MVI 1              Omeprazole prilosec 20mg  1             Sertraline zoloft 25mg     1    Trazodone desyrel 50mg         1                                            Plan:    No swallowing issues. Has some drooling at night    No dysphagia, constipation   He has a colonoscopy next week    Bladder - none    Partial pancreatectomy  Pseudocyst pancreas     Diabetes  A1c was 8.2 on 11/6/2023  7.6 most recently  Lab Results   Component Value Date    A1C 8.3 02/19/2025    A1C 8.4 11/01/2024    A1C 8.2 07/08/2024    A1C 7.2 05/09/2024    A1C 8.2 11/16/2023     Insulin  Intolerance to metformin  Seen nutritionist for this.      Lipid disorder   Atorvastatin lipitor 40mg      Not driving much - no problems.     No falls.   He has not had clear wearing off.   Therapy has been very helpful  Therapy has been helpful  He is not very active in the winter  Was good to be in [place]   He has metro mobility.    Drove down from [place]  Has some cramping of his left hand  He had problems with playing drums and has to open the left hand with  "his right hand or think about opening it up.  He has problems most commonly in the early morning before he gets up.     He got a cortisone injection on Tuesday for his back and he has not yet had relief but it is too early    He has been on midodrine/proamatine 2.5mg started a few weeks ago  Cardiology and pcp recommended starting this.   BP (!) 84/55 (BP Location: Right arm, Patient Position: Sitting, Cuff Size: Adult Regular)   Pulse 78   Ht 1.718 m (5' 7.64\")   Wt 95 kg (209 lb 8 oz)   SpO2 95%   BMI 32.20 kg/m    Losartan cozaar 50mg  Midodrine proamatine 2.5mg     CT angiogram 1/29/2025  1.  Known severe native LAD coronary disease status post 2-vessel CABG  (LIMA-LAD SVG-DIAG.)  2.  Widely patent LIMA-LAD graft.   3.  100% occluded SVG-DIAG graft.  4.  Moderate native LCx stenosis.  5.  Please review the separate Radiology report for incidental  noncardiac findings.    Doppler 3/4/2025  1. RIGHT LOWER EXTREMITY: ARI is Non-diagnostic indicating vessel calcification. Toe Pressures are Mildly abnormal but adequate for wound healing with toe pressures of 66 mmHg.     2. LEFT LOWER EXTREMITY: ARI is Non-diagnostic indicating vessel calcification. Toe Pressures are Mildly abnormal but adequate for wound healing with toe pressures of 59 mmHg.    Sleep is better.   He slept till 730am     He has followup of his neuroendocrine cancer in July 2025    He is taking an anxiety medication. He is taking 25mg at night - at 9 or 10p  Went to [place] in February and did well.     He is scheduled for ANS testing but he has not had instructions about medications.   Med information needed about    1. Losartan  ? 24 hr  2. Midodrine ??  3. Sertraline ? 48 h  4. Trazodone ? 48h     Patients undergoing the Adult Autonomic Reflex Screen with Dr. Sen will need to hold the following medications:    Hold for 48 hours prior to test:  Anticholinergics  Antidepressants  Antihistamine  Sympathomimetic (e.g. stimulants)  muscle " relaxants     Hold for 24 hours prior to test:  Alpha blockers  Beta blockers  Vasodilators  Antihypertensives  Diuretics  Opioids      Fasting instructions:   Patients should abstain from food or nicotine for at least 4 hours prior to test   Patients should abstain from caffeine or alcohol for at least 12 hours prior to test    Other instructions:  Patients should not use antiperspirants prior to test (can interfere with sweat measurement)  Patients should wear comfortable loose fitting clothes (shorts or pants that can be pulled up past the knee & a non-restrictive short sleeve shirt is recommended)  Patients should report to the 5th floor of the [facility] for Autonomic testing. The check in desk is just as you exit the elevators.    Clinic and Surgery Center   [address] [facility] in [place], [place].       In summary  Anxiety is better with medication  He has a scheduled ANS testing and will instructions  Last 1/21/2025 9/2025 MT visit with Morenitapineda Delgado  Return to see Candida in 3-6 months.      Future Appointments 4/25/2025 - 10/22/2025        Date Visit Type Length Department Provider     4/25/2025  9:30 AM RETURN MOVEMENT DISORDER 30 min [facility] NEUROLOG[facility] Juan Jose Armenta MD    Location Instructions:     Due to road construction on [address], travel times to this location may be longer than usual. Please plan for extra travel time and check the [place] [employer] [address] project website for delay, closure, and detour information.  The [facility] [place] ([facility]) is in a dense urban area with multiple transportation and parking options. You may wish to review options for  service and self-parking in more detail on the [facility] s website at [email or web address].              4/28/2025 10:15 AM PT BIG AND LOUD TREATMENT 45 min [facility] PHYSICAL THERAPY Salma Blount, ANN-MARIE    Location Instructions:     Our clinic is located at:  [facility]&nbsp; Mobile  Rehabilitation Services  [address] [place] [address]  How to find our clinic: We are behind [place] [facility].&nbsp; Check in is ahead to the right through the clinic entrance.   Parking: Parking is available in the lot adjacent to the clinic.  Questions or to reschedule: Contact our clinic: [phone].               4/28/2025 12:00 PM AUTONOMIC TESTING 60 min [facility] EMG Khadijah Sen MD    Location Instructions:     Due to road construction on [address], travel times to this location may be longer than usual. Please plan for extra travel time and check the [place] [employer] [address] [place] website for delay, closure, and detour information.  The [facility] and [facility] [place] ([facility]) is in a dense urban area with multiple transportation and parking options. You may wish to review options for  service and self-parking in more detail on the [facility] s website at [email or web address].  For your EMG test, please proceed to the 3rd floor for check-in and testing. If you are here for Autonomic testing, proceed to the 5th floor for check-in and testing.              5/5/2025 11:00 AM PT BIG AND LOUD TREATMENT 45 min [facility] PHYSICAL THERAPY Salma Blount PT    Location Instructions:     Our clinic is located at:  [facility]; [place] [address]  How to find our clinic: We are behind [place] [facility].&nbsp; Check in is ahead to the right through the clinic entrance.   Parking: Parking is available in the lot adjacent to the clinic.  Questions or to reschedule: Contact our clinic: [phone].               5/12/2025 10:15 AM PT BIG AND LOUD TREATMENT 45 min [facility] PHYSICAL THERAPY Salma Blount PT    Location Instructions:     Our clinic is located at:  [facility]&nbs; [address]  [address]  How to find our clinic: We are behind [place] [facility].&nbsp; Check in is ahead to the right through the clinic entrance.   Parking: Parking is available in the lot adjacent to the clinic.  Questions or to reschedule: Contact our clinic: [phone].               5/19/2025  2:00 PM RETURN MED SPINE 20 min [facility] Jovan Fong MD    Location Instructions:     We are located at [address] [place] [id number]              5/29/2025 12:00 AM CARDIAC DEVICE CHECK - REMOTE 30 min  CARDIAC SERVICES  ICD REMOTE    Location Instructions:     Due to road [place] on [address], travel times to this location may be longer than usual. Please plan for extra travel time and check the [place] [employer] [address] project website for delay, closure, and detour information.  [facility] ([facility]) is in a dense urban area with multiple transportation and parking options. You may wish to review options for  service and self-parking in more detail on the [facility] s website at [email or web address].  Please proceed to the third floor at the [facility].  This appointment is in a hospital-based location. Before your visit, you may want to check with your insurance company for coverage and referral options, including cost differences between services provided in different clinic settings. For more information visit this link on the [facility] Website: [email or web address]              6/10/2025  9:40 AM RETURN CARDIOLOGY 40 min [facility] Dilma Zee APRN CNP    Location Instructions:     [facility] has 2 buildings on its campus. Please visit us at [address] [place] [address] and enter the building with the numbers [id number] on it.               6/18/2025 11:00 AM OFFICE VISIT 20 min [facility] Amelia Johnson DO    Location Instructions:     [facility] is located at [address]  in [place]. This is just over a mile west of the [address] exit off of [address]. If traveling east on [address], turn south on [address] to access the parking lot; if traveling west, turn south on [address], then east on [address].               6/24/2025  1:00 PM RETURN DIABETES 30 min [facility] ENDO DIABETES Valeri Gomez PA-C    Location Instructions:     Due to road [place] on [address], travel times to this location may be longer than usual. Please plan for extra travel time and check the [facility] [address] project website for delay, closure, and detour information.  The [facility] ([facility]) is in a dense urban area with multiple transportation and parking options. You may wish to review options for  service and self-parking in more detail on the [facility] s website at [email or web address].              7/8/2025 10:30 AM LAB 15 min [facility] LABORATORY UC LAB    Location Instructions:     Due to road construction on [address], travel times to this location may be longer than usual. Please plan for extra travel time and check the [facility] [address] [place] website for delay, closure, and detour information.  The [facility] ([facility]) is in a dense urban area with multiple transportation and parking options. You may wish to review options for  service and self-parking in more detail on the [facility] s website at [email or web address].              7/8/2025 11:20 AM CT CHEST/ABDOMEN/PELVIS W 20 min [facility] CT [id number]    Location Instructions:     Due to road construction on [address], travel times to this location may be longer than usual. Please plan for extra travel time and check the [facility] [address] [place] website for delay, closure, and detour information.  The [facility] ([facility]) is in a dense urban area with multiple transportation and parking options. You may  wish to review options for  service and self-parking in more detail on the [facility] s website at [email or web address].              7/14/2025  1:00 PM RETURN CCSL 45 min  ONCOLOGY ADULT Dayanna Das, KRYSTYNA CNP    Location Instructions:     Due to road construction on [address], travel times to this location may be longer than usual. Please plan for extra travel time and check the [facility] [employer] [address] project website for delay, closure, and detour information.  The [facility] ([facility]) is in a dense urban area with multiple transportation and parking options. You may wish to review options for  service and self-parking in more detail on the [facility] s website at [email or web address].  This appointment is in a hospital-based location. Before your visit, you may want to check with your insurance company for coverage and referral options, including cost differences between services provided in different clinic settings. For more information visit this link on the [facility] Website: [email or web address]              7/23/2025  1:15 PM NEW DERMATOLOGY 15 min [facility] Дмитрий Willard MD    Location Instructions:     The medical center is located at [address] (between [address] in [place], four miles north of [place]).              9/2/2025 12:00 AM CARDIAC DEVICE CHECK - REMOTE 30 min  CARDIAC SERVICES  ICD REMOTE    Location Instructions:     Due to road construction on [address], travel times to this location may be longer than usual. Please plan for extra travel time and check the [facility] [employer] [address] project website for delay, closure, and detour information.  The [facility] ([facility]) is in a dense urban area with multiple transportation and parking options. You may wish to review options for  service and self-parking in more detail on the [facility] website at [email or web address].   Please proceed to the third floor at the [facility].  This appointment is in a hospital-based location. Before your visit, you may want to check with your insurance company for coverage and referral options, including cost differences between services provided in different clinic settings. For more information visit this link on the [employer] Website: [email or web address]              9/4/2025 11:00 AM MTM RETURN 30 min [facility] NEURO Morenita Delgado PharmD    Location Instructions:     Due to road construction on [address], travel times to this location may be longer than usual. Please plan for extra travel time and check the [place] [employer] [address] project website for delay, closure, and detour information.  The [facility] ([facility]) is in a dense urban area with multiple transportation and parking options. You may wish to review options for  service and self-parking in more detail on the [facility] s website at [email or web address].  This appointment is in a hospital-based location. Before your visit, you may want to check with your insurance company for coverage and referral options, including cost differences between services provided in different clinic settings. For more information visit this link on the [employer] Website: [email or web address]                             Coding statement:   Medical Decision Making:  #  Chronic progressive medical conditions addressed  - see above --   Review and/or interpretation of unique test or documentation from a provider outside of neurology yes   Independent historian provided additional details  yes I  Prescription drug management and review of potential side effects and/or monitoring for side effects  -- see above ---  Health impacted by social determinants of health  no    I have reviewed the note as documented above.  This accurately captures the substance of my conversation with the patient and total time spent preparing  for visit, executing visit and completing visit on the day of the visit:  30 minutes.  The portion of this total time included face to face time     The longitudinal plan of care for Arnaldo Henderson was addressed during this visit. Due to the added complexity in care, I will continue to support Arnaldo Henderson in the subsequent management of this condition(s) and with the ongoing continuity of care of this condition(s).      Juan Jose Armenta MD     ______________________________________    Last visit date and details:             ______________________________________      Patient was asked about 14 Review of systems including changes in vision (dry eyes, double vision), hearing, heart, lungs, musculoskeletal, depression, anxiety, snoring, RBD, insomnia, urinary frequency, urinary urgency, constipation, swallowing problems, hematological, ID, allergies, skin problems: seborrhea, endocrinological: thyroid, diabetes, cholesterol; balance, weight changes, and other neurological problems and these were not significant at this time except for   Patient Active Problem List   Diagnosis     Retained foreign body     Wound infection     Acute bilateral low back pain without sciatica     Adenomatous polyp of colon     Atherosclerotic heart disease of native coronary artery without angina pectoris     Cancer of skin of scrotum (H)     Cervicalgia     Gastroesophageal reflux disease without esophagitis     History of cholecystectomy     History of coronary artery bypass surgery     Hyperlipidemia, unspecified     Hypertension     Gait instability     Long term (current) use of aspirin     Mixed conductive and sensorineural hearing loss     Generalized muscle weakness     Need for assistance with personal care     Other hydrocele     Parkinson's disease with dyskinesia, unspecified whether manifestations fluctuate (H)     Physical deconditioning     Pseudocyst of pancreas     Sensorineural hearing loss (SNHL) of right ear with  "unrestricted hearing of left ear     Type 2 diabetes mellitus with hyperglycemia, with long-term current use of insulin (H)     Neuroendocrine carcinoma of small bowel (H)     Failure to thrive in adult     Hyponatremia     Chronic right-sided low back pain without sciatica     Abdominal pain, generalized     Other secondary neuroendocrine tumors (H)     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)     Weakness     Second degree AV block     History of Parkinson's disease     Orthostatic hypotension     Hypotension     Bradycardia     Parkinson's disease with dyskinesia without fluctuating manifestations (H)     Family history of parkinsonism     Cardiac pacemaker in situ     Seborrheic dermatitis     Lumbar spondylosis     Chronic bilateral thoracic back pain     Osteoarthritis of spine with radiculopathy, thoracic region     Spinal stenosis of lumbar region with neurogenic claudication     Osteopenia of multiple sites     Heart block AV third degree (H)     Bradykinesia     Impairment of balance     Acquired absence of organ     Digestive system disorder     Long term current use of anticoagulant therapy     Long term current use of insulin (H)     Mild protein-calorie malnutrition (weight for age 75-89% of standard)     Type 2 diabetes mellitus without complication (H)     Idiopathic acute pancreatitis     Atherosclerosis of coronary artery without angina pectoris     Neck pain     Muscle weakness     Hyperlipidemia     Essential hypertension     Long term current use of aspirin          Allergies   Allergen Reactions     Ciprofloxacin Other (See Comments), Hives, Itching, Rash and Unknown     Other reaction(s): Other (see comments)  Oral swelling per [facility]         Dilaudid [Hydromorphone] Rash     Morphine Rash     rash     Penicillins Rash     aka ancef/ rash       Clindamycin Itching and Rash     Oxycodone Rash     \"HORRIBLE, SEVERE RASH MAYBE CAUSED BY OXY\"     Past Surgical History: "   Procedure Laterality Date     CA ANESTH CABG W/PUMP       CHOLECYSTECTOMY  2022     COLONOSCOPY N/A 01/12/2023    Procedure: colonoscopy with fluroscopy;  Surgeon: Ayden Fraire MD;  Location:  OR     ENDOSCOPIC RETROGRADE CHOLANGIOPANCREATOGRAM N/A 05/18/2023    Procedure: ENDOSCOPIC RETROGRADE CHOLANGIOPANCREATOGRAPHY, WITH  CYSTDUODENOSTOMY STENTS X2 REMOVAL;  Surgeon: Cedric Saldana MD;  Location: [facility] OR     ENT SURGERY       EP PACEMAKER DEVICE & LEAD IMPLANT- RIGHT ATRIAL & RIGHT VENTRICULAR N/A 02/28/2024    Procedure: Pacemaker Device & Lead Implant - Right Atrial & Right Ventricular;  Surgeon: Jenna Hernandez MD;  Location:  HEART CARDIAC CATH LAB     LAPAROSCOPY DIAGNOSTIC (GENERAL) N/A 02/20/2023    Procedure: LAPAROSCOPY, DIAGNOSTIC; RETRIEVAL OF MIGRATED STENT;  Surgeon: Joseluis Lima MD;  Location: [facility] OR     ORTHOPEDIC SURGERY       OTHER SURGICAL HISTORY      skin cancer ? from scrotum     PANCREATECTOMY PEUSTOW N/A 06/30/2023    Procedure: Open distal pancreactectomy with splenectomy, lysis of adhesions, resection of proximal illeum;  Surgeon: Ashvin Haider MD;  Location: [facility] OR     TX CABG, ARTERIAL, TWO  2003     Past Medical History:   Diagnosis Date     Acute pancreatitis 04/18/2022    Formatting of this note might be different from the original. Formatting of this note might be different from the original. Added automatically from request for surgery [id number] Formatting of this note might be different from the original. Added automatically from request for surgery [id number]  Formatting of this note might be different from the original. Added automatically from request for surgery      CAD (coronary artery disease)     CABG     Cholecystitis, acute 07/05/2023     Diabetes mellitus, type 2 (H) 2013     Family history of parkinsonism 02/14/2024     Gastroesophageal reflux disease      Hypercholesteremia      Hypertension      Mixed conductive and sensorineural hearing loss of  both ears      Mixed hearing loss      Pancreatic duct stricture      Pancreatitis      Parkinson disease (H)      Parkinson's disease, unspecified whether dyskinesia present, unspecified whether manifestations fluctuate (H) 02/14/2024     Seborrheic dermatitis 03/22/2024     Second degree AV block      Surgical site infection 07/26/2023     Social History     Socioeconomic History     Marital status:      Spouse name: Not on file     Number of children: Not on file     Years of education: Not on file     Highest education level: Not on file   Occupational History     Not on file   Tobacco Use     Smoking status: Former     Types: Cigarettes     Smokeless tobacco: Never     Tobacco comments:     Quit 10 years ago - quit a few times; started at age 12 or 13 and then stopped at 26 years and then stopped for 15 years got  and started smoking again   Vaping Use     Vaping status: Never Used   Substance and Sexual Activity     Alcohol use: Not Currently     Comment: quit 2 years ago     Drug use: Not Currently     Comment: used to smoke marijuana when young; rare use     Sexual activity: Not on file   Other Topics Concern     Not on file   Social History Narrative     Not on file     Social Drivers of Health     Financial Resource Strain: Low Risk  (12/18/2024)    Financial Resource Strain      Within the past 12 months, have you or your family members you live with been unable to get utilities (heat, electricity) when it was really needed?: No   Food Insecurity: Low Risk  (12/18/2024)    Food Insecurity      Within the past 12 months, did you worry that your food would run out before you got money to buy more?: No      Within the past 12 months, did the food you bought just not last and you didn t have money to get more?: No   Transportation Needs: Low Risk  (12/18/2024)    Transportation Needs      Within the past 12 months, has lack of transportation kept you from medical appointments, getting your  medicines, non-medical meetings or appointments, work, or from getting things that you need?: No   Physical Activity: Unknown (12/18/2024)    Exercise Vital Sign      Days of Exercise per Week: 1 day      Minutes of Exercise per Session: Not on file   Stress: Stress Concern Present (12/18/2024)    [employer] - Occupational Stress Questionnaire      Feeling of Stress : To some extent   Social Connections: Unknown (12/18/2024)    Social Connection and Isolation Panel [NHANES]      Frequency of Communication with Friends and Family: Not on file      Frequency of Social Gatherings with Friends and Family: Once a week      Attends Confucianist Services: Not on file      Active Member of Clubs or Organizations: Not on file      Attends Club or Organization Meetings: Not on file      Marital Status: Not on file   Interpersonal Safety: Not At Risk (8/6/2022)    Received from [facility]    Humiliation, Afraid, Rape, and Kick questionnaire      Fear of Current or Ex-Partner: No      Emotionally Abused: No      Physically Abused: No      Sexually Abused: No   Housing Stability: Low Risk  (12/18/2024)    Housing Stability      Do you have housing? : Yes      Are you worried about losing your housing?: No       Drug and lactation database from the [place] [employer] of Medicine:  http://toxnet.nlm.nih.gov/cgi-bin/sis/htmlgen?LACT      B/P: Data Unavailable, T: Data Unavailable, P: Data Unavailable, R: Data Unavailable 0 lbs 0 oz  There were no vitals taken for this visit., There is no height or weight on file to calculate BMI.  Medications and Vitals not listed above were documented in the cart and reviewed by me.     Current Outpatient Medications   Medication Sig Dispense Refill     aspirin (ASA) 81 MG EC tablet Take 81 mg by mouth daily       atorvastatin (LIPITOR) 40 MG tablet Take 1 tablet (40 mg) by mouth daily 90 tablet 3     BD PEN NEEDLE LINDY 2ND GEN 32G X 4 MM miscellaneous USE  TO INJECT INSULIN 4 TIMES A  each 3     bisacodyl (DULCOLAX) 5 MG EC tablet Take 2 tablets at 3 pm the day before your procedure. If your procedure is before 11 am, take 2 additional tablets at 11 pm. If your procedure is after 11 am, take 2 additional tablets at 6 am. For additional instructions refer to your colonoscopy prep instructions. 4 tablet 0     carbidopa-levodopa (SINEMET CR)  MG CR tablet Take 1 tablet by mouth at bedtime @9p 90 tablet 3     carbidopa-levodopa (SINEMET)  MG tablet 2.5@6a, 2@10a, 2@2p and 1@5p 675 tablet 3     Continuous Glucose Sensor (DEXCOM G7 SENSOR) MISC Change every 10 days. 9 each 11     dasiglucagon HCl (ZEGALOGUE) 0.6 MG/0.6ML SOAJ injection Inject 0.6 mg Subcutaneous once as needed (use for severe hypoglycemia) 1.2 mL 1     gabapentin (NEURONTIN) 300 MG capsule Take 2 capsules (600 mg) by mouth 3 times daily 540 capsule 3     insulin aspart (NOVOLOG PEN) 100 UNIT/ML pen Inject 20 Units subcutaneously 3 times daily as needed for high blood sugar (take 3 times/day as needed). 60 mL 2     Insulin Glargine-yfgn 100 UNIT/ML SOPN Inject 14 Units subcutaneously every morning AND 20 Units at bedtime. 50 mL 2     lipase-protease-amylase (ZENPEP) 41257-100306-890417 units CPEP Take 1 capsule by mouth 3 times daily (with meals). 90 capsule 11     losartan (COZAAR) 50 MG tablet Take 1 tablet (50 mg) by mouth daily 90 tablet 3     magnesium oxide (MAG-OX) 400 MG tablet Take 400 mg by mouth every other day       midodrine (PROAMATINE) 2.5 MG tablet Take 1 tablet (2.5 mg) by mouth daily. 90 tablet 1     Multiple Vitamin (MULTI-VITAMINS) TABS Take 1 tablet by mouth daily       omeprazole (PRILOSEC) 20 MG DR capsule Take 1 capsule (20 mg) by mouth daily 90 capsule 3     polyethylene glycol (GOLYTELY) 236 g suspension The night before the exam at 6 pm drink an 8-ounce glass every 15 minutes until the jug is half empty. If you arrive before 11 AM: Drink the other half of the  ZarthCodely jug at 11 PM night before procedure. If you arrive after 11 AM: Drink the other half of the ZarthCodely jug at 6 AM day of procedure. For additional instructions refer to your colonoscopy prep instructions. 4000 mL 0     sertraline (ZOLOFT) 25 MG tablet Take 1 tablet (25 mg) by mouth daily. 30 tablet 3     traZODone (DESYREL) 50 MG tablet TAKE 0.5-1 TABLETS BY MOUTH AT BEDTIME. 90 tablet 1         Juan Jose Armenta MD      Again, thank you for allowing me to participate in the care of your patient.      Sincerely,    Juan Jose Armenta MD

## 2025-04-28 ENCOUNTER — TELEPHONE (OUTPATIENT)
Dept: NEUROLOGY | Facility: CLINIC | Age: 70
End: 2025-04-28

## 2025-04-28 NOTE — TELEPHONE ENCOUNTER
Left Voicemail (1st Attempt) and Sent Mychart (1st Attempt) for the patient to call back and schedule the following:    Appointment type: Autonomic Testing  Provider: Mckinley  Return date: next available  Specialty phone number: [phone]  Additional appointment(s) needed: NA  Additonal Notes: Neurology referral from ELLA Connor on 4/28/2025 at 2:05 PM

## 2025-04-28 NOTE — TELEPHONE ENCOUNTER
[facility] Call Center    Phone Message    May a detailed message be left on voicemail: yes     Reason for Call: Other: Veronika pt spouse stated Pat has a appointment to day at 12:00 for Autonomic testing.  Spouse stated Pt blood sugars are high and blood pressure is also high and would like to be advised if they should still come to the appointment.  Please contact Veronika at: [phone]     Action Taken: Message routed to:  [facility] Neurology     Travel Screening: Not Applicable     Date of Service:

## 2025-04-28 NOTE — TELEPHONE ENCOUNTER
Spoke with Veronika. Pt will be called to reschedule. Per Dr Sen, ok to continue losartan but recommend holding midodrine prior to the testing.

## 2025-04-29 ENCOUNTER — TELEPHONE (OUTPATIENT)
Dept: NEUROLOGY | Facility: CLINIC | Age: 70
End: 2025-04-29

## 2025-04-29 NOTE — TELEPHONE ENCOUNTER
4/28/25 telephone encounter: Dr. Sen advised patient can continue losartan for testing but should hold midodrine.    Plan:  Continue losartan as prescribed  STOP midodrine 2.5 mg (alpha-adrenergic agonist, hold for 24 or 48 hours?) - prescribed by cardiology  STOP sertraline 48 hours prior (Dr. Armenta)  STOP trazodone 50 mg 48 hours prior (Dr. Armenta)    --------------------------------  From: Juan Jose Armenta MD   Sent: 4/25/2025   9:29 AM CDT   To: Khadijah Sen MD; *   Subject: ANS testing coming up                            He is scheduled for ANS testing but he has not had instructions about medications.   Med information needed about     1. Losartan  ? 24 hr   2. Midodrine ??   3. Sertraline ? 48 h   4. Trazodone ? 48h               Patients undergoing the Adult Autonomic Reflex Screen with Dr. Sen will need to hold the following medications:     Hold for 48 hours prior to test:   Anticholinergics  Antidepressants  Antihistamine   Sympathomimetic (e.g. stimulants)   muscle relaxants     Hold for 24 hours prior to test:   Alpha blockers   Beta blockers   Vasodilators   Antihypertensives   Diuretics   Opioids     Fasting instructions:   Patients should abstain from food or nicotine for at least 4 hours prior to test   Patients should abstain from caffeine or alcohol for at least 12 hours prior to test     Other instructions:   Patients should not use antiperspirants prior to test (can interfere with sweat measurement)   Patients should wear comfortable loose fitting clothes (shorts or pants that can be pulled up past the knee & a non-restrictive short sleeve shirt is recommended)   Patients should report to the 5th floor of the [facility] for Autonomic testing. The check in desk is just as you exit the elevators.

## 2025-05-01 ENCOUNTER — ANESTHESIA (OUTPATIENT)
Dept: GASTROENTEROLOGY | Facility: CLINIC | Age: 70
End: 2025-05-01
Payer: MEDICARE

## 2025-05-01 ENCOUNTER — ANESTHESIA EVENT (OUTPATIENT)
Dept: GASTROENTEROLOGY | Facility: CLINIC | Age: 70
End: 2025-05-01
Payer: MEDICARE

## 2025-05-01 ENCOUNTER — HOSPITAL ENCOUNTER (OUTPATIENT)
Facility: CLINIC | Age: 70
Discharge: HOME | End: 2025-05-01
Attending: INTERNAL MEDICINE | Admitting: INTERNAL MEDICINE
Payer: MEDICARE

## 2025-05-01 VITALS
TEMPERATURE: 97.8 F | HEART RATE: 83 BPM | RESPIRATION RATE: 18 BRPM | OXYGEN SATURATION: 99 % | DIASTOLIC BLOOD PRESSURE: 87 MMHG | SYSTOLIC BLOOD PRESSURE: 177 MMHG

## 2025-05-01 LAB
COLONOSCOPY: NORMAL
GLUCOSE BLDC GLUCOMTR-MCNC: 157 MG/DL (ref 70–99)

## 2025-05-01 PROCEDURE — 82962 GLUCOSE BLOOD TEST: CPT

## 2025-05-01 PROCEDURE — 258N000003 HC RX IP 258 OP 636: Performed by: NURSE ANESTHETIST, CERTIFIED REGISTERED

## 2025-05-01 PROCEDURE — 45378 DIAGNOSTIC COLONOSCOPY: CPT | Performed by: INTERNAL MEDICINE

## 2025-05-01 PROCEDURE — 250N000009 HC RX 250: Performed by: NURSE ANESTHETIST, CERTIFIED REGISTERED

## 2025-05-01 PROCEDURE — 250N000011 HC RX IP 250 OP 636: Performed by: NURSE ANESTHETIST, CERTIFIED REGISTERED

## 2025-05-01 PROCEDURE — G0121 COLON CA SCRN NOT HI RSK IND: HCPCS | Performed by: INTERNAL MEDICINE

## 2025-05-01 PROCEDURE — 370N000017 HC ANESTHESIA TECHNICAL FEE, PER MIN: Performed by: INTERNAL MEDICINE

## 2025-05-01 RX ORDER — NALOXONE HYDROCHLORIDE 0.4 MG/ML
0.4 INJECTION, SOLUTION INTRAMUSCULAR; INTRAVENOUS; SUBCUTANEOUS
Status: DISCONTINUED | OUTPATIENT
Start: 2025-05-01 | End: 2025-05-01 | Stop reason: HOSPADM

## 2025-05-01 RX ORDER — LIDOCAINE HYDROCHLORIDE 20 MG/ML
INJECTION, SOLUTION INFILTRATION; PERINEURAL PRN
Status: DISCONTINUED | OUTPATIENT
Start: 2025-05-01 | End: 2025-05-01

## 2025-05-01 RX ORDER — ONDANSETRON 2 MG/ML
4 INJECTION INTRAMUSCULAR; INTRAVENOUS EVERY 30 MIN PRN
Status: DISCONTINUED | OUTPATIENT
Start: 2025-05-01 | End: 2025-05-01 | Stop reason: HOSPADM

## 2025-05-01 RX ORDER — FLUMAZENIL 0.1 MG/ML
0.2 INJECTION, SOLUTION INTRAVENOUS
Status: DISCONTINUED | OUTPATIENT
Start: 2025-05-01 | End: 2025-05-01 | Stop reason: HOSPADM

## 2025-05-01 RX ORDER — PROCHLORPERAZINE MALEATE 5 MG/1
5 TABLET ORAL EVERY 6 HOURS PRN
Status: DISCONTINUED | OUTPATIENT
Start: 2025-05-01 | End: 2025-05-01 | Stop reason: HOSPADM

## 2025-05-01 RX ORDER — NALOXONE HYDROCHLORIDE 0.4 MG/ML
0.1 INJECTION, SOLUTION INTRAMUSCULAR; INTRAVENOUS; SUBCUTANEOUS
Status: DISCONTINUED | OUTPATIENT
Start: 2025-05-01 | End: 2025-05-01 | Stop reason: HOSPADM

## 2025-05-01 RX ORDER — ONDANSETRON 4 MG/1
4 TABLET, ORALLY DISINTEGRATING ORAL EVERY 30 MIN PRN
Status: DISCONTINUED | OUTPATIENT
Start: 2025-05-01 | End: 2025-05-01 | Stop reason: HOSPADM

## 2025-05-01 RX ORDER — DEXAMETHASONE SODIUM PHOSPHATE 4 MG/ML
4 INJECTION, SOLUTION INTRA-ARTICULAR; INTRALESIONAL; INTRAMUSCULAR; INTRAVENOUS; SOFT TISSUE
Status: DISCONTINUED | OUTPATIENT
Start: 2025-05-01 | End: 2025-05-01 | Stop reason: HOSPADM

## 2025-05-01 RX ORDER — PROPOFOL 10 MG/ML
INJECTION, EMULSION INTRAVENOUS CONTINUOUS PRN
Status: DISCONTINUED | OUTPATIENT
Start: 2025-05-01 | End: 2025-05-01

## 2025-05-01 RX ORDER — ONDANSETRON 2 MG/ML
4 INJECTION INTRAMUSCULAR; INTRAVENOUS
Status: DISCONTINUED | OUTPATIENT
Start: 2025-05-01 | End: 2025-05-01 | Stop reason: HOSPADM

## 2025-05-01 RX ORDER — LIDOCAINE 40 MG/G
CREAM TOPICAL
Status: DISCONTINUED | OUTPATIENT
Start: 2025-05-01 | End: 2025-05-01 | Stop reason: HOSPADM

## 2025-05-01 RX ORDER — SODIUM CHLORIDE, SODIUM LACTATE, POTASSIUM CHLORIDE, CALCIUM CHLORIDE 600; 310; 30; 20 MG/100ML; MG/100ML; MG/100ML; MG/100ML
INJECTION, SOLUTION INTRAVENOUS CONTINUOUS
Status: DISCONTINUED | OUTPATIENT
Start: 2025-05-01 | End: 2025-05-01 | Stop reason: HOSPADM

## 2025-05-01 RX ORDER — FENTANYL CITRATE 50 UG/ML
25 INJECTION, SOLUTION INTRAMUSCULAR; INTRAVENOUS EVERY 5 MIN PRN
Status: DISCONTINUED | OUTPATIENT
Start: 2025-05-01 | End: 2025-05-01 | Stop reason: HOSPADM

## 2025-05-01 RX ORDER — NALOXONE HYDROCHLORIDE 0.4 MG/ML
0.2 INJECTION, SOLUTION INTRAMUSCULAR; INTRAVENOUS; SUBCUTANEOUS
Status: DISCONTINUED | OUTPATIENT
Start: 2025-05-01 | End: 2025-05-01 | Stop reason: HOSPADM

## 2025-05-01 RX ORDER — ONDANSETRON 4 MG/1
4 TABLET, ORALLY DISINTEGRATING ORAL EVERY 6 HOURS PRN
Status: DISCONTINUED | OUTPATIENT
Start: 2025-05-01 | End: 2025-05-01 | Stop reason: HOSPADM

## 2025-05-01 RX ORDER — PROPOFOL 10 MG/ML
INJECTION, EMULSION INTRAVENOUS PRN
Status: DISCONTINUED | OUTPATIENT
Start: 2025-05-01 | End: 2025-05-01

## 2025-05-01 RX ORDER — FENTANYL CITRATE 50 UG/ML
50 INJECTION, SOLUTION INTRAMUSCULAR; INTRAVENOUS EVERY 5 MIN PRN
Status: DISCONTINUED | OUTPATIENT
Start: 2025-05-01 | End: 2025-05-01 | Stop reason: HOSPADM

## 2025-05-01 RX ORDER — ONDANSETRON 2 MG/ML
4 INJECTION INTRAMUSCULAR; INTRAVENOUS EVERY 6 HOURS PRN
Status: DISCONTINUED | OUTPATIENT
Start: 2025-05-01 | End: 2025-05-01 | Stop reason: HOSPADM

## 2025-05-01 RX ORDER — SODIUM CHLORIDE, SODIUM LACTATE, POTASSIUM CHLORIDE, CALCIUM CHLORIDE 600; 310; 30; 20 MG/100ML; MG/100ML; MG/100ML; MG/100ML
INJECTION, SOLUTION INTRAVENOUS CONTINUOUS PRN
Status: DISCONTINUED | OUTPATIENT
Start: 2025-05-01 | End: 2025-05-01

## 2025-05-01 RX ADMIN — PROPOFOL 40 MG: 10 INJECTION, EMULSION INTRAVENOUS at 12:48

## 2025-05-01 RX ADMIN — LIDOCAINE HYDROCHLORIDE 60 MG: 20 INJECTION, SOLUTION INFILTRATION; PERINEURAL at 12:48

## 2025-05-01 RX ADMIN — PROPOFOL 125 MCG/KG/MIN: 10 INJECTION, EMULSION INTRAVENOUS at 12:48

## 2025-05-01 RX ADMIN — SODIUM CHLORIDE, SODIUM LACTATE, POTASSIUM CHLORIDE, AND CALCIUM CHLORIDE: .6; .31; .03; .02 INJECTION, SOLUTION INTRAVENOUS at 12:46

## 2025-05-01 ASSESSMENT — ACTIVITIES OF DAILY LIVING (ADL)
ADLS_ACUITY_SCORE: 56

## 2025-05-01 ASSESSMENT — ENCOUNTER SYMPTOMS: DYSRHYTHMIAS: 1

## 2025-05-01 NOTE — ANESTHESIA PREPROCEDURE EVALUATION
Anesthesia Pre-Procedure Evaluation    Patient: Arnaldo Henderson   MRN: [id number] : 1955        Procedure : Colonoscopy        Past Medical History:   Diagnosis Date     Acute pancreatitis 2022    Formatting of this note might be different from the original. Formatting of this note might be different from the original. Added automatically from request for surgery [id number] Formatting of this note might be different from the original. Added automatically from request for surgery [id number]  Formatting of this note might be different from the original. Added automatically from request for surgery      CAD (coronary artery disease)     CABG     Cholecystitis, acute 2023     Diabetes mellitus, type 2 (H)      Dystonia, torsion, fragments of 2025     Family history of parkinsonism 2024     Gastroesophageal reflux disease      Hypercholesteremia      Hypertension      Mixed conductive and sensorineural hearing loss of both ears      Mixed hearing loss      Pancreatic duct stricture      Pancreatitis      Parkinson disease (H)      Parkinson's disease, unspecified whether dyskinesia present, unspecified whether manifestations fluctuate (H) 2024     Seborrheic dermatitis 2024     Second degree AV block      Surgical site infection 2023      Past Surgical History:   Procedure Laterality Date     CA ANESTH CABG W/PUMP       CHOLECYSTECTOMY       COLONOSCOPY N/A 2023    Procedure: colonoscopy with fluroscopy;  Surgeon: Ayden Fraire MD;  Location:  OR     ENDOSCOPIC RETROGRADE CHOLANGIOPANCREATOGRAM N/A 2023    Procedure: ENDOSCOPIC RETROGRADE CHOLANGIOPANCREATOGRAPHY, WITH  CYSTDUODENOSTOMY STENTS X2 REMOVAL;  Surgeon: Cedric Saldana MD;  Location: [facility] OR     ENT SURGERY       EP PACEMAKER DEVICE & LEAD IMPLANT- RIGHT ATRIAL & RIGHT VENTRICULAR N/A 2024    Procedure: Pacemaker Device & Lead Implant - Right Atrial & Right Ventricular;  Surgeon:  "Jenna Hernandez MD;  Location:  [facility]     LAPAROSCOPY DIAGNOSTIC (GENERAL) N/A 02/20/2023    Procedure: LAPAROSCOPY, DIAGNOSTIC; RETRIEVAL OF MIGRATED STENT;  Surgeon: Joseluis Lima MD;  Location: [facility] OR     ORTHOPEDIC SURGERY       OTHER SURGICAL HISTORY      skin cancer ? from scrotum     OTHER SURGICAL HISTORY  04/22/2025    cortison injection     PANCREATECTOMY PEUSTOW N/A 06/30/2023    Procedure: Open distal pancreactectomy with splenectomy, lysis of adhesions, resection of proximal illeum;  Surgeon: Ashvin Haider MD;  Location: [facility] OR     SD CABG, ARTERIAL, TWO  2003      Allergies   Allergen Reactions     Ciprofloxacin Other (See Comments), Hives, Itching, Rash and Unknown     Other reaction(s): Other (see comments)  Oral swelling per [facility]         Dilaudid [Hydromorphone] Rash     Morphine Rash     rash     Penicillins Rash     aka ancef/ rash       Clindamycin Itching and Rash     Oxycodone Rash     \"HORRIBLE, SEVERE RASH MAYBE CAUSED BY OXY\"      Social History     Tobacco Use     Smoking status: Former     Types: Cigarettes     Smokeless tobacco: Never     Tobacco comments:     Quit 10 years ago - quit a few times; started at age 12 or 13 and then stopped at 26 years and then stopped for 15 years got  and started smoking again   Substance Use Topics     Alcohol use: Not Currently     Comment: quit 2 years ago      Wt Readings from Last 1 Encounters:   04/25/25 95 kg (209 lb 8 oz)        Anesthesia Evaluation   Pt has had prior anesthetic. Type: General.        ROS/MED HX  ENT/Pulmonary:  - neg pulmonary ROS     Neurologic:     (+)                 Parkinson's disease,               Cardiovascular:     (+)  hypertension- -  CAD -  CABG-date: 6/2018. -              pacemaker, Reason placed: Arron. type: dual chamber,  - Patientt is not dependent on pacemaker.      dysrhythmias, 2nd Deg Heart Block,        Previous cardiac testing   Echo: Date: 12/04/2024 " Results:  Echocardiogram with two-dimensional, color and spectral Doppler.  ______________________________________________________________________________  Interpretation Summary  Left ventricular size, wall motion and function are normal. The ejection  fraction is 60-65%. Abnormal septal motion consistent with left bundle branch  block is present.  Right ventricular function, chamber size, wall motion, and thickness are  normal.  Mild mitral insufficiency is present.  The inferior vena cava was normal in size with preserved respiratory  variability. No pericardial effusion is present.     There is no prior study for direct comparison.    Stress Test:  Date: Results:    ECG Reviewed:  Date: Results:    Cath:  Date: Results:      METS/Exercise Tolerance: 3 - Able to walk 1-2 blocks without stopping    Hematologic:     (+)       history of blood transfusion, no previous transfusion reaction,        Musculoskeletal:   (+)  arthritis,             GI/Hepatic: Comment: Colon polyps  Distal Pancreatectomy 2 to chronic pancreatitis  Biliary duct stricture    (+) GERD, Asymptomatic on medication,                  Renal/Genitourinary:       Endo:     (+) type I DM,    Using insulin, - not using insulin pump.                Psychiatric/Substance Use:  - neg psychiatric ROS     Infectious Disease:  - neg infectious disease ROS     Malignancy:  - neg malignancy ROS     Other:            Physical Exam    Airway        Mallampati: II   TM distance: > 3 FB   Neck ROM: limited   Mouth opening: > 3 cm    Respiratory Devices and Support         Dental       (+) Modest Abnormalities - crowns, retainers, 1 or 2 missing teeth      Cardiovascular   cardiovascular exam normal       Rhythm and rate: normal     Pulmonary   pulmonary exam normal        breath sounds clear to auscultation         OUTSIDE LABS:  CBC:   Lab Results   Component Value Date    WBC 9.1 01/20/2025    WBC 10.4 01/15/2025    HGB 13.6 01/20/2025    HGB 13.7 01/15/2025  "   HCT 41.5 01/20/2025    HCT 41.4 01/15/2025     01/20/2025     01/15/2025     BMP:   Lab Results   Component Value Date     01/20/2025     01/15/2025    POTASSIUM 4.3 01/20/2025    POTASSIUM 4.8 01/15/2025    CHLORIDE 103 01/20/2025    CHLORIDE 100 01/15/2025    CO2 26 01/20/2025    CO2 26 01/15/2025    BUN 21.8 01/20/2025    BUN 23.2 (H) 01/15/2025    CR 0.98 01/20/2025    CR 0.92 01/15/2025     (H) 05/01/2025     (A) 04/22/2025     COAGS:   Lab Results   Component Value Date    INR 1.24 (H) 07/25/2023     POC: No results found for: \"BGM\", \"HCG\", \"HCGS\"  HEPATIC:   Lab Results   Component Value Date    ALBUMIN 3.8 01/20/2025    PROTTOTAL 6.9 01/20/2025    ALT 6 01/20/2025    AST 20 01/20/2025    ALKPHOS 105 01/20/2025    BILITOTAL 0.7 01/20/2025     OTHER:   Lab Results   Component Value Date    PH 7.42 06/30/2023    LACT 1.1 02/04/2024    A1C 8.3 (H) 02/19/2025    JAKE 9.0 01/20/2025    PHOS 3.4 07/26/2023    MAG 1.9 01/20/2025    LIPASE 15 02/04/2024    AMYLASE 14 (L) 07/03/2023    TSH 2.44 01/20/2025       Anesthesia Plan    ASA Status:  3    NPO Status:  NPO Appropriate    Anesthesia Type: MAC.     - Reason for MAC: straight local not clinically adequate, immobility needed              Consents    Anesthesia Plan(s) and associated risks, benefits, and realistic alternatives discussed. Questions answered and patient/representative(s) expressed understanding.     - Discussed:     - Discussed with:  Patient      - Extended Intubation/Ventilatory Support Discussed: No.      - Patient is DNR/DNI Status: No     Use of blood products discussed: No .     Postoperative Care    Pain management: Multi-modal analgesia.   PONV prophylaxis: Ondansetron (or other 5HT-3)     Comments:              PAC Discussion and Assessment    ASA Classification: 3    Anesthetic techniques and relevant risks discussed: MAC with GA as backup  Invasive monitoring and risk discussed: No    Possibility " "and Risk of blood transfusion discussed: No  NPO instructions given: NPO after midnight    Needs early admission to pre-op area: No                                          Lou Wade MD    I have reviewed the pertinent notes and labs in the chart from the past 30 days and (re)examined the patient.  Any updates or changes from those notes are reflected in this note.                # Drug Induced Platelet Defect: home medication list includes an antiplatelet medication  # DMII: A1C = 8.3 % (Ref range: <5.7 %) within past 6 months  # Obesity: Estimated body mass index is 32.2 kg/m  as calculated from the following:    Height as of 4/25/25: 1.718 m (5' 7.64\").    Weight as of 4/25/25: 95 kg (209 lb 8 oz).      "

## 2025-05-01 NOTE — ANESTHESIA POSTPROCEDURE EVALUATION
Patient: Arnaldo Henderson    Procedure: Procedure(s):  Colonoscopy       Anesthesia Type:  MAC    Note:  Disposition: Outpatient   Postop Pain Control: Uneventful            Sign Out: Well controlled pain   PONV: No   Neuro/Psych: Uneventful            Sign Out: Acceptable/Baseline neuro status   Airway/Respiratory: Uneventful            Sign Out: Acceptable/Baseline resp. status   CV/Hemodynamics: Uneventful            Sign Out: Acceptable CV status; No obvious hypovolemia; No obvious fluid overload   Other NRE: NONE   DID A NON-ROUTINE EVENT OCCUR? No           Last vitals:  Vitals Value Taken Time   /87 05/01/25 1340   Temp     Pulse     Resp 18 05/01/25 1340   SpO2 99 % 05/01/25 1350   Vitals shown include unfiled device data.    Electronically Signed By: Lou Wade MD  May 1, 2025  2:58 PM

## 2025-05-01 NOTE — ANESTHESIA CARE TRANSFER NOTE
Patient: Arnaldo Henderson    Procedure: Procedure(s):  Colonoscopy       Diagnosis: History of colonic polyps [Z86.0100]  Diagnosis Additional Information: No value filed.    Anesthesia Type:   MAC     Note:    Oropharynx: oropharynx clear of all foreign objects and spontaneously breathing  Level of Consciousness: awake  Oxygen Supplementation: face mask  Level of Supplemental Oxygen (L/min / FiO2): 6  Independent Airway: airway patency satisfactory and stable  Dentition: dentition unchanged  Vital Signs Stable: post-procedure vital signs reviewed and stable  Report to RN Given: handoff report given  Patient transferred to: Phase II  Comments: Pt to Phase II with monitors/O2, airway/IV patent, reports pain<3/10, resting comfortably.    Handoff Report: Identifed the Patient, Identified the Reponsible Provider, Reviewed the pertinent medical history, Discussed the surgical course, Reviewed Intra-OP anesthesia mangement and issues during anesthesia, Set expectations for post-procedure period and Allowed opportunity for questions and acknowledgement of understanding      Vitals:  Vitals Value Taken Time   /79 05/01/25 1319   Temp 36.7    Pulse 78 A paced    Resp 15    SpO2 100 % 05/01/25 1318   Vitals shown include unfiled device data.    Electronically Signed By: KRYSTYNA Alba CRNA  May 1, 2025  1:20 PM

## 2025-05-01 NOTE — H&P
I have seen and evaluated the patient today.  There are no significant changes in the patient's history or physical exam from the prior date of service.  The patient is stable and appropriate to undergo the proposed endoscopic procedure today.  Ricardo Moreira MD

## 2025-05-05 ENCOUNTER — THERAPY VISIT (OUTPATIENT)
Dept: PHYSICAL THERAPY | Facility: REHABILITATION | Age: 70
End: 2025-05-05
Payer: MEDICARE

## 2025-05-05 DIAGNOSIS — R26.81 GAIT INSTABILITY: Primary | ICD-10-CM

## 2025-05-05 DIAGNOSIS — M62.81 GENERALIZED MUSCLE WEAKNESS: ICD-10-CM

## 2025-05-05 DIAGNOSIS — G20.B1 PARKINSON'S DISEASE WITH DYSKINESIA WITHOUT FLUCTUATING MANIFESTATIONS (H): ICD-10-CM

## 2025-05-05 DIAGNOSIS — R26.89 IMPAIRMENT OF BALANCE: ICD-10-CM

## 2025-05-05 DIAGNOSIS — R25.8 BRADYKINESIA: ICD-10-CM

## 2025-05-05 PROCEDURE — 97110 THERAPEUTIC EXERCISES: CPT | Mod: KX | Performed by: PHYSICAL THERAPIST

## 2025-05-05 PROCEDURE — 97112 NEUROMUSCULAR REEDUCATION: CPT | Mod: KX | Performed by: PHYSICAL THERAPIST

## 2025-05-07 ENCOUNTER — DOCUMENTATION ONLY (OUTPATIENT)
Dept: NEUROLOGY | Facility: CLINIC | Age: 70
End: 2025-05-07
Payer: MEDICARE

## 2025-05-07 ENCOUNTER — TELEPHONE (OUTPATIENT)
Dept: NEUROLOGY | Facility: CLINIC | Age: 70
End: 2025-05-07
Payer: MEDICARE

## 2025-05-07 NOTE — PROGRESS NOTES
Received research visit notes and medication list from [facility]. Copy sent to provider and into scanning.   Maria L Lamb CMA

## 2025-05-07 NOTE — TELEPHONE ENCOUNTER
Left Voicemail (1st Attempt) and Sent Mychart (1st Attempt) for the patient to call back and schedule the following:    Appointment type: Autonomic testing  Provider: Dr. Sen   Return date: Next avail  Specialty phone number: [phone]  Additional appointment(s) needed: NA  Additonal Notes:

## 2025-05-09 ENCOUNTER — VIRTUAL VISIT (OUTPATIENT)
Dept: PSYCHOLOGY | Facility: CLINIC | Age: 70
End: 2025-05-09
Payer: MEDICARE

## 2025-05-09 DIAGNOSIS — G20.B1 PARKINSON'S DISEASE WITH DYSKINESIA, UNSPECIFIED WHETHER MANIFESTATIONS FLUCTUATE (H): ICD-10-CM

## 2025-05-09 DIAGNOSIS — F43.23 ADJUSTMENT DISORDER WITH MIXED ANXIETY AND DEPRESSED MOOD: Primary | ICD-10-CM

## 2025-05-09 PROCEDURE — 90837 PSYTX W PT 60 MINUTES: CPT | Mod: 95 | Performed by: PSYCHOLOGIST

## 2025-05-12 ENCOUNTER — THERAPY VISIT (OUTPATIENT)
Dept: PHYSICAL THERAPY | Facility: REHABILITATION | Age: 70
End: 2025-05-12
Payer: MEDICARE

## 2025-05-12 DIAGNOSIS — R25.8 BRADYKINESIA: ICD-10-CM

## 2025-05-12 DIAGNOSIS — R26.89 IMPAIRMENT OF BALANCE: ICD-10-CM

## 2025-05-12 DIAGNOSIS — M62.81 GENERALIZED MUSCLE WEAKNESS: ICD-10-CM

## 2025-05-12 DIAGNOSIS — R26.81 GAIT INSTABILITY: Primary | ICD-10-CM

## 2025-05-12 DIAGNOSIS — G20.B1 PARKINSON'S DISEASE WITH DYSKINESIA WITHOUT FLUCTUATING MANIFESTATIONS (H): ICD-10-CM

## 2025-05-12 PROCEDURE — 97750 PHYSICAL PERFORMANCE TEST: CPT | Mod: KX | Performed by: PHYSICAL THERAPIST

## 2025-05-12 PROCEDURE — 97110 THERAPEUTIC EXERCISES: CPT | Mod: KX | Performed by: PHYSICAL THERAPIST

## 2025-05-12 NOTE — PROGRESS NOTES
DISCHARGE  Reason for Discharge: Pt has made gains towards goals and has optimized his mobility with PT. He is independent with his HEP and is appropriate for discharge from PT at this time.     Equipment Issued: HEP    Discharge Plan: Patient to continue home program.    Referring Provider:  Alberto Jackson    05/12/25 0500   Appointment Info   Signing clinician's name / credentials Katelyn Butler, SPT / Salma Blount, PT, DPT, NCS   Total/Authorized Visits 17   Visits Used 16   Medical Diagnosis G20.B1 (ICD-10-CM) - Parkinson's disease with dyskinesia without fluctuating manifestations (H)   PT Tx Diagnosis impaired gait, impaired balance, postural imbalance   Precautions/Limitations none   Progress Note/Certification   Start of Care Date 03/03/25   Onset of illness/injury or Date of Surgery 03/03/25   Therapy Frequency 1-2x/week   Predicted Duration 12   Certification date from 03/03/25   Certification date to 06/01/25   GOALS   PT Goals 2;3;4   PT Goal 1   Goal Identifier HEP   Goal Description Patient will demonstrate at least 50% compliance in their HEP to support progress towards functional and patient specific goals   Goal Progress Met   Target Date 05/19/25   PT Goal 2   Goal Identifier MiniBest   Goal Description pt will increase minibest score by 5 points to indicae decreased falls risk and improved mobility   Goal Progress Not met - scored 22/28 this date   Target Date 05/19/25   PT Goal 3   Goal Identifier 30s STS   Goal Description pt gabriele improve 30s STS to 15 stands in order to show imprived LE endurance and to match age and gender norms   Goal Progress Met (17 on 4/14/2025)   Target Date 05/19/25   PT Goal 4   Goal Identifier Gait speed   Goal Description pt will improe gait speed to 1.262m/s in order to match age and gender norms to indicate improve movility   Goal Progress ongoing - 6 meteres in 5.25 sec (1.142 m/s - 5/12/25)   Target Date 05/19/25   Subjective Report   Subjective Report Pt  states that he is doing well this date. Pt states that he has been stutter stepping more late at night. He states that he notices it more when he is switiching rooms or turning multiple times when getting up from bed to go to the bathroom. He states that this has started in the last couple of weeks. Pt ready for therapy this date.   Objective Measures   Objective Measures Objective Measure 3;Objective Measure 4   Objective Measure 1   Objective Measure glucose reading   Details 237 at onset of Rx session; After NuStep 287. SPT asks pt for another reading after seated rest and it is 286.   Objective Measure 2   Objective Measure BP, seated, at onset of Rx session   Details Not tested this date   Objective Measure 3   Objective Measure BP with exercise, seated   Details NT   Objective Measure 4   Objective Measure BP supine with LE on bolster, 5 min supine rest break with feet on bolster after NuStep   Details NT   Objective Measure 5   Objective Measure 30 sec STS   Details Not tested this date   Objective Measure 6   Objective Measure 6 Meters timed within 10 MWT   Details 5.25 sec   Treatment Interventions (PT)   Interventions Therapeutic Procedure/Exercise;Therapeutic Activity;Neuromuscular Re-education;Gait Training;Manual Therapy;Self Care/Home Management   Therapeutic Procedure/Exercise   Therapeutic Procedures: strength, endurance, ROM, flexibility minutes (29554) 20   Ther Proc 1 NUSTEP to address axial rigidity and aerobic tolerance   Ther Proc 1 - Details To promote axial trunk rotation and conditioning: NUSTEP x 8 min total at level #5, avg METs: 2.5, SPM: 76   Ther Proc 2 Big walking   Ther Proc 2 - Details Not today   PTRx Ther Proc 1 PD: Seated Low to High   PTRx Ther Proc 1 - Details Not today   PTRx Ther Proc 2 PD: Seated Arm Sweeps   PTRx Ther Proc 2 - Details Not today   PTRx Ther Proc 3 Sit to Stand   PTRx Ther Proc 3 - Details HEP   PTRx Ther Proc 4 birddog   PTRx Ther Proc 4 - Details Not today    PTRx Ther Proc 5 Barker and Arrow Stretch   PTRx Ther Proc 5 - Details Not today   PTRx Ther Proc 6 Roll Outs Hooklying   PTRx Ther Proc 6 - Details Not today   PTRx Ther Proc 7 Single Knee to Chest   PTRx Ther Proc 7 - Details Not today   PTRx Ther Proc 8 Pec Stretch Doorway   PTRx Ther Proc 8 - Details Not today   Skilled Intervention Large amplitude training to address rigidity and bradykinesia.   Patient Response/Progress Tolerated all exercises well today   Neuromuscular Re-education   Neuro Re-ed 1 Dual Tasking   Neuro Re-ed 1 - Details Not today   Neuro Re-ed 2 UE PNF on foam   Neuro Re-ed 2 - Details Not today   PTRx Neuro Re-ed 1 PD: Foot Forward with Arms   PTRx Neuro Re-ed 1 - Details Not today   PTRx Neuro Re-ed 2 PD: Foot to Side with Arms   PTRx Neuro Re-ed 2 - Details Not today   PTRx Neuro Re-ed 3 PD: Foot Backwards with Arms   PTRx Neuro Re-ed 3 - Details Not today   PTRx Neuro Re-ed 4 PD: Forward Weight Shift with Legs and Arms   PTRx Neuro Re-ed 4 - Details Not today   PTRx Neuro Re-ed 5 PD: Twist with Arms   PTRx Neuro Re-ed 5 - Details Not today   Skilled Intervention Multidirectional movements to improve movement coordination, static and dynamic balance, kinesthetic awareness and positioning of involved body regions in the environment.   Patient Response/Progress Tolerated all exercises well this date   Eval/Assessments   Assessments Physical Performance Test/Measures   Physical Performance Test/measures   Physical Performance Test/Measurement, Minutes (41268) 20   Physical Performance Test/Measurement Details MiniBest   Skilled Intervention Time spent administering test, interpreting results, and explaining findings to pt. All pt questions asked and answered.   Patient Response/Progress Scored 22/28   Education   Learner/Method Patient   Plan   Home program seated PD, foot forward with arms standing P   Plan for next session Discharge   Comments   Comments Pt's vitals monitored throughout  session but remianed in a good range for continued exercise.Time taken this date to review goals and discuss next steps for pt post discharge from PT. Pt improved on his gait speed, met his 30 sec STS goals. but regressed in his minibest scores. Pt function overall improving and pt appropriate for discharge from PT at this time.   Total Session Time   Timed Code Treatment Minutes 40   Total Treatment Time (sum of timed and untimed services) 40

## 2025-05-18 NOTE — CONFIDENTIAL NOTE
"Health Psychology - Follow up Visit  Confidential Summary*     The author of this note documented a reason for not sharing it with the patient.      REFERRAL SOURCE:  Whitney Renteria, PhD (provider who will be out on medical leave)     Visit conducted via video using [employer].  Patient was at his home and provider at her home.      The patient has been notified of following:   \"This VIDEO visit will be conducted via a call between you and your physician/provider. We have found that certain health care needs can be provided without the need for an in-person physical exam.   Video visits are billed at different rates depending on your insurance coverage.  Please reach out to your insurance provider with any questions. If during the course of the call the physician/provider feels a video visit is not appropriate, you will not be charged for this service.\"     Patient has given verbal consent for Video/PHONE visit? Yes     CHIEF COMPLAINT/REASON FOR VISIT  Patient would like to decrease anxiety.     Subjective:   Patient began with optimistic report that he continues to feel less depressed and less anxious.  He attributed these changes to medication.  He was encouraged to continue taking medication as prescirbed and to continue to learn about the thoughts and behaviors that might contribute to both so that he might continue to learn new strategies to target both depression and anxiety.      He also discussed his PD diagnosis and that he is noticing worsening of symptoms.  Discussed lessons taught by Valeri Rivas in the PD clinic including the importance of muscle building and activity and taking medications as prescribed.  He reported that he is also challenged by blood pressure and that he is walking a tight rope between hypo and hypertension and that he and his providers are working on adjusting his medications.      He reported that he is having his 70st birthday this weekend and that he is intending to visit his family " this year.  He described having a challenging past year and his intention to make this year more enjoyable.      Objective:  Patient was on time for today s session.  She appeared friendly, alert and oriented.  Mood was euthymic, with appropriate range of affect, including occasional tearfulness. Patient endorsed suicidal ideation and denied suicidal plan or intent.         Assessment:  The patient is coping with multiple medical problems and is in the process of learning to adjust to the changes these pose to his well being.  He may have experienced episodes of panic in the past which have resolved with medication.  He is currently describing worry and regret.  Will treat for ANETA.      Plan:  CBT and supportive therapy in context of medical diagnosis and surrounding concerns.  See in one week.      Treatment plan reviewed.     Time In: 2:16  Time Out: 3:10     Diagnosis:  Axis I Adjustment disorder with mixed emotions of anxiety and depression, psychological and behavioral factors associated with medical; ANETA; panic disorder   Axis II Deferred   Axis III please see medical records for details   [place] IV Psychosocial and Environmental Stressors: Health & family          Samantha Watters, PhD, LP

## 2025-05-20 ENCOUNTER — OFFICE VISIT (OUTPATIENT)
Dept: PHYSICAL MEDICINE AND REHAB | Facility: CLINIC | Age: 70
End: 2025-05-20
Payer: MEDICARE

## 2025-05-20 VITALS
DIASTOLIC BLOOD PRESSURE: 51 MMHG | BODY MASS INDEX: 31.83 KG/M2 | HEART RATE: 86 BPM | SYSTOLIC BLOOD PRESSURE: 90 MMHG | HEIGHT: 68 IN | OXYGEN SATURATION: 94 % | WEIGHT: 210 LBS

## 2025-05-20 DIAGNOSIS — M54.16 LUMBAR RADICULOPATHY: Primary | ICD-10-CM

## 2025-05-20 DIAGNOSIS — M48.062 SPINAL STENOSIS OF LUMBAR REGION WITH NEUROGENIC CLAUDICATION: ICD-10-CM

## 2025-05-20 ASSESSMENT — PAIN SCALES - GENERAL: PAINLEVEL_OUTOF10: MILD PAIN (2)

## 2025-05-20 NOTE — LETTER
5/20/2025      Arnaldo Henderson  [address] [facility] [address]      Dear Colleague,    Thank you for referring your patient, Arnaldo Henderson, to the [facility]. Please see a copy of my visit note below.    ASSESSMENT:  Arnaldo Henderson is a 70year old male presents for follow-up of :         Diagnoses and all orders for this visit:  Lumbar radiculopathy  Spinal stenosis of lumbar region with neurogenic claudication    Patient is following up on his low back pain, lumbar stenosis, and lumbar radiculopathy, which has responded very well to a bilateral L2-L3 TFESI with 90% improvement of low back symptoms as well as improvement in standing and walking tolerance.  Patient definitely feels like this injection was more beneficial than the previous L2-L3 IL DEB. Patient advised of the usual duration of benefit with injection, in which we usually see 50% improvement for 3 to 6 months.  Patient's extent of improvement is very promising and he was advised to continue with his home exercises for now, with a plan to reach out later in the year if symptoms start to come back.  We can do another round of PT which worked quite well for the patient as well as investigating a repeat injection.  Patient is in agreement with this plan, all questions were addressed.    PLAN:  Reviewed spine anatomy and disease process. Discussed diagnosis and treatment options with the patient today. A shared decision making model was used. The patient's values and choices were respected. The following represents what was discussed and decided upon by the provider and the patient.    1. DIAGNOSTIC TESTS  No new imaging orders at this time.    2. PHYSICAL THERAPY  Patient recently completed PT for back pain and gait  If pain starts to recur we can refer for another round of PT    3. MEDICATIONS:  Discussed multiple medication options today with patient. Discussed risks, side effects, and proper use of medications.  Patient verbalized understanding.  No new medications ordered at this visit.    4. INTERVENTIONS:  Interventional treatments are not indicated for patient's presentation.  Patient is doing well after previous injection  Consider repeat bilateral L2-L3 TFESI in future if pain recurs    5. OTHER REFERRALS:  No other referrals at this time.    6. FOLLOW-UP  As needed.    Advised patient to call the [facility] if symptoms worsen or you have problems controlling bladder and bowel function.   ______________________________________________________________________    SUBJECTIVE:   Patient is presenting for follow-up of his mid to low back pain.  He presents with his wife who helps provide collateral information.    Patient reports the bilateral L2-L3 TFESI performed on 4/22/2025 has yielded at least 90% improvement of his low back pain.  He feels like, compared to the previous L2-L3 IL DEB, this second injection has actually been better in terms of the amount of pain relief and also he has noticed improvement in his walking tolerance.  No new symptoms, no problems after the injection.  Overall patient is quite happy with the injection.    No new numbness, weakness, or bladder bowel incontinence.    Takes gabapentin 600 mg TID to help to manage abdominal pain.    No prior back surgeries.    -Treatment to Date: As above, completed PT last week for gait and balance    1/8/25 - Fong - L2-L3 ILESI - 75-80% relief, lasted about 2.5 months  4/22/25 - Fong - Bilateral L2-L3 TFESI - 90%+ relief at 1 month      Oswestry (EVELYN) Questionnaire        12/7/2024    10:53 AM   OSWESTRY DISABILITY INDEX   Count 10    Sum 17    Oswestry Score (%) 34 %        Patient-reported       Neck Disability Index:       No data to display                       -Medications:    Current Outpatient Medications   Medication Sig Dispense Refill     aspirin (ASA) 81 MG EC tablet Take 81 mg by mouth daily       atorvastatin (LIPITOR) 40 MG tablet Take 1  tablet (40 mg) by mouth daily 90 tablet 3     BD PEN NEEDLE LINDY 2ND GEN 32G X 4 MM miscellaneous USE TO INJECT INSULIN 4 TIMES A  each 3     bisacodyl (DULCOLAX) 5 MG EC tablet Take 2 tablets at 3 pm the day before your procedure. If your procedure is before 11 am, take 2 additional tablets at 11 pm. If your procedure is after 11 am, take 2 additional tablets at 6 am. For additional instructions refer to your colonoscopy prep instructions. 4 tablet 0     carbidopa-levodopa (SINEMET CR)  MG CR tablet Take 1 tablet by mouth at bedtime @9p 90 tablet 3     carbidopa-levodopa (SINEMET)  MG tablet 2.5@6a, 2@10a, 2@2p and 1@5p 675 tablet 3     Continuous Glucose Sensor (DEXCOM G7 SENSOR) MISC Change every 10 days. 9 each 11     dasiglucagon HCl (ZEGALOGUE) 0.6 MG/0.6ML SOAJ injection Inject 0.6 mg Subcutaneous once as needed (use for severe hypoglycemia) 1.2 mL 1     gabapentin (NEURONTIN) 300 MG capsule Take 2 capsules (600 mg) by mouth 3 times daily 540 capsule 3     insulin aspart (NOVOLOG PEN) 100 UNIT/ML pen Inject 20 Units subcutaneously 3 times daily as needed for high blood sugar (take 3 times/day as needed). 60 mL 2     Insulin Glargine-yfgn 100 UNIT/ML SOPN Inject 14 Units subcutaneously every morning AND 20 Units at bedtime. 50 mL 2     lipase-protease-amylase (ZENPEP) 20281-786535-780652 units CPEP Take 1 capsule by mouth 3 times daily (with meals). 90 capsule 11     losartan (COZAAR) 50 MG tablet Take 1 tablet (50 mg) by mouth daily 90 tablet 3     magnesium oxide (MAG-OX) 400 MG tablet Take 400 mg by mouth every other day       midodrine (PROAMATINE) 2.5 MG tablet Take 1 tablet (2.5 mg) by mouth daily. 90 tablet 1     Multiple Vitamin (MULTI-VITAMINS) TABS Take 1 tablet by mouth daily       omeprazole (PRILOSEC) 20 MG DR capsule Take 1 capsule (20 mg) by mouth daily 90 capsule 3     polyethylene glycol (GOLYTELY) 236 g suspension The night before the exam at 6 pm drink an 8-ounce glass  "every 15 minutes until the jug is half empty. If you arrive before 11 AM: Drink the other half of the [place] jug at 11 PM night before procedure. If you arrive after 11 AM: Drink the other half of the [place]end[place] jug at 6 AM day of procedure. For additional instructions refer to your colonoscopy prep instructions. 4000 mL 0     sertraline (ZOLOFT) 25 MG tablet Take 1 tablet (25 mg) by mouth daily. 90 tablet 3     traZODone (DESYREL) 50 MG tablet TAKE 0.5-1 TABLETS BY MOUTH AT BEDTIME. 90 tablet 1     No current facility-administered medications for this visit.       Allergies   Allergen Reactions     Ciprofloxacin Other (See Comments), Hives, Itching, Rash and Unknown     Other reaction(s): Other (see comments)  Oral swelling per [facility]         Dilaudid [Hydromorphone] Rash     Morphine Rash     rash     Penicillins Rash     aka ancef/ rash       Clindamycin Itching and Rash     Oxycodone Rash     \"HORRIBLE, SEVERE RASH MAYBE CAUSED BY OXY\"       Past Medical History:   Diagnosis Date     Acute pancreatitis 04/18/2022    Formatting of this note might be different from the original. Formatting of this note might be different from the original. Added automatically from request for surgery [id number] Formatting of this note might be different from the original. Added automatically from request for surgery [id number]  Formatting of this note might be different from the original. Added automatically from request for surgery      CAD (coronary artery disease)     CABG     Cholecystitis, acute 07/05/2023     Diabetes mellitus, type 2 (H) 2013     Dystonia, torsion, fragments of 04/25/2025     Family history of parkinsonism 02/14/2024     Gastroesophageal reflux disease      Hypercholesteremia      Hypertension      Mixed conductive and sensorineural hearing loss of both ears      Mixed hearing loss      Pancreatic duct stricture      Pancreatitis      Parkinson disease (H)      Parkinson's disease, unspecified whether " dyskinesia present, unspecified whether manifestations fluctuate (H) 02/14/2024     Seborrheic dermatitis 03/22/2024     Second degree AV block      Surgical site infection 07/26/2023        Patient Active Problem List   Diagnosis     Retained foreign body     Wound infection     Acute bilateral low back pain without sciatica     Adenomatous polyp of colon     Atherosclerotic heart disease of native coronary artery without angina pectoris     Cancer of skin of scrotum (H)     Cervicalgia     Gastroesophageal reflux disease without esophagitis     History of cholecystectomy     History of coronary artery bypass surgery     Hyperlipidemia, unspecified     Hypertension     Long term (current) use of aspirin     Mixed conductive and sensorineural hearing loss     Need for assistance with personal care     Other hydrocele     Parkinson's disease with dyskinesia, unspecified whether manifestations fluctuate (H)     Physical deconditioning     Pseudocyst of pancreas     Sensorineural hearing loss (SNHL) of right ear with unrestricted hearing of left ear     Type 2 diabetes mellitus with hyperglycemia, with long-term current use of insulin (H)     Neuroendocrine carcinoma of small bowel (H)     Failure to thrive in adult     Hyponatremia     Chronic right-sided low back pain without sciatica     Abdominal pain, generalized     Other secondary neuroendocrine tumors (H)     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)     Weakness     Second degree AV block     History of Parkinson's disease     Orthostatic hypotension     Hypotension     Bradycardia     Family history of parkinsonism     Cardiac pacemaker in situ     Seborrheic dermatitis     Lumbar spondylosis     Chronic bilateral thoracic back pain     Osteoarthritis of spine with radiculopathy, thoracic region     Spinal stenosis of lumbar region with neurogenic claudication     Osteopenia of multiple sites     Heart block AV third degree (H)      Acquired absence of organ     Digestive system disorder     Long term current use of anticoagulant therapy     Long term current use of insulin (H)     Mild protein-calorie malnutrition (weight for age 75-89% of standard)     Type 2 diabetes mellitus without complication (H)     Idiopathic acute pancreatitis     Atherosclerosis of coronary artery without angina pectoris     Neck pain     Muscle weakness     Hyperlipidemia     Essential hypertension     Long term current use of aspirin     Dystonia, torsion, fragments of       Past Surgical History:   Procedure Laterality Date     CA ANESTH CABG W/PUMP       CHOLECYSTECTOMY  2022     COLONOSCOPY N/A 01/12/2023    Procedure: colonoscopy with fluroscopy;  Surgeon: Ayden Fraire MD;  Location:  OR     COLONOSCOPY N/A 5/1/2025    Procedure: Colonoscopy;  Surgeon: Ricardo Moreira MD;  Location:  GI     ENDOSCOPIC RETROGRADE CHOLANGIOPANCREATOGRAM N/A 05/18/2023    Procedure: ENDOSCOPIC RETROGRADE CHOLANGIOPANCREATOGRAPHY, WITH  CYSTDUODENOSTOMY STENTS X2 REMOVAL;  Surgeon: Cedric Saldana MD;  Location: [facility] OR     ENT SURGERY       EP PACEMAKER DEVICE & LEAD IMPLANT- RIGHT ATRIAL & RIGHT VENTRICULAR N/A 02/28/2024    Procedure: Pacemaker Device & Lead Implant - Right Atrial & Right Ventricular;  Surgeon: Jenna Hernandez MD;  Location:  HEART CARDIAC CATH LAB     LAPAROSCOPY DIAGNOSTIC (GENERAL) N/A 02/20/2023    Procedure: LAPAROSCOPY, DIAGNOSTIC; RETRIEVAL OF MIGRATED STENT;  Surgeon: Joseluis Lima MD;  Location: [facility] OR     ORTHOPEDIC SURGERY       OTHER SURGICAL HISTORY      skin cancer ? from scrotum     OTHER SURGICAL HISTORY  04/22/2025    cortison injection     PANCREATECTOMY PEUSTOW N/A 06/30/2023    Procedure: Open distal pancreactectomy with splenectomy, lysis of adhesions, resection of proximal illeum;  Surgeon: Ashvin Haider MD;  Location: [facility] OR     DC CABG, ARTERIAL, TWO  2003       Family History   Problem Relation Age of  "Onset     Parkinsonism Mother 65         at 75 years     Other - See Comments Mother         Polish ?AJ     Psychosis Mother         hallucinations from medications     Other - See Comments Father      Other - See Comments Sister      Other - See Comments Sister      Lung Cancer Brother         smoker -  47 years     Other - See Comments Daughter         3 kids - 2 girls and boy     Heart Disease Son         orthostatic hypotension     Other - See Comments Son         2 daughters     Autism Spectrum Disorder Grandson      Diabetes No family hx of        Reviewed past medical, surgical, and family history with patient found on new patient intake packet located in [employer]MR [employer] tab.     SOCIAL HX: Reviewed    ROS: Specifically negative for bowel/bladder dysfunction, balance changes, headache, dizziness, foot drop, fevers, chills, appetite changes, nausea/vomiting, unexplained weight loss. Otherwise 13 systems reviewed are negative. Please see the patient's intake questionnaire from today for details.    OBJECTIVE:  BP 90/51 (BP Location: Right arm, Patient Position: Sitting, Cuff Size: Adult Regular)   Pulse 86   Ht 5' 8\" (1.727 m)   Wt 210 lb (95.3 kg)   SpO2 94%   BMI 31.93 kg/m      PHYSICAL EXAMINATION: Previous exam noted below, changes made as needed  --CONSTITUTIONAL: Vital signs as above. No acute distress. The patient is well nourished and well groomed.  --PSYCHIATRIC: The patient is awake, alert, oriented to person, place, time and answering questions appropriately with clear speech. Appropriate mood and affect   --RESPIRATORY: Normal rhythm and effort. No abnormal accessory muscle breathing patterns noted.   --GROSS MOTOR: Easily arises from a seated position.  --SKIN: Visible midline laparotomy scar, appears well-healed.  No vesicular lesions or other rash in distribution of pain symptoms.  --LUMBAR SPINE: Inspection reveals no evidence of deformity. Range of motion is not limited in flexion, " extension, lateral rotation. Mild tenderness to palpation of lumbar paraspinals, no tenderness in spinous processes.  Facet loading (López test) elicits improvement of pain, seated SLR negative  --HIPS: Full range of motion bilaterally. Negative DANIELA test.  --LOWER EXTREMITY MOTOR TESTING:  Hip flexion left 5/5, right 5/5  Knee extension left 5/5, right 5/5  Ankle dorsiflexors left 5/5, right 5/5  Ankle plantarflexors left 5/5, right 5/5   Great toe extension left 5/5, right 5/5   Knee flexion left 5/5, right 5/5  --NEUROLOGIC: Sensation to lower extremities is intact bilaterally in L2-S1 dermatomes.  Negative Merritt's bilaterally. Reflexes intact in BLE, no clonus.  --VASCULAR: Warm upper limbs bilaterally. Capillary refill in the upper extremities is less than 1 second.    RESULTS: Available medical records from [facility] and any other outside records were reviewed today.     Imaging:  Available relevant imaging was personally reviewed and interpreted today. The images were shown to the patient and the findings were explained using a spine model.    11/27/2024 MRI thoracic and lumbar spine:    Impression:   1. Similar endplate deformities of the lower thoracic spine, greatest  at T12 where there is 50% loss of height anteriorly.  2. Lumbar spine degeneration with mild to moderate spinal canal  narrowing at L1-2 and mild bilateral neural foraminal narrowing at  L2-L4.    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 Unchanged compression deformities of T11 and  T12 with multilevel vacuum disc phenomena. No acute or suspicious  osseous abnormality. Stable lucent lesion of posterior left rib 11  (8/302 and 13/37), unchanged since at least 7/23/2022. Bilateral small  indirect fat-containing inguinal hernias, new  on the right since prior  exam 3/12/2024. Large right hydrocele.    IMPRESSION:  1. Stable post surgical changes of bowel resection and anastomosis,  distal pancreatectomy, splenectomy, appendectomy.  2. Stable size of multiple mesenteric lymph nodes and slightly  increased size of soft tissue density along the hepatic artery that  are concerning for neoplastic etiology.  3. Stable sub-6 mm pulmonary nodules. No new or enlarging pulmonary  nodule.  4. Unchanged subcentimeter mediastinal and thoracic nodes. No new or  enlarging thoracic lymphadenopathy.    Again, thank you for allowing me to participate in the care of your patient.        Sincerely,        Jovan Fong MD    Electronically signed

## 2025-05-20 NOTE — PROGRESS NOTES
ASSESSMENT:  Arnaldo Henderson is a 70year old male presents for follow-up of :         Diagnoses and all orders for this visit:  Lumbar radiculopathy  Spinal stenosis of lumbar region with neurogenic claudication    Patient is following up on his low back pain, lumbar stenosis, and lumbar radiculopathy, which has responded very well to a bilateral L2-L3 TFESI with 90% improvement of low back symptoms as well as improvement in standing and walking tolerance.  Patient definitely feels like this injection was more beneficial than the previous L2-L3 IL DEB. Patient advised of the usual duration of benefit with injection, in which we usually see 50% improvement for 3 to 6 months.  Patient's extent of improvement is very promising and he was advised to continue with his home exercises for now, with a plan to reach out later in the year if symptoms start to come back.  We can do another round of PT which worked quite well for the patient as well as investigating a repeat injection.  Patient is in agreement with this plan, all questions were addressed.    PLAN:  Reviewed spine anatomy and disease process. Discussed diagnosis and treatment options with the patient today. A shared decision making model was used. The patient's values and choices were respected. The following represents what was discussed and decided upon by the provider and the patient.    1. DIAGNOSTIC TESTS  No new imaging orders at this time.    2. PHYSICAL THERAPY  Patient recently completed PT for back pain and gait  If pain starts to recur we can refer for another round of PT    3. MEDICATIONS:  Discussed multiple medication options today with patient. Discussed risks, side effects, and proper use of medications. Patient verbalized understanding.  No new medications ordered at this visit.    4. INTERVENTIONS:  Interventional treatments are not indicated for patient's presentation.  Patient is doing well after previous injection  Consider repeat bilateral  L2-L3 TFESI in future if pain recurs    5. OTHER REFERRALS:  No other referrals at this time.    6. FOLLOW-UP  As needed.    Advised patient to call the [facility] if symptoms worsen or you have problems controlling bladder and bowel function.   ______________________________________________________________________    SUBJECTIVE:   Patient is presenting for follow-up of his mid to low back pain.  He presents with his wife who helps provide collateral information.    Patient reports the bilateral L2-L3 TFESI performed on 4/22/2025 has yielded at least 90% improvement of his low back pain.  He feels like, compared to the previous L2-L3 IL DEB, this second injection has actually been better in terms of the amount of pain relief and also he has noticed improvement in his walking tolerance.  No new symptoms, no problems after the injection.  Overall patient is quite happy with the injection.    No new numbness, weakness, or bladder bowel incontinence.    Takes gabapentin 600 mg TID to help to manage abdominal pain.    No prior back surgeries.    -Treatment to Date: As above, completed PT last week for gait and balance    1/8/25 - Fong - L2-L3 ILESI - 75-80% relief, lasted about 2.5 months  4/22/25 - Fong - Bilateral L2-L3 TFESI - 90%+ relief at 1 month      Oswestry (EVELYN) Questionnaire        12/7/2024    10:53 AM   OSWESTRY DISABILITY INDEX   Count 10    Sum 17    Oswestry Score (%) 34 %        Patient-reported       Neck Disability Index:       No data to display                       -Medications:    Current Outpatient Medications   Medication Sig Dispense Refill    aspirin (ASA) 81 MG EC tablet Take 81 mg by mouth daily      atorvastatin (LIPITOR) 40 MG tablet Take 1 tablet (40 mg) by mouth daily 90 tablet 3    BD PEN NEEDLE LINDY 2ND GEN 32G X 4 MM miscellaneous USE TO INJECT INSULIN 4 TIMES A  each 3    bisacodyl (DULCOLAX) 5 MG EC tablet Take 2 tablets at 3 pm the day before your procedure. If your  procedure is before 11 am, take 2 additional tablets at 11 pm. If your procedure is after 11 am, take 2 additional tablets at 6 am. For additional instructions refer to your colonoscopy prep instructions. 4 tablet 0    carbidopa-levodopa (SINEMET CR)  MG CR tablet Take 1 tablet by mouth at bedtime @9p 90 tablet 3    carbidopa-levodopa (SINEMET)  MG tablet 2.5@6a, 2@10a, 2@2p and 1@5p 675 tablet 3    Continuous Glucose Sensor (DEXCOM G7 SENSOR) MISC Change every 10 days. 9 each 11    dasiglucagon HCl (ZEGALOGUE) 0.6 MG/0.6ML SOAJ injection Inject 0.6 mg Subcutaneous once as needed (use for severe hypoglycemia) 1.2 mL 1    gabapentin (NEURONTIN) 300 MG capsule Take 2 capsules (600 mg) by mouth 3 times daily 540 capsule 3    insulin aspart (NOVOLOG PEN) 100 UNIT/ML pen Inject 20 Units subcutaneously 3 times daily as needed for high blood sugar (take 3 times/day as needed). 60 mL 2    Insulin Glargine-yfgn 100 UNIT/ML SOPN Inject 14 Units subcutaneously every morning AND 20 Units at bedtime. 50 mL 2    lipase-protease-amylase (ZENPEP) 06287-144149-634338 units CPEP Take 1 capsule by mouth 3 times daily (with meals). 90 capsule 11    losartan (COZAAR) 50 MG tablet Take 1 tablet (50 mg) by mouth daily 90 tablet 3    magnesium oxide (MAG-OX) 400 MG tablet Take 400 mg by mouth every other day      midodrine (PROAMATINE) 2.5 MG tablet Take 1 tablet (2.5 mg) by mouth daily. 90 tablet 1    Multiple Vitamin (MULTI-VITAMINS) TABS Take 1 tablet by mouth daily      omeprazole (PRILOSEC) 20 MG DR capsule Take 1 capsule (20 mg) by mouth daily 90 capsule 3    polyethylene glycol (GOLYTELY) 236 g suspension The night before the exam at 6 pm drink an 8-ounce glass every 15 minutes until the jug is half empty. If you arrive before 11 AM: Drink the other half of the Golytely jug at 11 PM night before procedure. If you arrive after 11 AM: Drink the other half of the Golytely jug at 6 AM day of procedure. For additional  "instructions refer to your colonoscopy prep instructions. 4000 mL 0    sertraline (ZOLOFT) 25 MG tablet Take 1 tablet (25 mg) by mouth daily. 90 tablet 3    traZODone (DESYREL) 50 MG tablet TAKE 0.5-1 TABLETS BY MOUTH AT BEDTIME. 90 tablet 1     No current facility-administered medications for this visit.       Allergies   Allergen Reactions    Ciprofloxacin Other (See Comments), Hives, Itching, Rash and Unknown     Other reaction(s): Other (see comments)  Oral swelling per [facility]        Dilaudid [Hydromorphone] Rash    Morphine Rash     rash    Penicillins Rash     aka ancef/ rash      Clindamycin Itching and Rash    Oxycodone Rash     \"HORRIBLE, SEVERE RASH MAYBE CAUSED BY OXY\"       Past Medical History:   Diagnosis Date    Acute pancreatitis 04/18/2022    Formatting of this note might be different from the original. Formatting of this note might be different from the original. Added automatically from request for surgery [id number] Formatting of this note might be different from the original. Added automatically from request for surgery [id number]  Formatting of this note might be different from the original. Added automatically from request for surgery     CAD (coronary artery disease)     CABG    Cholecystitis, acute 07/05/2023    Diabetes mellitus, type 2 (H) 2013    Dystonia, torsion, fragments of 04/25/2025    Family history of parkinsonism 02/14/2024    Gastroesophageal reflux disease     Hypercholesteremia     Hypertension     Mixed conductive and sensorineural hearing loss of both ears     Mixed hearing loss     Pancreatic duct stricture     Pancreatitis     Parkinson disease (H)     Parkinson's disease, unspecified whether dyskinesia present, unspecified whether manifestations fluctuate (H) 02/14/2024    Seborrheic dermatitis 03/22/2024    Second degree AV block     Surgical site infection 07/26/2023        Patient Active Problem List   Diagnosis    Retained foreign body    Wound infection    Acute " bilateral low back pain without sciatica    Adenomatous polyp of colon    Atherosclerotic heart disease of native coronary artery without angina pectoris    Cancer of skin of scrotum (H)    Cervicalgia    Gastroesophageal reflux disease without esophagitis    History of cholecystectomy    History of coronary artery bypass surgery    Hyperlipidemia, unspecified    Hypertension    Long term (current) use of aspirin    Mixed conductive and sensorineural hearing loss    Need for assistance with personal care    Other hydrocele    Parkinson's disease with dyskinesia, unspecified whether manifestations fluctuate (H)    Physical deconditioning    Pseudocyst of pancreas    Sensorineural hearing loss (SNHL) of right ear with unrestricted hearing of left ear    Type 2 diabetes mellitus with hyperglycemia, with long-term current use of insulin (H)    Neuroendocrine carcinoma of small bowel (H)    Failure to thrive in adult    Hyponatremia    Chronic right-sided low back pain without sciatica    Abdominal pain, generalized    Other secondary neuroendocrine tumors (H)    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)    Weakness    Second degree AV block    History of Parkinson's disease    Orthostatic hypotension    Hypotension    Bradycardia    Family history of parkinsonism    Cardiac pacemaker in situ    Seborrheic dermatitis    Lumbar spondylosis    Chronic bilateral thoracic back pain    Osteoarthritis of spine with radiculopathy, thoracic region    Spinal stenosis of lumbar region with neurogenic claudication    Osteopenia of multiple sites    Heart block AV third degree (H)    Acquired absence of organ    Digestive system disorder    Long term current use of anticoagulant therapy    Long term current use of insulin (H)    Mild protein-calorie malnutrition (weight for age 75-89% of standard)    Type 2 diabetes mellitus without complication (H)    Idiopathic acute pancreatitis    Atherosclerosis of  coronary artery without angina pectoris    Neck pain    Muscle weakness    Hyperlipidemia    Essential hypertension    Long term current use of aspirin    Dystonia, torsion, fragments of       Past Surgical History:   Procedure Laterality Date    CA ANESTH CABG W/PUMP      CHOLECYSTECTOMY      COLONOSCOPY N/A 2023    Procedure: colonoscopy with fluroscopy;  Surgeon: Ayden Fraire MD;  Location:  OR    COLONOSCOPY N/A 2025    Procedure: Colonoscopy;  Surgeon: Ricardo Moreira MD;  Location:  GI    ENDOSCOPIC RETROGRADE CHOLANGIOPANCREATOGRAM N/A 2023    Procedure: ENDOSCOPIC RETROGRADE CHOLANGIOPANCREATOGRAPHY, WITH  CYSTDUODENOSTOMY STENTS X2 REMOVAL;  Surgeon: Cedric Saldana MD;  Location: [facility]    ENT SURGERY      EP PACEMAKER DEVICE & LEAD IMPLANT- RIGHT ATRIAL & RIGHT VENTRICULAR N/A 2024    Procedure: Pacemaker Device & Lead Implant - Right Atrial & Right Ventricular;  Surgeon: Jenna Hernandez MD;  Location:  [facility]    LAPAROSCOPY DIAGNOSTIC (GENERAL) N/A 2023    Procedure: LAPAROSCOPY, DIAGNOSTIC; RETRIEVAL OF MIGRATED STENT;  Surgeon: Joseluis Lima MD;  Location: [facility]    ORTHOPEDIC SURGERY      OTHER SURGICAL HISTORY      skin cancer ? from scrotum    OTHER SURGICAL HISTORY  2025    cortison injection    PANCREATECTOMY [place]USTOW N/A 2023    Procedure: Open distal pancreactectomy with splenectomy, lysis of adhesions, resection of proximal illeum;  Surgeon: Ashvin Haider MD;  Location:  OR    OR CABG, ARTERIAL, TWO         Family History   Problem Relation Age of Onset    Parkinsonism Mother 65         at 75 years    Other - See Comments Mother         Polish ?AJ    Psychosis Mother         hallucinations from medications    Other - See Comments Father     Other - See Comments Sister     Other - See Comments Sister     Lung Cancer Brother         smoker -  47 years    Other - See Comments Daughter      "    3 kids - 2 girls and boy    Heart Disease Son         orthostatic hypotension    Other - See Comments Son         2 daughters    Autism Spectrum Disorder Grandson     Diabetes No family hx of        Reviewed past medical, surgical, and family history with patient found on new patient intake packet located in [employer]MR [employer] tab.     SOCIAL HX: Reviewed    ROS: Specifically negative for bowel/bladder dysfunction, balance changes, headache, dizziness, foot drop, fevers, chills, appetite changes, nausea/vomiting, unexplained weight loss. Otherwise 13 systems reviewed are negative. Please see the patient's intake questionnaire from today for details.    OBJECTIVE:  BP 90/51 (BP Location: Right arm, Patient Position: Sitting, Cuff Size: Adult Regular)   Pulse 86   Ht 5' 8\" (1.727 m)   Wt 210 lb (95.3 kg)   SpO2 94%   BMI 31.93 kg/m      PHYSICAL EXAMINATION: Previous exam noted below, changes made as needed  --CONSTITUTIONAL: Vital signs as above. No acute distress. The patient is well nourished and well groomed.  --PSYCHIATRIC: The patient is awake, alert, oriented to person, place, time and answering questions appropriately with clear speech. Appropriate mood and affect   --RESPIRATORY: Normal rhythm and effort. No abnormal accessory muscle breathing patterns noted.   --GROSS MOTOR: Easily arises from a seated position.  --SKIN: Visible midline laparotomy scar, appears well-healed.  No vesicular lesions or other rash in distribution of pain symptoms.  --LUMBAR SPINE: Inspection reveals no evidence of deformity. Range of motion is not limited in flexion, extension, lateral rotation. Mild tenderness to palpation of lumbar paraspinals, no tenderness in spinous processes.  Facet loading (López test) elicits improvement of pain, seated SLR negative  --HIPS: Full range of motion bilaterally. Negative DANIELA test.  --LOWER EXTREMITY MOTOR TESTING:  Hip flexion left 5/5, right 5/5  Knee extension left 5/5, right 5/5  Ankle " dorsiflexors left 5/5, right 5/5  Ankle plantarflexors left 5/5, right 5/5   Great toe extension left 5/5, right 5/5   Knee flexion left 5/5, right 5/5  --NEUROLOGIC: Sensation to lower extremities is intact bilaterally in L2-S1 dermatomes.  Negative Merritt's bilaterally. Reflexes intact in BLE, no clonus.  --VASCULAR: Warm upper limbs bilaterally. Capillary refill in the upper extremities is less than 1 second.    RESULTS: Available medical records from [facility] and any other outside records were reviewed today.     Imaging:  Available relevant imaging was personally reviewed and interpreted today. The images were shown to the patient and the findings were explained using a spine model.    11/27/2024 MRI thoracic and lumbar spine:    Impression:   1. Similar endplate deformities of the lower thoracic spine, greatest  at T12 where there is 50% loss of height anteriorly.  2. Lumbar spine degeneration with mild to moderate spinal canal  narrowing at L1-2 and mild bilateral neural foraminal narrowing at  L2-L4.    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 Unchanged compression deformities of T11 and  T12 with multilevel vacuum disc phenomena. No acute or suspicious  osseous abnormality. Stable lucent lesion of posterior left rib 11  (8/302 and 13/37), unchanged since at least 7/23/2022. Bilateral small  indirect fat-containing inguinal hernias, new on the right since prior  exam 3/12/2024. Large right hydrocele.    IMPRESSION:  1. Stable post surgical changes of bowel resection and anastomosis,  distal pancreatectomy, splenectomy, appendectomy.  2. Stable size of multiple mesenteric lymph nodes and slightly  increased size of soft tissue density along the hepatic artery that  are concerning for neoplastic  etiology.  3. Stable sub-6 mm pulmonary nodules. No new or enlarging pulmonary  nodule.  4. Unchanged subcentimeter mediastinal and thoracic nodes. No new or  enlarging thoracic lymphadenopathy.

## 2025-05-28 ENCOUNTER — ANCILLARY PROCEDURE (OUTPATIENT)
Dept: CARDIOLOGY | Facility: CLINIC | Age: 70
End: 2025-05-28
Attending: INTERNAL MEDICINE
Payer: MEDICARE

## 2025-05-28 DIAGNOSIS — Z95.0 CARDIAC PACEMAKER IN SITU: ICD-10-CM

## 2025-05-28 DIAGNOSIS — I44.1 HEART BLOCK AV SECOND DEGREE: ICD-10-CM

## 2025-05-28 DIAGNOSIS — I45.5 SINUS PAUSE: ICD-10-CM

## 2025-05-28 PROCEDURE — 93296 REM INTERROG EVL PM/IDS: CPT

## 2025-05-28 PROCEDURE — 93294 REM INTERROG EVL PM/LDLS PM: CPT | Performed by: INTERNAL MEDICINE

## 2025-05-31 LAB
MDC_IDC_LEAD_CONNECTION_STATUS: NORMAL
MDC_IDC_LEAD_CONNECTION_STATUS: NORMAL
MDC_IDC_LEAD_IMPLANT_DT: NORMAL
MDC_IDC_LEAD_IMPLANT_DT: NORMAL
MDC_IDC_LEAD_LOCATION: NORMAL
MDC_IDC_LEAD_LOCATION: NORMAL
MDC_IDC_LEAD_LOCATION_DETAIL_1: NORMAL
MDC_IDC_LEAD_LOCATION_DETAIL_1: NORMAL
MDC_IDC_LEAD_MFG: NORMAL
MDC_IDC_LEAD_MFG: NORMAL
MDC_IDC_LEAD_MODEL: NORMAL
MDC_IDC_LEAD_MODEL: NORMAL
MDC_IDC_LEAD_POLARITY_TYPE: NORMAL
MDC_IDC_LEAD_POLARITY_TYPE: NORMAL
MDC_IDC_LEAD_SERIAL: NORMAL
MDC_IDC_LEAD_SERIAL: NORMAL
MDC_IDC_LEAD_SPECIAL_FUNCTION: NORMAL
MDC_IDC_LEAD_SPECIAL_FUNCTION: NORMAL
MDC_IDC_MSMT_BATTERY_DTM: NORMAL
MDC_IDC_MSMT_BATTERY_REMAINING_LONGEVITY: 133 MO
MDC_IDC_MSMT_BATTERY_RRT_TRIGGER: 2.62
MDC_IDC_MSMT_BATTERY_STATUS: NORMAL
MDC_IDC_MSMT_BATTERY_VOLTAGE: 3.03 V
MDC_IDC_MSMT_LEADCHNL_RA_IMPEDANCE_VALUE: 323 OHM
MDC_IDC_MSMT_LEADCHNL_RA_IMPEDANCE_VALUE: 399 OHM
MDC_IDC_MSMT_LEADCHNL_RA_PACING_THRESHOLD_AMPLITUDE: 0.5 V
MDC_IDC_MSMT_LEADCHNL_RA_PACING_THRESHOLD_PULSEWIDTH: 0.4 MS
MDC_IDC_MSMT_LEADCHNL_RA_SENSING_INTR_AMPL: 4.5 MV
MDC_IDC_MSMT_LEADCHNL_RA_SENSING_INTR_AMPL: 4.5 MV
MDC_IDC_MSMT_LEADCHNL_RV_IMPEDANCE_VALUE: 399 OHM
MDC_IDC_MSMT_LEADCHNL_RV_IMPEDANCE_VALUE: 437 OHM
MDC_IDC_MSMT_LEADCHNL_RV_PACING_THRESHOLD_AMPLITUDE: 0.62 V
MDC_IDC_MSMT_LEADCHNL_RV_PACING_THRESHOLD_PULSEWIDTH: 0.4 MS
MDC_IDC_MSMT_LEADCHNL_RV_SENSING_INTR_AMPL: 17.88 MV
MDC_IDC_MSMT_LEADCHNL_RV_SENSING_INTR_AMPL: 17.88 MV
MDC_IDC_PG_IMPLANT_DTM: NORMAL
MDC_IDC_PG_MFG: NORMAL
MDC_IDC_PG_MODEL: NORMAL
MDC_IDC_PG_SERIAL: NORMAL
MDC_IDC_PG_TYPE: NORMAL
MDC_IDC_SESS_CLINIC_NAME: NORMAL
MDC_IDC_SESS_DTM: NORMAL
MDC_IDC_SESS_TYPE: NORMAL
MDC_IDC_SET_BRADY_AT_MODE_SWITCH_RATE: 171 {BEATS}/MIN
MDC_IDC_SET_BRADY_HYSTRATE: NORMAL
MDC_IDC_SET_BRADY_LOWRATE: 60 {BEATS}/MIN
MDC_IDC_SET_BRADY_MAX_SENSOR_RATE: 130 {BEATS}/MIN
MDC_IDC_SET_BRADY_MAX_TRACKING_RATE: 130 {BEATS}/MIN
MDC_IDC_SET_BRADY_MODE: NORMAL
MDC_IDC_SET_BRADY_PAV_DELAY_HIGH: 140 MS
MDC_IDC_SET_BRADY_PAV_DELAY_LOW: 240 MS
MDC_IDC_SET_BRADY_SAV_DELAY_HIGH: 110 MS
MDC_IDC_SET_BRADY_SAV_DELAY_LOW: 220 MS
MDC_IDC_SET_LEADCHNL_RA_PACING_AMPLITUDE: 1.5 V
MDC_IDC_SET_LEADCHNL_RA_PACING_ANODE_ELECTRODE_1: NORMAL
MDC_IDC_SET_LEADCHNL_RA_PACING_ANODE_LOCATION_1: NORMAL
MDC_IDC_SET_LEADCHNL_RA_PACING_CAPTURE_MODE: NORMAL
MDC_IDC_SET_LEADCHNL_RA_PACING_CATHODE_ELECTRODE_1: NORMAL
MDC_IDC_SET_LEADCHNL_RA_PACING_CATHODE_LOCATION_1: NORMAL
MDC_IDC_SET_LEADCHNL_RA_PACING_POLARITY: NORMAL
MDC_IDC_SET_LEADCHNL_RA_PACING_PULSEWIDTH: 0.4 MS
MDC_IDC_SET_LEADCHNL_RA_SENSING_ANODE_ELECTRODE_1: NORMAL
MDC_IDC_SET_LEADCHNL_RA_SENSING_ANODE_LOCATION_1: NORMAL
MDC_IDC_SET_LEADCHNL_RA_SENSING_CATHODE_ELECTRODE_1: NORMAL
MDC_IDC_SET_LEADCHNL_RA_SENSING_CATHODE_LOCATION_1: NORMAL
MDC_IDC_SET_LEADCHNL_RA_SENSING_POLARITY: NORMAL
MDC_IDC_SET_LEADCHNL_RA_SENSING_SENSITIVITY: 0.3 MV
MDC_IDC_SET_LEADCHNL_RV_PACING_AMPLITUDE: 2 V
MDC_IDC_SET_LEADCHNL_RV_PACING_ANODE_ELECTRODE_1: NORMAL
MDC_IDC_SET_LEADCHNL_RV_PACING_ANODE_LOCATION_1: NORMAL
MDC_IDC_SET_LEADCHNL_RV_PACING_CAPTURE_MODE: NORMAL
MDC_IDC_SET_LEADCHNL_RV_PACING_CATHODE_ELECTRODE_1: NORMAL
MDC_IDC_SET_LEADCHNL_RV_PACING_CATHODE_LOCATION_1: NORMAL
MDC_IDC_SET_LEADCHNL_RV_PACING_POLARITY: NORMAL
MDC_IDC_SET_LEADCHNL_RV_PACING_PULSEWIDTH: 0.4 MS
MDC_IDC_SET_LEADCHNL_RV_SENSING_ANODE_ELECTRODE_1: NORMAL
MDC_IDC_SET_LEADCHNL_RV_SENSING_ANODE_LOCATION_1: NORMAL
MDC_IDC_SET_LEADCHNL_RV_SENSING_CATHODE_ELECTRODE_1: NORMAL
MDC_IDC_SET_LEADCHNL_RV_SENSING_CATHODE_LOCATION_1: NORMAL
MDC_IDC_SET_LEADCHNL_RV_SENSING_POLARITY: NORMAL
MDC_IDC_SET_LEADCHNL_RV_SENSING_SENSITIVITY: 0.9 MV
MDC_IDC_SET_ZONE_DETECTION_INTERVAL: 350 MS
MDC_IDC_SET_ZONE_DETECTION_INTERVAL: 400 MS
MDC_IDC_SET_ZONE_STATUS: NORMAL
MDC_IDC_SET_ZONE_STATUS: NORMAL
MDC_IDC_SET_ZONE_TYPE: NORMAL
MDC_IDC_SET_ZONE_VENDOR_TYPE: NORMAL
MDC_IDC_STAT_AT_BURDEN_PERCENT: 0 %
MDC_IDC_STAT_AT_DTM_END: NORMAL
MDC_IDC_STAT_AT_DTM_START: NORMAL
MDC_IDC_STAT_BRADY_AP_VP_PERCENT: 8.86 %
MDC_IDC_STAT_BRADY_AP_VS_PERCENT: 0 %
MDC_IDC_STAT_BRADY_AS_VP_PERCENT: 91 %
MDC_IDC_STAT_BRADY_AS_VS_PERCENT: 0.14 %
MDC_IDC_STAT_BRADY_DTM_END: NORMAL
MDC_IDC_STAT_BRADY_DTM_START: NORMAL
MDC_IDC_STAT_BRADY_RA_PERCENT_PACED: 8.85 %
MDC_IDC_STAT_BRADY_RV_PERCENT_PACED: 99.86 %
MDC_IDC_STAT_EPISODE_RECENT_COUNT: 0
MDC_IDC_STAT_EPISODE_RECENT_COUNT_DTM_END: NORMAL
MDC_IDC_STAT_EPISODE_RECENT_COUNT_DTM_START: NORMAL
MDC_IDC_STAT_EPISODE_TOTAL_COUNT: 0
MDC_IDC_STAT_EPISODE_TOTAL_COUNT_DTM_END: NORMAL
MDC_IDC_STAT_EPISODE_TOTAL_COUNT_DTM_START: NORMAL
MDC_IDC_STAT_EPISODE_TYPE: NORMAL
MDC_IDC_STAT_TACHYTHERAPY_RECENT_DTM_END: NORMAL
MDC_IDC_STAT_TACHYTHERAPY_RECENT_DTM_START: NORMAL
MDC_IDC_STAT_TACHYTHERAPY_TOTAL_DTM_END: NORMAL
MDC_IDC_STAT_TACHYTHERAPY_TOTAL_DTM_START: NORMAL

## 2025-06-07 DIAGNOSIS — I10 PRIMARY HYPERTENSION: ICD-10-CM

## 2025-06-08 ENCOUNTER — HEALTH MAINTENANCE LETTER (OUTPATIENT)
Age: 70
End: 2025-06-08

## 2025-06-09 RX ORDER — LOSARTAN POTASSIUM 50 MG/1
50 TABLET ORAL DAILY
Qty: 90 TABLET | Refills: 0 | Status: SHIPPED | OUTPATIENT
Start: 2025-06-09

## 2025-06-09 NOTE — PROGRESS NOTES
[facility]    Patient Name: Arnaldo Henderson   : 1955   Date of Visit: 06/10/2025  Primary Care Physician: Amelia Johnson DO         Arnaldo Henderson is a pleasant 70 year old male, who presents for 3 month follow up. Prior history significant for CAD s/p 2vCABG (LIMA-LAD, SVG-Diag ), paroxsymal complete AV block s/p PPM (24), chronic pancreatitis s/p distal pancreatectomy, type II DM, parkinson's disease and neuroendocrine tumor.     Last cardiology clinic visit was with Rafaela Cueva NP on 25. He was mostly feeling well, but reported some continued brain fog / to his Zoloft and Sinemet. Home BP was high on average, 150/70 but sometimes 80-90's systolic, although he felt asymptomatic with low BP.  Cardiac testing prior to this visit included coronary CTA 2025 that showed patent LIMA-LAD graft, 100% occluded SVG-Diag, and moderate native LCx stenosis. As his EF was preserved and he had recent flat troponin, a coronary angiogram was not recommended. His device interrogation was also normal. He was advised to wear compression socks, consume more salt if BP was low, hydrate well, monitor pressures more often if feeling unwell. He was noted to have upcoming visit with an autonomic specialist, whom he was referred to by his neurologist. It is noted in his chart that he was started on midodrine in 2025 for multiple BP readings with SBP <90.    Today in clinic, Sri presents with his wife. He denies any cardiac symptoms today in clinic. Does continue to report intermittent dizziness, but feels this is related to his other health issues and not necessarily correlated to his blood pressures.    His May log shows extremely variable pressures ranging from 64/44 to 177/83.     He and his wife state that the visit with the autonomic specialist they were supposed to see got pushed back to November, so they are still waiting to find out what recommendations the specialist would have for  them.      Current Cardiac Medications  ASA 81 mg daily  Atorvastatin 40 mg daily  Losartan 50 mg daily  Midodrine 2.5 mg daily      PAST MEDICAL HISTORY:  Past Medical History:   Diagnosis Date    Acute pancreatitis 04/18/2022    Formatting of this note might be different from the original. Formatting of this note might be different from the original. Added automatically from request for surgery [id number] Formatting of this note might be different from the original. Added automatically from request for surgery [id number]  Formatting of this note might be different from the original. Added automatically from request for surgery     CAD (coronary artery disease)     CABG    Cholecystitis, acute 07/05/2023    Diabetes mellitus, type 2 (H) 2013    Dystonia, torsion, fragments of 04/25/2025    Family history of parkinsonism 02/14/2024    Gastroesophageal reflux disease     Hypercholesteremia     Hypertension     Mixed conductive and sensorineural hearing loss of both ears     Mixed hearing loss     Pancreatic duct stricture     Pancreatitis     Parkinson disease (H)     Parkinson's disease, unspecified whether dyskinesia present, unspecified whether manifestations fluctuate (H) 02/14/2024    Seborrheic dermatitis 03/22/2024    Second degree AV block     Surgical site infection 07/26/2023       PAST SURGICAL HISTORY:  Past Surgical History:   Procedure Laterality Date    CA ANESTH CABG W/PUMP      CHOLECYSTECTOMY  2022    COLONOSCOPY N/A 01/12/2023    Procedure: colonoscopy with fluroscopy;  Surgeon: Ayden Fraire MD;  Location:  OR    COLONOSCOPY N/A 5/1/2025    Procedure: Colonoscopy;  Surgeon: Ricardo Moreira MD;  Location:  GI    ENDOSCOPIC RETROGRADE CHOLANGIOPANCREATOGRAM N/A 05/18/2023    Procedure: ENDOSCOPIC RETROGRADE CHOLANGIOPANCREATOGRAPHY, WITH  CYSTDUODENOSTOMY STENTS X2 REMOVAL;  Surgeon: Cedric Saldana MD;  Location:  OR    ENT SURGERY      EP PACEMAKER DEVICE & LEAD IMPLANT- RIGHT  ATRIAL & RIGHT VENTRICULAR N/A 2024    Procedure: Pacemaker Device & Lead Implant - Right Atrial & Right Ventricular;  Surgeon: Jenna Hernandez MD;  Location:  HEART CARDIAC CATH LAB    LAPAROSCOPY DIAGNOSTIC (GENERAL) N/A 2023    Procedure: LAPAROSCOPY, DIAGNOSTIC; RETRIEVAL OF MIGRATED STENT;  Surgeon: Joseluis Lima MD;  Location: [facility] OR    ORTHOPEDIC SURGERY      OTHER SURGICAL HISTORY      skin cancer ? from scrotum    OTHER SURGICAL HISTORY  2025    cortison injection    PANCREATECTOMY PEUSTOW N/A 2023    Procedure: Open distal pancreactectomy with splenectomy, lysis of adhesions, resection of proximal illeum;  Surgeon: Ashvin Haider MD;  Location: [facility] OR    RI CABG, ARTERIAL, TWO         FAMILY HISTORY:  Family History   Problem Relation Age of Onset    Parkinsonism Mother 65         at 75 years    Other - See Comments Mother         Polish ?AJ    Psychosis Mother         hallucinations from medications    Other - See Comments Father     Other - See Comments Sister     Other - See Comments Sister     Lung Cancer Brother         smoker -  47 years    Other - See Comments Daughter         3 kids - 2 girls and boy    Heart Disease Son         orthostatic hypotension    Other - See Comments Son         2 daughters    Autism Spectrum Disorder Grandson     Diabetes No family hx of        SOCIAL HISTORY:  Social History     Socioeconomic History    Marital status:    Tobacco Use    Smoking status: Former     Types: Cigarettes    Smokeless tobacco: Never    Tobacco comments:     Quit 10 years ago - quit a few times; started at age 12 or 13 and then stopped at 26 years and then stopped for 15 years got  and started smoking again   Vaping Use    Vaping status: Never Used   Substance and Sexual Activity    Alcohol use: Not Currently     Comment: quit 2 years ago    Drug use: Not Currently     Comment: used to smoke marijuana when young; rare use     Social  Drivers of Health     Financial Resource Strain: Low Risk  (12/18/2024)    Financial Resource Strain     Within the past 12 months, have you or your family members you live with been unable to get utilities (heat, electricity) when it was really needed?: No   Food Insecurity: Low Risk  (12/18/2024)    Food Insecurity     Within the past 12 months, did you worry that your food would run out before you got money to buy more?: No     Within the past 12 months, did the food you bought just not last and you didn t have money to get more?: No   Transportation Needs: Low Risk  (12/18/2024)    Transportation Needs     Within the past 12 months, has lack of transportation kept you from medical appointments, getting your medicines, non-medical meetings or appointments, work, or from getting things that you need?: No   Physical Activity: Unknown (12/18/2024)    Exercise Vital Sign     Days of Exercise per Week: 1 day   Stress: Stress Concern Present (12/18/2024)    [employer] [place] [employer] - Occupational Stress Questionnaire     Feeling of Stress : To some extent   Social Connections: Unknown (12/18/2024)    Social Connection and Isolation Panel [NHANES]     Frequency of Social Gatherings with Friends and Family: Once a week   Interpersonal Safety: Unknown (5/1/2025)    Interpersonal Safety     Do you feel physically and emotionally safe where you currently live?: Patient unable to answer     Within the past 12 months, have you been hit, slapped, kicked or otherwise physically hurt by someone?: Patient unable to answer     Within the past 12 months, have you been humiliated or emotionally abused in other ways by your partner or ex-partner?: Patient unable to answer   Housing Stability: Low Risk  (12/18/2024)    Housing Stability     Do you have housing? : Yes     Are you worried about losing your housing?: No       MEDICATIONS:  Current Outpatient Medications   Medication Sig Dispense Refill    aspirin (ASA) 81  MG EC tablet Take 81 mg by mouth daily      atorvastatin (LIPITOR) 40 MG tablet Take 1 tablet (40 mg) by mouth daily 90 tablet 3    BD PEN NEEDLE LINDY 2ND GEN 32G X 4 MM miscellaneous USE TO INJECT INSULIN 4 TIMES A  each 3    bisacodyl (DULCOLAX) 5 MG EC tablet Take 2 tablets at 3 pm the day before your procedure. If your procedure is before 11 am, take 2 additional tablets at 11 pm. If your procedure is after 11 am, take 2 additional tablets at 6 am. For additional instructions refer to your colonoscopy prep instructions. 4 tablet 0    carbidopa-levodopa (SINEMET CR)  MG CR tablet Take 1 tablet by mouth at bedtime @9p 90 tablet 3    carbidopa-levodopa (SINEMET)  MG tablet 2.5@6a, 2@10a, 2@2p and 1@5p 675 tablet 3    Continuous Glucose Sensor (DEXCOM G7 SENSOR) MISC Change every 10 days. 9 each 11    dasiglucagon HCl (ZEGALOGUE) 0.6 MG/0.6ML SOAJ injection Inject 0.6 mg Subcutaneous once as needed (use for severe hypoglycemia) 1.2 mL 1    gabapentin (NEURONTIN) 300 MG capsule Take 2 capsules (600 mg) by mouth 3 times daily 540 capsule 3    insulin aspart (NOVOLOG PEN) 100 UNIT/ML pen Inject 20 Units subcutaneously 3 times daily as needed for high blood sugar (take 3 times/day as needed). 60 mL 2    Insulin Glargine-yfgn 100 UNIT/ML SOPN Inject 14 Units subcutaneously every morning AND 20 Units at bedtime. 50 mL 2    lipase-protease-amylase (ZENPEP) 67414-208214-030228 units CPEP Take 1 capsule by mouth 3 times daily (with meals). 90 capsule 11    losartan (COZAAR) 50 MG tablet TAKE 1 TABLET BY MOUTH EVERY DAY 90 tablet 0    magnesium oxide (MAG-OX) 400 MG tablet Take 400 mg by mouth every other day      midodrine (PROAMATINE) 2.5 MG tablet Take 1 tablet (2.5 mg) by mouth daily. 90 tablet 1    Multiple Vitamin (MULTI-VITAMINS) TABS Take 1 tablet by mouth daily      omeprazole (PRILOSEC) 20 MG DR capsule Take 1 capsule (20 mg) by mouth daily 90 capsule 3    polyethylene glycol (GOLYTELY) 236 g  "suspension The night before the exam at 6 pm drink an 8-ounce glass every 15 minutes until the jug is half empty. If you arrive before 11 AM: Drink the other half of the Golytely jug at 11 PM night before procedure. If you arrive after 11 AM: Drink the other half of the Golytely jug at 6 AM day of procedure. For additional instructions refer to your colonoscopy prep instructions. 4000 mL 0    sertraline (ZOLOFT) 25 MG tablet Take 1 tablet (25 mg) by mouth daily. 90 tablet 3    traZODone (DESYREL) 50 MG tablet TAKE 0.5-1 TABLETS BY MOUTH AT BEDTIME. 90 tablet 1     No current facility-administered medications for this visit.        ALLERGIES:     Allergies   Allergen Reactions    Ciprofloxacin Other (See Comments), Hives, Itching, Rash and Unknown     Other reaction(s): Other (see comments)  Oral swelling per [facility]        Dilaudid [Hydromorphone] Rash    Morphine Rash     rash    Penicillins Rash     aka ancef/ rash      Clindamycin Itching and Rash    Oxycodone Rash     \"HORRIBLE, SEVERE RASH MAYBE CAUSED BY OXY\"       ROS:  A complete 10-point ROS was negative except as above.    PHYSICAL EXAM:  BP 91/59 (BP Location: Left arm, Patient Position: Chair, Cuff Size: Adult Large)   Pulse 80   Wt 93.4 kg (206 lb)   SpO2 95%   BMI 31.32 kg/m    Gen: alert, interactive, NAD  Neck: supple, no JVD  CV: RRR, no m/r/g  Chest: CTAB, no wheezes or crackles  Abd: soft, NT, ND  Ext: no LE edema    LABS:    CMP  Sodium   Date Value Ref Range Status   01/20/2025 136 135 - 145 mmol/L Final     Potassium   Date Value Ref Range Status   01/20/2025 4.3 3.4 - 5.3 mmol/L Final     Potassium POCT   Date Value Ref Range Status   06/30/2023 4.7 3.5 - 5.0 mmol/L Final     Chloride   Date Value Ref Range Status   01/20/2025 103 98 - 107 mmol/L Final     Carbon Dioxide (CO2)   Date Value Ref Range Status   01/20/2025 26 22 - 29 mmol/L Final     Anion Gap   Date Value Ref Range Status   01/20/2025 7 7 - 15 mmol/L Final     Glucose " "  Date Value Ref Range Status   04/22/2025 197 (A) 70 - 99 mg/dL Final     GLUCOSE BY METER POCT   Date Value Ref Range Status   05/01/2025 157 (H) 70 - 99 mg/dL Final     Urea Nitrogen   Date Value Ref Range Status   01/20/2025 21.8 8.0 - 23.0 mg/dL Final     Creatinine   Date Value Ref Range Status   01/20/2025 0.98 0.67 - 1.17 mg/dL Final     GFR Estimate   Date Value Ref Range Status   01/20/2025 83 >60 mL/min/1.73m2 Final     Comment:     eGFR calculated using 2021 CKD-EPI equation.     GFR, ESTIMATED POCT   Date Value Ref Range Status   11/03/2023 >60 >60 mL/min/1.73m2 Final     Calcium   Date Value Ref Range Status   01/20/2025 9.0 8.8 - 10.4 mg/dL Final     Comment:     Reference intervals for this test were updated on 7/16/2024 to reflect our healthy population more accurately. There may be differences in the flagging of prior results with similar values performed with this method. Those prior results can be interpreted in the context of the updated reference intervals.     Bilirubin Total   Date Value Ref Range Status   01/20/2025 0.7 <=1.2 mg/dL Final     Alkaline Phosphatase   Date Value Ref Range Status   01/20/2025 105 40 - 150 U/L Final     ALT   Date Value Ref Range Status   01/20/2025 6 0 - 70 U/L Final     AST   Date Value Ref Range Status   01/20/2025 20 0 - 45 U/L Final       CBC  CBC RESULTS:   Recent Labs   Lab Test 01/20/25  1503   WBC 9.1   RBC 4.17*   HGB 13.6   HCT 41.5      MCH 32.6   MCHC 32.8   RDW 14.1          TROP  No results found for: \"TROPI\", \"TROPONIN\", \"TROPR\", \"TROPN\"    LIPIDS  Recent Labs   Lab Test 12/05/24  1106 11/01/24  0731   CHOL 76 78   HDL 39* 38*   LDL 23 29   TRIG 71 54       TSH  TSH   Date Value Ref Range Status   01/20/2025 2.44 0.30 - 4.20 uIU/mL Final       HBA1C  Lab Results   Component Value Date    A1C 8.3 02/19/2025    A1C 8.4 11/01/2024    A1C 8.2 07/08/2024    A1C 7.2 05/09/2024    A1C 8.2 11/16/2023         CARDIAC DATA:    Echo 2/05/2024 "   Interpretation Summary  1.Left ventricular size, wall motion and function are normal. The ejection  fraction is > 65%. LV might suggest dehydration.  2.Mildly decreased right ventricular systolic function  3.No hemodynamically significant valvular abnormalities on 2D or color flow  imaging.  There is no comparison study available.     Echo 12/5/2024  Interpretation Summary  Left ventricular size, wall motion and function are normal. The ejection  fraction is 60-65%. Abnormal septal motion consistent with left bundle branch  block is present.  Right ventricular function, chamber size, wall motion, and thickness are  normal.  Mild mitral insufficiency is present.  The inferior vena cava was normal in size with preserved respiratory  variability. No pericardial effusion is present.     There is no prior study for direct comparison.     PET (November 2020, O[facility])   No ischemia, no scar. Preserved LVEF 65%.      Coronary CTA 1/29/2025  1.  Known severe native LAD coronary disease status post 2-vessel CABG  (LIMA-LAD SVG-DIAG.)  2.  Widely patent LIMA-LAD graft.   3.  100% occluded SVG-DIAG graft.  4.  Moderate native LCx stenosis.  5.  Please review the separate Radiology report for incidental  noncardiac findings.      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 Bang Can    Blood pressures not controlled. Most recent lipid panel on 12/5/24 with LDL 23, TG 71. Patient reports no cardiac symptoms in clinic today.    - CAD: ASA 81 mg daily, Atorvastatin 40 mg daily    Discussed BP plan in depth with patient and wife, and they were comfortable with the following:  - Check blood pressure in the morning:   - For top number <90, take Midodrine 2.5 mg   - For top number >120, take Losartan 50  mg  - Recheck blood pressure in the afternoon/early evening:  - For top number <90, take Midodrine 2.5 mg (do not take if less than 4 hours from bedtime)   - For top number >120, take Losartan 50 mg      Follow up:  - EP in 7-8 months with echo prior (per most recent EP visit note)      KRYSTYNA Turner, MELISSAP-C  P General Cardiology  Securely message with [employer]         Chart review time: 10 minutes  Visit time: 41 minutes  Chart completion/documentation: 4 minutes  Total time spent: 55 minutes

## 2025-06-10 ENCOUNTER — OFFICE VISIT (OUTPATIENT)
Dept: CARDIOLOGY | Facility: CLINIC | Age: 70
End: 2025-06-10
Attending: NURSE PRACTITIONER
Payer: MEDICARE

## 2025-06-10 VITALS
WEIGHT: 206 LBS | HEART RATE: 80 BPM | SYSTOLIC BLOOD PRESSURE: 91 MMHG | OXYGEN SATURATION: 95 % | DIASTOLIC BLOOD PRESSURE: 59 MMHG | BODY MASS INDEX: 31.32 KG/M2

## 2025-06-10 DIAGNOSIS — I95.1 ORTHOSTATIC HYPOTENSION: Primary | ICD-10-CM

## 2025-06-10 DIAGNOSIS — G20.B2 PARKINSON'S DISEASE WITH DYSKINESIA AND FLUCTUATING MANIFESTATIONS (H): ICD-10-CM

## 2025-06-10 DIAGNOSIS — G90.9 AUTONOMIC DYSFUNCTION: ICD-10-CM

## 2025-06-10 DIAGNOSIS — I10 BENIGN ESSENTIAL HYPERTENSION: ICD-10-CM

## 2025-06-10 DIAGNOSIS — Z95.0 CARDIAC PACEMAKER IN SITU: ICD-10-CM

## 2025-06-10 RX ORDER — MIDODRINE HYDROCHLORIDE 2.5 MG/1
2.5 TABLET ORAL DAILY
Qty: 90 TABLET | Refills: 3 | Status: SHIPPED | OUTPATIENT
Start: 2025-06-10

## 2025-06-10 NOTE — LETTER
6/10/2025      RE: Arnaldo Henderson  [address] [facility] [address]       Dear Colleague,    Thank you for the opportunity to participate in the care of your patient, Arnaldo Henderson, at the [facility]. Please see a copy of my visit note below.        [facility]    Patient Name: Arnaldo Henderson   : 1955   Date of Visit: 06/10/2025  Primary Care Physician: Amelia Johnson DO         Arnaldo Henderson is a pleasant 70 year old male, who presents for 3 month follow up. Prior history significant for CAD s/p 2vCABG (LIMA-LAD, SVG-Diag ), paroxsymal complete AV block s/p PPM (24), chronic pancreatitis s/p distal pancreatectomy, type II DM, parkinson's disease and neuroendocrine tumor.     Last cardiology clinic visit was with Rafaela Cueva NP on 25. He was mostly feeling well, but reported some continued brain fog 2/2 to his Zoloft and Sinemet. Home BP was high on average, 150/70 but sometimes 80-90's systolic, although he felt asymptomatic with low BP.  Cardiac testing prior to this visit included coronary CTA 2025 that showed patent LIMA-LAD graft, 100% occluded SVG-Diag, and moderate native LCx stenosis. As his EF was preserved and he had recent flat troponin, a coronary angiogram was not recommended. His device interrogation was also normal. He was advised to wear compression socks, consume more salt if BP was low, hydrate well, monitor pressures more often if feeling unwell. He was noted to have upcoming visit with an autonomic specialist, whom he was referred to by his neurologist. It is noted in his chart that he was started on midodrine in 2025 for multiple BP readings with SBP <90.    Today in clinic, Sri presents with his wife. He denies any cardiac symptoms today in clinic. Does continue to report intermittent dizziness, but feels this is related to his other health issues and not  necessarily correlated to his blood pressures.    His May log shows extremely variable pressures ranging from 64/44 to 177/83.     He and his wife state that the visit with the autonomic specialist they were supposed to see got pushed back to November, so they are still waiting to find out what recommendations the specialist would have for them.      Current Cardiac Medications  ASA 81 mg daily  Atorvastatin 40 mg daily  Losartan 50 mg daily  Midodrine 2.5 mg daily      PAST MEDICAL HISTORY:  Past Medical History:   Diagnosis Date     Acute pancreatitis 04/18/2022    Formatting of this note might be different from the original. Formatting of this note might be different from the original. Added automatically from request for surgery [id number] Formatting of this note might be different from the original. Added automatically from request for surgery [id number]  Formatting of this note might be different from the original. Added automatically from request for surgery      CAD (coronary artery disease)     CABG     Cholecystitis, acute 07/05/2023     Diabetes mellitus, type 2 (H) 2013     Dystonia, torsion, fragments of 04/25/2025     Family history of parkinsonism 02/14/2024     Gastroesophageal reflux disease      Hypercholesteremia      Hypertension      Mixed conductive and sensorineural hearing loss of both ears      Mixed hearing loss      Pancreatic duct stricture      Pancreatitis      Parkinson disease (H)      Parkinson's disease, unspecified whether dyskinesia present, unspecified whether manifestations fluctuate (H) 02/14/2024     Seborrheic dermatitis 03/22/2024     Second degree AV block      Surgical site infection 07/26/2023       PAST SURGICAL HISTORY:  Past Surgical History:   Procedure Laterality Date     CA ANESTH CABG W/PUMP       CHOLECYSTECTOMY  2022     COLONOSCOPY N/A 01/12/2023    Procedure: colonoscopy with fluroscopy;  Surgeon: Ayden Fraire MD;  Location: [facility] OR     COLONOSCOPY N/A 5/1/2025     Procedure: Colonoscopy;  Surgeon: Ricardo Moreira MD;  Location:  GI     ENDOSCOPIC RETROGRADE CHOLANGIOPANCREATOGRAM N/A 2023    Procedure: ENDOSCOPIC RETROGRADE CHOLANGIOPANCREATOGRAPHY, WITH  CYSTDUODENOSTOMY STENTS X2 REMOVAL;  Surgeon: Cedric Saldana MD;  Location: [facility] OR     ENT SURGERY       EP PACEMAKER DEVICE & LEAD IMPLANT- RIGHT ATRIAL & RIGHT VENTRICULAR N/A 2024    Procedure: Pacemaker Device & Lead Implant - Right Atrial & Right Ventricular;  Surgeon: Jenna Hernandez MD;  Location:  HEART CARDIAC CATH LAB     LAPAROSCOPY DIAGNOSTIC (GENERAL) N/A 2023    Procedure: LAPAROSCOPY, DIAGNOSTIC; RETRIEVAL OF MIGRATED STENT;  Surgeon: Joseluis Lima MD;  Location: [facility] OR     ORTHOPEDIC SURGERY       OTHER SURGICAL HISTORY      skin cancer ? from scrotum     OTHER SURGICAL HISTORY  2025    cortison injection     PANCREATECTOMY PEUSTOW N/A 2023    Procedure: Open distal pancreactectomy with splenectomy, lysis of adhesions, resection of proximal illeum;  Surgeon: Ashvin Haider MD;  Location:  OR     FL CABG, ARTERIAL, TWO         FAMILY HISTORY:  Family History   Problem Relation Age of Onset     Parkinsonism Mother 65         at 75 years     Other - See Comments Mother         Polish ?AJ     Psychosis Mother         hallucinations from medications     Other - See Comments Father      Other - See Comments Sister      Other - See Comments Sister      Lung Cancer Brother         smoker -  47 years     Other - See Comments Daughter         3 kids - 2 girls and boy     Heart Disease Son         orthostatic hypotension     Other - See Comments Son         2 daughters     Autism Spectrum Disorder Grandson      Diabetes No family hx of        SOCIAL HISTORY:  Social History     Socioeconomic History     Marital status:    Tobacco Use     Smoking status: Former     Types: Cigarettes     Smokeless tobacco: Never     Tobacco comments:      Quit 10 years ago - quit a few times; started at age 12 or 13 and then stopped at 26 years and then stopped for 15 years got  and started smoking again   Vaping Use     Vaping status: Never Used   Substance and Sexual Activity     Alcohol use: Not Currently     Comment: quit 2 years ago     Drug use: Not Currently     Comment: used to smoke marijuana when young; rare use     Social Drivers of Health     Financial Resource Strain: Low Risk  (12/18/2024)    Financial Resource Strain      Within the past 12 months, have you or your family members you live with been unable to get utilities (heat, electricity) when it was really needed?: No   Food Insecurity: Low Risk  (12/18/2024)    Food Insecurity      Within the past 12 months, did you worry that your food would run out before you got money to buy more?: No      Within the past 12 months, did the food you bought just not last and you didn t have money to get more?: No   Transportation Needs: Low Risk  (12/18/2024)    Transportation Needs      Within the past 12 months, has lack of transportation kept you from medical appointments, getting your medicines, non-medical meetings or appointments, work, or from getting things that you need?: No   Physical Activity: Unknown (12/18/2024)    Exercise Vital Sign      Days of Exercise per Week: 1 day   Stress: Stress Concern Present (12/18/2024)    [employer] - Occupational Stress Questionnaire      Feeling of Stress : To some extent   Social Connections: Unknown (12/18/2024)    Social Connection and Isolation Panel [NHANES]      Frequency of Social Gatherings with Friends and Family: Once a week   Interpersonal Safety: Unknown (5/1/2025)    Interpersonal Safety      Do you feel physically and emotionally safe where you currently live?: Patient unable to answer      Within the past 12 months, have you been hit, slapped, kicked or otherwise physically hurt by someone?: Patient unable to  answer      Within the past 12 months, have you been humiliated or emotionally abused in other ways by your partner or ex-partner?: Patient unable to answer   Housing Stability: Low Risk  (12/18/2024)    Housing Stability      Do you have housing? : Yes      Are you worried about losing your housing?: No       MEDICATIONS:  Current Outpatient Medications   Medication Sig Dispense Refill     aspirin (ASA) 81 MG EC tablet Take 81 mg by mouth daily       atorvastatin (LIPITOR) 40 MG tablet Take 1 tablet (40 mg) by mouth daily 90 tablet 3     BD PEN NEEDLE LINDY 2ND GEN 32G X 4 MM miscellaneous USE TO INJECT INSULIN 4 TIMES A  each 3     bisacodyl (DULCOLAX) 5 MG EC tablet Take 2 tablets at 3 pm the day before your procedure. If your procedure is before 11 am, take 2 additional tablets at 11 pm. If your procedure is after 11 am, take 2 additional tablets at 6 am. For additional instructions refer to your colonoscopy prep instructions. 4 tablet 0     carbidopa-levodopa (SINEMET CR)  MG CR tablet Take 1 tablet by mouth at bedtime @9p 90 tablet 3     carbidopa-levodopa (SINEMET)  MG tablet 2.5@6a, 2@10a, 2@2p and 1@5p 675 tablet 3     Continuous Glucose Sensor (DEXCOM G7 SENSOR) MISC Change every 10 days. 9 each 11     dasiglucagon HCl (ZEGALOGUE) 0.6 MG/0.6ML SOAJ injection Inject 0.6 mg Subcutaneous once as needed (use for severe hypoglycemia) 1.2 mL 1     gabapentin (NEURONTIN) 300 MG capsule Take 2 capsules (600 mg) by mouth 3 times daily 540 capsule 3     insulin aspart (NOVOLOG PEN) 100 UNIT/ML pen Inject 20 Units subcutaneously 3 times daily as needed for high blood sugar (take 3 times/day as needed). 60 mL 2     Insulin Glargine-yfgn 100 UNIT/ML SOPN Inject 14 Units subcutaneously every morning AND 20 Units at bedtime. 50 mL 2     lipase-protease-amylase (ZENPEP) 91375-665806-409210 units CPEP Take 1 capsule by mouth 3 times daily (with meals). 90 capsule 11     losartan (COZAAR) 50 MG tablet TAKE  "1 TABLET BY MOUTH EVERY DAY 90 tablet 0     magnesium oxide (MAG-OX) 400 MG tablet Take 400 mg by mouth every other day       midodrine (PROAMATINE) 2.5 MG tablet Take 1 tablet (2.5 mg) by mouth daily. 90 tablet 1     Multiple Vitamin (MULTI-VITAMINS) TABS Take 1 tablet by mouth daily       omeprazole (PRILOSEC) 20 MG DR capsule Take 1 capsule (20 mg) by mouth daily 90 capsule 3     polyethylene glycol (GOLYTELY) 236 g suspension The night before the exam at 6 pm drink an 8-ounce glass every 15 minutes until the jug is half empty. If you arrive before 11 AM: Drink the other half of the Golytely jug at 11 PM night before procedure. If you arrive after 11 AM: Drink the other half of the Golytely jug at 6 AM day of procedure. For additional instructions refer to your colonoscopy prep instructions. 4000 mL 0     sertraline (ZOLOFT) 25 MG tablet Take 1 tablet (25 mg) by mouth daily. 90 tablet 3     traZODone (DESYREL) 50 MG tablet TAKE 0.5-1 TABLETS BY MOUTH AT BEDTIME. 90 tablet 1     No current facility-administered medications for this visit.        ALLERGIES:     Allergies   Allergen Reactions     Ciprofloxacin Other (See Comments), Hives, Itching, Rash and Unknown     Other reaction(s): Other (see comments)  Oral swelling per [facility]         Dilaudid [Hydromorphone] Rash     Morphine Rash     rash     Penicillins Rash     aka ancef/ rash       Clindamycin Itching and Rash     Oxycodone Rash     \"HORRIBLE, SEVERE RASH MAYBE CAUSED BY OXY\"       ROS:  A complete 10-point ROS was negative except as above.    PHYSICAL EXAM:  BP 91/59 (BP Location: Left arm, Patient Position: Chair, Cuff Size: Adult Large)   Pulse 80   Wt 93.4 kg (206 lb)   SpO2 95%   BMI 31.32 kg/m    Gen: alert, interactive, NAD  Neck: supple, no JVD  CV: RRR, no m/r/g  Chest: CTAB, no wheezes or crackles  Abd: soft, NT, ND  Ext: no LE edema    LABS:    CMP  Sodium   Date Value Ref Range Status   01/20/2025 136 135 - 145 mmol/L Final "     Potassium   Date Value Ref Range Status   01/20/2025 4.3 3.4 - 5.3 mmol/L Final     Potassium POCT   Date Value Ref Range Status   06/30/2023 4.7 3.5 - 5.0 mmol/L Final     Chloride   Date Value Ref Range Status   01/20/2025 103 98 - 107 mmol/L Final     Carbon Dioxide (CO2)   Date Value Ref Range Status   01/20/2025 26 22 - 29 mmol/L Final     Anion Gap   Date Value Ref Range Status   01/20/2025 7 7 - 15 mmol/L Final     Glucose   Date Value Ref Range Status   04/22/2025 197 (A) 70 - 99 mg/dL Final     GLUCOSE BY METER POCT   Date Value Ref Range Status   05/01/2025 157 (H) 70 - 99 mg/dL Final     Urea Nitrogen   Date Value Ref Range Status   01/20/2025 21.8 8.0 - 23.0 mg/dL Final     Creatinine   Date Value Ref Range Status   01/20/2025 0.98 0.67 - 1.17 mg/dL Final     GFR Estimate   Date Value Ref Range Status   01/20/2025 83 >60 mL/min/1.73m2 Final     Comment:     eGFR calculated using 2021 CKD-EPI equation.     GFR, ESTIMATED POCT   Date Value Ref Range Status   11/03/2023 >60 >60 mL/min/1.73m2 Final     Calcium   Date Value Ref Range Status   01/20/2025 9.0 8.8 - 10.4 mg/dL Final     Comment:     Reference intervals for this test were updated on 7/16/2024 to reflect our healthy population more accurately. There may be differences in the flagging of prior results with similar values performed with this method. Those prior results can be interpreted in the context of the updated reference intervals.     Bilirubin Total   Date Value Ref Range Status   01/20/2025 0.7 <=1.2 mg/dL Final     Alkaline Phosphatase   Date Value Ref Range Status   01/20/2025 105 40 - 150 U/L Final     ALT   Date Value Ref Range Status   01/20/2025 6 0 - 70 U/L Final     AST   Date Value Ref Range Status   01/20/2025 20 0 - 45 U/L Final       CBC  CBC RESULTS:   Recent Labs   Lab Test 01/20/25  1503   WBC 9.1   RBC 4.17*   HGB 13.6   HCT 41.5      MCH 32.6   MCHC 32.8   RDW 14.1          TROP  No results found for:  "\"TROPI\", \"TROPONIN\", \"TROPR\", \"TROPN\"    LIPIDS  Recent Labs   Lab Test 12/05/24  1106 11/01/24  0731   CHOL 76 78   HDL 39* 38*   LDL 23 29   TRIG 71 54       TSH  TSH   Date Value Ref Range Status   01/20/2025 2.44 0.30 - 4.20 uIU/mL Final       HBA1C  Lab Results   Component Value Date    A1C 8.3 02/19/2025    A1C 8.4 11/01/2024    A1C 8.2 07/08/2024    A1C 7.2 05/09/2024    A1C 8.2 11/16/2023         CARDIAC DATA:    Echo 2/05/2024   Interpretation Summary  1.Left ventricular size, wall motion and function are normal. The ejection  fraction is > 65%. LV might suggest dehydration.  2.Mildly decreased right ventricular systolic function  3.No hemodynamically significant valvular abnormalities on 2D or color flow  imaging.  There is no comparison study available.     Echo 12/5/2024  Interpretation Summary  Left ventricular size, wall motion and function are normal. The ejection  fraction is 60-65%. Abnormal septal motion consistent with left bundle branch  block is present.  Right ventricular function, chamber size, wall motion, and thickness are  normal.  Mild mitral insufficiency is present.  The inferior vena cava was normal in size with preserved respiratory  variability. No pericardial effusion is present.     There is no prior study for direct comparison.     PET (November 2020, [facility])   No ischemia, no scar. Preserved LVEF 65%.      Coronary CTA 1/29/2025  1.  Known severe native LAD coronary disease status post 2-vessel CABG  (LIMA-LAD SVG-DIAG.)  2.  Widely patent LIMA-LAD graft.   3.  100% occluded SVG-DIAG graft.  4.  Moderate native LCx stenosis.  5.  Please review the separate Radiology report for incidental  noncardiac findings.      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 Beti Dahl    Blood pressures not controlled. Most recent lipid panel on 12/5/24 with LDL 23, TG 71. Patient reports no cardiac symptoms in clinic today.    - CAD: ASA 81 mg daily, Atorvastatin 40 mg daily    Discussed BP plan in depth with patient and wife, and they were comfortable with the following:  - Check blood pressure in the morning:   - For top number <90, take Midodrine 2.5 mg   - For top number >120, take Losartan 50 mg  - Recheck blood pressure in the afternoon/early evening:  - For top number <90, take Midodrine 2.5 mg (do not take if less than 4 hours from bedtime)   - For top number >120, take Losartan 50 mg      Follow up:  - EP in 7-8 months with echo prior (per most recent EP visit note)      KRYSTYNA Turner, MELISSAP-C  [facility] General Cardiology  Securely message with [employer]         Chart review time: 10 minutes  Visit time: 41 minutes  Chart completion/documentation: 4 minutes  Total time spent: 55 minutes     Please do not hesitate to contact me if you have any questions/concerns.     Sincerely,     KRYSTYNA Turner CNP

## 2025-06-10 NOTE — NURSING NOTE
"Chief Complaint   Patient presents with    Hypotension       Initial BP 91/59 (BP Location: Left arm, Patient Position: Chair, Cuff Size: Adult Large)   Pulse 80   Wt 93.4 kg (206 lb)   SpO2 95%   BMI 31.32 kg/m   Estimated body mass index is 31.32 kg/m  as calculated from the following:    Height as of 5/20/25: 1.727 m (5' 8\").    Weight as of this encounter: 93.4 kg (206 lb)..  BP completed using cuff size: hiram Will, Visit Facilitator    "

## 2025-06-10 NOTE — PATIENT INSTRUCTIONS
Thank you for coming to the [facility]; please note the following instructions:    - Check blood pressure in the morning:   - For top number <90, take Midodrine 2.5 mg   - For top number >120, take Losartan 50 mg  - Recheck blood pressure in the afternoon/early evening:  - For top number <90, take Midodrine 2.5 mg (do not take if less than 4 hours from bedtime)   - For top number >120, take Losartan 50 mg    Follow up with Electrophysiology in 7-8 months with echocardiogram prior.    ~  PATIENT RELATIONS #[phone]    If you have any questions regarding your visit please contact your care team:     Cardiology  Telephone Number   Gaby AYALA., RN  Olamide CRUZ, RN  Twyla PRUETT, RN  Simran OLEARY, RMBEAN HOWARD, NITA BELTRE, Clinic Facilitator  Marilyn CRUZ, Clinic Facilitator [phone] (option 1)   For scheduling appts:     [phone] (select option 1)       For the Device Clinic (Pacemakers and ICD's)  RN's :  Ellie Vogt   During business hours: [phone]    *After business hours:  [phone] (select option 4)      Normal test result notifications will be released via [employer] or mailed within 7 business days.  All other test results, will be communicated via telephone once reviewed by your cardiologist.    If you need a medication refill please contact your pharmacy.  Please allow 3 business days for your refill to be completed.    As always, thank you for trusting us with your health care needs!

## 2025-06-10 NOTE — PROGRESS NOTES
Patient is showing 4/5 MNCM met. A1c not in range   06/10/25 1:21 PM  PATIENT LAB/IMAGING STATUS : Orders completed / No further action needed

## 2025-06-17 ENCOUNTER — TELEPHONE (OUTPATIENT)
Dept: PHYSICAL MEDICINE AND REHAB | Facility: CLINIC | Age: 70
End: 2025-06-17
Payer: MEDICARE

## 2025-06-17 NOTE — TELEPHONE ENCOUNTER
Call placed to pt's spouse. She states pt still getting relief with injection but they are wanting to schedule repeat injection for December. She states with other appts they schedule 6 months in advance so she wants to make sure to get the patient in.     Advised her to call back later in the year. As long as patients symptoms are the same and he has good relief with the previous injections we could proceed with repeat injections. She is going to call in early November.

## 2025-06-17 NOTE — TELEPHONE ENCOUNTER
Who's calling:   Patient             Family/Other name:      On C2C: yes or No: N/A       Providers name/other:    Dr. Ajit Cruz DO  Reason for call:  ORDERS: CASE REQUEST/IMAGING/ INJECTION/ PROCEDURES     Detailed Message: Patient looking for another injection, please review patient chart, thank you.       Call back #: [phone]  Preferred Pharmacy:

## 2025-06-18 ENCOUNTER — OFFICE VISIT (OUTPATIENT)
Dept: FAMILY MEDICINE | Facility: CLINIC | Age: 70
End: 2025-06-18
Payer: MEDICARE

## 2025-06-18 VITALS
RESPIRATION RATE: 16 BRPM | TEMPERATURE: 98.4 F | BODY MASS INDEX: 30.62 KG/M2 | DIASTOLIC BLOOD PRESSURE: 64 MMHG | HEIGHT: 68 IN | OXYGEN SATURATION: 93 % | SYSTOLIC BLOOD PRESSURE: 115 MMHG | WEIGHT: 202 LBS | HEART RATE: 81 BPM

## 2025-06-18 DIAGNOSIS — I10 PRIMARY HYPERTENSION: ICD-10-CM

## 2025-06-18 DIAGNOSIS — E78.5 HYPERLIPIDEMIA, UNSPECIFIED HYPERLIPIDEMIA TYPE: ICD-10-CM

## 2025-06-18 DIAGNOSIS — Z79.4 TYPE 2 DIABETES MELLITUS WITH HYPERGLYCEMIA, WITH LONG-TERM CURRENT USE OF INSULIN (H): Primary | ICD-10-CM

## 2025-06-18 DIAGNOSIS — E11.65 TYPE 2 DIABETES MELLITUS WITH HYPERGLYCEMIA, WITH LONG-TERM CURRENT USE OF INSULIN (H): Primary | ICD-10-CM

## 2025-06-18 DIAGNOSIS — K21.9 GASTROESOPHAGEAL REFLUX DISEASE WITHOUT ESOPHAGITIS: ICD-10-CM

## 2025-06-18 DIAGNOSIS — Z95.1 HISTORY OF CORONARY ARTERY BYPASS SURGERY: ICD-10-CM

## 2025-06-18 DIAGNOSIS — R10.84 ABDOMINAL PAIN, GENERALIZED: ICD-10-CM

## 2025-06-18 LAB
EST. AVERAGE GLUCOSE BLD GHB EST-MCNC: 171 MG/DL
HBA1C MFR BLD: 7.6 % (ref 0–5.6)
HOLD SPECIMEN: NORMAL

## 2025-06-18 PROCEDURE — 3051F HG A1C>EQUAL 7.0%<8.0%: CPT | Performed by: FAMILY MEDICINE

## 2025-06-18 PROCEDURE — 83036 HEMOGLOBIN GLYCOSYLATED A1C: CPT | Performed by: FAMILY MEDICINE

## 2025-06-18 PROCEDURE — 82570 ASSAY OF URINE CREATININE: CPT | Performed by: FAMILY MEDICINE

## 2025-06-18 PROCEDURE — 99214 OFFICE O/P EST MOD 30 MIN: CPT | Performed by: FAMILY MEDICINE

## 2025-06-18 PROCEDURE — 82043 UR ALBUMIN QUANTITATIVE: CPT | Performed by: FAMILY MEDICINE

## 2025-06-18 PROCEDURE — 3078F DIAST BP <80 MM HG: CPT | Performed by: FAMILY MEDICINE

## 2025-06-18 PROCEDURE — 36415 COLL VENOUS BLD VENIPUNCTURE: CPT | Performed by: FAMILY MEDICINE

## 2025-06-18 PROCEDURE — 3074F SYST BP LT 130 MM HG: CPT | Performed by: FAMILY MEDICINE

## 2025-06-18 PROCEDURE — G2211 COMPLEX E/M VISIT ADD ON: HCPCS | Performed by: FAMILY MEDICINE

## 2025-06-18 RX ORDER — ATORVASTATIN CALCIUM 40 MG/1
40 TABLET, FILM COATED ORAL DAILY
Qty: 90 TABLET | Refills: 3 | Status: SHIPPED | OUTPATIENT
Start: 2025-06-18

## 2025-06-18 RX ORDER — GABAPENTIN 300 MG/1
600 CAPSULE ORAL 3 TIMES DAILY
Qty: 540 CAPSULE | Refills: 3 | Status: SHIPPED | OUTPATIENT
Start: 2025-06-18

## 2025-06-18 RX ORDER — OMEPRAZOLE 20 MG/1
20 CAPSULE, DELAYED RELEASE ORAL DAILY
Qty: 90 CAPSULE | Refills: 3 | Status: CANCELLED | OUTPATIENT
Start: 2025-06-18

## 2025-06-18 RX ORDER — LOSARTAN POTASSIUM 50 MG/1
50 TABLET ORAL DAILY PRN
Qty: 90 TABLET | Refills: 3 | Status: CANCELLED | OUTPATIENT
Start: 2025-06-18

## 2025-06-18 ASSESSMENT — ANXIETY QUESTIONNAIRES
7. FEELING AFRAID AS IF SOMETHING AWFUL MIGHT HAPPEN: NOT AT ALL
GAD7 TOTAL SCORE: 0
5. BEING SO RESTLESS THAT IT IS HARD TO SIT STILL: NOT AT ALL
IF YOU CHECKED OFF ANY PROBLEMS ON THIS QUESTIONNAIRE, HOW DIFFICULT HAVE THESE PROBLEMS MADE IT FOR YOU TO DO YOUR WORK, TAKE CARE OF THINGS AT HOME, OR GET ALONG WITH OTHER PEOPLE: NOT DIFFICULT AT ALL
1. FEELING NERVOUS, ANXIOUS, OR ON EDGE: NOT AT ALL
6. BECOMING EASILY ANNOYED OR IRRITABLE: NOT AT ALL
3. WORRYING TOO MUCH ABOUT DIFFERENT THINGS: NOT AT ALL
GAD7 TOTAL SCORE: 0
2. NOT BEING ABLE TO STOP OR CONTROL WORRYING: NOT AT ALL

## 2025-06-18 ASSESSMENT — PATIENT HEALTH QUESTIONNAIRE - PHQ9
SUM OF ALL RESPONSES TO PHQ QUESTIONS 1-9: 1
5. POOR APPETITE OR OVEREATING: NOT AT ALL

## 2025-06-18 NOTE — PATIENT INSTRUCTIONS
Change omeprazole to twice a week for the next month. If symptoms still well controlled (no breakthrough reflux), we can stop this medication.  If reflux symptoms return, we can restart. Send me an update by [place].

## 2025-06-18 NOTE — PROGRESS NOTES
Assessment & Plan     1. Type 2 diabetes mellitus with hyperglycemia, with long-term current use of insulin (H) (Primary)  - Hemoglobin A1c  - Albumin Random Urine Quantitative with Creat Ratio    Due for updated A1c and urine microalbumin.  He continues to manage with endocrinology for insulin management.    2. Hyperlipidemia, unspecified hyperlipidemia type  3. History of coronary artery bypass surgery  - atorvastatin (LIPITOR) 40 MG tablet; Take 1 tablet (40 mg) by mouth daily.  Dispense: 90 tablet; Refill: 3    Continue atorvastatin for cholesterol management.  Labs are up-to-date.  LDL well-controlled.    4. Primary hypertension  Blood pressure continues to be labile.  Recent visit with cardiology, new plan for losartan as needed elevated blood pressure and midodrine as needed for low blood pressure.  He has documented his high readings and when he is taking losartan which is nearly twice daily.  He has not documented when he is taking the midodrine and what low blood pressure readings he has been experiencing.     5. Abdominal pain, generalized  - gabapentin (NEURONTIN) 300 MG capsule; Take 2 capsules (600 mg) by mouth 3 times daily.  Dispense: 540 capsule; Refill: 3    Continues on gabapentin scheduled for abdominal pain, symptoms remain under good control.    6. Gastroesophageal reflux disease without esophagitis  Symptoms remain well-controlled as he has reduced dose to every other day dosing.  We will trial omeprazole twice weekly for the next few weeks and then plan to discontinue.      Subjective   Pat is a 70 year old, presenting for the following health issues:  Recheck Medication      6/18/2025    10:46 AM   Additional Questions   Roomed by Ozzie SIMMS MA     History of Present Illness       Reason for visit:  BP   He is taking medications regularly.        Type 2 diabetes mellitus with hyperglycemia, with long-term current use of insulin (H) (Primary)  Has been managing with endocrinology.      Hemoglobin A1C   Date Value Ref Range Status   06/18/2025 7.6 (H) 0.0 - 5.6 % Final     Comment:     Normal <5.7%   Prediabetes 5.7-6.4%    Diabetes 6.5% or higher     Note: Adopted from ADA consensus guidelines.   02/19/2025 8.3 (H) <5.7 % Final     Comment:     Normal <5.7%   Prediabetes 5.7-6.4%    Diabetes 6.5% or higher     Note: Adopted from ADA consensus guidelines.   11/01/2024 8.4 (H) 0.0 - 5.6 % Final     Comment:     Normal <5.7%   Prediabetes 5.7-6.4%    Diabetes 6.5% or higher     Note: Adopted from ADA consensus guidelines.         Hyperlipidemia, unspecified hyperlipidemia type  History of coronary artery bypass surgery  The ASCVD Risk score (Tamy DESAI, et al., 2019) failed to calculate for the following reasons:    The valid total cholesterol range is 130 to 320 mg/dL     Lab Results   Component Value Date    CHOL 76 12/05/2024     Lab Results   Component Value Date    HDL 39 12/05/2024     Lab Results   Component Value Date    LDL 23 12/05/2024     Lab Results   Component Value Date    TRIG 71 12/05/2024    TRIG 74 06/02/2021     Currently taking atorvastatin 40mg daily.      Primary hypertension  BP Readings from Last 6 Encounters:   06/18/25 115/64   06/10/25 91/59   05/20/25 90/51   05/01/25 (!) 177/87   04/25/25 (!) 84/55   04/22/25 (!) 146/78     Most recent visit with cardiology discussed PRN plan for losartan and midodrine  Discussed BP plan in depth with patient and wife, and they were comfortable with the following:  - Check blood pressure in the morning:              - For top number <90, take Midodrine 2.5 mg              - For top number >120, take Losartan 50 mg  - Recheck blood pressure in the afternoon/early evening:  - For top number <90, take Midodrine 2.5 mg (do not take if less than 4 hours from bedtime)              - For top number >120, take Losartan 50 mg    Doesn't think this is working.   Has been taking the losartan twice a day pretty consistently.  Hasn't documented  "the midodrine use. Thinks he has used it twice.   Feeling some dizziness.     Was seeing Rafaela Cueva, couldn't get in until December? So saw Dilma Zee last week. Autonomic specialist appointment was pushed out.       Abdominal pain, generalized  Gabapentin 600mg TID. Symptoms are well controlled.       Gastroesophageal reflux disease without esophagitis  Omeprazole 20mg daily. Currently taking every other day. Still well controlled.        Review of Systems  See HPI above.         Objective    /64   Pulse 81   Temp 98.4  F (36.9  C)   Resp 16   Ht 1.727 m (5' 8\")   Wt 91.6 kg (202 lb)   SpO2 93%   BMI 30.71 kg/m    Body mass index is 30.71 kg/m .  Physical Exam   GENERAL: alert and no distress  NECK: no adenopathy, no asymmetry, masses, or scars  RESP: lungs clear to auscultation - no rales, rhonchi or wheezes  CV: regular rate and rhythm, no murmurs   ABDOMEN: soft, nontender, no hepatosplenomegaly, no masses and bowel sounds normal  MS: no gross musculoskeletal defects noted, no edema            Signed Electronically by: Amelia Johnson,     "

## 2025-06-19 ENCOUNTER — RESULTS FOLLOW-UP (OUTPATIENT)
Dept: FAMILY MEDICINE | Facility: CLINIC | Age: 70
End: 2025-06-19

## 2025-06-19 LAB
CREAT UR-MCNC: 148 MG/DL
MICROALBUMIN UR-MCNC: 15.1 MG/L
MICROALBUMIN/CREAT UR: 10.2 MG/G CR (ref 0–17)

## 2025-06-19 ASSESSMENT — ANXIETY QUESTIONNAIRES
GAD7 TOTAL SCORE: 0
6. BECOMING EASILY ANNOYED OR IRRITABLE: NOT AT ALL
7. FEELING AFRAID AS IF SOMETHING AWFUL MIGHT HAPPEN: NOT AT ALL
GAD7 TOTAL SCORE: 0
4. TROUBLE RELAXING: NOT AT ALL
3. WORRYING TOO MUCH ABOUT DIFFERENT THINGS: NOT AT ALL
5. BEING SO RESTLESS THAT IT IS HARD TO SIT STILL: NOT AT ALL
2. NOT BEING ABLE TO STOP OR CONTROL WORRYING: NOT AT ALL
7. FEELING AFRAID AS IF SOMETHING AWFUL MIGHT HAPPEN: NOT AT ALL
1. FEELING NERVOUS, ANXIOUS, OR ON EDGE: NOT AT ALL

## 2025-06-20 ENCOUNTER — VIRTUAL VISIT (OUTPATIENT)
Dept: PSYCHOLOGY | Facility: CLINIC | Age: 70
End: 2025-06-20
Payer: MEDICARE

## 2025-06-20 DIAGNOSIS — F43.23 ADJUSTMENT DISORDER WITH MIXED ANXIETY AND DEPRESSED MOOD: Primary | ICD-10-CM

## 2025-06-20 DIAGNOSIS — G20.B1 PARKINSON'S DISEASE WITH DYSKINESIA, UNSPECIFIED WHETHER MANIFESTATIONS FLUCTUATE (H): ICD-10-CM

## 2025-06-20 PROCEDURE — 90837 PSYTX W PT 60 MINUTES: CPT | Mod: 95 | Performed by: PSYCHOLOGIST

## 2025-06-20 ASSESSMENT — ANXIETY QUESTIONNAIRES: GAD7 TOTAL SCORE: 0

## 2025-06-24 ENCOUNTER — OFFICE VISIT (OUTPATIENT)
Dept: ENDOCRINOLOGY | Facility: CLINIC | Age: 70
End: 2025-06-24
Payer: MEDICARE

## 2025-06-24 VITALS
SYSTOLIC BLOOD PRESSURE: 93 MMHG | HEIGHT: 68 IN | HEART RATE: 83 BPM | DIASTOLIC BLOOD PRESSURE: 57 MMHG | OXYGEN SATURATION: 95 % | BODY MASS INDEX: 30.77 KG/M2 | WEIGHT: 203 LBS

## 2025-06-24 DIAGNOSIS — Z79.4 INSULIN DEPENDENT TYPE 2 DIABETES MELLITUS (H): Primary | ICD-10-CM

## 2025-06-24 DIAGNOSIS — E11.9 INSULIN DEPENDENT TYPE 2 DIABETES MELLITUS (H): Primary | ICD-10-CM

## 2025-06-24 PROCEDURE — 99214 OFFICE O/P EST MOD 30 MIN: CPT | Mod: 25 | Performed by: PHYSICIAN ASSISTANT

## 2025-06-24 PROCEDURE — 95251 CONT GLUC MNTR ANALYSIS I&R: CPT | Performed by: PHYSICIAN ASSISTANT

## 2025-06-24 PROCEDURE — 1126F AMNT PAIN NOTED NONE PRSNT: CPT | Performed by: PHYSICIAN ASSISTANT

## 2025-06-24 PROCEDURE — 3074F SYST BP LT 130 MM HG: CPT | Performed by: PHYSICIAN ASSISTANT

## 2025-06-24 PROCEDURE — 3078F DIAST BP <80 MM HG: CPT | Performed by: PHYSICIAN ASSISTANT

## 2025-06-24 ASSESSMENT — PAIN SCALES - GENERAL: PAINLEVEL_OUTOF10: NO PAIN (0)

## 2025-06-24 NOTE — LETTER
6/24/2025       RE: Arnaldo Henderson  [address] [address]     Dear Colleague,    Thank you for referring your patient, Arnaldo Henderson, to the [facility] [place] at [facility]. Please see a copy of my visit note below.    Patient is showing 4/5 MNCM met. A1c not in range   06/10/25 1:21 PM  PATIENT LAB/IMAGING STATUS : Orders completed / No further action needed                                       HPI  Arnaldo Henderson is a 70 year old male with type 2 diabetes mellitus insulin requiring.  Patient's C-peptide low < 0.1 ng/mL in February 2024.  Last visit with  in February 2025.  History of chronic pancreatitis s/p distal pancreatectomy and history of neuroendocrine tumor.  Pt has dx of neuroendocrine tumor of small bowel seen here by Oncology staff.   Pt was dx with type 2 DM in 2013.   No history of diabetic retinopathy, nephropathy and peripheral neuropathy.  Pt has hx of chronic pancreatitis with hx of gallstones, CAD- s/p CABG, Parkinson's disease and HTN.   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  History of hyperlipidemia, hypertension and orthostatic hypotension and spinal stenosis.  For his diabetes, he is taking Semglee 14 units subcutaneous each am and 20 units subcutaneous at hs and Novolog 4-6 units with breakfast, 8-10 units with lunch and 10-12 units with dinner with correction.    Most recent A1C 7.6 % on 6/18/2025.    Previous A1C 8.3 % in February 2025.    C-peptide low < 0.1 ng/mL on 2/15/2024.  I reviewed and scanned pt's DexcomG7 sensor download data in his note below.  Average glucose 175 with estimated A1C 7.5 %.  Glucose in target range 58 % of the time with no frequent hypoglycemia.  Reminded patient to take NovoLog before eating.  On ROS today, reports feeling better overall.  Patient has lost 10 pounds.  Parkinson's symptoms stable per patient.  Chronic shortness of  "breath with exertion.  No cough or fever.  Denies blurred vision, nausea, vomiting, chest pain, blood in stool, melena or sx of neuropathy.   No foot ulcers.    Diabetes Care  Retinopathy: None.  Patient seen by ophthalmology in December 2024 with no evidence of diabetic retinopathy.    Nephropathy:none; urine microalbuminuria negative in June 2025.  Pt taking Cozaar.  Neuropathy: None.  Foot Exam: No ulcers.  Taking aspirin: Yes.  Lipids:LDL 23 in December 2024. Pt taking Lipitor.  Insulin: basal and meal time insulin with correction.      DM meds: none.  Patient did not tolerate Metformin- GI side effects.  AVOID GLP1-history of pancreatitis. AVOID SGLT2 drug - low Cpeptide.  Testing: DexcomG7 sensor.        ROS  See under HPI.    Allergies  Allergies   Allergen Reactions     Ciprofloxacin Other (See Comments), Hives, Itching, Rash and Unknown     Other reaction(s): Other (see comments)  Oral swelling per [facility]         Dilaudid [Hydromorphone] Rash     Morphine Rash     rash     Penicillins Rash     aka ancef/ rash       Clindamycin Itching and Rash     Oxycodone Rash     \"HORRIBLE, SEVERE RASH MAYBE CAUSED BY OXY\"       Medications  Current Outpatient Medications   Medication Sig Dispense Refill     aspirin (ASA) 81 MG EC tablet Take 81 mg by mouth daily       atorvastatin (LIPITOR) 40 MG tablet Take 1 tablet (40 mg) by mouth daily. 90 tablet 3     BD PEN NEEDLE LINDY 2ND GEN 32G X 4 MM miscellaneous USE TO INJECT INSULIN 4 TIMES A  each 3     carbidopa-levodopa (SINEMET CR)  MG CR tablet Take 1 tablet by mouth at bedtime @9p 90 tablet 3     carbidopa-levodopa (SINEMET)  MG tablet 2.5@6a, 2@10a, 2@2p and 1@5p 675 tablet 3     Continuous Glucose Sensor (DEXCOM G7 SENSOR) MISC Change every 10 days. 9 each 11     dasiglucagon HCl (ZEGALOGUE) 0.6 MG/0.6ML SOAJ injection Inject 0.6 mg Subcutaneous once as needed (use for severe hypoglycemia) 1.2 mL 1     gabapentin (NEURONTIN) 300 MG capsule " Take 2 capsules (600 mg) by mouth 3 times daily. 540 capsule 3     insulin aspart (NOVOLOG PEN) 100 UNIT/ML pen Inject 20 Units subcutaneously 3 times daily as needed for high blood sugar (take 3 times/day as needed). 60 mL 2     Insulin Glargine-yfgn 100 UNIT/ML SOPN Inject 14 Units subcutaneously every morning AND 20 Units at bedtime. 50 mL 2     lipase-protease-amylase (ZENPEP) 21751-789850-102855 units CPEP Take 1 capsule by mouth 3 times daily (with meals). 90 capsule 11     losartan (COZAAR) 50 MG tablet TAKE 1 TABLET BY MOUTH EVERY DAY 90 tablet 0     magnesium oxide (MAG-OX) 400 MG tablet Take 400 mg by mouth every other day       midodrine (PROAMATINE) 2.5 MG tablet Take 1 tablet (2.5 mg) by mouth daily. 90 tablet 3     Multiple Vitamin (MULTI-VITAMINS) TABS Take 1 tablet by mouth daily       omeprazole (PRILOSEC) 20 MG DR capsule Take 1 capsule (20 mg) by mouth daily 90 capsule 3     sertraline (ZOLOFT) 25 MG tablet Take 1 tablet (25 mg) by mouth daily. 90 tablet 3     traZODone (DESYREL) 50 MG tablet TAKE 0.5-1 TABLETS BY MOUTH AT BEDTIME. 90 tablet 1       Family History  family history includes Autism Spectrum Disorder in his grandson; Heart Disease in his son; Hyperlipidemia in his mother; Hypertension in his mother; Lung Cancer in his brother; Other - See Comments in his daughter, father, mother, sister, sister, and son; Other Cancer in his mother; Parkinsonism (age of onset: 65) in his mother; Psychosis in his mother.    Social History   reports that he has quit smoking. His smoking use included cigarettes. He has never used smokeless tobacco. He reports that he does not currently use alcohol. He reports that he does not currently use drugs.     Past Medical History  Past Medical History:   Diagnosis Date     Acute pancreatitis 04/18/2022    Formatting of this note might be different from the original. Formatting of this note might be different from the original. Added automatically from request for  surgery [id number] Formatting of this note might be different from the original. Added automatically from request for surgery [id number]  Formatting of this note might be different from the original. Added automatically from request for surgery      CAD (coronary artery disease)     CABG     Cholecystitis, acute 07/05/2023     Diabetes mellitus, type 2 (H) 2013     Dystonia, torsion, fragments of 04/25/2025     Family history of parkinsonism 02/14/2024     Gastroesophageal reflux disease      Hypercholesteremia      Hypertension      Mixed conductive and sensorineural hearing loss of both ears      Mixed hearing loss      Pancreatic duct stricture      Pancreatitis      Parkinson disease (H)      Parkinson's disease, unspecified whether dyskinesia present, unspecified whether manifestations fluctuate (H) 02/14/2024     Seborrheic dermatitis 03/22/2024     Second degree AV block      Surgical site infection 07/26/2023       Past Surgical History:   Procedure Laterality Date     CA ANESTH CABG W/PUMP       CHOLECYSTECTOMY  2022     COLONOSCOPY N/A 01/12/2023    Procedure: colonoscopy with fluroscopy;  Surgeon: Ayden Fraire MD;  Location:  OR     COLONOSCOPY N/A 5/1/2025    Procedure: Colonoscopy;  Surgeon: Ricardo Moreira MD;  Location:  GI     ENDOSCOPIC RETROGRADE CHOLANGIOPANCREATOGRAM N/A 05/18/2023    Procedure: ENDOSCOPIC RETROGRADE CHOLANGIOPANCREATOGRAPHY, WITH  CYSTDUODENOSTOMY STENTS X2 REMOVAL;  Surgeon: Cedric Saldana MD;  Location:  OR     ENT SURGERY       EP PACEMAKER DEVICE & LEAD IMPLANT- RIGHT ATRIAL & RIGHT VENTRICULAR N/A 02/28/2024    Procedure: Pacemaker Device & Lead Implant - Right Atrial & Right Ventricular;  Surgeon: Jenna Hernandez MD;  Location:  HEART CARDIAC CATH LAB     LAPAROSCOPY DIAGNOSTIC (GENERAL) N/A 02/20/2023    Procedure: LAPAROSCOPY, DIAGNOSTIC; RETRIEVAL OF MIGRATED STENT;  Surgeon: Joseluis Lima MD;  Location:  OR     ORTHOPEDIC SURGERY       OTHER  "SURGICAL HISTORY      skin cancer ? from scrotum     OTHER SURGICAL HISTORY  04/22/2025    cortison injection     PANCREATECTOMY PEUSTOW N/A 06/30/2023    Procedure: Open distal pancreactectomy with splenectomy, lysis of adhesions, resection of proximal illeum;  Surgeon: Ashvin Haider MD;  Location: [facility] OR     NC CABG, ARTERIAL, TWO  2003       Physical Exam    BP 93/57   Pulse 83   Ht 1.727 m (5' 8\")   Wt 92.1 kg (203 lb)   SpO2 95%   BMI 30.87 kg/m       FEET: no ulcers.    RESULTS  Creatinine   Date Value Ref Range Status   01/20/2025 0.98 0.67 - 1.17 mg/dL Final     GFR Estimate   Date Value Ref Range Status   01/20/2025 83 >60 mL/min/1.73m2 Final     Comment:     eGFR calculated using 2021 CKD-EPI equation.     GFR, ESTIMATED POCT   Date Value Ref Range Status   11/03/2023 >60 >60 mL/min/1.73m2 Final     Hemoglobin A1C   Date Value Ref Range Status   06/18/2025 7.6 (H) 0.0 - 5.6 % Final     Comment:     Normal <5.7%   Prediabetes 5.7-6.4%    Diabetes 6.5% or higher     Note: Adopted from ADA consensus guidelines.     Potassium   Date Value Ref Range Status   01/20/2025 4.3 3.4 - 5.3 mmol/L Final     Potassium POCT   Date Value Ref Range Status   06/30/2023 4.7 3.5 - 5.0 mmol/L Final     ALT   Date Value Ref Range Status   01/20/2025 6 0 - 70 U/L Final     AST   Date Value Ref Range Status   01/20/2025 20 0 - 45 U/L Final     TSH   Date Value Ref Range Status   01/20/2025 2.44 0.30 - 4.20 uIU/mL Final       Cholesterol   Date Value Ref Range Status   12/05/2024 76 <200 mg/dL Final   11/01/2024 78 <200 mg/dL Final     Direct Measure HDL   Date Value Ref Range Status   12/05/2024 39 (L) >=40 mg/dL Final   11/01/2024 38 (L) >=40 mg/dL Final     LDL Cholesterol Calculated   Date Value Ref Range Status   12/05/2024 23 <100 mg/dL Final   11/01/2024 29 <100 mg/dL Final     Triglycerides   Date Value Ref Range Status   12/05/2024 71 <150 mg/dL Final   11/01/2024 54 <150 mg/dL Final     Triglycerides " (External)   Date Value Ref Range Status   06/02/2021 74 <150 mg/dL Final         ASSESSMENT/PLAN:      TYPE 2 DIABETES MELLITUS: 70-year-old male with history of type 2 diabetes mellitus dx in 2013 insulin requiring with hx of distal pancreatectomy on 6/3/2023 for chronic pancreatitis.  Patient's C-peptide was low < 0.1 ng/dL in February 2024.  Pt also dx with neuroendocrine tumor of the small bowel.  Pt's A1C has improved.  Continue current insulin doses and remind Pat to take his mealtime insulin before eating.  Pt did NOT tolerate Metformin.  Avoid use of GLP-1 given history of pancreatitis.  Will also avoid use of SGLT2 drug with history of a low C-peptide level.   Patient seen by ophthalmology in December 2024 with no evidence of diabetic retinopathy.  Patient's urine microalbuminuria negative in June 2025 with normal creatinine/GFR in January 2025.  No diabetic neuropathy.    BP: Managed by Cardiology.   LIPIDS: LDL 23 in December 2024. Pt taking Lipitor.  4.   PARKINSON'S DISEASE: Followed by Neurology staff  5.   HEALTH MAINTENANCE: Continue to see primary care provider for health maintenance.  6.   FOLLOW UP: With me in September 2025 and with Dr. Reynolds in Jan 2026.      Time spent reviewing chart, labs and DexcomG7 sensor data today = 5 minutes.  Time for clinic visit today = 20 minutes.  Time for documentation today = 10 minutes.    Total time for visit today = 35 minutes.    Valeri Gomez PA-C      Again, thank you for allowing me to participate in the care of your patient.      Sincerely,    Valeri Gomez PA-C

## 2025-06-28 NOTE — CONFIDENTIAL NOTE
"Health Psychology - Follow up Visit  Confidential Summary*     The author of this note documented a reason for not sharing it with the patient.      REFERRAL SOURCE:  Whitney Renteria, PhD (provider who will be out on medical leave)     Visit conducted via video using [employer].  Patient was at his home and provider at her home.      The patient has been notified of following:   \"This VIDEO visit will be conducted via a call between you and your physician/provider. We have found that certain health care needs can be provided without the need for an in-person physical exam.   Video visits are billed at different rates depending on your insurance coverage.  Please reach out to your insurance provider with any questions. If during the course of the call the physician/provider feels a video visit is not appropriate, you will not be charged for this service.\"     Patient has given verbal consent for Video/PHONE visit? Yes     CHIEF COMPLAINT/REASON FOR VISIT  Patient would like to decrease anxiety.     Subjective:   Patient began with report that he continues to notice that his overall anxiety has decreased and that he believes that he is now adjusting to changes associated with his medical conditions.  He reported that he is participating in an exercise and support program for his Parkinsons disease.  He has previously completed [employer] and he is currently in an in person exercise class one day per week and that he has been successfully taking [employer] mobility to get there (he is driving but he and wife are sharing one car).  In addition, he is also participating in an online exercise class conducted by the same woman who facilitates the PD support group on another day per week.  He also reported that he is better managing his A1c but that his blood pressure continues to be a challenge.  He reported that he experiences orthostasis.  Discussed how he might use muscle contraction to increase blood pressure prior to standing. "      He reported that his overall mood is improved and that he is looking forward to visiting his grand daughter and son this summer.  He reported that she is playing softball and he believes that he will be able to go to her games.  He also described increasing his communication with his other son.  Described his awareness that this son appears to be concerned about disappointing him but that this dynamic is changing.      Overall, patient and provider believe that he is doing well and he asked that we not meet again for one month.      Objective:  Patient was on time for today s session.  He appeared friendly, alert and oriented.  Mood was euthymic, with appropriate range of affect, including occasional tearfulness.          Assessment:  The patient is coping with multiple medical problems and is learning to adjust to the changes these pose to his well being.  He reported positive response to medication and denies current depression or anxiety.      Answers submitted by the patient for this visit:  Patient Health Questionnaire (G7) (Submitted on 6/20/2025)  ANETA 7 TOTAL SCORE: 0      Plan:  CBT and supportive therapy in context of medical diagnosis and surrounding concerns.  See in one month.      Treatment plan reviewed.     Time In: 2:00  Time Out: 3:00     Diagnosis:  Axis I Adjustment disorder with mixed emotions of anxiety and depression, psychological and behavioral factors associated with medical; ANETA; panic disorder   Axis II Deferred   Axis III please see medical records for details   [place] IV Psychosocial and Environmental Stressors: Health & family          Samantha Watters, PhD, LP

## 2025-06-28 NOTE — PROGRESS NOTES
HPI  Arnaldo Henderson is a 70 year old male with type 2 diabetes mellitus insulin requiring.  Patient's C-peptide low < 0.1 ng/mL in February 2024.  Last visit with  in February 2025.  History of chronic pancreatitis s/p distal pancreatectomy and history of neuroendocrine tumor.  Pt has dx of neuroendocrine tumor of small bowel seen here by Oncology staff.   Pt was dx with type 2 DM in 2013.   No history of diabetic retinopathy, nephropathy and peripheral neuropathy.  Pt has hx of chronic pancreatitis with hx of gallstones, CAD- s/p CABG, Parkinson's disease and HTN.   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  History of hyperlipidemia, hypertension and orthostatic hypotension and spinal stenosis.  For his diabetes, he is taking Semglee 14 units subcutaneous each am and 20 units subcutaneous at hs and Novolog 4-6 units with breakfast, 8-10 units with lunch and 10-12 units with dinner with correction.    Most recent A1C 7.6 % on 6/18/2025.    Previous A1C 8.3 % in February 2025.    C-peptide low < 0.1 ng/mL on 2/15/2024.  I reviewed and scanned pt's DexcomG7 sensor download data in his note below.  Average glucose 175 with estimated A1C 7.5 %.  Glucose in target range 58 % of the time with no frequent hypoglycemia.  Reminded patient to take NovoLog before eating.  On ROS today, reports feeling better overall.  Patient has lost 10 pounds.  Parkinson's symptoms stable per patient.  Chronic shortness of breath with exertion.  No cough or fever.  Denies blurred vision, nausea, vomiting, chest pain, blood in stool, melena or sx of neuropathy.   No foot ulcers.    Diabetes Care  Retinopathy: None.  Patient seen by ophthalmology in December 2024 with no evidence of diabetic retinopathy.    Nephropathy:none; urine microalbuminuria negative in June 2025.  Pt taking Cozaar.  Neuropathy: None.  Foot Exam: No ulcers.  Taking aspirin: Yes.  Lipids:LDL 23 in December 2024. Pt taking Lipitor.  Insulin:  "basal and meal time insulin with correction.      DM meds: none.  Patient did not tolerate Metformin- GI side effects.  AVOID GLP1-history of pancreatitis. AVOID SGLT2 drug - low Cpeptide.  Testing: DexcomG7 sensor.        ROS  See under HPI.    Allergies  Allergies   Allergen Reactions    Ciprofloxacin Other (See Comments), Hives, Itching, Rash and Unknown     Other reaction(s): Other (see comments)  Oral swelling per [facility]        Dilaudid [Hydromorphone] Rash    Morphine Rash     rash    Penicillins Rash     aka ancef/ rash      Clindamycin Itching and Rash    Oxycodone Rash     \"HORRIBLE, SEVERE RASH MAYBE CAUSED BY OXY\"       Medications  Current Outpatient Medications   Medication Sig Dispense Refill    aspirin (ASA) 81 MG EC tablet Take 81 mg by mouth daily      atorvastatin (LIPITOR) 40 MG tablet Take 1 tablet (40 mg) by mouth daily. 90 tablet 3    BD PEN NEEDLE LINDY 2ND GEN 32G X 4 MM miscellaneous USE TO INJECT INSULIN 4 TIMES A  each 3    carbidopa-levodopa (SINEMET CR)  MG CR tablet Take 1 tablet by mouth at bedtime @9p 90 tablet 3    carbidopa-levodopa (SINEMET)  MG tablet 2.5@6a, 2@10a, 2@2p and 1@5p 675 tablet 3    Continuous Glucose Sensor (DEXCOM G7 SENSOR) MISC Change every 10 days. 9 each 11    dasiglucagon HCl (ZEGALOGUE) 0.6 MG/0.6ML SOAJ injection Inject 0.6 mg Subcutaneous once as needed (use for severe hypoglycemia) 1.2 mL 1    gabapentin (NEURONTIN) 300 MG capsule Take 2 capsules (600 mg) by mouth 3 times daily. 540 capsule 3    insulin aspart (NOVOLOG PEN) 100 UNIT/ML pen Inject 20 Units subcutaneously 3 times daily as needed for high blood sugar (take 3 times/day as needed). 60 mL 2    Insulin Glargine-yfgn 100 UNIT/ML SOPN Inject 14 Units subcutaneously every morning AND 20 Units at bedtime. 50 mL 2    lipase-protease-amylase (ZENPEP) 83664-605662-661741 units CPEP Take 1 capsule by mouth 3 times daily (with meals). 90 capsule 11    losartan (COZAAR) 50 MG tablet " TAKE 1 TABLET BY MOUTH EVERY DAY 90 tablet 0    magnesium oxide (MAG-OX) 400 MG tablet Take 400 mg by mouth every other day      midodrine (PROAMATINE) 2.5 MG tablet Take 1 tablet (2.5 mg) by mouth daily. 90 tablet 3    Multiple Vitamin (MULTI-VITAMINS) TABS Take 1 tablet by mouth daily      omeprazole (PRILOSEC) 20 MG DR capsule Take 1 capsule (20 mg) by mouth daily 90 capsule 3    sertraline (ZOLOFT) 25 MG tablet Take 1 tablet (25 mg) by mouth daily. 90 tablet 3    traZODone (DESYREL) 50 MG tablet TAKE 0.5-1 TABLETS BY MOUTH AT BEDTIME. 90 tablet 1       Family History  family history includes Autism Spectrum Disorder in his grandson; Heart Disease in his son; Hyperlipidemia in his mother; Hypertension in his mother; Lung Cancer in his brother; Other - See Comments in his daughter, father, mother, sister, sister, and son; Other Cancer in his mother; Parkinsonism (age of onset: 65) in his mother; Psychosis in his mother.    Social History   reports that he has quit smoking. His smoking use included cigarettes. He has never used smokeless tobacco. He reports that he does not currently use alcohol. He reports that he does not currently use drugs.     Past Medical History  Past Medical History:   Diagnosis Date    Acute pancreatitis 04/18/2022    Formatting of this note might be different from the original. Formatting of this note might be different from the original. Added automatically from request for surgery [id number] Formatting of this note might be different from the original. Added automatically from request for surgery [id number]  Formatting of this note might be different from the original. Added automatically from request for surgery     CAD (coronary artery disease)     CABG    Cholecystitis, acute 07/05/2023    Diabetes mellitus, type 2 (H) 2013    Dystonia, torsion, fragments of 04/25/2025    Family history of parkinsonism 02/14/2024    Gastroesophageal reflux disease     Hypercholesteremia     Hypertension   "   Mixed conductive and sensorineural hearing loss of both ears     Mixed hearing loss     Pancreatic duct stricture     Pancreatitis     Parkinson disease (H)     Parkinson's disease, unspecified whether dyskinesia present, unspecified whether manifestations fluctuate (H) 02/14/2024    Seborrheic dermatitis 03/22/2024    Second degree AV block     Surgical site infection 07/26/2023       Past Surgical History:   Procedure Laterality Date    CA ANESTH CABG W/PUMP      CHOLECYSTECTOMY  2022    COLONOSCOPY N/A 01/12/2023    Procedure: colonoscopy with fluroscopy;  Surgeon: Ayden Fraire MD;  Location:  OR    COLONOSCOPY N/A 5/1/2025    Procedure: Colonoscopy;  Surgeon: Ricardo Moreira MD;  Location:  GI    ENDOSCOPIC RETROGRADE CHOLANGIOPANCREATOGRAM N/A 05/18/2023    Procedure: ENDOSCOPIC RETROGRADE CHOLANGIOPANCREATOGRAPHY, WITH  CYSTDUODENOSTOMY STENTS X2 REMOVAL;  Surgeon: Cedric Saldana MD;  Location: [facility]    ENT SURGERY      EP PACEMAKER DEVICE & LEAD IMPLANT- RIGHT ATRIAL & RIGHT VENTRICULAR N/A 02/28/2024    Procedure: Pacemaker Device & Lead Implant - Right Atrial & Right Ventricular;  Surgeon: Jenna Hernandez MD;  Location:  HEART CARDIAC CATH LAB    LAPAROSCOPY DIAGNOSTIC (GENERAL) N/A 02/20/2023    Procedure: LAPAROSCOPY, DIAGNOSTIC; RETRIEVAL OF MIGRATED STENT;  Surgeon: Joseluis Lima MD;  Location: [facility]    ORTHOPEDIC SURGERY      OTHER SURGICAL HISTORY      skin cancer ? from scrotum    OTHER SURGICAL HISTORY  04/22/2025    cortison injection    PANCREATECTOMY PEUSTOW N/A 06/30/2023    Procedure: Open distal pancreactectomy with splenectomy, lysis of adhesions, resection of proximal illeum;  Surgeon: Ashvin Haider MD;  Location: [facility]    LA CABG, ARTERIAL, TWO  2003       Physical Exam    BP 93/57   Pulse 83   Ht 1.727 m (5' 8\")   Wt 92.1 kg (203 lb)   SpO2 95%   BMI 30.87 kg/m       FEET: no ulcers.    RESULTS  Creatinine   Date Value Ref Range Status "   01/20/2025 0.98 0.67 - 1.17 mg/dL Final     GFR Estimate   Date Value Ref Range Status   01/20/2025 83 >60 mL/min/1.73m2 Final     Comment:     eGFR calculated using 2021 CKD-EPI equation.     GFR, ESTIMATED POCT   Date Value Ref Range Status   11/03/2023 >60 >60 mL/min/1.73m2 Final     Hemoglobin A1C   Date Value Ref Range Status   06/18/2025 7.6 (H) 0.0 - 5.6 % Final     Comment:     Normal <5.7%   Prediabetes 5.7-6.4%    Diabetes 6.5% or higher     Note: Adopted from ADA consensus guidelines.     Potassium   Date Value Ref Range Status   01/20/2025 4.3 3.4 - 5.3 mmol/L Final     Potassium POCT   Date Value Ref Range Status   06/30/2023 4.7 3.5 - 5.0 mmol/L Final     ALT   Date Value Ref Range Status   01/20/2025 6 0 - 70 U/L Final     AST   Date Value Ref Range Status   01/20/2025 20 0 - 45 U/L Final     TSH   Date Value Ref Range Status   01/20/2025 2.44 0.30 - 4.20 uIU/mL Final       Cholesterol   Date Value Ref Range Status   12/05/2024 76 <200 mg/dL Final   11/01/2024 78 <200 mg/dL Final     Direct Measure HDL   Date Value Ref Range Status   12/05/2024 39 (L) >=40 mg/dL Final   11/01/2024 38 (L) >=40 mg/dL Final     LDL Cholesterol Calculated   Date Value Ref Range Status   12/05/2024 23 <100 mg/dL Final   11/01/2024 29 <100 mg/dL Final     Triglycerides   Date Value Ref Range Status   12/05/2024 71 <150 mg/dL Final   11/01/2024 54 <150 mg/dL Final     Triglycerides (External)   Date Value Ref Range Status   06/02/2021 74 <150 mg/dL Final         ASSESSMENT/PLAN:      TYPE 2 DIABETES MELLITUS: 70-year-old male with history of type 2 diabetes mellitus dx in 2013 insulin requiring with hx of distal pancreatectomy on 6/3/2023 for chronic pancreatitis.  Patient's C-peptide was low < 0.1 ng/dL in February 2024.  Pt also dx with neuroendocrine tumor of the small bowel.  Pt's A1C has improved.  Continue current insulin doses and remind Pat to take his mealtime insulin before eating.  Pt did NOT tolerate Metformin.   Avoid use of GLP-1 given history of pancreatitis.  Will also avoid use of SGLT2 drug with history of a low C-peptide level.   Patient seen by ophthalmology in December 2024 with no evidence of diabetic retinopathy.  Patient's urine microalbuminuria negative in June 2025 with normal creatinine/GFR in January 2025.  No diabetic neuropathy.    BP: Managed by Cardiology.   LIPIDS: LDL 23 in December 2024. Pt taking Lipitor.  4.   PARKINSON'S DISEASE: Followed by Neurology staff  5.   HEALTH MAINTENANCE: Continue to see primary care provider for health maintenance.  6.   FOLLOW UP: With me in September 2025 and with Dr. Reynolds in Jan 2026.      Time spent reviewing chart, labs and DexcomG7 sensor data today = 5 minutes.  Time for clinic visit today = 20 minutes.  Time for documentation today = 10 minutes.    Total time for visit today = 35 minutes.    Valeri Gomez PA-C

## 2025-06-30 ENCOUNTER — MEDICAL CORRESPONDENCE (OUTPATIENT)
Dept: HEALTH INFORMATION MANAGEMENT | Facility: CLINIC | Age: 70
End: 2025-06-30
Payer: MEDICARE

## 2025-07-02 ENCOUNTER — OFFICE VISIT (OUTPATIENT)
Dept: CARDIOLOGY | Facility: CLINIC | Age: 70
End: 2025-07-02
Payer: MEDICARE

## 2025-07-02 VITALS
SYSTOLIC BLOOD PRESSURE: 136 MMHG | WEIGHT: 206 LBS | OXYGEN SATURATION: 98 % | HEART RATE: 77 BPM | DIASTOLIC BLOOD PRESSURE: 82 MMHG | BODY MASS INDEX: 31.32 KG/M2

## 2025-07-02 DIAGNOSIS — Z95.1 S/P CABG (CORONARY ARTERY BYPASS GRAFT): Primary | ICD-10-CM

## 2025-07-02 DIAGNOSIS — E11.9 TYPE 2 DIABETES, HBA1C GOAL < 7% (H): ICD-10-CM

## 2025-07-02 PROCEDURE — 3079F DIAST BP 80-89 MM HG: CPT | Performed by: INTERNAL MEDICINE

## 2025-07-02 PROCEDURE — 3075F SYST BP GE 130 - 139MM HG: CPT | Performed by: INTERNAL MEDICINE

## 2025-07-02 PROCEDURE — 99417 PROLNG OP E/M EACH 15 MIN: CPT | Performed by: INTERNAL MEDICINE

## 2025-07-02 PROCEDURE — 99215 OFFICE O/P EST HI 40 MIN: CPT | Performed by: INTERNAL MEDICINE

## 2025-07-02 NOTE — PATIENT INSTRUCTIONS
Thank you for coming to the [facility] at [place]; please note the following instructions:    1. Orthostatic Blood Pressures today    2. No medication changes today    3. Keep appointment on 12/29/25 as scheduled         If you have any questions regarding your visit, please contact your care team:     CARDIOLOGY  TELEPHONE NUMBER   Gaby FERGUSON, Registered Nurse  Olamide CRUZ, Registered Nurse  Staci HOWARD, Registered Medical Assistant  Piper BELTRE, Clinic Assistant  Kika CRUZ, Visit Facilitator  Ihsan HOWARD, Visit Facilitator [phone] (select option 1)    *After hours: [phone]   For Scheduling Appts:     [phone] (select option 1)    *After hours: [phone]   For the Device Clinic (Pacemakers and ICD's)  Ellie TOVAR, Registered Nurse   During business hours: [phone]    *After business hours:  [phone] (select option 4)      Normal test result notifications will be released via [facility] or mailed within 7 business days.  All other test results, will be communicated via telephone once reviewed by your cardiologist.    If you need a medication refill, please contact your pharmacy.  Please allow 3 business days for your refill to be completed.    As always, thank you for trusting us with your health care needs!

## 2025-07-02 NOTE — LETTER
7/2/2025      RE: Arnaldo Henderson  [address] [facility] [address]       Dear Colleague,    Thank you for the opportunity to participate in the care of your patient, Arnaldo Henderson, at the [facility] HEART CLINIC [facility]. Please see a copy of my visit note below.                                                                                                     General Cardiology [facility]-[place]    HPI: Mr. 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  He was recently seen in neurology clinic at [facility] on 1/12/2024.  He was noted to have heart rate of 39 with blood pressure of 94/53 mmHg.  After some monitoring blood pressure was 87/40 and heart rate was 59.  Patient tells me that he did not have any symptoms on that day.  He was recommended to see cardiology.  He has history of Mobitz type I AV block first noted on Holter in January 2023.  Subsequently he was seen in cardiology clinic a year ago and he was recommended follow-up.  He had a Lexiscan stress test a year ago which was normal.  Patient tells me that on some days he feels lightheaded, woozy and dizzy most so when he is upright and standing  Occurs 3-4 times a week.  Denies syncope, falls, chest pain, shortness of breath.  He has been on losartan over the last few years.  Medications:   Aspirin 81 every day  Atorvastatin 40 every day  Losartan 50 at bedtime  Omeprazole  Sinemet  Insulin    Medications, personal, family, and social history reviewed  with patient and revised.    Interval history 7/2/2025:  - Previously taking midodrine in the morning and losartan at night, but found it counterintuitive as it seemed to ty one another.  - Stopped midodrine on June 18, 2025, as advised by Dr. Johnson due to management complexity.  - Experienced lightheadedness episode last night after getting up quickly due to low blood sugar alarm and dog barking.  - Blood pressure during the episode was 134/73 after sitting for a while.  - No prior frequent dizzy or lightheaded episodes before starting midodrine.  - Occasionally experiences minor lightheadedness, especially when getting up too fast.  - No history of passing out.  - Has a pacemaker and had a bypass surgery in the past.  - Reports recent occurrences of bloody noses, which are new for him.  - No chest pain    PAST MEDICAL HISTORY:  Past Medical History:   Diagnosis Date     Acute pancreatitis 04/18/2022    Formatting of this note might be different from the original. Formatting of this note might be different from the original. Added automatically from request for surgery [id number] Formatting of this note might be different from the original. Added automatically from request for surgery [id number]  Formatting of this note might be different from the original. Added automatically from request for surgery      CAD (coronary artery disease)     CABG     Cholecystitis, acute 07/05/2023     Diabetes mellitus, type 2 (H) 2013     Dystonia, torsion, fragments of 04/25/2025     Family history of parkinsonism 02/14/2024     Gastroesophageal reflux disease      Hypercholesteremia      Hypertension      Mixed conductive and sensorineural hearing loss of both ears      Mixed hearing loss      Pancreatic duct stricture      Pancreatitis      Parkinson disease (H)      Parkinson's disease, unspecified whether dyskinesia present, unspecified whether manifestations fluctuate (H) 02/14/2024     Seborrheic dermatitis 03/22/2024      Second degree AV block      Surgical site infection 07/26/2023       CURRENT MEDICATIONS:  Current Outpatient Medications   Medication Sig Dispense Refill     aspirin (ASA) 81 MG EC tablet Take 81 mg by mouth daily       atorvastatin (LIPITOR) 40 MG tablet Take 1 tablet (40 mg) by mouth daily. 90 tablet 3     BD PEN NEEDLE LINDY 2ND GEN 32G X 4 MM miscellaneous USE TO INJECT INSULIN 4 TIMES A  each 3     carbidopa-levodopa (SINEMET CR)  MG CR tablet Take 1 tablet by mouth at bedtime @9p 90 tablet 3     carbidopa-levodopa (SINEMET)  MG tablet 2.5@6a, 2@10a, 2@2p and 1@5p 675 tablet 3     Continuous Glucose Sensor (DEXCOM G7 SENSOR) MISC Change every 10 days. 9 each 11     dasiglucagon HCl (ZEGALOGUE) 0.6 MG/0.6ML SOAJ injection Inject 0.6 mg Subcutaneous once as needed (use for severe hypoglycemia) 1.2 mL 1     gabapentin (NEURONTIN) 300 MG capsule Take 2 capsules (600 mg) by mouth 3 times daily. 540 capsule 3     insulin aspart (NOVOLOG PEN) 100 UNIT/ML pen Inject 20 Units subcutaneously 3 times daily as needed for high blood sugar (take 3 times/day as needed). 60 mL 2     Insulin Glargine-yfgn 100 UNIT/ML SOPN Inject 14 Units subcutaneously every morning AND 20 Units at bedtime. 50 mL 2     lipase-protease-amylase (ZENPEP) 91496-954839-643097 units CPEP Take 1 capsule by mouth 3 times daily (with meals). 90 capsule 11     losartan (COZAAR) 50 MG tablet TAKE 1 TABLET BY MOUTH EVERY DAY 90 tablet 0     magnesium oxide (MAG-OX) 400 MG tablet Take 400 mg by mouth every other day       midodrine (PROAMATINE) 2.5 MG tablet Take 1 tablet (2.5 mg) by mouth daily. 90 tablet 3     Multiple Vitamin (MULTI-VITAMINS) TABS Take 1 tablet by mouth daily       omeprazole (PRILOSEC) 20 MG DR capsule Take 1 capsule (20 mg) by mouth daily 90 capsule 3     sertraline (ZOLOFT) 25 MG tablet Take 1 tablet (25 mg) by mouth daily. 90 tablet 3     traZODone (DESYREL) 50 MG tablet TAKE 0.5-1 TABLETS BY MOUTH AT BEDTIME. 90  "tablet 1       PAST SURGICAL HISTORY:  Past Surgical History:   Procedure Laterality Date     CA ANESTH CABG W/PUMP       CHOLECYSTECTOMY       COLONOSCOPY N/A 2023    Procedure: colonoscopy with fluroscopy;  Surgeon: Ayden Fraire MD;  Location:  OR     COLONOSCOPY N/A 2025    Procedure: Colonoscopy;  Surgeon: Ricardo Moreira MD;  Location:  GI     ENDOSCOPIC RETROGRADE CHOLANGIOPANCREATOGRAM N/A 2023    Procedure: ENDOSCOPIC RETROGRADE CHOLANGIOPANCREATOGRAPHY, WITH  CYSTDUODENOSTOMY STENTS X2 REMOVAL;  Surgeon: Cedric Saldana MD;  Location: [facility] OR     ENT SURGERY       EP PACEMAKER DEVICE & LEAD IMPLANT- RIGHT ATRIAL & RIGHT VENTRICULAR N/A 2024    Procedure: Pacemaker Device & Lead Implant - Right Atrial & Right Ventricular;  Surgeon: Jenna Hernandez MD;  Location:  HEART CARDIAC CATH LAB     LAPAROSCOPY DIAGNOSTIC (GENERAL) N/A 2023    Procedure: LAPAROSCOPY, DIAGNOSTIC; RETRIEVAL OF MIGRATED STENT;  Surgeon: Joseluis Lima MD;  Location: [facility] OR     ORTHOPEDIC SURGERY       OTHER SURGICAL HISTORY      skin cancer ? from scrotum     OTHER SURGICAL HISTORY  2025    cortison injection     PANCREATECTOMY [place] N/A 2023    Procedure: Open distal pancreactectomy with splenectomy, lysis of adhesions, resection of proximal illeum;  Surgeon: Ashvin Haider MD;  Location:  OR     [place] CABG, ARTERIAL, TWO         ALLERGIES:     Allergies   Allergen Reactions     Ciprofloxacin Other (See Comments), Hives, Itching, Rash and Unknown     Other reaction(s): Other (see comments)  Oral swelling per [facility]         Dilaudid [Hydromorphone] Rash     Morphine Rash     rash     Penicillins Rash     aka ancef/ rash       Clindamycin Itching and Rash     Oxycodone Rash     \"HORRIBLE, SEVERE RASH MAYBE CAUSED BY OXY\"       FAMILY HISTORY:  Family History   Problem Relation Age of Onset     Parkinsonism Mother 65         at 75 years     Other - " See Comments Mother         Polish ?AJ     Psychosis Mother         hallucinations from medications     Hypertension Mother      Hyperlipidemia Mother      Other Cancer Mother      Other - See Comments Father      Other - See Comments Sister      Other - See Comments Sister      Lung Cancer Brother         smoker -  47 years     Other - See Comments Daughter         3 kids - 2 girls and boy     Heart Disease Son         orthostatic hypotension     Other - See Comments Son         2 daughters     Autism Spectrum Disorder Grandson      Diabetes No family hx of          SOCIAL HISTORY:  Social History     Tobacco Use     Smoking status: Former     Types: Cigarettes     Smokeless tobacco: Never     Tobacco comments:     Quit 10 years ago - quit a few times; started at age 12 or 13 and then stopped at 26 years and then stopped for 15 years got  and started smoking again   Vaping Use     Vaping status: Never Used   Substance Use Topics     Alcohol use: Not Currently     Comment: quit 2 years ago     Drug use: Not Currently     Comment: used to smoke marijuana when young; rare use       ROS:   Constitutional: No fever, chills, or sweats. Weight stable.   Cardiovascular: As per HPI.     Exam:  BP (!) 151/83 (BP Location: Left arm, Patient Position: Sitting, Cuff Size: Adult Regular)   Pulse 73   Wt 93.4 kg (206 lb)   SpO2 98%   BMI 31.32 kg/m    GENERAL APPEARANCE: alert and no distress  HEENT: no icterus, no central cyanosis  LYMPH/NECK: no adenopathy, no asymmetry, JVP not elevated  CARDIOVASCULAR: regular rhythm, normal S1, S2, no S3 or S4 and no murmur, click or rub, precordium quiet with normal PMI.  EXTREMITIES: no edema  NEURO: alert, normal speech,and affect  SKIN: no ecchymoses, no rashes     I have reviewed the labs and personally reviewed the imaging below and made my comment in the assessment and plan.    Labs:  CBC RESULTS:   Lab Results   Component Value Date    WBC 9.1 2025    RBC 4.17  (L) 01/20/2025    HGB 13.6 01/20/2025    HCT 41.5 01/20/2025     01/20/2025    MCH 32.6 01/20/2025    MCHC 32.8 01/20/2025    RDW 14.1 01/20/2025     01/20/2025       BMP RESULTS:  Lab Results   Component Value Date     01/20/2025    POTASSIUM 4.3 01/20/2025    POTASSIUM 4.7 06/30/2023    CHLORIDE 103 01/20/2025    CO2 26 01/20/2025    ANIONGAP 7 01/20/2025     (H) 05/01/2025     (A) 04/22/2025    BUN 21.8 01/20/2025    CR 0.98 01/20/2025    GFRESTIMATED 83 01/20/2025    GFRESTIMATED >60 11/03/2023    JAKE 9.0 01/20/2025   Stress test PET scan 7/18/2022: peak HR 98 bpm, no ischemia, no arrhythmias     Echo 2/6/2023: EF 57%, no sig valve disease.     EKG : Personally reviewed in clinic today shows sinus rhythm, first-degree AV block, CO interval 394 ms.  No advanced AV block.       Holter monitor 24 hours 1/2023: sinus  Average 61 bpm, range 35-90 bpm, AV WB     Echocardiogram 2/6/2023   1. Sinus rhythm with AV block during study. Unable to determine type of  AV block based on rhythm strip.     2. Normal LV size with normal wall thickness. Calculated bi-planes LVEF   57%   (Normal function). Normal diastolic function.     3. Normal RV size and systolic function.     4. Moderate LA enlargement.     5. Mild RA enlargement.     6. No significant valvular abnormalities.     7. The IVC measures <2.1 cm with <50% collapse upon inspiration   consistent   with elevated right atrial pressure, 8 mmHg.     8. A prior study is not available for comparison.     CTA 1/2025  1.  Known severe native LAD coronary disease status post 2-vessel CABG  (LIMA-LAD SVG-DIAG.)  2.  Widely patent LIMA-LAD graft.   3.  100% occluded SVG-DIAG graft.  4.  Moderate native LCx stenosis.    EP device 2/28/2024  Arnaldo Henderson is a 68 year old male with past medical history significant for chronic pancreatitis s/p distal pancreatectomy, type II DM, parkinson's disease and neuroendocrine tumor. Patient also has  history of AV Wenckebach noted during ERCP in May 2023. He was seen by EP at this time, he was asymptomatic, no pacemaker was indicated. Follow up zio monitor was worn from 1/16-1/30/2024, average HR 79 bpm, frequent episodes of AV Wenckebach. He had one 10.5 second occurrence of AVB with no escape on 1/24/24 at 4:42 pm and 3.4 seconds on 1/27/24 at 1:50 am. With shared decision making, plan to proceed with dual chamber PPM.   - Dual-chamber pacemaker implantation.    Assessment and Plan:     # Parkinson's disease  # Diabetes type 2 uncontrolled  # Hx of Autonomic dysfunction with orthostatic hypotension  - Patient is overall feeling well since coming off of midodrine 6/18.  Patient was on midodrine for 2 weeks.  Reports occasional lightheadedness.  We have checked orthostatic blood pressure measurements today.  Patient did not show any evidence of orthostatic hypotension and he was asymptomatic with 3-minute standing.  - Recommend to stay off of midodrine.  - No change in other medications.    # Hypertension with orthostatic hypotension  -Continue evening dose of oral losartan.    # Mobitz type I AV block (Reviewed EKG in clinic today which shows sinus rhythm, first-degree AV block, NE interval 394 ms, no advanced AV block)  # Status post pacemaker 2/28/2024.  -Normal pacemaker function.    # Status post CABG  - Patient underwent CT coronary angiogram 1/29/2025 which showed widely patent LIMA LAD graft, 100% occluded SVG diagonal graft and moderate native circumflex stenosis.  Given normal LV function 12/2024 and asymptomatic from CV standpoint I recommended no further intervention at this time.  He will continue aspirin statin and losartan.    Return to clinic 12/2025 as scheduled.    Total time spent today for this visit is 66 minutes including precharting, face-to-face clinic visit, review of labs/imaging and medical documentation.    Beti ROSARIO MD  [facility] Division of Cardiology  Pager [phone]      Please do not hesitate to contact me if you have any questions/concerns.     Sincerely,     Beti Dahl MD

## 2025-07-02 NOTE — NURSING NOTE
Patient left clinic prior to orthostatic blood pressure results were read by doctor. Patient stated he could not wait in the clinic longer and requested that results be sent to him via [employer]. Patient reported no symptoms during orthostatic testing.    FINA Mcginnis

## 2025-07-02 NOTE — NURSING NOTE
[employer] message sent to patient regarding orthostatic blood pressures as requested. See encounter.     Olamide Flores, RN, BSN  Cardiology RN Care Coordinator   [place]   Phone: [phone]  Fax: [phone] ([place]) [phone] ([place])

## 2025-07-02 NOTE — PROGRESS NOTES
[facility]    HPI: Mr. 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  He was recently seen in neurology clinic at [facility] on 1/12/2024.  He was noted to have heart rate of 39 with blood pressure of 94/53 mmHg.  After some monitoring blood pressure was 87/40 and heart rate was 59.  Patient tells me that he did not have any symptoms on that day.  He was recommended to see cardiology.  He has history of Mobitz type I AV block first noted on Holter in January 2023.  Subsequently he was seen in cardiology clinic a year ago and he was recommended follow-up.  He had a Lexiscan stress test a year ago which was normal.  Patient tells me that on some days he feels lightheaded, woozy and dizzy most so when he is upright and standing  Occurs 3-4 times a week.  Denies syncope, falls, chest pain, shortness of breath.  He has been on losartan over the last few years.  Medications:   Aspirin 81 every day  Atorvastatin 40 every day  Losartan 50 at bedtime  Omeprazole  Sinemet  Insulin    Medications, personal, family, and social history reviewed with patient and revised.    Interval history 7/2/2025:  - Previously taking midodrine in the morning and losartan at night, but found it counterintuitive as it seemed to ty one another.  - Stopped midodrine on June 18, 2025, as advised by Dr. Johnson due to management complexity.  - Experienced lightheadedness episode last night after  getting up quickly due to low blood sugar alarm and dog barking.  - Blood pressure during the episode was 134/73 after sitting for a while.  - No prior frequent dizzy or lightheaded episodes before starting midodrine.  - Occasionally experiences minor lightheadedness, especially when getting up too fast.  - No history of passing out.  - Has a pacemaker and had a bypass surgery in the past.  - Reports recent occurrences of bloody noses, which are new for him.  - No chest pain    PAST MEDICAL HISTORY:  Past Medical History:   Diagnosis Date    Acute pancreatitis 04/18/2022    Formatting of this note might be different from the original. Formatting of this note might be different from the original. Added automatically from request for surgery [id number] Formatting of this note might be different from the original. Added automatically from request for surgery [id number]  Formatting of this note might be different from the original. Added automatically from request for surgery     CAD (coronary artery disease)     CABG    Cholecystitis, acute 07/05/2023    Diabetes mellitus, type 2 (H) 2013    Dystonia, torsion, fragments of 04/25/2025    Family history of parkinsonism 02/14/2024    Gastroesophageal reflux disease     Hypercholesteremia     Hypertension     Mixed conductive and sensorineural hearing loss of both ears     Mixed hearing loss     Pancreatic duct stricture     Pancreatitis     Parkinson disease (H)     Parkinson's disease, unspecified whether dyskinesia present, unspecified whether manifestations fluctuate (H) 02/14/2024    Seborrheic dermatitis 03/22/2024    Second degree AV block     Surgical site infection 07/26/2023       CURRENT MEDICATIONS:  Current Outpatient Medications   Medication Sig Dispense Refill    aspirin (ASA) 81 MG EC tablet Take 81 mg by mouth daily      atorvastatin (LIPITOR) 40 MG tablet Take 1 tablet (40 mg) by mouth daily. 90 tablet 3    BD PEN NEEDLE LINDY 2ND GEN 32G X 4 MM miscellaneous  USE TO INJECT INSULIN 4 TIMES A  each 3    carbidopa-levodopa (SINEMET CR)  MG CR tablet Take 1 tablet by mouth at bedtime @9p 90 tablet 3    carbidopa-levodopa (SINEMET)  MG tablet 2.5@6a, 2@10a, 2@2p and 1@5p 675 tablet 3    Continuous Glucose Sensor (DEXCOM G7 SENSOR) MISC Change every 10 days. 9 each 11    dasiglucagon HCl (ZEGALOGUE) 0.6 MG/0.6ML SOAJ injection Inject 0.6 mg Subcutaneous once as needed (use for severe hypoglycemia) 1.2 mL 1    gabapentin (NEURONTIN) 300 MG capsule Take 2 capsules (600 mg) by mouth 3 times daily. 540 capsule 3    insulin aspart (NOVOLOG PEN) 100 UNIT/ML pen Inject 20 Units subcutaneously 3 times daily as needed for high blood sugar (take 3 times/day as needed). 60 mL 2    Insulin Glargine-yfgn 100 UNIT/ML SOPN Inject 14 Units subcutaneously every morning AND 20 Units at bedtime. 50 mL 2    lipase-protease-amylase (ZENPEP) 59367-206259-852202 units CPEP Take 1 capsule by mouth 3 times daily (with meals). 90 capsule 11    losartan (COZAAR) 50 MG tablet TAKE 1 TABLET BY MOUTH EVERY DAY 90 tablet 0    magnesium oxide (MAG-OX) 400 MG tablet Take 400 mg by mouth every other day      midodrine (PROAMATINE) 2.5 MG tablet Take 1 tablet (2.5 mg) by mouth daily. 90 tablet 3    Multiple Vitamin (MULTI-VITAMINS) TABS Take 1 tablet by mouth daily      omeprazole (PRILOSEC) 20 MG DR capsule Take 1 capsule (20 mg) by mouth daily 90 capsule 3    sertraline (ZOLOFT) 25 MG tablet Take 1 tablet (25 mg) by mouth daily. 90 tablet 3    traZODone (DESYREL) 50 MG tablet TAKE 0.5-1 TABLETS BY MOUTH AT BEDTIME. 90 tablet 1       PAST SURGICAL HISTORY:  Past Surgical History:   Procedure Laterality Date    CA ANESTH CABG W/PUMP      CHOLECYSTECTOMY  2022    COLONOSCOPY N/A 01/12/2023    Procedure: colonoscopy with fluroscopy;  Surgeon: Ayden Fraire MD;  Location: [facility] OR    COLONOSCOPY N/A 5/1/2025    Procedure: Colonoscopy;  Surgeon: Ricardo Moreira MD;  Location: [facility]     "ENDOSCOPIC RETROGRADE CHOLANGIOPANCREATOGRAM N/A 2023    Procedure: ENDOSCOPIC RETROGRADE CHOLANGIOPANCREATOGRAPHY, WITH  CYSTDUODENOSTOMY STENTS X2 REMOVAL;  Surgeon: Cedric Saldana MD;  Location: [facility] OR    ENT SURGERY      EP PACEMAKER DEVICE & LEAD IMPLANT- RIGHT ATRIAL & RIGHT VENTRICULAR N/A 2024    Procedure: Pacemaker Device & Lead Implant - Right Atrial & Right Ventricular;  Surgeon: Jenna Hernandez MD;  Location:  [facility]    LAPAROSCOPY DIAGNOSTIC (GENERAL) N/A 2023    Procedure: LAPAROSCOPY, DIAGNOSTIC; RETRIEVAL OF MIGRATED STENT;  Surgeon: Joseluis Lima MD;  Location: [facility] OR    ORTHOPEDIC SURGERY      OTHER SURGICAL HISTORY      skin cancer ? from scrotum    OTHER SURGICAL HISTORY  2025    cortison injection    PANCREATECTOMY PEUSTOW N/A 2023    Procedure: Open distal pancreactectomy with splenectomy, lysis of adhesions, resection of proximal illeum;  Surgeon: Ashvin Haider MD;  Location: [facility] OR    IL CABG, ARTERIAL, TWO         ALLERGIES:     Allergies   Allergen Reactions    Ciprofloxacin Other (See Comments), Hives, Itching, Rash and Unknown     Other reaction(s): Other (see comments)  Oral swelling per [facility]        Dilaudid [Hydromorphone] Rash    Morphine Rash     rash    Penicillins Rash     aka ancef/ rash      Clindamycin Itching and Rash    Oxycodone Rash     \"HORRIBLE, SEVERE RASH MAYBE CAUSED BY OXY\"       FAMILY HISTORY:  Family History   Problem Relation Age of Onset    Parkinsonism Mother 65         at 75 years    Other - See Comments Mother         Polish ?AJ    Psychosis Mother         hallucinations from medications    Hypertension Mother     Hyperlipidemia Mother     Other Cancer Mother     Other - See Comments Father     Other - See Comments Sister     Other - See Comments Sister     Lung Cancer Brother         smoker -  47 years    Other - See Comments Daughter         3 kids - 2 girls and boy    Heart " Disease Son         orthostatic hypotension    Other - See Comments Son         2 daughters    Autism Spectrum Disorder Grandson     Diabetes No family hx of          SOCIAL HISTORY:  Social History     Tobacco Use    Smoking status: Former     Types: Cigarettes    Smokeless tobacco: Never    Tobacco comments:     Quit 10 years ago - quit a few times; started at age 12 or 13 and then stopped at 26 years and then stopped for 15 years got  and started smoking again   Vaping Use    Vaping status: Never Used   Substance Use Topics    Alcohol use: Not Currently     Comment: quit 2 years ago    Drug use: Not Currently     Comment: used to smoke marijuana when young; rare use       ROS:   Constitutional: No fever, chills, or sweats. Weight stable.   Cardiovascular: As per HPI.     Exam:  BP (!) 151/83 (BP Location: Left arm, Patient Position: Sitting, Cuff Size: Adult Regular)   Pulse 73   Wt 93.4 kg (206 lb)   SpO2 98%   BMI 31.32 kg/m    GENERAL APPEARANCE: alert and no distress  HEENT: no icterus, no central cyanosis  LYMPH/NECK: no adenopathy, no asymmetry, JVP not elevated  CARDIOVASCULAR: regular rhythm, normal S1, S2, no S3 or S4 and no murmur, click or rub, precordium quiet with normal PMI.  EXTREMITIES: no edema  NEURO: alert, normal speech,and affect  SKIN: no ecchymoses, no rashes     I have reviewed the labs and personally reviewed the imaging below and made my comment in the assessment and plan.    Labs:  CBC RESULTS:   Lab Results   Component Value Date    WBC 9.1 01/20/2025    RBC 4.17 (L) 01/20/2025    HGB 13.6 01/20/2025    HCT 41.5 01/20/2025     01/20/2025    MCH 32.6 01/20/2025    MCHC 32.8 01/20/2025    RDW 14.1 01/20/2025     01/20/2025       BMP RESULTS:  Lab Results   Component Value Date     01/20/2025    POTASSIUM 4.3 01/20/2025    POTASSIUM 4.7 06/30/2023    CHLORIDE 103 01/20/2025    CO2 26 01/20/2025    ANIONGAP 7 01/20/2025     (H) 05/01/2025      (A) 04/22/2025    BUN 21.8 01/20/2025    CR 0.98 01/20/2025    GFRESTIMATED 83 01/20/2025    GFRESTIMATED >60 11/03/2023    JAKE 9.0 01/20/2025   Stress test PET scan 7/18/2022: peak HR 98 bpm, no ischemia, no arrhythmias     Echo 2/6/2023: EF 57%, no sig valve disease.     EKG : Personally reviewed in clinic today shows sinus rhythm, first-degree AV block, MN interval 394 ms.  No advanced AV block.       Holter monitor 24 hours 1/2023: sinus  Average 61 bpm, range 35-90 bpm, AV WB     Echocardiogram 2/6/2023   1. Sinus rhythm with AV block during study. Unable to determine type of  AV block based on rhythm strip.     2. Normal LV size with normal wall thickness. Calculated bi-planes LVEF   57%   (Normal function). Normal diastolic function.     3. Normal RV size and systolic function.     4. Moderate LA enlargement.     5. Mild RA enlargement.     6. No significant valvular abnormalities.     7. The IVC measures <2.1 cm with <50% collapse upon inspiration   consistent   with elevated right atrial pressure, 8 mmHg.     8. A prior study is not available for comparison.     CTA 1/2025  1.  Known severe native LAD coronary disease status post 2-vessel CABG  (LIMA-LAD SVG-DIAG.)  2.  Widely patent LIMA-LAD graft.   3.  100% occluded SVG-DIAG graft.  4.  Moderate native LCx stenosis.    EP device 2/28/2024  Arnaldo Henderson is a 68 year old male with past medical history significant for chronic pancreatitis s/p distal pancreatectomy, type II DM, parkinson's disease and neuroendocrine tumor. Patient also has history of AV Wenckebach noted during ERCP in May 2023. He was seen by EP at this time, he was asymptomatic, no pacemaker was indicated. Follow up zio monitor was worn from 1/16-1/30/2024, average HR 79 bpm, frequent episodes of AV Wenckebach. He had one 10.5 second occurrence of AVB with no escape on 1/24/24 at 4:42 pm and 3.4 seconds on 1/27/24 at 1:50 am. With shared decision making, plan to proceed with dual  chamber PPM.   - Dual-chamber pacemaker implantation.    Assessment and Plan:     # Parkinson's disease  # Diabetes type 2 uncontrolled  # Hx of Autonomic dysfunction with orthostatic hypotension  - Patient is overall feeling well since coming off of midodrine 6/18.  Patient was on midodrine for 2 weeks.  Reports occasional lightheadedness.  We have checked orthostatic blood pressure measurements today.  Patient did not show any evidence of orthostatic hypotension and he was asymptomatic with 3-minute standing.  - Recommend to stay off of midodrine.  - No change in other medications.    # Hypertension with orthostatic hypotension  -Continue evening dose of oral losartan.    # Mobitz type I AV block (Reviewed EKG in clinic today which shows sinus rhythm, first-degree AV block, HI interval 394 ms, no advanced AV block)  # Status post pacemaker 2/28/2024.  -Normal pacemaker function.    # Status post CABG  - Patient underwent CT coronary angiogram 1/29/2025 which showed widely patent LIMA LAD graft, 100% occluded SVG diagonal graft and moderate native circumflex stenosis.  Given normal LV function 12/2024 and asymptomatic from CV standpoint I recommended no further intervention at this time.  He will continue aspirin statin and losartan.    Return to clinic 12/2025 as scheduled.    Total time spent today for this visit is 66 minutes including precharting, face-to-face clinic visit, review of labs/imaging and medical documentation.    Beti ROSARIO MD  [facility] Division of Cardiology  Pager [phone]

## 2025-07-02 NOTE — NURSING NOTE
"Chief Complaint   Patient presents with    Follow Up     Reason for visit: Return general cardiology for discuss BP meds       Initial BP (!) 152/81 (BP Location: Left arm, Patient Position: Sitting, Cuff Size: Adult Regular)   Pulse 75   Wt 93.4 kg (206 lb)   SpO2 98%   BMI 31.32 kg/m   Estimated body mass index is 31.32 kg/m  as calculated from the following:    Height as of 6/24/25: 1.727 m (5' 8\").    Weight as of this encounter: 93.4 kg (206 lb)..  BP completed using cuff size: regular    FINA Mcginnis    "

## 2025-07-08 ENCOUNTER — ANCILLARY PROCEDURE (OUTPATIENT)
Dept: CT IMAGING | Facility: CLINIC | Age: 70
End: 2025-07-08
Attending: INTERNAL MEDICINE
Payer: MEDICARE

## 2025-07-08 ENCOUNTER — LAB (OUTPATIENT)
Dept: LAB | Facility: CLINIC | Age: 70
End: 2025-07-08
Payer: MEDICARE

## 2025-07-08 DIAGNOSIS — R10.84 ABDOMINAL PAIN, GENERALIZED: ICD-10-CM

## 2025-07-08 DIAGNOSIS — C77.2 MALIGNANT NEOPLASM METASTATIC TO INTRA-ABDOMINAL LYMPH NODE (H): ICD-10-CM

## 2025-07-08 DIAGNOSIS — C7B.8 OTHER SECONDARY NEUROENDOCRINE TUMORS (H): ICD-10-CM

## 2025-07-08 DIAGNOSIS — G20.A1 PARKINSON'S DISEASE, UNSPECIFIED WHETHER DYSKINESIA PRESENT, UNSPECIFIED WHETHER MANIFESTATIONS FLUCTUATE (H): ICD-10-CM

## 2025-07-08 DIAGNOSIS — E11.65 TYPE 2 DIABETES MELLITUS WITH HYPERGLYCEMIA, WITH LONG-TERM CURRENT USE OF INSULIN (H): ICD-10-CM

## 2025-07-08 DIAGNOSIS — Z79.4 TYPE 2 DIABETES MELLITUS WITH HYPERGLYCEMIA, WITH LONG-TERM CURRENT USE OF INSULIN (H): ICD-10-CM

## 2025-07-08 DIAGNOSIS — C7A.8 NEUROENDOCRINE CARCINOMA OF SMALL BOWEL (H): ICD-10-CM

## 2025-07-08 LAB
ALBUMIN SERPL BCG-MCNC: 4 G/DL (ref 3.5–5.2)
ALP SERPL-CCNC: 117 U/L (ref 40–150)
ALT SERPL W P-5'-P-CCNC: <5 U/L (ref 0–70)
ANION GAP SERPL CALCULATED.3IONS-SCNC: 9 MMOL/L (ref 7–15)
AST SERPL W P-5'-P-CCNC: 31 U/L (ref 0–45)
BASOPHILS # BLD AUTO: 0.1 10E3/UL (ref 0–0.2)
BASOPHILS NFR BLD AUTO: 1 %
BILIRUB SERPL-MCNC: 0.5 MG/DL
BUN SERPL-MCNC: 20.4 MG/DL (ref 8–23)
CALCIUM SERPL-MCNC: 8.9 MG/DL (ref 8.8–10.4)
CHLORIDE SERPL-SCNC: 103 MMOL/L (ref 98–107)
CREAT BLD-MCNC: 0.4 MG/DL (ref 0.7–1.2)
CREAT SERPL-MCNC: 1.02 MG/DL (ref 0.67–1.17)
CREAT UR-MCNC: 125 MG/DL
EGFRCR SERPLBLD CKD-EPI 2021: 79 ML/MIN/1.73M2
EGFRCR SERPLBLD CKD-EPI 2021: >60 ML/MIN/1.73M2
EOSINOPHIL # BLD AUTO: 0.2 10E3/UL (ref 0–0.7)
EOSINOPHIL NFR BLD AUTO: 3 %
ERYTHROCYTE [DISTWIDTH] IN BLOOD BY AUTOMATED COUNT: 14 % (ref 10–15)
EST. AVERAGE GLUCOSE BLD GHB EST-MCNC: 174 MG/DL
GLUCOSE SERPL-MCNC: 132 MG/DL (ref 70–99)
HBA1C MFR BLD: 7.7 %
HCO3 SERPL-SCNC: 27 MMOL/L (ref 22–29)
HCT VFR BLD AUTO: 37.6 % (ref 40–53)
HGB BLD-MCNC: 12.5 G/DL (ref 13.3–17.7)
IMM GRANULOCYTES # BLD: 0 10E3/UL
IMM GRANULOCYTES NFR BLD: 0 %
LYMPHOCYTES # BLD AUTO: 1.7 10E3/UL (ref 0.8–5.3)
LYMPHOCYTES NFR BLD AUTO: 21 %
MCH RBC QN AUTO: 33.2 PG (ref 26.5–33)
MCHC RBC AUTO-ENTMCNC: 33.2 G/DL (ref 31.5–36.5)
MCV RBC AUTO: 100 FL (ref 78–100)
MICROALBUMIN UR-MCNC: 23.3 MG/L
MICROALBUMIN/CREAT UR: 18.64 MG/G CR (ref 0–17)
MONOCYTES # BLD AUTO: 1.2 10E3/UL (ref 0–1.3)
MONOCYTES NFR BLD AUTO: 15 %
NEUTROPHILS # BLD AUTO: 5.1 10E3/UL (ref 1.6–8.3)
NEUTROPHILS NFR BLD AUTO: 61 %
NRBC # BLD AUTO: 0 10E3/UL
NRBC BLD AUTO-RTO: 0 /100
PLATELET # BLD AUTO: 319 10E3/UL (ref 150–450)
POTASSIUM SERPL-SCNC: 4 MMOL/L (ref 3.4–5.3)
PROT SERPL-MCNC: 7 G/DL (ref 6.4–8.3)
RBC # BLD AUTO: 3.77 10E6/UL (ref 4.4–5.9)
SODIUM SERPL-SCNC: 139 MMOL/L (ref 135–145)
WBC # BLD AUTO: 8.4 10E3/UL (ref 4–11)

## 2025-07-08 PROCEDURE — 82570 ASSAY OF URINE CREATININE: CPT | Performed by: PHYSICIAN ASSISTANT

## 2025-07-08 PROCEDURE — 84155 ASSAY OF PROTEIN SERUM: CPT | Performed by: INTERNAL MEDICINE

## 2025-07-08 PROCEDURE — 74177 CT ABD & PELVIS W/CONTRAST: CPT | Performed by: RADIOLOGY

## 2025-07-08 PROCEDURE — 71260 CT THORAX DX C+: CPT | Performed by: RADIOLOGY

## 2025-07-08 PROCEDURE — 86316 IMMUNOASSAY TUMOR OTHER: CPT | Mod: 90 | Performed by: PATHOLOGY

## 2025-07-08 PROCEDURE — 99000 SPECIMEN HANDLING OFFICE-LAB: CPT | Performed by: PATHOLOGY

## 2025-07-08 PROCEDURE — 36415 COLL VENOUS BLD VENIPUNCTURE: CPT | Performed by: PATHOLOGY

## 2025-07-08 PROCEDURE — 85025 COMPLETE CBC W/AUTO DIFF WBC: CPT | Performed by: PATHOLOGY

## 2025-07-08 PROCEDURE — 83036 HEMOGLOBIN GLYCOSYLATED A1C: CPT | Performed by: PHYSICIAN ASSISTANT

## 2025-07-08 RX ORDER — IOPAMIDOL 755 MG/ML
101 INJECTION, SOLUTION INTRAVASCULAR ONCE
Status: COMPLETED | OUTPATIENT
Start: 2025-07-08 | End: 2025-07-08

## 2025-07-08 RX ADMIN — IOPAMIDOL 101 ML: 755 INJECTION, SOLUTION INTRAVASCULAR at 11:58

## 2025-07-08 NOTE — PROGRESS NOTES
Vascular Access Services Notes:    Ultrasound-guided lab draw done without complication. Attempted x1 in the right antecubital fossa with a 22 gauge x 1.75 inch B Nance PIV needle.  was present to assist.    Time spent with pt - 15 minutes      SAMANTHA MahajanN, RN VA-BC  Vascular Access Services  [facility]  [phone]

## 2025-07-08 NOTE — DISCHARGE INSTRUCTIONS

## 2025-07-14 ENCOUNTER — ONCOLOGY VISIT (OUTPATIENT)
Dept: ONCOLOGY | Facility: CLINIC | Age: 70
End: 2025-07-14
Attending: NURSE PRACTITIONER
Payer: MEDICARE

## 2025-07-14 VITALS
HEART RATE: 80 BPM | SYSTOLIC BLOOD PRESSURE: 102 MMHG | OXYGEN SATURATION: 92 % | RESPIRATION RATE: 20 BRPM | TEMPERATURE: 97.8 F | WEIGHT: 204.2 LBS | DIASTOLIC BLOOD PRESSURE: 57 MMHG | BODY MASS INDEX: 31.05 KG/M2

## 2025-07-14 DIAGNOSIS — C7B.8 OTHER SECONDARY NEUROENDOCRINE TUMORS (H): ICD-10-CM

## 2025-07-14 DIAGNOSIS — E11.65 TYPE 2 DIABETES MELLITUS WITH HYPERGLYCEMIA, WITH LONG-TERM CURRENT USE OF INSULIN (H): ICD-10-CM

## 2025-07-14 DIAGNOSIS — C77.2 MALIGNANT NEOPLASM METASTATIC TO INTRA-ABDOMINAL LYMPH NODE (H): ICD-10-CM

## 2025-07-14 DIAGNOSIS — C7A.8 NEUROENDOCRINE CARCINOMA OF SMALL BOWEL (H): ICD-10-CM

## 2025-07-14 DIAGNOSIS — G20.A1 PARKINSON'S DISEASE, UNSPECIFIED WHETHER DYSKINESIA PRESENT, UNSPECIFIED WHETHER MANIFESTATIONS FLUCTUATE (H): ICD-10-CM

## 2025-07-14 DIAGNOSIS — R10.84 ABDOMINAL PAIN, GENERALIZED: ICD-10-CM

## 2025-07-14 DIAGNOSIS — Z79.4 TYPE 2 DIABETES MELLITUS WITH HYPERGLYCEMIA, WITH LONG-TERM CURRENT USE OF INSULIN (H): ICD-10-CM

## 2025-07-14 PROCEDURE — G2211 COMPLEX E/M VISIT ADD ON: HCPCS | Performed by: NURSE PRACTITIONER

## 2025-07-14 PROCEDURE — 99214 OFFICE O/P EST MOD 30 MIN: CPT | Performed by: NURSE PRACTITIONER

## 2025-07-14 PROCEDURE — G0463 HOSPITAL OUTPT CLINIC VISIT: HCPCS | Performed by: NURSE PRACTITIONER

## 2025-07-14 ASSESSMENT — PAIN SCALES - GENERAL: PAINLEVEL_OUTOF10: NO PAIN (0)

## 2025-07-14 NOTE — NURSING NOTE
"Oncology Rooming Note    July 14, 2025 12:53 PM   Arnaldo Henderson is a 70 year old male who presents for:    Chief Complaint   Patient presents with    Oncology Clinic Visit     Neuroendocrine carcinoma of small bowel      Initial Vitals: /57 (BP Location: Left arm, Patient Position: Sitting, Cuff Size: Adult Regular)   Pulse 80   Temp 97.8  F (36.6  C) (Oral)   Resp 20   Wt 92.6 kg (204 lb 3.2 oz)   SpO2 92%   BMI 31.05 kg/m   Estimated body mass index is 31.05 kg/m  as calculated from the following:    Height as of 6/24/25: 1.727 m (5' 8\").    Weight as of this encounter: 92.6 kg (204 lb 3.2 oz). Body surface area is 2.11 meters squared.  No Pain (0) Comment: Data Unavailable   No LMP for male patient.  Allergies reviewed: Yes  Medications reviewed: Yes    Medications: Medication refills not needed today.  Pharmacy name entered into [facility]:    [facility] - [place], [place] - [address]  [id number] - [place], [place] - [address] [place] [address] PHARMACY [place] - [address] [address] [place] - [place], [place] - [address]    PHQ9:  Did this patient require a PHQ9?: No      Clinical concerns: none      Zafar Marsh              "

## 2025-07-14 NOTE — PROGRESS NOTES
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 His tumor is well-differentiated with a Ki-67 index of 10%. 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 He has not ever had any symptoms that we could directly attribute to his cancer.     Interval history:     Sri is here with his wife, Veronika today. In general, has been feeling well. He has a chronic complaint of an intermittent bubble or ball like sensation in the back. Isn't clearly correlated with any activity, bowel movement, gas, urination. No abdominal pain or bloating. Bowels are regular. Has noted some new difficulty with moving the left 5th finger, has as follow-up with Dr. Latham in about a week.  No flushing or cramping.    Appetite and energy are good.   -no edema, shortness of breath or chest pain.     Current Outpatient Medications   Medication Sig Dispense Refill    aspirin (ASA) 81 MG EC tablet Take 81 mg by mouth daily      atorvastatin (LIPITOR) 40 MG tablet Take 1 tablet (40 mg) by mouth daily. 90 tablet 3    BD PEN NEEDLE LINDY 2ND GEN 32G X 4 MM miscellaneous USE TO INJECT INSULIN 4 TIMES A  each 3    carbidopa-levodopa (SINEMET CR)  MG CR tablet Take 1 tablet by mouth at bedtime @9p 90 tablet 3    carbidopa-levodopa (SINEMET)  MG tablet 2.5@6a, 2@10a, 2@2p and 1@5p 675 tablet 3    Continuous Glucose Sensor (DEXCOM G7 SENSOR) MISC Change every 10 days. 9 each 11    dasiglucagon HCl (ZEGALOGUE) 0.6 MG/0.6ML SOAJ injection Inject 0.6 mg Subcutaneous once as needed (use for severe hypoglycemia) 1.2 mL 1    gabapentin (NEURONTIN) 300 MG capsule Take 2 capsules (600 mg) by mouth 3 times daily. 540 capsule 3    insulin aspart (NOVOLOG PEN) 100 UNIT/ML pen Inject 20 Units subcutaneously 3 times daily as needed for high blood sugar  "(take 3 times/day as needed). 60 mL 2    Insulin Glargine-yfgn 100 UNIT/ML SOPN Inject 14 Units subcutaneously every morning AND 20 Units at bedtime. 50 mL 2    lipase-protease-amylase (ZENPEP) [id number] units CPEP Take 1 capsule by mouth 3 times daily (with meals). 90 capsule 11    losartan (COZAAR) 50 MG tablet TAKE 1 TABLET BY MOUTH EVERY DAY 90 tablet 0    magnesium oxide (MAG-OX) 400 MG tablet Take 400 mg by mouth every other day      midodrine (PROAMATINE) 2.5 MG tablet Take 1 tablet (2.5 mg) by mouth daily. (Patient not taking: Reported on 7/2/2025) 90 tablet 3    Multiple Vitamin (MULTI-VITAMINS) TABS Take 1 tablet by mouth daily      omeprazole (PRILOSEC) 20 MG DR capsule Take 1 capsule (20 mg) by mouth daily 90 capsule 3    sertraline (ZOLOFT) 25 MG tablet Take 1 tablet (25 mg) by mouth daily. 90 tablet 3    traZODone (DESYREL) 50 MG tablet TAKE 0.5-1 TABLETS BY MOUTH AT BEDTIME. 90 tablet 1          Allergies   Allergen Reactions    Ciprofloxacin Other (See Comments), Hives, Itching, Rash and Unknown     Other reaction(s): Other (see comments)  Oral swelling per [facility]        Dilaudid [Hydromorphone] Rash    Morphine Rash     rash    Penicillins Rash     aka ancef/ rash      Clindamycin Itching and Rash    Oxycodone Rash     \"HORRIBLE, SEVERE RASH MAYBE CAUSED BY OXY\"         Exam: alert, appears well.  Blood pressure 102/57, pulse 80, temperature 97.8  F (36.6  C), temperature source Oral, resp. rate 20, weight 92.6 kg (204 lb 3.2 oz), SpO2 92%.  Wt Readings from Last 4 Encounters:   07/02/25 93.4 kg (206 lb)   06/24/25 92.1 kg (203 lb)   06/18/25 91.6 kg (202 lb)   06/10/25 93.4 kg (206 lb)     Oropharynx is moist and without lesion. Neck supple and without adenopathy. Lungs:CTA. Heart: RRR, no murmur or rub. Abdomen: soft, nontender, BS active, no masses or organomegaly.  Extremities: warm, no edema.       Labs:      Latest Reference Range & Units 07/08/25 11:06   Sodium 135 - 145 mmol/L " 139   Potassium 3.4 - 5.3 mmol/L 4.0   Chloride 98 - 107 mmol/L 103   Carbon Dioxide (CO2) 22 - 29 mmol/L 27   Urea Nitrogen 8.0 - 23.0 mg/dL 20.4   Creatinine 0.67 - 1.17 mg/dL 1.02   GFR Estimate >60 mL/min/1.73m2 79   Calcium 8.8 - 10.4 mg/dL 8.9   Anion Gap 7 - 15 mmol/L 9   Albumin 3.5 - 5.2 g/dL 4.0   Protein Total 6.4 - 8.3 g/dL 7.0   Alkaline Phosphatase 40 - 150 U/L 117   ALT 0 - 70 U/L <5   AST 0 - 45 U/L 31   Bilirubin Total <=1.2 mg/dL 0.5   Glucose 70 - 99 mg/dL 132 (H)   Estimated Average Glucose <117 mg/dL 174 (H)   Hemoglobin A1C <5.7 % 7.7 (H)   WBC 4.0 - 11.0 10e3/uL 8.4   Hemoglobin 13.3 - 17.7 g/dL 12.5 (L)   Hematocrit 40.0 - 53.0 % 37.6 (L)   Platelet Count 150 - 450 10e3/uL 319   RBC Count 4.40 - 5.90 10e6/uL 3.77 (L)   MCV 78 - 100 fL 100   MCH 26.5 - 33.0 pg 33.2 (H)   MCHC 31.5 - 36.5 g/dL 33.2   RDW 10.0 - 15.0 % 14.0   % Neutrophils % 61   % Lymphocytes % 21   % Monocytes % 15   % Eosinophils % 3   % Basophils % 1   % Immature Granulocytes % 0   NRBC/W <1 /100 0   Absolute Neutrophil 1.6 - 8.3 10e3/uL 5.1   Absolute Lymphocytes 0.8 - 5.3 10e3/uL 1.7   Absolute Monocytes 0.0 - 1.3 10e3/uL 1.2   Absolute Eosinophils 0.0 - 0.7 10e3/uL 0.2   Absolute Basophils 0.0 - 0.2 10e3/uL 0.1   Absolute Immature Granulocytes <=0.4 10e3/uL 0.0   Absolute NRBCs 10e3/uL 0.0   Chromogranin A 0 - 187 ng/mL 253 (H)   (H): Data is abnormally high  (L): Data is abnormally low     Chromogranin A  0 - 187 ng/mL 253 High  698 High   High  CM       Imaging:     EXAM: CT CHEST/ABDOMEN/PELVIS W CONTRAST  LOCATION: [facility]  DATE: 7/8/2025     INDICATION:  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 Posttreatment surveillance.  COMPARISON: CT CAP 1/15/2025, 7/8/2024 and older studies, PET Dotatate 11/21/2023  TECHNIQUE: CT scan of the chest, abdomen, and pelvis was performed following injection of IV contrast. Multiplanar  reformats were obtained. Dose reduction techniques were used.   CONTRAST: ISOVUE 370 101cc     FINDINGS:   LUNGS AND PLEURA: No suspicious pulmonary nodules or effusions. Fine, subpleural reticular opacities with a basilar predominance and tiny cystic change in the left lower lobe is stable. No airway lesions.     MEDIASTINUM/AXILLAE: Poststernotomy changes with left subclavian venous pacer leads. No suspicious adenopathy. No central pulmonary emboli.     CORONARY ARTERY CALCIFICATION: Previous intervention (stents or CABG).     HEPATOBILIARY: No liver lesions. Prior cholecystectomy.     PANCREAS: Subtotal pancreatectomy.     SPLEEN: Resected.     ADRENAL GLANDS: Minimal nodular thickening again noted.     KIDNEYS/BLADDER: Normal.     BOWEL: Small bowel and cecal staple line. Multiple scattered nodes in the root of the small bowel mesentery have slightly increased in size over the last year. Dominant node measures at 1.6 x 1.5 cm (axial image 370), was 1.5 x 1.4 cm on Jan exam and 1.4   x 1.4 in the same plane a year ago. Further inferiorly another small grouping of nodes measures up to 1.2 cm in short axis (axial image 387, increased from 0.9 mm in the same plane a year ago. Minimal stranding about the celiac, bifurcation is stable.   No new sites of adenopathy or ascites. Quite redundant colon especially the sigmoid with a moderate stool burden.     LYMPH NODES: Normal.     VASCULATURE: Moderate arterial calcifications. No aneurysm. Vessels are patent.     PELVIC ORGANS: Normal.     MUSCULOSKELETAL: No concerning bone or soft tissue lesions. Mild compression fracture superior endplate of T12 is stable. Scattered thoracolumbar degenerative changes.                                                                      IMPRESSION:  1.  Nodes in the root of the small bowel mesentery have minimally increased in size, easiest to appreciate compared to exam done a year ago. Largest node now measures 1.6 x 1.5 cm,  increased from 1.4 x 1.4 cm a year ago.  2.  No new sites of disease in the chest, abdomen or pelvis.  3.  Stable postsurgical changes from subtotal pancreatectomy with stranding about the celiac axis bifurcation.  4.  Other noncritical findings as noted above.    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 I reviewed imaging with Dr. Lyn and we compared to prior images. Over time, there is very minimal change.  His chromogranin has dropped. We talked about potential reasons for this. He is tapering off a PPI now and previously had been on daily dosing, which can contribute to elevations in the chromogranin. Will repeat at our next follow-up. As he is clinically stable as well and does not have symptoms of progression, we recommend ongoing surveillance in 6 months with a CT CAP and labs including a chromogranin.    He will continue to follow with neurology for management of his Parkinson's and discuss the mobility changes in the left 5th digit as his upcoming appointment    The longitudinal plan of care for the diagnosis(es)/condition(s) as documented were addressed during this visit. Due to the added complexity in care, I will continue to support Pat in the subsequent management and with ongoing continuity of care.

## 2025-07-14 NOTE — LETTER
7/14/2025      Arnaldo Henderson  [address] [place] [id number]      Dear Colleague,    Thank you for referring your patient, Arnaldo Henderson, to the [facility]. Please see a copy of my visit note below.    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 His tumor is well-differentiated with a Ki-67 index of 10%. 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 He has not ever had any symptoms that we could directly attribute to his cancer.     Interval history:     Sri is here with his wife, Veronika today. In general, has been feeling well. He has a chronic complaint of an intermittent bubble or ball like sensation in the back. Isn't clearly correlated with any activity, bowel movement, gas, urination. No abdominal pain or bloating. Bowels are regular. Has noted some new difficulty with moving the left 5th finger, has as follow-up with Dr. Latham in about a week.  No flushing or cramping.    Appetite and energy are good.   -no edema, shortness of breath or chest pain.     Current Outpatient Medications   Medication Sig Dispense Refill     aspirin (ASA) 81 MG EC tablet Take 81 mg by mouth daily       atorvastatin (LIPITOR) 40 MG tablet Take 1 tablet (40 mg) by mouth daily. 90 tablet 3     BD PEN NEEDLE LINDY 2ND GEN 32G X 4 MM miscellaneous USE TO INJECT INSULIN 4 TIMES A  each 3     carbidopa-levodopa (SINEMET CR)  MG CR tablet Take 1 tablet by mouth at bedtime @9p 90 tablet 3     carbidopa-levodopa (SINEMET)  MG tablet 2.5@6a, 2@10a, 2@2p and 1@5p 675 tablet 3     Continuous Glucose Sensor (DEXCOM G7 SENSOR) MISC Change every 10 days. 9 each 11     dasiglucagon HCl (ZEGALOGUE) 0.6 MG/0.6ML SOAJ injection Inject 0.6 mg Subcutaneous once as needed (use for severe  "hypoglycemia) 1.2 mL 1     gabapentin (NEURONTIN) 300 MG capsule Take 2 capsules (600 mg) by mouth 3 times daily. 540 capsule 3     insulin aspart (NOVOLOG PEN) 100 UNIT/ML pen Inject 20 Units subcutaneously 3 times daily as needed for high blood sugar (take 3 times/day as needed). 60 mL 2     Insulin Glargine-yfgn 100 UNIT/ML SOPN Inject 14 Units subcutaneously every morning AND 20 Units at bedtime. 50 mL 2     lipase-protease-amylase (ZENPEP) [id number] units CPEP Take 1 capsule by mouth 3 times daily (with meals). 90 capsule 11     losartan (COZAAR) 50 MG tablet TAKE 1 TABLET BY MOUTH EVERY DAY 90 tablet 0     magnesium oxide (MAG-OX) 400 MG tablet Take 400 mg by mouth every other day       midodrine (PROAMATINE) 2.5 MG tablet Take 1 tablet (2.5 mg) by mouth daily. (Patient not taking: Reported on 7/2/2025) 90 tablet 3     Multiple Vitamin (MULTI-VITAMINS) TABS Take 1 tablet by mouth daily       omeprazole (PRILOSEC) 20 MG DR capsule Take 1 capsule (20 mg) by mouth daily 90 capsule 3     sertraline (ZOLOFT) 25 MG tablet Take 1 tablet (25 mg) by mouth daily. 90 tablet 3     traZODone (DESYREL) 50 MG tablet TAKE 0.5-1 TABLETS BY MOUTH AT BEDTIME. 90 tablet 1          Allergies   Allergen Reactions     Ciprofloxacin Other (See Comments), Hives, Itching, Rash and Unknown     Other reaction(s): Other (see comments)  Oral swelling per [facility]         Dilaudid [Hydromorphone] Rash     Morphine Rash     rash     Penicillins Rash     aka ancef/ rash       Clindamycin Itching and Rash     Oxycodone Rash     \"HORRIBLE, SEVERE RASH MAYBE CAUSED BY OXY\"         Exam: alert, appears well.  Blood pressure 102/57, pulse 80, temperature 97.8  F (36.6  C), temperature source Oral, resp. rate 20, weight 92.6 kg (204 lb 3.2 oz), SpO2 92%.  Wt Readings from Last 4 Encounters:   07/02/25 93.4 kg (206 lb)   06/24/25 92.1 kg (203 lb)   06/18/25 91.6 kg (202 lb)   06/10/25 93.4 kg (206 lb)     Oropharynx is moist and without " lesion. Neck supple and without adenopathy. Lungs:CTA. Heart: RRR, no murmur or rub. Abdomen: soft, nontender, BS active, no masses or organomegaly.  Extremities: warm, no edema.       Labs:      Latest Reference Range & Units 07/08/25 11:06   Sodium 135 - 145 mmol/L 139   Potassium 3.4 - 5.3 mmol/L 4.0   Chloride 98 - 107 mmol/L 103   Carbon Dioxide (CO2) 22 - 29 mmol/L 27   Urea Nitrogen 8.0 - 23.0 mg/dL 20.4   Creatinine 0.67 - 1.17 mg/dL 1.02   GFR Estimate >60 mL/min/1.73m2 79   Calcium 8.8 - 10.4 mg/dL 8.9   Anion Gap 7 - 15 mmol/L 9   Albumin 3.5 - 5.2 g/dL 4.0   Protein Total 6.4 - 8.3 g/dL 7.0   Alkaline Phosphatase 40 - 150 U/L 117   ALT 0 - 70 U/L <5   AST 0 - 45 U/L 31   Bilirubin Total <=1.2 mg/dL 0.5   Glucose 70 - 99 mg/dL 132 (H)   Estimated Average Glucose <117 mg/dL 174 (H)   Hemoglobin A1C <5.7 % 7.7 (H)   WBC 4.0 - 11.0 10e3/uL 8.4   Hemoglobin 13.3 - 17.7 g/dL 12.5 (L)   Hematocrit 40.0 - 53.0 % 37.6 (L)   Platelet Count 150 - 450 10e3/uL 319   RBC Count 4.40 - 5.90 10e6/uL 3.77 (L)   MCV 78 - 100 fL 100   MCH 26.5 - 33.0 pg 33.2 (H)   MCHC 31.5 - 36.5 g/dL 33.2   RDW 10.0 - 15.0 % 14.0   % Neutrophils % 61   % Lymphocytes % 21   % Monocytes % 15   % Eosinophils % 3   % Basophils % 1   % Immature Granulocytes % 0   NRBC/W <1 /100 0   Absolute Neutrophil 1.6 - 8.3 10e3/uL 5.1   Absolute Lymphocytes 0.8 - 5.3 10e3/uL 1.7   Absolute Monocytes 0.0 - 1.3 10e3/uL 1.2   Absolute Eosinophils 0.0 - 0.7 10e3/uL 0.2   Absolute Basophils 0.0 - 0.2 10e3/uL 0.1   Absolute Immature Granulocytes <=0.4 10e3/uL 0.0   Absolute NRBCs 10e3/uL 0.0   Chromogranin A 0 - 187 ng/mL 253 (H)   (H): Data is abnormally high  (L): Data is abnormally low     Chromogranin A  0 - 187 ng/mL 253 High  698 High   High  CM       Imaging:     EXAM: CT CHEST/ABDOMEN/PELVIS W CONTRAST  LOCATION: [facility]  DATE: 7/8/2025     INDICATION:  Neuroendocrine carcinoma of small bowel with  kiersten metastasis diagnosed in 2023. Posttreatment surveillance.  COMPARISON: CT CAP 1/15/2025, 7/8/2024 and older studies, PET Dotatate 11/21/2023  TECHNIQUE: CT scan of the chest, abdomen, and pelvis was performed following injection of IV contrast. Multiplanar reformats were obtained. Dose reduction techniques were used.   CONTRAST: ISOVUE 370 101cc     FINDINGS:   LUNGS AND PLEURA: No suspicious pulmonary nodules or effusions. Fine, subpleural reticular opacities with a basilar predominance and tiny cystic change in the left lower lobe is stable. No airway lesions.     MEDIASTINUM/AXILLAE: Poststernotomy changes with left subclavian venous pacer leads. No suspicious adenopathy. No central pulmonary emboli.     CORONARY ARTERY CALCIFICATION: Previous intervention (stents or CABG).     HEPATOBILIARY: No liver lesions. Prior cholecystectomy.     PANCREAS: Subtotal pancreatectomy.     SPLEEN: Resected.     ADRENAL GLANDS: Minimal nodular thickening again noted.     KIDNEYS/BLADDER: Normal.     BOWEL: Small bowel and cecal staple line. Multiple scattered nodes in the root of the small bowel mesentery have slightly increased in size over the last year. Dominant node measures at 1.6 x 1.5 cm (axial image 370), was 1.5 x 1.4 cm on Jose exam and 1.4   x 1.4 in the same plane a year ago. Further inferiorly another small grouping of nodes measures up to 1.2 cm in short axis (axial image 387, increased from 0.9 mm in the same plane a year ago. Minimal stranding about the celiac, bifurcation is stable.   No new sites of adenopathy or ascites. Quite redundant colon especially the sigmoid with a moderate stool burden.     LYMPH NODES: Normal.     VASCULATURE: Moderate arterial calcifications. No aneurysm. Vessels are patent.     PELVIC ORGANS: Normal.     MUSCULOSKELETAL: No concerning bone or soft tissue lesions. Mild compression fracture superior endplate of T12 is stable. Scattered thoracolumbar degenerative changes.                                                                       IMPRESSION:  1.  Nodes in the root of the small bowel mesentery have minimally increased in size, easiest to appreciate compared to exam done a year ago. Largest node now measures 1.6 x 1.5 cm, increased from 1.4 x 1.4 cm a year ago.  2.  No new sites of disease in the chest, abdomen or pelvis.  3.  Stable postsurgical changes from subtotal pancreatectomy with stranding about the celiac axis bifurcation.  4.  Other noncritical findings as noted above.    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 I reviewed imaging with Dr. Lyn and we compared to prior images. Over time, there is very minimal change.  His chromogranin has dropped. We talked about potential reasons for this. He is tapering off a PPI now and previously had been on daily dosing, which can contribute to elevations in the chromogranin. Will repeat at our next follow-up. As he is clinically stable as well and does not have symptoms of progression, we recommend ongoing surveillance in 6 months with a CT CAP and labs including a chromogranin.    He will continue to follow with neurology for management of his Parkinson's and discuss the mobility changes in the left 5th digit as his upcoming appointment    The longitudinal plan of care for the diagnosis(es)/condition(s) as documented were addressed during this visit. Due to the added complexity in care, I will continue to support Pat in the subsequent management and with ongoing continuity of care.      Again, thank you for allowing me to participate in the care of your patient.        Sincerely,        KRYSTYNA Martinez CNP    Electronically signed

## 2025-07-20 DIAGNOSIS — G47.09 OTHER INSOMNIA: ICD-10-CM

## 2025-07-21 RX ORDER — TRAZODONE HYDROCHLORIDE 50 MG/1
TABLET ORAL
Qty: 90 TABLET | Refills: 1 | Status: SHIPPED | OUTPATIENT
Start: 2025-07-21

## 2025-07-21 NOTE — TELEPHONE ENCOUNTER
Neuroscience Clinic Task Note    TASK    Neurology Rx Refill  Medication traZODone (DESYREL) 50 MG tablet    Dose TAKE 0.5-1 TABLETS BY MOUTH AT BEDTIME.    Last refill ordered (m/d/y) 01/30/2025   Last quantity ordered 90   Last # refills 1   Last clinic visit with ordering provider (m/d/y) 04/25/2025   Next clinic visit with ordering provider (m/d/y) 08/05/2025 (with Candida)   All pertinent protocol data (lab date/result)    Pertinent information from patient's message        FOLLOW-UP      ADDITIONAL COMMENTS      Karen Holden

## 2025-07-23 ENCOUNTER — OFFICE VISIT (OUTPATIENT)
Dept: DERMATOLOGY | Facility: CLINIC | Age: 70
End: 2025-07-23
Attending: FAMILY MEDICINE
Payer: MEDICARE

## 2025-07-23 DIAGNOSIS — L57.0 AK (ACTINIC KERATOSIS): ICD-10-CM

## 2025-07-23 DIAGNOSIS — L82.1 SEBORRHEIC KERATOSES: ICD-10-CM

## 2025-07-23 DIAGNOSIS — D23.9 DERMAL NEVUS: Primary | ICD-10-CM

## 2025-07-23 DIAGNOSIS — Z00.00 ENCOUNTER FOR MEDICARE ANNUAL WELLNESS EXAM: ICD-10-CM

## 2025-07-23 DIAGNOSIS — D18.01 ANGIOMA OF SKIN: ICD-10-CM

## 2025-07-23 DIAGNOSIS — L81.4 LENTIGO: ICD-10-CM

## 2025-07-23 PROCEDURE — G2211 COMPLEX E/M VISIT ADD ON: HCPCS | Performed by: DERMATOLOGY

## 2025-07-23 PROCEDURE — 1126F AMNT PAIN NOTED NONE PRSNT: CPT | Performed by: DERMATOLOGY

## 2025-07-23 PROCEDURE — 99203 OFFICE O/P NEW LOW 30 MIN: CPT | Performed by: DERMATOLOGY

## 2025-07-23 ASSESSMENT — PAIN SCALES - GENERAL: PAINLEVEL_OUTOF10: NO PAIN (0)

## 2025-07-23 NOTE — LETTER
7/23/2025      Arnaldo Henderson  [address] [address] [address]      Dear Colleague,    Thank you for referring your patient, Arnaldo Henderson, to the [facility]. Please see a copy of my visit note below.    Arnaldo Henderson , a 70 year old year old male patient, I was asked to see by Dr. Ivy for spots on skin.  Patient has no other skin complaints today.  Remainder of the HPI, Meds, PMH, Allergies, FH, and SH was reviewed in chart.      Past Medical History:   Diagnosis Date     Acute pancreatitis 04/18/2022    Formatting of this note might be different from the original. Formatting of this note might be different from the original. Added automatically from request for surgery [id number] Formatting of this note might be different from the original. Added automatically from request for surgery [id number]  Formatting of this note might be different from the original. Added automatically from request for surgery      CAD (coronary artery disease)     CABG     Cholecystitis, acute 07/05/2023     Diabetes mellitus, type 2 (H) 2013     Dystonia, torsion, fragments of 04/25/2025     Family history of parkinsonism 02/14/2024     Gastroesophageal reflux disease      Hypercholesteremia      Hypertension      Mixed conductive and sensorineural hearing loss of both ears      Mixed hearing loss      Pancreatic duct stricture      Pancreatitis      Parkinson disease (H)      Parkinson's disease, unspecified whether dyskinesia present, unspecified whether manifestations fluctuate (H) 02/14/2024     Seborrheic dermatitis 03/22/2024     Second degree AV block      Surgical site infection 07/26/2023       Past Surgical History:   Procedure Laterality Date     CA ANESTH CABG W/PUMP       CHOLECYSTECTOMY  2022     COLONOSCOPY N/A 01/12/2023    Procedure: colonoscopy with fluroscopy;  Surgeon: Ayden Fraire MD;  Location: [facility] OR     COLONOSCOPY N/A 5/1/2025    Procedure: Colonoscopy;  Surgeon: Harish  Ricardo Brandon MD;  Location:  GI     ENDOSCOPIC RETROGRADE CHOLANGIOPANCREATOGRAM N/A 2023    Procedure: ENDOSCOPIC RETROGRADE CHOLANGIOPANCREATOGRAPHY, WITH  CYSTDUODENOSTOMY STENTS X2 REMOVAL;  Surgeon: Cedric Saldana MD;  Location: [facility] OR     ENT SURGERY       EP PACEMAKER DEVICE & LEAD IMPLANT- RIGHT ATRIAL & RIGHT VENTRICULAR N/A 2024    Procedure: Pacemaker Device & Lead Implant - Right Atrial & Right Ventricular;  Surgeon: Jenna Hernandez MD;  Location:  HEART CARDIAC CATH LAB     LAPAROSCOPY DIAGNOSTIC (GENERAL) N/A 2023    Procedure: LAPAROSCOPY, DIAGNOSTIC; RETRIEVAL OF MIGRATED STENT;  Surgeon: Joseluis Lima MD;  Location: [facility] OR     ORTHOPEDIC SURGERY       OTHER SURGICAL HISTORY      skin cancer ? from scrotum     OTHER SURGICAL HISTORY  2025    cortison injection     PANCREATECTOMY PEUSTOW N/A 2023    Procedure: Open distal pancreactectomy with splenectomy, lysis of adhesions, resection of proximal illeum;  Surgeon: Ashvin Haider MD;  Location:  OR     AZ CABG, ARTERIAL, TWO          Family History   Problem Relation Age of Onset     Parkinsonism Mother 65         at 75 years     Other - See Comments Mother         Polish ?AJ     Psychosis Mother         hallucinations from medications     Hypertension Mother      Hyperlipidemia Mother      Other Cancer Mother      Other - See Comments Father      Other - See Comments Sister      Other - See Comments Sister      Lung Cancer Brother         smoker -  47 years     Other - See Comments Daughter         3 kids - 2 girls and boy     Heart Disease Son         orthostatic hypotension     Other - See Comments Son         2 daughters     Autism Spectrum Disorder Grandson      Diabetes No family hx of        Social History     Socioeconomic History     Marital status:      Spouse name: Not on file     Number of children: Not on file     Years of education: Not on file     Highest  education level: Not on file   Occupational History     Not on file   Tobacco Use     Smoking status: Former     Types: Cigarettes     Smokeless tobacco: Never     Tobacco comments:     Quit 10 years ago - quit a few times; started at age 12 or 13 and then stopped at 26 years and then stopped for 15 years got  and started smoking again   Vaping Use     Vaping status: Never Used   Substance and Sexual Activity     Alcohol use: Not Currently     Comment: quit 2 years ago     Drug use: Not Currently     Comment: used to smoke marijuana when young; rare use     Sexual activity: Not on file   Other Topics Concern     Not on file   Social History Narrative     Not on file     Social Drivers of Health     Financial Resource Strain: Low Risk  (12/18/2024)    Financial Resource Strain      Within the past 12 months, have you or your family members you live with been unable to get utilities (heat, electricity) when it was really needed?: No   Food Insecurity: Low Risk  (12/18/2024)    Food Insecurity      Within the past 12 months, did you worry that your food would run out before you got money to buy more?: No      Within the past 12 months, did the food you bought just not last and you didn t have money to get more?: No   Transportation Needs: Low Risk  (12/18/2024)    Transportation Needs      Within the past 12 months, has lack of transportation kept you from medical appointments, getting your medicines, non-medical meetings or appointments, work, or from getting things that you need?: No   Physical Activity: Unknown (12/18/2024)    Exercise Vital Sign      Days of Exercise per Week: 1 day      Minutes of Exercise per Session: Not on file   Stress: Stress Concern Present (12/18/2024)    [employer] - Occupational Stress Questionnaire      Feeling of Stress : To some extent   Social Connections: Unknown (12/18/2024)    Social Connection and Isolation Panel [NHANES]      Frequency of  Communication with Friends and Family: Not on file      Frequency of Social Gatherings with Friends and Family: Once a week      Attends Temple Services: Not on file      Active Member of Clubs or Organizations: Not on file      Attends Club or Organization Meetings: Not on file      Marital Status: Not on file   Interpersonal Safety: Unknown (5/1/2025)    Interpersonal Safety      Do you feel physically and emotionally safe where you currently live?: Patient unable to answer      Within the past 12 months, have you been hit, slapped, kicked or otherwise physically hurt by someone?: Patient unable to answer      Within the past 12 months, have you been humiliated or emotionally abused in other ways by your partner or ex-partner?: Patient unable to answer   Housing Stability: Low Risk  (12/18/2024)    Housing Stability      Do you have housing? : Yes      Are you worried about losing your housing?: No       Outpatient Encounter Medications as of 7/23/2025   Medication Sig Dispense Refill     aspirin (ASA) 81 MG EC tablet Take 81 mg by mouth daily       atorvastatin (LIPITOR) 40 MG tablet Take 1 tablet (40 mg) by mouth daily. 90 tablet 3     BD PEN NEEDLE LINDY 2ND GEN 32G X 4 MM miscellaneous USE TO INJECT INSULIN 4 TIMES A  each 3     carbidopa-levodopa (SINEMET CR)  MG CR tablet Take 1 tablet by mouth at bedtime @9p 90 tablet 3     carbidopa-levodopa (SINEMET)  MG tablet 2.5@6a, 2@10a, 2@2p and 1@5p 675 tablet 3     Continuous Glucose Sensor (DEXCOM G7 SENSOR) MISC Change every 10 days. 9 each 11     dasiglucagon HCl (ZEGALOGUE) 0.6 MG/0.6ML SOAJ injection Inject 0.6 mg Subcutaneous once as needed (use for severe hypoglycemia) 1.2 mL 1     gabapentin (NEURONTIN) 300 MG capsule Take 2 capsules (600 mg) by mouth 3 times daily. 540 capsule 3     insulin aspart (NOVOLOG PEN) 100 UNIT/ML pen Inject 20 Units subcutaneously 3 times daily as needed for high blood sugar (take 3 times/day as needed). 60  mL 2     Insulin Glargine-yfgn 100 UNIT/ML SOPN Inject 14 Units subcutaneously every morning AND 20 Units at bedtime. 50 mL 2     lipase-protease-amylase (ZENPEP) 48670-605445-804338 units CPEP Take 1 capsule by mouth 3 times daily (with meals). 90 capsule 11     losartan (COZAAR) 50 MG tablet TAKE 1 TABLET BY MOUTH EVERY DAY 90 tablet 0     magnesium oxide (MAG-OX) 400 MG tablet Take 400 mg by mouth every other day       midodrine (PROAMATINE) 2.5 MG tablet Take 1 tablet (2.5 mg) by mouth daily. (Patient not taking: Reported on 7/2/2025) 90 tablet 3     Multiple Vitamin (MULTI-VITAMINS) TABS Take 1 tablet by mouth daily       omeprazole (PRILOSEC) 20 MG DR capsule Take 1 capsule (20 mg) by mouth daily 90 capsule 3     sertraline (ZOLOFT) 25 MG tablet Take 1 tablet (25 mg) by mouth daily. 90 tablet 3     traZODone (DESYREL) 50 MG tablet TAKE 0.5-1 TABLETS BY MOUTH AT BEDTIME. 90 tablet 1     No facility-administered encounter medications on file as of 7/23/2025.             Review Of Systems  Skin: As above  Eyes: negative  Ears/Nose/Throat: negative  Respiratory: No shortness of breath, dyspnea on exertion, cough, or hemoptysis  Cardiovascular: negative  Gastrointestinal: negative  Genitourinary: negative  Musculoskeletal: negative  Neurologic: negative  Psychiatric: negative  Hematologic/Lymphatic/Immunologic: negative  Endocrine: negative      O:   NAD, WDWN, Alert & Oriented, Mood & Affect wnl, Vitals stable   General appearance jose ii   Vitals stable   Alert, oriented and in no acute distress     Burns scars on legs  Gritty scaly papules on arms and face and scal p   Stuck on papules and brown macules on trunk and ext    Red papules on trunk   Flesh colored papules on trunk      The remainder of the full exam was normal; the following areas were examined:  conjunctiva/lids, , neck, peripheral vascular system, abdomen, lymph nodes, digits/nails, eccrine and apocrine glands, scalp/hair, face, neck, chest,  abdomen, buttocks, back, RUE, LUE, RLE, LLE       Eyes: Conjunctivae/lids:Normal     ENT: Lips, mucosa: normal    MSK:Normal    Cardiovascular: peripheral edema none    Pulm: Breathing Normal    Lymph Nodes: No Head and Neck Lymphadenopathy     Neuro/Psych: Orientation:Normal; Mood/Affect:Normal      A/P:  1. Seborrheic keratosis, lentigo, angioma, dermal nevus  2. Actinic keratosis   Pathophysiology discussed with pateint   Using 5-Flurouracil Cream    5-Fluorouracil (5FU) topical cream (brand names Efudex, Carac) is a prescription topical medicine to treat actinic keratoses (pre-squamous cell skin cancer lesions), sun-damaged skin as well as superficial skin cancers.    When applied the areas of sun-damaged skin, the 5FU will  find  damaged skin cells and destroy them.   During treatment, the skin will become red and look very irritated. This is the expected  normal response,  Some patients using 5FU show minimal redness and scaling while others have a very  vigorous  response where the skin scabs and peels. The important thing to realize is that 5FU is treating sun-damaged skin that carries skin cancer risk.        While the skin is irritated, open, sore or scabbed you can apply aquaphor, vaseline or 1% hydrocortisone cream in the morning.      You should apply a thin layer of the cream to the affected area twice a day for 2  weeks every night. A strip of cream the length of your finger tip should be enough to cover your entire face.  For tougher skin like arms, legs, or back, we may suggest longer treatment plans.  However if you react really strong and fast, you might stop earlier or use less frequently. - please call if it is very strong and you are concern you might need to stop early.      If you prescription coverage allows we may add calcipotriene (Dovonex ), a vitamin-D derivative, to the treatment plan.  In these cases we will have you mix the calcipotriene with the efudex to help shorten the treatment  course and improve outcomes.      Typically very strong reactions are related to lots of underlying sun damage, and this means you are getting a good response to the medication. However, there is no need to be miserable while using this. Please let us know if you are having trouble or concerns!     It was a pleasure speaking to Arnaldo Henderson today.  Previous clinic  notes and pertinent laboratory tests were reviewed prior to Arnaldo Henderson's visit.  Nature and genetics of benign skin lesions dicussed with patient.  Patient encouraged to perform monthly skin exams.  UV precautions reviewed with patient.  Return to clinic 6 months      Again, thank you for allowing me to participate in the care of your patient.        Sincerely,        Дмитрий Willard MD    Electronically signed

## 2025-07-23 NOTE — NURSING NOTE
Chief Complaint   Patient presents with    Skin Check     Spot on L brow        There were no vitals filed for this visit.  Wt Readings from Last 1 Encounters:   07/14/25 92.6 kg (204 lb 3.2 oz)       Twyla Ellis LPN .................7/23/2025

## 2025-07-23 NOTE — PATIENT INSTRUCTIONS
How 5-Flurouracil Cream (5FU) (Efudex/Fluorouracil) prescription is filled:  This prescription is filled by [facility] a Mail-order Pharmacy located in [place].  Initially, [facility] will contact you via Text Message which will contain a link to their website.   You will need to follow this link and enter your information (shipping address, and payment method for this medication).    [facility] will also attempt to reach you by email or phone should there be no response to the text message or if there is a need for any clarification of your information that was entered on their website.  Should you have questions for [facility] you may contact them directly by phone at [phone].      Using 5-Flurouracil Cream  Apply a thin finger length amount of Efudex/Fluorouracil cream to Scalp, Forehead, Bilateral Temples & Bilateral Cheeks use twice daily for 1 week   Use cream Twice a day for 2 weeks for Bilateral arms .  Follow up in clinic 6 months after completion to access the effectiveness of the treatment.      5-Fluorouracil (5FU) topical cream (brand names Efudex, Carac) is a prescription topical medicine to treat actinic keratoses (pre-squamous cell skin cancer lesions), sun-damaged skin as well as superficial skin cancers.    When applied the areas of sun-damaged skin, the 5FU will  find  damaged skin cells and destroy them.   During treatment, the skin will become red and look very irritated. This is the expected  normal response, some patients using 5FU show minimal redness and scaling while others have a very  vigorous  response where the skin scabs and peels. The important thing to realize is that 5FU is treating sun-damaged skin that carries skin cancer risk.      While the skin is irritated, open, sore or scabbed you can apply aquaphor, vaseline or 1% hydrocortisone cream in the morning.     A strip of 5FU cream the length of  your finger tip should be enough to cover your entire face.  For tougher skin like arms, legs, or back, we may suggest longer treatment plans.  However if you react really strong and fast, you might stop earlier or use less frequently. Please call if your reaction to the treatment is very strong and you are concerned you might need to stop early.       Typically very strong reactions are related to lots of underlying sun damage, and this means you are getting a good response to the medication. However, there is no need to be miserable while using this. Please let us know if you are having trouble or concerns!      Proper skin care from [facility]:    -Eliminate harsh soaps as they strip the natural oils from the skin, often resulting in dry itchy skin ( i.e. Dial, Zest, [place])  -Use mild soaps such as Cetaphil or Dove Sensitive Skin in the shower. You do not need to use soap on arms, legs, and trunk every time you shower unless visibly soiled.   -Avoid hot or cold showers.  -After showering, lightly (pat) dry off and apply moisturizing within 2-3 minutes. This will help trap moisture in the skin.   -Aggressive use of a moisturizer at least 1-2 times a day to the entire body (including -Vanicream, Cetaphil, Aquaphor or Cerave) and moisturize hands after every washing.  -We recommend using moisturizers that come in a tub that needs to be scooped out, not a pump. This has more of an oil base. It will hold moisture in your skin much better than a water base moisturizer. The above recommended are non-pore clogging.      Wear a sunscreen with at least SPF 30 on your face, ears, neck and V of the chest daily. Wear sunscreen on other areas of the body if those areas are exposed to the sun throughout the day. Sunscreens can contain physical and/or chemical blockers. Physical blockers are less likely to clog pores, these include zinc oxide and titanium dioxide. Reapply every two hour and after swimming.      Sunscreen examples: https://www.ewg.org/sunscreen/    UV radiation  UVA radiation remains constant throughout the day and throughout the year. It is a longer wavelength than UVB and therefore penetrates deeper into the skin leading to immediate and delayed tanning, photoaging, and skin cancer. 70-80% of UVA and UVB radiation occurs between the hours of 10am-2pm.  UVB radiation  UVB radiation causes the most harmful effects and is more significant during the summer months. However, snow and ice can reflect UVB radiation leading to skin damage during the winter months as well. UVB radiation is responsible for tanning, burning, inflammation, delayed erythema (pinkness), pigmentation (brown spots), and skin cancer.     I recommend self monthly full body exams and yearly full body exams with a dermatology provider. If you develop a new or changing lesion please follow up for examination. Most skin cancers are pink and scaly or pink and pearly. However, we do see blue/brown/black skin cancers.  Consider the ABCDEs of melanoma when giving yourself your monthly full body exam ( don't forget the groin, buttocks, feet, toes, etc). A-asymmetry, B-borders, C-color, D-diameter, E-elevation or evolving. If you see any of these changes please follow up in clinic. If you cannot see your back I recommend purchasing a hand held mirror to use with a larger wall mirror.       Checking for Skin Cancer  You can find cancer early by checking your skin each month. There are 3 kinds of skin cancer. They are melanoma, basal cell carcinoma, and squamous cell carcinoma. Doing monthly skin checks is the best way to find new marks or skin changes. Follow the instructions below for checking your skin.   The ABCDEs of checking moles for melanoma   Check your moles or growths for signs of melanoma using ABCDE:   Asymmetry: the sides of the mole or growth don t match  Border: the edges are ragged, notched, or blurred  Color: the color within the  mole or growth varies  Diameter: the mole or growth is larger than 6 mm (size of a pencil eraser)  Evolving: the size, shape, or color of the mole or growth is changing (evolving is not shown in the images below)    Checking for other types of skin cancer  Basal cell carcinoma or squamous cell carcinoma have symptoms such as:     A spot or mole that looks different from all other marks on your skin  Changes in how an area feels, such as itching, tenderness, or pain  Changes in the skin's surface, such as oozing, bleeding, or scaliness  A sore that does not heal  New swelling or redness beyond the border of a mole    Who s at risk?  Anyone can get skin cancer. But you are at greater risk if you have:   Fair skin, light-colored hair, or light-colored eyes  Many moles or abnormal moles on your skin  A history of sunburns from sunlight or tanning beds  A family history of skin cancer  A history of exposure to radiation or chemicals  A weakened immune system  If you have had skin cancer in the past, you are at risk for recurring skin cancer.   How to check your skin  Do your monthly skin checkups in front of a full-length mirror. Check all parts of your body, including your:   Head (ears, face, neck, and scalp)  Torso (front, back, and sides)  Arms (tops, undersides, upper, and lower armpits)  Hands (palms, backs, and fingers, including under the nails)  Buttocks and genitals  Legs (front, back, and sides)  Feet (tops, soles, toes, including under the nails, and between toes)  If you have a lot of moles, take digital photos of them each month. Make sure to take photos both up close and from a distance. These can help you see if any moles change over time.   Most skin changes are not cancer. But if you see any changes in your skin, call your doctor right away. Only he or she can diagnose a problem. If you have skin cancer, seeing your doctor can be the first step toward getting the treatment that could save your life.    Lilia last reviewed this educational content on 4/1/2019 2000-2020 [email or web address]. [address] [place] [address], [place] [address]. All rights reserved. This information is not intended as a substitute for professional medical care. Always follow your healthcare professional's instructions.       When should I call my doctor?  If you are worsening or not improving, please, contact us or seek urgent care as noted below.     Who should I call with questions (adults)?    [facility] [phone]  For urgent needs outside of business hours call the [facility] at [phone] and ask for the dermatology resident on call to be paged  If this is a medical emergency and you are unable to reach an ER, Call [phone]      If you need a prescription refill, please contact your pharmacy. Refills are approved or denied by our Physicians during normal business hours, Monday through Friday.  Per office policy, refills will not be granted if you have not been seen within the past year (or sooner depending on the condition).

## 2025-07-23 NOTE — PROGRESS NOTES
Arnaldo Henderson , a 70 year old year old male patient, I was asked to see by Dr. Ivy for spots on skin.  Patient has no other skin complaints today.  Remainder of the HPI, Meds, PMH, Allergies, FH, and SH was reviewed in chart.      Past Medical History:   Diagnosis Date    Acute pancreatitis 04/18/2022    Formatting of this note might be different from the original. Formatting of this note might be different from the original. Added automatically from request for surgery [id number] Formatting of this note might be different from the original. Added automatically from request for surgery [id number]  Formatting of this note might be different from the original. Added automatically from request for surgery     CAD (coronary artery disease)     CABG    Cholecystitis, acute 07/05/2023    Diabetes mellitus, type 2 (H) 2013    Dystonia, torsion, fragments of 04/25/2025    Family history of parkinsonism 02/14/2024    Gastroesophageal reflux disease     Hypercholesteremia     Hypertension     Mixed conductive and sensorineural hearing loss of both ears     Mixed hearing loss     Pancreatic duct stricture     Pancreatitis     Parkinson disease (H)     Parkinson's disease, unspecified whether dyskinesia present, unspecified whether manifestations fluctuate (H) 02/14/2024    Seborrheic dermatitis 03/22/2024    Second degree AV block     Surgical site infection 07/26/2023       Past Surgical History:   Procedure Laterality Date    CA ANESTH CABG W/PUMP      CHOLECYSTECTOMY  2022    COLONOSCOPY N/A 01/12/2023    Procedure: colonoscopy with fluroscopy;  Surgeon: Ayden Fraire MD;  Location:  OR    COLONOSCOPY N/A 5/1/2025    Procedure: Colonoscopy;  Surgeon: Ricardo Moreira MD;  Location: [facility]    ENDOSCOPIC RETROGRADE CHOLANGIOPANCREATOGRAM N/A 05/18/2023    Procedure: ENDOSCOPIC RETROGRADE CHOLANGIOPANCREATOGRAPHY, WITH  CYSTDUODENOSTOMY STENTS X2 REMOVAL;  Surgeon: Cedric Saldana MD;  Location: [facility]    ENT  SURGERY      EP PACEMAKER DEVICE & LEAD IMPLANT- RIGHT ATRIAL & RIGHT VENTRICULAR N/A 2024    Procedure: Pacemaker Device & Lead Implant - Right Atrial & Right Ventricular;  Surgeon: Jenna Hernandez MD;  Location:  [facility]    LAPAROSCOPY DIAGNOSTIC (GENERAL) N/A 2023    Procedure: LAPAROSCOPY, DIAGNOSTIC; RETRIEVAL OF MIGRATED STENT;  Surgeon: Joseluis Lima MD;  Location: [facility]    ORTHOPEDIC SURGERY      OTHER SURGICAL HISTORY      skin cancer ? from scrotum    OTHER SURGICAL HISTORY  2025    cortison injection    PANCREATECTOMY PEUSTOW N/A 2023    Procedure: Open distal pancreactectomy with splenectomy, lysis of adhesions, resection of proximal illeum;  Surgeon: Ashvin Haider MD;  Location: [facility]    NE CABG, ARTERIAL, TWO          Family History   Problem Relation Age of Onset    Parkinsonism Mother 65         at 75 years    Other - See Comments Mother         Polish ?AJ    Psychosis Mother         hallucinations from medications    Hypertension Mother     Hyperlipidemia Mother     Other Cancer Mother     Other - See Comments Father     Other - See Comments Sister     Other - See Comments Sister     Lung Cancer Brother         smoker -  47 years    Other - See Comments Daughter         3 kids - 2 girls and boy    Heart Disease Son         orthostatic hypotension    Other - See Comments Son         2 daughters    Autism Spectrum Disorder Grandson     Diabetes No family hx of        Social History     Socioeconomic History    Marital status:      Spouse name: Not on file    Number of children: Not on file    Years of education: Not on file    Highest education level: Not on file   Occupational History    Not on file   Tobacco Use    Smoking status: Former     Types: Cigarettes    Smokeless tobacco: Never    Tobacco comments:     Quit 10 years ago - quit a few times; started at age 12 or 13 and then stopped at 26 years and then stopped for 15  years got  and started smoking again   Vaping Use    Vaping status: Never Used   Substance and Sexual Activity    Alcohol use: Not Currently     Comment: quit 2 years ago    Drug use: Not Currently     Comment: used to smoke marijuana when young; rare use    Sexual activity: Not on file   Other Topics Concern    Not on file   Social History Narrative    Not on file     Social Drivers of Health     Financial Resource Strain: Low Risk  (12/18/2024)    Financial Resource Strain     Within the past 12 months, have you or your family members you live with been unable to get utilities (heat, electricity) when it was really needed?: No   Food Insecurity: Low Risk  (12/18/2024)    Food Insecurity     Within the past 12 months, did you worry that your food would run out before you got money to buy more?: No     Within the past 12 months, did the food you bought just not last and you didn t have money to get more?: No   Transportation Needs: Low Risk  (12/18/2024)    Transportation Needs     Within the past 12 months, has lack of transportation kept you from medical appointments, getting your medicines, non-medical meetings or appointments, work, or from getting things that you need?: No   Physical Activity: Unknown (12/18/2024)    Exercise Vital Sign     Days of Exercise per Week: 1 day     Minutes of Exercise per Session: Not on file   Stress: Stress Concern Present (12/18/2024)    [employer] [place] [employer] - Occupational Stress Questionnaire     Feeling of Stress : To some extent   Social Connections: Unknown (12/18/2024)    Social Connection and Isolation Panel [NHANES]     Frequency of Communication with Friends and Family: Not on file     Frequency of Social Gatherings with Friends and Family: Once a week     Attends Temple Services: Not on file     Active Member of Clubs or Organizations: Not on file     Attends Club or Organization Meetings: Not on file     Marital Status: Not on file    Interpersonal Safety: Unknown (5/1/2025)    Interpersonal Safety     Do you feel physically and emotionally safe where you currently live?: Patient unable to answer     Within the past 12 months, have you been hit, slapped, kicked or otherwise physically hurt by someone?: Patient unable to answer     Within the past 12 months, have you been humiliated or emotionally abused in other ways by your partner or ex-partner?: Patient unable to answer   Housing Stability: Low Risk  (12/18/2024)    Housing Stability     Do you have housing? : Yes     Are you worried about losing your housing?: No       Outpatient Encounter Medications as of 7/23/2025   Medication Sig Dispense Refill    aspirin (ASA) 81 MG EC tablet Take 81 mg by mouth daily      atorvastatin (LIPITOR) 40 MG tablet Take 1 tablet (40 mg) by mouth daily. 90 tablet 3    BD PEN NEEDLE LINDY 2ND GEN 32G X 4 MM miscellaneous USE TO INJECT INSULIN 4 TIMES A  each 3    carbidopa-levodopa (SINEMET CR)  MG CR tablet Take 1 tablet by mouth at bedtime @9p 90 tablet 3    carbidopa-levodopa (SINEMET)  MG tablet 2.5@6a, 2@10a, 2@2p and 1@5p 675 tablet 3    Continuous Glucose Sensor (DEXCOM G7 SENSOR) MISC Change every 10 days. 9 each 11    dasiglucagon HCl (ZEGALOGUE) 0.6 MG/0.6ML SOAJ injection Inject 0.6 mg Subcutaneous once as needed (use for severe hypoglycemia) 1.2 mL 1    gabapentin (NEURONTIN) 300 MG capsule Take 2 capsules (600 mg) by mouth 3 times daily. 540 capsule 3    insulin aspart (NOVOLOG PEN) 100 UNIT/ML pen Inject 20 Units subcutaneously 3 times daily as needed for high blood sugar (take 3 times/day as needed). 60 mL 2    Insulin Glargine-yfgn 100 UNIT/ML SOPN Inject 14 Units subcutaneously every morning AND 20 Units at bedtime. 50 mL 2    lipase-protease-amylase (ZENPEP) 76472-196165-855099 units CPEP Take 1 capsule by mouth 3 times daily (with meals). 90 capsule 11    losartan (COZAAR) 50 MG tablet TAKE 1 TABLET BY MOUTH EVERY DAY 90  tablet 0    magnesium oxide (MAG-OX) 400 MG tablet Take 400 mg by mouth every other day      midodrine (PROAMATINE) 2.5 MG tablet Take 1 tablet (2.5 mg) by mouth daily. (Patient not taking: Reported on 7/2/2025) 90 tablet 3    Multiple Vitamin (MULTI-VITAMINS) TABS Take 1 tablet by mouth daily      omeprazole (PRILOSEC) 20 MG DR capsule Take 1 capsule (20 mg) by mouth daily 90 capsule 3    sertraline (ZOLOFT) 25 MG tablet Take 1 tablet (25 mg) by mouth daily. 90 tablet 3    traZODone (DESYREL) 50 MG tablet TAKE 0.5-1 TABLETS BY MOUTH AT BEDTIME. 90 tablet 1     No facility-administered encounter medications on file as of 7/23/2025.             Review Of Systems  Skin: As above  Eyes: negative  Ears/Nose/Throat: negative  Respiratory: No shortness of breath, dyspnea on exertion, cough, or hemoptysis  Cardiovascular: negative  Gastrointestinal: negative  Genitourinary: negative  Musculoskeletal: negative  Neurologic: negative  Psychiatric: negative  Hematologic/Lymphatic/Immunologic: negative  Endocrine: negative      O:   NAD, WDWN, Alert & Oriented, Mood & Affect wnl, Vitals stable   General appearance jose ii   Vitals stable   Alert, oriented and in no acute distress     Burns scars on legs  Gritty scaly papules on arms and face and scal p   Stuck on papules and brown macules on trunk and ext    Red papules on trunk   Flesh colored papules on trunk      The remainder of the full exam was normal; the following areas were examined:  conjunctiva/lids, , neck, peripheral vascular system, abdomen, lymph nodes, digits/nails, eccrine and apocrine glands, scalp/hair, face, neck, chest, abdomen, buttocks, back, RUE, LUE, RLE, LLE       Eyes: Conjunctivae/lids:Normal     ENT: Lips, mucosa: normal    MSK:Normal    Cardiovascular: peripheral edema none    Pulm: Breathing Normal    Lymph Nodes: No Head and Neck Lymphadenopathy     Neuro/Psych: Orientation:Normal; Mood/Affect:Normal      A/P:  1. Seborrheic keratosis, lentigo,  angioma, dermal nevus  2. Actinic keratosis   Pathophysiology discussed with pateint   Using 5-Flurouracil Cream    5-Fluorouracil (5FU) topical cream (brand names Efudex, Carac) is a prescription topical medicine to treat actinic keratoses (pre-squamous cell skin cancer lesions), sun-damaged skin as well as superficial skin cancers.    When applied the areas of sun-damaged skin, the 5FU will  find  damaged skin cells and destroy them.   During treatment, the skin will become red and look very irritated. This is the expected  normal response,  Some patients using 5FU show minimal redness and scaling while others have a very  vigorous  response where the skin scabs and peels. The important thing to realize is that 5FU is treating sun-damaged skin that carries skin cancer risk.        While the skin is irritated, open, sore or scabbed you can apply aquaphor, vaseline or 1% hydrocortisone cream in the morning.      You should apply a thin layer of the cream to the affected area twice a day for 2  weeks every night. A strip of cream the length of your finger tip should be enough to cover your entire face.  For tougher skin like arms, legs, or back, we may suggest longer treatment plans.  However if you react really strong and fast, you might stop earlier or use less frequently. - please call if it is very strong and you are concern you might need to stop early.      If you prescription coverage allows we may add calcipotriene (Dovonex ), a vitamin-D derivative, to the treatment plan.  In these cases we will have you mix the calcipotriene with the efudex to help shorten the treatment course and improve outcomes.      Typically very strong reactions are related to lots of underlying sun damage, and this means you are getting a good response to the medication. However, there is no need to be miserable while using this. Please let us know if you are having trouble or concerns!     It was a pleasure speaking to Arnaldo CAMPA  Beatriz today.  Previous clinic  notes and pertinent laboratory tests were reviewed prior to Arnaldo Henderson's visit.  Nature and genetics of benign skin lesions dicussed with patient.  Patient encouraged to perform monthly skin exams.  UV precautions reviewed with patient.  Return to clinic 6 months

## 2025-07-24 DIAGNOSIS — K85.91 NECROTIZING PANCREATITIS: ICD-10-CM

## 2025-07-24 RX ORDER — PANCRELIPASE LIPASE, PANCRELIPASE PROTEASE, PANCRELIPASE AMYLASE 40000; 126000; 168000 [USP'U]/1; [USP'U]/1; [USP'U]/1
1 CAPSULE, DELAYED RELEASE ORAL
Qty: 90 CAPSULE | Refills: 11 | Status: SHIPPED | OUTPATIENT
Start: 2025-07-24

## 2025-08-05 ENCOUNTER — OFFICE VISIT (OUTPATIENT)
Dept: NEUROLOGY | Facility: CLINIC | Age: 70
End: 2025-08-05
Payer: MEDICARE

## 2025-08-05 VITALS — DIASTOLIC BLOOD PRESSURE: 60 MMHG | OXYGEN SATURATION: 94 % | SYSTOLIC BLOOD PRESSURE: 96 MMHG | HEART RATE: 85 BPM

## 2025-08-05 DIAGNOSIS — G20.B2 PARKINSON'S DISEASE WITH DYSKINESIA AND FLUCTUATING MANIFESTATIONS (H): Primary | ICD-10-CM

## 2025-08-05 DIAGNOSIS — I95.1 ORTHOSTATIC HYPOTENSION: ICD-10-CM

## 2025-08-05 RX ORDER — AMANTADINE HYDROCHLORIDE 100 MG/1
100 CAPSULE, GELATIN COATED ORAL 2 TIMES DAILY
Qty: 60 CAPSULE | Refills: 5 | Status: SHIPPED | OUTPATIENT
Start: 2025-08-05

## 2025-08-05 ASSESSMENT — MOVEMENT DISORDERS SOCIETY - UNIFIED PARKINSONS DISEASE RATING SCALE (MDS-UPDRS)
DRESSING: (0) NORMAL: NOT AT ALL (NO PROBLEMS).
GETTING_OUT_OF_BED_CAR_DEEP_CHAIR: (0) NORMAL: NOT AT ALL (NO PROBLEMS).
TOTAL_SCORE: 8
FREEZING: (1) SLIGHTLY: I BRIEFLY FREEZE, BUT I CAN EASILY START WALKING AGAIN. I DO NOT NEED HELP FROM SOMEONE ELSE OR A WALKING AID (CANE OR WALKER) BECAUSE OF FREEZING.
HANDWRITING: (0) NORMAL: NOT AT ALL (NO PROBLEMS).
TREMOR: (1) SLIGHT: SHAKING OR TREMOR OCCURS BUT DOES NOT CAUSE PROBLEMS WITH ANY ACTIVITIES.
SPEECH: (0) NORMAL: NOT AT ALL (NO PROBLEMS).
EATING_TASKS: (0) NORMAL: NOT AT ALL (NO PROBLEMS).
TURNING_IN_BED: (1) SLIGHT: I HAVE A BIT OF TROUBLE TURNING, BUT I DO NOT NEED ANY HELP.
HYGIENE: (1) SLIGHT: I AM SLOW, BUT I DO NOT NEED ANY HELP.
HOBBIES_AND_OTHER_ACTIVITIES: (1) SLIGHT: I AM A BIT SLOW BUT DO THESE ACTIVITIES EASILY.
WALKING_AND_BALANCE: (1) SLIGHT: I AM SLIGHTLY SLOW OR MAY DRAG A LEG.  I NEVER USE A WALKING AID.
SALIVA_AND_DROOLING: (2) MILD: I HAVE SOME DROOLING DURING SLEEP, BUT NONE WHEN I AM AWAKE.
CHEWING_AND_SWALLOWING: (0) NORMAL: NO PROBLEMS.

## 2025-08-09 ENCOUNTER — MYC MEDICAL ADVICE (OUTPATIENT)
Dept: CARDIOLOGY | Facility: CLINIC | Age: 70
End: 2025-08-09
Payer: MEDICARE

## 2025-08-20 DIAGNOSIS — Z79.4 TYPE 2 DIABETES MELLITUS WITH HYPERGLYCEMIA, WITH LONG-TERM CURRENT USE OF INSULIN (H): ICD-10-CM

## 2025-08-20 DIAGNOSIS — E11.65 TYPE 2 DIABETES MELLITUS WITH HYPERGLYCEMIA, WITH LONG-TERM CURRENT USE OF INSULIN (H): ICD-10-CM

## 2025-08-20 RX ORDER — ACYCLOVIR 400 MG/1
TABLET ORAL
Qty: 9 EACH | Refills: 11 | Status: SHIPPED | OUTPATIENT
Start: 2025-08-20

## 2025-09-03 ENCOUNTER — ANCILLARY PROCEDURE (OUTPATIENT)
Dept: CARDIOLOGY | Facility: CLINIC | Age: 70
End: 2025-09-03
Attending: INTERNAL MEDICINE
Payer: MEDICARE

## 2025-09-03 DIAGNOSIS — I44.1 HEART BLOCK AV SECOND DEGREE: ICD-10-CM

## 2025-09-03 DIAGNOSIS — I45.5 SINUS PAUSE: ICD-10-CM

## 2025-09-03 DIAGNOSIS — Z95.0 CARDIAC PACEMAKER IN SITU: ICD-10-CM

## 2025-09-03 LAB
MDC_IDC_LEAD_CONNECTION_STATUS: NORMAL
MDC_IDC_LEAD_CONNECTION_STATUS: NORMAL
MDC_IDC_LEAD_IMPLANT_DT: NORMAL
MDC_IDC_LEAD_IMPLANT_DT: NORMAL
MDC_IDC_LEAD_LOCATION: NORMAL
MDC_IDC_LEAD_LOCATION: NORMAL
MDC_IDC_LEAD_LOCATION_DETAIL_1: NORMAL
MDC_IDC_LEAD_LOCATION_DETAIL_1: NORMAL
MDC_IDC_LEAD_MFG: NORMAL
MDC_IDC_LEAD_MFG: NORMAL
MDC_IDC_LEAD_MODEL: NORMAL
MDC_IDC_LEAD_MODEL: NORMAL
MDC_IDC_LEAD_POLARITY_TYPE: NORMAL
MDC_IDC_LEAD_POLARITY_TYPE: NORMAL
MDC_IDC_LEAD_SERIAL: NORMAL
MDC_IDC_LEAD_SERIAL: NORMAL
MDC_IDC_LEAD_SPECIAL_FUNCTION: NORMAL
MDC_IDC_LEAD_SPECIAL_FUNCTION: NORMAL
MDC_IDC_MSMT_BATTERY_DTM: NORMAL
MDC_IDC_MSMT_BATTERY_REMAINING_LONGEVITY: 131 MO
MDC_IDC_MSMT_BATTERY_RRT_TRIGGER: 2.62
MDC_IDC_MSMT_BATTERY_STATUS: NORMAL
MDC_IDC_MSMT_BATTERY_VOLTAGE: 3.03 V
MDC_IDC_MSMT_LEADCHNL_RA_IMPEDANCE_VALUE: 323 OHM
MDC_IDC_MSMT_LEADCHNL_RA_IMPEDANCE_VALUE: 399 OHM
MDC_IDC_MSMT_LEADCHNL_RA_PACING_THRESHOLD_AMPLITUDE: 0.62 V
MDC_IDC_MSMT_LEADCHNL_RA_PACING_THRESHOLD_PULSEWIDTH: 0.4 MS
MDC_IDC_MSMT_LEADCHNL_RA_SENSING_INTR_AMPL: 3 MV
MDC_IDC_MSMT_LEADCHNL_RV_IMPEDANCE_VALUE: 399 OHM
MDC_IDC_MSMT_LEADCHNL_RV_IMPEDANCE_VALUE: 456 OHM
MDC_IDC_MSMT_LEADCHNL_RV_PACING_THRESHOLD_AMPLITUDE: 0.75 V
MDC_IDC_MSMT_LEADCHNL_RV_PACING_THRESHOLD_PULSEWIDTH: 0.4 MS
MDC_IDC_MSMT_LEADCHNL_RV_SENSING_INTR_AMPL: 16.12 MV
MDC_IDC_PG_IMPLANT_DTM: NORMAL
MDC_IDC_PG_MFG: NORMAL
MDC_IDC_PG_MODEL: NORMAL
MDC_IDC_PG_SERIAL: NORMAL
MDC_IDC_PG_TYPE: NORMAL
MDC_IDC_SESS_CLINIC_NAME: NORMAL
MDC_IDC_SESS_DTM: NORMAL
MDC_IDC_SESS_TYPE: NORMAL
MDC_IDC_SET_BRADY_AT_MODE_SWITCH_RATE: 171 {BEATS}/MIN
MDC_IDC_SET_BRADY_HYSTRATE: NORMAL
MDC_IDC_SET_BRADY_LOWRATE: 60 {BEATS}/MIN
MDC_IDC_SET_BRADY_MAX_SENSOR_RATE: 130 {BEATS}/MIN
MDC_IDC_SET_BRADY_MAX_TRACKING_RATE: 130 {BEATS}/MIN
MDC_IDC_SET_BRADY_MODE: NORMAL
MDC_IDC_SET_BRADY_PAV_DELAY_HIGH: 140 MS
MDC_IDC_SET_BRADY_PAV_DELAY_LOW: 240 MS
MDC_IDC_SET_BRADY_SAV_DELAY_HIGH: 110 MS
MDC_IDC_SET_BRADY_SAV_DELAY_LOW: 220 MS
MDC_IDC_SET_LEADCHNL_RA_PACING_AMPLITUDE: 1.5 V
MDC_IDC_SET_LEADCHNL_RA_PACING_ANODE_ELECTRODE_1: NORMAL
MDC_IDC_SET_LEADCHNL_RA_PACING_ANODE_LOCATION_1: NORMAL
MDC_IDC_SET_LEADCHNL_RA_PACING_CAPTURE_MODE: NORMAL
MDC_IDC_SET_LEADCHNL_RA_PACING_CATHODE_ELECTRODE_1: NORMAL
MDC_IDC_SET_LEADCHNL_RA_PACING_CATHODE_LOCATION_1: NORMAL
MDC_IDC_SET_LEADCHNL_RA_PACING_POLARITY: NORMAL
MDC_IDC_SET_LEADCHNL_RA_PACING_PULSEWIDTH: 0.4 MS
MDC_IDC_SET_LEADCHNL_RA_SENSING_ANODE_ELECTRODE_1: NORMAL
MDC_IDC_SET_LEADCHNL_RA_SENSING_ANODE_LOCATION_1: NORMAL
MDC_IDC_SET_LEADCHNL_RA_SENSING_CATHODE_ELECTRODE_1: NORMAL
MDC_IDC_SET_LEADCHNL_RA_SENSING_CATHODE_LOCATION_1: NORMAL
MDC_IDC_SET_LEADCHNL_RA_SENSING_POLARITY: NORMAL
MDC_IDC_SET_LEADCHNL_RA_SENSING_SENSITIVITY: 0.3 MV
MDC_IDC_SET_LEADCHNL_RV_PACING_AMPLITUDE: 2 V
MDC_IDC_SET_LEADCHNL_RV_PACING_ANODE_ELECTRODE_1: NORMAL
MDC_IDC_SET_LEADCHNL_RV_PACING_ANODE_LOCATION_1: NORMAL
MDC_IDC_SET_LEADCHNL_RV_PACING_CAPTURE_MODE: NORMAL
MDC_IDC_SET_LEADCHNL_RV_PACING_CATHODE_ELECTRODE_1: NORMAL
MDC_IDC_SET_LEADCHNL_RV_PACING_CATHODE_LOCATION_1: NORMAL
MDC_IDC_SET_LEADCHNL_RV_PACING_POLARITY: NORMAL
MDC_IDC_SET_LEADCHNL_RV_PACING_PULSEWIDTH: 0.4 MS
MDC_IDC_SET_LEADCHNL_RV_SENSING_ANODE_ELECTRODE_1: NORMAL
MDC_IDC_SET_LEADCHNL_RV_SENSING_ANODE_LOCATION_1: NORMAL
MDC_IDC_SET_LEADCHNL_RV_SENSING_CATHODE_ELECTRODE_1: NORMAL
MDC_IDC_SET_LEADCHNL_RV_SENSING_CATHODE_LOCATION_1: NORMAL
MDC_IDC_SET_LEADCHNL_RV_SENSING_POLARITY: NORMAL
MDC_IDC_SET_LEADCHNL_RV_SENSING_SENSITIVITY: 0.9 MV
MDC_IDC_SET_ZONE_DETECTION_INTERVAL: 350 MS
MDC_IDC_SET_ZONE_DETECTION_INTERVAL: 400 MS
MDC_IDC_SET_ZONE_STATUS: NORMAL
MDC_IDC_SET_ZONE_STATUS: NORMAL
MDC_IDC_SET_ZONE_TYPE: NORMAL
MDC_IDC_SET_ZONE_VENDOR_TYPE: NORMAL
MDC_IDC_STAT_AT_BURDEN_PERCENT: 0.1 %
MDC_IDC_STAT_AT_DTM_END: NORMAL
MDC_IDC_STAT_AT_DTM_START: NORMAL
MDC_IDC_STAT_BRADY_AP_VP_PERCENT: 14.54 %
MDC_IDC_STAT_BRADY_AP_VS_PERCENT: 0 %
MDC_IDC_STAT_BRADY_AS_VP_PERCENT: 85.41 %
MDC_IDC_STAT_BRADY_AS_VS_PERCENT: 0.04 %
MDC_IDC_STAT_BRADY_DTM_END: NORMAL
MDC_IDC_STAT_BRADY_DTM_START: NORMAL
MDC_IDC_STAT_BRADY_RA_PERCENT_PACED: 14.54 %
MDC_IDC_STAT_BRADY_RV_PERCENT_PACED: 99.95 %
MDC_IDC_STAT_EPISODE_RECENT_COUNT: 0
MDC_IDC_STAT_EPISODE_RECENT_COUNT_DTM_END: NORMAL
MDC_IDC_STAT_EPISODE_RECENT_COUNT_DTM_START: NORMAL
MDC_IDC_STAT_EPISODE_TOTAL_COUNT: 0
MDC_IDC_STAT_EPISODE_TOTAL_COUNT_DTM_END: NORMAL
MDC_IDC_STAT_EPISODE_TOTAL_COUNT_DTM_START: NORMAL
MDC_IDC_STAT_EPISODE_TYPE: NORMAL
MDC_IDC_STAT_TACHYTHERAPY_RECENT_DTM_END: NORMAL
MDC_IDC_STAT_TACHYTHERAPY_RECENT_DTM_START: NORMAL
MDC_IDC_STAT_TACHYTHERAPY_TOTAL_DTM_END: NORMAL
MDC_IDC_STAT_TACHYTHERAPY_TOTAL_DTM_START: NORMAL

## 2025-09-03 PROCEDURE — 93296 REM INTERROG EVL PM/IDS: CPT

## 2025-09-04 ENCOUNTER — VIRTUAL VISIT (OUTPATIENT)
Dept: PHARMACY | Facility: CLINIC | Age: 70
End: 2025-09-04
Attending: PSYCHIATRY & NEUROLOGY
Payer: MEDICARE

## 2025-09-04 DIAGNOSIS — I95.9 HYPOTENSION, UNSPECIFIED HYPOTENSION TYPE: ICD-10-CM

## 2025-09-04 DIAGNOSIS — G20.A1 PARKINSON'S DISEASE, UNSPECIFIED WHETHER DYSKINESIA PRESENT, UNSPECIFIED WHETHER MANIFESTATIONS FLUCTUATE (H): Primary | ICD-10-CM

## 2025-09-04 DIAGNOSIS — F41.1 GAD (GENERALIZED ANXIETY DISORDER): ICD-10-CM

## (undated) DEVICE — SU PROLENE 4-0 SHDA 36" 8521H

## (undated) DEVICE — DRSG PRIMAPORE 04 3/4X13 3/4" 66007140

## (undated) DEVICE — SOL WATER IRRIG 1000ML BOTTLE 2F7114

## (undated) DEVICE — ENDO CAP AND TUBING STERILE FOR ENDOGATOR  100130

## (undated) DEVICE — PACK ENDOSCOPY GI CUSTOM UMMC

## (undated) DEVICE — STPL LINEAR CUT 55MM TLC55

## (undated) DEVICE — LINEN TOWEL PACK X30 5481

## (undated) DEVICE — ESU LIGASURE IMPACT OPEN SEALER/DVDR CVD LG JAW LF4418

## (undated) DEVICE — ENDO FUSION OMNI-TOME 21 FS-OMNI-21 G48675

## (undated) DEVICE — TUBING SMOKE EVAC PNEUMOCLEAR HIGH FLOW 0620050250

## (undated) DEVICE — ESU PENCIL W/COATED BLADE E2450H

## (undated) DEVICE — TUBING SUCTION 10'X3/16" N510

## (undated) DEVICE — COVER CAMERA IN-LIGHT DISP LT-C02

## (undated) DEVICE — PREP CHLORAPREP 26ML TINTED HI-LITE ORANGE 930815

## (undated) DEVICE — SU SILK 0 TIE 6X30" A306H

## (undated) DEVICE — ENDO FUSION OMNI-TOME G31903

## (undated) DEVICE — SU VICRYL 3-0 SH 27" J316H

## (undated) DEVICE — SU SILK 3-0 SH CR 8X18" C013D

## (undated) DEVICE — GUIDEWIRE VASCULAR 0.035IN DIA 145CML 15CML/6CML STAINLESS

## (undated) DEVICE — DRSG DRAIN 2X2" 7087

## (undated) DEVICE — SUCTION MANIFOLD NEPTUNE 2 SYS 4 PORT 0702-020-000

## (undated) DEVICE — SU VICRYL 0 UR-6 27" J603H

## (undated) DEVICE — SU SILK 2-0 SH 30" K833H

## (undated) DEVICE — LINEN TOWEL PACK X6 WHITE 5487

## (undated) DEVICE — SU SILK 2-0 TIE 12X30" A305H

## (undated) DEVICE — CABLE PACING ALLIGATOR CLIP 12FT 5833SL

## (undated) DEVICE — SURGICEL ABSORBABLE HEMOSTAT SNOW 4"X4" 2083

## (undated) DEVICE — BLADE CLIPPER SGL USE 9680

## (undated) DEVICE — SU VICRYL 3-0 SH 27" UND J416H

## (undated) DEVICE — KIT MICROINTRODUCER VASCULAR VSI 4FRX0.018INX40CM 7195X

## (undated) DEVICE — Device

## (undated) DEVICE — CATH TRAY FOLEY 16FR BARDEX W/TEMP PRB URINE METER 319416AM

## (undated) DEVICE — DRSG PRIMAPORE 02X3" 7133

## (undated) DEVICE — SU SILK 4-0 TIE 12X30" A303H

## (undated) DEVICE — BIOPSY VALVE BIOSHIELD 00711135

## (undated) DEVICE — SOL NACL 0.9% IRRIG 1000ML BOTTLE 2F7124

## (undated) DEVICE — BALLOON EXTRACTION 15X1950MM 3.2MM TL B-V243Q-A

## (undated) DEVICE — DRAIN JACKSON PRATT ROUND W/TROCAR 19FR JP-HUR195

## (undated) DEVICE — SUCTION TIP YANKAUER W/O VENT K86

## (undated) DEVICE — ENDO SNARE POLYPECTOMY OVAL 15MM LOOP SD-240U-15

## (undated) DEVICE — 7FR X 13CM SAFESHEATH II TEAR-AWAY VALVED SHEATH SYSTEM, WITH SIDE PORT, 0.038IN GUIDEWIRE, 19.5CM DILATOR

## (undated) DEVICE — SOL NACL 0.9% INJ 1000ML BAG 2B1324X

## (undated) DEVICE — ENDO TUBING CO2 SMARTCAP STERILE DISP 100145CO2EXT

## (undated) DEVICE — ANTIFOG SOLUTION W/FOAM PAD CF-1002

## (undated) DEVICE — DRAPE SHEET REV FOLD 3/4 9349

## (undated) DEVICE — DEVICE SUTURE PASSER 14GA WECK EFX EFXSP2

## (undated) DEVICE — SPONGE KITTNER 30-101

## (undated) DEVICE — SU SILK 3-0 SH 30" K832H

## (undated) DEVICE — ADH SKIN CLOSURE PREMIERPRO EXOFIN 1.0ML 3470

## (undated) DEVICE — BLADE KNIFE SURG 15 371115

## (undated) DEVICE — DRAPE IOBAN INCISE 23X17" 6650EZ

## (undated) DEVICE — GLOVE BIOGEL PI SZ 8.0 40880

## (undated) DEVICE — NDL INSUFFLATION 13GA 150MM C2202

## (undated) DEVICE — STPL SKIN 35W ROTATING HEAD PRW35

## (undated) DEVICE — WIPES FOLEY CARE SURESTEP PROVON DFC100

## (undated) DEVICE — GUIDEWIRE NOVAGOLD .018X260CM STR TIP M00552000

## (undated) DEVICE — ESU ELEC BLADE 6" COATED/INSULATED E1455-6

## (undated) DEVICE — SU PDS II 1 TP-1 54" Z879G

## (undated) DEVICE — ESU GROUND PAD ADULT W/CORD E7507

## (undated) DEVICE — SUCTION IRR STRYKERFLOW II W/TIP 250-070-520

## (undated) DEVICE — SU PDS II 1 TP-1 48" Z880G

## (undated) DEVICE — SU PDS II 0 TP-1 60" Z991G

## (undated) DEVICE — LINEN GOWN XLG 5407

## (undated) DEVICE — VESSEL LOOPS YELLOW MAXI 31145694

## (undated) DEVICE — ENDO TROCAR FIRST ENTRY KII FIOS ADV FIX 05X100MM CFF03

## (undated) DEVICE — ENDO TROCAR SLEEVE KII ADV FIXATION 05X100MM CFS02

## (undated) DEVICE — ELECTRODE DEFIB CADENCE 22550R

## (undated) DEVICE — SPONGE LAP 18X18" X8435

## (undated) DEVICE — SPONGE RAY-TEC 4X8" 7318

## (undated) DEVICE — SU SILK 3-0 TIE 12X30" A304H

## (undated) DEVICE — NDL COUNTER 20CT 31142493

## (undated) DEVICE — ENDO ENDO DISTAL MAJ-2315

## (undated) DEVICE — BLADE KNIFE SURG 10 371110

## (undated) DEVICE — CATH TRAY FOLEY SURESTEP 16FR W/TMP PRB STLK LATEX A319416AM

## (undated) DEVICE — SU MONOCRYL 4-0 PS-2 27" UND Y426H

## (undated) DEVICE — KIT CONNECTOR FOR OLYMPUS ENDOSCOPES DEFENDO 100310

## (undated) DEVICE — SU ETHILON 3-0 PS-1 18" 1663H

## (undated) DEVICE — SU PDS II 3-0 SH 27" Z316H

## (undated) DEVICE — KIT ENDO FIRST STEP DISINFECTANT 200ML W/POUCH EP-4

## (undated) DEVICE — ENDO SCOPE WARMER SEAL  C3101

## (undated) DEVICE — DRAIN JACKSON PRATT RESERVOIR 100ML SU130-1305

## (undated) DEVICE — ENDO FCP GRASPING ROTATABLE 7.2MMX2.6MM FG-244NR

## (undated) DEVICE — STPL RELOAD LINEAR CUT 55MM TCR55

## (undated) DEVICE — LIGHT HANDLE X1 31140133

## (undated) DEVICE — ENDO BITE BLOCK ADULT OMNI-BLOC

## (undated) DEVICE — SU PROLENE 5-0 RB-1DA 36"  8556H

## (undated) DEVICE — WIRE GUIDE 0.025"X270CM ANG VISIGLIDE G-240-2527A

## (undated) RX ORDER — FENTANYL CITRATE 50 UG/ML
INJECTION, SOLUTION INTRAMUSCULAR; INTRAVENOUS
Status: DISPENSED
Start: 2024-02-28

## (undated) RX ORDER — IOPAMIDOL 510 MG/ML
INJECTION, SOLUTION INTRAVASCULAR
Status: DISPENSED
Start: 2023-05-18

## (undated) RX ORDER — GLYCOPYRROLATE 0.2 MG/ML
INJECTION, SOLUTION INTRAMUSCULAR; INTRAVENOUS
Status: DISPENSED
Start: 2023-06-30

## (undated) RX ORDER — CLINDAMYCIN PHOSPHATE 900 MG/50ML
INJECTION, SOLUTION INTRAVENOUS
Status: DISPENSED
Start: 2024-02-28

## (undated) RX ORDER — ACETAMINOPHEN 325 MG/1
TABLET ORAL
Status: DISPENSED
Start: 2023-06-30

## (undated) RX ORDER — PROPOFOL 10 MG/ML
INJECTION, EMULSION INTRAVENOUS
Status: DISPENSED
Start: 2023-01-12

## (undated) RX ORDER — BUPIVACAINE HYDROCHLORIDE 2.5 MG/ML
INJECTION, SOLUTION EPIDURAL; INFILTRATION; INTRACAUDAL
Status: DISPENSED
Start: 2023-02-20

## (undated) RX ORDER — LIDOCAINE HYDROCHLORIDE 10 MG/ML
INJECTION, SOLUTION INFILTRATION; PERINEURAL
Status: DISPENSED
Start: 2023-08-31

## (undated) RX ORDER — FENTANYL CITRATE 50 UG/ML
INJECTION, SOLUTION INTRAMUSCULAR; INTRAVENOUS
Status: DISPENSED
Start: 2023-06-30

## (undated) RX ORDER — FENTANYL CITRATE 50 UG/ML
INJECTION, SOLUTION INTRAMUSCULAR; INTRAVENOUS
Status: DISPENSED
Start: 2023-02-20

## (undated) RX ORDER — ONDANSETRON 2 MG/ML
INJECTION INTRAMUSCULAR; INTRAVENOUS
Status: DISPENSED
Start: 2023-02-20

## (undated) RX ORDER — FENTANYL CITRATE 50 UG/ML
INJECTION, SOLUTION INTRAMUSCULAR; INTRAVENOUS
Status: DISPENSED
Start: 2023-05-18

## (undated) RX ORDER — HYDROMORPHONE HCL IN WATER/PF 6 MG/30 ML
PATIENT CONTROLLED ANALGESIA SYRINGE INTRAVENOUS
Status: DISPENSED
Start: 2023-02-20

## (undated) RX ORDER — LIDOCAINE HYDROCHLORIDE 10 MG/ML
INJECTION, SOLUTION EPIDURAL; INFILTRATION; INTRACAUDAL; PERINEURAL
Status: DISPENSED
Start: 2024-02-28

## (undated) RX ORDER — FENTANYL CITRATE-0.9 % NACL/PF 10 MCG/ML
PLASTIC BAG, INJECTION (ML) INTRAVENOUS
Status: DISPENSED
Start: 2023-02-20

## (undated) RX ORDER — FENTANYL CITRATE-0.9 % NACL/PF 10 MCG/ML
PLASTIC BAG, INJECTION (ML) INTRAVENOUS
Status: DISPENSED
Start: 2023-06-30

## (undated) RX ORDER — PROPOFOL 10 MG/ML
INJECTION, EMULSION INTRAVENOUS
Status: DISPENSED
Start: 2023-02-20

## (undated) RX ORDER — SIMETHICONE 40MG/0.6ML
SUSPENSION, DROPS(FINAL DOSAGE FORM)(ML) ORAL
Status: DISPENSED
Start: 2023-05-18

## (undated) RX ORDER — GLYCOPYRROLATE 0.2 MG/ML
INJECTION, SOLUTION INTRAMUSCULAR; INTRAVENOUS
Status: DISPENSED
Start: 2023-02-20

## (undated) RX ORDER — INDOMETHACIN 50 MG/1
SUPPOSITORY RECTAL
Status: DISPENSED
Start: 2023-05-18

## (undated) RX ORDER — ACETAMINOPHEN 325 MG/1
TABLET ORAL
Status: DISPENSED
Start: 2024-02-28

## (undated) RX ORDER — PROPOFOL 10 MG/ML
INJECTION, EMULSION INTRAVENOUS
Status: DISPENSED
Start: 2023-06-30

## (undated) RX ORDER — IVABRADINE 5 MG/1
TABLET, FILM COATED ORAL
Status: DISPENSED
Start: 2025-01-29

## (undated) RX ORDER — LIDOCAINE HYDROCHLORIDE AND EPINEPHRINE 10; 10 MG/ML; UG/ML
INJECTION, SOLUTION INFILTRATION; PERINEURAL
Status: DISPENSED
Start: 2023-02-20

## (undated) RX ORDER — METOPROLOL TARTRATE 1 MG/ML
INJECTION, SOLUTION INTRAVENOUS
Status: DISPENSED
Start: 2025-01-29

## (undated) RX ORDER — SODIUM CHLORIDE, SODIUM LACTATE, POTASSIUM CHLORIDE, CALCIUM CHLORIDE 600; 310; 30; 20 MG/100ML; MG/100ML; MG/100ML; MG/100ML
INJECTION, SOLUTION INTRAVENOUS
Status: DISPENSED
Start: 2023-06-30

## (undated) RX ORDER — METOPROLOL TARTRATE 100 MG/1
TABLET ORAL
Status: DISPENSED
Start: 2025-01-29

## (undated) RX ORDER — HYDROMORPHONE HYDROCHLORIDE 1 MG/ML
INJECTION, SOLUTION INTRAMUSCULAR; INTRAVENOUS; SUBCUTANEOUS
Status: DISPENSED
Start: 2023-02-20

## (undated) RX ORDER — ESMOLOL HYDROCHLORIDE 10 MG/ML
INJECTION INTRAVENOUS
Status: DISPENSED
Start: 2023-02-20

## (undated) RX ORDER — HYDROMORPHONE HYDROCHLORIDE 1 MG/ML
INJECTION, SOLUTION INTRAMUSCULAR; INTRAVENOUS; SUBCUTANEOUS
Status: DISPENSED
Start: 2023-05-18

## (undated) RX ORDER — LIDOCAINE HYDROCHLORIDE AND EPINEPHRINE 10; 10 MG/ML; UG/ML
INJECTION, SOLUTION INFILTRATION; PERINEURAL
Status: DISPENSED
Start: 2023-03-03

## (undated) RX ORDER — DEXAMETHASONE SODIUM PHOSPHATE 4 MG/ML
INJECTION, SOLUTION INTRA-ARTICULAR; INTRALESIONAL; INTRAMUSCULAR; INTRAVENOUS; SOFT TISSUE
Status: DISPENSED
Start: 2023-02-20

## (undated) RX ORDER — NITROGLYCERIN 0.4 MG/1
TABLET SUBLINGUAL
Status: DISPENSED
Start: 2025-01-29

## (undated) RX ORDER — CLINDAMYCIN PHOSPHATE 900 MG/50ML
INJECTION, SOLUTION INTRAVENOUS
Status: DISPENSED
Start: 2023-02-20

## (undated) RX ORDER — EPHEDRINE SULFATE 50 MG/ML
INJECTION, SOLUTION INTRAMUSCULAR; INTRAVENOUS; SUBCUTANEOUS
Status: DISPENSED
Start: 2023-06-30

## (undated) RX ORDER — DEXTROSE MONOHYDRATE, SODIUM CHLORIDE, AND POTASSIUM CHLORIDE 50; 1.49; 4.5 G/1000ML; G/1000ML; G/1000ML
INJECTION, SOLUTION INTRAVENOUS
Status: DISPENSED
Start: 2023-02-20